# Patient Record
Sex: MALE | Race: BLACK OR AFRICAN AMERICAN | Employment: OTHER | URBAN - METROPOLITAN AREA
[De-identification: names, ages, dates, MRNs, and addresses within clinical notes are randomized per-mention and may not be internally consistent; named-entity substitution may affect disease eponyms.]

---

## 2018-02-15 ENCOUNTER — ED HISTORICAL/CONVERTED ENCOUNTER (OUTPATIENT)
Dept: OTHER | Age: 73
End: 2018-02-15

## 2019-03-04 ENCOUNTER — IP HISTORICAL/CONVERTED ENCOUNTER (OUTPATIENT)
Dept: OTHER | Age: 74
End: 2019-03-04

## 2019-04-04 ENCOUNTER — OP HISTORICAL/CONVERTED ENCOUNTER (OUTPATIENT)
Dept: OTHER | Age: 74
End: 2019-04-04

## 2019-04-18 ENCOUNTER — OP HISTORICAL/CONVERTED ENCOUNTER (OUTPATIENT)
Dept: OTHER | Age: 74
End: 2019-04-18

## 2019-05-02 ENCOUNTER — OP HISTORICAL/CONVERTED ENCOUNTER (OUTPATIENT)
Dept: OTHER | Age: 74
End: 2019-05-02

## 2019-05-14 ENCOUNTER — OP HISTORICAL/CONVERTED ENCOUNTER (OUTPATIENT)
Dept: OTHER | Age: 74
End: 2019-05-14

## 2019-05-16 ENCOUNTER — IP HISTORICAL/CONVERTED ENCOUNTER (OUTPATIENT)
Dept: OTHER | Age: 74
End: 2019-05-16

## 2019-05-30 ENCOUNTER — OP HISTORICAL/CONVERTED ENCOUNTER (OUTPATIENT)
Dept: OTHER | Age: 74
End: 2019-05-30

## 2019-06-27 ENCOUNTER — OP HISTORICAL/CONVERTED ENCOUNTER (OUTPATIENT)
Dept: OTHER | Age: 74
End: 2019-06-27

## 2019-07-11 ENCOUNTER — OP HISTORICAL/CONVERTED ENCOUNTER (OUTPATIENT)
Dept: OTHER | Age: 74
End: 2019-07-11

## 2019-08-01 ENCOUNTER — OP HISTORICAL/CONVERTED ENCOUNTER (OUTPATIENT)
Dept: OTHER | Age: 74
End: 2019-08-01

## 2019-08-22 ENCOUNTER — OP HISTORICAL/CONVERTED ENCOUNTER (OUTPATIENT)
Dept: OTHER | Age: 74
End: 2019-08-22

## 2019-09-05 ENCOUNTER — OP HISTORICAL/CONVERTED ENCOUNTER (OUTPATIENT)
Dept: OTHER | Age: 74
End: 2019-09-05

## 2019-10-03 ENCOUNTER — OP HISTORICAL/CONVERTED ENCOUNTER (OUTPATIENT)
Dept: OTHER | Age: 74
End: 2019-10-03

## 2019-11-22 ENCOUNTER — IP HISTORICAL/CONVERTED ENCOUNTER (OUTPATIENT)
Dept: OTHER | Age: 74
End: 2019-11-22

## 2020-07-10 ENCOUNTER — OP HISTORICAL/CONVERTED ENCOUNTER (OUTPATIENT)
Dept: OTHER | Age: 75
End: 2020-07-10

## 2020-07-19 ENCOUNTER — OP HISTORICAL/CONVERTED ENCOUNTER (OUTPATIENT)
Dept: OTHER | Age: 75
End: 2020-07-19

## 2021-01-01 ENCOUNTER — APPOINTMENT (OUTPATIENT)
Dept: CT IMAGING | Age: 76
DRG: 673 | End: 2021-01-01
Attending: FAMILY MEDICINE
Payer: MEDICARE

## 2021-01-01 ENCOUNTER — OFFICE VISIT (OUTPATIENT)
Dept: INFECTIOUS DISEASES | Age: 76
End: 2021-01-01
Payer: MEDICARE

## 2021-01-01 ENCOUNTER — APPOINTMENT (OUTPATIENT)
Dept: GENERAL RADIOLOGY | Age: 76
DRG: 673 | End: 2021-01-01
Attending: INTERNAL MEDICINE
Payer: MEDICARE

## 2021-01-01 ENCOUNTER — APPOINTMENT (OUTPATIENT)
Dept: ULTRASOUND IMAGING | Age: 76
DRG: 683 | End: 2021-01-01
Attending: INTERNAL MEDICINE
Payer: MEDICARE

## 2021-01-01 ENCOUNTER — HOSPITAL ENCOUNTER (OUTPATIENT)
Dept: INTERVENTIONAL RADIOLOGY/VASCULAR | Age: 76
Discharge: HOME | End: 2021-07-27
Attending: INTERNAL MEDICINE
Payer: MEDICARE

## 2021-01-01 ENCOUNTER — ANESTHESIA EVENT (OUTPATIENT)
Dept: SURGERY | Age: 76
DRG: 571 | End: 2021-01-01
Payer: MEDICARE

## 2021-01-01 ENCOUNTER — APPOINTMENT (OUTPATIENT)
Dept: GENERAL RADIOLOGY | Age: 76
DRG: 673 | End: 2021-01-01
Attending: NURSE PRACTITIONER
Payer: MEDICARE

## 2021-01-01 ENCOUNTER — APPOINTMENT (OUTPATIENT)
Dept: GENERAL RADIOLOGY | Age: 76
DRG: 571 | End: 2021-01-01
Attending: INTERNAL MEDICINE
Payer: MEDICARE

## 2021-01-01 ENCOUNTER — ANESTHESIA (OUTPATIENT)
Dept: SURGERY | Age: 76
DRG: 673 | End: 2021-01-01
Payer: MEDICARE

## 2021-01-01 ENCOUNTER — HOSPITAL ENCOUNTER (EMERGENCY)
Age: 76
Discharge: ANOTHER HOSPITAL | End: 2021-07-21
Attending: STUDENT IN AN ORGANIZED HEALTH CARE EDUCATION/TRAINING PROGRAM
Payer: MEDICARE

## 2021-01-01 ENCOUNTER — APPOINTMENT (OUTPATIENT)
Dept: GENERAL RADIOLOGY | Age: 76
DRG: 673 | End: 2021-01-01
Attending: SURGERY
Payer: MEDICARE

## 2021-01-01 ENCOUNTER — TELEPHONE (OUTPATIENT)
Dept: SURGERY | Age: 76
End: 2021-01-01

## 2021-01-01 ENCOUNTER — APPOINTMENT (OUTPATIENT)
Dept: INTERVENTIONAL RADIOLOGY/VASCULAR | Age: 76
DRG: 673 | End: 2021-01-01
Attending: EMERGENCY MEDICINE
Payer: MEDICARE

## 2021-01-01 ENCOUNTER — APPOINTMENT (OUTPATIENT)
Dept: NON INVASIVE DIAGNOSTICS | Age: 76
DRG: 571 | End: 2021-01-01
Attending: INTERNAL MEDICINE
Payer: MEDICARE

## 2021-01-01 ENCOUNTER — OFFICE VISIT (OUTPATIENT)
Dept: SURGERY | Age: 76
End: 2021-01-01
Payer: MEDICARE

## 2021-01-01 ENCOUNTER — APPOINTMENT (OUTPATIENT)
Dept: ULTRASOUND IMAGING | Age: 76
DRG: 571 | End: 2021-01-01
Attending: INTERNAL MEDICINE
Payer: MEDICARE

## 2021-01-01 ENCOUNTER — ANESTHESIA (OUTPATIENT)
Dept: SURGERY | Age: 76
DRG: 571 | End: 2021-01-01
Payer: MEDICARE

## 2021-01-01 ENCOUNTER — ANESTHESIA EVENT (OUTPATIENT)
Dept: SURGERY | Age: 76
DRG: 673 | End: 2021-01-01
Payer: MEDICARE

## 2021-01-01 ENCOUNTER — APPOINTMENT (OUTPATIENT)
Dept: CT IMAGING | Age: 76
DRG: 571 | End: 2021-01-01
Attending: PHYSICIAN ASSISTANT
Payer: MEDICARE

## 2021-01-01 ENCOUNTER — APPOINTMENT (OUTPATIENT)
Dept: GENERAL RADIOLOGY | Age: 76
DRG: 673 | End: 2021-01-01
Attending: STUDENT IN AN ORGANIZED HEALTH CARE EDUCATION/TRAINING PROGRAM
Payer: MEDICARE

## 2021-01-01 ENCOUNTER — APPOINTMENT (OUTPATIENT)
Dept: GENERAL RADIOLOGY | Age: 76
DRG: 673 | End: 2021-01-01
Attending: EMERGENCY MEDICINE
Payer: MEDICARE

## 2021-01-01 ENCOUNTER — APPOINTMENT (OUTPATIENT)
Dept: GENERAL RADIOLOGY | Age: 76
DRG: 683 | End: 2021-01-01
Attending: INTERNAL MEDICINE
Payer: MEDICARE

## 2021-01-01 ENCOUNTER — HOSPITAL ENCOUNTER (INPATIENT)
Age: 76
LOS: 7 days | Discharge: HOME HEALTH | DRG: 683 | End: 2021-03-11
Attending: EMERGENCY MEDICINE | Admitting: INTERNAL MEDICINE
Payer: MEDICARE

## 2021-01-01 ENCOUNTER — HOSPITAL ENCOUNTER (INPATIENT)
Age: 76
LOS: 15 days | DRG: 673 | End: 2021-08-05
Attending: EMERGENCY MEDICINE | Admitting: INTERNAL MEDICINE
Payer: MEDICARE

## 2021-01-01 ENCOUNTER — APPOINTMENT (OUTPATIENT)
Dept: CT IMAGING | Age: 76
DRG: 673 | End: 2021-01-01
Attending: NURSE PRACTITIONER
Payer: MEDICARE

## 2021-01-01 ENCOUNTER — APPOINTMENT (OUTPATIENT)
Dept: ULTRASOUND IMAGING | Age: 76
DRG: 673 | End: 2021-01-01
Attending: INTERNAL MEDICINE
Payer: MEDICARE

## 2021-01-01 ENCOUNTER — HOSPITAL ENCOUNTER (INPATIENT)
Age: 76
LOS: 14 days | Discharge: HOME HEALTH | DRG: 571 | End: 2021-02-10
Attending: INTERNAL MEDICINE | Admitting: INTERNAL MEDICINE
Payer: MEDICARE

## 2021-01-01 ENCOUNTER — APPOINTMENT (OUTPATIENT)
Dept: GENERAL RADIOLOGY | Age: 76
End: 2021-01-01
Attending: STUDENT IN AN ORGANIZED HEALTH CARE EDUCATION/TRAINING PROGRAM
Payer: MEDICARE

## 2021-01-01 ENCOUNTER — APPOINTMENT (OUTPATIENT)
Dept: CT IMAGING | Age: 76
End: 2021-01-01
Attending: STUDENT IN AN ORGANIZED HEALTH CARE EDUCATION/TRAINING PROGRAM
Payer: MEDICARE

## 2021-01-01 VITALS
HEART RATE: 51 BPM | HEIGHT: 72 IN | TEMPERATURE: 97.7 F | BODY MASS INDEX: 16.18 KG/M2 | RESPIRATION RATE: 4 BRPM | OXYGEN SATURATION: 100 % | DIASTOLIC BLOOD PRESSURE: 20 MMHG | WEIGHT: 119.49 LBS | SYSTOLIC BLOOD PRESSURE: 38 MMHG

## 2021-01-01 VITALS
SYSTOLIC BLOOD PRESSURE: 153 MMHG | WEIGHT: 151.9 LBS | TEMPERATURE: 97.8 F | HEIGHT: 74 IN | RESPIRATION RATE: 18 BRPM | HEART RATE: 60 BPM | DIASTOLIC BLOOD PRESSURE: 83 MMHG | BODY MASS INDEX: 19.49 KG/M2 | OXYGEN SATURATION: 100 %

## 2021-01-01 VITALS
HEART RATE: 67 BPM | DIASTOLIC BLOOD PRESSURE: 67 MMHG | SYSTOLIC BLOOD PRESSURE: 122 MMHG | OXYGEN SATURATION: 100 % | RESPIRATION RATE: 17 BRPM

## 2021-01-01 VITALS
RESPIRATION RATE: 16 BRPM | BODY MASS INDEX: 19.35 KG/M2 | TEMPERATURE: 98 F | OXYGEN SATURATION: 97 % | SYSTOLIC BLOOD PRESSURE: 149 MMHG | HEART RATE: 73 BPM | WEIGHT: 150.79 LBS | DIASTOLIC BLOOD PRESSURE: 80 MMHG | HEIGHT: 74 IN

## 2021-01-01 VITALS
HEIGHT: 74 IN | WEIGHT: 167 LBS | RESPIRATION RATE: 16 BRPM | SYSTOLIC BLOOD PRESSURE: 125 MMHG | BODY MASS INDEX: 21.43 KG/M2 | OXYGEN SATURATION: 95 % | HEART RATE: 58 BPM | DIASTOLIC BLOOD PRESSURE: 71 MMHG

## 2021-01-01 VITALS
DIASTOLIC BLOOD PRESSURE: 58 MMHG | SYSTOLIC BLOOD PRESSURE: 140 MMHG | HEART RATE: 67 BPM | HEIGHT: 74 IN | RESPIRATION RATE: 20 BRPM | OXYGEN SATURATION: 94 % | BODY MASS INDEX: 21.1 KG/M2 | WEIGHT: 164.4 LBS | TEMPERATURE: 98.9 F

## 2021-01-01 VITALS
BODY MASS INDEX: 20.32 KG/M2 | TEMPERATURE: 98.2 F | OXYGEN SATURATION: 96 % | RESPIRATION RATE: 25 BRPM | HEART RATE: 75 BPM | HEIGHT: 72 IN | DIASTOLIC BLOOD PRESSURE: 79 MMHG | SYSTOLIC BLOOD PRESSURE: 169 MMHG | WEIGHT: 150 LBS

## 2021-01-01 DIAGNOSIS — R63.30 FEEDING DIFFICULTIES: ICD-10-CM

## 2021-01-01 DIAGNOSIS — N17.9 AKI (ACUTE KIDNEY INJURY) (HCC): ICD-10-CM

## 2021-01-01 DIAGNOSIS — N17.9 AKI (ACUTE KIDNEY INJURY) (HCC): Primary | ICD-10-CM

## 2021-01-01 DIAGNOSIS — R79.89 ELEVATED SERUM CREATININE: ICD-10-CM

## 2021-01-01 DIAGNOSIS — R51.9 SCALP PAIN: ICD-10-CM

## 2021-01-01 DIAGNOSIS — N39.0 URINARY TRACT INFECTION WITHOUT HEMATURIA, SITE UNSPECIFIED: ICD-10-CM

## 2021-01-01 DIAGNOSIS — Z51.5 END OF LIFE CARE: ICD-10-CM

## 2021-01-01 DIAGNOSIS — L02.811 SCALP ABSCESS: Primary | ICD-10-CM

## 2021-01-01 DIAGNOSIS — N17.9 ACUTE RENAL FAILURE, UNSPECIFIED ACUTE RENAL FAILURE TYPE (HCC): Primary | ICD-10-CM

## 2021-01-01 DIAGNOSIS — A49.01 MSSA INFECTION, NON-INVASIVE: ICD-10-CM

## 2021-01-01 DIAGNOSIS — E87.6 HYPOKALEMIA: ICD-10-CM

## 2021-01-01 DIAGNOSIS — E83.51 HYPOCALCEMIA: ICD-10-CM

## 2021-01-01 DIAGNOSIS — R06.02 SHORTNESS OF BREATH: ICD-10-CM

## 2021-01-01 DIAGNOSIS — N18.4 CHRONIC RENAL FAILURE SYNDROME, STAGE 4 (SEVERE) (HCC): ICD-10-CM

## 2021-01-01 DIAGNOSIS — R09.02 HYPOXIA: ICD-10-CM

## 2021-01-01 DIAGNOSIS — R65.10 SIRS (SYSTEMIC INFLAMMATORY RESPONSE SYNDROME) (HCC): ICD-10-CM

## 2021-01-01 DIAGNOSIS — L02.811 SCALP ABSCESS: ICD-10-CM

## 2021-01-01 DIAGNOSIS — E88.09 HYPOALBUMINEMIA: ICD-10-CM

## 2021-01-01 DIAGNOSIS — J18.9 COMMUNITY ACQUIRED BILATERAL LOWER LOBE PNEUMONIA: ICD-10-CM

## 2021-01-01 DIAGNOSIS — R41.82 ALTERED MENTAL STATUS, UNSPECIFIED ALTERED MENTAL STATUS TYPE: Primary | ICD-10-CM

## 2021-01-01 DIAGNOSIS — K59.81 OGILVIE'S SYNDROME: Chronic | ICD-10-CM

## 2021-01-01 DIAGNOSIS — E87.20 METABOLIC ACIDOSIS: ICD-10-CM

## 2021-01-01 DIAGNOSIS — R41.0 DELIRIUM: ICD-10-CM

## 2021-01-01 DIAGNOSIS — R06.03 ACUTE RESPIRATORY DISTRESS: ICD-10-CM

## 2021-01-01 DIAGNOSIS — R41.0 ACUTE DELIRIUM: ICD-10-CM

## 2021-01-01 LAB
25(OH)D3 SERPL-MCNC: 13.2 NG/ML (ref 30–100)
25(OH)D3 SERPL-MCNC: <9 NG/ML (ref 30–100)
ABO + RH BLD: NORMAL
ALBUMIN SERPL ELPH-MCNC: 3.5 G/DL (ref 2.9–4.4)
ALBUMIN SERPL-MCNC: 2.2 G/DL (ref 3.5–5)
ALBUMIN SERPL-MCNC: 2.3 G/DL (ref 3.5–5)
ALBUMIN SERPL-MCNC: 2.4 G/DL (ref 3.5–5)
ALBUMIN SERPL-MCNC: 2.4 G/DL (ref 3.5–5)
ALBUMIN SERPL-MCNC: 2.5 G/DL (ref 3.5–5)
ALBUMIN SERPL-MCNC: 2.6 G/DL (ref 3.5–5)
ALBUMIN SERPL-MCNC: 2.7 G/DL (ref 3.5–5)
ALBUMIN SERPL-MCNC: 2.8 G/DL (ref 3.5–5)
ALBUMIN SERPL-MCNC: 2.8 G/DL (ref 3.5–5)
ALBUMIN SERPL-MCNC: 2.9 G/DL (ref 3.5–5)
ALBUMIN SERPL-MCNC: 3 G/DL (ref 3.5–5)
ALBUMIN SERPL-MCNC: 3.2 G/DL (ref 3.5–5)
ALBUMIN SERPL-MCNC: 3.3 G/DL (ref 3.5–5)
ALBUMIN SERPL-MCNC: 3.5 G/DL (ref 3.5–5)
ALBUMIN/GLOB SERPL: 0.6 {RATIO} (ref 1.1–2.2)
ALBUMIN/GLOB SERPL: 0.7 {RATIO} (ref 1.1–2.2)
ALBUMIN/GLOB SERPL: 0.8 {RATIO} (ref 1.1–2.2)
ALBUMIN/GLOB SERPL: 0.9 {RATIO} (ref 1.1–2.2)
ALBUMIN/GLOB SERPL: 1.1 {RATIO} (ref 0.7–1.7)
ALP SERPL-CCNC: 104 U/L (ref 45–117)
ALP SERPL-CCNC: 105 U/L (ref 45–117)
ALP SERPL-CCNC: 131 U/L (ref 45–117)
ALP SERPL-CCNC: 136 U/L (ref 45–117)
ALP SERPL-CCNC: 137 U/L (ref 45–117)
ALP SERPL-CCNC: 139 U/L (ref 45–117)
ALP SERPL-CCNC: 139 U/L (ref 45–117)
ALP SERPL-CCNC: 179 U/L (ref 45–117)
ALP SERPL-CCNC: 188 U/L (ref 45–117)
ALP SERPL-CCNC: 223 U/L (ref 45–117)
ALP SERPL-CCNC: 84 U/L (ref 45–117)
ALP SERPL-CCNC: 90 U/L (ref 45–117)
ALPHA1 GLOB SERPL ELPH-MCNC: 0.2 G/DL (ref 0–0.4)
ALPHA2 GLOB SERPL ELPH-MCNC: 0.6 G/DL (ref 0.4–1)
ALT SERPL-CCNC: 105 U/L (ref 12–78)
ALT SERPL-CCNC: 14 U/L (ref 12–78)
ALT SERPL-CCNC: 15 U/L (ref 12–78)
ALT SERPL-CCNC: 152 U/L (ref 12–78)
ALT SERPL-CCNC: 235 U/L (ref 12–78)
ALT SERPL-CCNC: 25 U/L (ref 12–78)
ALT SERPL-CCNC: 26 U/L (ref 12–78)
ALT SERPL-CCNC: 26 U/L (ref 12–78)
ALT SERPL-CCNC: 33 U/L (ref 12–78)
ALT SERPL-CCNC: 40 U/L (ref 12–78)
ALT SERPL-CCNC: 54 U/L (ref 12–78)
ALT SERPL-CCNC: 85 U/L (ref 12–78)
AMPHET UR QL SCN: NEGATIVE
ANION GAP SERPL CALC-SCNC: 10 MMOL/L (ref 5–15)
ANION GAP SERPL CALC-SCNC: 11 MMOL/L (ref 5–15)
ANION GAP SERPL CALC-SCNC: 12 MMOL/L (ref 5–15)
ANION GAP SERPL CALC-SCNC: 13 MMOL/L (ref 5–15)
ANION GAP SERPL CALC-SCNC: 15 MMOL/L (ref 5–15)
ANION GAP SERPL CALC-SCNC: 16 MMOL/L (ref 5–15)
ANION GAP SERPL CALC-SCNC: 16 MMOL/L (ref 5–15)
ANION GAP SERPL CALC-SCNC: 17 MMOL/L (ref 5–15)
ANION GAP SERPL CALC-SCNC: 17 MMOL/L (ref 5–15)
ANION GAP SERPL CALC-SCNC: 19 MMOL/L (ref 5–15)
ANION GAP SERPL CALC-SCNC: 19 MMOL/L (ref 5–15)
ANION GAP SERPL CALC-SCNC: 20 MMOL/L (ref 5–15)
ANION GAP SERPL CALC-SCNC: 21 MMOL/L (ref 5–15)
ANION GAP SERPL CALC-SCNC: 3 MMOL/L (ref 5–15)
ANION GAP SERPL CALC-SCNC: 3 MMOL/L (ref 5–15)
ANION GAP SERPL CALC-SCNC: 4 MMOL/L (ref 5–15)
ANION GAP SERPL CALC-SCNC: 5 MMOL/L (ref 5–15)
ANION GAP SERPL CALC-SCNC: 6 MMOL/L (ref 5–15)
ANION GAP SERPL CALC-SCNC: 7 MMOL/L (ref 5–15)
ANION GAP SERPL CALC-SCNC: 8 MMOL/L (ref 5–15)
ANION GAP SERPL CALC-SCNC: 9 MMOL/L (ref 5–15)
APPEARANCE UR: ABNORMAL
ARTERIAL PATENCY WRIST A: ABNORMAL
ARTERIAL PATENCY WRIST A: NORMAL
ARTERIAL PATENCY WRIST A: POSITIVE
ARTERIAL PATENCY WRIST A: YES
ARTERIAL PATENCY WRIST A: YES
AST SERPL W P-5'-P-CCNC: 12 U/L (ref 15–37)
AST SERPL W P-5'-P-CCNC: 14 U/L (ref 15–37)
AST SERPL W P-5'-P-CCNC: 24 U/L (ref 15–37)
AST SERPL W P-5'-P-CCNC: 28 U/L (ref 15–37)
AST SERPL-CCNC: 106 U/L (ref 15–37)
AST SERPL-CCNC: 18 U/L (ref 15–37)
AST SERPL-CCNC: 22 U/L (ref 15–37)
AST SERPL-CCNC: 23 U/L (ref 15–37)
AST SERPL-CCNC: 27 U/L (ref 15–37)
AST SERPL-CCNC: 40 U/L (ref 15–37)
AST SERPL-CCNC: 66 U/L (ref 15–37)
AST SERPL-CCNC: 728 U/L (ref 15–37)
ATRIAL RATE: 58 BPM
ATRIAL RATE: 75 BPM
ATRIAL RATE: 78 BPM
ATRIAL RATE: 84 BPM
B-GLOBULIN SERPL ELPH-MCNC: 0.8 G/DL (ref 0.7–1.3)
B-OH-BUTYR SERPL-SCNC: 0.04 MMOL/L
BACTERIA SPEC CULT: ABNORMAL
BACTERIA SPEC CULT: NORMAL
BACTERIA URNS QL MICRO: ABNORMAL /HPF
BACTERIA URNS QL MICRO: ABNORMAL /HPF
BACTERIA URNS QL MICRO: NEGATIVE /HPF
BARBITURATES UR QL SCN: NEGATIVE
BASE DEFICIT BLD-SCNC: 16.1 MMOL/L
BASE DEFICIT BLD-SCNC: 19.9 MMOL/L
BASE DEFICIT BLD-SCNC: 3.2 MMOL/L
BASE DEFICIT BLD-SCNC: 5.7 MMOL/L
BASE DEFICIT BLD-SCNC: 5.8 MMOL/L
BASE DEFICIT BLD-SCNC: 7.4 MMOL/L
BASE DEFICIT BLD-SCNC: 7.9 MMOL/L
BASE DEFICIT BLD-SCNC: 8.3 MMOL/L
BASE DEFICIT BLD-SCNC: 8.3 MMOL/L
BASE DEFICIT BLD-SCNC: 9.8 MMOL/L
BASE DEFICIT BLDA-SCNC: 0.7 MMOL/L
BASE DEFICIT BLDA-SCNC: 10.2 MMOL/L
BASE DEFICIT BLDA-SCNC: 18.5 MMOL/L
BASE DEFICIT BLDA-SCNC: 4.7 MMOL/L
BASE DEFICIT BLDA-SCNC: 7.7 MMOL/L
BASE DEFICIT BLDV-SCNC: 21.4 MMOL/L (ref 0–2)
BASE EXCESS BLD CALC-SCNC: 0.7 MMOL/L
BASOPHILS # BLD: 0 K/UL (ref 0–0.1)
BASOPHILS # BLD: 0.1 K/UL (ref 0–0.1)
BASOPHILS NFR BLD: 0 % (ref 0–1)
BASOPHILS NFR BLD: 1 % (ref 0–1)
BDY SITE: ABNORMAL
BDY SITE: NORMAL
BENZODIAZ UR QL: NEGATIVE
BILIRUB SERPL-MCNC: 0.2 MG/DL (ref 0.2–1)
BILIRUB SERPL-MCNC: 0.3 MG/DL (ref 0.2–1)
BILIRUB SERPL-MCNC: 0.3 MG/DL (ref 0.2–1)
BILIRUB SERPL-MCNC: 0.4 MG/DL (ref 0.2–1)
BILIRUB SERPL-MCNC: 0.5 MG/DL (ref 0.2–1)
BILIRUB SERPL-MCNC: 0.6 MG/DL (ref 0.2–1)
BILIRUB SERPL-MCNC: 0.7 MG/DL (ref 0.2–1)
BILIRUB UR QL: NEGATIVE
BLOOD BANK CMNT PATIENT-IMP: NORMAL
BLOOD GROUP ANTIBODIES SERPL: NEGATIVE
BLOOD GROUP ANTIBODIES SERPL: NEGATIVE
BLOOD GROUP ANTIBODIES SERPL: NORMAL
BNP SERPL-MCNC: 3508 PG/ML
BNP SERPL-MCNC: ABNORMAL PG/ML
BUN SERPL-MCNC: 103 MG/DL (ref 6–20)
BUN SERPL-MCNC: 23 MG/DL (ref 6–20)
BUN SERPL-MCNC: 23 MG/DL (ref 6–20)
BUN SERPL-MCNC: 24 MG/DL (ref 6–20)
BUN SERPL-MCNC: 24 MG/DL (ref 6–20)
BUN SERPL-MCNC: 25 MG/DL (ref 6–20)
BUN SERPL-MCNC: 26 MG/DL (ref 6–20)
BUN SERPL-MCNC: 26 MG/DL (ref 6–20)
BUN SERPL-MCNC: 28 MG/DL (ref 6–20)
BUN SERPL-MCNC: 28 MG/DL (ref 6–20)
BUN SERPL-MCNC: 29 MG/DL (ref 6–20)
BUN SERPL-MCNC: 31 MG/DL (ref 6–20)
BUN SERPL-MCNC: 32 MG/DL (ref 6–20)
BUN SERPL-MCNC: 32 MG/DL (ref 6–20)
BUN SERPL-MCNC: 34 MG/DL (ref 6–20)
BUN SERPL-MCNC: 35 MG/DL (ref 6–20)
BUN SERPL-MCNC: 36 MG/DL (ref 6–20)
BUN SERPL-MCNC: 37 MG/DL (ref 6–20)
BUN SERPL-MCNC: 38 MG/DL (ref 6–20)
BUN SERPL-MCNC: 39 MG/DL (ref 6–20)
BUN SERPL-MCNC: 39 MG/DL (ref 6–20)
BUN SERPL-MCNC: 41 MG/DL (ref 6–20)
BUN SERPL-MCNC: 41 MG/DL (ref 6–20)
BUN SERPL-MCNC: 42 MG/DL (ref 6–20)
BUN SERPL-MCNC: 43 MG/DL (ref 6–20)
BUN SERPL-MCNC: 45 MG/DL (ref 6–20)
BUN SERPL-MCNC: 46 MG/DL (ref 6–20)
BUN SERPL-MCNC: 46 MG/DL (ref 6–20)
BUN SERPL-MCNC: 47 MG/DL (ref 6–20)
BUN SERPL-MCNC: 47 MG/DL (ref 6–20)
BUN SERPL-MCNC: 49 MG/DL (ref 6–20)
BUN SERPL-MCNC: 52 MG/DL (ref 6–20)
BUN SERPL-MCNC: 55 MG/DL (ref 6–20)
BUN SERPL-MCNC: 57 MG/DL (ref 6–20)
BUN SERPL-MCNC: 58 MG/DL (ref 6–20)
BUN SERPL-MCNC: 60 MG/DL (ref 6–20)
BUN SERPL-MCNC: 66 MG/DL (ref 6–20)
BUN SERPL-MCNC: 97 MG/DL (ref 6–20)
BUN SERPL-MCNC: 98 MG/DL (ref 6–20)
BUN/CREAT SERPL: 10 (ref 12–20)
BUN/CREAT SERPL: 11 (ref 12–20)
BUN/CREAT SERPL: 12 (ref 12–20)
BUN/CREAT SERPL: 13 (ref 12–20)
BUN/CREAT SERPL: 14 (ref 12–20)
BUN/CREAT SERPL: 15 (ref 12–20)
BUN/CREAT SERPL: 16 (ref 12–20)
BUN/CREAT SERPL: 17 (ref 12–20)
BUN/CREAT SERPL: 18 (ref 12–20)
BUN/CREAT SERPL: 7 (ref 12–20)
BUN/CREAT SERPL: 7 (ref 12–20)
BUN/CREAT SERPL: 8 (ref 12–20)
BUN/CREAT SERPL: 9 (ref 12–20)
C DIFF GDH STL QL: NEGATIVE
C DIFF TOX A+B STL QL IA: NEGATIVE
CA-I BLD-MCNC: 0.88 MMOL/L (ref 1.12–1.32)
CA-I BLD-MCNC: 5.8 MG/DL (ref 8.5–10.1)
CA-I BLD-MCNC: 6.1 MG/DL (ref 8.5–10.1)
CA-I BLD-MCNC: 6.2 MG/DL (ref 8.5–10.1)
CA-I BLD-MCNC: 6.4 MG/DL (ref 8.5–10.1)
CA-I BLD-MCNC: 6.6 MG/DL (ref 8.5–10.1)
CA-I BLD-MCNC: 6.7 MG/DL (ref 8.5–10.1)
CA-I BLD-MCNC: 6.8 MG/DL (ref 8.5–10.1)
CA-I BLD-MCNC: 6.9 MG/DL (ref 8.5–10.1)
CA-I BLD-MCNC: 7 MG/DL (ref 8.5–10.1)
CA-I BLD-MCNC: 7.1 MG/DL (ref 8.5–10.1)
CA-I BLD-MCNC: 7.3 MG/DL (ref 8.5–10.1)
CA-I BLD-MCNC: 7.5 MG/DL (ref 8.5–10.1)
CA-I BLD-MCNC: 7.5 MG/DL (ref 8.5–10.1)
CA-I BLD-MCNC: 7.6 MG/DL (ref 8.5–10.1)
CA-I BLD-MCNC: 7.7 MG/DL (ref 8.5–10.1)
CA-I BLD-MCNC: 7.7 MG/DL (ref 8.5–10.1)
CA-I BLD-MCNC: 7.8 MG/DL (ref 8.5–10.1)
CA-I BLD-MCNC: 7.9 MG/DL (ref 8.5–10.1)
CA-I BLD-MCNC: 8 MG/DL (ref 8.5–10.1)
CA-I BLD-MCNC: 8.4 MG/DL (ref 8.5–10.1)
CA-I BLD-MCNC: 8.6 MG/DL (ref 8.5–10.1)
CA-I BLD-MCNC: 8.6 MG/DL (ref 8.5–10.1)
CA-I BLD-MCNC: 8.7 MG/DL (ref 8.5–10.1)
CA-I BLD-SCNC: 0.82 MMOL/L (ref 1.12–1.32)
CA-I BLD-SCNC: 1.11 MMOL/L (ref 1.12–1.32)
CALCIUM SERPL-MCNC: 6.6 MG/DL (ref 8.5–10.1)
CALCIUM SERPL-MCNC: 6.8 MG/DL (ref 8.5–10.1)
CALCIUM SERPL-MCNC: 7 MG/DL (ref 8.5–10.1)
CALCIUM SERPL-MCNC: 7 MG/DL (ref 8.5–10.1)
CALCIUM SERPL-MCNC: 7.1 MG/DL (ref 8.5–10.1)
CALCIUM SERPL-MCNC: 7.2 MG/DL (ref 8.5–10.1)
CALCIUM SERPL-MCNC: 7.5 MG/DL (ref 8.5–10.1)
CALCIUM SERPL-MCNC: 7.6 MG/DL (ref 8.5–10.1)
CALCIUM SERPL-MCNC: 7.8 MG/DL (ref 8.5–10.1)
CALCIUM SERPL-MCNC: 7.9 MG/DL (ref 8.5–10.1)
CALCIUM SERPL-MCNC: 7.9 MG/DL (ref 8.5–10.1)
CALCIUM SERPL-MCNC: 8 MG/DL (ref 8.5–10.1)
CALCIUM SERPL-MCNC: 8 MG/DL (ref 8.5–10.1)
CALCIUM SERPL-MCNC: 8.1 MG/DL (ref 8.5–10.1)
CALCIUM SERPL-MCNC: 8.2 MG/DL (ref 8.5–10.1)
CALCIUM SERPL-MCNC: 8.3 MG/DL (ref 8.5–10.1)
CALCIUM SERPL-MCNC: 8.4 MG/DL (ref 8.5–10.1)
CALCIUM SERPL-MCNC: 8.5 MG/DL (ref 8.5–10.1)
CALCIUM SERPL-MCNC: 8.6 MG/DL (ref 8.5–10.1)
CALCULATED P AXIS, ECG09: 103 DEGREES
CALCULATED P AXIS, ECG09: 51 DEGREES
CALCULATED R AXIS, ECG10: -86 DEGREES
CALCULATED R AXIS, ECG10: 20 DEGREES
CALCULATED R AXIS, ECG10: 25 DEGREES
CALCULATED R AXIS, ECG10: 36 DEGREES
CALCULATED T AXIS, ECG11: 104 DEGREES
CALCULATED T AXIS, ECG11: 141 DEGREES
CALCULATED T AXIS, ECG11: 27 DEGREES
CALCULATED T AXIS, ECG11: 84 DEGREES
CAMPYLOBACTER SPECIES, DNA: NEGATIVE
CANNABINOIDS UR QL SCN: NEGATIVE
CHLORIDE BLD-SCNC: 113 MMOL/L (ref 100–108)
CHLORIDE SERPL-SCNC: 102 MMOL/L (ref 97–108)
CHLORIDE SERPL-SCNC: 102 MMOL/L (ref 97–108)
CHLORIDE SERPL-SCNC: 103 MMOL/L (ref 97–108)
CHLORIDE SERPL-SCNC: 103 MMOL/L (ref 97–108)
CHLORIDE SERPL-SCNC: 104 MMOL/L (ref 97–108)
CHLORIDE SERPL-SCNC: 105 MMOL/L (ref 97–108)
CHLORIDE SERPL-SCNC: 106 MMOL/L (ref 97–108)
CHLORIDE SERPL-SCNC: 107 MMOL/L (ref 97–108)
CHLORIDE SERPL-SCNC: 108 MMOL/L (ref 97–108)
CHLORIDE SERPL-SCNC: 109 MMOL/L (ref 97–108)
CHLORIDE SERPL-SCNC: 110 MMOL/L (ref 97–108)
CHLORIDE SERPL-SCNC: 111 MMOL/L (ref 97–108)
CHLORIDE SERPL-SCNC: 112 MMOL/L (ref 97–108)
CHLORIDE SERPL-SCNC: 113 MMOL/L (ref 97–108)
CHLORIDE SERPL-SCNC: 114 MMOL/L (ref 97–108)
CHLORIDE SERPL-SCNC: 115 MMOL/L (ref 97–108)
CHLORIDE SERPL-SCNC: 117 MMOL/L (ref 97–108)
CHLORIDE SERPL-SCNC: 117 MMOL/L (ref 97–108)
CHLORIDE SERPL-SCNC: 119 MMOL/L (ref 97–108)
CHLORIDE SERPL-SCNC: 119 MMOL/L (ref 97–108)
CHLORIDE SERPL-SCNC: 120 MMOL/L (ref 97–108)
CHLORIDE SERPL-SCNC: 121 MMOL/L (ref 97–108)
CHLORIDE SERPL-SCNC: 124 MMOL/L (ref 97–108)
CHLORIDE UR-SCNC: 65 MMOL/L
CK SERPL-CCNC: 103 U/L (ref 39–308)
CK SERPL-CCNC: 276 U/L (ref 39–308)
CK SERPL-CCNC: 356 U/L (ref 39–308)
CO2 BLD-SCNC: 16 MMOL/L (ref 19–24)
CO2 SERPL-SCNC: 13 MMOL/L (ref 21–32)
CO2 SERPL-SCNC: 13 MMOL/L (ref 21–32)
CO2 SERPL-SCNC: 14 MMOL/L (ref 21–32)
CO2 SERPL-SCNC: 14 MMOL/L (ref 21–32)
CO2 SERPL-SCNC: 15 MMOL/L (ref 21–32)
CO2 SERPL-SCNC: 16 MMOL/L (ref 21–32)
CO2 SERPL-SCNC: 16 MMOL/L (ref 21–32)
CO2 SERPL-SCNC: 17 MMOL/L (ref 21–32)
CO2 SERPL-SCNC: 18 MMOL/L (ref 21–32)
CO2 SERPL-SCNC: 18 MMOL/L (ref 21–32)
CO2 SERPL-SCNC: 19 MMOL/L (ref 21–32)
CO2 SERPL-SCNC: 20 MMOL/L (ref 21–32)
CO2 SERPL-SCNC: 21 MMOL/L (ref 21–32)
CO2 SERPL-SCNC: 22 MMOL/L (ref 21–32)
CO2 SERPL-SCNC: 23 MMOL/L (ref 21–32)
CO2 SERPL-SCNC: 24 MMOL/L (ref 21–32)
CO2 SERPL-SCNC: 25 MMOL/L (ref 21–32)
CO2 SERPL-SCNC: 26 MMOL/L (ref 21–32)
CO2 SERPL-SCNC: 27 MMOL/L (ref 21–32)
CO2 SERPL-SCNC: 28 MMOL/L (ref 21–32)
CO2 SERPL-SCNC: 6 MMOL/L (ref 21–32)
CO2 SERPL-SCNC: 8 MMOL/L (ref 21–32)
CO2 SERPL-SCNC: 9 MMOL/L (ref 21–32)
COCAINE UR QL SCN: NEGATIVE
COLONY COUNT,CNT: ABNORMAL
COLOR UR: ABNORMAL
COLOR UR: YELLOW
COLOR UR: YELLOW
COMMENT, HOLDF: NORMAL
CREAT SERPL-MCNC: 1.98 MG/DL (ref 0.7–1.3)
CREAT SERPL-MCNC: 2.06 MG/DL (ref 0.7–1.3)
CREAT SERPL-MCNC: 2.13 MG/DL (ref 0.7–1.3)
CREAT SERPL-MCNC: 2.19 MG/DL (ref 0.7–1.3)
CREAT SERPL-MCNC: 2.19 MG/DL (ref 0.7–1.3)
CREAT SERPL-MCNC: 2.29 MG/DL (ref 0.7–1.3)
CREAT SERPL-MCNC: 2.4 MG/DL (ref 0.7–1.3)
CREAT SERPL-MCNC: 2.4 MG/DL (ref 0.7–1.3)
CREAT SERPL-MCNC: 2.42 MG/DL (ref 0.7–1.3)
CREAT SERPL-MCNC: 2.42 MG/DL (ref 0.7–1.3)
CREAT SERPL-MCNC: 2.47 MG/DL (ref 0.7–1.3)
CREAT SERPL-MCNC: 2.49 MG/DL (ref 0.7–1.3)
CREAT SERPL-MCNC: 2.55 MG/DL (ref 0.7–1.3)
CREAT SERPL-MCNC: 2.59 MG/DL (ref 0.7–1.3)
CREAT SERPL-MCNC: 2.6 MG/DL (ref 0.7–1.3)
CREAT SERPL-MCNC: 2.63 MG/DL (ref 0.7–1.3)
CREAT SERPL-MCNC: 2.66 MG/DL (ref 0.7–1.3)
CREAT SERPL-MCNC: 2.71 MG/DL (ref 0.7–1.3)
CREAT SERPL-MCNC: 2.73 MG/DL (ref 0.7–1.3)
CREAT SERPL-MCNC: 2.77 MG/DL (ref 0.7–1.3)
CREAT SERPL-MCNC: 2.81 MG/DL (ref 0.7–1.3)
CREAT SERPL-MCNC: 2.82 MG/DL (ref 0.7–1.3)
CREAT SERPL-MCNC: 2.82 MG/DL (ref 0.7–1.3)
CREAT SERPL-MCNC: 2.85 MG/DL (ref 0.7–1.3)
CREAT SERPL-MCNC: 2.85 MG/DL (ref 0.7–1.3)
CREAT SERPL-MCNC: 2.87 MG/DL (ref 0.7–1.3)
CREAT SERPL-MCNC: 2.9 MG/DL (ref 0.7–1.3)
CREAT SERPL-MCNC: 2.95 MG/DL (ref 0.7–1.3)
CREAT SERPL-MCNC: 2.98 MG/DL (ref 0.7–1.3)
CREAT SERPL-MCNC: 3.01 MG/DL (ref 0.7–1.3)
CREAT SERPL-MCNC: 3.14 MG/DL (ref 0.7–1.3)
CREAT SERPL-MCNC: 3.2 MG/DL (ref 0.7–1.3)
CREAT SERPL-MCNC: 3.21 MG/DL (ref 0.7–1.3)
CREAT SERPL-MCNC: 3.22 MG/DL (ref 0.7–1.3)
CREAT SERPL-MCNC: 3.23 MG/DL (ref 0.7–1.3)
CREAT SERPL-MCNC: 3.25 MG/DL (ref 0.7–1.3)
CREAT SERPL-MCNC: 3.29 MG/DL (ref 0.7–1.3)
CREAT SERPL-MCNC: 3.6 MG/DL (ref 0.7–1.3)
CREAT SERPL-MCNC: 3.61 MG/DL (ref 0.7–1.3)
CREAT SERPL-MCNC: 3.61 MG/DL (ref 0.7–1.3)
CREAT SERPL-MCNC: 3.62 MG/DL (ref 0.7–1.3)
CREAT SERPL-MCNC: 3.8 MG/DL (ref 0.7–1.3)
CREAT SERPL-MCNC: 3.93 MG/DL (ref 0.7–1.3)
CREAT SERPL-MCNC: 4.3 MG/DL (ref 0.7–1.3)
CREAT SERPL-MCNC: 4.39 MG/DL (ref 0.7–1.3)
CREAT SERPL-MCNC: 4.42 MG/DL (ref 0.7–1.3)
CREAT SERPL-MCNC: 4.7 MG/DL (ref 0.7–1.3)
CREAT SERPL-MCNC: 4.77 MG/DL (ref 0.7–1.3)
CREAT SERPL-MCNC: 4.83 MG/DL (ref 0.7–1.3)
CREAT SERPL-MCNC: 5.89 MG/DL (ref 0.7–1.3)
CREAT SERPL-MCNC: 6.24 MG/DL (ref 0.7–1.3)
CREAT SERPL-MCNC: 6.54 MG/DL (ref 0.7–1.3)
CREAT SERPL-MCNC: 6.79 MG/DL (ref 0.7–1.3)
CREAT SERPL-MCNC: 7.08 MG/DL (ref 0.7–1.3)
CREAT SERPL-MCNC: 7.35 MG/DL (ref 0.7–1.3)
CREAT SERPL-MCNC: 7.41 MG/DL (ref 0.7–1.3)
CREAT UR-MCNC: 101 MG/DL
CREAT UR-MCNC: 24.2 MG/DL
CREAT UR-MCNC: 46 MG/DL
CREAT UR-MCNC: 5.5 MG/DL (ref 0.6–1.3)
DIAGNOSIS, 93000: NORMAL
DIFFERENTIAL METHOD BLD: ABNORMAL
DRUG SCRN COMMENT,DRGCM: NORMAL
ECHO AO ROOT DIAM: 3.5 CM
ECHO AV PEAK GRADIENT: 7 MMHG
ECHO LA AREA 4C: 22.09 CM2
ECHO LA MAJOR AXIS: 4.9 CM
ECHO LA MINOR AXIS: 2.53 CM
ECHO LA TO AORTIC ROOT RATIO: 1.4
ECHO LV E' SEPTAL VELOCITY: 4.68 CM/S
ECHO LV EJECTION FRACTION BIPLANE: 71.9 % (ref 55–100)
ECHO LV ESV A2C: 24.6 CM3
ECHO LV INTERNAL DIMENSION DIASTOLIC MMODE: 4.4 CM
ECHO LV INTERNAL DIMENSION SYSTOLIC MMODE: 2.6 CM
ECHO LV IVSD MMODE: 1.61 CM
ECHO LV POSTERIOR WALL DIASTOLIC MMODE: 1.69 CM
ECHO LVOT PEAK GRADIENT: 4 MMHG
ECHO LVOT SV: 67.6 CM3
ECHO MV A VELOCITY: 90.1 CM/S
ECHO MV AREA PHT: 4.68 CM2
ECHO MV E DECELERATION TIME (DT): 204 MS
ECHO MV E VELOCITY: 131 CM/S
ECHO MV E/A RATIO: 1.45
ECHO MV E/E' SEPTAL: 27.99
ECHO MV PRESSURE HALF TIME (PHT): 47 MS
ECHO PV PEAK INSTANTANEOUS GRADIENT SYSTOLIC: 3 MMHG
ECHO PV REGURGITANT MAX VELOCITY: 102 CM/S
ECHO PV REGURGITANT MAX VELOCITY: 134 CM/S
ECHO PV REGURGITANT MAX VELOCITY: 89 CM/S
ECHO RA AREA 4C: 23.75 CM2
ECHO RV INTERNAL DIMENSION: 4.09 CM
ECHO TV MAX VELOCITY: 365 CM/S
ECHO TV REGURGITANT PEAK GRADIENT: 53 MMHG
ENTEROTOXIGEN E COLI, DNA: NEGATIVE
EOSINOPHIL # BLD: 0 K/UL (ref 0–0.4)
EOSINOPHIL # BLD: 0.1 K/UL (ref 0–0.4)
EOSINOPHIL # BLD: 0.2 K/UL (ref 0–0.4)
EOSINOPHIL # BLD: 0.3 K/UL (ref 0–0.4)
EOSINOPHIL #/AREA URNS HPF: NEGATIVE /[HPF]
EOSINOPHIL NFR BLD: 0 % (ref 0–7)
EOSINOPHIL NFR BLD: 1 % (ref 0–7)
EOSINOPHIL NFR BLD: 2 % (ref 0–7)
EOSINOPHIL NFR BLD: 2 % (ref 0–7)
EOSINOPHIL NFR BLD: 3 % (ref 0–7)
EOSINOPHIL NFR BLD: 3 % (ref 0–7)
EOSINOPHIL NFR BLD: 4 % (ref 0–7)
EOSINOPHIL NFR BLD: 6 % (ref 0–7)
EPITH CASTS URNS QL MICRO: ABNORMAL /LPF
ERYTHROCYTE [DISTWIDTH] IN BLOOD BY AUTOMATED COUNT: 15.1 % (ref 11.5–14.5)
ERYTHROCYTE [DISTWIDTH] IN BLOOD BY AUTOMATED COUNT: 15.2 % (ref 11.5–14.5)
ERYTHROCYTE [DISTWIDTH] IN BLOOD BY AUTOMATED COUNT: 15.3 % (ref 11.5–14.5)
ERYTHROCYTE [DISTWIDTH] IN BLOOD BY AUTOMATED COUNT: 15.4 % (ref 11.5–14.5)
ERYTHROCYTE [DISTWIDTH] IN BLOOD BY AUTOMATED COUNT: 15.4 % (ref 11.5–14.5)
ERYTHROCYTE [DISTWIDTH] IN BLOOD BY AUTOMATED COUNT: 15.6 % (ref 11.5–14.5)
ERYTHROCYTE [DISTWIDTH] IN BLOOD BY AUTOMATED COUNT: 15.6 % (ref 11.5–14.5)
ERYTHROCYTE [DISTWIDTH] IN BLOOD BY AUTOMATED COUNT: 16.7 % (ref 11.5–14.5)
ERYTHROCYTE [DISTWIDTH] IN BLOOD BY AUTOMATED COUNT: 16.8 % (ref 11.5–14.5)
ERYTHROCYTE [DISTWIDTH] IN BLOOD BY AUTOMATED COUNT: 17 % (ref 11.5–14.5)
ERYTHROCYTE [DISTWIDTH] IN BLOOD BY AUTOMATED COUNT: 17.1 % (ref 11.5–14.5)
ERYTHROCYTE [DISTWIDTH] IN BLOOD BY AUTOMATED COUNT: 17.2 % (ref 11.5–14.5)
ERYTHROCYTE [DISTWIDTH] IN BLOOD BY AUTOMATED COUNT: 17.3 % (ref 11.5–14.5)
ERYTHROCYTE [DISTWIDTH] IN BLOOD BY AUTOMATED COUNT: 17.8 % (ref 11.5–14.5)
ERYTHROCYTE [DISTWIDTH] IN BLOOD BY AUTOMATED COUNT: 17.9 % (ref 11.5–14.5)
ERYTHROCYTE [DISTWIDTH] IN BLOOD BY AUTOMATED COUNT: 18.1 % (ref 11.5–14.5)
ERYTHROCYTE [DISTWIDTH] IN BLOOD BY AUTOMATED COUNT: 18.2 % (ref 11.5–14.5)
ERYTHROCYTE [DISTWIDTH] IN BLOOD BY AUTOMATED COUNT: 18.5 % (ref 11.5–14.5)
ERYTHROCYTE [DISTWIDTH] IN BLOOD BY AUTOMATED COUNT: 18.6 % (ref 11.5–14.5)
ERYTHROCYTE [DISTWIDTH] IN BLOOD BY AUTOMATED COUNT: 18.7 % (ref 11.5–14.5)
ERYTHROCYTE [DISTWIDTH] IN BLOOD BY AUTOMATED COUNT: 18.8 % (ref 11.5–14.5)
ERYTHROCYTE [DISTWIDTH] IN BLOOD BY AUTOMATED COUNT: 19 % (ref 11.5–14.5)
ERYTHROCYTE [DISTWIDTH] IN BLOOD BY AUTOMATED COUNT: 19.1 % (ref 11.5–14.5)
ERYTHROCYTE [DISTWIDTH] IN BLOOD BY AUTOMATED COUNT: 20.1 % (ref 11.5–14.5)
ERYTHROCYTE [DISTWIDTH] IN BLOOD BY AUTOMATED COUNT: 20.1 % (ref 11.5–14.5)
ERYTHROCYTE [DISTWIDTH] IN BLOOD BY AUTOMATED COUNT: 20.7 % (ref 11.5–14.5)
ERYTHROCYTE [DISTWIDTH] IN BLOOD BY AUTOMATED COUNT: 21.2 % (ref 11.5–14.5)
EST. AVERAGE GLUCOSE BLD GHB EST-MCNC: 131 MG/DL
EST. AVERAGE GLUCOSE BLD GHB EST-MCNC: 131 MG/DL
FERRITIN SERPL-MCNC: 147 NG/ML (ref 26–388)
FERRITIN SERPL-MCNC: 287 NG/ML (ref 26–388)
FERRITIN SERPL-MCNC: 494 NG/ML (ref 26–388)
FIO2 ON VENT: 1 %
FIO2 ON VENT: 21 %
FOLATE SERPL-MCNC: 18.6 NG/ML (ref 5–21)
GAMMA GLOB SERPL ELPH-MCNC: 1.5 G/DL (ref 0.4–1.8)
GAS FLOW.O2 O2 DELIVERY SYS: ABNORMAL L/MIN
GAS FLOW.O2 SETTING OXYMISER: 18 BPM
GAS FLOW.O2 SETTING OXYMISER: 20 BPM
GAS FLOW.O2 SETTING OXYMISER: 22 BPM
GAS FLOW.O2 SETTING OXYMISER: 28 BPM
GAS FLOW.O2 SETTING OXYMISER: 4 BPM
GAS FLOW.O2 SETTING OXYMISER: 4 BPM
GLOBULIN SER CALC-MCNC: 3.2 G/DL (ref 2.2–3.9)
GLOBULIN SER CALC-MCNC: 3.6 G/DL (ref 2–4)
GLOBULIN SER CALC-MCNC: 3.9 G/DL (ref 2–4)
GLOBULIN SER CALC-MCNC: 4.1 G/DL (ref 2–4)
GLOBULIN SER CALC-MCNC: 4.1 G/DL (ref 2–4)
GLOBULIN SER CALC-MCNC: 4.2 G/DL (ref 2–4)
GLOBULIN SER CALC-MCNC: 4.4 G/DL (ref 2–4)
GLOBULIN SER CALC-MCNC: 4.4 G/DL (ref 2–4)
GLOBULIN SER CALC-MCNC: 4.7 G/DL (ref 2–4)
GLOBULIN SER CALC-MCNC: 4.8 G/DL (ref 2–4)
GLOBULIN SER CALC-MCNC: 4.9 G/DL (ref 2–4)
GLUCOSE BLD STRIP.AUTO-MCNC: 100 MG/DL (ref 65–117)
GLUCOSE BLD STRIP.AUTO-MCNC: 101 MG/DL (ref 65–100)
GLUCOSE BLD STRIP.AUTO-MCNC: 102 MG/DL (ref 65–117)
GLUCOSE BLD STRIP.AUTO-MCNC: 104 MG/DL (ref 65–117)
GLUCOSE BLD STRIP.AUTO-MCNC: 104 MG/DL (ref 65–117)
GLUCOSE BLD STRIP.AUTO-MCNC: 106 MG/DL (ref 65–117)
GLUCOSE BLD STRIP.AUTO-MCNC: 106 MG/DL (ref 74–106)
GLUCOSE BLD STRIP.AUTO-MCNC: 107 MG/DL (ref 65–100)
GLUCOSE BLD STRIP.AUTO-MCNC: 107 MG/DL (ref 65–100)
GLUCOSE BLD STRIP.AUTO-MCNC: 107 MG/DL (ref 65–117)
GLUCOSE BLD STRIP.AUTO-MCNC: 107 MG/DL (ref 65–117)
GLUCOSE BLD STRIP.AUTO-MCNC: 108 MG/DL (ref 65–100)
GLUCOSE BLD STRIP.AUTO-MCNC: 108 MG/DL (ref 65–117)
GLUCOSE BLD STRIP.AUTO-MCNC: 110 MG/DL (ref 65–117)
GLUCOSE BLD STRIP.AUTO-MCNC: 111 MG/DL (ref 65–100)
GLUCOSE BLD STRIP.AUTO-MCNC: 114 MG/DL (ref 65–117)
GLUCOSE BLD STRIP.AUTO-MCNC: 116 MG/DL (ref 65–100)
GLUCOSE BLD STRIP.AUTO-MCNC: 116 MG/DL (ref 65–117)
GLUCOSE BLD STRIP.AUTO-MCNC: 116 MG/DL (ref 65–117)
GLUCOSE BLD STRIP.AUTO-MCNC: 118 MG/DL (ref 65–100)
GLUCOSE BLD STRIP.AUTO-MCNC: 118 MG/DL (ref 65–117)
GLUCOSE BLD STRIP.AUTO-MCNC: 119 MG/DL (ref 65–117)
GLUCOSE BLD STRIP.AUTO-MCNC: 119 MG/DL (ref 65–117)
GLUCOSE BLD STRIP.AUTO-MCNC: 121 MG/DL (ref 65–100)
GLUCOSE BLD STRIP.AUTO-MCNC: 121 MG/DL (ref 65–100)
GLUCOSE BLD STRIP.AUTO-MCNC: 121 MG/DL (ref 65–117)
GLUCOSE BLD STRIP.AUTO-MCNC: 124 MG/DL (ref 65–100)
GLUCOSE BLD STRIP.AUTO-MCNC: 124 MG/DL (ref 65–117)
GLUCOSE BLD STRIP.AUTO-MCNC: 125 MG/DL (ref 65–100)
GLUCOSE BLD STRIP.AUTO-MCNC: 126 MG/DL (ref 65–100)
GLUCOSE BLD STRIP.AUTO-MCNC: 126 MG/DL (ref 65–100)
GLUCOSE BLD STRIP.AUTO-MCNC: 127 MG/DL (ref 65–117)
GLUCOSE BLD STRIP.AUTO-MCNC: 130 MG/DL (ref 65–117)
GLUCOSE BLD STRIP.AUTO-MCNC: 130 MG/DL (ref 65–117)
GLUCOSE BLD STRIP.AUTO-MCNC: 133 MG/DL (ref 65–100)
GLUCOSE BLD STRIP.AUTO-MCNC: 134 MG/DL (ref 65–100)
GLUCOSE BLD STRIP.AUTO-MCNC: 135 MG/DL (ref 65–100)
GLUCOSE BLD STRIP.AUTO-MCNC: 135 MG/DL (ref 65–100)
GLUCOSE BLD STRIP.AUTO-MCNC: 136 MG/DL (ref 65–117)
GLUCOSE BLD STRIP.AUTO-MCNC: 136 MG/DL (ref 65–117)
GLUCOSE BLD STRIP.AUTO-MCNC: 137 MG/DL (ref 65–100)
GLUCOSE BLD STRIP.AUTO-MCNC: 141 MG/DL (ref 65–100)
GLUCOSE BLD STRIP.AUTO-MCNC: 141 MG/DL (ref 65–117)
GLUCOSE BLD STRIP.AUTO-MCNC: 142 MG/DL (ref 65–117)
GLUCOSE BLD STRIP.AUTO-MCNC: 143 MG/DL (ref 65–100)
GLUCOSE BLD STRIP.AUTO-MCNC: 143 MG/DL (ref 65–117)
GLUCOSE BLD STRIP.AUTO-MCNC: 144 MG/DL (ref 65–100)
GLUCOSE BLD STRIP.AUTO-MCNC: 144 MG/DL (ref 65–117)
GLUCOSE BLD STRIP.AUTO-MCNC: 146 MG/DL (ref 65–100)
GLUCOSE BLD STRIP.AUTO-MCNC: 146 MG/DL (ref 65–100)
GLUCOSE BLD STRIP.AUTO-MCNC: 146 MG/DL (ref 65–117)
GLUCOSE BLD STRIP.AUTO-MCNC: 147 MG/DL (ref 65–117)
GLUCOSE BLD STRIP.AUTO-MCNC: 153 MG/DL (ref 65–100)
GLUCOSE BLD STRIP.AUTO-MCNC: 154 MG/DL (ref 65–117)
GLUCOSE BLD STRIP.AUTO-MCNC: 156 MG/DL (ref 65–100)
GLUCOSE BLD STRIP.AUTO-MCNC: 156 MG/DL (ref 65–100)
GLUCOSE BLD STRIP.AUTO-MCNC: 157 MG/DL (ref 65–100)
GLUCOSE BLD STRIP.AUTO-MCNC: 157 MG/DL (ref 65–100)
GLUCOSE BLD STRIP.AUTO-MCNC: 159 MG/DL (ref 65–100)
GLUCOSE BLD STRIP.AUTO-MCNC: 159 MG/DL (ref 65–117)
GLUCOSE BLD STRIP.AUTO-MCNC: 159 MG/DL (ref 65–117)
GLUCOSE BLD STRIP.AUTO-MCNC: 165 MG/DL (ref 65–100)
GLUCOSE BLD STRIP.AUTO-MCNC: 166 MG/DL (ref 65–117)
GLUCOSE BLD STRIP.AUTO-MCNC: 168 MG/DL (ref 65–100)
GLUCOSE BLD STRIP.AUTO-MCNC: 170 MG/DL (ref 65–100)
GLUCOSE BLD STRIP.AUTO-MCNC: 170 MG/DL (ref 65–100)
GLUCOSE BLD STRIP.AUTO-MCNC: 170 MG/DL (ref 65–117)
GLUCOSE BLD STRIP.AUTO-MCNC: 171 MG/DL (ref 65–100)
GLUCOSE BLD STRIP.AUTO-MCNC: 173 MG/DL (ref 65–100)
GLUCOSE BLD STRIP.AUTO-MCNC: 173 MG/DL (ref 65–117)
GLUCOSE BLD STRIP.AUTO-MCNC: 174 MG/DL (ref 65–117)
GLUCOSE BLD STRIP.AUTO-MCNC: 176 MG/DL (ref 65–100)
GLUCOSE BLD STRIP.AUTO-MCNC: 177 MG/DL (ref 65–100)
GLUCOSE BLD STRIP.AUTO-MCNC: 179 MG/DL (ref 65–117)
GLUCOSE BLD STRIP.AUTO-MCNC: 180 MG/DL (ref 65–100)
GLUCOSE BLD STRIP.AUTO-MCNC: 180 MG/DL (ref 65–100)
GLUCOSE BLD STRIP.AUTO-MCNC: 180 MG/DL (ref 65–117)
GLUCOSE BLD STRIP.AUTO-MCNC: 184 MG/DL (ref 65–100)
GLUCOSE BLD STRIP.AUTO-MCNC: 184 MG/DL (ref 65–117)
GLUCOSE BLD STRIP.AUTO-MCNC: 184 MG/DL (ref 65–117)
GLUCOSE BLD STRIP.AUTO-MCNC: 185 MG/DL (ref 65–117)
GLUCOSE BLD STRIP.AUTO-MCNC: 186 MG/DL (ref 65–100)
GLUCOSE BLD STRIP.AUTO-MCNC: 190 MG/DL (ref 65–100)
GLUCOSE BLD STRIP.AUTO-MCNC: 191 MG/DL (ref 65–100)
GLUCOSE BLD STRIP.AUTO-MCNC: 192 MG/DL (ref 65–117)
GLUCOSE BLD STRIP.AUTO-MCNC: 193 MG/DL (ref 65–100)
GLUCOSE BLD STRIP.AUTO-MCNC: 193 MG/DL (ref 65–117)
GLUCOSE BLD STRIP.AUTO-MCNC: 194 MG/DL (ref 65–100)
GLUCOSE BLD STRIP.AUTO-MCNC: 195 MG/DL (ref 65–100)
GLUCOSE BLD STRIP.AUTO-MCNC: 196 MG/DL (ref 65–100)
GLUCOSE BLD STRIP.AUTO-MCNC: 196 MG/DL (ref 65–117)
GLUCOSE BLD STRIP.AUTO-MCNC: 197 MG/DL (ref 65–100)
GLUCOSE BLD STRIP.AUTO-MCNC: 199 MG/DL (ref 65–100)
GLUCOSE BLD STRIP.AUTO-MCNC: 199 MG/DL (ref 65–100)
GLUCOSE BLD STRIP.AUTO-MCNC: 200 MG/DL (ref 65–100)
GLUCOSE BLD STRIP.AUTO-MCNC: 201 MG/DL (ref 65–117)
GLUCOSE BLD STRIP.AUTO-MCNC: 203 MG/DL (ref 65–117)
GLUCOSE BLD STRIP.AUTO-MCNC: 206 MG/DL (ref 65–100)
GLUCOSE BLD STRIP.AUTO-MCNC: 210 MG/DL (ref 65–117)
GLUCOSE BLD STRIP.AUTO-MCNC: 211 MG/DL (ref 65–100)
GLUCOSE BLD STRIP.AUTO-MCNC: 212 MG/DL (ref 65–100)
GLUCOSE BLD STRIP.AUTO-MCNC: 213 MG/DL (ref 65–117)
GLUCOSE BLD STRIP.AUTO-MCNC: 214 MG/DL (ref 65–117)
GLUCOSE BLD STRIP.AUTO-MCNC: 218 MG/DL (ref 65–117)
GLUCOSE BLD STRIP.AUTO-MCNC: 219 MG/DL (ref 65–100)
GLUCOSE BLD STRIP.AUTO-MCNC: 222 MG/DL (ref 65–100)
GLUCOSE BLD STRIP.AUTO-MCNC: 225 MG/DL (ref 65–117)
GLUCOSE BLD STRIP.AUTO-MCNC: 226 MG/DL (ref 65–100)
GLUCOSE BLD STRIP.AUTO-MCNC: 228 MG/DL (ref 65–100)
GLUCOSE BLD STRIP.AUTO-MCNC: 228 MG/DL (ref 65–100)
GLUCOSE BLD STRIP.AUTO-MCNC: 228 MG/DL (ref 65–117)
GLUCOSE BLD STRIP.AUTO-MCNC: 229 MG/DL (ref 65–100)
GLUCOSE BLD STRIP.AUTO-MCNC: 229 MG/DL (ref 65–117)
GLUCOSE BLD STRIP.AUTO-MCNC: 231 MG/DL (ref 65–100)
GLUCOSE BLD STRIP.AUTO-MCNC: 233 MG/DL (ref 65–100)
GLUCOSE BLD STRIP.AUTO-MCNC: 234 MG/DL (ref 65–117)
GLUCOSE BLD STRIP.AUTO-MCNC: 240 MG/DL (ref 65–100)
GLUCOSE BLD STRIP.AUTO-MCNC: 240 MG/DL (ref 65–117)
GLUCOSE BLD STRIP.AUTO-MCNC: 241 MG/DL (ref 65–117)
GLUCOSE BLD STRIP.AUTO-MCNC: 242 MG/DL (ref 65–100)
GLUCOSE BLD STRIP.AUTO-MCNC: 243 MG/DL (ref 65–100)
GLUCOSE BLD STRIP.AUTO-MCNC: 247 MG/DL (ref 65–100)
GLUCOSE BLD STRIP.AUTO-MCNC: 249 MG/DL (ref 65–100)
GLUCOSE BLD STRIP.AUTO-MCNC: 250 MG/DL (ref 65–100)
GLUCOSE BLD STRIP.AUTO-MCNC: 251 MG/DL (ref 65–117)
GLUCOSE BLD STRIP.AUTO-MCNC: 259 MG/DL (ref 65–117)
GLUCOSE BLD STRIP.AUTO-MCNC: 260 MG/DL (ref 65–117)
GLUCOSE BLD STRIP.AUTO-MCNC: 268 MG/DL (ref 65–100)
GLUCOSE BLD STRIP.AUTO-MCNC: 273 MG/DL (ref 65–100)
GLUCOSE BLD STRIP.AUTO-MCNC: 283 MG/DL (ref 65–100)
GLUCOSE BLD STRIP.AUTO-MCNC: 283 MG/DL (ref 65–117)
GLUCOSE BLD STRIP.AUTO-MCNC: 284 MG/DL (ref 65–117)
GLUCOSE BLD STRIP.AUTO-MCNC: 288 MG/DL (ref 65–117)
GLUCOSE BLD STRIP.AUTO-MCNC: 289 MG/DL (ref 65–100)
GLUCOSE BLD STRIP.AUTO-MCNC: 298 MG/DL (ref 65–117)
GLUCOSE BLD STRIP.AUTO-MCNC: 312 MG/DL (ref 65–100)
GLUCOSE BLD STRIP.AUTO-MCNC: 316 MG/DL (ref 65–117)
GLUCOSE BLD STRIP.AUTO-MCNC: 320 MG/DL (ref 65–100)
GLUCOSE BLD STRIP.AUTO-MCNC: 328 MG/DL (ref 65–117)
GLUCOSE BLD STRIP.AUTO-MCNC: 334 MG/DL (ref 65–100)
GLUCOSE BLD STRIP.AUTO-MCNC: 345 MG/DL (ref 65–100)
GLUCOSE BLD STRIP.AUTO-MCNC: 362 MG/DL (ref 65–117)
GLUCOSE BLD STRIP.AUTO-MCNC: 364 MG/DL (ref 65–100)
GLUCOSE BLD STRIP.AUTO-MCNC: 38 MG/DL (ref 65–100)
GLUCOSE BLD STRIP.AUTO-MCNC: 39 MG/DL (ref 65–100)
GLUCOSE BLD STRIP.AUTO-MCNC: 72 MG/DL (ref 65–117)
GLUCOSE BLD STRIP.AUTO-MCNC: 80 MG/DL (ref 65–117)
GLUCOSE BLD STRIP.AUTO-MCNC: 87 MG/DL (ref 65–117)
GLUCOSE BLD STRIP.AUTO-MCNC: 88 MG/DL (ref 65–117)
GLUCOSE BLD STRIP.AUTO-MCNC: 89 MG/DL (ref 65–117)
GLUCOSE BLD STRIP.AUTO-MCNC: 90 MG/DL (ref 65–100)
GLUCOSE BLD STRIP.AUTO-MCNC: 92 MG/DL (ref 65–100)
GLUCOSE BLD STRIP.AUTO-MCNC: 99 MG/DL (ref 65–117)
GLUCOSE SERPL-MCNC: 101 MG/DL (ref 65–100)
GLUCOSE SERPL-MCNC: 101 MG/DL (ref 65–100)
GLUCOSE SERPL-MCNC: 104 MG/DL (ref 65–100)
GLUCOSE SERPL-MCNC: 107 MG/DL (ref 65–100)
GLUCOSE SERPL-MCNC: 108 MG/DL (ref 65–100)
GLUCOSE SERPL-MCNC: 119 MG/DL (ref 65–100)
GLUCOSE SERPL-MCNC: 121 MG/DL (ref 65–100)
GLUCOSE SERPL-MCNC: 123 MG/DL (ref 65–100)
GLUCOSE SERPL-MCNC: 124 MG/DL (ref 65–100)
GLUCOSE SERPL-MCNC: 124 MG/DL (ref 65–100)
GLUCOSE SERPL-MCNC: 125 MG/DL (ref 65–100)
GLUCOSE SERPL-MCNC: 128 MG/DL (ref 65–100)
GLUCOSE SERPL-MCNC: 128 MG/DL (ref 65–100)
GLUCOSE SERPL-MCNC: 129 MG/DL (ref 65–100)
GLUCOSE SERPL-MCNC: 132 MG/DL (ref 65–100)
GLUCOSE SERPL-MCNC: 133 MG/DL (ref 65–100)
GLUCOSE SERPL-MCNC: 134 MG/DL (ref 65–100)
GLUCOSE SERPL-MCNC: 137 MG/DL (ref 65–100)
GLUCOSE SERPL-MCNC: 138 MG/DL (ref 65–100)
GLUCOSE SERPL-MCNC: 144 MG/DL (ref 65–100)
GLUCOSE SERPL-MCNC: 144 MG/DL (ref 65–100)
GLUCOSE SERPL-MCNC: 150 MG/DL (ref 65–100)
GLUCOSE SERPL-MCNC: 150 MG/DL (ref 65–100)
GLUCOSE SERPL-MCNC: 158 MG/DL (ref 65–100)
GLUCOSE SERPL-MCNC: 161 MG/DL (ref 65–100)
GLUCOSE SERPL-MCNC: 165 MG/DL (ref 65–100)
GLUCOSE SERPL-MCNC: 165 MG/DL (ref 65–100)
GLUCOSE SERPL-MCNC: 169 MG/DL (ref 65–100)
GLUCOSE SERPL-MCNC: 170 MG/DL (ref 65–100)
GLUCOSE SERPL-MCNC: 172 MG/DL (ref 65–100)
GLUCOSE SERPL-MCNC: 178 MG/DL (ref 65–100)
GLUCOSE SERPL-MCNC: 181 MG/DL (ref 65–100)
GLUCOSE SERPL-MCNC: 189 MG/DL (ref 65–100)
GLUCOSE SERPL-MCNC: 194 MG/DL (ref 65–100)
GLUCOSE SERPL-MCNC: 199 MG/DL (ref 65–100)
GLUCOSE SERPL-MCNC: 201 MG/DL (ref 65–100)
GLUCOSE SERPL-MCNC: 202 MG/DL (ref 65–100)
GLUCOSE SERPL-MCNC: 203 MG/DL (ref 65–100)
GLUCOSE SERPL-MCNC: 213 MG/DL (ref 65–100)
GLUCOSE SERPL-MCNC: 215 MG/DL (ref 65–100)
GLUCOSE SERPL-MCNC: 216 MG/DL (ref 65–100)
GLUCOSE SERPL-MCNC: 217 MG/DL (ref 65–100)
GLUCOSE SERPL-MCNC: 221 MG/DL (ref 65–100)
GLUCOSE SERPL-MCNC: 244 MG/DL (ref 65–100)
GLUCOSE SERPL-MCNC: 245 MG/DL (ref 65–100)
GLUCOSE SERPL-MCNC: 250 MG/DL (ref 65–100)
GLUCOSE SERPL-MCNC: 263 MG/DL (ref 65–100)
GLUCOSE SERPL-MCNC: 265 MG/DL (ref 65–100)
GLUCOSE SERPL-MCNC: 273 MG/DL (ref 65–100)
GLUCOSE SERPL-MCNC: 382 MG/DL (ref 65–100)
GLUCOSE SERPL-MCNC: 384 MG/DL (ref 65–100)
GLUCOSE SERPL-MCNC: 44 MG/DL (ref 65–100)
GLUCOSE SERPL-MCNC: 77 MG/DL (ref 65–100)
GLUCOSE SERPL-MCNC: 86 MG/DL (ref 65–100)
GLUCOSE SERPL-MCNC: 96 MG/DL (ref 65–100)
GLUCOSE SERPL-MCNC: 97 MG/DL (ref 65–100)
GLUCOSE UR STRIP.AUTO-MCNC: NEGATIVE MG/DL
GRAM STN SPEC: NORMAL
GRAM STN SPEC: NORMAL
HBA1C MFR BLD: 6.2 % (ref 4–5.6)
HBA1C MFR BLD: 6.2 % (ref 4–5.6)
HBV CORE AB SERPL QL IA: POSITIVE
HBV CORE AB SERPL QL IA: POSITIVE
HBV CORE IGM SER QL: NONREACTIVE
HBV CORE IGM SERPL QL IA: NEGATIVE
HBV SURFACE AB SER QL: REACTIVE
HBV SURFACE AB SER-ACNC: 13.29 MIU/ML
HBV SURFACE AG SER QL: <0.1 INDEX
HBV SURFACE AG SER QL: NEGATIVE
HCO3 BLD-SCNC: 18.1 MMOL/L (ref 22–26)
HCO3 BLD-SCNC: 18.2 MMOL/L (ref 22–26)
HCO3 BLD-SCNC: 19.9 MMOL/L (ref 22–26)
HCO3 BLD-SCNC: 20.3 MMOL/L (ref 22–26)
HCO3 BLD-SCNC: 22.1 MMOL/L (ref 22–26)
HCO3 BLD-SCNC: 23.2 MMOL/L (ref 22–26)
HCO3 BLD-SCNC: 23.4 MMOL/L (ref 22–26)
HCO3 BLD-SCNC: 25.3 MMOL/L (ref 22–26)
HCO3 BLD-SCNC: 26.5 MMOL/L (ref 22–26)
HCO3 BLD-SCNC: 7.6 MMOL/L (ref 22–26)
HCO3 BLDA-SCNC: 11 MMOL/L (ref 22–26)
HCO3 BLDA-SCNC: 14 MMOL/L
HCO3 BLDA-SCNC: 16 MMOL/L (ref 22–26)
HCO3 BLDA-SCNC: 20 MMOL/L (ref 22–26)
HCO3 BLDA-SCNC: 21 MMOL/L (ref 22–26)
HCO3 BLDA-SCNC: 27 MMOL/L (ref 22–26)
HCO3 BLDV-SCNC: 9 MMOL/L (ref 22–26)
HCT VFR BLD AUTO: 20.6 % (ref 36.6–50.3)
HCT VFR BLD AUTO: 21 % (ref 36.6–50.3)
HCT VFR BLD AUTO: 23 % (ref 36.6–50.3)
HCT VFR BLD AUTO: 23.4 % (ref 36.6–50.3)
HCT VFR BLD AUTO: 23.7 % (ref 36.6–50.3)
HCT VFR BLD AUTO: 23.7 % (ref 36.6–50.3)
HCT VFR BLD AUTO: 23.9 % (ref 36.6–50.3)
HCT VFR BLD AUTO: 24 % (ref 36.6–50.3)
HCT VFR BLD AUTO: 24.2 % (ref 36.6–50.3)
HCT VFR BLD AUTO: 24.3 % (ref 36.6–50.3)
HCT VFR BLD AUTO: 24.7 % (ref 36.6–50.3)
HCT VFR BLD AUTO: 25.2 % (ref 36.6–50.3)
HCT VFR BLD AUTO: 25.3 % (ref 36.6–50.3)
HCT VFR BLD AUTO: 25.9 % (ref 36.6–50.3)
HCT VFR BLD AUTO: 26.3 % (ref 36.6–50.3)
HCT VFR BLD AUTO: 26.6 % (ref 36.6–50.3)
HCT VFR BLD AUTO: 26.7 % (ref 36.6–50.3)
HCT VFR BLD AUTO: 26.8 % (ref 36.6–50.3)
HCT VFR BLD AUTO: 26.9 % (ref 36.6–50.3)
HCT VFR BLD AUTO: 27.4 % (ref 36.6–50.3)
HCT VFR BLD AUTO: 27.8 % (ref 36.6–50.3)
HCT VFR BLD AUTO: 27.9 % (ref 36.6–50.3)
HCT VFR BLD AUTO: 28.4 % (ref 36.6–50.3)
HCT VFR BLD AUTO: 28.5 % (ref 36.6–50.3)
HCT VFR BLD AUTO: 29.1 % (ref 36.6–50.3)
HCT VFR BLD AUTO: 29.2 % (ref 36.6–50.3)
HCT VFR BLD AUTO: 29.3 % (ref 36.6–50.3)
HCT VFR BLD AUTO: 29.9 % (ref 36.6–50.3)
HCT VFR BLD AUTO: 30.8 % (ref 36.6–50.3)
HCT VFR BLD AUTO: 31.5 % (ref 36.6–50.3)
HCT VFR BLD AUTO: 31.6 % (ref 36.6–50.3)
HCT VFR BLD AUTO: 32.1 % (ref 36.6–50.3)
HEMOCCULT STL QL: POSITIVE
HGB BLD-MCNC: 10.6 G/DL (ref 12.1–17)
HGB BLD-MCNC: 7 G/DL (ref 12.1–17)
HGB BLD-MCNC: 7.1 G/DL (ref 12.1–17)
HGB BLD-MCNC: 7.1 G/DL (ref 12.1–17)
HGB BLD-MCNC: 7.3 G/DL (ref 12.1–17)
HGB BLD-MCNC: 7.4 G/DL (ref 12.1–17)
HGB BLD-MCNC: 7.5 G/DL (ref 12.1–17)
HGB BLD-MCNC: 7.5 G/DL (ref 12.1–17)
HGB BLD-MCNC: 7.6 G/DL (ref 12.1–17)
HGB BLD-MCNC: 7.7 G/DL (ref 12.1–17)
HGB BLD-MCNC: 7.7 G/DL (ref 12.1–17)
HGB BLD-MCNC: 7.8 G/DL (ref 12.1–17)
HGB BLD-MCNC: 7.8 G/DL (ref 12.1–17)
HGB BLD-MCNC: 7.9 G/DL (ref 12.1–17)
HGB BLD-MCNC: 8.1 G/DL (ref 12.1–17)
HGB BLD-MCNC: 8.2 G/DL (ref 12.1–17)
HGB BLD-MCNC: 8.2 G/DL (ref 12.1–17)
HGB BLD-MCNC: 8.4 G/DL (ref 12.1–17)
HGB BLD-MCNC: 8.5 G/DL (ref 12.1–17)
HGB BLD-MCNC: 8.7 G/DL (ref 12.1–17)
HGB BLD-MCNC: 8.8 G/DL (ref 12.1–17)
HGB BLD-MCNC: 8.9 G/DL (ref 12.1–17)
HGB BLD-MCNC: 9 G/DL (ref 12.1–17)
HGB BLD-MCNC: 9.1 G/DL (ref 12.1–17)
HGB BLD-MCNC: 9.2 G/DL (ref 12.1–17)
HGB BLD-MCNC: 9.2 G/DL (ref 12.1–17)
HGB BLD-MCNC: 9.3 G/DL (ref 12.1–17)
HGB BLD-MCNC: 9.5 G/DL (ref 12.1–17)
HGB BLD-MCNC: 9.7 G/DL (ref 12.1–17)
HGB BLD-MCNC: 9.8 G/DL (ref 12.1–17)
HGB UR QL STRIP: ABNORMAL
HGB UR QL STRIP: ABNORMAL
HGB UR QL STRIP: NEGATIVE
HISTORY CHECKED?,CKHIST: NORMAL
HYALINE CASTS URNS QL MICRO: ABNORMAL /LPF (ref 0–5)
IMM GRANULOCYTES # BLD AUTO: 0 K/UL
IMM GRANULOCYTES # BLD AUTO: 0 K/UL (ref 0–0.04)
IMM GRANULOCYTES # BLD AUTO: 0.1 K/UL (ref 0–0.04)
IMM GRANULOCYTES # BLD AUTO: 0.2 K/UL (ref 0–0.04)
IMM GRANULOCYTES # BLD AUTO: 0.4 K/UL (ref 0–0.04)
IMM GRANULOCYTES NFR BLD AUTO: 0 %
IMM GRANULOCYTES NFR BLD AUTO: 0 % (ref 0–0.5)
IMM GRANULOCYTES NFR BLD AUTO: 1 % (ref 0–0.5)
IMM GRANULOCYTES NFR BLD AUTO: 2 % (ref 0–0.5)
INR PPP: 1.2 (ref 0.9–1.1)
INR PPP: 1.5 (ref 0.9–1.1)
INTERPRETATION: NORMAL
IRON SATN MFR SERPL: 10 % (ref 20–50)
IRON SATN MFR SERPL: 14 % (ref 20–50)
IRON SATN MFR SERPL: 9 % (ref 20–50)
IRON SERPL-MCNC: 17 UG/DL (ref 35–150)
IRON SERPL-MCNC: 20 UG/DL (ref 35–150)
IRON SERPL-MCNC: 21 UG/DL (ref 35–150)
IRON SERPL-MCNC: 23 UG/DL (ref 35–150)
KETONES UR QL STRIP.AUTO: NEGATIVE MG/DL
LACTATE BLD-SCNC: 6.6 MMOL/L (ref 0.4–2)
LACTATE SERPL-SCNC: 0.7 MMOL/L (ref 0.4–2)
LACTATE SERPL-SCNC: 0.7 MMOL/L (ref 0.4–2)
LACTATE SERPL-SCNC: 0.9 MMOL/L (ref 0.4–2)
LACTATE SERPL-SCNC: 1.2 MMOL/L (ref 0.4–2)
LACTATE SERPL-SCNC: 1.5 MMOL/L (ref 0.4–2)
LACTATE SERPL-SCNC: 1.5 MMOL/L (ref 0.4–2)
LACTATE SERPL-SCNC: 1.7 MMOL/L (ref 0.4–2)
LACTATE SERPL-SCNC: 10 MMOL/L (ref 0.4–2)
LACTATE SERPL-SCNC: 3 MMOL/L (ref 0.4–2)
LACTATE SERPL-SCNC: 8.2 MMOL/L (ref 0.4–2)
LEUKOCYTE ESTERASE UR QL STRIP.AUTO: ABNORMAL
LYMPHOCYTES # BLD: 0.4 K/UL (ref 0.8–3.5)
LYMPHOCYTES # BLD: 0.5 K/UL (ref 0.8–3.5)
LYMPHOCYTES # BLD: 0.7 K/UL (ref 0.8–3.5)
LYMPHOCYTES # BLD: 0.7 K/UL (ref 0.8–3.5)
LYMPHOCYTES # BLD: 0.8 K/UL (ref 0.8–3.5)
LYMPHOCYTES # BLD: 0.9 K/UL (ref 0.8–3.5)
LYMPHOCYTES # BLD: 1 K/UL (ref 0.8–3.5)
LYMPHOCYTES # BLD: 1 K/UL (ref 0.8–3.5)
LYMPHOCYTES # BLD: 1.1 K/UL (ref 0.8–3.5)
LYMPHOCYTES # BLD: 1.1 K/UL (ref 0.8–3.5)
LYMPHOCYTES # BLD: 1.2 K/UL (ref 0.8–3.5)
LYMPHOCYTES # BLD: 1.3 K/UL (ref 0.8–3.5)
LYMPHOCYTES # BLD: 1.4 K/UL (ref 0.8–3.5)
LYMPHOCYTES # BLD: 1.4 K/UL (ref 0.8–3.5)
LYMPHOCYTES # BLD: 1.5 K/UL (ref 0.8–3.5)
LYMPHOCYTES NFR BLD: 15 % (ref 12–49)
LYMPHOCYTES NFR BLD: 15 % (ref 12–49)
LYMPHOCYTES NFR BLD: 16 % (ref 12–49)
LYMPHOCYTES NFR BLD: 16 % (ref 12–49)
LYMPHOCYTES NFR BLD: 18 % (ref 12–49)
LYMPHOCYTES NFR BLD: 19 % (ref 12–49)
LYMPHOCYTES NFR BLD: 2 % (ref 12–49)
LYMPHOCYTES NFR BLD: 2 % (ref 12–49)
LYMPHOCYTES NFR BLD: 20 % (ref 12–49)
LYMPHOCYTES NFR BLD: 24 % (ref 12–49)
LYMPHOCYTES NFR BLD: 24 % (ref 12–49)
LYMPHOCYTES NFR BLD: 27 % (ref 12–49)
LYMPHOCYTES NFR BLD: 3 % (ref 12–49)
LYMPHOCYTES NFR BLD: 4 % (ref 12–49)
LYMPHOCYTES NFR BLD: 4 % (ref 12–49)
LYMPHOCYTES NFR BLD: 5 % (ref 12–49)
LYMPHOCYTES NFR BLD: 5 % (ref 12–49)
LYMPHOCYTES NFR BLD: 6 % (ref 12–49)
LYMPHOCYTES NFR BLD: 7 % (ref 12–49)
LYMPHOCYTES NFR BLD: 8 % (ref 12–49)
M PROTEIN SERPL ELPH-MCNC: 0.4 G/DL
MAGNESIUM SERPL-MCNC: 1.2 MG/DL (ref 1.6–2.4)
MAGNESIUM SERPL-MCNC: 1.4 MG/DL (ref 1.6–2.4)
MAGNESIUM SERPL-MCNC: 1.4 MG/DL (ref 1.6–2.4)
MAGNESIUM SERPL-MCNC: 1.6 MG/DL (ref 1.6–2.4)
MAGNESIUM SERPL-MCNC: 1.6 MG/DL (ref 1.6–2.4)
MAGNESIUM SERPL-MCNC: 1.8 MG/DL (ref 1.6–2.4)
MAGNESIUM SERPL-MCNC: 2.1 MG/DL (ref 1.6–2.4)
MAGNESIUM SERPL-MCNC: 2.2 MG/DL (ref 1.6–2.4)
MAGNESIUM SERPL-MCNC: 2.4 MG/DL (ref 1.6–2.4)
MAGNESIUM SERPL-MCNC: 2.4 MG/DL (ref 1.6–2.4)
MAGNESIUM SERPL-MCNC: 2.5 MG/DL (ref 1.6–2.4)
MAGNESIUM SERPL-MCNC: 2.6 MG/DL (ref 1.6–2.4)
MAGNESIUM SERPL-MCNC: 2.7 MG/DL (ref 1.6–2.4)
MCH RBC QN AUTO: 28.1 PG (ref 26–34)
MCH RBC QN AUTO: 28.2 PG (ref 26–34)
MCH RBC QN AUTO: 28.2 PG (ref 26–34)
MCH RBC QN AUTO: 28.3 PG (ref 26–34)
MCH RBC QN AUTO: 28.4 PG (ref 26–34)
MCH RBC QN AUTO: 28.5 PG (ref 26–34)
MCH RBC QN AUTO: 28.6 PG (ref 26–34)
MCH RBC QN AUTO: 28.7 PG (ref 26–34)
MCH RBC QN AUTO: 28.9 PG (ref 26–34)
MCH RBC QN AUTO: 28.9 PG (ref 26–34)
MCH RBC QN AUTO: 29.1 PG (ref 26–34)
MCH RBC QN AUTO: 29.2 PG (ref 26–34)
MCH RBC QN AUTO: 29.3 PG (ref 26–34)
MCH RBC QN AUTO: 29.3 PG (ref 26–34)
MCH RBC QN AUTO: 29.4 PG (ref 26–34)
MCH RBC QN AUTO: 29.5 PG (ref 26–34)
MCH RBC QN AUTO: 29.7 PG (ref 26–34)
MCH RBC QN AUTO: 30 PG (ref 26–34)
MCH RBC QN AUTO: 30.1 PG (ref 26–34)
MCHC RBC AUTO-ENTMCNC: 29.7 G/DL (ref 30–36.5)
MCHC RBC AUTO-ENTMCNC: 29.8 G/DL (ref 30–36.5)
MCHC RBC AUTO-ENTMCNC: 29.9 G/DL (ref 30–36.5)
MCHC RBC AUTO-ENTMCNC: 30.2 G/DL (ref 30–36.5)
MCHC RBC AUTO-ENTMCNC: 30.2 G/DL (ref 30–36.5)
MCHC RBC AUTO-ENTMCNC: 30.3 G/DL (ref 30–36.5)
MCHC RBC AUTO-ENTMCNC: 30.5 G/DL (ref 30–36.5)
MCHC RBC AUTO-ENTMCNC: 30.5 G/DL (ref 30–36.5)
MCHC RBC AUTO-ENTMCNC: 30.7 G/DL (ref 30–36.5)
MCHC RBC AUTO-ENTMCNC: 30.9 G/DL (ref 30–36.5)
MCHC RBC AUTO-ENTMCNC: 31.2 G/DL (ref 30–36.5)
MCHC RBC AUTO-ENTMCNC: 31.3 G/DL (ref 30–36.5)
MCHC RBC AUTO-ENTMCNC: 31.5 G/DL (ref 30–36.5)
MCHC RBC AUTO-ENTMCNC: 31.5 G/DL (ref 30–36.5)
MCHC RBC AUTO-ENTMCNC: 31.6 G/DL (ref 30–36.5)
MCHC RBC AUTO-ENTMCNC: 31.6 G/DL (ref 30–36.5)
MCHC RBC AUTO-ENTMCNC: 31.7 G/DL (ref 30–36.5)
MCHC RBC AUTO-ENTMCNC: 31.8 G/DL (ref 30–36.5)
MCHC RBC AUTO-ENTMCNC: 31.8 G/DL (ref 30–36.5)
MCHC RBC AUTO-ENTMCNC: 31.9 G/DL (ref 30–36.5)
MCHC RBC AUTO-ENTMCNC: 32.1 G/DL (ref 30–36.5)
MCHC RBC AUTO-ENTMCNC: 32.4 G/DL (ref 30–36.5)
MCHC RBC AUTO-ENTMCNC: 32.4 G/DL (ref 30–36.5)
MCHC RBC AUTO-ENTMCNC: 32.5 G/DL (ref 30–36.5)
MCHC RBC AUTO-ENTMCNC: 32.7 G/DL (ref 30–36.5)
MCHC RBC AUTO-ENTMCNC: 33.1 G/DL (ref 30–36.5)
MCHC RBC AUTO-ENTMCNC: 33.2 G/DL (ref 30–36.5)
MCHC RBC AUTO-ENTMCNC: 33.5 G/DL (ref 30–36.5)
MCHC RBC AUTO-ENTMCNC: 33.8 G/DL (ref 30–36.5)
MCHC RBC AUTO-ENTMCNC: 34 G/DL (ref 30–36.5)
MCV RBC AUTO: 83.3 FL (ref 80–99)
MCV RBC AUTO: 83.4 FL (ref 80–99)
MCV RBC AUTO: 86.5 FL (ref 80–99)
MCV RBC AUTO: 86.8 FL (ref 80–99)
MCV RBC AUTO: 86.9 FL (ref 80–99)
MCV RBC AUTO: 87.9 FL (ref 80–99)
MCV RBC AUTO: 88 FL (ref 80–99)
MCV RBC AUTO: 88.2 FL (ref 80–99)
MCV RBC AUTO: 88.5 FL (ref 80–99)
MCV RBC AUTO: 88.8 FL (ref 80–99)
MCV RBC AUTO: 89.1 FL (ref 80–99)
MCV RBC AUTO: 89.3 FL (ref 80–99)
MCV RBC AUTO: 89.4 FL (ref 80–99)
MCV RBC AUTO: 90.3 FL (ref 80–99)
MCV RBC AUTO: 90.3 FL (ref 80–99)
MCV RBC AUTO: 90.7 FL (ref 80–99)
MCV RBC AUTO: 91.2 FL (ref 80–99)
MCV RBC AUTO: 92 FL (ref 80–99)
MCV RBC AUTO: 92.4 FL (ref 80–99)
MCV RBC AUTO: 93.7 FL (ref 80–99)
MCV RBC AUTO: 93.9 FL (ref 80–99)
MCV RBC AUTO: 93.9 FL (ref 80–99)
MCV RBC AUTO: 94 FL (ref 80–99)
MCV RBC AUTO: 94.1 FL (ref 80–99)
MCV RBC AUTO: 94.1 FL (ref 80–99)
MCV RBC AUTO: 94.5 FL (ref 80–99)
MCV RBC AUTO: 95.1 FL (ref 80–99)
MCV RBC AUTO: 95.2 FL (ref 80–99)
MCV RBC AUTO: 95.7 FL (ref 80–99)
MCV RBC AUTO: 96.3 FL (ref 80–99)
MCV RBC AUTO: 96.9 FL (ref 80–99)
MCV RBC AUTO: 97.5 FL (ref 80–99)
METAMYELOCYTES NFR BLD MANUAL: 1 %
METAMYELOCYTES NFR BLD MANUAL: 3 %
METHADONE UR QL: NEGATIVE
MONOCYTES # BLD: 0.4 K/UL (ref 0–1)
MONOCYTES # BLD: 0.4 K/UL (ref 0–1)
MONOCYTES # BLD: 0.5 K/UL (ref 0–1)
MONOCYTES # BLD: 0.6 K/UL (ref 0–1)
MONOCYTES # BLD: 0.6 K/UL (ref 0–1)
MONOCYTES # BLD: 0.7 K/UL (ref 0–1)
MONOCYTES # BLD: 0.8 K/UL (ref 0–1)
MONOCYTES # BLD: 0.9 K/UL (ref 0–1)
MONOCYTES # BLD: 1 K/UL (ref 0–1)
MONOCYTES # BLD: 1.1 K/UL (ref 0–1)
MONOCYTES # BLD: 1.3 K/UL (ref 0–1)
MONOCYTES # BLD: 1.4 K/UL (ref 0–1)
MONOCYTES # BLD: 1.7 K/UL (ref 0–1)
MONOCYTES NFR BLD: 11 % (ref 5–13)
MONOCYTES NFR BLD: 11 % (ref 5–13)
MONOCYTES NFR BLD: 12 % (ref 5–13)
MONOCYTES NFR BLD: 12 % (ref 5–13)
MONOCYTES NFR BLD: 13 % (ref 5–13)
MONOCYTES NFR BLD: 13 % (ref 5–13)
MONOCYTES NFR BLD: 14 % (ref 5–13)
MONOCYTES NFR BLD: 3 % (ref 5–13)
MONOCYTES NFR BLD: 4 % (ref 5–13)
MONOCYTES NFR BLD: 4 % (ref 5–13)
MONOCYTES NFR BLD: 5 % (ref 5–13)
MONOCYTES NFR BLD: 5 % (ref 5–13)
MONOCYTES NFR BLD: 7 % (ref 5–13)
MONOCYTES NFR BLD: 7 % (ref 5–13)
MONOCYTES NFR BLD: 8 % (ref 5–13)
MONOCYTES NFR BLD: 9 % (ref 5–13)
MRSA DNA SPEC QL NAA+PROBE: NOT DETECTED
MUCOUS THREADS URNS QL MICRO: ABNORMAL /LPF
MV DEC SLOPE: 7600 MM/S2
MV DEC SLOPE: 7600 MM/S2
MYELOCYTES NFR BLD MANUAL: 3 %
NEUTS BAND NFR BLD MANUAL: 1 % (ref 0–6)
NEUTS BAND NFR BLD MANUAL: 2 % (ref 0–6)
NEUTS BAND NFR BLD MANUAL: 5 % (ref 0–6)
NEUTS SEG # BLD: 13.3 K/UL (ref 1.8–8)
NEUTS SEG # BLD: 14.8 K/UL (ref 1.8–8)
NEUTS SEG # BLD: 15.5 K/UL (ref 1.8–8)
NEUTS SEG # BLD: 16.7 K/UL (ref 1.8–8)
NEUTS SEG # BLD: 17.8 K/UL (ref 1.8–8)
NEUTS SEG # BLD: 18.5 K/UL (ref 1.8–8)
NEUTS SEG # BLD: 19 K/UL (ref 1.8–8)
NEUTS SEG # BLD: 2.3 K/UL (ref 1.8–8)
NEUTS SEG # BLD: 23.1 K/UL (ref 1.8–8)
NEUTS SEG # BLD: 3 K/UL (ref 1.8–8)
NEUTS SEG # BLD: 3 K/UL (ref 1.8–8)
NEUTS SEG # BLD: 3.4 K/UL (ref 1.8–8)
NEUTS SEG # BLD: 4.1 K/UL (ref 1.8–8)
NEUTS SEG # BLD: 4.1 K/UL (ref 1.8–8)
NEUTS SEG # BLD: 5.2 K/UL (ref 1.8–8)
NEUTS SEG # BLD: 5.2 K/UL (ref 1.8–8)
NEUTS SEG # BLD: 5.3 K/UL (ref 1.8–8)
NEUTS SEG # BLD: 6.7 K/UL (ref 1.8–8)
NEUTS SEG # BLD: 7.1 K/UL (ref 1.8–8)
NEUTS SEG # BLD: 8.2 K/UL (ref 1.8–8)
NEUTS SEG # BLD: 9.4 K/UL (ref 1.8–8)
NEUTS SEG # BLD: 9.8 K/UL (ref 1.8–8)
NEUTS SEG NFR BLD: 57 % (ref 32–75)
NEUTS SEG NFR BLD: 58 % (ref 32–75)
NEUTS SEG NFR BLD: 61 % (ref 32–75)
NEUTS SEG NFR BLD: 65 % (ref 32–75)
NEUTS SEG NFR BLD: 66 % (ref 32–75)
NEUTS SEG NFR BLD: 67 % (ref 32–75)
NEUTS SEG NFR BLD: 68 % (ref 32–75)
NEUTS SEG NFR BLD: 75 % (ref 32–75)
NEUTS SEG NFR BLD: 79 % (ref 32–75)
NEUTS SEG NFR BLD: 82 % (ref 32–75)
NEUTS SEG NFR BLD: 82 % (ref 32–75)
NEUTS SEG NFR BLD: 83 % (ref 32–75)
NEUTS SEG NFR BLD: 84 % (ref 32–75)
NEUTS SEG NFR BLD: 85 % (ref 32–75)
NEUTS SEG NFR BLD: 85 % (ref 32–75)
NEUTS SEG NFR BLD: 86 % (ref 32–75)
NEUTS SEG NFR BLD: 89 % (ref 32–75)
NEUTS SEG NFR BLD: 91 % (ref 32–75)
NEUTS SEG NFR BLD: 91 % (ref 32–75)
NEUTS SEG NFR BLD: 92 % (ref 32–75)
NITRITE UR QL STRIP.AUTO: NEGATIVE
NITRITE UR QL STRIP.AUTO: NEGATIVE
NITRITE UR QL STRIP.AUTO: POSITIVE
NRBC # BLD: 0 K/UL (ref 0–0.01)
NRBC # BLD: 0.08 K/UL (ref 0–0.01)
NRBC # BLD: 0.08 K/UL (ref 0–0.01)
NRBC # BLD: 0.16 K/UL (ref 0–0.01)
NRBC # BLD: 0.18 K/UL (ref 0–0.01)
NRBC # BLD: 0.28 K/UL (ref 0–0.01)
NRBC # BLD: 0.29 K/UL (ref 0–0.01)
NRBC # BLD: 0.3 K/UL (ref 0–0.01)
NRBC # BLD: 0.31 K/UL (ref 0–0.01)
NRBC # BLD: 0.31 K/UL (ref 0–0.01)
NRBC # BLD: 0.39 K/UL (ref 0–0.01)
NRBC # BLD: 0.39 K/UL (ref 0–0.01)
NRBC # BLD: 0.45 K/UL (ref 0–0.01)
NRBC # BLD: 0.75 K/UL (ref 0–0.01)
NRBC # BLD: 0.92 K/UL (ref 0–0.01)
NRBC # BLD: 0.97 K/UL (ref 0–0.01)
NRBC # BLD: 1.61 K/UL (ref 0–0.01)
NRBC BLD-RTO: 0 PER 100 WBC
NRBC BLD-RTO: 0.8 PER 100 WBC
NRBC BLD-RTO: 0.9 PER 100 WBC
NRBC BLD-RTO: 1 PER 100 WBC
NRBC BLD-RTO: 1.9 PER 100 WBC
NRBC BLD-RTO: 2.7 PER 100 WBC
NRBC BLD-RTO: 2.7 PER 100 WBC
NRBC BLD-RTO: 3.3 PER 100 WBC
NRBC BLD-RTO: 3.4 PER 100 WBC
NRBC BLD-RTO: 3.6 PER 100 WBC
NRBC BLD-RTO: 4.2 PER 100 WBC
NRBC BLD-RTO: 4.3 PER 100 WBC
NRBC BLD-RTO: 4.5 PER 100 WBC
NRBC BLD-RTO: 5.2 PER 100 WBC
NRBC BLD-RTO: 6 PER 100 WBC
NRBC BLD-RTO: 6.8 PER 100 WBC
O+P SPEC MICRO: NORMAL
O+P STL CONC: NORMAL
O2/TOTAL GAS SETTING VFR VENT: 2 %
O2/TOTAL GAS SETTING VFR VENT: 21 %
O2/TOTAL GAS SETTING VFR VENT: 28 %
O2/TOTAL GAS SETTING VFR VENT: 40 %
O2/TOTAL GAS SETTING VFR VENT: 40 %
O2/TOTAL GAS SETTING VFR VENT: 70 %
OPIATES UR QL: NEGATIVE
P SHIGELLOIDES DNA STL QL NAA+PROBE: NEGATIVE
P-R INTERVAL, ECG05: 208 MS
P-R INTERVAL, ECG05: 248 MS
PAW @ MEAN EXP FLOW ON VENT: 13 CMH2O
PCO2 BLD: 23.2 MMHG (ref 35–45)
PCO2 BLD: 38.3 MMHG (ref 35–45)
PCO2 BLD: 39.3 MMHG (ref 35–45)
PCO2 BLD: 41.1 MMHG (ref 35–45)
PCO2 BLD: 47.9 MMHG (ref 35–45)
PCO2 BLD: 51.1 MMHG (ref 35–45)
PCO2 BLD: 51.9 MMHG (ref 35–45)
PCO2 BLD: 61.6 MMHG (ref 35–45)
PCO2 BLD: 64 MMHG (ref 35–45)
PCO2 BLD: 71.5 MMHG (ref 35–45)
PCO2 BLD: 84 MMHG (ref 35–45)
PCO2 BLDA: 37 MMHG (ref 35–45)
PCO2 BLDA: 40 MMHG (ref 35–45)
PCO2 BLDA: 41 MMHG (ref 35–45)
PCO2 BLDA: 49 MMHG (ref 35–45)
PCO2 BLDA: 59 MMHG (ref 35–45)
PCO2 BLDV: 26 MMHG (ref 40–50)
PCP UR QL: NEGATIVE
PEEP RESPIRATORY: 5 CMH2O
PEEP RESPIRATORY: 5 CM[H2O]
PEEP RESPIRATORY: 6 CMH2O
PERFORMED BY, TECHID: ABNORMAL
PERFORMED BY, TECHID: NORMAL
PERFORMED BY, TECHID: NORMAL
PH BLD: 7.04 [PH] (ref 7.35–7.45)
PH BLD: 7.05 [PH] (ref 7.35–7.45)
PH BLD: 7.13 [PH] (ref 7.35–7.45)
PH BLD: 7.15 [PH] (ref 7.35–7.45)
PH BLD: 7.18 [PH] (ref 7.35–7.45)
PH BLD: 7.18 [PH] (ref 7.35–7.45)
PH BLD: 7.19 [PH] (ref 7.35–7.45)
PH BLD: 7.19 [PH] (ref 7.35–7.45)
PH BLD: 7.28 [PH] (ref 7.35–7.45)
PH BLD: 7.3 [PH] (ref 7.35–7.45)
PH BLD: 7.42 [PH] (ref 7.35–7.45)
PH BLDA: 7.06 [PH] (ref 7.35–7.45)
PH BLDA: 7.23 [PH] (ref 7.35–7.45)
PH BLDA: 7.26 [PH] (ref 7.35–7.45)
PH BLDA: 7.28 [PH] (ref 7.35–7.45)
PH BLDA: 7.33 [PH] (ref 7.35–7.45)
PH BLDV: 7.1 [PH] (ref 7.31–7.41)
PH UR STRIP: 5 [PH] (ref 5–8)
PHOSPHATE SERPL-MCNC: 2.1 MG/DL (ref 2.6–4.7)
PHOSPHATE SERPL-MCNC: 2.7 MG/DL (ref 2.6–4.7)
PHOSPHATE SERPL-MCNC: 2.8 MG/DL (ref 2.6–4.7)
PHOSPHATE SERPL-MCNC: 2.9 MG/DL (ref 2.6–4.7)
PHOSPHATE SERPL-MCNC: 3 MG/DL (ref 2.6–4.7)
PHOSPHATE SERPL-MCNC: 3.1 MG/DL (ref 2.6–4.7)
PHOSPHATE SERPL-MCNC: 3.2 MG/DL (ref 2.6–4.7)
PHOSPHATE SERPL-MCNC: 3.4 MG/DL (ref 2.6–4.7)
PHOSPHATE SERPL-MCNC: 3.5 MG/DL (ref 2.6–4.7)
PHOSPHATE SERPL-MCNC: 3.5 MG/DL (ref 2.6–4.7)
PHOSPHATE SERPL-MCNC: 3.6 MG/DL (ref 2.6–4.7)
PHOSPHATE SERPL-MCNC: 3.7 MG/DL (ref 2.6–4.7)
PHOSPHATE SERPL-MCNC: 3.8 MG/DL (ref 2.6–4.7)
PHOSPHATE SERPL-MCNC: 3.9 MG/DL (ref 2.6–4.7)
PHOSPHATE SERPL-MCNC: 4.1 MG/DL (ref 2.6–4.7)
PHOSPHATE SERPL-MCNC: 4.2 MG/DL (ref 2.6–4.7)
PHOSPHATE SERPL-MCNC: 4.9 MG/DL (ref 2.6–4.7)
PHOSPHATE SERPL-MCNC: 4.9 MG/DL (ref 2.6–4.7)
PHOSPHATE SERPL-MCNC: 7.1 MG/DL (ref 2.6–4.7)
PHOSPHATE SERPL-MCNC: 9.8 MG/DL (ref 2.6–4.7)
PIP ISTAT,IPIP: 14
PIP ISTAT,IPIP: 14
PIP ISTAT,IPIP: 15
PIP ISTAT,IPIP: 16
PIP ISTAT,IPIP: 20
PIP ISTAT,IPIP: 20
PLATELET # BLD AUTO: 109 K/UL (ref 150–400)
PLATELET # BLD AUTO: 113 K/UL (ref 150–400)
PLATELET # BLD AUTO: 114 K/UL (ref 150–400)
PLATELET # BLD AUTO: 131 K/UL (ref 150–400)
PLATELET # BLD AUTO: 132 K/UL (ref 150–400)
PLATELET # BLD AUTO: 137 K/UL (ref 150–400)
PLATELET # BLD AUTO: 143 K/UL (ref 150–400)
PLATELET # BLD AUTO: 144 K/UL (ref 150–400)
PLATELET # BLD AUTO: 145 K/UL (ref 150–400)
PLATELET # BLD AUTO: 147 K/UL (ref 150–400)
PLATELET # BLD AUTO: 149 K/UL (ref 150–400)
PLATELET # BLD AUTO: 155 K/UL (ref 150–400)
PLATELET # BLD AUTO: 160 K/UL (ref 150–400)
PLATELET # BLD AUTO: 170 K/UL (ref 150–400)
PLATELET # BLD AUTO: 172 K/UL (ref 150–400)
PLATELET # BLD AUTO: 173 K/UL (ref 150–400)
PLATELET # BLD AUTO: 174 K/UL (ref 150–400)
PLATELET # BLD AUTO: 174 K/UL (ref 150–400)
PLATELET # BLD AUTO: 176 K/UL (ref 150–400)
PLATELET # BLD AUTO: 177 K/UL (ref 150–400)
PLATELET # BLD AUTO: 177 K/UL (ref 150–400)
PLATELET # BLD AUTO: 182 K/UL (ref 150–400)
PLATELET # BLD AUTO: 183 K/UL (ref 150–400)
PLATELET # BLD AUTO: 191 K/UL (ref 150–400)
PLATELET # BLD AUTO: 201 K/UL (ref 150–400)
PLATELET # BLD AUTO: 273 K/UL (ref 150–400)
PLATELET # BLD AUTO: 308 K/UL (ref 150–400)
PLATELET # BLD AUTO: 329 K/UL (ref 150–400)
PLATELET # BLD AUTO: 333 K/UL (ref 150–400)
PLATELET # BLD AUTO: 333 K/UL (ref 150–400)
PLATELET # BLD AUTO: 339 K/UL (ref 150–400)
PLATELET # BLD AUTO: 97 K/UL (ref 150–400)
PLATELET COMMENTS,PCOM: ABNORMAL
PMV BLD AUTO: 10 FL (ref 8.9–12.9)
PMV BLD AUTO: 10.1 FL (ref 8.9–12.9)
PMV BLD AUTO: 10.1 FL (ref 8.9–12.9)
PMV BLD AUTO: 10.2 FL (ref 8.9–12.9)
PMV BLD AUTO: 10.3 FL (ref 8.9–12.9)
PMV BLD AUTO: 10.4 FL (ref 8.9–12.9)
PMV BLD AUTO: 10.4 FL (ref 8.9–12.9)
PMV BLD AUTO: 10.5 FL (ref 8.9–12.9)
PMV BLD AUTO: 10.6 FL (ref 8.9–12.9)
PMV BLD AUTO: 10.7 FL (ref 8.9–12.9)
PMV BLD AUTO: 10.7 FL (ref 8.9–12.9)
PMV BLD AUTO: 10.8 FL (ref 8.9–12.9)
PMV BLD AUTO: 10.9 FL (ref 8.9–12.9)
PMV BLD AUTO: 11 FL (ref 8.9–12.9)
PMV BLD AUTO: 11.2 FL (ref 8.9–12.9)
PMV BLD AUTO: 11.4 FL (ref 8.9–12.9)
PMV BLD AUTO: 11.4 FL (ref 8.9–12.9)
PMV BLD AUTO: 11.6 FL (ref 8.9–12.9)
PMV BLD AUTO: 12.1 FL (ref 8.9–12.9)
PMV BLD AUTO: 12.1 FL (ref 8.9–12.9)
PMV BLD AUTO: 12.7 FL (ref 8.9–12.9)
PMV BLD AUTO: 9.8 FL (ref 8.9–12.9)
PMV BLD AUTO: 9.9 FL (ref 8.9–12.9)
PO2 BLD: 109 MMHG (ref 80–100)
PO2 BLD: 132 MMHG (ref 80–100)
PO2 BLD: 172 MMHG (ref 80–100)
PO2 BLD: 200 MMHG (ref 80–100)
PO2 BLD: 202 MMHG (ref 80–100)
PO2 BLD: 283 MMHG (ref 80–100)
PO2 BLD: 426 MMHG (ref 80–100)
PO2 BLD: 57 MMHG (ref 80–100)
PO2 BLD: 71 MMHG (ref 80–100)
PO2 BLD: 77 MMHG (ref 80–100)
PO2 BLD: 80 MMHG (ref 80–100)
PO2 BLDA: 110 MMHG (ref 80–100)
PO2 BLDA: 127 MMHG (ref 80–100)
PO2 BLDA: 132 MMHG (ref 80–100)
PO2 BLDA: 157 MMHG (ref 80–100)
PO2 BLDA: 408 MMHG (ref 80–100)
PO2 BLDV: 95 MMHG (ref 36–42)
POTASSIUM BLD-SCNC: 3.2 MMOL/L (ref 3.5–5.5)
POTASSIUM SERPL-SCNC: 2.1 MMOL/L (ref 3.5–5.1)
POTASSIUM SERPL-SCNC: 2.3 MMOL/L (ref 3.5–5.1)
POTASSIUM SERPL-SCNC: 2.3 MMOL/L (ref 3.5–5.1)
POTASSIUM SERPL-SCNC: 2.4 MMOL/L (ref 3.5–5.1)
POTASSIUM SERPL-SCNC: 2.5 MMOL/L (ref 3.5–5.1)
POTASSIUM SERPL-SCNC: 2.6 MMOL/L (ref 3.5–5.1)
POTASSIUM SERPL-SCNC: 2.7 MMOL/L (ref 3.5–5.1)
POTASSIUM SERPL-SCNC: 2.8 MMOL/L (ref 3.5–5.1)
POTASSIUM SERPL-SCNC: 2.9 MMOL/L (ref 3.5–5.1)
POTASSIUM SERPL-SCNC: 3 MMOL/L (ref 3.5–5.1)
POTASSIUM SERPL-SCNC: 3.1 MMOL/L (ref 3.5–5.1)
POTASSIUM SERPL-SCNC: 3.1 MMOL/L (ref 3.5–5.1)
POTASSIUM SERPL-SCNC: 3.2 MMOL/L (ref 3.5–5.1)
POTASSIUM SERPL-SCNC: 3.2 MMOL/L (ref 3.5–5.1)
POTASSIUM SERPL-SCNC: 3.3 MMOL/L (ref 3.5–5.1)
POTASSIUM SERPL-SCNC: 3.4 MMOL/L (ref 3.5–5.1)
POTASSIUM SERPL-SCNC: 3.4 MMOL/L (ref 3.5–5.1)
POTASSIUM SERPL-SCNC: 3.5 MMOL/L (ref 3.5–5.1)
POTASSIUM SERPL-SCNC: 3.6 MMOL/L (ref 3.5–5.1)
POTASSIUM SERPL-SCNC: 3.7 MMOL/L (ref 3.5–5.1)
POTASSIUM SERPL-SCNC: 3.8 MMOL/L (ref 3.5–5.1)
POTASSIUM SERPL-SCNC: 3.9 MMOL/L (ref 3.5–5.1)
POTASSIUM SERPL-SCNC: 4 MMOL/L (ref 3.5–5.1)
POTASSIUM SERPL-SCNC: 4.1 MMOL/L (ref 3.5–5.1)
POTASSIUM SERPL-SCNC: 4.1 MMOL/L (ref 3.5–5.1)
POTASSIUM SERPL-SCNC: 4.2 MMOL/L (ref 3.5–5.1)
POTASSIUM SERPL-SCNC: 4.2 MMOL/L (ref 3.5–5.1)
POTASSIUM SERPL-SCNC: 4.4 MMOL/L (ref 3.5–5.1)
POTASSIUM SERPL-SCNC: 4.4 MMOL/L (ref 3.5–5.1)
POTASSIUM SERPL-SCNC: 4.5 MMOL/L (ref 3.5–5.1)
POTASSIUM SERPL-SCNC: 4.7 MMOL/L (ref 3.5–5.1)
POTASSIUM SERPL-SCNC: 4.8 MMOL/L (ref 3.5–5.1)
POTASSIUM SERPL-SCNC: 4.8 MMOL/L (ref 3.5–5.1)
POTASSIUM SERPL-SCNC: 4.9 MMOL/L (ref 3.5–5.1)
POTASSIUM SERPL-SCNC: 5 MMOL/L (ref 3.5–5.1)
POTASSIUM UR-SCNC: 7 MMOL/L
PRESSURE SUPPORT SETTING VENT: 8 CMH2O
PRESSURE SUPPORT SETTING VENT: 8 CMH2O
PROT SERPL-MCNC: 6.6 G/DL (ref 6.4–8.2)
PROT SERPL-MCNC: 6.7 G/DL (ref 6–8.5)
PROT SERPL-MCNC: 6.9 G/DL (ref 6.4–8.2)
PROT SERPL-MCNC: 7 G/DL (ref 6.4–8.2)
PROT SERPL-MCNC: 7 G/DL (ref 6.4–8.2)
PROT SERPL-MCNC: 7.1 G/DL (ref 6.4–8.2)
PROT SERPL-MCNC: 7.2 G/DL (ref 6.4–8.2)
PROT SERPL-MCNC: 7.6 G/DL (ref 6.4–8.2)
PROT SERPL-MCNC: 7.7 G/DL (ref 6.4–8.2)
PROT SERPL-MCNC: 7.7 G/DL (ref 6.4–8.2)
PROT SERPL-MCNC: 7.8 G/DL (ref 6.4–8.2)
PROT SERPL-MCNC: 7.9 G/DL (ref 6.4–8.2)
PROT SERPL-MCNC: 8.4 G/DL (ref 6.4–8.2)
PROT UR STRIP-MCNC: 100 MG/DL
PROT UR STRIP-MCNC: 100 MG/DL
PROT UR STRIP-MCNC: ABNORMAL MG/DL
PROT UR-MCNC: 85 MG/DL (ref 0–11.9)
PROTHROMBIN TIME: 12.1 SEC (ref 9–11.1)
PROTHROMBIN TIME: 15.2 SEC (ref 9–11.1)
PTH-INTACT SERPL-MCNC: 248.5 PG/ML (ref 18.4–88)
PTH-INTACT SERPL-MCNC: 352.6 PG/ML (ref 18.4–88)
Q-T INTERVAL, ECG07: 430 MS
Q-T INTERVAL, ECG07: 432 MS
Q-T INTERVAL, ECG07: 438 MS
Q-T INTERVAL, ECG07: 470 MS
QRS DURATION, ECG06: 108 MS
QRS DURATION, ECG06: 138 MS
QRS DURATION, ECG06: 92 MS
QRS DURATION, ECG06: 98 MS
QTC CALCULATION (BEZET), ECG08: 465 MS
QTC CALCULATION (BEZET), ECG08: 479 MS
QTC CALCULATION (BEZET), ECG08: 490 MS
QTC CALCULATION (BEZET), ECG08: 510 MS
RBC # BLD AUTO: 2.46 M/UL (ref 4.1–5.7)
RBC # BLD AUTO: 2.47 M/UL (ref 4.1–5.7)
RBC # BLD AUTO: 2.49 M/UL (ref 4.1–5.7)
RBC # BLD AUTO: 2.5 M/UL (ref 4.1–5.7)
RBC # BLD AUTO: 2.52 M/UL (ref 4.1–5.7)
RBC # BLD AUTO: 2.54 M/UL (ref 4.1–5.7)
RBC # BLD AUTO: 2.65 M/UL (ref 4.1–5.7)
RBC # BLD AUTO: 2.65 M/UL (ref 4.1–5.7)
RBC # BLD AUTO: 2.68 M/UL (ref 4.1–5.7)
RBC # BLD AUTO: 2.7 M/UL (ref 4.1–5.7)
RBC # BLD AUTO: 2.72 M/UL (ref 4.1–5.7)
RBC # BLD AUTO: 2.72 M/UL (ref 4.1–5.7)
RBC # BLD AUTO: 2.78 M/UL (ref 4.1–5.7)
RBC # BLD AUTO: 2.8 M/UL (ref 4.1–5.7)
RBC # BLD AUTO: 2.84 M/UL (ref 4.1–5.7)
RBC # BLD AUTO: 2.84 M/UL (ref 4.1–5.7)
RBC # BLD AUTO: 2.86 M/UL (ref 4.1–5.7)
RBC # BLD AUTO: 2.9 M/UL (ref 4.1–5.7)
RBC # BLD AUTO: 2.99 M/UL (ref 4.1–5.7)
RBC # BLD AUTO: 3.06 M/UL (ref 4.1–5.7)
RBC # BLD AUTO: 3.08 M/UL (ref 4.1–5.7)
RBC # BLD AUTO: 3.1 M/UL (ref 4.1–5.7)
RBC # BLD AUTO: 3.1 M/UL (ref 4.1–5.7)
RBC # BLD AUTO: 3.17 M/UL (ref 4.1–5.7)
RBC # BLD AUTO: 3.18 M/UL (ref 4.1–5.7)
RBC # BLD AUTO: 3.22 M/UL (ref 4.1–5.7)
RBC # BLD AUTO: 3.23 M/UL (ref 4.1–5.7)
RBC # BLD AUTO: 3.24 M/UL (ref 4.1–5.7)
RBC # BLD AUTO: 3.27 M/UL (ref 4.1–5.7)
RBC # BLD AUTO: 3.4 M/UL (ref 4.1–5.7)
RBC # BLD AUTO: 3.41 M/UL (ref 4.1–5.7)
RBC # BLD AUTO: 3.64 M/UL (ref 4.1–5.7)
RBC #/AREA URNS HPF: >100 /HPF (ref 0–5)
RBC #/AREA URNS HPF: ABNORMAL /HPF (ref 0–5)
RBC #/AREA URNS HPF: ABNORMAL /HPF (ref 0–5)
RBC MORPH BLD: ABNORMAL
SALMONELLA SPECIES, DNA: NEGATIVE
SAMPLES BEING HELD,HOLD: NORMAL
SAO2 % BLD: 100 % (ref 92–97)
SAO2 % BLD: 80.3 % (ref 92–97)
SAO2 % BLD: 89.5 % (ref 92–97)
SAO2 % BLD: 91.4 % (ref 92–97)
SAO2 % BLD: 96 % (ref 92–97)
SAO2 % BLD: 97 % (ref 92–97)
SAO2 % BLD: 97.7 % (ref 92–97)
SAO2 % BLD: 98 % (ref 92–97)
SAO2 % BLD: 99 % (ref 92–97)
SAO2 % BLD: 99.3 % (ref 92–97)
SAO2 % BLD: 99.4 % (ref 92–97)
SAO2 % BLD: 99.4 % (ref 92–97)
SAO2 % BLD: 99.7 % (ref 92–97)
SAO2 % BLDV: 97 % (ref 60–80)
SAO2% DEVICE SAO2% SENSOR NAME: ABNORMAL
SERVICE CMNT-IMP: ABNORMAL
SERVICE CMNT-IMP: NORMAL
SHIGA TOXIN PRODUCING, DNA: NEGATIVE
SHIGELLA SP+EIEC IPAH STL QL NAA+PROBE: NEGATIVE
SODIUM BLD-SCNC: 145 MMOL/L (ref 136–145)
SODIUM SERPL-SCNC: 136 MMOL/L (ref 136–145)
SODIUM SERPL-SCNC: 136 MMOL/L (ref 136–145)
SODIUM SERPL-SCNC: 137 MMOL/L (ref 136–145)
SODIUM SERPL-SCNC: 138 MMOL/L (ref 136–145)
SODIUM SERPL-SCNC: 139 MMOL/L (ref 136–145)
SODIUM SERPL-SCNC: 140 MMOL/L (ref 136–145)
SODIUM SERPL-SCNC: 141 MMOL/L (ref 136–145)
SODIUM SERPL-SCNC: 142 MMOL/L (ref 136–145)
SODIUM SERPL-SCNC: 143 MMOL/L (ref 136–145)
SODIUM SERPL-SCNC: 144 MMOL/L (ref 136–145)
SODIUM SERPL-SCNC: 146 MMOL/L (ref 136–145)
SODIUM SERPL-SCNC: 147 MMOL/L (ref 136–145)
SODIUM SERPL-SCNC: 147 MMOL/L (ref 136–145)
SODIUM UR-SCNC: 118 MMOL/L
SODIUM UR-SCNC: 36 MMOL/L
SODIUM UR-SCNC: 55 MMOL/L
SP GR UR REFRACTOMETRY: 1.01 (ref 1–1.03)
SPECIAL REQUESTS,SREQ: ABNORMAL
SPECIAL REQUESTS,SREQ: NORMAL
SPECIMEN EXP DATE BLD: NORMAL
SPECIMEN SITE: ABNORMAL
SPECIMEN SOURCE: NORMAL
SPECIMEN TYPE: ABNORMAL
SPECIMEN TYPE: NORMAL
TIBC SERPL-MCNC: 169 UG/DL (ref 250–450)
TIBC SERPL-MCNC: 209 UG/DL (ref 250–450)
TIBC SERPL-MCNC: 213 UG/DL (ref 250–450)
TROPONIN I SERPL-MCNC: 0.05 NG/ML
TROPONIN I SERPL-MCNC: 0.06 NG/ML
TROPONIN I SERPL-MCNC: 0.11 NG/ML
TSH SERPL DL<=0.05 MIU/L-ACNC: 2.17 UIU/ML (ref 0.36–3.74)
UA: UC IF INDICATED,UAUC: ABNORMAL
UROBILINOGEN UR QL STRIP.AUTO: 0.1 EU/DL (ref 0.1–1)
UROBILINOGEN UR QL STRIP.AUTO: 0.2 EU/DL (ref 0.2–1)
UROBILINOGEN UR QL STRIP.AUTO: NORMAL EU/DL (ref 0.1–1)
VENTILATION MODE VENT: ABNORMAL
VENTILATION MODE VENT: NORMAL
VENTRICULAR RATE, ECG03: 59 BPM
VENTRICULAR RATE, ECG03: 72 BPM
VENTRICULAR RATE, ECG03: 78 BPM
VENTRICULAR RATE, ECG03: 84 BPM
VIBRIO SPECIES, DNA: NEGATIVE
VIT B12 SERPL-MCNC: 598 PG/ML (ref 193–986)
VT SETTING VENT: 440 ML
VT SETTING VENT: 550 ML
WBC # BLD AUTO: 10.7 K/UL (ref 4.1–11.1)
WBC # BLD AUTO: 11.5 K/UL (ref 4.1–11.1)
WBC # BLD AUTO: 13.5 K/UL (ref 4.1–11.1)
WBC # BLD AUTO: 14.2 K/UL (ref 4.1–11.1)
WBC # BLD AUTO: 14.3 K/UL (ref 4.1–11.1)
WBC # BLD AUTO: 16.1 K/UL (ref 4.1–11.1)
WBC # BLD AUTO: 17.6 K/UL (ref 4.1–11.1)
WBC # BLD AUTO: 18.2 K/UL (ref 4.1–11.1)
WBC # BLD AUTO: 18.3 K/UL (ref 4.1–11.1)
WBC # BLD AUTO: 18.6 K/UL (ref 4.1–11.1)
WBC # BLD AUTO: 18.8 K/UL (ref 4.1–11.1)
WBC # BLD AUTO: 19.4 K/UL (ref 4.1–11.1)
WBC # BLD AUTO: 20.3 K/UL (ref 4.1–11.1)
WBC # BLD AUTO: 20.6 K/UL (ref 4.1–11.1)
WBC # BLD AUTO: 20.9 K/UL (ref 4.1–11.1)
WBC # BLD AUTO: 26.9 K/UL (ref 4.1–11.1)
WBC # BLD AUTO: 3.8 K/UL (ref 4.1–11.1)
WBC # BLD AUTO: 4.6 K/UL (ref 4.1–11.1)
WBC # BLD AUTO: 5.2 K/UL (ref 4.1–11.1)
WBC # BLD AUTO: 5.8 K/UL (ref 4.1–11.1)
WBC # BLD AUTO: 5.9 K/UL (ref 4.1–11.1)
WBC # BLD AUTO: 6.1 K/UL (ref 4.1–11.1)
WBC # BLD AUTO: 6.6 K/UL (ref 4.1–11.1)
WBC # BLD AUTO: 7.5 K/UL (ref 4.1–11.1)
WBC # BLD AUTO: 7.8 K/UL (ref 4.1–11.1)
WBC # BLD AUTO: 7.8 K/UL (ref 4.1–11.1)
WBC # BLD AUTO: 8.3 K/UL (ref 4.1–11.1)
WBC # BLD AUTO: 8.9 K/UL (ref 4.1–11.1)
WBC # BLD AUTO: 9 K/UL (ref 4.1–11.1)
WBC # BLD AUTO: 9.1 K/UL (ref 4.1–11.1)
WBC # BLD AUTO: 9.2 K/UL (ref 4.1–11.1)
WBC # BLD AUTO: 9.7 K/UL (ref 4.1–11.1)
WBC MORPH BLD: ABNORMAL
WBC URNS QL MICRO: >100 /HPF (ref 0–4)
Y. ENTEROCOLITICA, DNA: NEGATIVE

## 2021-01-01 PROCEDURE — 82962 GLUCOSE BLOOD TEST: CPT

## 2021-01-01 PROCEDURE — 36600 WITHDRAWAL OF ARTERIAL BLOOD: CPT

## 2021-01-01 PROCEDURE — 36415 COLL VENOUS BLD VENIPUNCTURE: CPT

## 2021-01-01 PROCEDURE — 99024 POSTOP FOLLOW-UP VISIT: CPT | Performed by: SURGERY

## 2021-01-01 PROCEDURE — 1111F DSCHRG MED/CURRENT MED MERGE: CPT | Performed by: INTERNAL MEDICINE

## 2021-01-01 PROCEDURE — 74011000258 HC RX REV CODE- 258: Performed by: INTERNAL MEDICINE

## 2021-01-01 PROCEDURE — 83735 ASSAY OF MAGNESIUM: CPT

## 2021-01-01 PROCEDURE — 74011250636 HC RX REV CODE- 250/636: Performed by: NURSE PRACTITIONER

## 2021-01-01 PROCEDURE — 82803 BLOOD GASES ANY COMBINATION: CPT

## 2021-01-01 PROCEDURE — 96374 THER/PROPH/DIAG INJ IV PUSH: CPT

## 2021-01-01 PROCEDURE — 74011000250 HC RX REV CODE- 250

## 2021-01-01 PROCEDURE — 86900 BLOOD TYPING SEROLOGIC ABO: CPT

## 2021-01-01 PROCEDURE — 90935 HEMODIALYSIS ONE EVALUATION: CPT

## 2021-01-01 PROCEDURE — 85025 COMPLETE CBC W/AUTO DIFF WBC: CPT

## 2021-01-01 PROCEDURE — 82550 ASSAY OF CK (CPK): CPT

## 2021-01-01 PROCEDURE — 87205 SMEAR GRAM STAIN: CPT

## 2021-01-01 PROCEDURE — 02PAX3Z REMOVAL OF INFUSION DEVICE FROM HEART, EXTERNAL APPROACH: ICD-10-PCS | Performed by: STUDENT IN AN ORGANIZED HEALTH CARE EDUCATION/TRAINING PROGRAM

## 2021-01-01 PROCEDURE — 83605 ASSAY OF LACTIC ACID: CPT

## 2021-01-01 PROCEDURE — 87324 CLOSTRIDIUM AG IA: CPT

## 2021-01-01 PROCEDURE — 74011250637 HC RX REV CODE- 250/637: Performed by: INTERNAL MEDICINE

## 2021-01-01 PROCEDURE — 83880 ASSAY OF NATRIURETIC PEPTIDE: CPT

## 2021-01-01 PROCEDURE — G8432 DEP SCR NOT DOC, RNG: HCPCS | Performed by: INTERNAL MEDICINE

## 2021-01-01 PROCEDURE — 97161 PT EVAL LOW COMPLEX 20 MIN: CPT

## 2021-01-01 PROCEDURE — 96375 TX/PRO/DX INJ NEW DRUG ADDON: CPT

## 2021-01-01 PROCEDURE — 51702 INSERT TEMP BLADDER CATH: CPT

## 2021-01-01 PROCEDURE — 77010033678 HC OXYGEN DAILY

## 2021-01-01 PROCEDURE — 0D9N00Z DRAINAGE OF SIGMOID COLON WITH DRAINAGE DEVICE, OPEN APPROACH: ICD-10-PCS | Performed by: SURGERY

## 2021-01-01 PROCEDURE — 74011636637 HC RX REV CODE- 636/637: Performed by: NURSE PRACTITIONER

## 2021-01-01 PROCEDURE — 76770 US EXAM ABDO BACK WALL COMP: CPT

## 2021-01-01 PROCEDURE — 2709999900 HC NON-CHARGEABLE SUPPLY: Performed by: SURGERY

## 2021-01-01 PROCEDURE — 65270000029 HC RM PRIVATE

## 2021-01-01 PROCEDURE — 74011000250 HC RX REV CODE- 250: Performed by: INTERNAL MEDICINE

## 2021-01-01 PROCEDURE — 99231 SBSQ HOSP IP/OBS SF/LOW 25: CPT | Performed by: INTERNAL MEDICINE

## 2021-01-01 PROCEDURE — 65660000000 HC RM CCU STEPDOWN

## 2021-01-01 PROCEDURE — 74011250636 HC RX REV CODE- 250/636: Performed by: INTERNAL MEDICINE

## 2021-01-01 PROCEDURE — G8754 DIAS BP LESS 90: HCPCS | Performed by: INTERNAL MEDICINE

## 2021-01-01 PROCEDURE — 99232 SBSQ HOSP IP/OBS MODERATE 35: CPT | Performed by: INTERNAL MEDICINE

## 2021-01-01 PROCEDURE — C1769 GUIDE WIRE: HCPCS

## 2021-01-01 PROCEDURE — 77030002996 HC SUT SLK J&J -A

## 2021-01-01 PROCEDURE — 83540 ASSAY OF IRON: CPT

## 2021-01-01 PROCEDURE — 80053 COMPREHEN METABOLIC PANEL: CPT

## 2021-01-01 PROCEDURE — 5A12012 PERFORMANCE OF CARDIAC OUTPUT, SINGLE, MANUAL: ICD-10-PCS | Performed by: INTERNAL MEDICINE

## 2021-01-01 PROCEDURE — 82570 ASSAY OF URINE CREATININE: CPT

## 2021-01-01 PROCEDURE — 94660 CPAP INITIATION&MGMT: CPT

## 2021-01-01 PROCEDURE — 74011636637 HC RX REV CODE- 636/637: Performed by: SURGERY

## 2021-01-01 PROCEDURE — 93005 ELECTROCARDIOGRAM TRACING: CPT

## 2021-01-01 PROCEDURE — 92526 ORAL FUNCTION THERAPY: CPT

## 2021-01-01 PROCEDURE — 99284 EMERGENCY DEPT VISIT MOD MDM: CPT

## 2021-01-01 PROCEDURE — 74011250637 HC RX REV CODE- 250/637: Performed by: SURGERY

## 2021-01-01 PROCEDURE — 94762 N-INVAS EAR/PLS OXIMTRY CONT: CPT

## 2021-01-01 PROCEDURE — 99222 1ST HOSP IP/OBS MODERATE 55: CPT | Performed by: COLON & RECTAL SURGERY

## 2021-01-01 PROCEDURE — 94003 VENT MGMT INPAT SUBQ DAY: CPT

## 2021-01-01 PROCEDURE — 74011000250 HC RX REV CODE- 250: Performed by: PHYSICIAN ASSISTANT

## 2021-01-01 PROCEDURE — 99356 PR PROLONGED SVC I/P OR OBS SETTING 1ST HOUR: CPT | Performed by: NURSE PRACTITIONER

## 2021-01-01 PROCEDURE — 0JB00ZZ EXCISION OF SCALP SUBCUTANEOUS TISSUE AND FASCIA, OPEN APPROACH: ICD-10-PCS | Performed by: COLON & RECTAL SURGERY

## 2021-01-01 PROCEDURE — 74011250636 HC RX REV CODE- 250/636: Performed by: EMERGENCY MEDICINE

## 2021-01-01 PROCEDURE — 80048 BASIC METABOLIC PNL TOTAL CA: CPT

## 2021-01-01 PROCEDURE — 74011250636 HC RX REV CODE- 250/636: Performed by: STUDENT IN AN ORGANIZED HEALTH CARE EDUCATION/TRAINING PROGRAM

## 2021-01-01 PROCEDURE — 5A1945Z RESPIRATORY VENTILATION, 24-96 CONSECUTIVE HOURS: ICD-10-PCS | Performed by: INTERNAL MEDICINE

## 2021-01-01 PROCEDURE — 77030042556 HC PNCL CAUT -B: Performed by: SURGERY

## 2021-01-01 PROCEDURE — 83036 HEMOGLOBIN GLYCOSYLATED A1C: CPT

## 2021-01-01 PROCEDURE — 70450 CT HEAD/BRAIN W/O DYE: CPT

## 2021-01-01 PROCEDURE — 87086 URINE CULTURE/COLONY COUNT: CPT

## 2021-01-01 PROCEDURE — 71045 X-RAY EXAM CHEST 1 VIEW: CPT

## 2021-01-01 PROCEDURE — 65620000000 HC RM CCU GENERAL

## 2021-01-01 PROCEDURE — 74011636637 HC RX REV CODE- 636/637: Performed by: INTERNAL MEDICINE

## 2021-01-01 PROCEDURE — 77030011278 HC ELECTRD LIG IMPT COVD -F: Performed by: SURGERY

## 2021-01-01 PROCEDURE — 31500 INSERT EMERGENCY AIRWAY: CPT

## 2021-01-01 PROCEDURE — 80069 RENAL FUNCTION PANEL: CPT

## 2021-01-01 PROCEDURE — 84484 ASSAY OF TROPONIN QUANT: CPT

## 2021-01-01 PROCEDURE — 65610000006 HC RM INTENSIVE CARE

## 2021-01-01 PROCEDURE — 74011000258 HC RX REV CODE- 258: Performed by: NURSE PRACTITIONER

## 2021-01-01 PROCEDURE — 5A1945Z RESPIRATORY VENTILATION, 24-96 CONSECUTIVE HOURS: ICD-10-PCS | Performed by: STUDENT IN AN ORGANIZED HEALTH CARE EDUCATION/TRAINING PROGRAM

## 2021-01-01 PROCEDURE — 74011000250 HC RX REV CODE- 250: Performed by: SURGERY

## 2021-01-01 PROCEDURE — 81003 URINALYSIS AUTO W/O SCOPE: CPT

## 2021-01-01 PROCEDURE — 90945 DIALYSIS ONE EVALUATION: CPT

## 2021-01-01 PROCEDURE — G8420 CALC BMI NORM PARAMETERS: HCPCS | Performed by: INTERNAL MEDICINE

## 2021-01-01 PROCEDURE — 99157 MOD SED OTHER PHYS/QHP EA: CPT

## 2021-01-01 PROCEDURE — 87186 SC STD MICRODIL/AGAR DIL: CPT

## 2021-01-01 PROCEDURE — 74011000258 HC RX REV CODE- 258: Performed by: PHYSICIAN ASSISTANT

## 2021-01-01 PROCEDURE — 77030018798 HC PMP KT ENTRL FED COVD -A

## 2021-01-01 PROCEDURE — 77030040361 HC SLV COMPR DVT MDII -B: Performed by: SURGERY

## 2021-01-01 PROCEDURE — 84100 ASSAY OF PHOSPHORUS: CPT

## 2021-01-01 PROCEDURE — 82306 VITAMIN D 25 HYDROXY: CPT

## 2021-01-01 PROCEDURE — G8420 CALC BMI NORM PARAMETERS: HCPCS | Performed by: COLON & RECTAL SURGERY

## 2021-01-01 PROCEDURE — 74011250637 HC RX REV CODE- 250/637: Performed by: PHYSICIAN ASSISTANT

## 2021-01-01 PROCEDURE — 99024 POSTOP FOLLOW-UP VISIT: CPT | Performed by: COLON & RECTAL SURGERY

## 2021-01-01 PROCEDURE — 1101F PT FALLS ASSESS-DOCD LE1/YR: CPT | Performed by: INTERNAL MEDICINE

## 2021-01-01 PROCEDURE — G8428 CUR MEDS NOT DOCUMENT: HCPCS | Performed by: INTERNAL MEDICINE

## 2021-01-01 PROCEDURE — 74011000250 HC RX REV CODE- 250: Performed by: NURSE PRACTITIONER

## 2021-01-01 PROCEDURE — 74011250636 HC RX REV CODE- 250/636: Performed by: SURGERY

## 2021-01-01 PROCEDURE — 99232 SBSQ HOSP IP/OBS MODERATE 35: CPT | Performed by: COLON & RECTAL SURGERY

## 2021-01-01 PROCEDURE — 74011000258 HC RX REV CODE- 258: Performed by: SURGERY

## 2021-01-01 PROCEDURE — 92610 EVALUATE SWALLOWING FUNCTION: CPT

## 2021-01-01 PROCEDURE — 3E0336Z INTRODUCTION OF NUTRITIONAL SUBSTANCE INTO PERIPHERAL VEIN, PERCUTANEOUS APPROACH: ICD-10-PCS | Performed by: NURSE PRACTITIONER

## 2021-01-01 PROCEDURE — 02H633Z INSERTION OF INFUSION DEVICE INTO RIGHT ATRIUM, PERCUTANEOUS APPROACH: ICD-10-PCS | Performed by: STUDENT IN AN ORGANIZED HEALTH CARE EDUCATION/TRAINING PROGRAM

## 2021-01-01 PROCEDURE — 86706 HEP B SURFACE ANTIBODY: CPT

## 2021-01-01 PROCEDURE — 97165 OT EVAL LOW COMPLEX 30 MIN: CPT

## 2021-01-01 PROCEDURE — 74176 CT ABD & PELVIS W/O CONTRAST: CPT

## 2021-01-01 PROCEDURE — 3017F COLORECTAL CA SCREEN DOC REV: CPT | Performed by: COLON & RECTAL SURGERY

## 2021-01-01 PROCEDURE — 74011250636 HC RX REV CODE- 250/636: Performed by: PHYSICIAN ASSISTANT

## 2021-01-01 PROCEDURE — 74011000258 HC RX REV CODE- 258: Performed by: HEALTH CARE PROVIDER

## 2021-01-01 PROCEDURE — 87506 IADNA-DNA/RNA PROBE TQ 6-11: CPT

## 2021-01-01 PROCEDURE — 87040 BLOOD CULTURE FOR BACTERIA: CPT

## 2021-01-01 PROCEDURE — 94002 VENT MGMT INPAT INIT DAY: CPT

## 2021-01-01 PROCEDURE — 86704 HEP B CORE ANTIBODY TOTAL: CPT

## 2021-01-01 PROCEDURE — 74011250636 HC RX REV CODE- 250/636: Performed by: HOSPITALIST

## 2021-01-01 PROCEDURE — 0D9B00Z DRAINAGE OF ILEUM WITH DRAINAGE DEVICE, OPEN APPROACH: ICD-10-PCS | Performed by: SURGERY

## 2021-01-01 PROCEDURE — 83970 ASSAY OF PARATHORMONE: CPT

## 2021-01-01 PROCEDURE — 85610 PROTHROMBIN TIME: CPT

## 2021-01-01 PROCEDURE — 2709999900 HC NON-CHARGEABLE SUPPLY

## 2021-01-01 PROCEDURE — 74011250637 HC RX REV CODE- 250/637: Performed by: NURSE PRACTITIONER

## 2021-01-01 PROCEDURE — G8752 SYS BP LESS 140: HCPCS | Performed by: INTERNAL MEDICINE

## 2021-01-01 PROCEDURE — C9113 INJ PANTOPRAZOLE SODIUM, VIA: HCPCS | Performed by: SURGERY

## 2021-01-01 PROCEDURE — 51798 US URINE CAPACITY MEASURE: CPT

## 2021-01-01 PROCEDURE — 0BH17EZ INSERTION OF ENDOTRACHEAL AIRWAY INTO TRACHEA, VIA NATURAL OR ARTIFICIAL OPENING: ICD-10-PCS | Performed by: INTERNAL MEDICINE

## 2021-01-01 PROCEDURE — 87340 HEPATITIS B SURFACE AG IA: CPT

## 2021-01-01 PROCEDURE — 77030002966 HC SUT PDS J&J -A: Performed by: SURGERY

## 2021-01-01 PROCEDURE — 97116 GAIT TRAINING THERAPY: CPT

## 2021-01-01 PROCEDURE — 86901 BLOOD TYPING SEROLOGIC RH(D): CPT

## 2021-01-01 PROCEDURE — 84300 ASSAY OF URINE SODIUM: CPT

## 2021-01-01 PROCEDURE — 82436 ASSAY OF URINE CHLORIDE: CPT

## 2021-01-01 PROCEDURE — 85027 COMPLETE CBC AUTOMATED: CPT

## 2021-01-01 PROCEDURE — 76210000006 HC OR PH I REC 0.5 TO 1 HR: Performed by: COLON & RECTAL SURGERY

## 2021-01-01 PROCEDURE — 76010000138 HC OR TIME 0.5 TO 1 HR: Performed by: COLON & RECTAL SURGERY

## 2021-01-01 PROCEDURE — 99233 SBSQ HOSP IP/OBS HIGH 50: CPT | Performed by: NURSE PRACTITIONER

## 2021-01-01 PROCEDURE — 97535 SELF CARE MNGMENT TRAINING: CPT

## 2021-01-01 PROCEDURE — 94760 N-INVAS EAR/PLS OXIMETRY 1: CPT

## 2021-01-01 PROCEDURE — 81001 URINALYSIS AUTO W/SCOPE: CPT

## 2021-01-01 PROCEDURE — 3017F COLORECTAL CA SCREEN DOC REV: CPT | Performed by: INTERNAL MEDICINE

## 2021-01-01 PROCEDURE — 77030019895 HC PCKNG STRP IODO -A: Performed by: COLON & RECTAL SURGERY

## 2021-01-01 PROCEDURE — 82728 ASSAY OF FERRITIN: CPT

## 2021-01-01 PROCEDURE — 77030002996 HC SUT SLK J&J -A: Performed by: SURGERY

## 2021-01-01 PROCEDURE — 99221 1ST HOSP IP/OBS SF/LOW 40: CPT | Performed by: INTERNAL MEDICINE

## 2021-01-01 PROCEDURE — 99213 OFFICE O/P EST LOW 20 MIN: CPT | Performed by: INTERNAL MEDICINE

## 2021-01-01 PROCEDURE — G8427 DOCREV CUR MEDS BY ELIG CLIN: HCPCS | Performed by: COLON & RECTAL SURGERY

## 2021-01-01 PROCEDURE — 99222 1ST HOSP IP/OBS MODERATE 55: CPT | Performed by: NURSE PRACTITIONER

## 2021-01-01 PROCEDURE — 84156 ASSAY OF PROTEIN URINE: CPT

## 2021-01-01 PROCEDURE — 84443 ASSAY THYROID STIM HORMONE: CPT

## 2021-01-01 PROCEDURE — 77030010507 HC ADH SKN DERMBND J&J -B: Performed by: SURGERY

## 2021-01-01 PROCEDURE — 74011250636 HC RX REV CODE- 250/636: Performed by: HEALTH CARE PROVIDER

## 2021-01-01 PROCEDURE — 99285 EMERGENCY DEPT VISIT HI MDM: CPT

## 2021-01-01 PROCEDURE — 02HV33Z INSERTION OF INFUSION DEVICE INTO SUPERIOR VENA CAVA, PERCUTANEOUS APPROACH: ICD-10-PCS | Performed by: INTERNAL MEDICINE

## 2021-01-01 PROCEDURE — 74011000250 HC RX REV CODE- 250: Performed by: HEALTH CARE PROVIDER

## 2021-01-01 PROCEDURE — 99213 OFFICE O/P EST LOW 20 MIN: CPT | Performed by: COLON & RECTAL SURGERY

## 2021-01-01 PROCEDURE — 92611 MOTION FLUOROSCOPY/SWALLOW: CPT

## 2021-01-01 PROCEDURE — G8753 SYS BP > OR = 140: HCPCS | Performed by: COLON & RECTAL SURGERY

## 2021-01-01 PROCEDURE — 74011250636 HC RX REV CODE- 250/636

## 2021-01-01 PROCEDURE — 75810000289 HC I&D ABSCESS SIMP/COMP/MULT

## 2021-01-01 PROCEDURE — 86705 HEP B CORE ANTIBODY IGM: CPT

## 2021-01-01 PROCEDURE — 97530 THERAPEUTIC ACTIVITIES: CPT

## 2021-01-01 PROCEDURE — 0H90XZZ DRAINAGE OF SCALP SKIN, EXTERNAL APPROACH: ICD-10-PCS | Performed by: PHYSICIAN ASSISTANT

## 2021-01-01 PROCEDURE — 77030019702 HC WRP THER MENM -C: Performed by: SURGERY

## 2021-01-01 PROCEDURE — 82746 ASSAY OF FOLIC ACID SERUM: CPT

## 2021-01-01 PROCEDURE — 96376 TX/PRO/DX INJ SAME DRUG ADON: CPT

## 2021-01-01 PROCEDURE — 74011000250 HC RX REV CODE- 250: Performed by: STUDENT IN AN ORGANIZED HEALTH CARE EDUCATION/TRAINING PROGRAM

## 2021-01-01 PROCEDURE — 77030040922 HC BLNKT HYPOTHRM STRY -A

## 2021-01-01 PROCEDURE — 74011000250 HC RX REV CODE- 250: Performed by: NURSE ANESTHETIST, CERTIFIED REGISTERED

## 2021-01-01 PROCEDURE — 93306 TTE W/DOPPLER COMPLETE: CPT

## 2021-01-01 PROCEDURE — 49320 DIAG LAPARO SEPARATE PROC: CPT | Performed by: SURGERY

## 2021-01-01 PROCEDURE — 0JH63XZ INSERTION OF TUNNELED VASCULAR ACCESS DEVICE INTO CHEST SUBCUTANEOUS TISSUE AND FASCIA, PERCUTANEOUS APPROACH: ICD-10-PCS | Performed by: STUDENT IN AN ORGANIZED HEALTH CARE EDUCATION/TRAINING PROGRAM

## 2021-01-01 PROCEDURE — P9045 ALBUMIN (HUMAN), 5%, 250 ML: HCPCS | Performed by: ANESTHESIOLOGY

## 2021-01-01 PROCEDURE — 74018 RADEX ABDOMEN 1 VIEW: CPT

## 2021-01-01 PROCEDURE — 74011000250 HC RX REV CODE- 250: Performed by: EMERGENCY MEDICINE

## 2021-01-01 PROCEDURE — 76060000036 HC ANESTHESIA 2.5 TO 3 HR: Performed by: SURGERY

## 2021-01-01 PROCEDURE — 92610 EVALUATE SWALLOWING FUNCTION: CPT | Performed by: SPEECH-LANGUAGE PATHOLOGIST

## 2021-01-01 PROCEDURE — 74011250637 HC RX REV CODE- 250/637: Performed by: FAMILY MEDICINE

## 2021-01-01 PROCEDURE — P9045 ALBUMIN (HUMAN), 5%, 250 ML: HCPCS | Performed by: INTERNAL MEDICINE

## 2021-01-01 PROCEDURE — 77030005513 HC CATH URETH FOL11 MDII -B: Performed by: SURGERY

## 2021-01-01 PROCEDURE — 77030011264 HC ELECTRD BLD EXT COVD -A: Performed by: SURGERY

## 2021-01-01 PROCEDURE — 74011250636 HC RX REV CODE- 250/636: Performed by: NURSE ANESTHETIST, CERTIFIED REGISTERED

## 2021-01-01 PROCEDURE — P9045 ALBUMIN (HUMAN), 5%, 250 ML: HCPCS | Performed by: NURSE PRACTITIONER

## 2021-01-01 PROCEDURE — 5A1D70Z PERFORMANCE OF URINARY FILTRATION, INTERMITTENT, LESS THAN 6 HOURS PER DAY: ICD-10-PCS | Performed by: INTERNAL MEDICINE

## 2021-01-01 PROCEDURE — 80307 DRUG TEST PRSMV CHEM ANLYZR: CPT

## 2021-01-01 PROCEDURE — 84133 ASSAY OF URINE POTASSIUM: CPT

## 2021-01-01 PROCEDURE — 0BH17EZ INSERTION OF ENDOTRACHEAL AIRWAY INTO TRACHEA, VIA NATURAL OR ARTIFICIAL OPENING: ICD-10-PCS | Performed by: STUDENT IN AN ORGANIZED HEALTH CARE EDUCATION/TRAINING PROGRAM

## 2021-01-01 PROCEDURE — 77030010507 HC ADH SKN DERMBND J&J -B

## 2021-01-01 PROCEDURE — 87209 SMEAR COMPLEX STAIN: CPT

## 2021-01-01 PROCEDURE — 10060 I&D ABSCESS SIMPLE/SINGLE: CPT | Performed by: COLON & RECTAL SURGERY

## 2021-01-01 PROCEDURE — 77030013038 HC MSK CPAP FISP -A

## 2021-01-01 PROCEDURE — 87077 CULTURE AEROBIC IDENTIFY: CPT

## 2021-01-01 PROCEDURE — 82010 KETONE BODYS QUAN: CPT

## 2021-01-01 PROCEDURE — 77030002916 HC SUT ETHLN J&J -A: Performed by: SURGERY

## 2021-01-01 PROCEDURE — G8536 NO DOC ELDER MAL SCRN: HCPCS | Performed by: INTERNAL MEDICINE

## 2021-01-01 PROCEDURE — 76010000131 HC OR TIME 2 TO 2.5 HR: Performed by: SURGERY

## 2021-01-01 PROCEDURE — 82607 VITAMIN B-12: CPT

## 2021-01-01 PROCEDURE — 74011000250 HC RX REV CODE- 250: Performed by: HOSPITALIST

## 2021-01-01 PROCEDURE — G8510 SCR DEP NEG, NO PLAN REQD: HCPCS | Performed by: COLON & RECTAL SURGERY

## 2021-01-01 PROCEDURE — 99221 1ST HOSP IP/OBS SF/LOW 40: CPT | Performed by: NURSE PRACTITIONER

## 2021-01-01 PROCEDURE — C1750 CATH, HEMODIALYSIS,LONG-TERM: HCPCS

## 2021-01-01 PROCEDURE — G8754 DIAS BP LESS 90: HCPCS | Performed by: COLON & RECTAL SURGERY

## 2021-01-01 PROCEDURE — 87641 MR-STAPH DNA AMP PROBE: CPT

## 2021-01-01 PROCEDURE — 74230 X-RAY XM SWLNG FUNCJ C+: CPT

## 2021-01-01 PROCEDURE — 74011250636 HC RX REV CODE- 250/636: Performed by: FAMILY MEDICINE

## 2021-01-01 PROCEDURE — 76060000032 HC ANESTHESIA 0.5 TO 1 HR: Performed by: COLON & RECTAL SURGERY

## 2021-01-01 PROCEDURE — 74011250637 HC RX REV CODE- 250/637: Performed by: HOSPITALIST

## 2021-01-01 PROCEDURE — 82330 ASSAY OF CALCIUM: CPT

## 2021-01-01 PROCEDURE — G8536 NO DOC ELDER MAL SCRN: HCPCS | Performed by: COLON & RECTAL SURGERY

## 2021-01-01 PROCEDURE — 74011000258 HC RX REV CODE- 258: Performed by: ANESTHESIOLOGY

## 2021-01-01 PROCEDURE — 84155 ASSAY OF PROTEIN SERUM: CPT

## 2021-01-01 PROCEDURE — 2709999900 HC NON-CHARGEABLE SUPPLY: Performed by: COLON & RECTAL SURGERY

## 2021-01-01 PROCEDURE — 74011000258 HC RX REV CODE- 258: Performed by: STUDENT IN AN ORGANIZED HEALTH CARE EDUCATION/TRAINING PROGRAM

## 2021-01-01 PROCEDURE — 74011000250 HC RX REV CODE- 250: Performed by: COLON & RECTAL SURGERY

## 2021-01-01 PROCEDURE — 76942 ECHO GUIDE FOR BIOPSY: CPT

## 2021-01-01 PROCEDURE — 77030040506 HC DRN WND MDII -A: Performed by: SURGERY

## 2021-01-01 PROCEDURE — 82272 OCCULT BLD FECES 1-3 TESTS: CPT

## 2021-01-01 PROCEDURE — 5A1D90Z PERFORMANCE OF URINARY FILTRATION, CONTINUOUS, GREATER THAN 18 HOURS PER DAY: ICD-10-PCS | Performed by: INTERNAL MEDICINE

## 2021-01-01 PROCEDURE — 74011250636 HC RX REV CODE- 250/636: Performed by: ANESTHESIOLOGY

## 2021-01-01 PROCEDURE — 1111F DSCHRG MED/CURRENT MED MERGE: CPT | Performed by: COLON & RECTAL SURGERY

## 2021-01-01 PROCEDURE — 74011000250 HC RX REV CODE- 250: Performed by: ANESTHESIOLOGY

## 2021-01-01 PROCEDURE — 1101F PT FALLS ASSESS-DOCD LE1/YR: CPT | Performed by: COLON & RECTAL SURGERY

## 2021-01-01 RX ORDER — SODIUM CHLORIDE 0.9 % (FLUSH) 0.9 %
5-40 SYRINGE (ML) INJECTION EVERY 8 HOURS
Status: DISCONTINUED | OUTPATIENT
Start: 2021-01-01 | End: 2021-01-01 | Stop reason: HOSPADM

## 2021-01-01 RX ORDER — MIDAZOLAM HYDROCHLORIDE 1 MG/ML
5 INJECTION, SOLUTION INTRAMUSCULAR; INTRAVENOUS
Status: DISCONTINUED | OUTPATIENT
Start: 2021-01-01 | End: 2021-01-01

## 2021-01-01 RX ORDER — FENTANYL CITRATE 50 UG/ML
INJECTION, SOLUTION INTRAMUSCULAR; INTRAVENOUS AS NEEDED
Status: DISCONTINUED | OUTPATIENT
Start: 2021-01-01 | End: 2021-01-01 | Stop reason: HOSPADM

## 2021-01-01 RX ORDER — OXYCODONE AND ACETAMINOPHEN 5; 325 MG/1; MG/1
1 TABLET ORAL
Status: DISCONTINUED | OUTPATIENT
Start: 2021-01-01 | End: 2021-01-01 | Stop reason: HOSPADM

## 2021-01-01 RX ORDER — SODIUM CHLORIDE 0.9 % (FLUSH) 0.9 %
5-40 SYRINGE (ML) INJECTION AS NEEDED
Status: CANCELLED | OUTPATIENT
Start: 2021-01-01

## 2021-01-01 RX ORDER — POTASSIUM CHLORIDE 7.45 MG/ML
10 INJECTION INTRAVENOUS
Status: ACTIVE | OUTPATIENT
Start: 2021-01-01 | End: 2021-01-01

## 2021-01-01 RX ORDER — LEVOFLOXACIN 500 MG/1
500 TABLET, FILM COATED ORAL EVERY 24 HOURS
Status: DISCONTINUED | OUTPATIENT
Start: 2021-01-01 | End: 2021-01-01 | Stop reason: HOSPADM

## 2021-01-01 RX ORDER — ACETAMINOPHEN 325 MG/1
650 TABLET ORAL
Status: DISCONTINUED | OUTPATIENT
Start: 2021-01-01 | End: 2021-08-06 | Stop reason: HOSPADM

## 2021-01-01 RX ORDER — CALCIUM GLUCONATE 20 MG/ML
2 INJECTION, SOLUTION INTRAVENOUS ONCE
Status: COMPLETED | OUTPATIENT
Start: 2021-01-01 | End: 2021-01-01

## 2021-01-01 RX ORDER — INSULIN GLARGINE 100 [IU]/ML
12 INJECTION, SOLUTION SUBCUTANEOUS DAILY
Status: DISCONTINUED | OUTPATIENT
Start: 2021-01-01 | End: 2021-01-01

## 2021-01-01 RX ORDER — BUPIVACAINE HYDROCHLORIDE 5 MG/ML
INJECTION, SOLUTION EPIDURAL; INTRACAUDAL AS NEEDED
Status: DISCONTINUED | OUTPATIENT
Start: 2021-01-01 | End: 2021-01-01 | Stop reason: HOSPADM

## 2021-01-01 RX ORDER — FACIAL-BODY WIPES
10 EACH TOPICAL DAILY
Status: DISCONTINUED | OUTPATIENT
Start: 2021-01-01 | End: 2021-01-01

## 2021-01-01 RX ORDER — ACETAMINOPHEN 650 MG/1
650 SUPPOSITORY RECTAL
Status: DISCONTINUED | OUTPATIENT
Start: 2021-01-01 | End: 2021-08-06 | Stop reason: HOSPADM

## 2021-01-01 RX ORDER — LANOLIN ALCOHOL/MO/W.PET/CERES
325 CREAM (GRAM) TOPICAL
Status: DISCONTINUED | OUTPATIENT
Start: 2021-01-01 | End: 2021-01-01 | Stop reason: HOSPADM

## 2021-01-01 RX ORDER — DOCUSATE SODIUM 100 MG/1
100 CAPSULE, LIQUID FILLED ORAL 2 TIMES DAILY
Status: DISCONTINUED | OUTPATIENT
Start: 2021-01-01 | End: 2021-01-01 | Stop reason: HOSPADM

## 2021-01-01 RX ORDER — INSULIN GLARGINE 100 [IU]/ML
10 INJECTION, SOLUTION SUBCUTANEOUS ONCE
Status: COMPLETED | OUTPATIENT
Start: 2021-01-01 | End: 2021-01-01

## 2021-01-01 RX ORDER — AMLODIPINE BESYLATE 5 MG/1
5 TABLET ORAL DAILY
Status: DISCONTINUED | OUTPATIENT
Start: 2021-01-01 | End: 2021-01-01

## 2021-01-01 RX ORDER — INSULIN GLARGINE 100 [IU]/ML
8 INJECTION, SOLUTION SUBCUTANEOUS DAILY
Status: DISCONTINUED | OUTPATIENT
Start: 2021-01-01 | End: 2021-01-01

## 2021-01-01 RX ORDER — DOXYCYCLINE 100 MG/1
500 CAPSULE ORAL 4 TIMES DAILY
Qty: 280 CAP | Refills: 0 | Status: SHIPPED | OUTPATIENT
Start: 2021-01-01 | End: 2021-01-01

## 2021-01-01 RX ORDER — METOCLOPRAMIDE HYDROCHLORIDE 5 MG/ML
10 INJECTION INTRAMUSCULAR; INTRAVENOUS EVERY 6 HOURS
Status: DISCONTINUED | OUTPATIENT
Start: 2021-01-01 | End: 2021-01-01

## 2021-01-01 RX ORDER — VANCOMYCIN HYDROCHLORIDE 250 MG/5ML
500 POWDER, FOR SOLUTION ORAL EVERY 6 HOURS
Status: DISCONTINUED | OUTPATIENT
Start: 2021-01-01 | End: 2021-01-01

## 2021-01-01 RX ORDER — HYDRALAZINE HYDROCHLORIDE 20 MG/ML
10 INJECTION INTRAMUSCULAR; INTRAVENOUS
Status: DISCONTINUED | OUTPATIENT
Start: 2021-01-01 | End: 2021-01-01 | Stop reason: HOSPADM

## 2021-01-01 RX ORDER — MAGNESIUM SULFATE HEPTAHYDRATE 40 MG/ML
2 INJECTION, SOLUTION INTRAVENOUS
Status: COMPLETED | OUTPATIENT
Start: 2021-01-01 | End: 2021-01-01

## 2021-01-01 RX ORDER — SODIUM CHLORIDE 9 MG/ML
INJECTION, SOLUTION INTRAVENOUS
Status: DISCONTINUED | OUTPATIENT
Start: 2021-01-01 | End: 2021-01-01 | Stop reason: HOSPADM

## 2021-01-01 RX ORDER — POTASSIUM CHLORIDE 29.8 MG/ML
20 INJECTION INTRAVENOUS
Status: COMPLETED | OUTPATIENT
Start: 2021-01-01 | End: 2021-01-01

## 2021-01-01 RX ORDER — METRONIDAZOLE 500 MG/100ML
500 INJECTION, SOLUTION INTRAVENOUS EVERY 8 HOURS
Status: DISCONTINUED | OUTPATIENT
Start: 2021-01-01 | End: 2021-01-01

## 2021-01-01 RX ORDER — PROPOFOL 10 MG/ML
0-50 VIAL (ML) INTRAVENOUS
Status: DISCONTINUED | OUTPATIENT
Start: 2021-01-01 | End: 2021-01-01

## 2021-01-01 RX ORDER — FUROSEMIDE 20 MG/1
20 TABLET ORAL DAILY
Qty: 30 TAB | Refills: 0 | Status: SHIPPED | OUTPATIENT
Start: 2021-01-01 | End: 2021-01-01

## 2021-01-01 RX ORDER — POTASSIUM CHLORIDE 29.8 MG/ML
20 INJECTION INTRAVENOUS ONCE
Status: COMPLETED | OUTPATIENT
Start: 2021-01-01 | End: 2021-01-01

## 2021-01-01 RX ORDER — LANOLIN ALCOHOL/MO/W.PET/CERES
325 CREAM (GRAM) TOPICAL
COMMUNITY

## 2021-01-01 RX ORDER — POTASSIUM CHLORIDE 750 MG/1
40 TABLET, FILM COATED, EXTENDED RELEASE ORAL
Status: COMPLETED | OUTPATIENT
Start: 2021-01-01 | End: 2021-01-01

## 2021-01-01 RX ORDER — SODIUM BICARBONATE 650 MG/1
650 TABLET ORAL 2 TIMES DAILY
Status: DISCONTINUED | OUTPATIENT
Start: 2021-01-01 | End: 2021-01-01

## 2021-01-01 RX ORDER — MAGNESIUM SULFATE 1 G/100ML
1 INJECTION INTRAVENOUS ONCE
Status: COMPLETED | OUTPATIENT
Start: 2021-01-01 | End: 2021-01-01

## 2021-01-01 RX ORDER — SODIUM CHLORIDE 9 MG/ML
50 INJECTION, SOLUTION INTRAVENOUS CONTINUOUS
Status: DISCONTINUED | OUTPATIENT
Start: 2021-01-01 | End: 2021-01-01

## 2021-01-01 RX ORDER — SAME BUTANEDISULFONATE/BETAINE 400-600 MG
250 POWDER IN PACKET (EA) ORAL 2 TIMES DAILY
Status: DISCONTINUED | OUTPATIENT
Start: 2021-01-01 | End: 2021-01-01 | Stop reason: HOSPADM

## 2021-01-01 RX ORDER — SODIUM CHLORIDE 0.9 % (FLUSH) 0.9 %
5-40 SYRINGE (ML) INJECTION EVERY 8 HOURS
Status: DISCONTINUED | OUTPATIENT
Start: 2021-01-01 | End: 2021-01-01

## 2021-01-01 RX ORDER — POLYETHYLENE GLYCOL 3350 17 G/17G
17 POWDER, FOR SOLUTION ORAL DAILY PRN
Status: DISCONTINUED | OUTPATIENT
Start: 2021-01-01 | End: 2021-01-01 | Stop reason: HOSPADM

## 2021-01-01 RX ORDER — ACETAMINOPHEN 325 MG/1
650 TABLET ORAL
Status: DISCONTINUED | OUTPATIENT
Start: 2021-01-01 | End: 2021-01-01 | Stop reason: HOSPADM

## 2021-01-01 RX ORDER — PHENYLEPHRINE HCL IN 0.9% NACL 0.4MG/10ML
100-400 SYRINGE (ML) INTRAVENOUS ONCE
Status: COMPLETED | OUTPATIENT
Start: 2021-01-01 | End: 2021-01-01

## 2021-01-01 RX ORDER — FENTANYL CITRATE 50 UG/ML
25-100 INJECTION, SOLUTION INTRAMUSCULAR; INTRAVENOUS
Status: DISCONTINUED | OUTPATIENT
Start: 2021-01-01 | End: 2021-01-01 | Stop reason: HOSPADM

## 2021-01-01 RX ORDER — CALCIUM GLUCONATE 20 MG/ML
1 INJECTION, SOLUTION INTRAVENOUS ONCE
Status: COMPLETED | OUTPATIENT
Start: 2021-01-01 | End: 2021-01-01

## 2021-01-01 RX ORDER — SODIUM CHLORIDE 9 MG/ML
250 INJECTION, SOLUTION INTRAVENOUS AS NEEDED
Status: DISCONTINUED | OUTPATIENT
Start: 2021-01-01 | End: 2021-01-01

## 2021-01-01 RX ORDER — PRAVASTATIN SODIUM 20 MG/1
20 TABLET ORAL DAILY
COMMUNITY

## 2021-01-01 RX ORDER — HYDROCODONE BITARTRATE AND ACETAMINOPHEN 5; 325 MG/1; MG/1
2 TABLET ORAL
COMMUNITY
End: 2021-01-01

## 2021-01-01 RX ORDER — HYDRALAZINE HYDROCHLORIDE 50 MG/1
50 TABLET, FILM COATED ORAL 3 TIMES DAILY
Status: DISCONTINUED | OUTPATIENT
Start: 2021-01-01 | End: 2021-01-01

## 2021-01-01 RX ORDER — SODIUM BICARBONATE 84 MG/ML
50 INJECTION, SOLUTION INTRAVENOUS
Status: COMPLETED | OUTPATIENT
Start: 2021-01-01 | End: 2021-01-01

## 2021-01-01 RX ORDER — DOCUSATE SODIUM 100 MG/1
100 CAPSULE, LIQUID FILLED ORAL 2 TIMES DAILY
Qty: 60 CAP | Refills: 2 | Status: SHIPPED | OUTPATIENT
Start: 2021-01-01 | End: 2021-01-01

## 2021-01-01 RX ORDER — INSULIN GLARGINE 100 [IU]/ML
INJECTION, SOLUTION SUBCUTANEOUS
Qty: 1 VIAL | Refills: 0 | Status: SHIPPED | OUTPATIENT
Start: 2021-01-01

## 2021-01-01 RX ORDER — LANOLIN ALCOHOL/MO/W.PET/CERES
100 CREAM (GRAM) TOPICAL DAILY
Status: DISCONTINUED | OUTPATIENT
Start: 2021-01-01 | End: 2021-08-06 | Stop reason: HOSPADM

## 2021-01-01 RX ORDER — PROPOFOL 10 MG/ML
INJECTION, EMULSION INTRAVENOUS
Status: DISCONTINUED | OUTPATIENT
Start: 2021-01-01 | End: 2021-01-01 | Stop reason: HOSPADM

## 2021-01-01 RX ORDER — HEPARIN SODIUM 5000 [USP'U]/ML
5000 INJECTION, SOLUTION INTRAVENOUS; SUBCUTANEOUS EVERY 8 HOURS
Status: DISCONTINUED | OUTPATIENT
Start: 2021-01-01 | End: 2021-01-01

## 2021-01-01 RX ORDER — OXYCODONE AND ACETAMINOPHEN 5; 325 MG/1; MG/1
1 TABLET ORAL
Qty: 20 TAB | Refills: 0 | Status: SHIPPED | OUTPATIENT
Start: 2021-01-01 | End: 2021-01-01

## 2021-01-01 RX ORDER — FENTANYL CITRATE 50 UG/ML
50 INJECTION, SOLUTION INTRAMUSCULAR; INTRAVENOUS
Status: DISCONTINUED | OUTPATIENT
Start: 2021-01-01 | End: 2021-01-01

## 2021-01-01 RX ORDER — DULOXETIN HYDROCHLORIDE 60 MG/1
60 CAPSULE, DELAYED RELEASE ORAL DAILY
Status: DISCONTINUED | OUTPATIENT
Start: 2021-01-01 | End: 2021-01-01

## 2021-01-01 RX ORDER — ROCURONIUM BROMIDE 10 MG/ML
INJECTION, SOLUTION INTRAVENOUS
Status: COMPLETED
Start: 2021-01-01 | End: 2021-01-01

## 2021-01-01 RX ORDER — CARVEDILOL 12.5 MG/1
12.5 TABLET ORAL 2 TIMES DAILY
Qty: 60 TAB | Refills: 0 | Status: SHIPPED | OUTPATIENT
Start: 2021-01-01 | End: 2021-01-01

## 2021-01-01 RX ORDER — HEPARIN SODIUM 1000 [USP'U]/ML
10000 INJECTION, SOLUTION INTRAVENOUS; SUBCUTANEOUS
Status: ACTIVE | OUTPATIENT
Start: 2021-01-01 | End: 2021-01-01

## 2021-01-01 RX ORDER — NIFEDIPINE 60 MG/1
60 TABLET, EXTENDED RELEASE ORAL 2 TIMES DAILY
Status: DISCONTINUED | OUTPATIENT
Start: 2021-01-01 | End: 2021-01-01 | Stop reason: HOSPADM

## 2021-01-01 RX ORDER — ERGOCALCIFEROL 1.25 MG/1
50000 CAPSULE ORAL
Status: CANCELLED | OUTPATIENT
Start: 2021-01-01

## 2021-01-01 RX ORDER — ONDANSETRON 2 MG/ML
INJECTION INTRAMUSCULAR; INTRAVENOUS AS NEEDED
Status: DISCONTINUED | OUTPATIENT
Start: 2021-01-01 | End: 2021-01-01 | Stop reason: HOSPADM

## 2021-01-01 RX ORDER — FUROSEMIDE 10 MG/ML
40 INJECTION INTRAMUSCULAR; INTRAVENOUS 2 TIMES DAILY
Status: DISCONTINUED | OUTPATIENT
Start: 2021-01-01 | End: 2021-01-01

## 2021-01-01 RX ORDER — DEXTROSE 50 % IN WATER (D50W) INTRAVENOUS SYRINGE
12.5-25 AS NEEDED
Status: DISCONTINUED | OUTPATIENT
Start: 2021-01-01 | End: 2021-08-06 | Stop reason: HOSPADM

## 2021-01-01 RX ORDER — ROCURONIUM BROMIDE 10 MG/ML
80 INJECTION, SOLUTION INTRAVENOUS
Status: COMPLETED | OUTPATIENT
Start: 2021-01-01 | End: 2021-01-01

## 2021-01-01 RX ORDER — HYDRALAZINE HYDROCHLORIDE 100 MG/1
100 TABLET, FILM COATED ORAL 3 TIMES DAILY
Qty: 90 TAB | Refills: 0 | Status: SHIPPED | OUTPATIENT
Start: 2021-01-01 | End: 2021-01-01

## 2021-01-01 RX ORDER — HYDROCODONE BITARTRATE AND ACETAMINOPHEN 5; 325 MG/1; MG/1
1 TABLET ORAL
Status: COMPLETED | OUTPATIENT
Start: 2021-01-01 | End: 2021-01-01

## 2021-01-01 RX ORDER — CALCITRIOL 0.25 UG/1
0.5 CAPSULE ORAL DAILY
Status: DISCONTINUED | OUTPATIENT
Start: 2021-01-01 | End: 2021-01-01

## 2021-01-01 RX ORDER — POTASSIUM CHLORIDE 20 MEQ/1
80 TABLET, EXTENDED RELEASE ORAL
Status: COMPLETED | OUTPATIENT
Start: 2021-01-01 | End: 2021-01-01

## 2021-01-01 RX ORDER — ALBUMIN HUMAN 50 G/1000ML
25 SOLUTION INTRAVENOUS ONCE
Status: COMPLETED | OUTPATIENT
Start: 2021-01-01 | End: 2021-01-01

## 2021-01-01 RX ORDER — POTASSIUM CHLORIDE 14.9 MG/ML
10 INJECTION INTRAVENOUS
Status: COMPLETED | OUTPATIENT
Start: 2021-01-01 | End: 2021-01-01

## 2021-01-01 RX ORDER — DEXTROSE, SODIUM CHLORIDE, SODIUM LACTATE, POTASSIUM CHLORIDE, AND CALCIUM CHLORIDE 5; .6; .31; .03; .02 G/100ML; G/100ML; G/100ML; G/100ML; G/100ML
125 INJECTION, SOLUTION INTRAVENOUS CONTINUOUS
Status: CANCELLED | OUTPATIENT
Start: 2021-01-01 | End: 2021-01-01

## 2021-01-01 RX ORDER — SODIUM CHLORIDE 0.9 % (FLUSH) 0.9 %
5-40 SYRINGE (ML) INJECTION EVERY 8 HOURS
Status: DISCONTINUED | OUTPATIENT
Start: 2021-01-01 | End: 2021-08-06 | Stop reason: HOSPADM

## 2021-01-01 RX ORDER — POTASSIUM CHLORIDE 20 MEQ/1
20 TABLET, EXTENDED RELEASE ORAL 2 TIMES DAILY
Status: ON HOLD | COMMUNITY
End: 2021-01-01 | Stop reason: SDUPTHER

## 2021-01-01 RX ORDER — SODIUM CHLORIDE 0.9 % (FLUSH) 0.9 %
5-40 SYRINGE (ML) INJECTION EVERY 8 HOURS
Status: CANCELLED | OUTPATIENT
Start: 2021-01-01

## 2021-01-01 RX ORDER — SODIUM BICARBONATE 1 MEQ/ML
200 SYRINGE (ML) INTRAVENOUS ONCE
Status: COMPLETED | OUTPATIENT
Start: 2021-01-01 | End: 2021-01-01

## 2021-01-01 RX ORDER — LANOLIN ALCOHOL/MO/W.PET/CERES
325 CREAM (GRAM) TOPICAL
Status: DISCONTINUED | OUTPATIENT
Start: 2021-01-01 | End: 2021-01-01

## 2021-01-01 RX ORDER — MIDAZOLAM HYDROCHLORIDE 1 MG/ML
2 INJECTION, SOLUTION INTRAMUSCULAR; INTRAVENOUS
Status: DISCONTINUED | OUTPATIENT
Start: 2021-01-01 | End: 2021-08-06 | Stop reason: HOSPADM

## 2021-01-01 RX ORDER — INSULIN GLARGINE 100 [IU]/ML
10 INJECTION, SOLUTION SUBCUTANEOUS DAILY
Status: DISCONTINUED | OUTPATIENT
Start: 2021-01-01 | End: 2021-01-01

## 2021-01-01 RX ORDER — HYDROCORTISONE SODIUM SUCCINATE 100 MG/2ML
50 INJECTION, POWDER, FOR SOLUTION INTRAMUSCULAR; INTRAVENOUS EVERY 12 HOURS
Status: DISCONTINUED | OUTPATIENT
Start: 2021-01-01 | End: 2021-01-01

## 2021-01-01 RX ORDER — DICLOXACILLIN SODIUM 250 MG/1
500 CAPSULE ORAL EVERY 6 HOURS
Status: DISCONTINUED | OUTPATIENT
Start: 2021-01-01 | End: 2021-01-01 | Stop reason: HOSPADM

## 2021-01-01 RX ORDER — NIFEDIPINE 30 MG/1
60 TABLET, EXTENDED RELEASE ORAL 2 TIMES DAILY
Status: DISCONTINUED | OUTPATIENT
Start: 2021-01-01 | End: 2021-01-01 | Stop reason: HOSPADM

## 2021-01-01 RX ORDER — FENTANYL CITRATE 50 UG/ML
25 INJECTION, SOLUTION INTRAMUSCULAR; INTRAVENOUS
Status: DISCONTINUED | OUTPATIENT
Start: 2021-01-01 | End: 2021-01-01 | Stop reason: SDUPTHER

## 2021-01-01 RX ORDER — FUROSEMIDE 10 MG/ML
60 INJECTION INTRAMUSCULAR; INTRAVENOUS ONCE
Status: COMPLETED | OUTPATIENT
Start: 2021-01-01 | End: 2021-01-01

## 2021-01-01 RX ORDER — DEXAMETHASONE SODIUM PHOSPHATE 4 MG/ML
INJECTION, SOLUTION INTRA-ARTICULAR; INTRALESIONAL; INTRAMUSCULAR; INTRAVENOUS; SOFT TISSUE AS NEEDED
Status: DISCONTINUED | OUTPATIENT
Start: 2021-01-01 | End: 2021-01-01 | Stop reason: HOSPADM

## 2021-01-01 RX ORDER — SODIUM CHLORIDE 0.9 % (FLUSH) 0.9 %
5-40 SYRINGE (ML) INJECTION AS NEEDED
Status: DISCONTINUED | OUTPATIENT
Start: 2021-01-01 | End: 2021-01-01 | Stop reason: HOSPADM

## 2021-01-01 RX ORDER — ERGOCALCIFEROL 1.25 MG/1
50000 CAPSULE ORAL
Qty: 4 CAP | Refills: 0 | Status: SHIPPED | OUTPATIENT
Start: 2021-01-01 | End: 2021-01-01

## 2021-01-01 RX ORDER — INSULIN LISPRO 100 [IU]/ML
INJECTION, SOLUTION INTRAVENOUS; SUBCUTANEOUS EVERY 4 HOURS
Status: DISCONTINUED | OUTPATIENT
Start: 2021-01-01 | End: 2021-01-01

## 2021-01-01 RX ORDER — HYDRALAZINE HYDROCHLORIDE 50 MG/1
50 TABLET, FILM COATED ORAL 3 TIMES DAILY
Qty: 90 TAB | Refills: 0 | Status: SHIPPED | OUTPATIENT
Start: 2021-01-01

## 2021-01-01 RX ORDER — LIDOCAINE HYDROCHLORIDE 20 MG/ML
20 INJECTION, SOLUTION INFILTRATION; PERINEURAL
Status: ACTIVE | OUTPATIENT
Start: 2021-01-01 | End: 2021-01-01

## 2021-01-01 RX ORDER — SODIUM BICARBONATE 1 MEQ/ML
SYRINGE (ML) INTRAVENOUS
Status: COMPLETED
Start: 2021-01-01 | End: 2021-01-01

## 2021-01-01 RX ORDER — INSULIN LISPRO 100 [IU]/ML
INJECTION, SOLUTION INTRAVENOUS; SUBCUTANEOUS
Status: DISCONTINUED | OUTPATIENT
Start: 2021-01-01 | End: 2021-01-01 | Stop reason: HOSPADM

## 2021-01-01 RX ORDER — MAGNESIUM SULFATE 100 %
4 CRYSTALS MISCELLANEOUS AS NEEDED
Status: DISCONTINUED | OUTPATIENT
Start: 2021-01-01 | End: 2021-08-06 | Stop reason: HOSPADM

## 2021-01-01 RX ORDER — AMLODIPINE BESYLATE 5 MG/1
10 TABLET ORAL DAILY
Status: DISCONTINUED | OUTPATIENT
Start: 2021-01-01 | End: 2021-01-01

## 2021-01-01 RX ORDER — ONDANSETRON 2 MG/ML
4 INJECTION INTRAMUSCULAR; INTRAVENOUS
Status: DISCONTINUED | OUTPATIENT
Start: 2021-01-01 | End: 2021-01-01 | Stop reason: HOSPADM

## 2021-01-01 RX ORDER — NIFEDIPINE 30 MG/1
60 TABLET, EXTENDED RELEASE ORAL DAILY
Status: DISCONTINUED | OUTPATIENT
Start: 2021-01-01 | End: 2021-01-01

## 2021-01-01 RX ORDER — SODIUM CHLORIDE, SODIUM LACTATE, POTASSIUM CHLORIDE, CALCIUM CHLORIDE 600; 310; 30; 20 MG/100ML; MG/100ML; MG/100ML; MG/100ML
125 INJECTION, SOLUTION INTRAVENOUS CONTINUOUS
Status: CANCELLED | OUTPATIENT
Start: 2021-01-01

## 2021-01-01 RX ORDER — CARVEDILOL 3.12 MG/1
6.25 TABLET ORAL 2 TIMES DAILY WITH MEALS
Status: DISCONTINUED | OUTPATIENT
Start: 2021-01-01 | End: 2021-01-01

## 2021-01-01 RX ORDER — ONDANSETRON 2 MG/ML
4 INJECTION INTRAMUSCULAR; INTRAVENOUS
Status: DISCONTINUED | OUTPATIENT
Start: 2021-01-01 | End: 2021-08-06 | Stop reason: HOSPADM

## 2021-01-01 RX ORDER — LINEZOLID 2 MG/ML
600 INJECTION, SOLUTION INTRAVENOUS EVERY 12 HOURS
Status: DISCONTINUED | OUTPATIENT
Start: 2021-01-01 | End: 2021-01-01

## 2021-01-01 RX ORDER — POTASSIUM CHLORIDE 20 MEQ/1
40 TABLET, EXTENDED RELEASE ORAL
Status: COMPLETED | OUTPATIENT
Start: 2021-01-01 | End: 2021-01-01

## 2021-01-01 RX ORDER — PROPOFOL 10 MG/ML
INJECTION, EMULSION INTRAVENOUS AS NEEDED
Status: DISCONTINUED | OUTPATIENT
Start: 2021-01-01 | End: 2021-01-01 | Stop reason: HOSPADM

## 2021-01-01 RX ORDER — FUROSEMIDE 10 MG/ML
40 INJECTION INTRAMUSCULAR; INTRAVENOUS ONCE
Status: COMPLETED | OUTPATIENT
Start: 2021-01-01 | End: 2021-01-01

## 2021-01-01 RX ORDER — ONDANSETRON 2 MG/ML
4 INJECTION INTRAMUSCULAR; INTRAVENOUS AS NEEDED
Status: CANCELLED | OUTPATIENT
Start: 2021-01-01

## 2021-01-01 RX ORDER — SODIUM CHLORIDE 0.9 % (FLUSH) 0.9 %
5-40 SYRINGE (ML) INJECTION AS NEEDED
Status: DISCONTINUED | OUTPATIENT
Start: 2021-01-01 | End: 2021-08-06 | Stop reason: HOSPADM

## 2021-01-01 RX ORDER — DEXTROSE MONOHYDRATE AND SODIUM CHLORIDE 5; .45 G/100ML; G/100ML
50 INJECTION, SOLUTION INTRAVENOUS CONTINUOUS
Status: DISCONTINUED | OUTPATIENT
Start: 2021-01-01 | End: 2021-01-01

## 2021-01-01 RX ORDER — DEXMEDETOMIDINE HYDROCHLORIDE 4 UG/ML
.1-1.5 INJECTION, SOLUTION INTRAVENOUS
Status: DISCONTINUED | OUTPATIENT
Start: 2021-01-01 | End: 2021-08-06 | Stop reason: HOSPADM

## 2021-01-01 RX ORDER — NOREPINEPHRINE BITARTRATE/D5W 8 MG/250ML
.5-16 PLASTIC BAG, INJECTION (ML) INTRAVENOUS
Status: DISCONTINUED | OUTPATIENT
Start: 2021-01-01 | End: 2021-01-01

## 2021-01-01 RX ORDER — DEXMEDETOMIDINE HYDROCHLORIDE 100 UG/ML
INJECTION, SOLUTION INTRAVENOUS AS NEEDED
Status: DISCONTINUED | OUTPATIENT
Start: 2021-01-01 | End: 2021-01-01 | Stop reason: HOSPADM

## 2021-01-01 RX ORDER — LIDOCAINE HYDROCHLORIDE 10 MG/ML
0.5 INJECTION, SOLUTION EPIDURAL; INFILTRATION; INTRACAUDAL; PERINEURAL AS NEEDED
Status: CANCELLED | OUTPATIENT
Start: 2021-01-01

## 2021-01-01 RX ORDER — CALCITRIOL 0.5 UG/1
0.5 CAPSULE ORAL DAILY
Qty: 30 CAP | Refills: 0 | Status: SHIPPED | OUTPATIENT
Start: 2021-01-01 | End: 2021-01-01

## 2021-01-01 RX ORDER — HEPARIN SODIUM 5000 [USP'U]/ML
5000 INJECTION, SOLUTION INTRAVENOUS; SUBCUTANEOUS EVERY 8 HOURS
Status: DISCONTINUED | OUTPATIENT
Start: 2021-01-01 | End: 2021-01-01 | Stop reason: HOSPADM

## 2021-01-01 RX ORDER — MORPHINE SULFATE 2 MG/ML
2 INJECTION, SOLUTION INTRAMUSCULAR; INTRAVENOUS
Status: CANCELLED | OUTPATIENT
Start: 2021-01-01

## 2021-01-01 RX ORDER — MIDAZOLAM HYDROCHLORIDE 1 MG/ML
1 INJECTION, SOLUTION INTRAMUSCULAR; INTRAVENOUS AS NEEDED
Status: CANCELLED | OUTPATIENT
Start: 2021-01-01

## 2021-01-01 RX ORDER — CALCIUM CARBONATE 200(500)MG
400 TABLET,CHEWABLE ORAL 2 TIMES DAILY
Qty: 120 TAB | Refills: 0 | Status: SHIPPED | OUTPATIENT
Start: 2021-01-01 | End: 2021-01-01

## 2021-01-01 RX ORDER — OXYCODONE AND ACETAMINOPHEN 10; 325 MG/1; MG/1
1 TABLET ORAL
Status: DISCONTINUED | OUTPATIENT
Start: 2021-01-01 | End: 2021-01-01 | Stop reason: HOSPADM

## 2021-01-01 RX ORDER — PRAVASTATIN SODIUM 10 MG/1
20 TABLET ORAL
Status: DISCONTINUED | OUTPATIENT
Start: 2021-01-01 | End: 2021-01-01

## 2021-01-01 RX ORDER — CALCIUM CARBONATE 200(500)MG
400 TABLET,CHEWABLE ORAL 2 TIMES DAILY
Status: DISCONTINUED | OUTPATIENT
Start: 2021-01-01 | End: 2021-01-01 | Stop reason: HOSPADM

## 2021-01-01 RX ORDER — LORAZEPAM 2 MG/ML
2 INJECTION INTRAMUSCULAR
Status: DISCONTINUED | OUTPATIENT
Start: 2021-01-01 | End: 2021-01-01

## 2021-01-01 RX ORDER — SILVER SULFADIAZINE 10 G/1000G
CREAM TOPICAL DAILY
Qty: 50 G | Refills: 1 | Status: SHIPPED | OUTPATIENT
Start: 2021-01-01

## 2021-01-01 RX ORDER — MAGNESIUM SULFATE HEPTAHYDRATE 40 MG/ML
2 INJECTION, SOLUTION INTRAVENOUS ONCE
Status: COMPLETED | OUTPATIENT
Start: 2021-01-01 | End: 2021-01-01

## 2021-01-01 RX ORDER — INSULIN LISPRO 100 [IU]/ML
INJECTION, SOLUTION INTRAVENOUS; SUBCUTANEOUS EVERY 6 HOURS
Status: DISCONTINUED | OUTPATIENT
Start: 2021-01-01 | End: 2021-01-01

## 2021-01-01 RX ORDER — POTASSIUM CHLORIDE 750 MG/1
40 TABLET, FILM COATED, EXTENDED RELEASE ORAL EVERY 4 HOURS
Status: COMPLETED | OUTPATIENT
Start: 2021-01-01 | End: 2021-01-01

## 2021-01-01 RX ORDER — HYDROMORPHONE HYDROCHLORIDE 2 MG/ML
2 INJECTION, SOLUTION INTRAMUSCULAR; INTRAVENOUS; SUBCUTANEOUS
Status: DISCONTINUED | OUTPATIENT
Start: 2021-01-01 | End: 2021-08-06 | Stop reason: HOSPADM

## 2021-01-01 RX ORDER — HYDRALAZINE HYDROCHLORIDE 50 MG/1
100 TABLET, FILM COATED ORAL 3 TIMES DAILY
Status: DISCONTINUED | OUTPATIENT
Start: 2021-01-01 | End: 2021-01-01 | Stop reason: HOSPADM

## 2021-01-01 RX ORDER — MIDAZOLAM HYDROCHLORIDE 1 MG/ML
1 INJECTION, SOLUTION INTRAMUSCULAR; INTRAVENOUS
Status: CANCELLED | OUTPATIENT
Start: 2021-01-01

## 2021-01-01 RX ORDER — EPINEPHRINE 0.1 MG/ML
INJECTION INTRACARDIAC; INTRAVENOUS
Status: COMPLETED | OUTPATIENT
Start: 2021-01-01 | End: 2021-01-01

## 2021-01-01 RX ORDER — FENTANYL CITRATE 50 UG/ML
25 INJECTION, SOLUTION INTRAMUSCULAR; INTRAVENOUS
Status: CANCELLED | OUTPATIENT
Start: 2021-01-01

## 2021-01-01 RX ORDER — ACETAMINOPHEN 650 MG/1
650 SUPPOSITORY RECTAL
Status: DISCONTINUED | OUTPATIENT
Start: 2021-01-01 | End: 2021-01-01 | Stop reason: HOSPADM

## 2021-01-01 RX ORDER — FUROSEMIDE 10 MG/ML
100 INJECTION INTRAMUSCULAR; INTRAVENOUS
Status: DISCONTINUED | OUTPATIENT
Start: 2021-01-01 | End: 2021-01-01

## 2021-01-01 RX ORDER — CALCIUM ACETATE 667 MG/1
1 TABLET ORAL
Status: DISCONTINUED | OUTPATIENT
Start: 2021-01-01 | End: 2021-01-01

## 2021-01-01 RX ORDER — CALCITRIOL 0.25 UG/1
0.25 CAPSULE ORAL DAILY
Status: DISCONTINUED | OUTPATIENT
Start: 2021-01-01 | End: 2021-01-01 | Stop reason: HOSPADM

## 2021-01-01 RX ORDER — INSULIN GLARGINE 100 [IU]/ML
14 INJECTION, SOLUTION SUBCUTANEOUS DAILY
Status: DISCONTINUED | OUTPATIENT
Start: 2021-01-01 | End: 2021-01-01

## 2021-01-01 RX ORDER — CHLORHEXIDINE GLUCONATE 1.2 MG/ML
15 RINSE ORAL EVERY 12 HOURS
Status: DISCONTINUED | OUTPATIENT
Start: 2021-01-01 | End: 2021-08-06 | Stop reason: HOSPADM

## 2021-01-01 RX ORDER — SODIUM CHLORIDE 9 MG/ML
250 INJECTION, SOLUTION INTRAVENOUS AS NEEDED
Status: DISCONTINUED | OUTPATIENT
Start: 2021-01-01 | End: 2021-01-01 | Stop reason: HOSPADM

## 2021-01-01 RX ORDER — DIPHENHYDRAMINE HYDROCHLORIDE 50 MG/ML
12.5 INJECTION, SOLUTION INTRAMUSCULAR; INTRAVENOUS AS NEEDED
Status: CANCELLED | OUTPATIENT
Start: 2021-01-01 | End: 2021-01-01

## 2021-01-01 RX ORDER — DULOXETIN HYDROCHLORIDE 60 MG/1
60 CAPSULE, DELAYED RELEASE ORAL DAILY
COMMUNITY
End: 2021-01-01

## 2021-01-01 RX ORDER — ENOXAPARIN SODIUM 100 MG/ML
30 INJECTION SUBCUTANEOUS DAILY
Status: DISCONTINUED | OUTPATIENT
Start: 2021-01-01 | End: 2021-01-01

## 2021-01-01 RX ORDER — CARVEDILOL 12.5 MG/1
12.5 TABLET ORAL 2 TIMES DAILY
Status: DISCONTINUED | OUTPATIENT
Start: 2021-01-01 | End: 2021-01-01

## 2021-01-01 RX ORDER — SODIUM BICARBONATE 1 MEQ/ML
100 SYRINGE (ML) INTRAVENOUS
Status: COMPLETED | OUTPATIENT
Start: 2021-01-01 | End: 2021-01-01

## 2021-01-01 RX ORDER — POLYETHYLENE GLYCOL 3350 17 G/17G
17 POWDER, FOR SOLUTION ORAL DAILY PRN
Status: DISCONTINUED | OUTPATIENT
Start: 2021-01-01 | End: 2021-08-06 | Stop reason: HOSPADM

## 2021-01-01 RX ORDER — INSULIN GLARGINE 100 [IU]/ML
8 INJECTION, SOLUTION SUBCUTANEOUS
Status: DISCONTINUED | OUTPATIENT
Start: 2021-01-01 | End: 2021-01-01

## 2021-01-01 RX ORDER — SODIUM CHLORIDE, SODIUM LACTATE, POTASSIUM CHLORIDE, CALCIUM CHLORIDE 600; 310; 30; 20 MG/100ML; MG/100ML; MG/100ML; MG/100ML
125 INJECTION, SOLUTION INTRAVENOUS CONTINUOUS
Status: CANCELLED | OUTPATIENT
Start: 2021-01-01 | End: 2021-01-01

## 2021-01-01 RX ORDER — DULOXETIN HYDROCHLORIDE 60 MG/1
60 CAPSULE, DELAYED RELEASE ORAL DAILY
Qty: 30 CAP | Refills: 0 | Status: SHIPPED | OUTPATIENT
Start: 2021-01-01

## 2021-01-01 RX ORDER — CALCITRIOL 0.25 UG/1
0.5 CAPSULE ORAL DAILY
Status: DISCONTINUED | OUTPATIENT
Start: 2021-01-01 | End: 2021-01-01 | Stop reason: HOSPADM

## 2021-01-01 RX ORDER — PRAVASTATIN SODIUM 20 MG/1
20 TABLET ORAL
Status: DISCONTINUED | OUTPATIENT
Start: 2021-01-01 | End: 2021-01-01 | Stop reason: HOSPADM

## 2021-01-01 RX ORDER — INSULIN GLARGINE 100 [IU]/ML
10 INJECTION, SOLUTION SUBCUTANEOUS 2 TIMES DAILY
Status: DISCONTINUED | OUTPATIENT
Start: 2021-01-01 | End: 2021-01-01

## 2021-01-01 RX ORDER — POTASSIUM CHLORIDE 20 MEQ/1
20 TABLET, EXTENDED RELEASE ORAL DAILY
Qty: 30 TAB | Refills: 0 | Status: SHIPPED | OUTPATIENT
Start: 2021-01-01 | End: 2021-01-01

## 2021-01-01 RX ORDER — HYDROCODONE BITARTRATE AND ACETAMINOPHEN 10; 325 MG/1; MG/1
1 TABLET ORAL
Qty: 12 TAB | Refills: 0 | Status: SHIPPED | OUTPATIENT
Start: 2021-01-01 | End: 2021-01-01

## 2021-01-01 RX ORDER — FENTANYL CITRATE 50 UG/ML
25-100 INJECTION, SOLUTION INTRAMUSCULAR; INTRAVENOUS
Status: DISCONTINUED | OUTPATIENT
Start: 2021-01-01 | End: 2021-01-01

## 2021-01-01 RX ORDER — ROCURONIUM BROMIDE 10 MG/ML
INJECTION, SOLUTION INTRAVENOUS AS NEEDED
Status: DISCONTINUED | OUTPATIENT
Start: 2021-01-01 | End: 2021-01-01 | Stop reason: HOSPADM

## 2021-01-01 RX ORDER — PHENYLEPHRINE HCL IN 0.9% NACL 0.4MG/10ML
SYRINGE (ML) INTRAVENOUS
Status: COMPLETED
Start: 2021-01-01 | End: 2021-01-01

## 2021-01-01 RX ORDER — SODIUM BICARBONATE 1 MEQ/ML
SYRINGE (ML) INTRAVENOUS AS NEEDED
Status: DISCONTINUED | OUTPATIENT
Start: 2021-01-01 | End: 2021-01-01 | Stop reason: HOSPADM

## 2021-01-01 RX ORDER — GLYCOPYRROLATE 0.2 MG/ML
0.2 INJECTION INTRAMUSCULAR; INTRAVENOUS
Status: DISCONTINUED | OUTPATIENT
Start: 2021-01-01 | End: 2021-08-06 | Stop reason: HOSPADM

## 2021-01-01 RX ORDER — CARVEDILOL 12.5 MG/1
12.5 TABLET ORAL 2 TIMES DAILY
Status: DISCONTINUED | OUTPATIENT
Start: 2021-01-01 | End: 2021-01-01 | Stop reason: HOSPADM

## 2021-01-01 RX ORDER — HYDROCORTISONE SODIUM SUCCINATE 100 MG/2ML
50 INJECTION, POWDER, FOR SOLUTION INTRAMUSCULAR; INTRAVENOUS EVERY 6 HOURS
Status: DISCONTINUED | OUTPATIENT
Start: 2021-01-01 | End: 2021-01-01

## 2021-01-01 RX ORDER — NIFEDIPINE 30 MG/1
30 TABLET, EXTENDED RELEASE ORAL DAILY
COMMUNITY
End: 2021-01-01

## 2021-01-01 RX ORDER — HYDRALAZINE HYDROCHLORIDE 50 MG/1
50 TABLET, FILM COATED ORAL 3 TIMES DAILY
Status: DISCONTINUED | OUTPATIENT
Start: 2021-01-01 | End: 2021-01-01 | Stop reason: HOSPADM

## 2021-01-01 RX ORDER — KETAMINE HYDROCHLORIDE 10 MG/ML
100 INJECTION, SOLUTION INTRAMUSCULAR; INTRAVENOUS ONCE
Status: COMPLETED | OUTPATIENT
Start: 2021-01-01 | End: 2021-01-01

## 2021-01-01 RX ORDER — CARVEDILOL 3.12 MG/1
6.25 TABLET ORAL 2 TIMES DAILY
Status: DISCONTINUED | OUTPATIENT
Start: 2021-01-01 | End: 2021-01-01

## 2021-01-01 RX ORDER — POTASSIUM CHLORIDE 750 MG/1
40 TABLET, FILM COATED, EXTENDED RELEASE ORAL ONCE
Status: COMPLETED | OUTPATIENT
Start: 2021-01-01 | End: 2021-01-01

## 2021-01-01 RX ORDER — NIFEDIPINE 60 MG/1
60 TABLET, EXTENDED RELEASE ORAL 2 TIMES DAILY
Status: DISCONTINUED | OUTPATIENT
Start: 2021-01-01 | End: 2021-08-06 | Stop reason: HOSPADM

## 2021-01-01 RX ORDER — ALBUMIN HUMAN 50 G/1000ML
SOLUTION INTRAVENOUS AS NEEDED
Status: DISCONTINUED | OUTPATIENT
Start: 2021-01-01 | End: 2021-01-01 | Stop reason: HOSPADM

## 2021-01-01 RX ORDER — PROMETHAZINE HYDROCHLORIDE 25 MG/1
12.5 TABLET ORAL
Status: DISCONTINUED | OUTPATIENT
Start: 2021-01-01 | End: 2021-01-01 | Stop reason: HOSPADM

## 2021-01-01 RX ORDER — NIFEDIPINE 60 MG/1
60 TABLET, EXTENDED RELEASE ORAL 2 TIMES DAILY
Qty: 60 TAB | Refills: 0 | Status: SHIPPED | OUTPATIENT
Start: 2021-01-01

## 2021-01-01 RX ORDER — DEXTROSE 50 % IN WATER (D50W) INTRAVENOUS SYRINGE
25-50 AS NEEDED
Status: DISCONTINUED | OUTPATIENT
Start: 2021-01-01 | End: 2021-01-01 | Stop reason: HOSPADM

## 2021-01-01 RX ORDER — GLIPIZIDE 5 MG/1
5 TABLET ORAL 2 TIMES DAILY
COMMUNITY

## 2021-01-01 RX ORDER — INSULIN GLARGINE 100 [IU]/ML
INJECTION, SOLUTION SUBCUTANEOUS
COMMUNITY
End: 2021-01-01

## 2021-01-01 RX ORDER — OXYCODONE HYDROCHLORIDE 5 MG/1
5 TABLET ORAL AS NEEDED
Status: CANCELLED | OUTPATIENT
Start: 2021-01-01

## 2021-01-01 RX ORDER — MAGNESIUM SULFATE 100 %
4 CRYSTALS MISCELLANEOUS AS NEEDED
Status: DISCONTINUED | OUTPATIENT
Start: 2021-01-01 | End: 2021-01-01 | Stop reason: HOSPADM

## 2021-01-01 RX ORDER — HEPARIN SODIUM 5000 [USP'U]/ML
5000 INJECTION, SOLUTION INTRAVENOUS; SUBCUTANEOUS EVERY 12 HOURS
Status: DISCONTINUED | OUTPATIENT
Start: 2021-01-01 | End: 2021-01-01

## 2021-01-01 RX ORDER — LIDOCAINE HYDROCHLORIDE 20 MG/ML
INJECTION, SOLUTION EPIDURAL; INFILTRATION; INTRACAUDAL; PERINEURAL AS NEEDED
Status: DISCONTINUED | OUTPATIENT
Start: 2021-01-01 | End: 2021-01-01 | Stop reason: HOSPADM

## 2021-01-01 RX ORDER — INSULIN LISPRO 100 [IU]/ML
10 INJECTION, SOLUTION INTRAVENOUS; SUBCUTANEOUS ONCE
Status: COMPLETED | OUTPATIENT
Start: 2021-01-01 | End: 2021-01-01

## 2021-01-01 RX ORDER — INSULIN GLARGINE 100 [IU]/ML
8 INJECTION, SOLUTION SUBCUTANEOUS
Status: DISCONTINUED | OUTPATIENT
Start: 2021-01-01 | End: 2021-01-01 | Stop reason: HOSPADM

## 2021-01-01 RX ORDER — HYDROCODONE BITARTRATE AND ACETAMINOPHEN 10; 325 MG/1; MG/1
1 TABLET ORAL
Qty: 20 TAB | Refills: 0 | Status: SHIPPED | OUTPATIENT
Start: 2021-01-01 | End: 2021-01-01

## 2021-01-01 RX ORDER — POTASSIUM CHLORIDE AND SODIUM CHLORIDE 450; 150 MG/100ML; MG/100ML
INJECTION, SOLUTION INTRAVENOUS CONTINUOUS
Status: DISCONTINUED | OUTPATIENT
Start: 2021-01-01 | End: 2021-01-01 | Stop reason: HOSPADM

## 2021-01-01 RX ORDER — OXYCODONE AND ACETAMINOPHEN 5; 325 MG/1; MG/1
1 TABLET ORAL
Status: DISCONTINUED | OUTPATIENT
Start: 2021-01-01 | End: 2021-01-01

## 2021-01-01 RX ORDER — SODIUM BICARBONATE 1 MEQ/ML
50 SYRINGE (ML) INTRAVENOUS ONCE
Status: COMPLETED | OUTPATIENT
Start: 2021-01-01 | End: 2021-01-01

## 2021-01-01 RX ORDER — SODIUM BICARBONATE 650 MG/1
650 TABLET ORAL 2 TIMES DAILY
COMMUNITY

## 2021-01-01 RX ORDER — ERGOCALCIFEROL 1.25 MG/1
50000 CAPSULE ORAL
Status: DISCONTINUED | OUTPATIENT
Start: 2021-01-01 | End: 2021-01-01 | Stop reason: HOSPADM

## 2021-01-01 RX ORDER — KETAMINE HYDROCHLORIDE 10 MG/ML
INJECTION, SOLUTION INTRAMUSCULAR; INTRAVENOUS
Status: COMPLETED
Start: 2021-01-01 | End: 2021-01-01

## 2021-01-01 RX ORDER — FUROSEMIDE 10 MG/ML
40 INJECTION INTRAMUSCULAR; INTRAVENOUS 2 TIMES DAILY
Status: COMPLETED | OUTPATIENT
Start: 2021-01-01 | End: 2021-01-01

## 2021-01-01 RX ORDER — HYDROCODONE BITARTRATE AND ACETAMINOPHEN 10; 325 MG/1; MG/1
1 TABLET ORAL
Status: DISCONTINUED | OUTPATIENT
Start: 2021-01-01 | End: 2021-01-01 | Stop reason: HOSPADM

## 2021-01-01 RX ORDER — DULOXETIN HYDROCHLORIDE 30 MG/1
60 CAPSULE, DELAYED RELEASE ORAL DAILY
Status: DISCONTINUED | OUTPATIENT
Start: 2021-01-01 | End: 2021-01-01 | Stop reason: HOSPADM

## 2021-01-01 RX ORDER — MIDAZOLAM HYDROCHLORIDE 1 MG/ML
5 INJECTION, SOLUTION INTRAMUSCULAR; INTRAVENOUS
Status: DISCONTINUED | OUTPATIENT
Start: 2021-01-01 | End: 2021-01-01 | Stop reason: HOSPADM

## 2021-01-01 RX ORDER — INSULIN GLARGINE 100 [IU]/ML
10 INJECTION, SOLUTION SUBCUTANEOUS DAILY
Status: DISCONTINUED | OUTPATIENT
Start: 2021-01-01 | End: 2021-01-01 | Stop reason: HOSPADM

## 2021-01-01 RX ORDER — LIDOCAINE HYDROCHLORIDE 10 MG/ML
10 INJECTION INFILTRATION; PERINEURAL ONCE
Status: COMPLETED | OUTPATIENT
Start: 2021-01-01 | End: 2021-01-01

## 2021-01-01 RX ORDER — ONDANSETRON 4 MG/1
4 TABLET, ORALLY DISINTEGRATING ORAL
Status: DISCONTINUED | OUTPATIENT
Start: 2021-01-01 | End: 2021-01-01

## 2021-01-01 RX ORDER — LIDOCAINE HYDROCHLORIDE 20 MG/ML
20 INJECTION, SOLUTION INFILTRATION; PERINEURAL
Status: COMPLETED | OUTPATIENT
Start: 2021-01-01 | End: 2021-01-01

## 2021-01-01 RX ORDER — SODIUM BICARBONATE 1 MEQ/ML
SYRINGE (ML) INTRAVENOUS
Status: COMPLETED | OUTPATIENT
Start: 2021-01-01 | End: 2021-01-01

## 2021-01-01 RX ORDER — INSULIN LISPRO 100 [IU]/ML
INJECTION, SOLUTION INTRAVENOUS; SUBCUTANEOUS
Status: DISCONTINUED | OUTPATIENT
Start: 2021-01-01 | End: 2021-01-01

## 2021-01-01 RX ORDER — HEPARIN SODIUM 1000 [USP'U]/ML
10000 INJECTION, SOLUTION INTRAVENOUS; SUBCUTANEOUS
Status: COMPLETED | OUTPATIENT
Start: 2021-01-01 | End: 2021-01-01

## 2021-01-01 RX ORDER — SODIUM BICARBONATE IN D5W 150/1000ML
PLASTIC BAG, INJECTION (ML) INTRAVENOUS CONTINUOUS
Status: DISCONTINUED | OUTPATIENT
Start: 2021-01-01 | End: 2021-01-01

## 2021-01-01 RX ORDER — NIFEDIPINE 30 MG/1
30 TABLET, EXTENDED RELEASE ORAL 2 TIMES DAILY
Status: DISCONTINUED | OUTPATIENT
Start: 2021-01-01 | End: 2021-01-01

## 2021-01-01 RX ORDER — ACETAMINOPHEN 325 MG/1
650 TABLET ORAL ONCE
Status: CANCELLED | OUTPATIENT
Start: 2021-01-01 | End: 2021-01-01

## 2021-01-01 RX ORDER — SODIUM CHLORIDE 9 MG/ML
500 INJECTION, SOLUTION INTRAVENOUS CONTINUOUS
Status: DISCONTINUED | OUTPATIENT
Start: 2021-01-01 | End: 2021-01-01 | Stop reason: HOSPADM

## 2021-01-01 RX ORDER — FENTANYL CITRATE 50 UG/ML
50 INJECTION, SOLUTION INTRAMUSCULAR; INTRAVENOUS AS NEEDED
Status: CANCELLED | OUTPATIENT
Start: 2021-01-01

## 2021-01-01 RX ORDER — AMOXICILLIN AND CLAVULANATE POTASSIUM 875; 125 MG/1; MG/1
1 TABLET, FILM COATED ORAL EVERY 12 HOURS
Qty: 14 TAB | Refills: 0 | Status: SHIPPED | OUTPATIENT
Start: 2021-01-01

## 2021-01-01 RX ORDER — CALCIUM CARBONATE 200(500)MG
400 TABLET,CHEWABLE ORAL 2 TIMES DAILY
Qty: 120 TAB | Refills: 0 | Status: ON HOLD | OUTPATIENT
Start: 2021-01-01 | End: 2021-01-01

## 2021-01-01 RX ORDER — NIFEDIPINE 60 MG/1
60 TABLET, EXTENDED RELEASE ORAL 2 TIMES DAILY
Qty: 60 TAB | Refills: 0 | Status: SHIPPED | OUTPATIENT
Start: 2021-01-01 | End: 2021-01-01

## 2021-01-01 RX ORDER — SAME BUTANEDISULFONATE/BETAINE 400-600 MG
250 POWDER IN PACKET (EA) ORAL 2 TIMES DAILY
Qty: 28 CAP | Refills: 0 | Status: SHIPPED | OUTPATIENT
Start: 2021-01-01 | End: 2021-01-01

## 2021-01-01 RX ORDER — HEPARIN SODIUM 1000 [USP'U]/ML
INJECTION, SOLUTION INTRAVENOUS; SUBCUTANEOUS
Status: COMPLETED
Start: 2021-01-01 | End: 2021-01-01

## 2021-01-01 RX ADMIN — POTASSIUM CHLORIDE 20 MEQ: 29.8 INJECTION, SOLUTION INTRAVENOUS at 00:26

## 2021-01-01 RX ADMIN — HEPARIN SODIUM 5000 UNITS: 5000 INJECTION INTRAVENOUS; SUBCUTANEOUS at 01:52

## 2021-01-01 RX ADMIN — HEPARIN SODIUM 5000 UNITS: 5000 INJECTION INTRAVENOUS; SUBCUTANEOUS at 09:24

## 2021-01-01 RX ADMIN — METOCLOPRAMIDE 10 MG: 5 INJECTION, SOLUTION INTRAMUSCULAR; INTRAVENOUS at 00:18

## 2021-01-01 RX ADMIN — POTASSIUM CHLORIDE AND SODIUM CHLORIDE: 450; 150 INJECTION, SOLUTION INTRAVENOUS at 17:37

## 2021-01-01 RX ADMIN — NAFCILLIN SODIUM 2 G: 2 INJECTION, POWDER, LYOPHILIZED, FOR SOLUTION INTRAMUSCULAR; INTRAVENOUS at 17:07

## 2021-01-01 RX ADMIN — PIPERACILLIN AND TAZOBACTAM 2.25 G: 2; .25 INJECTION, POWDER, LYOPHILIZED, FOR SOLUTION INTRAVENOUS at 11:44

## 2021-01-01 RX ADMIN — Medication 10 ML: at 14:00

## 2021-01-01 RX ADMIN — SODIUM BICARBONATE: 84 INJECTION, SOLUTION INTRAVENOUS at 01:43

## 2021-01-01 RX ADMIN — Medication 667 MG: at 11:41

## 2021-01-01 RX ADMIN — POTASSIUM CHLORIDE 20 MEQ: 29.8 INJECTION, SOLUTION INTRAVENOUS at 06:55

## 2021-01-01 RX ADMIN — NAFCILLIN SODIUM 2 G: 2 INJECTION, POWDER, LYOPHILIZED, FOR SOLUTION INTRAMUSCULAR; INTRAVENOUS at 11:34

## 2021-01-01 RX ADMIN — Medication 667 MG: at 16:11

## 2021-01-01 RX ADMIN — PRAVASTATIN SODIUM 20 MG: 20 TABLET ORAL at 23:53

## 2021-01-01 RX ADMIN — INSULIN LISPRO 5 UNITS: 100 INJECTION, SOLUTION INTRAVENOUS; SUBCUTANEOUS at 12:47

## 2021-01-01 RX ADMIN — HEPARIN SODIUM 5000 UNITS: 5000 INJECTION INTRAVENOUS; SUBCUTANEOUS at 16:11

## 2021-01-01 RX ADMIN — NAFCILLIN SODIUM 2 G: 2 INJECTION, POWDER, LYOPHILIZED, FOR SOLUTION INTRAMUSCULAR; INTRAVENOUS at 02:24

## 2021-01-01 RX ADMIN — VANCOMYCIN HYDROCHLORIDE 500 MG: KIT at 06:57

## 2021-01-01 RX ADMIN — SODIUM CHLORIDE 100 MG: 9 INJECTION, SOLUTION INTRAVENOUS at 13:58

## 2021-01-01 RX ADMIN — INSULIN GLARGINE 10 UNITS: 100 INJECTION, SOLUTION SUBCUTANEOUS at 08:13

## 2021-01-01 RX ADMIN — HEPARIN SODIUM 5000 UNITS: 5000 INJECTION INTRAVENOUS; SUBCUTANEOUS at 22:50

## 2021-01-01 RX ADMIN — CHLORHEXIDINE GLUCONATE 15 ML: 1.2 RINSE ORAL at 21:29

## 2021-01-01 RX ADMIN — PIPERACILLIN AND TAZOBACTAM 2.25 G: 2; .25 INJECTION, POWDER, LYOPHILIZED, FOR SOLUTION INTRAVENOUS at 04:55

## 2021-01-01 RX ADMIN — NAFCILLIN SODIUM 2 G: 2 INJECTION, POWDER, LYOPHILIZED, FOR SOLUTION INTRAMUSCULAR; INTRAVENOUS at 05:17

## 2021-01-01 RX ADMIN — HYDRALAZINE HYDROCHLORIDE 100 MG: 50 TABLET, FILM COATED ORAL at 09:53

## 2021-01-01 RX ADMIN — Medication 10 ML: at 13:58

## 2021-01-01 RX ADMIN — OXYCODONE HYDROCHLORIDE AND ACETAMINOPHEN 1 TABLET: 5; 325 TABLET ORAL at 17:19

## 2021-01-01 RX ADMIN — PIPERACILLIN AND TAZOBACTAM 3.38 G: 3; .375 INJECTION, POWDER, LYOPHILIZED, FOR SOLUTION INTRAVENOUS at 08:32

## 2021-01-01 RX ADMIN — CARVEDILOL 6.25 MG: 3.12 TABLET, FILM COATED ORAL at 20:17

## 2021-01-01 RX ADMIN — ACETAMINOPHEN 650 MG: 325 TABLET, FILM COATED ORAL at 06:00

## 2021-01-01 RX ADMIN — HEPARIN SODIUM 5000 UNITS: 5000 INJECTION INTRAVENOUS; SUBCUTANEOUS at 02:35

## 2021-01-01 RX ADMIN — Medication 10 ML: at 05:16

## 2021-01-01 RX ADMIN — ACETAMINOPHEN 650 MG: 325 TABLET ORAL at 06:34

## 2021-01-01 RX ADMIN — NAFCILLIN SODIUM 2 G: 2 INJECTION, POWDER, LYOPHILIZED, FOR SOLUTION INTRAMUSCULAR; INTRAVENOUS at 19:45

## 2021-01-01 RX ADMIN — DEXTROSE AND SODIUM CHLORIDE 50 ML/HR: 5; 450 INJECTION, SOLUTION INTRAVENOUS at 10:15

## 2021-01-01 RX ADMIN — HEPARIN SODIUM 5000 UNITS: 5000 INJECTION INTRAVENOUS; SUBCUTANEOUS at 14:00

## 2021-01-01 RX ADMIN — HYDRALAZINE HYDROCHLORIDE 50 MG: 50 TABLET, FILM COATED ORAL at 16:02

## 2021-01-01 RX ADMIN — POTASSIUM CHLORIDE 20 MEQ: 29.8 INJECTION, SOLUTION INTRAVENOUS at 15:39

## 2021-01-01 RX ADMIN — PRAVASTATIN SODIUM 20 MG: 20 TABLET ORAL at 21:01

## 2021-01-01 RX ADMIN — PRAVASTATIN SODIUM 20 MG: 20 TABLET ORAL at 22:35

## 2021-01-01 RX ADMIN — NIFEDIPINE 60 MG: 60 TABLET, EXTENDED RELEASE ORAL at 09:27

## 2021-01-01 RX ADMIN — HYDRALAZINE HYDROCHLORIDE 100 MG: 50 TABLET, FILM COATED ORAL at 17:07

## 2021-01-01 RX ADMIN — INSULIN LISPRO 2 UNITS: 100 INJECTION, SOLUTION INTRAVENOUS; SUBCUTANEOUS at 08:37

## 2021-01-01 RX ADMIN — FUROSEMIDE 40 MG: 10 INJECTION, SOLUTION INTRAMUSCULAR; INTRAVENOUS at 05:44

## 2021-01-01 RX ADMIN — OXYCODONE HYDROCHLORIDE AND ACETAMINOPHEN 1 TABLET: 5; 325 TABLET ORAL at 05:46

## 2021-01-01 RX ADMIN — CALCIUM GLUCONATE 2 G: 20 INJECTION, SOLUTION INTRAVENOUS at 13:10

## 2021-01-01 RX ADMIN — NOREPINEPHRINE BITARTRATE 7 MCG/MIN: 1 INJECTION, SOLUTION, CONCENTRATE INTRAVENOUS at 05:58

## 2021-01-01 RX ADMIN — CARVEDILOL 6.25 MG: 3.12 TABLET, FILM COATED ORAL at 21:04

## 2021-01-01 RX ADMIN — CHLORHEXIDINE GLUCONATE 15 ML: 1.2 RINSE ORAL at 08:14

## 2021-01-01 RX ADMIN — HYDRALAZINE HYDROCHLORIDE 50 MG: 50 TABLET, FILM COATED ORAL at 15:51

## 2021-01-01 RX ADMIN — HYDRALAZINE HYDROCHLORIDE 100 MG: 50 TABLET, FILM COATED ORAL at 21:51

## 2021-01-01 RX ADMIN — POTASSIUM CHLORIDE 20 MEQ: 29.8 INJECTION, SOLUTION INTRAVENOUS at 16:32

## 2021-01-01 RX ADMIN — METRONIDAZOLE 500 MG: 500 INJECTION, SOLUTION INTRAVENOUS at 00:06

## 2021-01-01 RX ADMIN — POTASSIUM CHLORIDE: 2 INJECTION, SOLUTION, CONCENTRATE INTRAVENOUS at 14:20

## 2021-01-01 RX ADMIN — CARVEDILOL 6.25 MG: 3.12 TABLET, FILM COATED ORAL at 08:19

## 2021-01-01 RX ADMIN — CHLORHEXIDINE GLUCONATE 15 ML: 1.2 RINSE ORAL at 09:00

## 2021-01-01 RX ADMIN — AMLODIPINE BESYLATE 10 MG: 5 TABLET ORAL at 09:54

## 2021-01-01 RX ADMIN — CALCIUM CARBONATE 400 MG: 500 TABLET, CHEWABLE ORAL at 21:51

## 2021-01-01 RX ADMIN — Medication 10 ML: at 21:00

## 2021-01-01 RX ADMIN — CEFTRIAXONE SODIUM 1 G: 1 INJECTION, POWDER, FOR SOLUTION INTRAMUSCULAR; INTRAVENOUS at 09:43

## 2021-01-01 RX ADMIN — Medication 10 ML: at 21:03

## 2021-01-01 RX ADMIN — Medication 10 ML: at 13:39

## 2021-01-01 RX ADMIN — CALCIUM GLUCONATE 1000 MG: 20 INJECTION, SOLUTION INTRAVENOUS at 22:30

## 2021-01-01 RX ADMIN — POTASSIUM CHLORIDE 20 MEQ: 29.8 INJECTION, SOLUTION INTRAVENOUS at 05:01

## 2021-01-01 RX ADMIN — CALCIUM CARBONATE 400 MG: 500 TABLET, CHEWABLE ORAL at 08:57

## 2021-01-01 RX ADMIN — NOREPINEPHRINE BITARTRATE 6 MCG/MIN: 1 INJECTION, SOLUTION, CONCENTRATE INTRAVENOUS at 02:44

## 2021-01-01 RX ADMIN — HEPARIN SODIUM 5000 UNITS: 5000 INJECTION INTRAVENOUS; SUBCUTANEOUS at 06:42

## 2021-01-01 RX ADMIN — OXYCODONE HYDROCHLORIDE AND ACETAMINOPHEN 1 TABLET: 5; 325 TABLET ORAL at 23:04

## 2021-01-01 RX ADMIN — HYDRALAZINE HYDROCHLORIDE 100 MG: 50 TABLET, FILM COATED ORAL at 20:43

## 2021-01-01 RX ADMIN — CARVEDILOL 6.25 MG: 3.12 TABLET, FILM COATED ORAL at 08:57

## 2021-01-01 RX ADMIN — Medication 100 MG: at 08:37

## 2021-01-01 RX ADMIN — DULOXETINE HYDROCHLORIDE 60 MG: 60 CAPSULE, DELAYED RELEASE ORAL at 08:08

## 2021-01-01 RX ADMIN — OXYCODONE HYDROCHLORIDE AND ACETAMINOPHEN 1 TABLET: 5; 325 TABLET ORAL at 20:42

## 2021-01-01 RX ADMIN — FERROUS SULFATE TAB 325 MG (65 MG ELEMENTAL FE) 325 MG: 325 (65 FE) TAB at 08:18

## 2021-01-01 RX ADMIN — PIPERACILLIN AND TAZOBACTAM 3.38 G: 3; .375 INJECTION, POWDER, LYOPHILIZED, FOR SOLUTION INTRAVENOUS at 05:30

## 2021-01-01 RX ADMIN — INSULIN LISPRO 3 UNITS: 100 INJECTION, SOLUTION INTRAVENOUS; SUBCUTANEOUS at 18:04

## 2021-01-01 RX ADMIN — OXYCODONE HYDROCHLORIDE AND ACETAMINOPHEN 1 TABLET: 5; 325 TABLET ORAL at 01:08

## 2021-01-01 RX ADMIN — Medication 10 ML: at 16:03

## 2021-01-01 RX ADMIN — Medication 10 ML: at 17:08

## 2021-01-01 RX ADMIN — PIPERACILLIN AND TAZOBACTAM 2.25 G: 2; .25 INJECTION, POWDER, LYOPHILIZED, FOR SOLUTION INTRAVENOUS at 20:39

## 2021-01-01 RX ADMIN — HYDRALAZINE HYDROCHLORIDE 50 MG: 50 TABLET, FILM COATED ORAL at 09:43

## 2021-01-01 RX ADMIN — HYDRALAZINE HYDROCHLORIDE 50 MG: 50 TABLET, FILM COATED ORAL at 21:03

## 2021-01-01 RX ADMIN — PRAVASTATIN SODIUM 20 MG: 20 TABLET ORAL at 21:03

## 2021-01-01 RX ADMIN — HYDROCODONE BITARTRATE AND ACETAMINOPHEN 1 TABLET: 10; 325 TABLET ORAL at 06:31

## 2021-01-01 RX ADMIN — HYDRALAZINE HYDROCHLORIDE 10 MG: 20 INJECTION INTRAMUSCULAR; INTRAVENOUS at 08:57

## 2021-01-01 RX ADMIN — HEPARIN SODIUM 5000 UNITS: 5000 INJECTION INTRAVENOUS; SUBCUTANEOUS at 13:30

## 2021-01-01 RX ADMIN — VASOPRESSIN 0.04 UNITS/MIN: 20 INJECTION INTRAVENOUS at 20:53

## 2021-01-01 RX ADMIN — PIPERACILLIN AND TAZOBACTAM 2.25 G: 2; .25 INJECTION, POWDER, LYOPHILIZED, FOR SOLUTION INTRAVENOUS at 04:24

## 2021-01-01 RX ADMIN — CALCITRIOL CAPSULES 0.25 MCG 0.5 MCG: 0.25 CAPSULE ORAL at 09:53

## 2021-01-01 RX ADMIN — INSULIN LISPRO 5 UNITS: 100 INJECTION, SOLUTION INTRAVENOUS; SUBCUTANEOUS at 13:58

## 2021-01-01 RX ADMIN — VASOPRESSIN 0.03 UNITS/MIN: 20 INJECTION INTRAVENOUS at 23:01

## 2021-01-01 RX ADMIN — HEPARIN SODIUM 1100 UNITS: 1000 INJECTION INTRAVENOUS; SUBCUTANEOUS at 00:03

## 2021-01-01 RX ADMIN — FAMOTIDINE: 10 INJECTION, SOLUTION INTRAVENOUS at 18:45

## 2021-01-01 RX ADMIN — INSULIN GLARGINE 8 UNITS: 100 INJECTION, SOLUTION SUBCUTANEOUS at 21:58

## 2021-01-01 RX ADMIN — PIPERACILLIN AND TAZOBACTAM 3.38 G: 3; .375 INJECTION, POWDER, LYOPHILIZED, FOR SOLUTION INTRAVENOUS at 09:23

## 2021-01-01 RX ADMIN — NIFEDIPINE 60 MG: 30 TABLET, FILM COATED, EXTENDED RELEASE ORAL at 21:51

## 2021-01-01 RX ADMIN — FENTANYL CITRATE 50 MCG: 50 INJECTION, SOLUTION INTRAMUSCULAR; INTRAVENOUS at 08:51

## 2021-01-01 RX ADMIN — CALCIUM CHLORIDE, MAGNESIUM CHLORIDE, DEXTROSE MONOHYDRATE, LACTIC ACID, SODIUM CHLORIDE, SODIUM BICARBONATE AND POTASSIUM CHLORIDE: 3.68; 3.05; 22; 5.4; 6.46; 3.09; .314 INJECTION INTRAVENOUS at 06:10

## 2021-01-01 RX ADMIN — INSULIN LISPRO 2 UNITS: 100 INJECTION, SOLUTION INTRAVENOUS; SUBCUTANEOUS at 00:05

## 2021-01-01 RX ADMIN — SODIUM BICARBONATE 50 MEQ: 84 INJECTION, SOLUTION INTRAVENOUS at 16:59

## 2021-01-01 RX ADMIN — OXYCODONE HYDROCHLORIDE AND ACETAMINOPHEN 1 TABLET: 5; 325 TABLET ORAL at 11:26

## 2021-01-01 RX ADMIN — Medication 10 ML: at 00:45

## 2021-01-01 RX ADMIN — HEPARIN SODIUM 5000 UNITS: 5000 INJECTION INTRAVENOUS; SUBCUTANEOUS at 06:33

## 2021-01-01 RX ADMIN — Medication 10 ML: at 14:27

## 2021-01-01 RX ADMIN — PIPERACILLIN SODIUM AND TAZOBACTAM SODIUM 4.5 G: 4; .5 INJECTION, POWDER, LYOPHILIZED, FOR SOLUTION INTRAVENOUS at 23:39

## 2021-01-01 RX ADMIN — OXYCODONE HYDROCHLORIDE AND ACETAMINOPHEN 1 TABLET: 5; 325 TABLET ORAL at 07:46

## 2021-01-01 RX ADMIN — NIFEDIPINE 60 MG: 60 TABLET, FILM COATED, EXTENDED RELEASE ORAL at 20:34

## 2021-01-01 RX ADMIN — CARVEDILOL 12.5 MG: 12.5 TABLET, FILM COATED ORAL at 22:16

## 2021-01-01 RX ADMIN — PIPERACILLIN AND TAZOBACTAM 3.38 G: 3; .375 INJECTION, POWDER, LYOPHILIZED, FOR SOLUTION INTRAVENOUS at 21:52

## 2021-01-01 RX ADMIN — CALCIUM CARBONATE (ANTACID) CHEW TAB 500 MG 400 MG: 500 CHEW TAB at 21:03

## 2021-01-01 RX ADMIN — ONDANSETRON 4 MG: 2 INJECTION INTRAMUSCULAR; INTRAVENOUS at 09:10

## 2021-01-01 RX ADMIN — HEPARIN SODIUM 5000 UNITS: 5000 INJECTION INTRAVENOUS; SUBCUTANEOUS at 22:07

## 2021-01-01 RX ADMIN — INSULIN GLARGINE 10 UNITS: 100 INJECTION, SOLUTION SUBCUTANEOUS at 12:56

## 2021-01-01 RX ADMIN — FAMOTIDINE 20 MG: 10 INJECTION, SOLUTION INTRAVENOUS at 11:01

## 2021-01-01 RX ADMIN — METOCLOPRAMIDE 10 MG: 5 INJECTION, SOLUTION INTRAMUSCULAR; INTRAVENOUS at 00:02

## 2021-01-01 RX ADMIN — VASOPRESSIN 0.04 UNITS/MIN: 20 INJECTION INTRAVENOUS at 16:44

## 2021-01-01 RX ADMIN — HYDRALAZINE HYDROCHLORIDE 100 MG: 50 TABLET, FILM COATED ORAL at 08:57

## 2021-01-01 RX ADMIN — Medication 10 ML: at 05:09

## 2021-01-01 RX ADMIN — INSULIN GLARGINE 12 UNITS: 100 INJECTION, SOLUTION SUBCUTANEOUS at 08:57

## 2021-01-01 RX ADMIN — PROPOFOL 10 MCG/KG/MIN: 10 INJECTION, EMULSION INTRAVENOUS at 19:01

## 2021-01-01 RX ADMIN — CALCIUM CHLORIDE, MAGNESIUM CHLORIDE, DEXTROSE MONOHYDRATE, LACTIC ACID, SODIUM CHLORIDE, SODIUM BICARBONATE AND POTASSIUM CHLORIDE: 3.68; 3.05; 22; 5.4; 6.46; 3.09; .314 INJECTION INTRAVENOUS at 09:02

## 2021-01-01 RX ADMIN — Medication 10 ML: at 23:45

## 2021-01-01 RX ADMIN — LINEZOLID 600 MG: 600 INJECTION, SOLUTION INTRAVENOUS at 22:15

## 2021-01-01 RX ADMIN — Medication 40 ML: at 05:47

## 2021-01-01 RX ADMIN — HYDROMORPHONE HYDROCHLORIDE 2 MG: 2 INJECTION INTRAMUSCULAR; INTRAVENOUS; SUBCUTANEOUS at 14:43

## 2021-01-01 RX ADMIN — DOCUSATE SODIUM 100 MG: 100 CAPSULE, LIQUID FILLED ORAL at 20:20

## 2021-01-01 RX ADMIN — POTASSIUM CHLORIDE 10 MEQ: 14.9 INJECTION, SOLUTION INTRAVENOUS at 23:46

## 2021-01-01 RX ADMIN — PIPERACILLIN AND TAZOBACTAM 2.25 G: 2; .25 INJECTION, POWDER, LYOPHILIZED, FOR SOLUTION INTRAVENOUS at 04:54

## 2021-01-01 RX ADMIN — ACETAMINOPHEN 650 MG: 325 TABLET, FILM COATED ORAL at 05:47

## 2021-01-01 RX ADMIN — EPOETIN ALFA-EPBX 20000 UNITS: 20000 INJECTION, SOLUTION INTRAVENOUS; SUBCUTANEOUS at 11:58

## 2021-01-01 RX ADMIN — LINEZOLID 600 MG: 600 INJECTION, SOLUTION INTRAVENOUS at 21:30

## 2021-01-01 RX ADMIN — HYDRALAZINE HYDROCHLORIDE 50 MG: 50 TABLET, FILM COATED ORAL at 08:50

## 2021-01-01 RX ADMIN — HYDROCORTISONE SODIUM SUCCINATE 50 MG: 100 INJECTION, POWDER, FOR SOLUTION INTRAMUSCULAR; INTRAVENOUS at 00:09

## 2021-01-01 RX ADMIN — HYDRALAZINE HYDROCHLORIDE 50 MG: 50 TABLET, FILM COATED ORAL at 22:51

## 2021-01-01 RX ADMIN — HEPARIN SODIUM 5000 UNITS: 5000 INJECTION INTRAVENOUS; SUBCUTANEOUS at 01:55

## 2021-01-01 RX ADMIN — HYDRALAZINE HYDROCHLORIDE 50 MG: 50 TABLET, FILM COATED ORAL at 08:17

## 2021-01-01 RX ADMIN — POTASSIUM CHLORIDE 10 MEQ: 14.9 INJECTION, SOLUTION INTRAVENOUS at 22:00

## 2021-01-01 RX ADMIN — Medication 10 ML: at 15:51

## 2021-01-01 RX ADMIN — HYDRALAZINE HYDROCHLORIDE 10 MG: 20 INJECTION INTRAMUSCULAR; INTRAVENOUS at 02:35

## 2021-01-01 RX ADMIN — PIPERACILLIN AND TAZOBACTAM 3.38 G: 3; .375 INJECTION, POWDER, LYOPHILIZED, FOR SOLUTION INTRAVENOUS at 21:09

## 2021-01-01 RX ADMIN — HYDROCODONE BITARTRATE AND ACETAMINOPHEN 1 TABLET: 10; 325 TABLET ORAL at 21:52

## 2021-01-01 RX ADMIN — OXYCODONE HYDROCHLORIDE AND ACETAMINOPHEN 1 TABLET: 10; 325 TABLET ORAL at 18:05

## 2021-01-01 RX ADMIN — CHLORHEXIDINE GLUCONATE 15 ML: 1.2 RINSE ORAL at 08:32

## 2021-01-01 RX ADMIN — ENOXAPARIN SODIUM 30 MG: 30 INJECTION SUBCUTANEOUS at 12:30

## 2021-01-01 RX ADMIN — NAFCILLIN SODIUM 2 G: 2 INJECTION, POWDER, LYOPHILIZED, FOR SOLUTION INTRAMUSCULAR; INTRAVENOUS at 14:36

## 2021-01-01 RX ADMIN — INSULIN LISPRO 2 UNITS: 100 INJECTION, SOLUTION INTRAVENOUS; SUBCUTANEOUS at 21:29

## 2021-01-01 RX ADMIN — Medication 667 MG: at 16:34

## 2021-01-01 RX ADMIN — CALCIUM CHLORIDE, MAGNESIUM CHLORIDE, DEXTROSE MONOHYDRATE, LACTIC ACID, SODIUM CHLORIDE, SODIUM BICARBONATE AND POTASSIUM CHLORIDE: 3.68; 3.05; 22; 5.4; 6.46; 3.09; .314 INJECTION INTRAVENOUS at 16:42

## 2021-01-01 RX ADMIN — AMLODIPINE BESYLATE 5 MG: 5 TABLET ORAL at 08:51

## 2021-01-01 RX ADMIN — CARVEDILOL 12.5 MG: 12.5 TABLET, FILM COATED ORAL at 20:38

## 2021-01-01 RX ADMIN — HYDRALAZINE HYDROCHLORIDE 100 MG: 50 TABLET, FILM COATED ORAL at 15:34

## 2021-01-01 RX ADMIN — CEFTRIAXONE SODIUM 1 G: 1 INJECTION, POWDER, FOR SOLUTION INTRAMUSCULAR; INTRAVENOUS at 18:40

## 2021-01-01 RX ADMIN — Medication 10 ML: at 21:44

## 2021-01-01 RX ADMIN — INSULIN LISPRO 5 UNITS: 100 INJECTION, SOLUTION INTRAVENOUS; SUBCUTANEOUS at 23:38

## 2021-01-01 RX ADMIN — POTASSIUM CHLORIDE 10 MEQ: 14.9 INJECTION, SOLUTION INTRAVENOUS at 14:58

## 2021-01-01 RX ADMIN — HYDRALAZINE HYDROCHLORIDE 100 MG: 50 TABLET, FILM COATED ORAL at 18:06

## 2021-01-01 RX ADMIN — HYDRALAZINE HYDROCHLORIDE 50 MG: 50 TABLET, FILM COATED ORAL at 16:22

## 2021-01-01 RX ADMIN — ENOXAPARIN SODIUM 30 MG: 30 INJECTION SUBCUTANEOUS at 10:12

## 2021-01-01 RX ADMIN — CEFTRIAXONE SODIUM 1 G: 1 INJECTION, POWDER, FOR SOLUTION INTRAMUSCULAR; INTRAVENOUS at 06:42

## 2021-01-01 RX ADMIN — NAFCILLIN SODIUM 2 G: 2 INJECTION, POWDER, LYOPHILIZED, FOR SOLUTION INTRAMUSCULAR; INTRAVENOUS at 22:17

## 2021-01-01 RX ADMIN — Medication 10 ML: at 21:10

## 2021-01-01 RX ADMIN — Medication 1 CAPSULE: at 08:28

## 2021-01-01 RX ADMIN — POTASSIUM CHLORIDE 20 MEQ: 29.8 INJECTION, SOLUTION INTRAVENOUS at 07:02

## 2021-01-01 RX ADMIN — HYDROCODONE BITARTRATE AND ACETAMINOPHEN 1 TABLET: 10; 325 TABLET ORAL at 14:41

## 2021-01-01 RX ADMIN — ENOXAPARIN SODIUM 30 MG: 30 INJECTION SUBCUTANEOUS at 08:35

## 2021-01-01 RX ADMIN — CALCIUM CHLORIDE, MAGNESIUM CHLORIDE, DEXTROSE MONOHYDRATE, LACTIC ACID, SODIUM CHLORIDE, SODIUM BICARBONATE AND POTASSIUM CHLORIDE: 3.68; 3.05; 22; 5.4; 6.46; 3.09; .314 INJECTION INTRAVENOUS at 05:33

## 2021-01-01 RX ADMIN — PIPERACILLIN AND TAZOBACTAM 3.38 G: 3; .375 INJECTION, POWDER, LYOPHILIZED, FOR SOLUTION INTRAVENOUS at 16:02

## 2021-01-01 RX ADMIN — CALCIUM CHLORIDE, MAGNESIUM CHLORIDE, DEXTROSE MONOHYDRATE, LACTIC ACID, SODIUM CHLORIDE, SODIUM BICARBONATE AND POTASSIUM CHLORIDE: 3.68; 3.05; 22; 5.4; 6.46; 3.09; .314 INJECTION INTRAVENOUS at 14:20

## 2021-01-01 RX ADMIN — POTASSIUM CHLORIDE 20 MEQ: 400 INJECTION, SOLUTION INTRAVENOUS at 13:30

## 2021-01-01 RX ADMIN — SODIUM CHLORIDE: 234 INJECTION, SOLUTION INTRAVENOUS at 07:03

## 2021-01-01 RX ADMIN — IRON SUCROSE 200 MG: 20 INJECTION, SOLUTION INTRAVENOUS at 11:31

## 2021-01-01 RX ADMIN — SODIUM BICARBONATE: 84 INJECTION, SOLUTION INTRAVENOUS at 11:33

## 2021-01-01 RX ADMIN — Medication 10 ML: at 06:04

## 2021-01-01 RX ADMIN — CALCIUM CARBONATE 400 MG: 500 TABLET, CHEWABLE ORAL at 08:51

## 2021-01-01 RX ADMIN — HEPARIN SODIUM 1400 UNITS: 1000 INJECTION INTRAVENOUS; SUBCUTANEOUS at 00:06

## 2021-01-01 RX ADMIN — Medication 667 MG: at 18:17

## 2021-01-01 RX ADMIN — CHLORHEXIDINE GLUCONATE 15 ML: 1.2 RINSE ORAL at 08:36

## 2021-01-01 RX ADMIN — VANCOMYCIN HYDROCHLORIDE 500 MG: KIT at 16:48

## 2021-01-01 RX ADMIN — SODIUM CHLORIDE 500 ML: 9 INJECTION, SOLUTION INTRAVENOUS at 18:15

## 2021-01-01 RX ADMIN — OXYCODONE HYDROCHLORIDE AND ACETAMINOPHEN 1 TABLET: 10; 325 TABLET ORAL at 00:12

## 2021-01-01 RX ADMIN — POTASSIUM CHLORIDE 40 MEQ: 750 TABLET, FILM COATED, EXTENDED RELEASE ORAL at 10:22

## 2021-01-01 RX ADMIN — ACETAMINOPHEN 650 MG: 325 TABLET, FILM COATED ORAL at 11:44

## 2021-01-01 RX ADMIN — NAFCILLIN SODIUM 2 G: 2 INJECTION, POWDER, LYOPHILIZED, FOR SOLUTION INTRAMUSCULAR; INTRAVENOUS at 04:40

## 2021-01-01 RX ADMIN — CARVEDILOL 12.5 MG: 12.5 TABLET, FILM COATED ORAL at 09:07

## 2021-01-01 RX ADMIN — CALCIUM CHLORIDE, MAGNESIUM CHLORIDE, DEXTROSE MONOHYDRATE, LACTIC ACID, SODIUM CHLORIDE, SODIUM BICARBONATE AND POTASSIUM CHLORIDE: 3.68; 3.05; 22; 5.4; 6.46; 3.09; .314 INJECTION INTRAVENOUS at 18:45

## 2021-01-01 RX ADMIN — ENOXAPARIN SODIUM 30 MG: 30 INJECTION SUBCUTANEOUS at 11:22

## 2021-01-01 RX ADMIN — NIFEDIPINE 60 MG: 30 TABLET, FILM COATED, EXTENDED RELEASE ORAL at 20:37

## 2021-01-01 RX ADMIN — CARVEDILOL 6.25 MG: 3.12 TABLET, FILM COATED ORAL at 22:14

## 2021-01-01 RX ADMIN — NAFCILLIN SODIUM 2 G: 2 INJECTION, POWDER, LYOPHILIZED, FOR SOLUTION INTRAMUSCULAR; INTRAVENOUS at 04:32

## 2021-01-01 RX ADMIN — NAFCILLIN SODIUM 2 G: 2 INJECTION, POWDER, LYOPHILIZED, FOR SOLUTION INTRAMUSCULAR; INTRAVENOUS at 11:37

## 2021-01-01 RX ADMIN — POTASSIUM CHLORIDE 20 MEQ: 29.8 INJECTION, SOLUTION INTRAVENOUS at 18:39

## 2021-01-01 RX ADMIN — OXYCODONE HYDROCHLORIDE AND ACETAMINOPHEN 1 TABLET: 5; 325 TABLET ORAL at 13:35

## 2021-01-01 RX ADMIN — NAFCILLIN SODIUM 2 G: 2 INJECTION, POWDER, LYOPHILIZED, FOR SOLUTION INTRAMUSCULAR; INTRAVENOUS at 05:44

## 2021-01-01 RX ADMIN — NIFEDIPINE 60 MG: 60 TABLET, FILM COATED, EXTENDED RELEASE ORAL at 20:54

## 2021-01-01 RX ADMIN — NIFEDIPINE 60 MG: 30 TABLET, FILM COATED, EXTENDED RELEASE ORAL at 09:06

## 2021-01-01 RX ADMIN — POTASSIUM CHLORIDE AND SODIUM CHLORIDE: 450; 150 INJECTION, SOLUTION INTRAVENOUS at 23:37

## 2021-01-01 RX ADMIN — LIDOCAINE HYDROCHLORIDE 10 ML: 10 INJECTION, SOLUTION INFILTRATION; PERINEURAL at 16:01

## 2021-01-01 RX ADMIN — INSULIN LISPRO 5 UNITS: 100 INJECTION, SOLUTION INTRAVENOUS; SUBCUTANEOUS at 09:54

## 2021-01-01 RX ADMIN — NAFCILLIN SODIUM 2 G: 2 INJECTION, POWDER, LYOPHILIZED, FOR SOLUTION INTRAMUSCULAR; INTRAVENOUS at 17:13

## 2021-01-01 RX ADMIN — PIPERACILLIN AND TAZOBACTAM 3.38 G: 3; .375 INJECTION, POWDER, LYOPHILIZED, FOR SOLUTION INTRAVENOUS at 17:05

## 2021-01-01 RX ADMIN — POTASSIUM CHLORIDE 20 MEQ: 29.8 INJECTION, SOLUTION INTRAVENOUS at 06:37

## 2021-01-01 RX ADMIN — HYDRALAZINE HYDROCHLORIDE 10 MG: 20 INJECTION INTRAMUSCULAR; INTRAVENOUS at 03:44

## 2021-01-01 RX ADMIN — Medication 10 ML: at 06:56

## 2021-01-01 RX ADMIN — CALCIUM CARBONATE 400 MG: 500 TABLET, CHEWABLE ORAL at 20:39

## 2021-01-01 RX ADMIN — DULOXETINE HYDROCHLORIDE 60 MG: 30 CAPSULE, DELAYED RELEASE ORAL at 09:24

## 2021-01-01 RX ADMIN — PIPERACILLIN AND TAZOBACTAM 3.38 G: 3; .375 INJECTION, POWDER, LYOPHILIZED, FOR SOLUTION INTRAVENOUS at 00:18

## 2021-01-01 RX ADMIN — PROPOFOL 10 MCG/KG/MIN: 10 INJECTION, EMULSION INTRAVENOUS at 06:56

## 2021-01-01 RX ADMIN — OXYCODONE HYDROCHLORIDE AND ACETAMINOPHEN 1 TABLET: 10; 325 TABLET ORAL at 12:30

## 2021-01-01 RX ADMIN — CALCITRIOL CAPSULES 0.25 MCG 0.5 MCG: 0.25 CAPSULE ORAL at 09:07

## 2021-01-01 RX ADMIN — HEPARIN SODIUM 5000 UNITS: 5000 INJECTION INTRAVENOUS; SUBCUTANEOUS at 02:12

## 2021-01-01 RX ADMIN — CALCIUM CHLORIDE, MAGNESIUM CHLORIDE, DEXTROSE MONOHYDRATE, LACTIC ACID, SODIUM CHLORIDE, SODIUM BICARBONATE AND POTASSIUM CHLORIDE: 3.68; 3.05; 22; 5.4; 6.46; 3.09; .314 INJECTION INTRAVENOUS at 00:09

## 2021-01-01 RX ADMIN — HYDRALAZINE HYDROCHLORIDE 50 MG: 50 TABLET, FILM COATED ORAL at 16:09

## 2021-01-01 RX ADMIN — EPOETIN ALFA-EPBX 10000 UNITS: 10000 INJECTION, SOLUTION INTRAVENOUS; SUBCUTANEOUS at 20:40

## 2021-01-01 RX ADMIN — HYDRALAZINE HYDROCHLORIDE 50 MG: 50 TABLET, FILM COATED ORAL at 18:24

## 2021-01-01 RX ADMIN — FERROUS SULFATE TAB 325 MG (65 MG ELEMENTAL FE) 325 MG: 325 (65 FE) TAB at 05:48

## 2021-01-01 RX ADMIN — Medication 10 ML: at 21:22

## 2021-01-01 RX ADMIN — AMLODIPINE BESYLATE 5 MG: 5 TABLET ORAL at 08:19

## 2021-01-01 RX ADMIN — CALCIUM CHLORIDE, MAGNESIUM CHLORIDE, DEXTROSE MONOHYDRATE, LACTIC ACID, SODIUM CHLORIDE, SODIUM BICARBONATE AND POTASSIUM CHLORIDE: 3.68; 3.05; 22; 5.4; 6.46; 3.09; .314 INJECTION INTRAVENOUS at 07:02

## 2021-01-01 RX ADMIN — PIPERACILLIN AND TAZOBACTAM 3.38 G: 3; .375 INJECTION, POWDER, LYOPHILIZED, FOR SOLUTION INTRAVENOUS at 14:27

## 2021-01-01 RX ADMIN — ACETAMINOPHEN 650 MG: 325 TABLET ORAL at 15:50

## 2021-01-01 RX ADMIN — MAGNESIUM SULFATE HEPTAHYDRATE 1 G: 1 INJECTION, SOLUTION INTRAVENOUS at 09:43

## 2021-01-01 RX ADMIN — HYDROCODONE BITARTRATE AND ACETAMINOPHEN 1 TABLET: 10; 325 TABLET ORAL at 18:03

## 2021-01-01 RX ADMIN — OXYCODONE HYDROCHLORIDE AND ACETAMINOPHEN 1 TABLET: 5; 325 TABLET ORAL at 10:14

## 2021-01-01 RX ADMIN — Medication 10 ML: at 22:43

## 2021-01-01 RX ADMIN — OXYCODONE HYDROCHLORIDE AND ACETAMINOPHEN 1 TABLET: 5; 325 TABLET ORAL at 14:03

## 2021-01-01 RX ADMIN — HYDRALAZINE HYDROCHLORIDE 50 MG: 50 TABLET, FILM COATED ORAL at 22:34

## 2021-01-01 RX ADMIN — HEPARIN SODIUM 5000 UNITS: 5000 INJECTION INTRAVENOUS; SUBCUTANEOUS at 23:05

## 2021-01-01 RX ADMIN — NIFEDIPINE 60 MG: 60 TABLET, EXTENDED RELEASE ORAL at 09:43

## 2021-01-01 RX ADMIN — Medication 667 MG: at 16:30

## 2021-01-01 RX ADMIN — CALCITRIOL CAPSULES 0.25 MCG 0.5 MCG: 0.25 CAPSULE ORAL at 08:57

## 2021-01-01 RX ADMIN — CALCITRIOL CAPSULES 0.25 MCG 0.5 MCG: 0.25 CAPSULE ORAL at 08:17

## 2021-01-01 RX ADMIN — FERROUS SULFATE TAB 325 MG (65 MG ELEMENTAL FE) 325 MG: 325 (65 FE) TAB at 09:25

## 2021-01-01 RX ADMIN — NIFEDIPINE 60 MG: 60 TABLET, EXTENDED RELEASE ORAL at 08:07

## 2021-01-01 RX ADMIN — CALCIUM CARBONATE (ANTACID) CHEW TAB 500 MG 400 MG: 500 CHEW TAB at 08:18

## 2021-01-01 RX ADMIN — NIFEDIPINE 60 MG: 60 TABLET, FILM COATED, EXTENDED RELEASE ORAL at 23:02

## 2021-01-01 RX ADMIN — HEPARIN SODIUM 5000 UNITS: 5000 INJECTION INTRAVENOUS; SUBCUTANEOUS at 16:02

## 2021-01-01 RX ADMIN — CALCIUM CHLORIDE, MAGNESIUM CHLORIDE, DEXTROSE MONOHYDRATE, LACTIC ACID, SODIUM CHLORIDE, SODIUM BICARBONATE AND POTASSIUM CHLORIDE: 3.68; 3.05; 22; 5.4; 6.46; 3.09; .314 INJECTION INTRAVENOUS at 15:10

## 2021-01-01 RX ADMIN — POTASSIUM CHLORIDE 20 MEQ: 29.8 INJECTION, SOLUTION INTRAVENOUS at 08:45

## 2021-01-01 RX ADMIN — NAFCILLIN SODIUM 2 G: 2 INJECTION, POWDER, LYOPHILIZED, FOR SOLUTION INTRAMUSCULAR; INTRAVENOUS at 23:18

## 2021-01-01 RX ADMIN — HEPARIN SODIUM 5000 UNITS: 5000 INJECTION INTRAVENOUS; SUBCUTANEOUS at 23:53

## 2021-01-01 RX ADMIN — Medication 10 ML: at 05:56

## 2021-01-01 RX ADMIN — Medication 10 ML: at 05:11

## 2021-01-01 RX ADMIN — HYDRALAZINE HYDROCHLORIDE 50 MG: 50 TABLET, FILM COATED ORAL at 09:54

## 2021-01-01 RX ADMIN — PHENYLEPHRINE HYDROCHLORIDE 35 MCG/MIN: 10 INJECTION INTRAVENOUS at 11:08

## 2021-01-01 RX ADMIN — DEXTROSE AND SODIUM CHLORIDE 50 ML/HR: 5; 450 INJECTION, SOLUTION INTRAVENOUS at 08:56

## 2021-01-01 RX ADMIN — CALCIUM CARBONATE 400 MG: 500 TABLET, CHEWABLE ORAL at 10:14

## 2021-01-01 RX ADMIN — CALCIUM CARBONATE (ANTACID) CHEW TAB 500 MG 400 MG: 500 CHEW TAB at 08:33

## 2021-01-01 RX ADMIN — Medication 10 ML: at 05:18

## 2021-01-01 RX ADMIN — HYDROCODONE BITARTRATE AND ACETAMINOPHEN 1 TABLET: 10; 325 TABLET ORAL at 20:34

## 2021-01-01 RX ADMIN — ALBUMIN (HUMAN) 250 ML: 12.5 INJECTION, SOLUTION INTRAVENOUS at 16:34

## 2021-01-01 RX ADMIN — Medication 10 ML: at 22:10

## 2021-01-01 RX ADMIN — POTASSIUM CHLORIDE 20 MEQ: 29.8 INJECTION, SOLUTION INTRAVENOUS at 05:57

## 2021-01-01 RX ADMIN — MAGNESIUM SULFATE HEPTAHYDRATE 2 G: 40 INJECTION, SOLUTION INTRAVENOUS at 15:52

## 2021-01-01 RX ADMIN — Medication 667 MG: at 08:07

## 2021-01-01 RX ADMIN — HYDRALAZINE HYDROCHLORIDE 50 MG: 50 TABLET, FILM COATED ORAL at 21:01

## 2021-01-01 RX ADMIN — CALCIUM CARBONATE (ANTACID) CHEW TAB 500 MG 400 MG: 500 CHEW TAB at 20:20

## 2021-01-01 RX ADMIN — HEPARIN SODIUM 5000 UNITS: 5000 INJECTION INTRAVENOUS; SUBCUTANEOUS at 15:44

## 2021-01-01 RX ADMIN — POTASSIUM CHLORIDE 40 MEQ: 750 TABLET, FILM COATED, EXTENDED RELEASE ORAL at 21:03

## 2021-01-01 RX ADMIN — NIFEDIPINE 60 MG: 60 TABLET, FILM COATED, EXTENDED RELEASE ORAL at 08:33

## 2021-01-01 RX ADMIN — SODIUM CHLORIDE 100 MG: 9 INJECTION, SOLUTION INTRAVENOUS at 13:45

## 2021-01-01 RX ADMIN — POTASSIUM CHLORIDE: 2 INJECTION, SOLUTION, CONCENTRATE INTRAVENOUS at 18:16

## 2021-01-01 RX ADMIN — OXYCODONE HYDROCHLORIDE AND ACETAMINOPHEN 1 TABLET: 5; 325 TABLET ORAL at 13:21

## 2021-01-01 RX ADMIN — CALCITRIOL CAPSULES 0.25 MCG 0.5 MCG: 0.25 CAPSULE ORAL at 10:14

## 2021-01-01 RX ADMIN — SODIUM BICARBONATE: 84 INJECTION, SOLUTION INTRAVENOUS at 01:42

## 2021-01-01 RX ADMIN — OXYCODONE HYDROCHLORIDE AND ACETAMINOPHEN 1 TABLET: 10; 325 TABLET ORAL at 23:04

## 2021-01-01 RX ADMIN — HEPARIN SODIUM 5000 UNITS: 5000 INJECTION INTRAVENOUS; SUBCUTANEOUS at 02:30

## 2021-01-01 RX ADMIN — SODIUM BICARBONATE 50 MEQ: 84 INJECTION, SOLUTION INTRAVENOUS at 10:20

## 2021-01-01 RX ADMIN — INSULIN LISPRO 3 UNITS: 100 INJECTION, SOLUTION INTRAVENOUS; SUBCUTANEOUS at 20:48

## 2021-01-01 RX ADMIN — HEPARIN SODIUM 2500 UNITS: 1000 INJECTION, SOLUTION INTRAVENOUS; SUBCUTANEOUS at 05:46

## 2021-01-01 RX ADMIN — NIFEDIPINE 60 MG: 30 TABLET, FILM COATED, EXTENDED RELEASE ORAL at 20:41

## 2021-01-01 RX ADMIN — CALCIUM CHLORIDE, MAGNESIUM CHLORIDE, DEXTROSE MONOHYDRATE, LACTIC ACID, SODIUM CHLORIDE, SODIUM BICARBONATE AND POTASSIUM CHLORIDE: 3.68; 3.05; 22; 5.4; 6.46; 3.09; .314 INJECTION INTRAVENOUS at 20:32

## 2021-01-01 RX ADMIN — OXYCODONE HYDROCHLORIDE AND ACETAMINOPHEN 1 TABLET: 10; 325 TABLET ORAL at 17:38

## 2021-01-01 RX ADMIN — Medication 667 MG: at 09:43

## 2021-01-01 RX ADMIN — DULOXETINE HYDROCHLORIDE 60 MG: 60 CAPSULE, DELAYED RELEASE ORAL at 09:43

## 2021-01-01 RX ADMIN — LIDOCAINE HYDROCHLORIDE 200 MG: 20 INJECTION, SOLUTION INFILTRATION; PERINEURAL at 05:43

## 2021-01-01 RX ADMIN — Medication 10 ML: at 06:05

## 2021-01-01 RX ADMIN — CALCIUM CARBONATE (ANTACID) CHEW TAB 500 MG 400 MG: 500 CHEW TAB at 09:24

## 2021-01-01 RX ADMIN — NAFCILLIN SODIUM 2 G: 2 INJECTION, POWDER, LYOPHILIZED, FOR SOLUTION INTRAMUSCULAR; INTRAVENOUS at 17:38

## 2021-01-01 RX ADMIN — PIPERACILLIN AND TAZOBACTAM 3.38 G: 3; .375 INJECTION, POWDER, LYOPHILIZED, FOR SOLUTION INTRAVENOUS at 08:55

## 2021-01-01 RX ADMIN — HEPARIN SODIUM 5000 UNITS: 5000 INJECTION INTRAVENOUS; SUBCUTANEOUS at 06:29

## 2021-01-01 RX ADMIN — METRONIDAZOLE 500 MG: 500 INJECTION, SOLUTION INTRAVENOUS at 00:21

## 2021-01-01 RX ADMIN — INSULIN LISPRO 3 UNITS: 100 INJECTION, SOLUTION INTRAVENOUS; SUBCUTANEOUS at 17:08

## 2021-01-01 RX ADMIN — NIFEDIPINE 60 MG: 30 TABLET, FILM COATED, EXTENDED RELEASE ORAL at 21:17

## 2021-01-01 RX ADMIN — SODIUM CHLORIDE 500 ML: 9 INJECTION, SOLUTION INTRAVENOUS at 21:38

## 2021-01-01 RX ADMIN — POTASSIUM CHLORIDE 20 MEQ: 400 INJECTION, SOLUTION INTRAVENOUS at 12:56

## 2021-01-01 RX ADMIN — Medication 10 ML: at 06:00

## 2021-01-01 RX ADMIN — PIPERACILLIN AND TAZOBACTAM 3.38 G: 3; .375 INJECTION, POWDER, LYOPHILIZED, FOR SOLUTION INTRAVENOUS at 16:59

## 2021-01-01 RX ADMIN — PRAVASTATIN SODIUM 20 MG: 20 TABLET ORAL at 20:54

## 2021-01-01 RX ADMIN — CARVEDILOL 6.25 MG: 3.12 TABLET, FILM COATED ORAL at 20:41

## 2021-01-01 RX ADMIN — NIFEDIPINE 60 MG: 30 TABLET, FILM COATED, EXTENDED RELEASE ORAL at 22:16

## 2021-01-01 RX ADMIN — Medication 1 CAPSULE: at 08:14

## 2021-01-01 RX ADMIN — PIPERACILLIN AND TAZOBACTAM 2.25 G: 2; .25 INJECTION, POWDER, LYOPHILIZED, FOR SOLUTION INTRAVENOUS at 17:29

## 2021-01-01 RX ADMIN — NIFEDIPINE 60 MG: 60 TABLET, FILM COATED, EXTENDED RELEASE ORAL at 08:50

## 2021-01-01 RX ADMIN — Medication 10 ML: at 20:56

## 2021-01-01 RX ADMIN — CEFTRIAXONE 1 G: 1 INJECTION, POWDER, FOR SOLUTION INTRAMUSCULAR; INTRAVENOUS at 15:00

## 2021-01-01 RX ADMIN — NAFCILLIN SODIUM 2 G: 2 INJECTION, POWDER, LYOPHILIZED, FOR SOLUTION INTRAMUSCULAR; INTRAVENOUS at 05:32

## 2021-01-01 RX ADMIN — VASOPRESSIN 0.03 UNITS/MIN: 20 INJECTION INTRAVENOUS at 14:09

## 2021-01-01 RX ADMIN — ONDANSETRON HYDROCHLORIDE 4 MG: 2 INJECTION, SOLUTION INTRAMUSCULAR; INTRAVENOUS at 15:44

## 2021-01-01 RX ADMIN — CHLORHEXIDINE GLUCONATE 15 ML: 1.2 RINSE ORAL at 08:29

## 2021-01-01 RX ADMIN — OXYCODONE HYDROCHLORIDE AND ACETAMINOPHEN 1 TABLET: 5; 325 TABLET ORAL at 23:42

## 2021-01-01 RX ADMIN — NAFCILLIN SODIUM 2 G: 2 INJECTION, POWDER, LYOPHILIZED, FOR SOLUTION INTRAMUSCULAR; INTRAVENOUS at 05:09

## 2021-01-01 RX ADMIN — FENTANYL CITRATE 25 MCG: 50 INJECTION, SOLUTION INTRAMUSCULAR; INTRAVENOUS at 09:37

## 2021-01-01 RX ADMIN — Medication 10 ML: at 15:53

## 2021-01-01 RX ADMIN — HEPARIN SODIUM 5000 UNITS: 5000 INJECTION INTRAVENOUS; SUBCUTANEOUS at 22:23

## 2021-01-01 RX ADMIN — Medication 10 ML: at 21:05

## 2021-01-01 RX ADMIN — NAFCILLIN SODIUM 2 G: 2 INJECTION, POWDER, LYOPHILIZED, FOR SOLUTION INTRAMUSCULAR; INTRAVENOUS at 04:12

## 2021-01-01 RX ADMIN — POTASSIUM CHLORIDE 10 MEQ: 14.9 INJECTION, SOLUTION INTRAVENOUS at 16:35

## 2021-01-01 RX ADMIN — SODIUM BICARBONATE: 84 INJECTION, SOLUTION INTRAVENOUS at 22:38

## 2021-01-01 RX ADMIN — DEXMEDETOMIDINE HYDROCHLORIDE 0.4 MCG/KG/HR: 400 INJECTION INTRAVENOUS at 10:56

## 2021-01-01 RX ADMIN — VANCOMYCIN HYDROCHLORIDE 1250 MG: 1.25 INJECTION, POWDER, LYOPHILIZED, FOR SOLUTION INTRAVENOUS at 01:56

## 2021-01-01 RX ADMIN — NIFEDIPINE 60 MG: 60 TABLET, EXTENDED RELEASE ORAL at 17:48

## 2021-01-01 RX ADMIN — HYDRALAZINE HYDROCHLORIDE 50 MG: 50 TABLET, FILM COATED ORAL at 23:53

## 2021-01-01 RX ADMIN — Medication 10 ML: at 14:02

## 2021-01-01 RX ADMIN — CALCIUM CHLORIDE, MAGNESIUM CHLORIDE, DEXTROSE MONOHYDRATE, LACTIC ACID, SODIUM CHLORIDE, SODIUM BICARBONATE AND POTASSIUM CHLORIDE: 3.68; 3.05; 22; 5.4; 6.46; 3.09; .314 INJECTION INTRAVENOUS at 13:19

## 2021-01-01 RX ADMIN — POTASSIUM CHLORIDE: 2 INJECTION, SOLUTION, CONCENTRATE INTRAVENOUS at 12:45

## 2021-01-01 RX ADMIN — POTASSIUM CHLORIDE 10 MEQ: 14.9 INJECTION, SOLUTION INTRAVENOUS at 13:36

## 2021-01-01 RX ADMIN — DOXYCYCLINE 100 MG: 100 INJECTION, POWDER, LYOPHILIZED, FOR SOLUTION INTRAVENOUS at 08:08

## 2021-01-01 RX ADMIN — CALCIUM CARBONATE 400 MG: 500 TABLET, CHEWABLE ORAL at 08:19

## 2021-01-01 RX ADMIN — HYDRALAZINE HYDROCHLORIDE 100 MG: 50 TABLET, FILM COATED ORAL at 22:16

## 2021-01-01 RX ADMIN — HYDRALAZINE HYDROCHLORIDE 10 MG: 20 INJECTION INTRAMUSCULAR; INTRAVENOUS at 23:28

## 2021-01-01 RX ADMIN — HYDRALAZINE HYDROCHLORIDE 10 MG: 20 INJECTION INTRAMUSCULAR; INTRAVENOUS at 08:35

## 2021-01-01 RX ADMIN — ACETAMINOPHEN 650 MG: 325 TABLET ORAL at 03:48

## 2021-01-01 RX ADMIN — BARIUM SULFATE 40 ML: 0.81 POWDER, FOR SUSPENSION ORAL at 10:00

## 2021-01-01 RX ADMIN — POTASSIUM CHLORIDE 20 MEQ: 400 INJECTION, SOLUTION INTRAVENOUS at 14:58

## 2021-01-01 RX ADMIN — INSULIN LISPRO 3 UNITS: 100 INJECTION, SOLUTION INTRAVENOUS; SUBCUTANEOUS at 11:46

## 2021-01-01 RX ADMIN — HEPARIN SODIUM 5000 UNITS: 5000 INJECTION INTRAVENOUS; SUBCUTANEOUS at 13:54

## 2021-01-01 RX ADMIN — CALCIUM CARBONATE 400 MG: 500 TABLET, CHEWABLE ORAL at 09:30

## 2021-01-01 RX ADMIN — INSULIN LISPRO 3 UNITS: 100 INJECTION, SOLUTION INTRAVENOUS; SUBCUTANEOUS at 12:00

## 2021-01-01 RX ADMIN — OXYCODONE HYDROCHLORIDE AND ACETAMINOPHEN 1 TABLET: 10; 325 TABLET ORAL at 18:06

## 2021-01-01 RX ADMIN — FERROUS SULFATE TAB 325 MG (65 MG ELEMENTAL FE) 325 MG: 325 (65 FE) TAB at 08:53

## 2021-01-01 RX ADMIN — ENOXAPARIN SODIUM 30 MG: 30 INJECTION SUBCUTANEOUS at 07:38

## 2021-01-01 RX ADMIN — CALCIUM GLUCONATE 2 G: 20 INJECTION, SOLUTION INTRAVENOUS at 02:08

## 2021-01-01 RX ADMIN — CALCIUM CARBONATE (ANTACID) CHEW TAB 500 MG 400 MG: 500 CHEW TAB at 23:02

## 2021-01-01 RX ADMIN — POTASSIUM CHLORIDE 20 MEQ: 29.8 INJECTION, SOLUTION INTRAVENOUS at 05:59

## 2021-01-01 RX ADMIN — HYDRALAZINE HYDROCHLORIDE 50 MG: 50 TABLET, FILM COATED ORAL at 20:34

## 2021-01-01 RX ADMIN — NIFEDIPINE 60 MG: 60 TABLET, FILM COATED, EXTENDED RELEASE ORAL at 09:24

## 2021-01-01 RX ADMIN — Medication 10 ML: at 23:03

## 2021-01-01 RX ADMIN — HYDROCORTISONE SODIUM SUCCINATE 50 MG: 100 INJECTION, POWDER, FOR SOLUTION INTRAMUSCULAR; INTRAVENOUS at 06:02

## 2021-01-01 RX ADMIN — HYDRALAZINE HYDROCHLORIDE 10 MG: 20 INJECTION INTRAMUSCULAR; INTRAVENOUS at 17:20

## 2021-01-01 RX ADMIN — POTASSIUM CHLORIDE 20 MEQ: 29.8 INJECTION, SOLUTION INTRAVENOUS at 18:25

## 2021-01-01 RX ADMIN — FAMOTIDINE: 10 INJECTION, SOLUTION INTRAVENOUS at 18:17

## 2021-01-01 RX ADMIN — CALCITRIOL CAPSULES 0.25 MCG 0.5 MCG: 0.25 CAPSULE ORAL at 09:24

## 2021-01-01 RX ADMIN — Medication 10 ML: at 05:47

## 2021-01-01 RX ADMIN — Medication 10 ML: at 05:30

## 2021-01-01 RX ADMIN — PIPERACILLIN AND TAZOBACTAM 3.38 G: 3; .375 INJECTION, POWDER, LYOPHILIZED, FOR SOLUTION INTRAVENOUS at 23:03

## 2021-01-01 RX ADMIN — PIPERACILLIN AND TAZOBACTAM 2.25 G: 2; .25 INJECTION, POWDER, LYOPHILIZED, FOR SOLUTION INTRAVENOUS at 22:15

## 2021-01-01 RX ADMIN — NAFCILLIN SODIUM 2 G: 2 INJECTION, POWDER, LYOPHILIZED, FOR SOLUTION INTRAMUSCULAR; INTRAVENOUS at 15:31

## 2021-01-01 RX ADMIN — INSULIN LISPRO 2 UNITS: 100 INJECTION, SOLUTION INTRAVENOUS; SUBCUTANEOUS at 08:45

## 2021-01-01 RX ADMIN — POTASSIUM CHLORIDE 20 MEQ: 29.8 INJECTION, SOLUTION INTRAVENOUS at 20:44

## 2021-01-01 RX ADMIN — OXYCODONE HYDROCHLORIDE AND ACETAMINOPHEN 1 TABLET: 5; 325 TABLET ORAL at 18:07

## 2021-01-01 RX ADMIN — INSULIN LISPRO 5 UNITS: 100 INJECTION, SOLUTION INTRAVENOUS; SUBCUTANEOUS at 17:00

## 2021-01-01 RX ADMIN — CALCITRIOL CAPSULES 0.25 MCG 0.5 MCG: 0.25 CAPSULE ORAL at 08:51

## 2021-01-01 RX ADMIN — HYDRALAZINE HYDROCHLORIDE 10 MG: 20 INJECTION INTRAMUSCULAR; INTRAVENOUS at 01:50

## 2021-01-01 RX ADMIN — INSULIN LISPRO 2 UNITS: 100 INJECTION, SOLUTION INTRAVENOUS; SUBCUTANEOUS at 15:58

## 2021-01-01 RX ADMIN — KETAMINE HYDROCHLORIDE 80 MG: 10 INJECTION, SOLUTION INTRAMUSCULAR; INTRAVENOUS at 06:12

## 2021-01-01 RX ADMIN — POTASSIUM CHLORIDE 20 MEQ: 400 INJECTION, SOLUTION INTRAVENOUS at 14:14

## 2021-01-01 RX ADMIN — CALCITRIOL CAPSULES 0.25 MCG 0.5 MCG: 0.25 CAPSULE ORAL at 12:37

## 2021-01-01 RX ADMIN — HEPARIN SODIUM 5000 UNITS: 5000 INJECTION INTRAVENOUS; SUBCUTANEOUS at 08:34

## 2021-01-01 RX ADMIN — ROCURONIUM BROMIDE 100 MG: 10 INJECTION, SOLUTION INTRAVENOUS at 06:14

## 2021-01-01 RX ADMIN — CHLORHEXIDINE GLUCONATE 15 ML: 1.2 RINSE ORAL at 08:33

## 2021-01-01 RX ADMIN — CHLORHEXIDINE GLUCONATE 15 ML: 1.2 RINSE ORAL at 21:00

## 2021-01-01 RX ADMIN — BARIUM SULFATE 5 ML: 400 SUSPENSION ORAL at 10:00

## 2021-01-01 RX ADMIN — VASOPRESSIN 0.04 UNITS/MIN: 20 INJECTION INTRAVENOUS at 12:00

## 2021-01-01 RX ADMIN — CARVEDILOL 6.25 MG: 3.12 TABLET, FILM COATED ORAL at 10:12

## 2021-01-01 RX ADMIN — CALCITRIOL CAPSULES 0.25 MCG 0.5 MCG: 0.25 CAPSULE ORAL at 08:19

## 2021-01-01 RX ADMIN — HYDROCORTISONE SODIUM SUCCINATE 50 MG: 100 INJECTION, POWDER, FOR SOLUTION INTRAMUSCULAR; INTRAVENOUS at 20:17

## 2021-01-01 RX ADMIN — Medication 250 MG: at 09:30

## 2021-01-01 RX ADMIN — CALCIUM CHLORIDE, MAGNESIUM CHLORIDE, DEXTROSE MONOHYDRATE, LACTIC ACID, SODIUM CHLORIDE, SODIUM BICARBONATE AND POTASSIUM CHLORIDE: 3.68; 3.05; 22; 5.4; 6.46; 3.09; .314 INJECTION INTRAVENOUS at 11:43

## 2021-01-01 RX ADMIN — HYDROCODONE BITARTRATE AND ACETAMINOPHEN 1 TABLET: 5; 325 TABLET ORAL at 16:00

## 2021-01-01 RX ADMIN — Medication 667 MG: at 17:47

## 2021-01-01 RX ADMIN — PHENYLEPHRINE HYDROCHLORIDE 50 MCG/MIN: 10 INJECTION INTRAVENOUS at 21:26

## 2021-01-01 RX ADMIN — INSULIN LISPRO 3 UNITS: 100 INJECTION, SOLUTION INTRAVENOUS; SUBCUTANEOUS at 17:20

## 2021-01-01 RX ADMIN — CARVEDILOL 6.25 MG: 3.12 TABLET, FILM COATED ORAL at 20:38

## 2021-01-01 RX ADMIN — DOXYCYCLINE 100 MG: 100 INJECTION, POWDER, LYOPHILIZED, FOR SOLUTION INTRAVENOUS at 21:00

## 2021-01-01 RX ADMIN — POTASSIUM CHLORIDE 20 MEQ: 400 INJECTION, SOLUTION INTRAVENOUS at 10:51

## 2021-01-01 RX ADMIN — CALCIUM CARBONATE (ANTACID) CHEW TAB 500 MG 400 MG: 500 CHEW TAB at 10:00

## 2021-01-01 RX ADMIN — LINEZOLID 600 MG: 600 INJECTION, SOLUTION INTRAVENOUS at 08:51

## 2021-01-01 RX ADMIN — OXYCODONE HYDROCHLORIDE AND ACETAMINOPHEN 1 TABLET: 5; 325 TABLET ORAL at 08:35

## 2021-01-01 RX ADMIN — Medication 10 ML: at 21:39

## 2021-01-01 RX ADMIN — NAFCILLIN SODIUM 2 G: 2 INJECTION, POWDER, LYOPHILIZED, FOR SOLUTION INTRAMUSCULAR; INTRAVENOUS at 10:10

## 2021-01-01 RX ADMIN — DEXAMETHASONE SODIUM PHOSPHATE 4 MG: 4 INJECTION, SOLUTION INTRAMUSCULAR; INTRAVENOUS at 15:25

## 2021-01-01 RX ADMIN — Medication 10 ML: at 22:00

## 2021-01-01 RX ADMIN — Medication 10 ML: at 06:15

## 2021-01-01 RX ADMIN — INSULIN GLARGINE 8 UNITS: 100 INJECTION, SOLUTION SUBCUTANEOUS at 09:54

## 2021-01-01 RX ADMIN — OXYCODONE HYDROCHLORIDE AND ACETAMINOPHEN 1 TABLET: 5; 325 TABLET ORAL at 16:38

## 2021-01-01 RX ADMIN — CALCIUM CARBONATE 400 MG: 500 TABLET, CHEWABLE ORAL at 20:36

## 2021-01-01 RX ADMIN — Medication 1 CAPSULE: at 08:37

## 2021-01-01 RX ADMIN — SODIUM BICARBONATE 100 MEQ: 84 INJECTION INTRAVENOUS at 12:41

## 2021-01-01 RX ADMIN — Medication 10 ML: at 21:18

## 2021-01-01 RX ADMIN — Medication 10 ML: at 06:02

## 2021-01-01 RX ADMIN — AMLODIPINE BESYLATE 5 MG: 5 TABLET ORAL at 17:29

## 2021-01-01 RX ADMIN — Medication 10 ML: at 15:31

## 2021-01-01 RX ADMIN — SODIUM BICARBONATE 50 MEQ: 84 INJECTION, SOLUTION INTRAVENOUS at 12:00

## 2021-01-01 RX ADMIN — OXYCODONE HYDROCHLORIDE AND ACETAMINOPHEN 1 TABLET: 5; 325 TABLET ORAL at 06:46

## 2021-01-01 RX ADMIN — NIFEDIPINE 60 MG: 60 TABLET, EXTENDED RELEASE ORAL at 08:05

## 2021-01-01 RX ADMIN — Medication 250 MG: at 11:46

## 2021-01-01 RX ADMIN — INSULIN LISPRO 2 UNITS: 100 INJECTION, SOLUTION INTRAVENOUS; SUBCUTANEOUS at 20:00

## 2021-01-01 RX ADMIN — SODIUM CHLORIDE 100 MG: 9 INJECTION, SOLUTION INTRAVENOUS at 13:39

## 2021-01-01 RX ADMIN — DULOXETINE HYDROCHLORIDE 60 MG: 30 CAPSULE, DELAYED RELEASE ORAL at 08:17

## 2021-01-01 RX ADMIN — OXYCODONE HYDROCHLORIDE AND ACETAMINOPHEN 1 TABLET: 10; 325 TABLET ORAL at 09:41

## 2021-01-01 RX ADMIN — MAGNESIUM SULFATE HEPTAHYDRATE 1 G: 1 INJECTION, SOLUTION INTRAVENOUS at 15:51

## 2021-01-01 RX ADMIN — INSULIN GLARGINE 8 UNITS: 100 INJECTION, SOLUTION SUBCUTANEOUS at 12:22

## 2021-01-01 RX ADMIN — INSULIN LISPRO 3 UNITS: 100 INJECTION, SOLUTION INTRAVENOUS; SUBCUTANEOUS at 17:00

## 2021-01-01 RX ADMIN — HYDRALAZINE HYDROCHLORIDE 100 MG: 50 TABLET, FILM COATED ORAL at 09:07

## 2021-01-01 RX ADMIN — INSULIN LISPRO 3 UNITS: 100 INJECTION, SOLUTION INTRAVENOUS; SUBCUTANEOUS at 20:23

## 2021-01-01 RX ADMIN — LINEZOLID 600 MG: 600 INJECTION, SOLUTION INTRAVENOUS at 00:09

## 2021-01-01 RX ADMIN — PIPERACILLIN AND TAZOBACTAM 2.25 G: 2; .25 INJECTION, POWDER, LYOPHILIZED, FOR SOLUTION INTRAVENOUS at 23:13

## 2021-01-01 RX ADMIN — PIPERACILLIN AND TAZOBACTAM 3.38 G: 3; .375 INJECTION, POWDER, LYOPHILIZED, FOR SOLUTION INTRAVENOUS at 16:31

## 2021-01-01 RX ADMIN — DULOXETINE HYDROCHLORIDE 60 MG: 60 CAPSULE, DELAYED RELEASE ORAL at 08:28

## 2021-01-01 RX ADMIN — HYDROCORTISONE SODIUM SUCCINATE 50 MG: 100 INJECTION, POWDER, FOR SOLUTION INTRAMUSCULAR; INTRAVENOUS at 12:32

## 2021-01-01 RX ADMIN — OXYCODONE HYDROCHLORIDE AND ACETAMINOPHEN 1 TABLET: 10; 325 TABLET ORAL at 16:19

## 2021-01-01 RX ADMIN — METOCLOPRAMIDE 10 MG: 5 INJECTION, SOLUTION INTRAMUSCULAR; INTRAVENOUS at 00:01

## 2021-01-01 RX ADMIN — INSULIN LISPRO 3 UNITS: 100 INJECTION, SOLUTION INTRAVENOUS; SUBCUTANEOUS at 11:44

## 2021-01-01 RX ADMIN — INSULIN LISPRO 3 UNITS: 100 INJECTION, SOLUTION INTRAVENOUS; SUBCUTANEOUS at 00:18

## 2021-01-01 RX ADMIN — ENOXAPARIN SODIUM 30 MG: 30 INJECTION SUBCUTANEOUS at 08:57

## 2021-01-01 RX ADMIN — OXYCODONE HYDROCHLORIDE AND ACETAMINOPHEN 1 TABLET: 10; 325 TABLET ORAL at 23:22

## 2021-01-01 RX ADMIN — CARVEDILOL 6.25 MG: 3.12 TABLET, FILM COATED ORAL at 21:17

## 2021-01-01 RX ADMIN — POTASSIUM CHLORIDE 20 MEQ: 400 INJECTION, SOLUTION INTRAVENOUS at 17:20

## 2021-01-01 RX ADMIN — CEFTRIAXONE SODIUM 1 G: 1 INJECTION, POWDER, FOR SOLUTION INTRAMUSCULAR; INTRAVENOUS at 17:48

## 2021-01-01 RX ADMIN — Medication 667 MG: at 11:31

## 2021-01-01 RX ADMIN — DOXYCYCLINE 100 MG: 100 INJECTION, POWDER, LYOPHILIZED, FOR SOLUTION INTRAVENOUS at 21:02

## 2021-01-01 RX ADMIN — PHYTONADIONE 10 MG: 10 INJECTION, EMULSION INTRAMUSCULAR; INTRAVENOUS; SUBCUTANEOUS at 13:59

## 2021-01-01 RX ADMIN — DOXYCYCLINE 100 MG: 100 INJECTION, POWDER, LYOPHILIZED, FOR SOLUTION INTRAVENOUS at 08:06

## 2021-01-01 RX ADMIN — Medication 10 ML: at 23:28

## 2021-01-01 RX ADMIN — CALCITRIOL CAPSULES 0.25 MCG 0.5 MCG: 0.25 CAPSULE ORAL at 10:00

## 2021-01-01 RX ADMIN — Medication 10 ML: at 21:52

## 2021-01-01 RX ADMIN — CHLORHEXIDINE GLUCONATE 15 ML: 1.2 RINSE ORAL at 08:46

## 2021-01-01 RX ADMIN — CALCIUM CARBONATE 400 MG: 500 TABLET, CHEWABLE ORAL at 21:04

## 2021-01-01 RX ADMIN — OXYCODONE HYDROCHLORIDE AND ACETAMINOPHEN 1 TABLET: 10; 325 TABLET ORAL at 09:07

## 2021-01-01 RX ADMIN — ACETAMINOPHEN 650 MG: 325 TABLET ORAL at 23:53

## 2021-01-01 RX ADMIN — HEPARIN SODIUM 5000 UNITS: 5000 INJECTION INTRAVENOUS; SUBCUTANEOUS at 17:59

## 2021-01-01 RX ADMIN — PIPERACILLIN AND TAZOBACTAM 3.38 G: 3; .375 INJECTION, POWDER, LYOPHILIZED, FOR SOLUTION INTRAVENOUS at 16:42

## 2021-01-01 RX ADMIN — POTASSIUM CHLORIDE 20 MEQ: 29.8 INJECTION, SOLUTION INTRAVENOUS at 10:50

## 2021-01-01 RX ADMIN — ENOXAPARIN SODIUM 30 MG: 30 INJECTION SUBCUTANEOUS at 09:54

## 2021-01-01 RX ADMIN — CALCIUM CARBONATE 400 MG: 500 TABLET, CHEWABLE ORAL at 20:38

## 2021-01-01 RX ADMIN — Medication 1 CAPSULE: at 08:31

## 2021-01-01 RX ADMIN — IRON SUCROSE 100 MG: 20 INJECTION, SOLUTION INTRAVENOUS at 02:07

## 2021-01-01 RX ADMIN — LIDOCAINE HYDROCHLORIDE 60 MG: 20 INJECTION, SOLUTION EPIDURAL; INFILTRATION; INTRACAUDAL; PERINEURAL at 08:51

## 2021-01-01 RX ADMIN — POTASSIUM CHLORIDE 20 MEQ: 29.8 INJECTION, SOLUTION INTRAVENOUS at 23:10

## 2021-01-01 RX ADMIN — POTASSIUM CHLORIDE 40 MEQ: 750 TABLET, FILM COATED, EXTENDED RELEASE ORAL at 23:02

## 2021-01-01 RX ADMIN — NIFEDIPINE 60 MG: 60 TABLET, FILM COATED, EXTENDED RELEASE ORAL at 21:03

## 2021-01-01 RX ADMIN — HYDRALAZINE HYDROCHLORIDE 100 MG: 50 TABLET, FILM COATED ORAL at 16:13

## 2021-01-01 RX ADMIN — DULOXETINE HYDROCHLORIDE 60 MG: 30 CAPSULE, DELAYED RELEASE ORAL at 08:35

## 2021-01-01 RX ADMIN — CALCIUM CARBONATE (ANTACID) CHEW TAB 500 MG 400 MG: 500 CHEW TAB at 08:50

## 2021-01-01 RX ADMIN — PHENYLEPHRINE HYDROCHLORIDE 30 MCG/MIN: 10 INJECTION INTRAVENOUS at 11:00

## 2021-01-01 RX ADMIN — METRONIDAZOLE 500 MG: 500 INJECTION, SOLUTION INTRAVENOUS at 16:34

## 2021-01-01 RX ADMIN — INSULIN LISPRO 3 UNITS: 100 INJECTION, SOLUTION INTRAVENOUS; SUBCUTANEOUS at 00:45

## 2021-01-01 RX ADMIN — NAFCILLIN SODIUM 2 G: 2 INJECTION, POWDER, LYOPHILIZED, FOR SOLUTION INTRAMUSCULAR; INTRAVENOUS at 23:55

## 2021-01-01 RX ADMIN — OXYCODONE HYDROCHLORIDE AND ACETAMINOPHEN 1 TABLET: 10; 325 TABLET ORAL at 17:07

## 2021-01-01 RX ADMIN — Medication 667 MG: at 08:28

## 2021-01-01 RX ADMIN — SODIUM BICARBONATE 50 MEQ: 84 INJECTION INTRAVENOUS at 11:22

## 2021-01-01 RX ADMIN — Medication 1 CAPSULE: at 08:09

## 2021-01-01 RX ADMIN — MIDAZOLAM HYDROCHLORIDE 1 MG: 1 INJECTION, SOLUTION INTRAMUSCULAR; INTRAVENOUS at 08:19

## 2021-01-01 RX ADMIN — CALCIUM CARBONATE 400 MG: 500 TABLET, CHEWABLE ORAL at 09:53

## 2021-01-01 RX ADMIN — CARVEDILOL 12.5 MG: 12.5 TABLET, FILM COATED ORAL at 08:28

## 2021-01-01 RX ADMIN — CALCIUM CHLORIDE, MAGNESIUM CHLORIDE, DEXTROSE MONOHYDRATE, LACTIC ACID, SODIUM CHLORIDE, SODIUM BICARBONATE AND POTASSIUM CHLORIDE: 3.68; 3.05; 22; 5.4; 6.46; 3.09; .314 INJECTION INTRAVENOUS at 19:44

## 2021-01-01 RX ADMIN — HYDRALAZINE HYDROCHLORIDE 100 MG: 50 TABLET, FILM COATED ORAL at 21:04

## 2021-01-01 RX ADMIN — HEPARIN SODIUM 5000 UNITS: 5000 INJECTION INTRAVENOUS; SUBCUTANEOUS at 13:57

## 2021-01-01 RX ADMIN — CALCIUM CARBONATE 400 MG: 500 TABLET, CHEWABLE ORAL at 22:14

## 2021-01-01 RX ADMIN — OXYCODONE HYDROCHLORIDE AND ACETAMINOPHEN 1 TABLET: 10; 325 TABLET ORAL at 08:28

## 2021-01-01 RX ADMIN — CARVEDILOL 6.25 MG: 3.12 TABLET, FILM COATED ORAL at 20:39

## 2021-01-01 RX ADMIN — SODIUM CHLORIDE 50 ML/HR: 9 INJECTION, SOLUTION INTRAVENOUS at 17:56

## 2021-01-01 RX ADMIN — POTASSIUM CHLORIDE 40 MEQ: 750 TABLET, FILM COATED, EXTENDED RELEASE ORAL at 12:33

## 2021-01-01 RX ADMIN — HYDROCODONE BITARTRATE AND ACETAMINOPHEN 1 TABLET: 10; 325 TABLET ORAL at 23:03

## 2021-01-01 RX ADMIN — DEXMEDETOMIDINE HYDROCHLORIDE 1 MCG/KG/HR: 400 INJECTION INTRAVENOUS at 12:10

## 2021-01-01 RX ADMIN — HYDRALAZINE HYDROCHLORIDE 10 MG: 20 INJECTION INTRAMUSCULAR; INTRAVENOUS at 08:19

## 2021-01-01 RX ADMIN — CALCITRIOL CAPSULES 0.25 MCG 0.5 MCG: 0.25 CAPSULE ORAL at 08:35

## 2021-01-01 RX ADMIN — OXYCODONE HYDROCHLORIDE AND ACETAMINOPHEN 1 TABLET: 5; 325 TABLET ORAL at 10:12

## 2021-01-01 RX ADMIN — Medication 667 MG: at 13:12

## 2021-01-01 RX ADMIN — OXYCODONE HYDROCHLORIDE AND ACETAMINOPHEN 1 TABLET: 5; 325 TABLET ORAL at 05:32

## 2021-01-01 RX ADMIN — LEVOFLOXACIN 500 MG: 500 TABLET, FILM COATED ORAL at 16:13

## 2021-01-01 RX ADMIN — HYDROCORTISONE SODIUM SUCCINATE 50 MG: 100 INJECTION, POWDER, FOR SOLUTION INTRAMUSCULAR; INTRAVENOUS at 08:14

## 2021-01-01 RX ADMIN — CEFTRIAXONE SODIUM 1 G: 1 INJECTION, POWDER, FOR SOLUTION INTRAMUSCULAR; INTRAVENOUS at 09:36

## 2021-01-01 RX ADMIN — CARVEDILOL 6.25 MG: 3.12 TABLET, FILM COATED ORAL at 08:17

## 2021-01-01 RX ADMIN — Medication 10 ML: at 22:04

## 2021-01-01 RX ADMIN — CARVEDILOL 12.5 MG: 12.5 TABLET, FILM COATED ORAL at 11:23

## 2021-01-01 RX ADMIN — INSULIN LISPRO 5 UNITS: 100 INJECTION, SOLUTION INTRAVENOUS; SUBCUTANEOUS at 11:30

## 2021-01-01 RX ADMIN — HYDROCORTISONE SODIUM SUCCINATE 50 MG: 100 INJECTION, POWDER, FOR SOLUTION INTRAMUSCULAR; INTRAVENOUS at 17:21

## 2021-01-01 RX ADMIN — METRONIDAZOLE 500 MG: 500 INJECTION, SOLUTION INTRAVENOUS at 09:27

## 2021-01-01 RX ADMIN — POTASSIUM CHLORIDE 20 MEQ: 29.8 INJECTION, SOLUTION INTRAVENOUS at 06:04

## 2021-01-01 RX ADMIN — LORAZEPAM 2 MG: 2 INJECTION INTRAMUSCULAR; INTRAVENOUS at 11:32

## 2021-01-01 RX ADMIN — NIFEDIPINE 60 MG: 60 TABLET, EXTENDED RELEASE ORAL at 18:17

## 2021-01-01 RX ADMIN — BARIUM SULFATE 10 ML: 400 PASTE ORAL at 10:00

## 2021-01-01 RX ADMIN — HEPARIN SODIUM 5000 UNITS: 5000 INJECTION INTRAVENOUS; SUBCUTANEOUS at 00:29

## 2021-01-01 RX ADMIN — CARVEDILOL 6.25 MG: 3.12 TABLET, FILM COATED ORAL at 21:51

## 2021-01-01 RX ADMIN — MAGNESIUM SULFATE HEPTAHYDRATE 1 G: 1 INJECTION, SOLUTION INTRAVENOUS at 20:11

## 2021-01-01 RX ADMIN — CHLORHEXIDINE GLUCONATE 15 ML: 1.2 RINSE ORAL at 20:14

## 2021-01-01 RX ADMIN — SODIUM BICARBONATE: 84 INJECTION, SOLUTION INTRAVENOUS at 09:25

## 2021-01-01 RX ADMIN — CHLORHEXIDINE GLUCONATE 15 ML: 1.2 RINSE ORAL at 21:26

## 2021-01-01 RX ADMIN — SODIUM BICARBONATE 50 MEQ: 84 INJECTION, SOLUTION INTRAVENOUS at 17:10

## 2021-01-01 RX ADMIN — IRON SUCROSE 200 MG: 20 INJECTION, SOLUTION INTRAVENOUS at 11:41

## 2021-01-01 RX ADMIN — Medication 10 ML: at 14:48

## 2021-01-01 RX ADMIN — Medication 10 ML: at 05:19

## 2021-01-01 RX ADMIN — ENOXAPARIN SODIUM 30 MG: 30 INJECTION SUBCUTANEOUS at 10:18

## 2021-01-01 RX ADMIN — HYDRALAZINE HYDROCHLORIDE 100 MG: 50 TABLET, FILM COATED ORAL at 17:38

## 2021-01-01 RX ADMIN — ENOXAPARIN SODIUM 30 MG: 30 INJECTION SUBCUTANEOUS at 08:28

## 2021-01-01 RX ADMIN — CARVEDILOL 6.25 MG: 3.12 TABLET, FILM COATED ORAL at 20:53

## 2021-01-01 RX ADMIN — INSULIN GLARGINE 10 UNITS: 100 INJECTION, SOLUTION SUBCUTANEOUS at 11:00

## 2021-01-01 RX ADMIN — SODIUM CHLORIDE 50 ML/HR: 9 INJECTION, SOLUTION INTRAVENOUS at 20:18

## 2021-01-01 RX ADMIN — Medication 10 ML: at 22:32

## 2021-01-01 RX ADMIN — NAFCILLIN SODIUM 2 G: 2 INJECTION, POWDER, LYOPHILIZED, FOR SOLUTION INTRAMUSCULAR; INTRAVENOUS at 12:58

## 2021-01-01 RX ADMIN — METOCLOPRAMIDE 10 MG: 5 INJECTION, SOLUTION INTRAMUSCULAR; INTRAVENOUS at 13:05

## 2021-01-01 RX ADMIN — ERGOCALCIFEROL 50000 UNITS: 1.25 CAPSULE ORAL at 15:06

## 2021-01-01 RX ADMIN — HEPARIN SODIUM 5000 UNITS: 5000 INJECTION INTRAVENOUS; SUBCUTANEOUS at 13:39

## 2021-01-01 RX ADMIN — SODIUM CHLORIDE: 9 INJECTION, SOLUTION INTRAVENOUS at 08:29

## 2021-01-01 RX ADMIN — ACETAMINOPHEN 650 MG: 325 TABLET, FILM COATED ORAL at 05:16

## 2021-01-01 RX ADMIN — CALCIUM CHLORIDE, MAGNESIUM CHLORIDE, DEXTROSE MONOHYDRATE, LACTIC ACID, SODIUM CHLORIDE, SODIUM BICARBONATE AND POTASSIUM CHLORIDE: 3.68; 3.05; 22; 5.4; 6.46; 3.09; .314 INJECTION INTRAVENOUS at 20:58

## 2021-01-01 RX ADMIN — HEPARIN SODIUM 2500 UNITS: 1000 INJECTION INTRAVENOUS; SUBCUTANEOUS at 05:46

## 2021-01-01 RX ADMIN — CALCIUM CARBONATE 400 MG: 500 TABLET, CHEWABLE ORAL at 20:41

## 2021-01-01 RX ADMIN — PIPERACILLIN AND TAZOBACTAM 3.38 G: 3; .375 INJECTION, POWDER, LYOPHILIZED, FOR SOLUTION INTRAVENOUS at 16:34

## 2021-01-01 RX ADMIN — NAFCILLIN SODIUM 2 G: 2 INJECTION, POWDER, LYOPHILIZED, FOR SOLUTION INTRAMUSCULAR; INTRAVENOUS at 08:31

## 2021-01-01 RX ADMIN — HEPARIN SODIUM 5000 UNITS: 5000 INJECTION INTRAVENOUS; SUBCUTANEOUS at 16:03

## 2021-01-01 RX ADMIN — PIPERACILLIN AND TAZOBACTAM 3.38 G: 3; .375 INJECTION, POWDER, LYOPHILIZED, FOR SOLUTION INTRAVENOUS at 00:55

## 2021-01-01 RX ADMIN — DOXYCYCLINE 100 MG: 100 INJECTION, POWDER, LYOPHILIZED, FOR SOLUTION INTRAVENOUS at 22:22

## 2021-01-01 RX ADMIN — CALCIUM CARBONATE 400 MG: 500 TABLET, CHEWABLE ORAL at 21:18

## 2021-01-01 RX ADMIN — SODIUM BICARBONATE: 84 INJECTION, SOLUTION INTRAVENOUS at 15:06

## 2021-01-01 RX ADMIN — Medication 10 ML: at 22:15

## 2021-01-01 RX ADMIN — Medication 667 MG: at 08:12

## 2021-01-01 RX ADMIN — SODIUM CHLORIDE 50 ML/HR: 9 INJECTION, SOLUTION INTRAVENOUS at 17:26

## 2021-01-01 RX ADMIN — FUROSEMIDE 40 MG: 10 INJECTION, SOLUTION INTRAMUSCULAR; INTRAVENOUS at 14:35

## 2021-01-01 RX ADMIN — HEPARIN SODIUM 5000 UNITS: 5000 INJECTION INTRAVENOUS; SUBCUTANEOUS at 03:02

## 2021-01-01 RX ADMIN — Medication 10 ML: at 13:33

## 2021-01-01 RX ADMIN — DICLOXACILLIN SODIUM 500 MG: 250 CAPSULE ORAL at 06:00

## 2021-01-01 RX ADMIN — Medication 10 ML: at 14:01

## 2021-01-01 RX ADMIN — OXYCODONE HYDROCHLORIDE AND ACETAMINOPHEN 1 TABLET: 5; 325 TABLET ORAL at 20:17

## 2021-01-01 RX ADMIN — CALCIUM GLUCONATE 1000 MG: 20 INJECTION, SOLUTION INTRAVENOUS at 11:56

## 2021-01-01 RX ADMIN — NIFEDIPINE 60 MG: 30 TABLET, FILM COATED, EXTENDED RELEASE ORAL at 20:53

## 2021-01-01 RX ADMIN — POTASSIUM CHLORIDE 10 MEQ: 14.9 INJECTION, SOLUTION INTRAVENOUS at 21:38

## 2021-01-01 RX ADMIN — Medication: at 01:45

## 2021-01-01 RX ADMIN — ALBUMIN (HUMAN) 25 G: 12.5 INJECTION, SOLUTION INTRAVENOUS at 13:41

## 2021-01-01 RX ADMIN — HEPARIN SODIUM 5000 UNITS: 5000 INJECTION INTRAVENOUS; SUBCUTANEOUS at 18:42

## 2021-01-01 RX ADMIN — OXYCODONE HYDROCHLORIDE AND ACETAMINOPHEN 1 TABLET: 5; 325 TABLET ORAL at 03:44

## 2021-01-01 RX ADMIN — INSULIN LISPRO 2 UNITS: 100 INJECTION, SOLUTION INTRAVENOUS; SUBCUTANEOUS at 10:12

## 2021-01-01 RX ADMIN — ACETAMINOPHEN 650 MG: 325 TABLET ORAL at 09:37

## 2021-01-01 RX ADMIN — CALCIUM CHLORIDE, MAGNESIUM CHLORIDE, DEXTROSE MONOHYDRATE, LACTIC ACID, SODIUM CHLORIDE, SODIUM BICARBONATE AND POTASSIUM CHLORIDE: 3.68; 3.05; 22; 5.4; 6.46; 3.09; .314 INJECTION INTRAVENOUS at 10:40

## 2021-01-01 RX ADMIN — CALCITRIOL CAPSULES 0.25 MCG 0.5 MCG: 0.25 CAPSULE ORAL at 17:29

## 2021-01-01 RX ADMIN — HYDRALAZINE HYDROCHLORIDE 50 MG: 50 TABLET, FILM COATED ORAL at 21:38

## 2021-01-01 RX ADMIN — INSULIN LISPRO 3 UNITS: 100 INJECTION, SOLUTION INTRAVENOUS; SUBCUTANEOUS at 04:57

## 2021-01-01 RX ADMIN — INSULIN LISPRO 3 UNITS: 100 INJECTION, SOLUTION INTRAVENOUS; SUBCUTANEOUS at 08:00

## 2021-01-01 RX ADMIN — NIFEDIPINE 60 MG: 60 TABLET, FILM COATED, EXTENDED RELEASE ORAL at 10:00

## 2021-01-01 RX ADMIN — DULOXETINE HYDROCHLORIDE 60 MG: 30 CAPSULE, DELAYED RELEASE ORAL at 10:00

## 2021-01-01 RX ADMIN — POTASSIUM CHLORIDE 20 MEQ: 29.8 INJECTION, SOLUTION INTRAVENOUS at 10:11

## 2021-01-01 RX ADMIN — HYDRALAZINE HYDROCHLORIDE 50 MG: 50 TABLET, FILM COATED ORAL at 08:33

## 2021-01-01 RX ADMIN — PHENYLEPHRINE HYDROCHLORIDE 125 MCG/MIN: 10 INJECTION INTRAVENOUS at 05:23

## 2021-01-01 RX ADMIN — DEXMEDETOMIDINE HYDROCHLORIDE 2 MCG: 100 INJECTION, SOLUTION, CONCENTRATE INTRAVENOUS at 15:07

## 2021-01-01 RX ADMIN — Medication 10 ML: at 21:54

## 2021-01-01 RX ADMIN — METOCLOPRAMIDE 10 MG: 5 INJECTION, SOLUTION INTRAMUSCULAR; INTRAVENOUS at 18:50

## 2021-01-01 RX ADMIN — NAFCILLIN SODIUM 2 G: 2 INJECTION, POWDER, LYOPHILIZED, FOR SOLUTION INTRAMUSCULAR; INTRAVENOUS at 23:29

## 2021-01-01 RX ADMIN — POTASSIUM CHLORIDE 20 MEQ: 29.8 INJECTION, SOLUTION INTRAVENOUS at 01:50

## 2021-01-01 RX ADMIN — Medication 1 CAPSULE: at 09:38

## 2021-01-01 RX ADMIN — HYDRALAZINE HYDROCHLORIDE 10 MG: 20 INJECTION INTRAMUSCULAR; INTRAVENOUS at 07:39

## 2021-01-01 RX ADMIN — PIPERACILLIN AND TAZOBACTAM 2.25 G: 2; .25 INJECTION, POWDER, LYOPHILIZED, FOR SOLUTION INTRAVENOUS at 11:49

## 2021-01-01 RX ADMIN — DEXMEDETOMIDINE HYDROCHLORIDE 0.8 MCG/KG/HR: 400 INJECTION INTRAVENOUS at 02:45

## 2021-01-01 RX ADMIN — ENOXAPARIN SODIUM 30 MG: 30 INJECTION SUBCUTANEOUS at 08:51

## 2021-01-01 RX ADMIN — PHENYLEPHRINE HYDROCHLORIDE 55 MCG/MIN: 10 INJECTION INTRAVENOUS at 11:25

## 2021-01-01 RX ADMIN — Medication 400 MCG: at 06:30

## 2021-01-01 RX ADMIN — INSULIN LISPRO 2 UNITS: 100 INJECTION, SOLUTION INTRAVENOUS; SUBCUTANEOUS at 00:09

## 2021-01-01 RX ADMIN — INSULIN LISPRO 10 UNITS: 100 INJECTION, SOLUTION INTRAVENOUS; SUBCUTANEOUS at 06:03

## 2021-01-01 RX ADMIN — CHLORHEXIDINE GLUCONATE 15 ML: 1.2 RINSE ORAL at 20:01

## 2021-01-01 RX ADMIN — NIFEDIPINE 60 MG: 60 TABLET, FILM COATED, EXTENDED RELEASE ORAL at 21:00

## 2021-01-01 RX ADMIN — PIPERACILLIN AND TAZOBACTAM 2.25 G: 2; .25 INJECTION, POWDER, LYOPHILIZED, FOR SOLUTION INTRAVENOUS at 10:11

## 2021-01-01 RX ADMIN — ERGOCALCIFEROL 50000 UNITS: 1.25 CAPSULE ORAL at 11:34

## 2021-01-01 RX ADMIN — FENTANYL CITRATE 50 MCG/HR: 50 INJECTION, SOLUTION INTRAMUSCULAR; INTRAVENOUS at 07:11

## 2021-01-01 RX ADMIN — PROPOFOL 100 MG: 10 INJECTION, EMULSION INTRAVENOUS at 08:51

## 2021-01-01 RX ADMIN — POTASSIUM CHLORIDE 20 MEQ: 29.8 INJECTION, SOLUTION INTRAVENOUS at 04:05

## 2021-01-01 RX ADMIN — CALCIUM CHLORIDE, MAGNESIUM CHLORIDE, DEXTROSE MONOHYDRATE, LACTIC ACID, SODIUM CHLORIDE, SODIUM BICARBONATE AND POTASSIUM CHLORIDE: 3.68; 3.05; 22; 5.4; 6.46; 3.09; .314 INJECTION INTRAVENOUS at 17:43

## 2021-01-01 RX ADMIN — NAFCILLIN SODIUM 2 G: 2 INJECTION, POWDER, LYOPHILIZED, FOR SOLUTION INTRAMUSCULAR; INTRAVENOUS at 23:28

## 2021-01-01 RX ADMIN — PIPERACILLIN AND TAZOBACTAM 3.38 G: 3; .375 INJECTION, POWDER, LYOPHILIZED, FOR SOLUTION INTRAVENOUS at 08:28

## 2021-01-01 RX ADMIN — HEPARIN SODIUM 5000 UNITS: 5000 INJECTION INTRAVENOUS; SUBCUTANEOUS at 21:49

## 2021-01-01 RX ADMIN — HYDRALAZINE HYDROCHLORIDE 10 MG: 20 INJECTION INTRAMUSCULAR; INTRAVENOUS at 02:09

## 2021-01-01 RX ADMIN — HYDRALAZINE HYDROCHLORIDE 50 MG: 50 TABLET, FILM COATED ORAL at 16:43

## 2021-01-01 RX ADMIN — NIFEDIPINE 60 MG: 60 TABLET, EXTENDED RELEASE ORAL at 17:11

## 2021-01-01 RX ADMIN — IRON SUCROSE 100 MG: 20 INJECTION, SOLUTION INTRAVENOUS at 23:39

## 2021-01-01 RX ADMIN — INSULIN LISPRO 2 UNITS: 100 INJECTION, SOLUTION INTRAVENOUS; SUBCUTANEOUS at 04:16

## 2021-01-01 RX ADMIN — INSULIN LISPRO 2 UNITS: 100 INJECTION, SOLUTION INTRAVENOUS; SUBCUTANEOUS at 12:28

## 2021-01-01 RX ADMIN — ENOXAPARIN SODIUM 30 MG: 30 INJECTION SUBCUTANEOUS at 09:31

## 2021-01-01 RX ADMIN — NIFEDIPINE 60 MG: 60 TABLET, EXTENDED RELEASE ORAL at 10:58

## 2021-01-01 RX ADMIN — FUROSEMIDE 40 MG: 10 INJECTION, SOLUTION INTRAMUSCULAR; INTRAVENOUS at 18:03

## 2021-01-01 RX ADMIN — FENTANYL CITRATE 10 MCG: 50 INJECTION, SOLUTION INTRAMUSCULAR; INTRAVENOUS at 14:52

## 2021-01-01 RX ADMIN — INSULIN GLARGINE 14 UNITS: 100 INJECTION, SOLUTION SUBCUTANEOUS at 12:32

## 2021-01-01 RX ADMIN — OXYCODONE HYDROCHLORIDE AND ACETAMINOPHEN 1 TABLET: 5; 325 TABLET ORAL at 18:00

## 2021-01-01 RX ADMIN — HEPARIN SODIUM 5000 UNITS: 5000 INJECTION INTRAVENOUS; SUBCUTANEOUS at 10:00

## 2021-01-01 RX ADMIN — INSULIN GLARGINE 10 UNITS: 100 INJECTION, SOLUTION SUBCUTANEOUS at 03:31

## 2021-01-01 RX ADMIN — INSULIN LISPRO 5 UNITS: 100 INJECTION, SOLUTION INTRAVENOUS; SUBCUTANEOUS at 08:13

## 2021-01-01 RX ADMIN — Medication 1 CAPSULE: at 10:58

## 2021-01-01 RX ADMIN — METOCLOPRAMIDE 10 MG: 5 INJECTION, SOLUTION INTRAMUSCULAR; INTRAVENOUS at 06:02

## 2021-01-01 RX ADMIN — LINEZOLID 600 MG: 600 INJECTION, SOLUTION INTRAVENOUS at 10:11

## 2021-01-01 RX ADMIN — FERROUS SULFATE TAB 325 MG (65 MG ELEMENTAL FE) 325 MG: 325 (65 FE) TAB at 05:16

## 2021-01-01 RX ADMIN — INSULIN LISPRO 2 UNITS: 100 INJECTION, SOLUTION INTRAVENOUS; SUBCUTANEOUS at 20:14

## 2021-01-01 RX ADMIN — POTASSIUM CHLORIDE 10 MEQ: 14.9 INJECTION, SOLUTION INTRAVENOUS at 15:47

## 2021-01-01 RX ADMIN — HYDRALAZINE HYDROCHLORIDE 50 MG: 50 TABLET, FILM COATED ORAL at 20:20

## 2021-01-01 RX ADMIN — DOCUSATE SODIUM 100 MG: 100 CAPSULE, LIQUID FILLED ORAL at 10:01

## 2021-01-01 RX ADMIN — Medication 10 ML: at 00:52

## 2021-01-01 RX ADMIN — LINEZOLID 600 MG: 600 INJECTION, SOLUTION INTRAVENOUS at 08:19

## 2021-01-01 RX ADMIN — POTASSIUM CHLORIDE 20 MEQ: 29.8 INJECTION, SOLUTION INTRAVENOUS at 08:28

## 2021-01-01 RX ADMIN — POTASSIUM CHLORIDE 10 MEQ: 14.9 INJECTION, SOLUTION INTRAVENOUS at 19:22

## 2021-01-01 RX ADMIN — IRON SUCROSE 200 MG: 20 INJECTION, SOLUTION INTRAVENOUS at 12:27

## 2021-01-01 RX ADMIN — CARVEDILOL 12.5 MG: 12.5 TABLET, FILM COATED ORAL at 21:51

## 2021-01-01 RX ADMIN — OXYCODONE HYDROCHLORIDE AND ACETAMINOPHEN 1 TABLET: 5; 325 TABLET ORAL at 05:17

## 2021-01-01 RX ADMIN — NIFEDIPINE 30 MG: 30 TABLET, FILM COATED, EXTENDED RELEASE ORAL at 09:24

## 2021-01-01 RX ADMIN — INSULIN LISPRO 3 UNITS: 100 INJECTION, SOLUTION INTRAVENOUS; SUBCUTANEOUS at 16:30

## 2021-01-01 RX ADMIN — IRON SUCROSE 200 MG: 20 INJECTION, SOLUTION INTRAVENOUS at 12:11

## 2021-01-01 RX ADMIN — Medication 10 ML: at 18:06

## 2021-01-01 RX ADMIN — SODIUM CHLORIDE 100 MG: 9 INJECTION, SOLUTION INTRAVENOUS at 13:54

## 2021-01-01 RX ADMIN — Medication 10 ML: at 21:35

## 2021-01-01 RX ADMIN — CALCIUM CHLORIDE, MAGNESIUM CHLORIDE, DEXTROSE MONOHYDRATE, LACTIC ACID, SODIUM CHLORIDE, SODIUM BICARBONATE AND POTASSIUM CHLORIDE: 3.68; 3.05; 22; 5.4; 6.46; 3.09; .314 INJECTION INTRAVENOUS at 04:12

## 2021-01-01 RX ADMIN — HEPARIN SODIUM 5000 UNITS: 5000 INJECTION INTRAVENOUS; SUBCUTANEOUS at 09:25

## 2021-01-01 RX ADMIN — Medication 10 ML: at 05:24

## 2021-01-01 RX ADMIN — EPOETIN ALFA-EPBX 10000 UNITS: 10000 INJECTION, SOLUTION INTRAVENOUS; SUBCUTANEOUS at 22:50

## 2021-01-01 RX ADMIN — CALCIUM CHLORIDE, MAGNESIUM CHLORIDE, DEXTROSE MONOHYDRATE, LACTIC ACID, SODIUM CHLORIDE, SODIUM BICARBONATE AND POTASSIUM CHLORIDE: 3.68; 3.05; 22; 5.4; 6.46; 3.09; .314 INJECTION INTRAVENOUS at 01:55

## 2021-01-01 RX ADMIN — Medication 10 ML: at 12:23

## 2021-01-01 RX ADMIN — PIPERACILLIN AND TAZOBACTAM 3.38 G: 3; .375 INJECTION, POWDER, LYOPHILIZED, FOR SOLUTION INTRAVENOUS at 00:47

## 2021-01-01 RX ADMIN — POTASSIUM CHLORIDE 20 MEQ: 29.8 INJECTION, SOLUTION INTRAVENOUS at 06:57

## 2021-01-01 RX ADMIN — NIFEDIPINE 60 MG: 30 TABLET, FILM COATED, EXTENDED RELEASE ORAL at 09:53

## 2021-01-01 RX ADMIN — PIPERACILLIN AND TAZOBACTAM 2.25 G: 2; .25 INJECTION, POWDER, LYOPHILIZED, FOR SOLUTION INTRAVENOUS at 17:28

## 2021-01-01 RX ADMIN — CARVEDILOL 6.25 MG: 3.12 TABLET, FILM COATED ORAL at 08:33

## 2021-01-01 RX ADMIN — CALCITRIOL CAPSULES 0.25 MCG 0.5 MCG: 0.25 CAPSULE ORAL at 09:30

## 2021-01-01 RX ADMIN — HYDRALAZINE HYDROCHLORIDE 100 MG: 50 TABLET, FILM COATED ORAL at 18:12

## 2021-01-01 RX ADMIN — FENTANYL CITRATE 50 MCG/HR: 50 INJECTION, SOLUTION INTRAMUSCULAR; INTRAVENOUS at 10:45

## 2021-01-01 RX ADMIN — NAFCILLIN SODIUM 2 G: 2 INJECTION, POWDER, LYOPHILIZED, FOR SOLUTION INTRAMUSCULAR; INTRAVENOUS at 00:12

## 2021-01-01 RX ADMIN — HYDROCODONE BITARTRATE AND ACETAMINOPHEN 1 TABLET: 10; 325 TABLET ORAL at 10:22

## 2021-01-01 RX ADMIN — Medication 200 MEQ: at 22:26

## 2021-01-01 RX ADMIN — POTASSIUM CHLORIDE 40 MEQ: 750 TABLET, FILM COATED, EXTENDED RELEASE ORAL at 04:14

## 2021-01-01 RX ADMIN — Medication 10 ML: at 15:06

## 2021-01-01 RX ADMIN — Medication 10 ML: at 21:27

## 2021-01-01 RX ADMIN — INSULIN LISPRO 7 UNITS: 100 INJECTION, SOLUTION INTRAVENOUS; SUBCUTANEOUS at 17:56

## 2021-01-01 RX ADMIN — NOREPINEPHRINE BITARTRATE 4 MCG/MIN: 1 INJECTION, SOLUTION, CONCENTRATE INTRAVENOUS at 14:09

## 2021-01-01 RX ADMIN — CALCIUM GLUCONATE 2 G: 20 INJECTION, SOLUTION INTRAVENOUS at 21:28

## 2021-01-01 RX ADMIN — HYDRALAZINE HYDROCHLORIDE 50 MG: 50 TABLET, FILM COATED ORAL at 22:32

## 2021-01-01 RX ADMIN — Medication 250 MG: at 09:06

## 2021-01-01 RX ADMIN — DEXMEDETOMIDINE HYDROCHLORIDE 4 MCG: 100 INJECTION, SOLUTION, CONCENTRATE INTRAVENOUS at 15:06

## 2021-01-01 RX ADMIN — HEPARIN SODIUM 1400 UNITS: 1000 INJECTION INTRAVENOUS; SUBCUTANEOUS at 11:04

## 2021-01-01 RX ADMIN — HEPARIN SODIUM 5000 UNITS: 5000 INJECTION INTRAVENOUS; SUBCUTANEOUS at 13:44

## 2021-01-01 RX ADMIN — INSULIN LISPRO 3 UNITS: 100 INJECTION, SOLUTION INTRAVENOUS; SUBCUTANEOUS at 07:37

## 2021-01-01 RX ADMIN — HYDRALAZINE HYDROCHLORIDE 100 MG: 50 TABLET, FILM COATED ORAL at 08:28

## 2021-01-01 RX ADMIN — CALCIUM CHLORIDE, MAGNESIUM CHLORIDE, DEXTROSE MONOHYDRATE, LACTIC ACID, SODIUM CHLORIDE, SODIUM BICARBONATE AND POTASSIUM CHLORIDE: 3.68; 3.05; 22; 5.4; 6.46; 3.09; .314 INJECTION INTRAVENOUS at 04:41

## 2021-01-01 RX ADMIN — DULOXETINE HYDROCHLORIDE 60 MG: 60 CAPSULE, DELAYED RELEASE ORAL at 09:29

## 2021-01-01 RX ADMIN — CALCIUM GLUCONATE: 94 INJECTION, SOLUTION INTRAVENOUS at 18:28

## 2021-01-01 RX ADMIN — PHENYLEPHRINE HYDROCHLORIDE 60 MCG/MIN: 10 INJECTION INTRAVENOUS at 11:43

## 2021-01-01 RX ADMIN — POTASSIUM CHLORIDE 20 MEQ: 29.8 INJECTION, SOLUTION INTRAVENOUS at 08:34

## 2021-01-01 RX ADMIN — AMLODIPINE BESYLATE 10 MG: 5 TABLET ORAL at 10:14

## 2021-01-01 RX ADMIN — PROPOFOL 10 MCG/KG/MIN: 10 INJECTION, EMULSION INTRAVENOUS at 20:40

## 2021-01-01 RX ADMIN — HEPARIN SODIUM 5000 UNITS: 5000 INJECTION INTRAVENOUS; SUBCUTANEOUS at 15:50

## 2021-01-01 RX ADMIN — BISACODYL 10 MG: 10 SUPPOSITORY RECTAL at 14:00

## 2021-01-01 RX ADMIN — NAFCILLIN SODIUM 2 G: 2 INJECTION, POWDER, LYOPHILIZED, FOR SOLUTION INTRAMUSCULAR; INTRAVENOUS at 18:32

## 2021-01-01 RX ADMIN — OXYCODONE HYDROCHLORIDE AND ACETAMINOPHEN 1 TABLET: 5; 325 TABLET ORAL at 18:28

## 2021-01-01 RX ADMIN — ENOXAPARIN SODIUM 30 MG: 30 INJECTION SUBCUTANEOUS at 08:19

## 2021-01-01 RX ADMIN — PIPERACILLIN AND TAZOBACTAM 3.38 G: 3; .375 INJECTION, POWDER, LYOPHILIZED, FOR SOLUTION INTRAVENOUS at 01:22

## 2021-01-01 RX ADMIN — CALCIUM CARBONATE (ANTACID) CHEW TAB 500 MG 400 MG: 500 CHEW TAB at 22:35

## 2021-01-01 RX ADMIN — HYDRALAZINE HYDROCHLORIDE 50 MG: 50 TABLET, FILM COATED ORAL at 20:54

## 2021-01-01 RX ADMIN — HYDRALAZINE HYDROCHLORIDE 100 MG: 50 TABLET, FILM COATED ORAL at 17:20

## 2021-01-01 RX ADMIN — CHLORHEXIDINE GLUCONATE 15 ML: 1.2 RINSE ORAL at 20:34

## 2021-01-01 RX ADMIN — CALCIUM CHLORIDE, MAGNESIUM CHLORIDE, DEXTROSE MONOHYDRATE, LACTIC ACID, SODIUM CHLORIDE, SODIUM BICARBONATE AND POTASSIUM CHLORIDE: 3.68; 3.05; 22; 5.4; 6.46; 3.09; .314 INJECTION INTRAVENOUS at 10:47

## 2021-01-01 RX ADMIN — HYDRALAZINE HYDROCHLORIDE 50 MG: 50 TABLET, FILM COATED ORAL at 15:50

## 2021-01-01 RX ADMIN — OXYCODONE HYDROCHLORIDE AND ACETAMINOPHEN 1 TABLET: 5; 325 TABLET ORAL at 15:34

## 2021-01-01 RX ADMIN — HYDRALAZINE HYDROCHLORIDE 100 MG: 50 TABLET, FILM COATED ORAL at 15:31

## 2021-01-01 RX ADMIN — HYDROCODONE BITARTRATE AND ACETAMINOPHEN 1 TABLET: 10; 325 TABLET ORAL at 08:23

## 2021-01-01 RX ADMIN — POTASSIUM CHLORIDE: 2 INJECTION, SOLUTION, CONCENTRATE INTRAVENOUS at 06:54

## 2021-01-01 RX ADMIN — CALCIUM CHLORIDE, MAGNESIUM CHLORIDE, DEXTROSE MONOHYDRATE, LACTIC ACID, SODIUM CHLORIDE, SODIUM BICARBONATE AND POTASSIUM CHLORIDE: 3.68; 3.05; 22; 5.4; 6.46; 3.09; .314 INJECTION INTRAVENOUS at 23:03

## 2021-01-01 RX ADMIN — INSULIN LISPRO 3 UNITS: 100 INJECTION, SOLUTION INTRAVENOUS; SUBCUTANEOUS at 04:00

## 2021-01-01 RX ADMIN — HYDRALAZINE HYDROCHLORIDE 50 MG: 50 TABLET, FILM COATED ORAL at 17:47

## 2021-01-01 RX ADMIN — NIFEDIPINE 60 MG: 60 TABLET, FILM COATED, EXTENDED RELEASE ORAL at 08:17

## 2021-01-01 RX ADMIN — OXYCODONE HYDROCHLORIDE AND ACETAMINOPHEN 1 TABLET: 5; 325 TABLET ORAL at 20:39

## 2021-01-01 RX ADMIN — CALCIUM CARBONATE 400 MG: 500 TABLET, CHEWABLE ORAL at 20:53

## 2021-01-01 RX ADMIN — NIFEDIPINE 60 MG: 60 TABLET, EXTENDED RELEASE ORAL at 17:58

## 2021-01-01 RX ADMIN — HYDRALAZINE HYDROCHLORIDE 10 MG: 20 INJECTION INTRAMUSCULAR; INTRAVENOUS at 14:14

## 2021-01-01 RX ADMIN — METRONIDAZOLE 500 MG: 500 INJECTION, SOLUTION INTRAVENOUS at 08:45

## 2021-01-01 RX ADMIN — POTASSIUM CHLORIDE 20 MEQ: 29.8 INJECTION, SOLUTION INTRAVENOUS at 08:14

## 2021-01-01 RX ADMIN — ALBUMIN (HUMAN) 250 ML: 12.5 INJECTION, SOLUTION INTRAVENOUS at 15:55

## 2021-01-01 RX ADMIN — NIFEDIPINE 60 MG: 30 TABLET, FILM COATED, EXTENDED RELEASE ORAL at 08:57

## 2021-01-01 RX ADMIN — PIPERACILLIN AND TAZOBACTAM 2.25 G: 2; .25 INJECTION, POWDER, LYOPHILIZED, FOR SOLUTION INTRAVENOUS at 21:43

## 2021-01-01 RX ADMIN — DOXYCYCLINE 100 MG: 100 INJECTION, POWDER, LYOPHILIZED, FOR SOLUTION INTRAVENOUS at 22:57

## 2021-01-01 RX ADMIN — HEPARIN SODIUM 5000 UNITS: 5000 INJECTION INTRAVENOUS; SUBCUTANEOUS at 17:13

## 2021-01-01 RX ADMIN — INSULIN LISPRO 10 UNITS: 100 INJECTION, SOLUTION INTRAVENOUS; SUBCUTANEOUS at 03:32

## 2021-01-01 RX ADMIN — POTASSIUM CHLORIDE 20 MEQ: 400 INJECTION, SOLUTION INTRAVENOUS at 08:32

## 2021-01-01 RX ADMIN — LEVOFLOXACIN 500 MG: 500 TABLET, FILM COATED ORAL at 18:12

## 2021-01-01 RX ADMIN — PRAVASTATIN SODIUM 20 MG: 20 TABLET ORAL at 23:03

## 2021-01-01 RX ADMIN — PIPERACILLIN AND TAZOBACTAM 3.38 G: 3; .375 INJECTION, POWDER, LYOPHILIZED, FOR SOLUTION INTRAVENOUS at 08:29

## 2021-01-01 RX ADMIN — CALCIUM CHLORIDE, MAGNESIUM CHLORIDE, DEXTROSE MONOHYDRATE, LACTIC ACID, SODIUM CHLORIDE, SODIUM BICARBONATE AND POTASSIUM CHLORIDE: 3.68; 3.05; 22; 5.4; 6.46; 3.09; .314 INJECTION INTRAVENOUS at 07:03

## 2021-01-01 RX ADMIN — ROCURONIUM BROMIDE 25 MG: 10 INJECTION, SOLUTION INTRAVENOUS at 08:52

## 2021-01-01 RX ADMIN — FERROUS SULFATE TAB 325 MG (65 MG ELEMENTAL FE) 325 MG: 325 (65 FE) TAB at 06:15

## 2021-01-01 RX ADMIN — METOCLOPRAMIDE 10 MG: 5 INJECTION, SOLUTION INTRAMUSCULAR; INTRAVENOUS at 06:55

## 2021-01-01 RX ADMIN — HYDRALAZINE HYDROCHLORIDE 100 MG: 50 TABLET, FILM COATED ORAL at 12:37

## 2021-01-01 RX ADMIN — Medication 10 ML: at 22:33

## 2021-01-01 RX ADMIN — INSULIN GLARGINE 10 UNITS: 100 INJECTION, SOLUTION SUBCUTANEOUS at 18:28

## 2021-01-01 RX ADMIN — Medication 10 ML: at 05:28

## 2021-01-01 RX ADMIN — CALCITRIOL CAPSULES 0.25 MCG 0.5 MCG: 0.25 CAPSULE ORAL at 08:50

## 2021-01-01 RX ADMIN — PHENYLEPHRINE HYDROCHLORIDE 100 MCG/MIN: 10 INJECTION INTRAVENOUS at 09:01

## 2021-01-01 RX ADMIN — Medication 10 ML: at 06:34

## 2021-01-01 RX ADMIN — FUROSEMIDE 60 MG: 10 INJECTION INTRAMUSCULAR; INTRAVENOUS at 22:27

## 2021-01-01 RX ADMIN — INSULIN LISPRO 5 UNITS: 100 INJECTION, SOLUTION INTRAVENOUS; SUBCUTANEOUS at 12:30

## 2021-01-01 RX ADMIN — PIPERACILLIN AND TAZOBACTAM 3.38 G: 3; .375 INJECTION, POWDER, LYOPHILIZED, FOR SOLUTION INTRAVENOUS at 08:35

## 2021-01-01 RX ADMIN — CALCIUM CARBONATE 400 MG: 500 TABLET, CHEWABLE ORAL at 08:28

## 2021-01-01 RX ADMIN — Medication 400 MCG: at 06:22

## 2021-01-01 RX ADMIN — PRAVASTATIN SODIUM 20 MG: 20 TABLET ORAL at 21:38

## 2021-01-01 RX ADMIN — IRON SUCROSE 100 MG: 20 INJECTION, SOLUTION INTRAVENOUS at 21:53

## 2021-01-01 RX ADMIN — CALCIUM CARBONATE 400 MG: 500 TABLET, CHEWABLE ORAL at 20:37

## 2021-01-01 RX ADMIN — NAFCILLIN SODIUM 2 G: 2 INJECTION, POWDER, LYOPHILIZED, FOR SOLUTION INTRAMUSCULAR; INTRAVENOUS at 05:33

## 2021-01-01 RX ADMIN — VANCOMYCIN HYDROCHLORIDE 1000 MG: 1 INJECTION, POWDER, LYOPHILIZED, FOR SOLUTION INTRAVENOUS at 22:48

## 2021-01-01 RX ADMIN — HEPARIN SODIUM 5000 UNITS: 5000 INJECTION INTRAVENOUS; SUBCUTANEOUS at 00:43

## 2021-01-01 RX ADMIN — DOCUSATE SODIUM 100 MG: 100 CAPSULE, LIQUID FILLED ORAL at 08:17

## 2021-01-01 RX ADMIN — DOXYCYCLINE 100 MG: 100 INJECTION, POWDER, LYOPHILIZED, FOR SOLUTION INTRAVENOUS at 21:30

## 2021-01-01 RX ADMIN — CALCITRIOL CAPSULES 0.25 MCG 0.5 MCG: 0.25 CAPSULE ORAL at 08:28

## 2021-01-01 RX ADMIN — INSULIN LISPRO 2 UNITS: 100 INJECTION, SOLUTION INTRAVENOUS; SUBCUTANEOUS at 16:30

## 2021-01-01 RX ADMIN — ALBUMIN (HUMAN) 250 ML: 12.5 INJECTION, SOLUTION INTRAVENOUS at 15:03

## 2021-01-01 RX ADMIN — AMLODIPINE BESYLATE 10 MG: 5 TABLET ORAL at 08:35

## 2021-01-01 RX ADMIN — INSULIN LISPRO 5 UNITS: 100 INJECTION, SOLUTION INTRAVENOUS; SUBCUTANEOUS at 11:53

## 2021-01-01 RX ADMIN — INSULIN LISPRO 2 UNITS: 100 INJECTION, SOLUTION INTRAVENOUS; SUBCUTANEOUS at 12:56

## 2021-01-01 RX ADMIN — CALCIUM CARBONATE 400 MG: 500 TABLET, CHEWABLE ORAL at 12:29

## 2021-01-01 RX ADMIN — PRAVASTATIN SODIUM 20 MG: 20 TABLET ORAL at 20:19

## 2021-01-01 RX ADMIN — PIPERACILLIN AND TAZOBACTAM 3.38 G: 3; .375 INJECTION, POWDER, LYOPHILIZED, FOR SOLUTION INTRAVENOUS at 23:37

## 2021-01-01 RX ADMIN — OXYCODONE HYDROCHLORIDE AND ACETAMINOPHEN 1 TABLET: 5; 325 TABLET ORAL at 17:20

## 2021-01-01 RX ADMIN — HYDRALAZINE HYDROCHLORIDE 10 MG: 20 INJECTION INTRAMUSCULAR; INTRAVENOUS at 16:10

## 2021-01-01 RX ADMIN — NIFEDIPINE 30 MG: 30 TABLET, FILM COATED, EXTENDED RELEASE ORAL at 22:34

## 2021-01-01 RX ADMIN — SODIUM CHLORIDE 200 MG: 9 INJECTION, SOLUTION INTRAVENOUS at 17:25

## 2021-01-01 RX ADMIN — DULOXETINE HYDROCHLORIDE 60 MG: 30 CAPSULE, DELAYED RELEASE ORAL at 08:50

## 2021-01-01 RX ADMIN — MAGNESIUM SULFATE HEPTAHYDRATE 2 G: 40 INJECTION, SOLUTION INTRAVENOUS at 15:11

## 2021-01-01 RX ADMIN — CALCIUM CHLORIDE, MAGNESIUM CHLORIDE, DEXTROSE MONOHYDRATE, LACTIC ACID, SODIUM CHLORIDE, SODIUM BICARBONATE AND POTASSIUM CHLORIDE: 3.68; 3.05; 22; 5.4; 6.46; 3.09; .314 INJECTION INTRAVENOUS at 09:35

## 2021-01-01 RX ADMIN — POTASSIUM CHLORIDE 20 MEQ: 29.8 INJECTION, SOLUTION INTRAVENOUS at 05:08

## 2021-01-01 RX ADMIN — PHENYLEPHRINE HYDROCHLORIDE 30 MCG/MIN: 10 INJECTION INTRAVENOUS at 07:37

## 2021-01-01 RX ADMIN — INSULIN LISPRO 3 UNITS: 100 INJECTION, SOLUTION INTRAVENOUS; SUBCUTANEOUS at 20:54

## 2021-01-01 RX ADMIN — EPINEPHRINE 1 MG: 0.1 INJECTION INTRACARDIAC; INTRAVENOUS at 10:19

## 2021-01-01 RX ADMIN — EPINEPHRINE 1 MG: 0.1 INJECTION INTRACARDIAC; INTRAVENOUS at 10:15

## 2021-01-01 RX ADMIN — FENTANYL CITRATE 25 MCG: 50 INJECTION, SOLUTION INTRAMUSCULAR; INTRAVENOUS at 06:02

## 2021-01-01 RX ADMIN — PRAVASTATIN SODIUM 20 MG: 20 TABLET ORAL at 22:50

## 2021-01-01 RX ADMIN — CEFTRIAXONE SODIUM 1 G: 1 INJECTION, POWDER, FOR SOLUTION INTRAMUSCULAR; INTRAVENOUS at 08:05

## 2021-01-01 RX ADMIN — CHLORHEXIDINE GLUCONATE 15 ML: 1.2 RINSE ORAL at 08:28

## 2021-01-01 RX ADMIN — CARVEDILOL 12.5 MG: 12.5 TABLET, FILM COATED ORAL at 09:53

## 2021-01-01 RX ADMIN — POTASSIUM CHLORIDE 80 MEQ: 1500 TABLET, EXTENDED RELEASE ORAL at 20:18

## 2021-01-01 RX ADMIN — NAFCILLIN SODIUM 2 G: 2 INJECTION, POWDER, LYOPHILIZED, FOR SOLUTION INTRAMUSCULAR; INTRAVENOUS at 11:43

## 2021-01-01 RX ADMIN — ALBUMIN (HUMAN) 250 ML: 12.5 INJECTION, SOLUTION INTRAVENOUS at 15:22

## 2021-01-01 RX ADMIN — PIPERACILLIN AND TAZOBACTAM 3.38 G: 3; .375 INJECTION, POWDER, LYOPHILIZED, FOR SOLUTION INTRAVENOUS at 17:27

## 2021-01-01 RX ADMIN — Medication: at 14:13

## 2021-01-01 RX ADMIN — METOCLOPRAMIDE 10 MG: 5 INJECTION, SOLUTION INTRAMUSCULAR; INTRAVENOUS at 18:12

## 2021-01-01 RX ADMIN — HYDRALAZINE HYDROCHLORIDE 100 MG: 50 TABLET, FILM COATED ORAL at 18:31

## 2021-01-01 RX ADMIN — Medication 10 ML: at 05:20

## 2021-01-01 RX ADMIN — CALCIUM CHLORIDE, MAGNESIUM CHLORIDE, DEXTROSE MONOHYDRATE, LACTIC ACID, SODIUM CHLORIDE, SODIUM BICARBONATE AND POTASSIUM CHLORIDE: 3.68; 3.05; 22; 5.4; 6.46; 3.09; .314 INJECTION INTRAVENOUS at 14:31

## 2021-01-01 RX ADMIN — POTASSIUM CHLORIDE: 2 INJECTION, SOLUTION, CONCENTRATE INTRAVENOUS at 03:19

## 2021-01-01 RX ADMIN — NAFCILLIN SODIUM 2 G: 2 INJECTION, POWDER, LYOPHILIZED, FOR SOLUTION INTRAMUSCULAR; INTRAVENOUS at 18:00

## 2021-01-01 RX ADMIN — CALCIUM CHLORIDE, MAGNESIUM CHLORIDE, DEXTROSE MONOHYDRATE, LACTIC ACID, SODIUM CHLORIDE, SODIUM BICARBONATE AND POTASSIUM CHLORIDE: 3.68; 3.05; 22; 5.4; 6.46; 3.09; .314 INJECTION INTRAVENOUS at 02:44

## 2021-01-01 RX ADMIN — ROCURONIUM BROMIDE 5 MG: 10 INJECTION, SOLUTION INTRAVENOUS at 08:51

## 2021-01-01 RX ADMIN — HYDRALAZINE HYDROCHLORIDE 50 MG: 50 TABLET, FILM COATED ORAL at 09:24

## 2021-01-01 RX ADMIN — DOXYCYCLINE 100 MG: 100 INJECTION, POWDER, LYOPHILIZED, FOR SOLUTION INTRAVENOUS at 09:39

## 2021-01-01 RX ADMIN — NIFEDIPINE 60 MG: 30 TABLET, FILM COATED, EXTENDED RELEASE ORAL at 08:28

## 2021-01-01 RX ADMIN — HEPARIN SODIUM 5000 UNITS: 5000 INJECTION INTRAVENOUS; SUBCUTANEOUS at 09:20

## 2021-01-01 RX ADMIN — Medication 10 ML: at 20:39

## 2021-01-01 RX ADMIN — Medication 10 ML: at 13:15

## 2021-01-01 RX ADMIN — Medication 10 ML: at 18:24

## 2021-01-01 RX ADMIN — HEPARIN SODIUM 5000 UNITS: 5000 INJECTION INTRAVENOUS; SUBCUTANEOUS at 17:48

## 2021-01-01 RX ADMIN — HYDRALAZINE HYDROCHLORIDE 50 MG: 50 TABLET, FILM COATED ORAL at 16:11

## 2021-01-01 RX ADMIN — DEXMEDETOMIDINE HYDROCHLORIDE 4 MCG: 100 INJECTION, SOLUTION INTRAVENOUS at 08:51

## 2021-01-01 RX ADMIN — DULOXETINE HYDROCHLORIDE 60 MG: 60 CAPSULE, DELAYED RELEASE ORAL at 08:06

## 2021-01-01 RX ADMIN — MAGNESIUM SULFATE HEPTAHYDRATE 2 G: 40 INJECTION, SOLUTION INTRAVENOUS at 19:35

## 2021-01-01 RX ADMIN — HYDROCODONE BITARTRATE AND ACETAMINOPHEN 1 TABLET: 10; 325 TABLET ORAL at 00:29

## 2021-01-01 RX ADMIN — CARVEDILOL 6.25 MG: 3.12 TABLET, FILM COATED ORAL at 08:35

## 2021-01-01 RX ADMIN — INSULIN LISPRO 2 UNITS: 100 INJECTION, SOLUTION INTRAVENOUS; SUBCUTANEOUS at 17:07

## 2021-01-01 RX ADMIN — CALCIUM CARBONATE 400 MG: 500 TABLET, CHEWABLE ORAL at 10:12

## 2021-01-01 RX ADMIN — PRAVASTATIN SODIUM 20 MG: 20 TABLET ORAL at 20:33

## 2021-01-01 RX ADMIN — Medication 10 ML: at 06:13

## 2021-01-01 RX ADMIN — HYDRALAZINE HYDROCHLORIDE 10 MG: 20 INJECTION INTRAMUSCULAR; INTRAVENOUS at 01:40

## 2021-01-01 RX ADMIN — HEPARIN SODIUM 1400 UNITS: 1000 INJECTION INTRAVENOUS; SUBCUTANEOUS at 11:31

## 2021-01-01 RX ADMIN — POTASSIUM CHLORIDE 20 MEQ: 29.8 INJECTION, SOLUTION INTRAVENOUS at 07:03

## 2021-01-01 RX ADMIN — SODIUM CHLORIDE 100 MG: 9 INJECTION, SOLUTION INTRAVENOUS at 15:51

## 2021-01-01 RX ADMIN — NAFCILLIN SODIUM 2 G: 2 INJECTION, POWDER, LYOPHILIZED, FOR SOLUTION INTRAMUSCULAR; INTRAVENOUS at 12:32

## 2021-01-01 RX ADMIN — CALCIUM CARBONATE 400 MG: 500 TABLET, CHEWABLE ORAL at 09:54

## 2021-01-01 RX ADMIN — Medication 250 MG: at 22:16

## 2021-01-01 RX ADMIN — NAFCILLIN SODIUM 2 G: 2 INJECTION, POWDER, LYOPHILIZED, FOR SOLUTION INTRAMUSCULAR; INTRAVENOUS at 17:20

## 2021-01-01 RX ADMIN — PIPERACILLIN AND TAZOBACTAM 3.38 G: 3; .375 INJECTION, POWDER, LYOPHILIZED, FOR SOLUTION INTRAVENOUS at 20:41

## 2021-01-01 RX ADMIN — METOCLOPRAMIDE 10 MG: 5 INJECTION, SOLUTION INTRAMUSCULAR; INTRAVENOUS at 12:36

## 2021-01-01 RX ADMIN — CALCIUM CHLORIDE, MAGNESIUM CHLORIDE, DEXTROSE MONOHYDRATE, LACTIC ACID, SODIUM CHLORIDE, SODIUM BICARBONATE AND POTASSIUM CHLORIDE: 3.68; 3.05; 22; 5.4; 6.46; 3.09; .314 INJECTION INTRAVENOUS at 22:17

## 2021-01-01 RX ADMIN — ENOXAPARIN SODIUM 30 MG: 30 INJECTION SUBCUTANEOUS at 08:58

## 2021-01-01 RX ADMIN — POTASSIUM CHLORIDE 40 MEQ: 750 TABLET, FILM COATED, EXTENDED RELEASE ORAL at 00:29

## 2021-01-01 RX ADMIN — CARVEDILOL 6.25 MG: 3.12 TABLET, FILM COATED ORAL at 12:29

## 2021-01-01 RX ADMIN — MIDAZOLAM HYDROCHLORIDE 2 MG: 1 INJECTION, SOLUTION INTRAMUSCULAR; INTRAVENOUS at 13:46

## 2021-01-01 RX ADMIN — FENTANYL CITRATE 50 MCG: 50 INJECTION, SOLUTION INTRAMUSCULAR; INTRAVENOUS at 08:18

## 2021-01-01 RX ADMIN — CALCIUM CARBONATE (ANTACID) CHEW TAB 500 MG 400 MG: 500 CHEW TAB at 20:54

## 2021-01-01 RX ADMIN — CHLORHEXIDINE GLUCONATE 15 ML: 1.2 RINSE ORAL at 21:21

## 2021-01-01 RX ADMIN — Medication 10 ML: at 12:24

## 2021-01-01 RX ADMIN — POTASSIUM CHLORIDE 40 MEQ: 750 TABLET, FILM COATED, EXTENDED RELEASE ORAL at 20:54

## 2021-01-01 RX ADMIN — Medication 667 MG: at 08:05

## 2021-01-01 RX ADMIN — INSULIN GLARGINE 12 UNITS: 100 INJECTION, SOLUTION SUBCUTANEOUS at 09:00

## 2021-01-01 RX ADMIN — MIDAZOLAM HYDROCHLORIDE 2 MG: 1 INJECTION, SOLUTION INTRAMUSCULAR; INTRAVENOUS at 12:21

## 2021-01-01 RX ADMIN — INSULIN LISPRO 7 UNITS: 100 INJECTION, SOLUTION INTRAVENOUS; SUBCUTANEOUS at 12:22

## 2021-01-01 RX ADMIN — CARVEDILOL 12.5 MG: 12.5 TABLET, FILM COATED ORAL at 02:07

## 2021-01-01 RX ADMIN — DICLOXACILLIN SODIUM 500 MG: 250 CAPSULE ORAL at 14:00

## 2021-01-01 RX ADMIN — OXYCODONE HYDROCHLORIDE AND ACETAMINOPHEN 1 TABLET: 5; 325 TABLET ORAL at 16:29

## 2021-01-01 RX ADMIN — INSULIN GLARGINE 10 UNITS: 100 INJECTION, SOLUTION SUBCUTANEOUS at 08:37

## 2021-01-01 RX ADMIN — CARVEDILOL 6.25 MG: 3.12 TABLET, FILM COATED ORAL at 09:54

## 2021-01-01 RX ADMIN — PIPERACILLIN AND TAZOBACTAM 2.25 G: 2; .25 INJECTION, POWDER, LYOPHILIZED, FOR SOLUTION INTRAVENOUS at 11:22

## 2021-01-01 RX ADMIN — POTASSIUM CHLORIDE: 2 INJECTION, SOLUTION, CONCENTRATE INTRAVENOUS at 16:55

## 2021-01-01 RX ADMIN — POTASSIUM CHLORIDE 10 MEQ: 14.9 INJECTION, SOLUTION INTRAVENOUS at 18:20

## 2021-01-01 RX ADMIN — Medication 10 ML: at 14:23

## 2021-01-01 RX ADMIN — METRONIDAZOLE 500 MG: 500 INJECTION, SOLUTION INTRAVENOUS at 17:28

## 2021-01-01 RX ADMIN — HEPARIN SODIUM 1100 UNITS: 1000 INJECTION INTRAVENOUS; SUBCUTANEOUS at 11:30

## 2021-01-01 RX ADMIN — HYDRALAZINE HYDROCHLORIDE 100 MG: 50 TABLET, FILM COATED ORAL at 20:38

## 2021-01-01 RX ADMIN — HYDRALAZINE HYDROCHLORIDE 10 MG: 20 INJECTION INTRAMUSCULAR; INTRAVENOUS at 03:26

## 2021-01-01 RX ADMIN — Medication 667 MG: at 12:32

## 2021-01-01 RX ADMIN — POTASSIUM CHLORIDE AND SODIUM CHLORIDE: 450; 150 INJECTION, SOLUTION INTRAVENOUS at 14:27

## 2021-01-01 RX ADMIN — OXYCODONE HYDROCHLORIDE AND ACETAMINOPHEN 1 TABLET: 10; 325 TABLET ORAL at 09:53

## 2021-01-01 RX ADMIN — CALCIUM GLUCONATE: 94 INJECTION, SOLUTION INTRAVENOUS at 19:15

## 2021-01-01 RX ADMIN — Medication 100 MG: at 08:31

## 2021-01-01 RX ADMIN — HYDROCORTISONE SODIUM SUCCINATE 50 MG: 100 INJECTION, POWDER, FOR SOLUTION INTRAMUSCULAR; INTRAVENOUS at 12:45

## 2021-01-01 RX ADMIN — INSULIN LISPRO 5 UNITS: 100 INJECTION, SOLUTION INTRAVENOUS; SUBCUTANEOUS at 13:22

## 2021-01-01 RX ADMIN — DICLOXACILLIN SODIUM 500 MG: 250 CAPSULE ORAL at 00:00

## 2021-01-01 RX ADMIN — SODIUM BICARBONATE 200 MEQ: 84 INJECTION, SOLUTION INTRAVENOUS at 22:26

## 2021-01-01 RX ADMIN — PRAVASTATIN SODIUM 20 MG: 20 TABLET ORAL at 22:33

## 2021-01-01 RX ADMIN — DICLOXACILLIN SODIUM 500 MG: 250 CAPSULE ORAL at 18:00

## 2021-01-01 RX ADMIN — HYDRALAZINE HYDROCHLORIDE 10 MG: 20 INJECTION INTRAMUSCULAR; INTRAVENOUS at 22:16

## 2021-01-01 RX ADMIN — HEPARIN SODIUM 5000 UNITS: 5000 INJECTION INTRAVENOUS; SUBCUTANEOUS at 08:18

## 2021-01-01 RX ADMIN — Medication 667 MG: at 09:27

## 2021-01-01 RX ADMIN — Medication 1 CAPSULE: at 09:36

## 2021-01-01 RX ADMIN — Medication 10 ML: at 01:19

## 2021-01-01 RX ADMIN — I.V. FAT EMULSION 500 ML: 20 EMULSION INTRAVENOUS at 18:17

## 2021-01-01 RX ADMIN — FAMOTIDINE: 10 INJECTION, SOLUTION INTRAVENOUS at 18:50

## 2021-01-01 RX ADMIN — POTASSIUM CHLORIDE 20 MEQ: 29.8 INJECTION, SOLUTION INTRAVENOUS at 09:38

## 2021-01-01 RX ADMIN — CEFTRIAXONE SODIUM 1 G: 1 INJECTION, POWDER, FOR SOLUTION INTRAMUSCULAR; INTRAVENOUS at 17:13

## 2021-01-01 RX ADMIN — Medication 667 MG: at 12:25

## 2021-01-01 RX ADMIN — Medication 10 ML: at 05:50

## 2021-01-01 RX ADMIN — HEPARIN SODIUM 5000 UNITS: 5000 INJECTION INTRAVENOUS; SUBCUTANEOUS at 01:44

## 2021-01-01 RX ADMIN — HYDRALAZINE HYDROCHLORIDE 50 MG: 50 TABLET, FILM COATED ORAL at 08:08

## 2021-01-01 RX ADMIN — METOCLOPRAMIDE 10 MG: 5 INJECTION, SOLUTION INTRAMUSCULAR; INTRAVENOUS at 19:20

## 2021-01-01 RX ADMIN — HYDRALAZINE HYDROCHLORIDE 50 MG: 50 TABLET, FILM COATED ORAL at 22:14

## 2021-01-01 RX ADMIN — HEPARIN SODIUM 5000 UNITS: 5000 INJECTION INTRAVENOUS; SUBCUTANEOUS at 02:55

## 2021-01-01 RX ADMIN — POTASSIUM CHLORIDE: 2 INJECTION, SOLUTION, CONCENTRATE INTRAVENOUS at 17:40

## 2021-01-01 RX ADMIN — HYDRALAZINE HYDROCHLORIDE 10 MG: 20 INJECTION INTRAMUSCULAR; INTRAVENOUS at 23:03

## 2021-01-01 RX ADMIN — INSULIN GLARGINE 10 UNITS: 100 INJECTION, SOLUTION SUBCUTANEOUS at 08:34

## 2021-01-01 RX ADMIN — NAFCILLIN SODIUM 2 G: 2 INJECTION, POWDER, LYOPHILIZED, FOR SOLUTION INTRAMUSCULAR; INTRAVENOUS at 23:00

## 2021-01-01 RX ADMIN — INSULIN LISPRO 3 UNITS: 100 INJECTION, SOLUTION INTRAVENOUS; SUBCUTANEOUS at 14:35

## 2021-01-01 RX ADMIN — HEPARIN SODIUM 5000 UNITS: 5000 INJECTION INTRAVENOUS; SUBCUTANEOUS at 16:59

## 2021-01-01 RX ADMIN — Medication 10 ML: at 05:52

## 2021-01-01 RX ADMIN — CALCIUM CHLORIDE, MAGNESIUM CHLORIDE, DEXTROSE MONOHYDRATE, LACTIC ACID, SODIUM CHLORIDE, SODIUM BICARBONATE AND POTASSIUM CHLORIDE: 3.68; 3.05; 22; 5.4; 6.46; 3.09; .314 INJECTION INTRAVENOUS at 20:01

## 2021-01-01 RX ADMIN — NIFEDIPINE 60 MG: 30 TABLET, FILM COATED, EXTENDED RELEASE ORAL at 12:30

## 2021-01-01 RX ADMIN — CEFTRIAXONE SODIUM 1 G: 1 INJECTION, POWDER, FOR SOLUTION INTRAMUSCULAR; INTRAVENOUS at 16:03

## 2021-01-01 RX ADMIN — OXYCODONE HYDROCHLORIDE AND ACETAMINOPHEN 1 TABLET: 5; 325 TABLET ORAL at 10:10

## 2021-01-01 RX ADMIN — CARVEDILOL 6.25 MG: 3.12 TABLET, FILM COATED ORAL at 18:39

## 2021-01-01 RX ADMIN — Medication 1 CAPSULE: at 08:35

## 2021-01-01 RX ADMIN — POTASSIUM CHLORIDE 40 MEQ: 1500 TABLET, EXTENDED RELEASE ORAL at 14:41

## 2021-01-01 RX ADMIN — INSULIN LISPRO 2 UNITS: 100 INJECTION, SOLUTION INTRAVENOUS; SUBCUTANEOUS at 12:00

## 2021-01-01 RX ADMIN — HYDRALAZINE HYDROCHLORIDE 100 MG: 50 TABLET, FILM COATED ORAL at 21:17

## 2021-01-01 RX ADMIN — ROCURONIUM BROMIDE 50 MG: 10 SOLUTION INTRAVENOUS at 14:54

## 2021-01-01 RX ADMIN — DOCUSATE SODIUM 100 MG: 100 CAPSULE, LIQUID FILLED ORAL at 20:34

## 2021-01-01 RX ADMIN — HEPARIN SODIUM 5000 UNITS: 5000 INJECTION INTRAVENOUS; SUBCUTANEOUS at 06:15

## 2021-01-01 RX ADMIN — ENOXAPARIN SODIUM 30 MG: 30 INJECTION SUBCUTANEOUS at 09:07

## 2021-01-01 RX ADMIN — SODIUM CHLORIDE 40 MG: 9 INJECTION, SOLUTION INTRAMUSCULAR; INTRAVENOUS; SUBCUTANEOUS at 08:45

## 2021-01-01 RX ADMIN — HEPARIN SODIUM 5000 UNITS: 5000 INJECTION INTRAVENOUS; SUBCUTANEOUS at 22:33

## 2021-01-01 RX ADMIN — ALBUMIN (HUMAN) 25 G: 12.5 INJECTION, SOLUTION INTRAVENOUS at 14:57

## 2021-01-01 RX ADMIN — Medication 10 ML: at 20:38

## 2021-01-01 RX ADMIN — DICLOXACILLIN SODIUM 500 MG: 250 CAPSULE ORAL at 17:00

## 2021-01-01 RX ADMIN — HYDRALAZINE HYDROCHLORIDE 10 MG: 20 INJECTION INTRAMUSCULAR; INTRAVENOUS at 15:35

## 2021-01-01 RX ADMIN — POTASSIUM CHLORIDE: 2 INJECTION, SOLUTION, CONCENTRATE INTRAVENOUS at 18:00

## 2021-01-01 RX ADMIN — CALCIUM CHLORIDE, MAGNESIUM CHLORIDE, DEXTROSE MONOHYDRATE, LACTIC ACID, SODIUM CHLORIDE, SODIUM BICARBONATE AND POTASSIUM CHLORIDE: 3.68; 3.05; 22; 5.4; 6.46; 3.09; .314 INJECTION INTRAVENOUS at 00:48

## 2021-01-01 RX ADMIN — CALCITRIOL CAPSULES 0.25 MCG 0.5 MCG: 0.25 CAPSULE ORAL at 09:54

## 2021-01-01 RX ADMIN — INSULIN GLARGINE 10 UNITS: 100 INJECTION, SOLUTION SUBCUTANEOUS at 08:31

## 2021-01-01 RX ADMIN — OXYCODONE HYDROCHLORIDE AND ACETAMINOPHEN 1 TABLET: 5; 325 TABLET ORAL at 22:14

## 2021-01-01 RX ADMIN — NAFCILLIN SODIUM 2 G: 2 INJECTION, POWDER, LYOPHILIZED, FOR SOLUTION INTRAMUSCULAR; INTRAVENOUS at 11:46

## 2021-01-01 RX ADMIN — HYDROCORTISONE SODIUM SUCCINATE 50 MG: 100 INJECTION, POWDER, FOR SOLUTION INTRAMUSCULAR; INTRAVENOUS at 17:05

## 2021-01-01 RX ADMIN — Medication 10 ML: at 16:11

## 2021-01-01 RX ADMIN — Medication 40 ML: at 22:00

## 2021-01-01 RX ADMIN — FENTANYL CITRATE 50 MCG: 50 INJECTION, SOLUTION INTRAMUSCULAR; INTRAVENOUS at 10:58

## 2021-01-01 RX ADMIN — HYDRALAZINE HYDROCHLORIDE 50 MG: 50 TABLET, FILM COATED ORAL at 10:00

## 2021-01-01 RX ADMIN — CALCIUM CHLORIDE, MAGNESIUM CHLORIDE, DEXTROSE MONOHYDRATE, LACTIC ACID, SODIUM CHLORIDE, SODIUM BICARBONATE AND POTASSIUM CHLORIDE: 3.68; 3.05; 22; 5.4; 6.46; 3.09; .314 INJECTION INTRAVENOUS at 08:10

## 2021-01-01 RX ADMIN — INSULIN LISPRO 5 UNITS: 100 INJECTION, SOLUTION INTRAVENOUS; SUBCUTANEOUS at 08:28

## 2021-01-01 RX ADMIN — Medication 10 ML: at 13:52

## 2021-01-01 RX ADMIN — Medication 667 MG: at 12:01

## 2021-01-01 RX ADMIN — HEPARIN SODIUM 5000 UNITS: 5000 INJECTION INTRAVENOUS; SUBCUTANEOUS at 16:23

## 2021-01-01 RX ADMIN — OXYCODONE HYDROCHLORIDE AND ACETAMINOPHEN 1 TABLET: 5; 325 TABLET ORAL at 02:08

## 2021-01-01 RX ADMIN — Medication 1 CAPSULE: at 09:43

## 2021-01-01 RX ADMIN — CALCITRIOL CAPSULES 0.25 MCG 0.5 MCG: 0.25 CAPSULE ORAL at 08:34

## 2021-01-01 RX ADMIN — CALCIUM CARBONATE (ANTACID) CHEW TAB 500 MG 400 MG: 500 CHEW TAB at 20:34

## 2021-01-01 RX ADMIN — OXYCODONE HYDROCHLORIDE AND ACETAMINOPHEN 1 TABLET: 10; 325 TABLET ORAL at 07:38

## 2021-01-01 RX ADMIN — INSULIN LISPRO 2 UNITS: 100 INJECTION, SOLUTION INTRAVENOUS; SUBCUTANEOUS at 06:15

## 2021-01-01 RX ADMIN — NAFCILLIN SODIUM 2 G: 2 INJECTION, POWDER, LYOPHILIZED, FOR SOLUTION INTRAMUSCULAR; INTRAVENOUS at 17:19

## 2021-01-01 RX ADMIN — PROPOFOL 20 MCG/KG/MIN: 10 INJECTION, EMULSION INTRAVENOUS at 16:33

## 2021-01-01 RX ADMIN — Medication 10 ML: at 06:47

## 2021-01-01 RX ADMIN — NIFEDIPINE 60 MG: 30 TABLET, FILM COATED, EXTENDED RELEASE ORAL at 09:30

## 2021-01-01 RX ADMIN — OXYCODONE HYDROCHLORIDE AND ACETAMINOPHEN 1 TABLET: 10; 325 TABLET ORAL at 08:57

## 2021-01-01 RX ADMIN — PIPERACILLIN AND TAZOBACTAM 3.38 G: 3; .375 INJECTION, POWDER, LYOPHILIZED, FOR SOLUTION INTRAVENOUS at 06:15

## 2021-01-01 RX ADMIN — EPOETIN ALFA-EPBX 10000 UNITS: 10000 INJECTION, SOLUTION INTRAVENOUS; SUBCUTANEOUS at 20:01

## 2021-01-01 RX ADMIN — HEPARIN SODIUM 1100 UNITS: 1000 INJECTION INTRAVENOUS; SUBCUTANEOUS at 11:05

## 2021-01-01 RX ADMIN — DEXMEDETOMIDINE HYDROCHLORIDE 6 MCG: 100 INJECTION, SOLUTION INTRAVENOUS at 09:05

## 2021-01-01 RX ADMIN — HEPARIN SODIUM 5000 UNITS: 5000 INJECTION INTRAVENOUS; SUBCUTANEOUS at 13:10

## 2021-01-01 RX ADMIN — CARVEDILOL 12.5 MG: 12.5 TABLET, FILM COATED ORAL at 09:30

## 2021-01-01 RX ADMIN — Medication 10 ML: at 00:33

## 2021-01-01 RX ADMIN — CALCIUM CARBONATE 400 MG: 500 TABLET, CHEWABLE ORAL at 08:35

## 2021-01-01 RX ADMIN — HEPARIN SODIUM 5000 UNITS: 5000 INJECTION INTRAVENOUS; SUBCUTANEOUS at 08:50

## 2021-01-01 RX ADMIN — CALCIUM CHLORIDE, MAGNESIUM CHLORIDE, DEXTROSE MONOHYDRATE, LACTIC ACID, SODIUM CHLORIDE, SODIUM BICARBONATE AND POTASSIUM CHLORIDE: 3.68; 3.05; 22; 5.4; 6.46; 3.09; .314 INJECTION INTRAVENOUS at 04:00

## 2021-01-01 RX ADMIN — IRON SUCROSE 200 MG: 20 INJECTION, SOLUTION INTRAVENOUS at 13:09

## 2021-01-01 RX ADMIN — HYDRALAZINE HYDROCHLORIDE 100 MG: 50 TABLET, FILM COATED ORAL at 10:14

## 2021-01-01 RX ADMIN — CARVEDILOL 6.25 MG: 3.12 TABLET, FILM COATED ORAL at 10:14

## 2021-01-01 RX ADMIN — HEPARIN SODIUM 5000 UNITS: 5000 INJECTION INTRAVENOUS; SUBCUTANEOUS at 23:38

## 2021-01-01 RX ADMIN — SODIUM CHLORIDE: 234 INJECTION, SOLUTION INTRAVENOUS at 15:43

## 2021-01-01 RX ADMIN — ACETAMINOPHEN 650 MG: 325 TABLET, FILM COATED ORAL at 04:27

## 2021-01-01 RX ADMIN — PIPERACILLIN AND TAZOBACTAM 3.38 G: 3; .375 INJECTION, POWDER, LYOPHILIZED, FOR SOLUTION INTRAVENOUS at 08:44

## 2021-01-01 RX ADMIN — HYDRALAZINE HYDROCHLORIDE 50 MG: 50 TABLET, FILM COATED ORAL at 08:06

## 2021-01-01 RX ADMIN — Medication 10 ML: at 13:44

## 2021-01-01 RX ADMIN — PIPERACILLIN AND TAZOBACTAM 2.25 G: 2; .25 INJECTION, POWDER, LYOPHILIZED, FOR SOLUTION INTRAVENOUS at 05:46

## 2021-01-01 RX ADMIN — Medication 10 ML: at 16:13

## 2021-01-01 RX ADMIN — PIPERACILLIN AND TAZOBACTAM 2.25 G: 2; .25 INJECTION, POWDER, LYOPHILIZED, FOR SOLUTION INTRAVENOUS at 04:17

## 2021-01-01 RX ADMIN — Medication 10 ML: at 05:01

## 2021-01-01 RX ADMIN — POTASSIUM CHLORIDE 80 MEQ: 1500 TABLET, EXTENDED RELEASE ORAL at 12:41

## 2021-01-01 RX ADMIN — VASOPRESSIN 0.04 UNITS/MIN: 20 INJECTION INTRAVENOUS at 02:05

## 2021-01-01 RX ADMIN — CARVEDILOL 6.25 MG: 3.12 TABLET, FILM COATED ORAL at 08:51

## 2021-01-01 RX ADMIN — HEPARIN SODIUM 5000 UNITS: 5000 INJECTION INTRAVENOUS; SUBCUTANEOUS at 05:56

## 2021-01-01 RX ADMIN — VASOPRESSIN 0.04 UNITS/MIN: 20 INJECTION INTRAVENOUS at 06:09

## 2021-01-01 RX ADMIN — CALCIUM CARBONATE 400 MG: 500 TABLET, CHEWABLE ORAL at 20:17

## 2021-01-01 RX ADMIN — INSULIN LISPRO 3 UNITS: 100 INJECTION, SOLUTION INTRAVENOUS; SUBCUTANEOUS at 13:19

## 2021-01-01 RX ADMIN — CALCIUM CARBONATE 400 MG: 500 TABLET, CHEWABLE ORAL at 22:16

## 2021-01-01 RX ADMIN — HYDRALAZINE HYDROCHLORIDE 50 MG: 50 TABLET, FILM COATED ORAL at 23:02

## 2021-01-01 RX ADMIN — NAFCILLIN SODIUM 2 G: 2 INJECTION, POWDER, LYOPHILIZED, FOR SOLUTION INTRAMUSCULAR; INTRAVENOUS at 10:16

## 2021-01-01 RX ADMIN — CEFTRIAXONE SODIUM 1 G: 1 INJECTION, POWDER, FOR SOLUTION INTRAMUSCULAR; INTRAVENOUS at 07:05

## 2021-01-01 RX ADMIN — OXYCODONE HYDROCHLORIDE AND ACETAMINOPHEN 1 TABLET: 5; 325 TABLET ORAL at 17:29

## 2021-01-01 RX ADMIN — OXYCODONE HYDROCHLORIDE AND ACETAMINOPHEN 1 TABLET: 10; 325 TABLET ORAL at 14:56

## 2021-01-01 RX ADMIN — ACETAMINOPHEN 650 MG: 325 TABLET ORAL at 14:28

## 2021-01-01 RX ADMIN — CALCIUM CARBONATE 400 MG: 500 TABLET, CHEWABLE ORAL at 09:06

## 2021-01-01 RX ADMIN — ROCURONIUM BROMIDE 100 MG: 10 INJECTION INTRAVENOUS at 06:14

## 2021-01-01 RX ADMIN — Medication 10 ML: at 06:01

## 2021-01-01 RX ADMIN — Medication 10 ML: at 12:39

## 2021-01-01 RX ADMIN — HYDROMORPHONE HYDROCHLORIDE 2 MG: 2 INJECTION INTRAMUSCULAR; INTRAVENOUS; SUBCUTANEOUS at 13:46

## 2021-01-01 RX ADMIN — HEPARIN SODIUM 5000 UNITS: 5000 INJECTION INTRAVENOUS; SUBCUTANEOUS at 14:29

## 2021-01-01 RX ADMIN — MAGNESIUM SULFATE HEPTAHYDRATE 1 G: 1 INJECTION, SOLUTION INTRAVENOUS at 20:54

## 2021-01-01 RX ADMIN — HYDRALAZINE HYDROCHLORIDE 10 MG: 20 INJECTION INTRAMUSCULAR; INTRAVENOUS at 22:15

## 2021-01-01 RX ADMIN — CEFTRIAXONE SODIUM 1 G: 1 INJECTION, POWDER, FOR SOLUTION INTRAMUSCULAR; INTRAVENOUS at 17:11

## 2021-01-01 RX ADMIN — HYDROCORTISONE SODIUM SUCCINATE 50 MG: 100 INJECTION, POWDER, FOR SOLUTION INTRAMUSCULAR; INTRAVENOUS at 08:28

## 2021-01-01 RX ADMIN — PRAVASTATIN SODIUM 20 MG: 20 TABLET ORAL at 21:30

## 2021-01-01 RX ADMIN — Medication 10 ML: at 23:04

## 2021-01-01 RX ADMIN — POTASSIUM CHLORIDE 20 MEQ: 29.8 INJECTION, SOLUTION INTRAVENOUS at 02:52

## 2021-01-01 RX ADMIN — NAFCILLIN SODIUM 2 G: 2 INJECTION, POWDER, LYOPHILIZED, FOR SOLUTION INTRAMUSCULAR; INTRAVENOUS at 22:16

## 2021-01-01 RX ADMIN — Medication 250 MG: at 20:37

## 2021-01-01 RX ADMIN — BISACODYL 10 MG: 10 SUPPOSITORY RECTAL at 08:32

## 2021-01-01 RX ADMIN — INSULIN GLARGINE 10 UNITS: 100 INJECTION, SOLUTION SUBCUTANEOUS at 10:13

## 2021-01-01 RX ADMIN — INSULIN LISPRO 2 UNITS: 100 INJECTION, SOLUTION INTRAVENOUS; SUBCUTANEOUS at 16:27

## 2021-01-01 RX ADMIN — DOXYCYCLINE 100 MG: 100 INJECTION, POWDER, LYOPHILIZED, FOR SOLUTION INTRAVENOUS at 12:39

## 2021-01-01 RX ADMIN — DULOXETINE HYDROCHLORIDE 60 MG: 60 CAPSULE, DELAYED RELEASE ORAL at 09:36

## 2021-01-01 RX ADMIN — CEFTRIAXONE SODIUM 1 G: 1 INJECTION, POWDER, FOR SOLUTION INTRAMUSCULAR; INTRAVENOUS at 17:59

## 2021-01-01 RX ADMIN — CARVEDILOL 6.25 MG: 3.12 TABLET, FILM COATED ORAL at 20:36

## 2021-01-01 RX ADMIN — HYDRALAZINE HYDROCHLORIDE 50 MG: 50 TABLET, FILM COATED ORAL at 17:13

## 2021-01-01 RX ADMIN — Medication 10 ML: at 05:10

## 2021-01-01 RX ADMIN — INSULIN LISPRO 7 UNITS: 100 INJECTION, SOLUTION INTRAVENOUS; SUBCUTANEOUS at 08:57

## 2021-01-01 RX ADMIN — INSULIN LISPRO 3 UNITS: 100 INJECTION, SOLUTION INTRAVENOUS; SUBCUTANEOUS at 04:07

## 2021-01-01 RX ADMIN — ACETAMINOPHEN 650 MG: 325 TABLET, FILM COATED ORAL at 22:34

## 2021-01-01 RX ADMIN — INSULIN LISPRO 3 UNITS: 100 INJECTION, SOLUTION INTRAVENOUS; SUBCUTANEOUS at 12:36

## 2021-01-26 NOTE — H&P
GENERAL GENERIC H&P/CONSULT  Presenting complaint: Scalp swelling    Subjective: 78-year-old male with past medical history of diabetes, hypertension, poor historian presents to [facility] with complaints of scalp swelling. Patient states he was in his usual state of health until few days back when he start expensing gradual onset persistent progressive swelling of his scalp, patient states that there was a bump over his posterior scalp that he scratched subsequent when she started experiencing scalp swelling, as the symptoms continue to progress patient presented to the ED. Patient denies any fevers chills nausea vomiting lightheadedness dizziness dyspnea orthopnea paroxysmal nocturnal dyspnea chest pain palpitations headache focal weakness loss of sensation auditory or visual symptoms abdominal stool or urinary complaints or any other associated symptoms. Patient endorses no recent sick contacts or travel activity    Past Medical History:   Diagnosis Date    Diabetes ([address]     Hypertension       History reviewed. No pertinent surgical history. Prior to Admission medications    Not on File   Pt does not remember his home medications, will provide copy in AM  No Known Allergies   Social History     Tobacco Use    Smoking status: Never Smoker    Smokeless tobacco: Never Used   Substance Use Topics    Alcohol use: Yes     Comment: rarely      History reviewed. No pertinent family history. Review of Systems   Constitutional: Positive for activity change, appetite change and fatigue. Negative for chills, diaphoresis, fever and unexpected weight change. HENT: Negative for congestion, dental problem, drooling, ear discharge, ear pain, facial swelling, hearing loss, mouth sores, nosebleeds, postnasal drip, rhinorrhea, sinus pressure, sinus pain, sneezing, sore throat, tinnitus, trouble swallowing and voice change.     Eyes: Negative for photophobia, pain, discharge, redness, itching and visual disturbance. Respiratory: Negative for apnea, cough, choking, chest tightness, shortness of breath, wheezing and stridor. Cardiovascular: Negative for chest pain, palpitations and leg swelling. Gastrointestinal: Negative for abdominal distention, abdominal pain, anal bleeding, blood in stool, constipation, diarrhea, nausea, rectal pain and vomiting. Endocrine: Negative for cold intolerance, heat intolerance, polydipsia, polyphagia and polyuria. Genitourinary: Negative for decreased urine volume, difficulty urinating, discharge, dysuria, enuresis, flank pain, frequency, genital sores, hematuria, penile pain, penile swelling, scrotal swelling, testicular pain and urgency. Musculoskeletal: Negative for arthralgias, back pain, gait problem, joint swelling, myalgias, neck pain and neck stiffness. Skin: Positive for rash and wound. Negative for color change and pallor. Allergic/Immunologic: Negative for environmental allergies, food allergies and immunocompromised state. Neurological: Positive for weakness. Negative for dizziness, tremors, seizures, syncope, facial asymmetry, speech difficulty, light-headedness, numbness and headaches. Hematological: Negative for adenopathy. Does not bruise/bleed easily. Psychiatric/Behavioral: Negative for agitation, behavioral problems, confusion, decreased concentration, dysphoric mood, hallucinations, self-injury, sleep disturbance and suicidal ideas. The patient is not nervous/anxious and is not hyperactive. Objective:    No intake/output data recorded. No intake/output data recorded. Patient Vitals for the past 8 hrs:   BP Temp Pulse Resp SpO2 Height Weight   01/26/21 1442 120/76 97.6 °F (36.4 °C) 70 18 100 % 6' 2\" (1.88 m) 77.1 kg (170 lb)     Physical Exam  Vitals signs reviewed. Constitutional:       General: He is not in acute distress. Appearance: Normal appearance. He is normal weight.  He is not ill-appearing, toxic-appearing or diaphoretic. HENT:      Head: Normocephalic and atraumatic. Nose: Nose normal. No congestion or rhinorrhea. Mouth/Throat:      Mouth: Mucous membranes are moist.      Pharynx: Oropharynx is clear. Eyes:      General: No scleral icterus. Right eye: No discharge. Left eye: No discharge. Extraocular Movements: Extraocular movements intact. Conjunctiva/sclera: Conjunctivae normal.      Pupils: Pupils are equal, round, and reactive to light. Neck:      Musculoskeletal: Normal range of motion and neck supple. No neck rigidity or muscular tenderness. Vascular: No carotid bruit. Cardiovascular:      Rate and Rhythm: Normal rate and regular rhythm. Pulses: Normal pulses. Heart sounds: Normal heart sounds. No murmur. No friction rub. No gallop. Pulmonary:      Effort: Pulmonary effort is normal. No respiratory distress. Breath sounds: Normal breath sounds. No stridor. No wheezing, rhonchi or rales. Chest:      Chest wall: No tenderness. Abdominal:      General: Abdomen is flat. Bowel sounds are normal. There is no distension. Palpations: Abdomen is soft. There is no mass. Tenderness: There is no abdominal tenderness. There is no right CVA tenderness, left CVA tenderness, guarding or rebound. Hernia: No hernia is present. Musculoskeletal: Normal range of motion. General: No swelling, tenderness, deformity or signs of injury. Right lower leg: No edema. Left lower leg: No edema. Lymphadenopathy:      Cervical: No cervical adenopathy. Skin:     Capillary Refill: Capillary refill takes less than 2 seconds. Coloration: Skin is not jaundiced or pale. Findings: Erythema, lesion (over scalp) and rash present. No bruising. Neurological:      General: No focal deficit present. Mental Status: He is alert and oriented to person, place, and time. Mental status is at baseline.       Cranial Nerves: No cranial nerve deficit. Sensory: No sensory deficit. Motor: No weakness. Coordination: Coordination normal.      Gait: Gait normal.      Deep Tendon Reflexes: Reflexes normal.   Psychiatric:         Mood and Affect: Mood normal.         Behavior: Behavior normal.         Thought Content: Thought content normal.         Judgment: Judgment normal.          Labs:    Recent Results (from the past 24 hour(s))   GLUCOSE, POC    Collection Time: 01/26/21  2:42 PM   Result Value Ref Range    Glucose (POC) 107 (H) 65 - 100 mg/dL    Performed by Clari Orta    CBC WITH AUTOMATED DIFF    Collection Time: 01/26/21  4:21 PM   Result Value Ref Range    WBC 18.6 (H) 4.1 - 11.1 K/uL    RBC 3.64 (L) 4.10 - 5.70 M/uL    HGB 10.6 (L) 12.1 - 17.0 g/dL    HCT 31.6 (L) 36.6 - 50.3 %    MCV 86.8 80.0 - 99.0 FL    MCH 29.1 26.0 - 34.0 PG    MCHC 33.5 30.0 - 36.5 g/dL    RDW 15.2 (H) 11.5 - 14.5 %    PLATELET 272 380 - 959 K/uL    MPV 9.9 8.9 - 12.9 FL    NEUTROPHILS 82 (H) 32 - 75 %    LYMPHOCYTES 7 (L) 12 - 49 %    MONOCYTES 9 5 - 13 %    EOSINOPHILS 1 0 - 7 %    BASOPHILS 0 0 - 1 %    IMMATURE GRANULOCYTES 1 (H) 0.0 - 0.5 %    ABS. NEUTROPHILS 15.5 (H) 1.8 - 8.0 K/UL    ABS. LYMPHOCYTES 1.3 0.8 - 3.5 K/UL    ABS. MONOCYTES 1.7 (H) 0.0 - 1.0 K/UL    ABS. EOSINOPHILS 0.1 0.0 - 0.4 K/UL    ABS. BASOPHILS 0.0 0.0 - 0.1 K/UL    ABS. IMM.  GRANS. 0.2 (H) 0.00 - 0.04 K/UL    DF AUTOMATED     METABOLIC PANEL, BASIC    Collection Time: 01/26/21  4:21 PM   Result Value Ref Range    Sodium 140 136 - 145 mmol/L    Potassium 2.7 (LL) 3.5 - 5.1 mmol/L    Chloride 113 (H) 97 - 108 mmol/L    CO2 15 (LL) 21 - 32 mmol/L    Anion gap 12 5 - 15 mmol/L    Glucose 138 (H) 65 - 100 mg/dL    BUN 46 (H) 6 - 20 mg/dL    Creatinine 3.25 (H) 0.70 - 1.30 mg/dL    BUN/Creatinine ratio 14 12 - 20      GFR est AA 23 (L) >60 ml/min/1.73m2    GFR est non-AA 19 (L) >60 ml/min/1.73m2    Calcium 5.8 (LL) 8.5 - 10.1 mg/dL       ECG: unchanged from previous tracings   CT head:IMPRESSION  Impression: Age-appropriate atrophy. No acute findings.        Patient has a soft tissue infection over the right occipital scalp. This is  shown to be fairly pronounced localized skin thickening, subcutaneous fat edema,  and some thickening of the deep soft tissues. There is no gas formation, bone  destruction, periosteal reaction or abscess    Assessment:  Active Problems:    * No active hospital problems. *      Plan:    Scalp abscesspatient presents with above-mentioned symptomatology based on patient's clinical presentation patient was found of a scalp abscess, does not meet sepsis criteria at this time and remains hemodynamically stable  Follow-up blood cultures  Follow-up wound cultures  Vancomycin and Zosyn for broad-spectrum antibiotic coverage  Infectious disease consult  Wound care consult    Hypokalemiareplete potassium and recheck potassium  Check magnesium level    Hypocalcemialikely multifactorial  Calcium gluconate 2 g x 1  Nephrology consult for evaluation    Acute kidney injurypatient was found to have an elevated serum creatinine with unclear baseline, could be multifactorial  Maintenance IV fluids  Obtain urinary electrolytes calculate fractional excretion of sodium  Nephrology consult    Hypertensionwe will consider reinitiation of hypertensive medications once patient has a list of his home medications    Diabetescurrently has a normal blood glucose level, initiate insulin sliding scale    ProphylaxisLovenox  FENcardiac diet, maintenance IV fluids, replete potassium and magnesium  Full code, will clarify about surrogate decision-maker, admitted for further management    Signed:   Albina Gaytan MD 1/26/2021

## 2021-01-26 NOTE — ED PROVIDER NOTES
EMERGENCY DEPARTMENT HISTORY AND PHYSICAL EXAM      Date: 1/26/2021  Patient Name: Mer Ware    History of Presenting Illness     Chief Complaint   Patient presents with    Abscess       History Provided By: Patient    HPI: Mer Ware, 77 y.o. male with a past medical history significant for hypertension, CKD, diabetes who presents to the ED with cc of gradually worsening abscess to right posterior scalp x4 to 5 days. Associated symptoms include pain over this area and drainage coming from this area over the weekend. Symptoms exacerbated to palpation and when patient tries to lay down to sleep. Patient reports he has had a \"bump\" in the right posterior scalp area ongoing for several years that has been unchanged until recently. No recent antibiotics. Patient denies fever, chills, chest pain, shortness of breath, nausea, vomiting, diarrhea    There are no other complaints, changes, or physical findings at this time. PCP: Ralph Baxter MD    No current facility-administered medications on file prior to encounter. No current outpatient medications on file prior to encounter. Past History     Past Medical History:  Past Medical History:   Diagnosis Date    Diabetes ([facility] [address]     Hypertension        Past Surgical History:  History reviewed. No pertinent surgical history. Family History:  History reviewed. No pertinent family history. Social History:  Social History     Tobacco Use    Smoking status: Never Smoker    Smokeless tobacco: Never Used   Substance Use Topics    Alcohol use: Yes     Comment: rarely    Drug use: Never       Allergies:  No Known Allergies      Review of Systems     Review of Systems   Constitutional: Negative for chills, fatigue and fever. HENT: Negative. Respiratory: Negative for cough, chest tightness, shortness of breath and wheezing. Cardiovascular: Negative for chest pain and palpitations.    Gastrointestinal: Negative for abdominal pain, diarrhea, nausea and vomiting. Genitourinary: Negative for frequency and urgency. Musculoskeletal: Negative for back pain, neck pain and neck stiffness. Skin: Negative for rash.        +abscess (posterior scalp)   Neurological: Negative for dizziness, weakness, light-headedness and headaches. Psychiatric/Behavioral: Negative. All other systems reviewed and are negative. Physical Exam     Physical Exam  Vitals signs and nursing note reviewed. Constitutional:       General: He is not in acute distress. Appearance: Normal appearance. He is well-developed. He is not ill-appearing, toxic-appearing or diaphoretic. Comments: Thin AA male   HENT:      Head: Normocephalic and atraumatic. Comments: R posterior scalp with large area of erythema, induration, fluctuance, and overlying TTP. Central area with purulent drainage. Diffusely excoriated, erythematous, indurated areas surrounding abscess     Nose: Nose normal. No congestion or rhinorrhea. Mouth/Throat:      Mouth: Mucous membranes are moist.      Pharynx: Oropharynx is clear. No oropharyngeal exudate or posterior oropharyngeal erythema. Eyes:      General: No scleral icterus. Conjunctiva/sclera: Conjunctivae normal.      Pupils: Pupils are equal, round, and reactive to light. Neck:      Musculoskeletal: Normal range of motion and neck supple. No neck rigidity or muscular tenderness. Cardiovascular:      Rate and Rhythm: Normal rate and regular rhythm. Pulses:           Radial pulses are 2+ on the right side and 2+ on the left side. Dorsalis pedis pulses are 2+ on the right side and 2+ on the left side. Heart sounds: No murmur. No friction rub. No gallop. Pulmonary:      Effort: Pulmonary effort is normal. No tachypnea, accessory muscle usage, respiratory distress or retractions. Breath sounds: Normal breath sounds. No stridor. No decreased breath sounds, wheezing, rhonchi or rales.    Chest:      Chest wall: No tenderness. Abdominal:      General: Bowel sounds are normal. There is no distension. Palpations: Abdomen is soft. There is no mass. Tenderness: There is no abdominal tenderness. There is no right CVA tenderness, left CVA tenderness, guarding or rebound. Musculoskeletal: Normal range of motion. General: No deformity. Right lower leg: No edema. Left lower leg: No edema. Skin:     General: Skin is warm and dry. Capillary Refill: Capillary refill takes less than 2 seconds. Coloration: Skin is not jaundiced or pale. Findings: No bruising, erythema or rash. Neurological:      General: No focal deficit present. Mental Status: He is alert and oriented to person, place, and time. Mental status is at baseline. Sensory: Sensation is intact. Motor: Motor function is intact. Psychiatric:         Mood and Affect: Mood normal.         Behavior: Behavior normal. Behavior is cooperative. Thought Content: Thought content normal.         Judgment: Judgment normal.         Lab and Diagnostic Study Results     Labs -     Recent Results (from the past 12 hour(s))   GLUCOSE, POC    Collection Time: 01/26/21  2:42 PM   Result Value Ref Range    Glucose (POC) 107 (H) 65 - 100 mg/dL    Performed by Louis Landaverde    CBC WITH AUTOMATED DIFF    Collection Time: 01/26/21  4:21 PM   Result Value Ref Range    WBC 18.6 (H) 4.1 - 11.1 K/uL    RBC 3.64 (L) 4.10 - 5.70 M/uL    HGB 10.6 (L) 12.1 - 17.0 g/dL    HCT 31.6 (L) 36.6 - 50.3 %    MCV 86.8 80.0 - 99.0 FL    MCH 29.1 26.0 - 34.0 PG    MCHC 33.5 30.0 - 36.5 g/dL    RDW 15.2 (H) 11.5 - 14.5 %    PLATELET 297 906 - 264 K/uL    MPV 9.9 8.9 - 12.9 FL    NEUTROPHILS 82 (H) 32 - 75 %    LYMPHOCYTES 7 (L) 12 - 49 %    MONOCYTES 9 5 - 13 %    EOSINOPHILS 1 0 - 7 %    BASOPHILS 0 0 - 1 %    IMMATURE GRANULOCYTES 1 (H) 0.0 - 0.5 %    ABS. NEUTROPHILS 15.5 (H) 1.8 - 8.0 K/UL    ABS. LYMPHOCYTES 1.3 0.8 - 3.5 K/UL    ABS.  MONOCYTES 1.7 (H) 0.0 - 1.0 K/UL    ABS. EOSINOPHILS 0.1 0.0 - 0.4 K/UL    ABS. BASOPHILS 0.0 0.0 - 0.1 K/UL    ABS. IMM. GRANS. 0.2 (H) 0.00 - 0.04 K/UL    DF AUTOMATED     METABOLIC PANEL, BASIC    Collection Time: 01/26/21  4:21 PM   Result Value Ref Range    Sodium 140 136 - 145 mmol/L    Potassium 2.7 (LL) 3.5 - 5.1 mmol/L    Chloride 113 (H) 97 - 108 mmol/L    CO2 15 (LL) 21 - 32 mmol/L    Anion gap 12 5 - 15 mmol/L    Glucose 138 (H) 65 - 100 mg/dL    BUN 46 (H) 6 - 20 mg/dL    Creatinine 3.25 (H) 0.70 - 1.30 mg/dL    BUN/Creatinine ratio 14 12 - 20      GFR est AA 23 (L) >60 ml/min/1.73m2    GFR est non-AA 19 (L) >60 ml/min/1.73m2    Calcium 5.8 (LL) 8.5 - 10.1 mg/dL       Radiologic Studies -   CT Results  (Last 48 hours)               01/26/21 1723  CT HEAD WO CONT Final result    Impression:  Impression: Age-appropriate atrophy. No acute findings. Patient has a soft tissue infection over the right occipital scalp. This is   shown to be fairly pronounced localized skin thickening, subcutaneous fat edema,   and some thickening of the deep soft tissues. There is no gas formation, bone   destruction, periosteal reaction or abscess       Narrative:  CT dose reduction was achieved through use of a standardized protocol tailored   for this examination and automatic exposure control for dose modulation. CT Head       History:        The sulci are prominent but symmetric. Periventricular and deep white matter   hypodensity is not unexpected for age. No acute abnormality seen gray or white   matter. Ventricles are symmetric and appropriate for atrophy. There is no   midline shift or mass effect, or evidence of hemorrhage. Bone windows show no   fracture. Medical Decision Making   - I am the first provider for this patient. - I reviewed the vital signs, available nursing notes, past medical history, past surgical history, family history and social history. - Initial assessment performed.  The patients presenting problems have been discussed, and they are in agreement with the care plan formulated and outlined with them. I have encouraged them to ask questions as they arise throughout their visit. Vital Signs-Reviewed the patient's vital signs. Patient Vitals for the past 12 hrs:   Temp Pulse Resp BP SpO2   01/26/21 1442 97.6 °F (36.4 °C) 70 18 120/76 100 %       Records Reviewed: Nursing Notes and Old Medical Records    The patient presents with scalp abscess with a differential diagnosis of abscess, cellulitis, folliculitis      ED Course:     ED Course as of Jan 26 1804   Tue Jan 26, 2021   1800 CONSULT NOTE:  Consultant: Dr. Denny Ramirez  Specialty: Hospitalist  Discussed pt's history, disposition, and available diagnostic and imaging results. Reviewed care plans. Patient will be admitted to the hospital for further management. ANTOINETTE Salmon    [NO]      ED Course User Index  [NO] ANTOINETTE Catherine       Provider Notes (Medical Decision Making):     MDM  Number of Diagnoses or Management Options  Elevated serum creatinine  Hypocalcemia  Hypokalemia  Scalp abscess  Diagnosis management comments:     56-year-old male with abscess to posterior scalp. Very poor historian. Appears very large. Discussed with ED attending who recommended CT scan and lab work. Lab work revealing leukocytosis with left shift, hypokalemia, hypocalcemia, elevated creatinine with no previous for comparison. Given how large the abscess is with leukocytosis, will admit to the hospital for IV antibiotics and further management. I&D as noted below. Wound culture pending. Blood cultures pending.   Patient agreeable with plan of care       Amount and/or Complexity of Data Reviewed  Clinical lab tests: ordered and reviewed  Tests in the radiology section of CPT®: ordered and reviewed  Discussion of test results with the performing providers: yes  Review and summarize past medical records: yes  Discuss the patient with other providers: yes  Independent visualization of images, tracings, or specimens: yes    Patient Progress  Patient progress: stable         Procedures   Medical Decision Makingedical Decision Making  Performed by: ANTOINETTE Hawley  PROCEDURES:  I&D Abcess Simple    Date/Time: 1/26/2021 5:50 PM  Performed by: ANTOINETTE Baca  Authorized by: ANTOINETTE Baca     Consent:     Consent obtained:  Verbal    Consent given by:  Patient    Risks discussed:  Bleeding, incomplete drainage, infection and pain    Alternatives discussed:  No treatment  Location:     Type:  Abscess    Location:  Head    Head location:  Scalp  Pre-procedure details:     Skin preparation:  Betadine  Anesthesia (see MAR for exact dosages): Anesthesia method:  Local infiltration    Local anesthetic:  Lidocaine 1% w/o epi  Procedure type:     Complexity:  Simple  Procedure details:     Needle aspiration: no      Incision types:  Stab incision    Incision depth:  Dermal    Scalpel blade:  11    Wound management:  Probed and deloculated    Drainage:  Purulent    Drainage amount: Moderate    Wound treatment:  Wound left open    Packing materials:  None  Post-procedure details:     Patient tolerance of procedure: Tolerated well, no immediate complications           Disposition   Disposition: Admitted to hospital      Diagnosis     Clinical Impression:   1. Scalp abscess    2. Hypokalemia    3. Hypocalcemia    4. Elevated serum creatinine        Attestations:    ANTOINETTE Hawley    Please note that this dictation was completed with [employer], the computer voice recognition software. Quite often unanticipated grammatical, syntax, homophones, and other interpretive errors are inadvertently transcribed by the computer software. Please disregard these errors. Please excuse any errors that have escaped final proofreading. Thank you.

## 2021-01-27 PROBLEM — L02.811 SCALP ABSCESS: Status: ACTIVE | Noted: 2021-01-01

## 2021-01-27 NOTE — PROGRESS NOTES
Infectious Disease Progress Note               Subjective:   Pt seen and examined at bedside. Remains in the ED awaiting transfer to the floors. Denies new complaints   Objective:   Physical Exam:     Visit Vitals  BP (!) 182/71   Pulse (!) 56   Temp 98.4 °F (36.9 °C)   Resp 18   Ht 6' 2\" (1.88 m)   Wt 170 lb (77.1 kg)   SpO2 100%   BMI 21.83 kg/m²      O2 Device: Room air    Temp (24hrs), Av.6 °F (37 °C), Min:98.4 °F (36.9 °C), Max:98.8 °F (37.1 °C)     07 -  1900  In: 100 [I.V.:100]  Out: -    No intake/output data recorded. General: NAD, AAO x 4  HEENT: NATHALIE, Moist mucosa   Lungs: CTA b/l, no wheeze/rhonchi   Heart: S1S2+, RRR, no murmur  Abdo: Soft, NT, ND, +BS   Exts: No edema, + pulses b/l   Skin: Occipital scalp lesion, purulent drainage     Data Review:       Recent Days:  Recent Labs     21  0430 21  1621   WBC 17.6* 18.6*   HGB 9.7* 10.6*   HCT 29.9* 31.6*    329     Recent Labs     21  1315 21  0430 21  1621   BUN 43* 45* 46*   CREA 2.95* 3.22* 3.25*     Microbiology   - MRSA screen is neg   - Wound Cx : Pending   - Blood Cx : Pending     Diagnostics   CXR Results  (Last 48 hours)    None         Assessment/Plan     1. Occipital scalp abscess, multiseptated lesion on scalp with purulent drainage      MRSA screen is neg, superficial wound and blood Cxs are pending       Afebrile, mild decrease in WBC on todays labs      Continue on renally dosed Vanc and Zosyn for now       Routine labs in the morning       2. Acute renal failure: Cr trending down on todays labs     3. H/o DM: A1C is pending, BS control to help w wound healing     4.  Anemia of chronic disease: Stable Hgb      Randall Traylor MD    2021

## 2021-01-27 NOTE — PROGRESS NOTES
Hospitalist Progress Note    NAME: Mer Ware   :  1945   MRN:  [id number]       Subjective:     Chief Complaint / Reason for Physician Visit  Patient seen and evaluated at bedside, currently complaining of localized pain at scalp site, overnight events reviewed. Discussed with RN events overnight. Review of Systems:  Symptom Y/N Comments  Symptom Y/N Comments   Fever/Chills N   Chest Pain N    Poor Appetite N   Edema N    Cough N   Abdominal Pain N    Sputum N   Joint Pain N    SOB/ROMEO N   Pruritis/Rash N    Nausea/vomit N   Tolerating PT/OT NA    Diarrhea N   Tolerating Diet Y    Constipation N   Other       Could NOT obtain due to:    Patient denies any fevers chills nausea vomiting lightheadedness dizziness dyspnea orthopnea paroxysmal nocturnal dyspnea chest pain palpitations headache focal weakness loss of sensation auditory or visual symptoms abdominal stool or urinary complaints or any other associated symptoms  Objective:     VITALS:   Last 24hrs VS reviewed since prior progress note. Most recent are:  Patient Vitals for the past 24 hrs:   Temp Pulse Resp BP SpO2   21 1106  60 18 (!) 178/86 99 %   21 0540 98.4 °F (36.9 °C) 93  (!) 151/72 100 %   21 0302  61  (!) 144/71 99 %   21 2240 98.8 °F (37.1 °C) (!) 57 18 (!) 140/73 97 %   21 1442 97.6 °F (36.4 °C) 70 18 120/76 100 %     No intake or output data in the 24 hours ending 21 1137     PHYSICAL EXAM:  General: WD, WN. Alert, cooperative, no acute distress    EENT:  EOMI. Anicteric sclerae. MMM  Resp:  CTA bilaterally, no wheezing or rales. No accessory muscle use  CV:  Regular  rhythm,  No edema  GI:  Soft, Non distended, Non tender. +Bowel sounds  Neurologic:  Alert and oriented X 3, normal speech,   Psych:   Good insight.  Not anxious nor agitated  Skin:  Patient has appreciable swelling with ulceration with mucopurulent discharge over posterior scalp, slight improvement from yesterday    Procedures: see electronic medical records for all procedures/Xrays and details which were not copied into this note but were reviewed prior to creation of Plan. LABS:  I reviewed today's most current labs and imaging studies. Pertinent labs include:  Recent Labs     01/27/21  0430 01/26/21  1621   WBC 17.6* 18.6*   HGB 9.7* 10.6*   HCT 29.9* 31.6*    329     Recent Labs     01/27/21  0430 01/26/21  1621 01/26/21  0830    140  --    K 3.2* 2.7*  --    * 113*  --    CO2 14* 15*  --    * 138*  --    BUN 45* 46*  --    CREA 3.22* 3.25*  --    CA 6.1* 5.8*  --    MG  --   --  1.2*   ALB 3.2*  --   --    TBILI 0.2  --   --    ALT 40  --   --        Signed: Stella Quezada MD  CT head:Patient has a soft tissue infection over the right occipital scalp. This is  shown to be fairly pronounced localized skin thickening, subcutaneous fat edema,  and some thickening of the deep soft tissues.  There is no gas formation, bone  destruction, periosteal reaction or abscess      Reviewed most current lab test results and cultures  YES  Reviewed most current radiology test results   YES  Review and summation of old records today    NO  Reviewed patient's current orders and MAR    YES  PMH/ reviewed - no change compared to H&P      Assessment / Plan:  Scalp abscesspatient presents with above-mentioned symptomatology based on patient's clinical presentation patient was found of a scalp abscess, does not meet sepsis criteria at this time and remains hemodynamically stable  Follow-up blood cultures  Follow-up wound cultures  Continue Zosyn for broad-spectrum antibiotic coverage  Infectious disease consult appreciated, will continue to follow  Wound care consult     Hypokalemiareplete potassium and recheck potassium  Check magnesium level     Hypocalcemialikely multifactorial  Calcium gluconate 2 g x 1  Nephrology consult for evaluation     Acute kidney injurypatient was found to have an elevated serum creatinine with unclear baseline, could be multifactorial, currently improving  Maintenance IV fluids  Continue to  Trend serum creatinine  Nephrology consult appreciated, will continue to follow    Hypomagnesemia - Replete Magnesium and recheck magnesium     Hypertensionwe will consider reinitiation of hypertensive medications once patient has a list of his home medications     Diabetescurrently has a normal blood glucose level, initiate insulin sliding scale     ProphylaxisLovenox  FENcardiac diet, maintenance IV fluids, replete potassium and magnesium  Full code,     Disposition -pending clinical improvement, ID recommendations    18.5 - 24.9 Normal weight / Body mass index is 21.83 kg/m². Code status: Full  Prophylaxis: Lovenox  Recommended Disposition: Home w/Family     ________________________________________________________________________  Care Plan discussed with:    Comments   Patient X    Family  X    RN X    Care Manager X    Consultant  X                     X Multidiciplinary team rounds were held today with , nursing, pharmacist and clinical coordinator. Patient's plan of care was discussed; medications were reviewed and discharge planning was addressed.      ________________________________________________________________________  Total NON critical care TIME:  35   Minutes        Comments   >50% of visit spent in counseling and coordination of care X    ________________________________________________________________________  Chuck Tate MD

## 2021-01-27 NOTE — CONSULTS
Infectious Disease Consult Note    Reason for Consult:  Scalp abscess   Date of Consultation: January 26, 2021  Date of Admission: 1/26/2021  Referring Physician: Hospitalist       HPI: 70-y.o BM presenting w occipital scalp abscess for which ID has been consulted. His medical history is significant for HTN, DM, CKD and hyperlipidemia. He reports noticing a \"bump\" on his right posterior scalp about 4 days ago which progressively worsened after irritation from scratching w associated  Pain/discomfort and drainage. He is afebrile with significant leukocytosis of 18.6 on routine labs. Cr is elevated at 3.25, and lactic acid 0.7. CT head done earlier today revealed soft tissue MR with skin thickening w/o soft tissue gas or bone reaction. Denies a history of MRSA colonization/infection. He has been started on Vanco and Zosyn. Blood and superficial wound Cxs are pending. Review of Systems:     Gen: Negative for chills, fevers, weight loss, weight gain   HEENT: Negative for headache, vision changes, ear ache or discharge   CV:  Negative for chest pain, dyspnea on exertion, leg edema   Lungs: Negative for shortness of breath, cough, wheezing   Abdomen: Negative for abdominal pain, nausea, vomiting, diarrhea, constipation   Genitourinary: Negative for genital pain or genital discharge     Neuro: Negative for headache, numbness, tingling, extremity weakness   Skin: Right posterior scalp swelling and tenderness    Musculoskeletal: Negative for joint pain, joint swelling, joint erythema    Psych: Negative for manic behavior       Past Medical History:  Past Medical History:   Diagnosis Date    Diabetes ([address]     Hypertension      Past surgical history   History reviewed. No pertinent surgical history.      Social History   Social History     Tobacco Use    Smoking status: Never Smoker    Smokeless tobacco: Never Used   Substance Use Topics    Alcohol use: Yes     Comment: rarely    Drug use: Never        Family history   History reviewed. No pertinent family history. Allergies:  No Known Allergies      Medications:  No current facility-administered medications on file prior to encounter. No current outpatient medications on file prior to encounter. Physical Exam:    Vitals:   Patient Vitals for the past 24 hrs:   Temp Pulse Resp BP SpO2   01/26/21 1442 97.6 °F (36.4 °C) 70 18 120/76 100 %   ·   · GEN: NAD, AAO x 4  · HEENT: NCAT, PERRLA  · HEART: S1, S2+, RRR, No murmur   · Lungs: CTA B/l, decreased at the bases, no wheeze/rhonchi   · Abdomen: soft, ND, NT, +BS   · Genitourinary: no genital discharge, no alonso  · Extremities: no edema  · Skin: Right posterior scalp cystic lesion w purulent drainage, tenderness, swelling     Labs:   Recent Labs     01/26/21  1621   WBC 18.6*   HGB 10.6*   HCT 31.6*        Recent Labs     01/26/21  1621   BUN 46*   CREA 3.25*       No results found for: CRPQN, CRP, CRPM   No results found for: SR       Microbiology Data:  - Blood Cx 01/26: Pending     Imaging:   CT head 01/26: Patient has a soft tissue infection over the right occipital scalp. This is  shown to be fairly pronounced localized skin thickening, subcutaneous fat edema,  and some thickening of the deep soft tissues. There is no gas formation, bone  destruction, periosteal reaction or abscess    Assessment / Plan:     1. SIRS due to Occipital scalp abscess, cystic lesion w purulent drainage       Underlying DM, unclear if controlled       Afebrile, sig leukocytosis, hemodynamically stable       Will check drainage Cx, MRSA screen       Agree w Vanc and Zosyn, may need I&D if no improvement       Routine lab in the morning     2. Acute renal failure: Baseline renal function unknown         3. H/o DM: Await A1C, BS control to help w wound healing     4.  Anemia of chronic disease: Stable Hgb     Abby Anne MD     1/26/2021

## 2021-01-27 NOTE — PROGRESS NOTES
Spiritual Care Assessment/Progress Note  [facility]      NAME: Bryant Naik      MRN: [id number]  AGE: 77 y.o.  SEX: male  Alevism Affiliation: No preference   Language: English     1/27/2021     Total Time (in minutes): 11     Spiritual Assessment begun in [facility] EMERGENCY DEPT through conversation with:         [x]Patient        [] Family    [] Friend(s)        Reason for Consult: Emergency Department visit     Spiritual beliefs: (Please include comment if needed)     [x] Identifies with a aleksey tradition:         [] Supported by a aleksey community:            [] Claims no spiritual orientation:           [] Seeking spiritual identity:                [] Adheres to an individual form of spirituality:           [] Not able to assess:                           Identified resources for coping:      [x] Prayer                               [] Music                  [] Guided Imagery     [] Family/friends                 [] Pet visits     [] Devotional reading                         [] Unknown     [] Other:                                             Interventions offered during this visit: (See comments for more details)    Patient Interventions: Affirmation of emotions/emotional suffering, Affirmation of aleksey, Catharsis/review of pertinent events in supportive environment, Coping skills reviewed/reinforced, Initial/Spiritual assessment, patient floor, Iconic (affirming the presence of God/Higher Power), Normalization of emotional/spiritual concerns, Prayer (actual), Prayer (assurance of), Alevism beliefs/image of God discussed           Plan of Care:     [] Support spiritual and/or cultural needs    [] Support AMD and/or advance care planning process      [] Support grieving process   [] Coordinate Rites and/or Rituals    [] Coordination with community clergy   [] No spiritual needs identified at this time   [] Detailed Plan of Care below (See Comments)  [] Make referral to Music Therapy  [] Make referral to Pet Therapy     [] Make referral to Addiction services  [] Make referral to [facility]  [] Make referral to Spiritual Care Partner  [] No future visits requested        [x] Follow up upon further referrals     Comments:  Visited the patient in the ED during rounds. Patient was alone during visit. Patient requested his bed to be repositioned. Per policy, I requested a medical staff to fulfill his request.  Mr. Chely Trinh shared his medical history and his overnight stay in the ED. He expressed his aleksey in God which seemed to be his source of strength and meaning. I facilitated storytelling and explored his needs and resources. I listened to Mr. Chely Trinh with empathy and reflection, affirming his aleksey in God and hope in getting better. I provided prayer per his request and advised of  availability should he sense further care. Chaplains will follow up if further referrals are requested. SANTOS [email or web address].    can be reached by calling the  at [facility]  [phone]

## 2021-01-27 NOTE — ED NOTES
Pt had multiple consecutive BP readings with SBP in the 240s. This RN notified Dr. Raf Morales. Verbal orders received via [employer].

## 2021-01-28 PROBLEM — N18.4 CHRONIC RENAL FAILURE SYNDROME, STAGE 4 (SEVERE) (HCC): Status: ACTIVE | Noted: 2021-01-01

## 2021-01-28 NOTE — CONSULTS
NAME:  Kendal Gerard   :   1945   MRN:   [id number]     ATTENDING: Lenin Harris MD  PCP:  Alfredo Orellana MD    Date/Time:  2021 9:59 PM      Subjective:   REQUESTING PHYSICIAN: dr. Barajas Nip:   Acute kidney injury, electrolyte disturbance    Mr. Farida Means is a 74-year old male with past medical history of hypertension, type 2 diabetes presented to the emergency room with complaints scalp swelling and pain. Patient was diagnosed with a abscess in his scalp, started on IV antibiotics. Initial labs showed a low acid of 2.7, calcium of 5 8, CO2 level of 14, high creatinine of 3.25, nephrology consultation is requested for further evaluation and management acute kidney injury and electrolyte disturbances. Patient was started on IV fluids, received potassium chloride 80 mEq. Patient seen in the emergency room, he is alert awake and well oriented. Unaware of any prior kidney disease. No complaint any voiding difficulties, no complaints of any swelling in his lower extremities, no history of any renal calculi, no history of NSAID use. He was not any ACE inhibitor or angiotensin receptor blocker. Patient had poor p.o. intake over last 3 to 4 days, no complaints of diarrhea or vomitings   Past Medical History:   Diagnosis Date    Diabetes ([address]     Hypertension       History reviewed. No pertinent surgical history. Social History     Tobacco Use    Smoking status: Never Smoker    Smokeless tobacco: Never Used   Substance Use Topics    Alcohol use: Yes     Comment: rarely      History reviewed. No pertinent family history. No Known Allergies   Prior to Admission medications    Medication Sig Start Date End Date Taking? Authorizing Provider   insulin glargine (Lantus Solostar U-100 Insulin) 100 unit/mL (3 mL) inpn by SubCUTAneous route nightly. Yes Provider, Historical   ferrous sulfate 325 mg (65 mg iron) tablet Take 325 mg by mouth Daily (before breakfast).    Yes Provider, Historical   DULoxetine (CYMBALTA) 60 mg capsule Take 60 mg by mouth daily. Yes Provider, Historical   NIFEdipine ER (PROCARDIA XL) 30 mg ER tablet Take 30 mg by mouth daily. Yes Provider, Historical   sodium bicarbonate 650 mg tablet Take 650 mg by mouth two (2) times a day. Yes Provider, Historical   pravastatin (PRAVACHOL) 20 mg tablet Take 20 mg by mouth daily. Yes Provider, Historical   potassium chloride (K-DUR, KLOR-CON) 20 mEq tablet Take 20 mEq by mouth two (2) times a day. Yes Provider, Historical   HYDROcodone-acetaminophen (Norco) 5-325 mg per tablet Take 2 Tabs by mouth nightly.  Indications: pain    Provider, Historical     Current Facility-Administered Medications   Medication Dose Route Frequency    0.45% sodium chloride 1,000 mL with potassium chloride 20 mEq, sodium bicarbonate (8.4%) 50 mEq infusion   IntraVENous CONTINUOUS    oxyCODONE-acetaminophen (PERCOCET) 5-325 mg per tablet 1 Tab  1 Tab Oral Q4H PRN    hydrALAZINE (APRESOLINE) 20 mg/mL injection 10 mg  10 mg IntraVENous Q4H PRN    piperacillin-tazobactam (ZOSYN) 2.25 g in 0.9% sodium chloride (MBP/ADV) 50 mL MBP  2.25 g IntraVENous Q8H    sodium chloride (NS) flush 5-40 mL  5-40 mL IntraVENous Q8H    sodium chloride (NS) flush 5-40 mL  5-40 mL IntraVENous PRN    acetaminophen (TYLENOL) tablet 650 mg  650 mg Oral Q6H PRN    Or    acetaminophen (TYLENOL) suppository 650 mg  650 mg Rectal Q6H PRN    polyethylene glycol (MIRALAX) packet 17 g  17 g Oral DAILY PRN    promethazine (PHENERGAN) tablet 12.5 mg  12.5 mg Oral Q6H PRN    Or    ondansetron (ZOFRAN) injection 4 mg  4 mg IntraVENous Q6H PRN    enoxaparin (LOVENOX) injection 30 mg  30 mg SubCUTAneous DAILY    glucose chewable tablet 16 g  4 Tab Oral PRN    dextrose (D50W) injection syrg 12.5-25 g  25-50 mL IntraVENous PRN    glucagon (GLUCAGEN) injection 1 mg  1 mg IntraMUSCular PRN    insulin lispro (HUMALOG) injection   SubCUTAneous AC&HS       REVIEW OF SYSTEMS:     Complaints as mentioned in the history of presenting illness. Patient claims generalized weakness,, poor appetite, on and off joint pains. No other significant pain on complete review of systems    Objective:   VITALS:    Visit Vitals  BP (!) 182/71   Pulse (!) 56   Temp 98.4 °F (36.9 °C)   Resp 18   Ht 6' 2\" (1.88 m)   Wt 77.1 kg (170 lb)   SpO2 100%   BMI 21.83 kg/m²     Temp (24hrs), Av.6 °F (37 °C), Min:98.4 °F (36.9 °C), Max:98.8 °F (37.1 °C)      PHYSICAL EXAM:   General: alert awake well-oriented, no acute distress. HEENT: EOMI, no Icterus, no Pallor, pupils reactive, mucosa moist, normal inspection of ears and nose, scalp swelling+  Neck: Neck is supple, No JVD, no thyromegaly,   Lungs: breathsounds normal, no respiratory distress on inspection, no rhonchi, no rales,  CVS: heart sounds normal,  no murmurs, no rubs. GI: soft, nontender, normal BS, no palpable organomegaly, no renal angle tenderness. Extremeties: no clubbing, no cyanosis, no edema,  Neuro: Alert, awake, oriented x3, speech is normal, moving all extremeties well.   Skin: normal skin turgor, scalp area swelling /pain+  Musculoskeletal: no acute joint swellings    LAB DATA REVIEWED:    Recent Results (from the past 24 hour(s))   GLUCOSE, POC    Collection Time: 21 10:08 PM   Result Value Ref Range    Glucose (POC) 135 (H) 65 - 100 mg/dL    Performed by Carola Boyce [facility]    Collection Time: 21  4:30 AM   Result Value Ref Range    Sodium 144 136 - 145 mmol/L    Potassium 3.2 (L) 3.5 - 5.1 mmol/L    Chloride 117 (H) 97 - 108 mmol/L    CO2 14 (LL) 21 - 32 mmol/L    Anion gap 13 5 - 15 mmol/L    Glucose 194 (H) 65 - 100 mg/dL    BUN 45 (H) 6 - 20 mg/dL    Creatinine 3.22 (H) 0.70 - 1.30 mg/dL    BUN/Creatinine ratio 14 12 - 20      GFR est AA 23 (L) >60 ml/min/1.73m2    GFR est non-AA 19 (L) >60 ml/min/1.73m2    Calcium 6.1 (LL) 8.5 - 10.1 mg/dL    Bilirubin, total 0.2 0.2 - 1.0 mg/dL AST (SGOT) 28 15 - 37 U/L    ALT (SGPT) 40 12 - 78 U/L    Alk. phosphatase 223 (H) 45 - 117 U/L    Protein, total 7.6 6.4 - 8.2 g/dL    Albumin 3.2 (L) 3.5 - 5.0 g/dL    Globulin 4.4 (H) 2.0 - 4.0 g/dL    A-G Ratio 0.7 (L) 1.1 - 2.2     CBC WITH AUTOMATED DIFF    Collection Time: 01/27/21  4:30 AM   Result Value Ref Range    WBC 17.6 (H) 4.1 - 11.1 K/uL    RBC 3.40 (L) 4.10 - 5.70 M/uL    HGB 9.7 (L) 12.1 - 17.0 g/dL    HCT 29.9 (L) 36.6 - 50.3 %    MCV 87.9 80.0 - 99.0 FL    MCH 28.5 26.0 - 34.0 PG    MCHC 32.4 30.0 - 36.5 g/dL    RDW 15.6 (H) 11.5 - 14.5 %    PLATELET 624 321 - 090 K/uL    MPV 10.4 8.9 - 12.9 FL    NEUTROPHILS 83 (H) 32 - 75 %    LYMPHOCYTES 7 (L) 12 - 49 %    MONOCYTES 8 5 - 13 %    EOSINOPHILS 1 0 - 7 %    BASOPHILS 0 0 - 1 %    IMMATURE GRANULOCYTES 1 (H) 0.0 - 0.5 %    ABS. NEUTROPHILS 14.8 (H) 1.8 - 8.0 K/UL    ABS. LYMPHOCYTES 1.2 0.8 - 3.5 K/UL    ABS. MONOCYTES 1.4 (H) 0.0 - 1.0 K/UL    ABS. EOSINOPHILS 0.1 0.0 - 0.4 K/UL    ABS. BASOPHILS 0.0 0.0 - 0.1 K/UL    ABS. IMM.  GRANS. 0.2 (H) 0.00 - 0.04 K/UL    DF AUTOMATED     GLUCOSE, POC    Collection Time: 01/27/21  7:04 AM   Result Value Ref Range    Glucose (POC) 242 (H) 65 - 100 mg/dL    Performed by [address] [place] [address] Dr, UR, RANDOM    Collection Time: 01/27/21  8:15 AM   Result Value Ref Range    Sodium,urine random 55 mmol/L   CREATININE, UR, RANDOM    Collection Time: 01/27/21  8:15 AM   Result Value Ref Range    Creatinine, urine 46.00 mg/dL   CHLORIDE, URINE RANDOM    Collection Time: 01/27/21  8:15 AM   Result Value Ref Range    Chloride,urine random 65 mmol/L   GLUCOSE, POC    Collection Time: 01/27/21 10:58 AM   Result Value Ref Range    Glucose (POC) 206 (H) 65 - 100 mg/dL    Performed by Marian Peterson, BASIC    Collection Time: 01/27/21  1:15 PM   Result Value Ref Range    Sodium 146 (H) 136 - 145 mmol/L    Potassium 3.4 (L) 3.5 - 5.1 mmol/L    Chloride 119 (H) 97 - 108 mmol/L    CO2 16 (L) 21 - 32 mmol/L Anion gap 11 5 - 15 mmol/L    Glucose 144 (H) 65 - 100 mg/dL    BUN 43 (H) 6 - 20 mg/dL    Creatinine 2.95 (H) 0.70 - 1.30 mg/dL    BUN/Creatinine ratio 15 12 - 20      GFR est AA 25 (L) >60 ml/min/1.73m2    GFR est non-AA 21 (L) >60 ml/min/1.73m2    Calcium 6.2 (LL) 8.5 - 10.1 mg/dL   GLUCOSE, POC    Collection Time: 01/27/21  5:29 PM   Result Value Ref Range    Glucose (POC) 222 (H) 65 - 100 mg/dL    Performed by Abida Mendoza    GLUCOSE, POC    Collection Time: 01/27/21  9:42 PM   Result Value Ref Range    Glucose (POC) 146 (H) 65 - 100 mg/dL    Performed by Malina Hauser        Assessment and plan   1. Acute Kidney Injury: probably prerenal azotemia secondary to dehydratrion/infection. Possible ATN  Non oliguric  Recommend to give IVF  Recommend to check urinalysis,urine random electrolytes, creatinine and protein. Check CPK levels to rule out rhabdomyolysis or myopathy  Will get an ultrasound of kidneys and bladder to rule out obstruction if no imporvement with IVF  Advised increased p.o. fluid intake. will continue to monitor renal functions and adjust management as needed    2. Chronic kidney disease: probably has stage 3 chronic kidney disease related to DM/HTN nephrosclerosis. Recommend to check urinalysis, urine random protein creatinine ratio to assess proteinuria. Will get phosphorus level, intact PTH level, vitamin D levels to assess the metabolic bone disease related to chronic kidney disease. Will continue to monitor renal functions to assess the baseline    3. Hypertension: Systolic hypertension , high systolic blood pressures  Recommend to add carvedilol 12.5 mg p.o. twice daily,   Will consider adding amlodipine tomorrow if no improvement in blood pressure  Will continue to monitor and blood pressures and adjust antihypertensive regimen as needed.     4. Hypokalemia: probably related to poor p.o. intake   Recommend to add potassium chloride to IV fluids  Supplement potassium and magnesium  Continue to monitor K and Mg    5. Metabolic acidosis:Secondary to acute kidney injury  nongap acidosis  Patient was given to have sodium bicarb IV now, started on IV sodium bicarb drip  Will monitor CO2 levels    6. Hypocalcemia: Probably with poor intake. Patient appears malnourished with significant electrolyte disturbances  ? ? Alcoholism  Will supplement IV calcium and monitor calcium levels    7. Scalp abscess: Continue IV antibiotics  ID following the patient  Cultures are pending, hemodynamically    8. Diabetes mellitus: stable.    check urine random protein creatinine ratio to assess for proteinuria with diabetic nephropathy  Accu-Cheks/SSI coverage      Signed: Virginia Beaulieu MD

## 2021-01-28 NOTE — PROGRESS NOTES
2 nurse skin assessment completed with [address] [place] [address]. Back of head (Scalp) has large abscess with drainage. No other open areas of skin issues noted.

## 2021-01-28 NOTE — PROGRESS NOTES
Infectious Disease Progress Note           Subjective:   Assessed pt at bedside. Reports feeling much better. Denies new complaints. Still w serousanguineous drainage from scalp wound. No antibiotic associated diarrhea   Objective:   Physical Exam:     Visit Vitals  /71 (BP 1 Location: Right upper arm, BP Patient Position: Sitting)   Pulse (!) 55   Temp 97.8 °F (36.6 °C)   Resp 18   Ht 6' 2\" (1.88 m)   Wt 170 lb (77.1 kg)   SpO2 99%   BMI 21.83 kg/m²      O2 Device: Room air    Temp (24hrs), Av.1 °F (36.7 °C), Min:97.8 °F (36.6 °C), Max:98.4 °F (36.9 °C)    No intake/output data recorded.  1901 -  0700  In: 100 [I.V.:100]  Out: -     General: NAD, AAO x 4  HEENT: NATHALIE, Moist mucosa   Lungs: CTA b/l, no wheeze/rhonchi   Heart: S1S2+, RRR, no murmur  Abdo: Soft, NT, ND, +BS   Exts: No edema, + pulses b/l   Skin: Occipital scalp lesion, purulent drainage     Data Review:       Recent Days:  Recent Labs     21  0605 21  0430 21  1621   WBC 18.8* 17.6* 18.6*   HGB 9.3* 9.7* 10.6*   HCT 28.4* 29.9* 31.6*    339 329     Recent Labs     21  0605 21  1315 21  0430   BUN 38* 43* 45*   CREA 2.82* 2.95* 3.22*     Microbiology   - MRSA screen is neg   - Wound Cx : Staph aureus   - Blood Cx : Pending     Diagnostics   CXR Results  (Last 48 hours)    None         Assessment/Plan     1. Occipital scalp abscess, multiseptated lesion on scalp with purulent drainage      MRSA screen is neg, Staph aureus pending susceptibility isolated from wound Cx       Remains afebrile, routine labs not done today       Pt not on Vanc, will start on Vanc pending susceptibility of staph aureus, continue on Zosyn for now       Greatly appreciate input from [facility], may need I&D       Routine labs in the morning       2. Acute renal failure: Cr continues to trend down on serial labs     3. H/o DM: A1C of 6.2, BS control to help w wound healing     4.  Anemia of chronic disease: Stable Hgb      Paul Ash MD    1/28/2021

## 2021-01-28 NOTE — PROGRESS NOTES
Renal Progress Note    Patient: Jake Reyna MRN: [id number]  SSN: [id number]    YOB: 1945  Age: 77 y.o. Sex: male      Admit Date: 1/26/2021    LOS: 1 day     Subjective:   Patient seen at bedside. Alert and awake, no acute distress. Patient is not giving any new complaints today. Labs showed c02 at 14 today  No complaints of any swelling in lower extremities. No complaints of shortness of breath.  No c/o doarrhea        Current Facility-Administered Medications   Medication Dose Route Frequency    carvediloL (COREG) tablet 12.5 mg  12.5 mg Oral BID    sterile water (preservative free) 1,000 mL with potassium chloride 20 mEq, sodium bicarbonate (8.4%) 100 mEq infusion   IntraVENous CONTINUOUS    linezolid in dextrose 5% (ZYVOX) IVPB premix in D5W 600 mg  600 mg IntraVENous Q12H    oxyCODONE-acetaminophen (PERCOCET) 5-325 mg per tablet 1 Tab  1 Tab Oral Q4H PRN    hydrALAZINE (APRESOLINE) 20 mg/mL injection 10 mg  10 mg IntraVENous Q4H PRN    piperacillin-tazobactam (ZOSYN) 2.25 g in 0.9% sodium chloride (MBP/ADV) 50 mL MBP  2.25 g IntraVENous Q8H    sodium chloride (NS) flush 5-40 mL  5-40 mL IntraVENous Q8H    sodium chloride (NS) flush 5-40 mL  5-40 mL IntraVENous PRN    acetaminophen (TYLENOL) tablet 650 mg  650 mg Oral Q6H PRN    Or    acetaminophen (TYLENOL) suppository 650 mg  650 mg Rectal Q6H PRN    polyethylene glycol (MIRALAX) packet 17 g  17 g Oral DAILY PRN    promethazine (PHENERGAN) tablet 12.5 mg  12.5 mg Oral Q6H PRN    Or    ondansetron (ZOFRAN) injection 4 mg  4 mg IntraVENous Q6H PRN    enoxaparin (LOVENOX) injection 30 mg  30 mg SubCUTAneous DAILY    glucose chewable tablet 16 g  4 Tab Oral PRN    dextrose (D50W) injection syrg 12.5-25 g  25-50 mL IntraVENous PRN    glucagon (GLUCAGEN) injection 1 mg  1 mg IntraMUSCular PRN    insulin lispro (HUMALOG) injection   SubCUTAneous AC&HS        Vitals:    01/28/21 0204 01/28/21 0800 01/28/21 1309 01/28/21 1626 BP: (!) 189/83 (!) 163/76 129/71 (!) 149/78   Pulse: 69 62 (!) 55 (!) 54   Resp: 18 18 18 18   Temp: 98.3 °F (36.8 °C) 98.4 °F (36.9 °C) 97.8 °F (36.6 °C) 97.9 °F (36.6 °C)   SpO2: 100% 99% 99% 100%   Weight:       Height:         Objective:   General: alert awake well-oriented, no acute distress. HEENT: EOMI, no Icterus, no Pallor, pupils reactive, mucosa moist,   Neck: Neck is supple, No JVD,   Lungs: breathsounds normal, no rhonchi, no rales,  CVS: heart sounds normal,  no murmurs, no rubs. GI: soft, nontender, normal BS,   Extremeties: no clubbing, no cyanosis, no edema,  Neuro: Alert, awake, oriented x3, speech is normal, moving all extremeties well. Skin: normal skin turgor, scalp area swelling /pain+  Musculoskeletal: no acute joint swellings       Intake and Output:  Current Shift: No intake/output data recorded. Last three shifts: 01/26 1901 - 01/28 0700  In: 100 [I.V.:100]  Out: -       Lab/Data Review:  Recent Labs     01/28/21  0605 01/27/21  0430 01/26/21  1621   WBC 18.8* 17.6* 18.6*   HGB 9.3* 9.7* 10.6*   HCT 28.4* 29.9* 31.6*    339 329     Recent Labs     01/28/21  0605 01/27/21  1315 01/27/21  0430 01/26/21  0830 01/26/21  0830   * 146* 144   < >  --    K 3.8 3.4* 3.2*   < >  --    * 119* 117*   < >  --    CO2 14* 16* 14*   < >  --    * 144* 194*   < >  --    BUN 38* 43* 45*   < >  --    CREA 2.82* 2.95* 3.22*   < >  --    CA 6.7* 6.2* 6.1*   < >  --    MG 1.6  --   --   --  1.2*   PHOS 3.0  --   --   --   --    ALB 2.7*  --  3.2*  --   --    TBILI  --   --  0.2  --   --    ALT  --   --  40  --   --     < > = values in this interval not displayed. No results for input(s): PH, PCO2, PO2, HCO3, FIO2 in the last 72 hours.   Recent Results (from the past 24 hour(s))   GLUCOSE, POC    Collection Time: 01/27/21  9:42 PM   Result Value Ref Range    Glucose (POC) 146 (H) 65 - 100 mg/dL    Performed by Kirit LATIF W/O DIFF    Collection Time: 01/28/21  6:05 AM Result Value Ref Range    WBC 18.8 (H) 4.1 - 11.1 K/uL    RBC 3.27 (L) 4.10 - 5.70 M/uL    HGB 9.3 (L) 12.1 - 17.0 g/dL    HCT 28.4 (L) 36.6 - 50.3 %    MCV 86.9 80.0 - 99.0 FL    MCH 28.4 26.0 - 34.0 PG    MCHC 32.7 30.0 - 36.5 g/dL    RDW 15.4 (H) 11.5 - 14.5 %    PLATELET 838 681 - 449 K/uL    MPV 10.0 8.9 - 12.9 FL    NRBC 0.0 0  WBC    ABSOLUTE NRBC 0.00 0.00 - 0.01 K/uL   MAGNESIUM    Collection Time: 01/28/21  6:05 AM   Result Value Ref Range    Magnesium 1.6 1.6 - 2.4 mg/dL   RENAL FUNCTION PANEL    Collection Time: 01/28/21  6:05 AM   Result Value Ref Range    Sodium 146 (H) 136 - 145 mmol/L    Potassium 3.8 3.5 - 5.1 mmol/L    Chloride 120 (H) 97 - 108 mmol/L    CO2 14 (LL) 21 - 32 mmol/L    Anion gap 12 5 - 15 mmol/L    Glucose 107 (H) 65 - 100 mg/dL    BUN 38 (H) 6 - 20 mg/dL    Creatinine 2.82 (H) 0.70 - 1.30 mg/dL    BUN/Creatinine ratio 13 12 - 20      GFR est AA 27 (L) >60 ml/min/1.73m2    GFR est non-AA 22 (L) >60 ml/min/1.73m2    Calcium 6.7 (L) 8.5 - 10.1 mg/dL    Phosphorus 3.0 2.6 - 4.7 mg/dL    Albumin 2.7 (L) 3.5 - 5.0 g/dL   CK    Collection Time: 01/28/21  6:05 AM   Result Value Ref Range     39 - 308 U/L   GLUCOSE, POC    Collection Time: 01/28/21  1:14 PM   Result Value Ref Range    Glucose (POC) 268 (H) 65 - 100 mg/dL    Performed by Belem Navarrete    LACTIC ACID    Collection Time: 01/28/21  1:20 PM   Result Value Ref Range    Lactic acid 1.2 0.4 - 2.0 mmol/L   GLUCOSE, POC    Collection Time: 01/28/21  3:18 PM   Result Value Ref Range    Glucose (POC) 121 (H) 65 - 100 mg/dL    Performed by Calli Curran and Plan:     1. Acute Kidney Injury on ? CKD: probably prerenal azotemia secondary to dehydratrion/infection. Improving creatinine 3.25 to 2.8 today with IVF  Recommend to continue IVF  No rhabdomyolysis   Will get an ultrasound of kidneys and bladder to rule out obstruction Advised increased p.o. fluid intake.   will continue to monitor renal functions and adjust management as needed    2. Chronic kidney disease: probably has stage 3 chronic kidney disease related to DM/HTN nephrosclerosis. Recommend to check urine random protein creatinine ratio to assess proteinuria. Will get phosphorus level, intact PTH level, vitamin D levels to assess the metabolic bone disease related to chronic kidney disease. Will continue to monitor renal functions to assess the baseline    3. Hypertension: Systolic hypertension , high systolic blood pressures   added carvedilol 12.5 mg p.o. twice daily, BP improved but bradycardia note with Hrs in 50s  Will decrease carvedilol to 6.25 bid, recommend to add amlodipine for high Bps if needed  Will continue to monitor and blood pressures and adjust antihypertensive regimen as needed. 4. Hypokalemia: probably related to poor p.o. intake   imporved with Supplementation potassium and magnesium  Continue to monitor K and Mg    5. Metabolic acidosis:Secondary to acute kidney injury  nongap acidosis. co2 down to 14  Given 1 amp of [place]3 today and increased bicarb in IVF  Check lactate ans ketone levels  Advised nursing staff to ensure continuous IV hydration  Will monitor CO2 levels     6. Hypocalcemia: Probably with poor intake. Patient appears malnourished with significant electrolyte disturbances  Denied any Alcoholism  Will supplement IV calcium and monitor calcium levels     7.  Scalp abscess: Continue IV antibiotics  ID following the patient  Cultures are pending, hemodynamically    8. Diabetes mellitus: stable.    check urine random protein creatinine ratio to assess for proteinuria with diabetic nephropathy  Accu-Cheks/SSI coverage        Signed By: Pedro Brandon MD     January 28, 2021

## 2021-01-28 NOTE — PROGRESS NOTES
Problem: Falls - Risk of  Goal: *Absence of Falls  Description: Document Alexey Pimentel Fall Risk and appropriate interventions in the flowsheet.   Outcome: Progressing Towards Goal  Note: Fall Risk Interventions:            Medication Interventions: Patient to call before getting OOB

## 2021-01-28 NOTE — WOUND CARE
Evaluated patient for wound care of abcess on back of scalp. Multiple openings draining serosanguinous drainage and yellow purulent. Irringate with NS. Dressing not place at this time due to pain. Order placed to irrigate with NS to remove dried drainage and replace chux under head when soiled. Will place on follow up to monitor change in treatment if needed.

## 2021-01-28 NOTE — PROGRESS NOTES
Hospitalist Progress Note               Daily Progress Note: 1/28/2021      Subjective: The patient is seen for follow  up.   78-year-old male with a history of diabetes mellitus and hypertension and chronic kidney disease came to the hospital with a complaint of having a bump on the occipital area, this is draining purulent discharge. Medications reviewed  Current Facility-Administered Medications   Medication Dose Route Frequency    carvediloL (COREG) tablet 12.5 mg  12.5 mg Oral BID    sodium bicarbonate 8.4 % (1 mEq/mL) injection 50 mEq  50 mEq IntraVENous ONCE    0.45% sodium chloride 1,000 mL with potassium chloride 20 mEq, sodium bicarbonate (8.4%) 50 mEq infusion   IntraVENous CONTINUOUS    oxyCODONE-acetaminophen (PERCOCET) 5-325 mg per tablet 1 Tab  1 Tab Oral Q4H PRN    hydrALAZINE (APRESOLINE) 20 mg/mL injection 10 mg  10 mg IntraVENous Q4H PRN    piperacillin-tazobactam (ZOSYN) 2.25 g in 0.9% sodium chloride (MBP/ADV) 50 mL MBP  2.25 g IntraVENous Q8H    sodium chloride (NS) flush 5-40 mL  5-40 mL IntraVENous Q8H    sodium chloride (NS) flush 5-40 mL  5-40 mL IntraVENous PRN    acetaminophen (TYLENOL) tablet 650 mg  650 mg Oral Q6H PRN    Or    acetaminophen (TYLENOL) suppository 650 mg  650 mg Rectal Q6H PRN    polyethylene glycol (MIRALAX) packet 17 g  17 g Oral DAILY PRN    promethazine (PHENERGAN) tablet 12.5 mg  12.5 mg Oral Q6H PRN    Or    ondansetron (ZOFRAN) injection 4 mg  4 mg IntraVENous Q6H PRN    enoxaparin (LOVENOX) injection 30 mg  30 mg SubCUTAneous DAILY    glucose chewable tablet 16 g  4 Tab Oral PRN    dextrose (D50W) injection syrg 12.5-25 g  25-50 mL IntraVENous PRN    glucagon (GLUCAGEN) injection 1 mg  1 mg IntraMUSCular PRN    insulin lispro (HUMALOG) injection   SubCUTAneous AC&HS       Review of Systems:   A comprehensive review of systems was negative except for that written in the HPI.     Objective:   Physical Exam:     Visit Vitals  BP (!) 163/76 (BP 1 Location: Right arm, BP Patient Position: At rest)   Pulse 62   Temp 98.4 °F (36.9 °C)   Resp 18   Ht 6' 2\" (1.88 m)   Wt 77.1 kg (170 lb)   SpO2 99%   BMI 21.83 kg/m²      O2 Device: Room air    Temp (24hrs), Av.2 °F (36.8 °C), Min:98 °F (36.7 °C), Max:98.4 °F (36.9 °C)    No intake/output data recorded.  1901 -  0700  In: 100 [I.V.:100]  Out: -     PHYSICAL EXAM:  General: Alert and awake  Skin: Extremities and face reveal no rashes. HEENT: Abscess on Rt occipital area with purulent discharge noticed, no ENT discharge, extraocular movements are intact  Cardiovascular: Regular rate and rhythm. No murmurs, gallops, or rubs. PMI nondisplaced. Carotids without bruits. Respiratory: Comfortable breathing with no accessory muscle use. Clear breath sounds with no wheezes, rales, or rhonchi. GI: Abdomen nondistended, soft, and nontender. Normal active bowel sounds. No enlargement of the liver or spleen. No masses palpable. Rectal: Deferred   Musculoskeletal: No pitting edema of the lower legs. Extremities have good range of motion. No costovertebral tenderness. Neurological: Gross memory appears intact. Patient is alert and oriented. Power 5.5, Cranial nerves II-XII intact  Psychiatric: Mood appears appropriate with judgement intact. Lymphatic: No cervical or supraclavicular adenopathy.     Data Review:       Recent Days:  Recent Labs     21  0605 21  0430 21  1621   WBC 18.8* 17.6* 18.6*   HGB 9.3* 9.7* 10.6*   HCT 28.4* 29.9* 31.6*    339 329     Recent Labs     21  0605 21  1315 21  0430 21  0830 21  0830   * 146* 144   < >  --    K 3.8 3.4* 3.2*   < >  --    * 119* 117*   < >  --    CO2 14* 16* 14*   < >  --    * 144* 194*   < >  --    BUN 38* 43* 45*   < >  --    CREA 2.82* 2.95* 3.22*   < >  --    CA 6.7* 6.2* 6.1*   < >  --    MG 1.6  --   --   --  1.2*   PHOS 3.0  --   --   --   --    ALB 2.7*  --  3.2*  -- --    TBILI  --   --  0.2  --   --    ALT  --   --  40  --   --     < > = values in this interval not displayed. No results for input(s): PH, PCO2, PO2, HCO3, FIO2 in the last 72 hours. Results     Procedure Component Value Units Date/Time    MRSA SCREEN - PCR (NASAL) [[id number]] Collected: 01/26/21 2000    Order Status: Completed Specimen: Swab Updated: 01/27/21 1415     MRSA by PCR, Nasal Not Detected       CULTURE, BLOOD, PAIRED [[id number]] Collected: 01/26/21 1820    Order Status: Completed Specimen: Blood Updated: 01/26/21 1835    CULTURE, Arzella Carrel STAIN [[id number]] Collected: 01/26/21 1743    Order Status: Completed Specimen: Wound Drainage Updated: 01/28/21 0655     Special Requests: No Special Requests        GRAM STAIN Few WBCs seen               2+ Gram Positive Cocci in clusters           Culture result: PENDING    CULTURE, BLOOD [[id number]]     Order Status: Canceled Specimen: Blood             CT HEAD WO CONT   Final Result   Impression: Age-appropriate atrophy. No acute findings. Patient has a soft tissue infection over the right occipital scalp. This is   shown to be fairly pronounced localized skin thickening, subcutaneous fat edema,   and some thickening of the deep soft tissues.  There is no gas formation, bone   destruction, periosteal reaction or abscess             Assessment/     Problem List:  Hospital Problems  Never Reviewed          Codes Class Noted POA    Scalp abscess ICD-10-CM: L02.811  ICD-9-CM: 682.8  1/27/2021 Unknown                     Plan:  Scalp abscesspatient presents with above-mentioned symptomatology based on patient's clinical presentation patient was found of a scalp abscess, does not meet sepsis criteria at this time and remains hemodynamically stable  Follow-up blood cultures  Follow-up wound cultures  Continue Zosyn for broad-spectrum antibiotic coverage  Infectious disease consult appreciated, will continue to follow  Wound care consult     Hypokalemiareplete potassium and recheck potassium  Check magnesium level     Hypocalcemialikely multifactorial, corrected Ca is 7.7  Calcium gluconate IV will be given  Nephrology consult appreciated     Acute kidney injurypatient was found to have an elevated serum creatinine with unclear baseline, could be multifactorial, currently improving  Maintenance IV fluids  Continue to  Trend serum creatinine  Nephrology consult appreciated, will continue to follow    Metabolic acidosis: patient is on Bicarb drip, HCO3 still low. D/w nephrology, he will adjust patients fluids, will give one dose of IV Bicarb now.      Hypomagnesemia - Resolved     Hypertension Patient is on coreg 12.5mg BID, will add amlodipine      Diabetescurrently has a normal blood glucose level, initiate insulin sliding scale     ProphylaxisLovenox  FENcardiac diet, maintenance IV fluids, replete potassium and magnesium  Full code,      Disposition -pending clinical improvement, ID recommendations     Code status: Full  Prophylaxis: Lovenox  Recommended Disposition: Home w/Family    Care Plan discussed with: Patient/Family, Nurse,  and Consultant Nephrology    Total time spent with patient: 45 minutes.     Deondre Franco MD

## 2021-01-29 NOTE — PROGRESS NOTES
Infectious Disease Progress Note           Subjective:   Remains stable, subjectively better, denies new complaints. Decreased drainage from scab ulcer  Objective:   Physical Exam:     Visit Vitals  BP (!) 156/77 (BP 1 Location: Left upper arm)   Pulse (!) 56   Temp 97.6 °F (36.4 °C)   Resp 18   Ht 6' 2\" (1.88 m)   Wt 170 lb (77.1 kg)   SpO2 99%   BMI 21.83 kg/m²      O2 Device: Room air    Temp (24hrs), Av.9 °F (36.6 °C), Min:97.6 °F (36.4 °C), Max:98.1 °F (36.7 °C)     07 -  1900  In: 470 [P.O.:470]  Out: -    1901 -  0700  In: 670 [P.O.:670]  Out: 1300 [Urine:1300]    General: NAD, AAO x 4  HEENT: NATHALIE, Moist mucosa   Lungs: CTA b/l, no wheeze/rhonchi   Heart: S1S2+, RRR, no murmur  Abdo: Soft, NT, ND, +BS   Exts: No edema, + pulses b/l   Skin: Occipital scalp lesion, purulent drainage     Data Review:       Recent Days:  Recent Labs     21  0603 21  0605 21  0430   WBC 16.1* 18.8* 17.6*   HGB 8.9* 9.3* 9.7*   HCT 26.8* 28.4* 29.9*    333 339     Recent Labs     21  0603 21  0605 21  1315   BUN 36* 38* 43*   CREA 2.90* 2.82* 2.95*     Microbiology   - MRSA screen is neg   - Wound Cx : Staph aureus   - Blood Cx : Negative    Diagnostics   CXR Results  (Last 48 hours)    None         Assessment/Plan     1. Occipital scalp abscess, multiseptated lesion on scalp with purulent drainage      Staph aureus pending susceptibility isolated from drainage Cx      Continue on Zosyn and linezolid pending final wound Cxs       Febrile, mild decrease in WBC on today's labs      Avoiding Vanc due to underlying renal failure      Routine labs in the morning. Continue routine wound care per [facility] (Appreciate recs)       I do not anticipate pt needing IV antibiotics upon d/c      2. Acute renal failure: Cr likely at baseline, seen by nephrology     3. H/o DM: A1C of 6.2, BS control to help w wound healing     4.  Anemia of chronic disease: Stable Hgb      Susan Estrella MD    1/29/2021

## 2021-01-29 NOTE — PROGRESS NOTES
Physician Progress Note      July De Los Santos  CSN #:                  [id number]  :                       1945  ADMIT DATE:       2021 3:16 PM  [address] [place] DATE:  RESPONDING  PROVIDER #:        Christy Morales MD          QUERY TEXT:    Patient admitted with SCALP ABSCESS. If possible, please document in progress notes and discharge summary if you are evaluating and /or treating any of the following: The medical record reflects the following:  Risk Factors: ACUTE RENAL FAILURE, ABSCESS, DM, CKD 3, HTN, METABOLIC ACIDOSIS  Clinical Indicators: ALB 2.7,  NEPH PN: Hypocalcemia: Probably with poor intake. Patient appears malnourished with significant electrolyte disturbances, ?? Alcoholism. CALCIUM 6.1, H&P: Constitutional: Positive for activity change, appetite change and fatigue. BMI: 21.83  Treatment: ID CONSULT, NEPH CONSULT, CARDIAC DIET, Supplement IV calcium, Advised increased p.o. fluid intake. Supplement potassium and magnesium, Patient was given to have sodium bicarb IV, IV ANTIBIOTICS, LAB MONITORING    Thank you,  ALAN VegaN, RN, CDI Specialist  [phone] or [email or web address]  Options provided:  -- Protein calorie malnutrition moderate  -- Protein calorie malnutrition severe  -- Protein calorie malnutrition mild  -- Other - I will add my own diagnosis  -- Disagree - Not applicable / Not valid  -- Disagree - Clinically unable to determine / Unknown  -- Refer to Clinical Documentation Reviewer    PROVIDER RESPONSE TEXT:    This patient has moderate protein calorie malnutrition.     Query created by: Jarod Guillen on 2021 4:21 PM      Electronically signed by:  Christy Morales MD 2021 9:41 AM

## 2021-01-29 NOTE — PROGRESS NOTES
Problem: Falls - Risk of  Goal: *Absence of Falls  Description: Document Abigail Reynolds Fall Risk and appropriate interventions in the flowsheet.   Outcome: Progressing Towards Goal  Note: Fall Risk Interventions:            Medication Interventions: Teach patient to arise slowly, Patient to call before getting OOB

## 2021-01-29 NOTE — PROGRESS NOTES
Patient was admitted to the hospital for complaints of scalp swelling. RUR score is 19%. Patient resides with his daughter Randy Hankins [phone]) in a one story house. Patient uses no DME, does not drive, and no falling. PCP is Dr. Addi Stone and had last appointment in 12/2020. Patient uses [address] Patient has no POA and has an AMD.    Decision maker is daughter Randy Hankins [phone]). Patient received HH in the past and no SNF. Patient declined [facility] and stated \"I don't need it\".

## 2021-01-29 NOTE — ACP (ADVANCE CARE PLANNING)
Advance Care Planning   Healthcare Decision Maker:   Quyen Morgan (daughter) [phone]      Click here to complete [address] including selection of the Healthcare Decision Maker Relationship (ie \"Primary\")

## 2021-01-29 NOTE — PROGRESS NOTES
Hospitalist Progress Note               Daily Progress Note: 1/29/2021      Subjective: The patient is seen for follow  up.   81-year-old male with a history of diabetes mellitus and hypertension and chronic kidney disease came to the hospital with a complaint of having a bump on the occipital area, this is draining purulent discharge.     Medications reviewed  Current Facility-Administered Medications   Medication Dose Route Frequency    sterile water (preservative free) 1,000 mL with potassium chloride 20 mEq, sodium bicarbonate (8.4%) 100 mEq infusion   IntraVENous CONTINUOUS    linezolid in dextrose 5% (ZYVOX) IVPB premix in D5W 600 mg  600 mg IntraVENous Q12H    carvediloL (COREG) tablet 6.25 mg  6.25 mg Oral BID    calcium carbonate (TUMS) chewable tablet 400 mg [elemental]  400 mg Oral BID    oxyCODONE-acetaminophen (PERCOCET) 5-325 mg per tablet 1 Tab  1 Tab Oral Q4H PRN    hydrALAZINE (APRESOLINE) 20 mg/mL injection 10 mg  10 mg IntraVENous Q4H PRN    piperacillin-tazobactam (ZOSYN) 2.25 g in 0.9% sodium chloride (MBP/ADV) 50 mL MBP  2.25 g IntraVENous Q8H    sodium chloride (NS) flush 5-40 mL  5-40 mL IntraVENous Q8H    sodium chloride (NS) flush 5-40 mL  5-40 mL IntraVENous PRN    acetaminophen (TYLENOL) tablet 650 mg  650 mg Oral Q6H PRN    Or    acetaminophen (TYLENOL) suppository 650 mg  650 mg Rectal Q6H PRN    polyethylene glycol (MIRALAX) packet 17 g  17 g Oral DAILY PRN    promethazine (PHENERGAN) tablet 12.5 mg  12.5 mg Oral Q6H PRN    Or    ondansetron (ZOFRAN) injection 4 mg  4 mg IntraVENous Q6H PRN    enoxaparin (LOVENOX) injection 30 mg  30 mg SubCUTAneous DAILY    glucose chewable tablet 16 g  4 Tab Oral PRN    dextrose (D50W) injection syrg 12.5-25 g  25-50 mL IntraVENous PRN    glucagon (GLUCAGEN) injection 1 mg  1 mg IntraMUSCular PRN    insulin lispro (HUMALOG) injection   SubCUTAneous AC&HS       Review of Systems:   A comprehensive review of systems was negative except for that written in the HPI. Objective:   Physical Exam:     Visit Vitals  BP (!) 170/82 (BP 1 Location: Left upper arm, BP Patient Position: Sitting)   Pulse (!) 56   Temp 98.1 °F (36.7 °C)   Resp 18   Ht 6' 2\" (1.88 m)   Wt 77.1 kg (170 lb)   SpO2 98%   BMI 21.83 kg/m²      O2 Device: Room air    Temp (24hrs), Av °F (36.7 °C), Min:97.8 °F (36.6 °C), Max:98.1 °F (36.7 °C)    No intake/output data recorded.  1901 -  0700  In: 670 [P.O.:670]  Out: 1300 [Urine:1300]    PHYSICAL EXAM:  General: Alert and awake  Skin: Extremities and face reveal no rashes. HEENT: Abscess on Rt occipital area with purulent discharge noticed, no ENT discharge, extraocular movements are intact  Cardiovascular: Regular rate and rhythm. No murmurs, gallops, or rubs. PMI nondisplaced. Carotids without bruits. Respiratory: Comfortable breathing with no accessory muscle use. Clear breath sounds with no wheezes, rales, or rhonchi. GI: Abdomen nondistended, soft, and nontender. Normal active bowel sounds. No enlargement of the liver or spleen. No masses palpable. Rectal: Deferred   Musculoskeletal: No pitting edema of the lower legs. Extremities have good range of motion. No costovertebral tenderness. Neurological: Gross memory appears intact. Patient is alert and oriented. Power 5.5, Cranial nerves II-XII intact  Psychiatric: Mood appears appropriate with judgement intact. Lymphatic: No cervical or supraclavicular adenopathy.     Data Review:       Recent Days:  Recent Labs     21  0603 21  0605 21  0430   WBC 16.1* 18.8* 17.6*   HGB 8.9* 9.3* 9.7*   HCT 26.8* 28.4* 29.9*    333 339     Recent Labs     21  0603 21  0605 21  1315 21  0430    146* 146* 144   K 3.7 3.8 3.4* 3.2*   * 120* 119* 117*   CO2 19* 14* 16* 14*   * 107* 144* 194*   BUN 36* 38* 43* 45*   CREA 2.90* 2.82* 2.95* 3.22*   CA 6.9* 6.7* 6.2* 6.1*   MG  --  1.6  --   --    PHOS 2.8 3.0  --   --    ALB 2.5* 2.7*  --  3.2*   TBILI  --   --   --  0.2   ALT  --   --   --  40     No results for input(s): PH, PCO2, PO2, HCO3, FIO2 in the last 72 hours. Results     Procedure Component Value Units Date/Time    MRSA SCREEN - PCR (NASAL) [[id number]] Collected: 01/26/21 2000    Order Status: Completed Specimen: Swab Updated: 01/27/21 1415     MRSA by PCR, Nasal Not Detected       CULTURE, BLOOD, PAIRED [[id number]] Collected: 01/26/21 1820    Order Status: Completed Specimen: Blood Updated: 01/26/21 1835    CULTURE, Adeola Blue STAIN [[id number]] Collected: 01/26/21 1743    Order Status: Completed Specimen: Wound Drainage Updated: 01/28/21 1341     Special Requests: No Special Requests        GRAM STAIN Few WBCs seen               2+ Gram Positive Cocci in clusters           Culture result:       Heavy Probable Staphylococcus aureus          CULTURE, BLOOD [[id number]]     Order Status: Canceled Specimen: Blood             CT HEAD WO CONT   Final Result   Impression: Age-appropriate atrophy. No acute findings. Patient has a soft tissue infection over the right occipital scalp. This is   shown to be fairly pronounced localized skin thickening, subcutaneous fat edema,   and some thickening of the deep soft tissues.  There is no gas formation, bone   destruction, periosteal reaction or abscess      US RETROPERITONEUM COMP    (Results Pending)          Assessment/     Problem List:  Hospital Problems  Never Reviewed          Codes Class Noted POA    Chronic renal failure syndrome, stage 4 (severe) ([facility]) ICD-10-CM: N18.4  ICD-9-CM: 585.4  1/28/2021 Unknown        Scalp abscess ICD-10-CM: L02.811  ICD-9-CM: 682.8  1/27/2021 Unknown                     Plan:  Scalp abscesspatient presents with above-mentioned symptomatology based on patient's clinical presentation patient was found of a scalp abscess, does not meet sepsis criteria at this time and remains hemodynamically stable  Follow-up blood cultures  Follow-up wound cultures  Continue Zosyn for broad-spectrum antibiotic coverage  Infectious disease consult appreciated, will continue to follow  Wound care consult     Hypokalemiareplete potassium and recheck potassium  Check magnesium level     Hypocalcemialikely multifactorial  Nephrology consult appreciated     Acute kidney injurypatient was found to have an elevated serum creatinine with unclear baseline, could be multifactorial, currently improving  Maintenance IV fluids  Continue to  Trend serum creatinine  Nephrology consult appreciated, will continue to follow    Metabolic acidosis: patient is on Bicarb drip, HCO3 still low. D/w nephrology, he will adjust patients fluids, will give one dose of IV Bicarb now.      Hypomagnesemia - Resolved     Hypertension Patient is on coreg 12.5mg BID, will add amlodipine      Diabetescurrently has a normal blood glucose level, initiate insulin sliding scale     ProphylaxisLovenox  FENcardiac diet, maintenance IV fluids, replete potassium and magnesium  Full code,      Disposition -pending clinical improvement, ID recommendations     Code status: Full  Prophylaxis: Lovenox  Recommended Disposition: Home w/Family    Care Plan discussed with: Patient/Family, Nurse,  and Consultant Nephrology    Total time spent with patient: 45 minutes.     Sincere Ace MD

## 2021-01-29 NOTE — PROGRESS NOTES
Renal Progress Note    Patient: Caitlin Rodriguez MRN: [id number]  SSN: [id number]    YOB: 1945  Age: 77 y.o. Sex: male      Admit Date: 1/26/2021    LOS: 2 days     Subjective:   Patient seen at bedside. Alert and awake, no acute distress. Patient is having some pain and discharge from scal abscess site  improfved CO2 to 19 today   No complaints of any swelling in lower extremities. No complaints of shortness of breath.  No c/o doarrhea        Current Facility-Administered Medications   Medication Dose Route Frequency    sterile water (preservative free) 1,000 mL with potassium chloride 20 mEq, sodium bicarbonate (8.4%) 100 mEq infusion   IntraVENous CONTINUOUS    linezolid in dextrose 5% (ZYVOX) IVPB premix in D5W 600 mg  600 mg IntraVENous Q12H    carvediloL (COREG) tablet 6.25 mg  6.25 mg Oral BID    calcium carbonate (TUMS) chewable tablet 400 mg [elemental]  400 mg Oral BID    oxyCODONE-acetaminophen (PERCOCET) 5-325 mg per tablet 1 Tab  1 Tab Oral Q4H PRN    hydrALAZINE (APRESOLINE) 20 mg/mL injection 10 mg  10 mg IntraVENous Q4H PRN    piperacillin-tazobactam (ZOSYN) 2.25 g in 0.9% sodium chloride (MBP/ADV) 50 mL MBP  2.25 g IntraVENous Q8H    sodium chloride (NS) flush 5-40 mL  5-40 mL IntraVENous Q8H    sodium chloride (NS) flush 5-40 mL  5-40 mL IntraVENous PRN    acetaminophen (TYLENOL) tablet 650 mg  650 mg Oral Q6H PRN    Or    acetaminophen (TYLENOL) suppository 650 mg  650 mg Rectal Q6H PRN    polyethylene glycol (MIRALAX) packet 17 g  17 g Oral DAILY PRN    promethazine (PHENERGAN) tablet 12.5 mg  12.5 mg Oral Q6H PRN    Or    ondansetron (ZOFRAN) injection 4 mg  4 mg IntraVENous Q6H PRN    enoxaparin (LOVENOX) injection 30 mg  30 mg SubCUTAneous DAILY    glucose chewable tablet 16 g  4 Tab Oral PRN    dextrose (D50W) injection syrg 12.5-25 g  25-50 mL IntraVENous PRN    glucagon (GLUCAGEN) injection 1 mg  1 mg IntraMUSCular PRN    insulin lispro (HUMALOG) injection SubCUTAneous AC&HS        Vitals:    01/28/21 2011 01/29/21 0319 01/29/21 0700 01/29/21 1302   BP: (!) 155/80 (!) 168/81 (!) 170/82 (!) 156/77   Pulse: 60 (!) 56 (!) 56 (!) 56   Resp: 18 18 18 18   Temp: 98 °F (36.7 °C) 98 °F (36.7 °C) 98.1 °F (36.7 °C) 97.6 °F (36.4 °C)   SpO2: 100% 100% 98% 99%   Weight:       Height:         Objective:   General: alert awake well-oriented, no acute distress. HEENT: EOMI, no Icterus, no Pallor, pupils reactive, mucosa moist,   Neck: Neck is supple, No JVD,   Lungs: breathsounds normal, no rhonchi, no rales,  CVS: heart sounds normal,  no murmurs, no rubs. GI: soft, nontender, normal BS,   Extremeties: no clubbing, no cyanosis, no edema,  Neuro: Alert, awake, oriented x3, speech is normal, moving all extremeties well. Skin: normal skin turgor, scalp area swelling /pain+  Musculoskeletal: no acute joint swellings       Intake and Output:  Current Shift: 01/29 0701 - 01/29 1900  In: 470 [P.O.:470]  Out: -   Last three shifts: 01/27 1901 - 01/29 0700  In: 670 [P.O.:670]  Out: 1300 [Urine:1300]      Lab/Data Review:  Recent Labs     01/29/21  0603 01/28/21  0605 01/27/21  0430   WBC 16.1* 18.8* 17.6*   HGB 8.9* 9.3* 9.7*   HCT 26.8* 28.4* 29.9*    333 339     Recent Labs     01/29/21  0603 01/28/21  1956 01/28/21  0605 01/27/21  1315 01/27/21  0430     --  146* 146* 144   K 3.7  --  3.8 3.4* 3.2*   *  --  120* 119* 117*   CO2 19*  --  14* 16* 14*   *  --  107* 144* 194*   BUN 36*  --  38* 43* 45*   CREA 2.90*  --  2.82* 2.95* 3.22*   CA 6.9* 6.4* 6.7* 6.2* 6.1*   MG  --   --  1.6  --   --    PHOS 2.8  --  3.0  --   --    ALB 2.5*  --  2.7*  --  3.2*   TBILI  --   --   --   --  0.2   ALT  --   --   --   --  40     No results for input(s): PH, PCO2, PO2, HCO3, FIO2 in the last 72 hours.   Recent Results (from the past 24 hour(s))   PTH INTACT    Collection Time: 01/28/21  7:56 PM   Result Value Ref Range    Calcium 6.4 (L) 8.5 - 10.1 mg/dL    PTH, Intact 352.6 (H) 18.4 - 88.0 pg/mL   GLUCOSE, POC    Collection Time: 01/28/21  7:58 PM   Result Value Ref Range    Glucose (POC) 165 (H) 65 - 100 mg/dL    Performed by ALY CORCORAN    CBC WITH AUTOMATED DIFF    Collection Time: 01/29/21  6:03 AM   Result Value Ref Range    WBC 16.1 (H) 4.1 - 11.1 K/uL    RBC 3.10 (L) 4.10 - 5.70 M/uL    HGB 8.9 (L) 12.1 - 17.0 g/dL    HCT 26.8 (L) 36.6 - 50.3 %    MCV 86.5 80.0 - 99.0 FL    MCH 28.7 26.0 - 34.0 PG    MCHC 33.2 30.0 - 36.5 g/dL    RDW 15.4 (H) 11.5 - 14.5 %    PLATELET 894 480 - 953 K/uL    MPV 10.1 8.9 - 12.9 FL    NEUTROPHILS 82 (H) 32 - 75 %    LYMPHOCYTES 7 (L) 12 - 49 %    MONOCYTES 8 5 - 13 %    EOSINOPHILS 2 0 - 7 %    BASOPHILS 0 0 - 1 %    IMMATURE GRANULOCYTES 1 (H) 0.0 - 0.5 %    ABS. NEUTROPHILS 13.3 (H) 1.8 - 8.0 K/UL    ABS. LYMPHOCYTES 1.1 0.8 - 3.5 K/UL    ABS. MONOCYTES 1.3 (H) 0.0 - 1.0 K/UL    ABS. EOSINOPHILS 0.3 0.0 - 0.4 K/UL    ABS. BASOPHILS 0.1 0.0 - 0.1 K/UL    ABS. IMM.  GRANS. 0.2 (H) 0.00 - 0.04 K/UL    DF AUTOMATED     RENAL FUNCTION PANEL    Collection Time: 01/29/21  6:03 AM   Result Value Ref Range    Sodium 140 136 - 145 mmol/L    Potassium 3.7 3.5 - 5.1 mmol/L    Chloride 112 (H) 97 - 108 mmol/L    CO2 19 (L) 21 - 32 mmol/L    Anion gap 9 5 - 15 mmol/L    Glucose 172 (H) 65 - 100 mg/dL    BUN 36 (H) 6 - 20 mg/dL    Creatinine 2.90 (H) 0.70 - 1.30 mg/dL    BUN/Creatinine ratio 12 12 - 20      GFR est AA 26 (L) >60 ml/min/1.73m2    GFR est non-AA 21 (L) >60 ml/min/1.73m2    Calcium 6.9 (L) 8.5 - 10.1 mg/dL    Phosphorus 2.8 2.6 - 4.7 mg/dL    Albumin 2.5 (L) 3.5 - 5.0 g/dL   GLUCOSE, POC    Collection Time: 01/29/21  8:02 AM   Result Value Ref Range    Glucose (POC) 171 (H) 65 - 100 mg/dL    Performed by Milagros Osullivan    GLUCOSE, POC    Collection Time: 01/29/21  1:06 PM   Result Value Ref Range    Glucose (POC) 289 (H) 65 - 100 mg/dL    Performed by [address], POC    Collection Time: 01/29/21  2:54 PM   Result Value Ref Range    Glucose (POC) 229 (H) 65 - 100 mg/dL    Performed by VIANCA ZIEGLER         Assessment and Plan:     1. Acute Kidney Injury on ? CKD: probably prerenal azotemia secondary to dehydratrion/infection. Improving creatinine 3.25 to 2.8--2.9 today with IVF  Recommend to continue IVF  No rhabdomyolysis   ultrasound of kidneys and bladder is negative for obstruction Advised increased p.o. fluid intake. will continue to monitor renal functions and adjust management as needed    2. Chronic kidney disease: probably has stage 3/4 chronic kidney disease related to DM/HTN nephrosclerosis. Renal functions probably at baseline  Will continue to monitor renal functions to assess the baseline    Recommend to check urine random protein creatinine ratio to assess proteinuria. Renal osteodystrophy: Hypocalcemia present, improving with supplementation  Phosphorus level is normal  Severe secondary hyperparathyroidism noted, recommend to add calcitriol 0.5 mcg once daily  Vitamin D level is pending      3. Hypertension: Systolic hypertension , high systolic blood pressures  decreased carvedilol to 6.25 bid, recommend to add amlodipine 5mg po daily  Will continue to monitor and blood pressures and adjust antihypertensive regimen as needed. 4. Hypokalemia: probably related to poor p.o. intake   imporved with Supplementation potassium and magnesium  Continue to monitor K and Mg    5. Metabolic acidosis:Secondary to acute kidney injury  nongap acidosis. co2 improved from 14-19 with sodium bicarbonate drip  Recommend to continue sodium bicarb drip for 1 more day  Will monitor CO2 levels     6. Scalp abscess: Continue IV antibiotics  ID following the patient  Cultures are pending, hemodynamically    8. Diabetes mellitus: stable.    check urine random protein creatinine ratio to assess for proteinuria with diabetic nephropathy  Accu-Cheks/SSI coverage        Signed By: Laci Heredia MD     January 29, 2021

## 2021-01-30 NOTE — PROGRESS NOTES
Progress Note    Patient: Susan Lino MRN: [id number]  SSN: [id number]    YOB: 1945  Age: 77 y.o. Sex: male      Admit Date: 1/26/2021    LOS: 3 days     Subjective:   Patient seen in follow-up for posterior scalp carbuncle  Patient seen in bed  Complains of some pain at the site  Reports he is having ongoing drainage    Objective:     Vitals:    01/29/21 1302 01/29/21 2004 01/30/21 0231 01/30/21 0830   BP: (!) 156/77 (!) 160/84 (!) 185/95 (!) 175/83   Pulse: (!) 56 74 70 60   Resp: 18 17 16 16   Temp: 97.6 °F (36.4 °C) 97.9 °F (36.6 °C) 98.3 °F (36.8 °C) 97.7 °F (36.5 °C)   SpO2: 99% 99% 98% 98%   Weight:       Height:            Intake and Output:  Current Shift: No intake/output data recorded. Last three shifts: 01/28 1901 - 01/30 0700  In: 1920 [P.O.:1220;  I.V.:700]  Out: 700 [Urine:700]    Review of Systems:  ROS     Physical Exam:   Posterior scalp area is draining spontaneously, area washed and dressing applied with Medihoney and Exufiber Ag    Lab/Data Review:  Recent Results (from the past 24 hour(s))   GLUCOSE, POC    Collection Time: 01/29/21  1:06 PM   Result Value Ref Range    Glucose (POC) 289 (H) 65 - 100 mg/dL    Performed by Holden Kilpatrick, POC    Collection Time: 01/29/21  2:54 PM   Result Value Ref Range    Glucose (POC) 229 (H) 65 - 100 mg/dL    Performed by VIANCA ZIEGLER    GLUCOSE, POC    Collection Time: 01/29/21  9:29 PM   Result Value Ref Range    Glucose (POC) 193 (H) 65 - 100 mg/dL    Performed by Moriah Higgins    GLUCOSE, POC    Collection Time: 01/30/21  8:25 AM   Result Value Ref Range    Glucose (POC) 156 (H) 65 - 100 mg/dL    Performed by Vonda Ngo           Assessment:     Active Problems:    Scalp abscess (1/27/2021)      Chronic renal failure syndrome, stage 4 (severe) ([facility] (1/28/2021)        Plan:    continue current IV antimicrobials pending speciation of staph, my suspicion is MRSA  Medihoney dressing with Exufiber Ag dressing change twice daily, area to be wash twice daily between dressing changes  No need for urgent surgical intervention at this time as area spontaneously draining    Signed By: Hermelinda Galvez MD     January 30, 2021

## 2021-01-30 NOTE — CONSULTS
Consult Date: 1/29/2021    Consults    Subjective   The patient is a 15-year-old gentleman with a past medical history of diabetes, hypertension, CKD who was admitted to the hospital 3 days ago after presents emergency department with complaints of posterior scalp swelling. At the time of admission he gave a history of there being a small bump which he scratched at and subsequently it became larger and started swelling. Since admission he has been started on Zosyn and linezolid. Surgery is now consulted for possible incision and drainage. Today  the patient states that the site has actually decreased in size. White blood cell count has decreased slightly from 18,600-16,000. Past Medical History:   Diagnosis Date    Diabetes ([facility] [address]     Hypertension       History reviewed. No pertinent surgical history. History reviewed. No pertinent family history.    Social History     Tobacco Use    Smoking status: Never Smoker    Smokeless tobacco: Never Used   Substance Use Topics    Alcohol use: Yes     Comment: rarely       Current Facility-Administered Medications   Medication Dose Route Frequency Provider Last Rate Last Admin    calcitRIOL (ROCALTROL) capsule 0.5 mcg  0.5 mcg Oral DAILY Jose Agudelo MD   0.5 mcg at 01/29/21 1729    amLODIPine (NORVASC) tablet 5 mg  5 mg Oral DAILY Jose Agudelo MD   5 mg at 01/29/21 1729    sterile water (preservative free) 1,000 mL with potassium chloride 20 mEq, sodium bicarbonate (8.4%) 100 mEq infusion   IntraVENous CONTINUOUS Jose Agudelo  mL/hr at 01/29/21 1740 New Bag at 01/29/21 1740    linezolid in dextrose 5% (ZYVOX) IVPB premix in D5W 600 mg  600 mg IntraVENous Q12H Sabine Perez MD   600 mg at 01/29/21 1011    carvediloL (COREG) tablet 6.25 mg  6.25 mg Oral BID Jose Agudelo MD   6.25 mg at 01/29/21 1012    calcium carbonate (TUMS) chewable tablet 400 mg [elemental]  400 mg Oral BID Jose Agudelo MD   400 mg at 01/29/21 1012    oxyCODONE-acetaminophen (PERCOCET) 5-325 mg per tablet 1 Tab  1 Tab Oral Q4H PRN Jose Luis Perez MD   1 Tab at 01/29/21 1729    hydrALAZINE (APRESOLINE) 20 mg/mL injection 10 mg  10 mg IntraVENous Q4H PRN Jose Luis Perez MD   10 mg at 01/29/21 0326    piperacillin-tazobactam (ZOSYN) 2.25 g in 0.9% sodium chloride (MBP/ADV) 50 mL MBP  2.25 g IntraVENous Susan Carmichael  mL/hr at 01/29/21 1729 2.25 g at 01/29/21 1729    sodium chloride (NS) flush 5-40 mL  5-40 mL IntraVENous Susan Carmichael MD   10 mL at 01/29/21 1400    sodium chloride (NS) flush 5-40 mL  5-40 mL IntraVENous PRN Jose Luis Perez MD        acetaminophen (TYLENOL) tablet 650 mg  650 mg Oral Q6H PRN Jose Luis Perez MD   650 mg at 01/27/21 1144    Or    acetaminophen (TYLENOL) suppository 650 mg  650 mg Rectal Q6H PRN Jose Luis Perez MD        polyethylene glycol [facility]) packet 17 g  17 g Oral DAILY PRN Jose Luis Perez MD        promethazine (PHENERGAN) tablet 12.5 mg  12.5 mg Oral Q6H PRN Jose Luis Perez MD        Or    ondansetron [facility]) injection 4 mg  4 mg IntraVENous Q6H PRN Jose Luis Perez MD        enoxaparin (LOVENOX) injection 30 mg  30 mg SubCUTAneous DAILY Kardar, Leopold Levee, MD   30 mg at 01/29/21 1012    glucose chewable tablet 16 g  4 Tab Oral PRN Jose Luis Perez MD        dextrose (D50W) injection syrg 12.5-25 g  25-50 mL IntraVENous PRN Jose Luis Perez MD        glucagon [facility]) injection 1 mg  1 mg IntraMUSCular PRN Jose Luis Perez MD        insulin lispro (HUMALOG) injection   SubCUTAneous AC&HS Jose Luis Perez MD   3 Units at 01/29/21 1630        Review of Systems   All other systems reviewed and are negative.       Objective     Vital signs for last 24 hours:  Visit Vitals  BP (!) 160/84   Pulse 74   Temp 97.9 °F (36.6 °C)   Resp 17   Ht 6' 2\" (1.88 m)   Wt 170 lb (77.1 kg)   SpO2 99%   BMI 21.83 kg/m²       Intake/Output this shift:  Current Shift: No intake/output data recorded. Last 3 Shifts: 01/28 0701 - 01/29 1900  In: 1390 [P.O.:1390]  Out: 1300 [Urine:1300]    Data Review:   Recent Results (from the past 24 hour(s))   CBC WITH AUTOMATED DIFF    Collection Time: 01/29/21  6:03 AM   Result Value Ref Range    WBC 16.1 (H) 4.1 - 11.1 K/uL    RBC 3.10 (L) 4.10 - 5.70 M/uL    HGB 8.9 (L) 12.1 - 17.0 g/dL    HCT 26.8 (L) 36.6 - 50.3 %    MCV 86.5 80.0 - 99.0 FL    MCH 28.7 26.0 - 34.0 PG    MCHC 33.2 30.0 - 36.5 g/dL    RDW 15.4 (H) 11.5 - 14.5 %    PLATELET 477 867 - 722 K/uL    MPV 10.1 8.9 - 12.9 FL    NEUTROPHILS 82 (H) 32 - 75 %    LYMPHOCYTES 7 (L) 12 - 49 %    MONOCYTES 8 5 - 13 %    EOSINOPHILS 2 0 - 7 %    BASOPHILS 0 0 - 1 %    IMMATURE GRANULOCYTES 1 (H) 0.0 - 0.5 %    ABS. NEUTROPHILS 13.3 (H) 1.8 - 8.0 K/UL    ABS. LYMPHOCYTES 1.1 0.8 - 3.5 K/UL    ABS. MONOCYTES 1.3 (H) 0.0 - 1.0 K/UL    ABS. EOSINOPHILS 0.3 0.0 - 0.4 K/UL    ABS. BASOPHILS 0.1 0.0 - 0.1 K/UL    ABS. IMM.  GRANS. 0.2 (H) 0.00 - 0.04 K/UL    DF AUTOMATED     RENAL FUNCTION PANEL    Collection Time: 01/29/21  6:03 AM   Result Value Ref Range    Sodium 140 136 - 145 mmol/L    Potassium 3.7 3.5 - 5.1 mmol/L    Chloride 112 (H) 97 - 108 mmol/L    CO2 19 (L) 21 - 32 mmol/L    Anion gap 9 5 - 15 mmol/L    Glucose 172 (H) 65 - 100 mg/dL    BUN 36 (H) 6 - 20 mg/dL    Creatinine 2.90 (H) 0.70 - 1.30 mg/dL    BUN/Creatinine ratio 12 12 - 20      GFR est AA 26 (L) >60 ml/min/1.73m2    GFR est non-AA 21 (L) >60 ml/min/1.73m2    Calcium 6.9 (L) 8.5 - 10.1 mg/dL    Phosphorus 2.8 2.6 - 4.7 mg/dL    Albumin 2.5 (L) 3.5 - 5.0 g/dL   VITAMIN D, 25 HYDROXY    Collection Time: 01/29/21  6:03 AM   Result Value Ref Range    Vitamin D 25-Hydroxy <9.0 (L) 30 - 100 ng/mL   GLUCOSE, POC    Collection Time: 01/29/21  8:02 AM   Result Value Ref Range    Glucose (POC) 171 (H) 65 - 100 mg/dL    Performed by Carol Adams, POC    Collection Time: 01/29/21  1:06 PM   Result Value Ref Range    Glucose (POC) 289 (H) 65 - 100 mg/dL    Performed by Carol Adams, POC    Collection Time: 01/29/21  2:54 PM   Result Value Ref Range    Glucose (POC) 229 (H) 65 - 100 mg/dL    Performed by VIANCA ZIEGLER        Physical Exam  Constitutional:       General: He is not in acute distress. Appearance: Normal appearance. He is not ill-appearing. HENT:      Head: Normocephalic. Neck:      Musculoskeletal: Normal range of motion and neck supple. Cardiovascular:      Rate and Rhythm: Normal rate and regular rhythm. Pulmonary:      Breath sounds: Normal breath sounds. Abdominal:      General: There is no distension. Palpations: Abdomen is soft. Tenderness: There is no abdominal tenderness. Musculoskeletal: Normal range of motion. Skin:     General: Skin is warm. Neurological:      Mental Status: He is alert. Mental status is at baseline. Psychiatric:         Mood and Affect: Mood normal.         Thought Content: Thought content normal.         Judgment: Judgment normal.     Assessment and plan:  67-year-old male with a form colitis of the posterior scalp which is evolved into a carbuncle. There are several draining sinuses. It has decreased in size according to the patient. I recommend continued antibiotics. From my assessment and also most likely this is MRSA. Will reevaluate in a.m. to see if the patient needs any surgical drainage.

## 2021-01-30 NOTE — PROGRESS NOTES
Renal Progress Note    Patient: Jose Mckeon MRN: [id number]  SSN: [id number]    YOB: 1945  Age: 77 y.o. Sex: male      Admit Date: 1/26/2021    LOS: 3 days     Subjective:   Patient seen at bedside. Alert and awake, no acute distress. improved CO2 to 24 today   No complaints of any swelling in lower extremities. No complaints of shortness of breath.  No c/o doarrhea        Current Facility-Administered Medications   Medication Dose Route Frequency    calcitRIOL (ROCALTROL) capsule 0.5 mcg  0.5 mcg Oral DAILY    amLODIPine (NORVASC) tablet 5 mg  5 mg Oral DAILY    sterile water (preservative free) 1,000 mL with potassium chloride 20 mEq, sodium bicarbonate (8.4%) 100 mEq infusion   IntraVENous CONTINUOUS    linezolid in dextrose 5% (ZYVOX) IVPB premix in D5W 600 mg  600 mg IntraVENous Q12H    carvediloL (COREG) tablet 6.25 mg  6.25 mg Oral BID    calcium carbonate (TUMS) chewable tablet 400 mg [elemental]  400 mg Oral BID    oxyCODONE-acetaminophen (PERCOCET) 5-325 mg per tablet 1 Tab  1 Tab Oral Q4H PRN    hydrALAZINE (APRESOLINE) 20 mg/mL injection 10 mg  10 mg IntraVENous Q4H PRN    piperacillin-tazobactam (ZOSYN) 2.25 g in 0.9% sodium chloride (MBP/ADV) 50 mL MBP  2.25 g IntraVENous Q8H    sodium chloride (NS) flush 5-40 mL  5-40 mL IntraVENous Q8H    sodium chloride (NS) flush 5-40 mL  5-40 mL IntraVENous PRN    acetaminophen (TYLENOL) tablet 650 mg  650 mg Oral Q6H PRN    Or    acetaminophen (TYLENOL) suppository 650 mg  650 mg Rectal Q6H PRN    polyethylene glycol (MIRALAX) packet 17 g  17 g Oral DAILY PRN    promethazine (PHENERGAN) tablet 12.5 mg  12.5 mg Oral Q6H PRN    Or    ondansetron (ZOFRAN) injection 4 mg  4 mg IntraVENous Q6H PRN    enoxaparin (LOVENOX) injection 30 mg  30 mg SubCUTAneous DAILY    glucose chewable tablet 16 g  4 Tab Oral PRN    dextrose (D50W) injection syrg 12.5-25 g  25-50 mL IntraVENous PRN    glucagon (GLUCAGEN) injection 1 mg  1 mg IntraMUSCular PRN    insulin lispro (HUMALOG) injection   SubCUTAneous AC&HS        Vitals:    01/30/21 0231 01/30/21 0830 01/30/21 1530 01/30/21 1627   BP: (!) 185/95 (!) 175/83 (!) 172/80 (!) 150/78   Pulse: 70 60 63    Resp: 16 16 16    Temp: 98.3 °F (36.8 °C) 97.7 °F (36.5 °C) 97.9 °F (36.6 °C)    SpO2: 98% 98% 99%    Weight:       Height:         Objective:   General: alert awake well-oriented, no acute distress. HEENT: EOMI, no Icterus, no Pallor, pupils reactive, mucosa moist,   Neck: Neck is supple, No JVD,   Lungs: breathsounds normal, no rhonchi, no rales,  CVS: heart sounds normal,  no murmurs, no rubs. GI: soft, nontender, normal BS,   Extremeties: no clubbing, no cyanosis, no edema,  Neuro: Alert, awake, oriented x3, speech is normal, moving all extremeties well. Skin: normal skin turgor, scalp area swelling /pain+  Musculoskeletal: no acute joint swellings       Intake and Output:  Current Shift: No intake/output data recorded. Last three shifts: 01/28 1901 - 01/30 0700  In: 1920 [P.O.:1220; I.V.:700]  Out: 700 [Urine:700]      Lab/Data Review:  Recent Labs     01/29/21  0603 01/28/21  0605   WBC 16.1* 18.8*   HGB 8.9* 9.3*   HCT 26.8* 28.4*    333     Recent Labs     01/30/21  1015 01/29/21  0603 01/28/21  1956 01/28/21  0605    140  --  146*   K 4.0 3.7  --  3.8    112*  --  120*   CO2 24 19*  --  14*   * 172*  --  107*   BUN 32* 36*  --  38*   CREA 2.55* 2.90*  --  2.82*   CA 7.1* 6.9* 6.4* 6.7*   MG  --   --   --  1.6   PHOS 2.7 2.8  --  3.0   ALB 2.5* 2.5*  --  2.7*     No results for input(s): PH, PCO2, PO2, HCO3, FIO2 in the last 72 hours.   Recent Results (from the past 24 hour(s))   GLUCOSE, POC    Collection Time: 01/29/21  9:29 PM   Result Value Ref Range    Glucose (POC) 193 (H) 65 - 100 mg/dL    Performed by Juan Alberto Enriquez    GLUCOSE, POC    Collection Time: 01/30/21  8:25 AM   Result Value Ref Range    Glucose (POC) 156 (H) 65 - 100 mg/dL    Performed by [facility] Jeni    RENAL FUNCTION PANEL    Collection Time: 01/30/21 10:15 AM   Result Value Ref Range    Sodium 139 136 - 145 mmol/L    Potassium 4.0 3.5 - 5.1 mmol/L    Chloride 107 97 - 108 mmol/L    CO2 24 21 - 32 mmol/L    Anion gap 8 5 - 15 mmol/L    Glucose 273 (H) 65 - 100 mg/dL    BUN 32 (H) 6 - 20 mg/dL    Creatinine 2.55 (H) 0.70 - 1.30 mg/dL    BUN/Creatinine ratio 13 12 - 20      GFR est AA 30 (L) >60 ml/min/1.73m2    GFR est non-AA 25 (L) >60 ml/min/1.73m2    Calcium 7.1 (L) 8.5 - 10.1 mg/dL    Phosphorus 2.7 2.6 - 4.7 mg/dL    Albumin 2.5 (L) 3.5 - 5.0 g/dL   GLUCOSE, POC    Collection Time: 01/30/21 11:34 AM   Result Value Ref Range    Glucose (POC) 345 (H) 65 - 100 mg/dL    Performed by [facility] Jeni    GLUCOSE, POC    Collection Time: 01/30/21  3:27 PM   Result Value Ref Range    Glucose (POC) 364 (H) 65 - 100 mg/dL    Performed by [facility] Jeni    GLUCOSE, POC    Collection Time: 01/30/21  4:40 PM   Result Value Ref Range    Glucose (POC) 312 (H) 65 - 100 mg/dL    Performed by [facility] Jeni         Assessment and Plan:     1. Acute Kidney Injury on ? CKD: probably prerenal azotemia secondary to dehydratrion/infection. Improving creatinine 3.25 to 2.8--2.9--2.55 today with IVF  Recommend to continue IVF  No rhabdomyolysis   ultrasound of kidneys and bladder is negative for obstruction Advised increased p.o. fluid intake. will continue to monitor renal functions and adjust management as needed    2. Chronic kidney disease: probably has stage 3/4 chronic kidney disease related to DM/HTN nephrosclerosis. Will continue to monitor renal functions to assess the baseline    3. Renal osteodystrophy: Hypocalcemia present, improving with supplementation  Phosphorus level is normal  Severe secondary hyperparathyroidism noted, added calcitriol 0.5 mcg once daily  Vitamin D level is pending    3.  Hypertension: Systolic hypertension ,   Better controlled, Hrs in 60s with decrease in carvedilol  continue amlodipine 5mg po daily and low dose carvedilol  Will continue to monitor and blood pressures and adjust antihypertensive regimen as needed. 4. Hypokalemia: probably related to poor p.o. intake   improved with Supplementation potassium and magnesium  Continue to monitor K and Mg    5. Metabolic acidosis:Secondary to acute kidney injury  nongap acidosis. co2 improved from 14-19--24 with sodium bicarbonate drip  willdc bicarb drip  Will monitor CO2 levels     6. Scalp abscess: Continue IV antibiotics  ID following the patient  Cultures are pending, hemodynamically stable    8.  Diabetes mellitus: hyperglycemia+  check urine random protein creatinine ratio to assess for proteinuria with diabetic nephropathy  Accu-Cheks/SSI coverage        Signed By: Laci Heredia MD     January 30, 2021

## 2021-01-30 NOTE — PROGRESS NOTES
Hospitalist Progress Note               Daily Progress Note: 1/30/2021      Subjective: The patient is seen for follow  up.   26-year-old male with a history of diabetes mellitus and hypertension and chronic kidney disease came to the hospital with a complaint of having a bump on the occipital area, this is draining purulent discharge.     Medications reviewed  Current Facility-Administered Medications   Medication Dose Route Frequency    calcitRIOL (ROCALTROL) capsule 0.5 mcg  0.5 mcg Oral DAILY    amLODIPine (NORVASC) tablet 5 mg  5 mg Oral DAILY    sterile water (preservative free) 1,000 mL with potassium chloride 20 mEq, sodium bicarbonate (8.4%) 100 mEq infusion   IntraVENous CONTINUOUS    linezolid in dextrose 5% (ZYVOX) IVPB premix in D5W 600 mg  600 mg IntraVENous Q12H    carvediloL (COREG) tablet 6.25 mg  6.25 mg Oral BID    calcium carbonate (TUMS) chewable tablet 400 mg [elemental]  400 mg Oral BID    oxyCODONE-acetaminophen (PERCOCET) 5-325 mg per tablet 1 Tab  1 Tab Oral Q4H PRN    hydrALAZINE (APRESOLINE) 20 mg/mL injection 10 mg  10 mg IntraVENous Q4H PRN    piperacillin-tazobactam (ZOSYN) 2.25 g in 0.9% sodium chloride (MBP/ADV) 50 mL MBP  2.25 g IntraVENous Q8H    sodium chloride (NS) flush 5-40 mL  5-40 mL IntraVENous Q8H    sodium chloride (NS) flush 5-40 mL  5-40 mL IntraVENous PRN    acetaminophen (TYLENOL) tablet 650 mg  650 mg Oral Q6H PRN    Or    acetaminophen (TYLENOL) suppository 650 mg  650 mg Rectal Q6H PRN    polyethylene glycol (MIRALAX) packet 17 g  17 g Oral DAILY PRN    promethazine (PHENERGAN) tablet 12.5 mg  12.5 mg Oral Q6H PRN    Or    ondansetron (ZOFRAN) injection 4 mg  4 mg IntraVENous Q6H PRN    enoxaparin (LOVENOX) injection 30 mg  30 mg SubCUTAneous DAILY    glucose chewable tablet 16 g  4 Tab Oral PRN    dextrose (D50W) injection syrg 12.5-25 g  25-50 mL IntraVENous PRN    glucagon (GLUCAGEN) injection 1 mg  1 mg IntraMUSCular PRN    insulin lispro (HUMALOG) injection   SubCUTAneous AC&HS       Review of Systems:   A comprehensive review of systems was negative except for that written in the HPI. Objective:   Physical Exam:     Visit Vitals  BP (!) 185/95   Pulse 70   Temp 98.3 °F (36.8 °C)   Resp 16   Ht 6' 2\" (1.88 m)   Wt 77.1 kg (170 lb)   SpO2 98%   BMI 21.83 kg/m²      O2 Device: Room air    Temp (24hrs), Av.9 °F (36.6 °C), Min:97.6 °F (36.4 °C), Max:98.3 °F (36.8 °C)    No intake/output data recorded.  1901 -  0700  In: 1920 [P.O.:1220; I.V.:700]  Out: 700 [Urine:700]    PHYSICAL EXAM:  General: Alert and awake  Skin: Extremities and face reveal no rashes. HEENT: Abscess on Rt occipital area with purulent discharge noticed, no ENT discharge, extraocular movements are intact  Cardiovascular: Regular rate and rhythm. No murmurs, gallops, or rubs. PMI nondisplaced. Carotids without bruits. Respiratory: Comfortable breathing with no accessory muscle use. Clear breath sounds with no wheezes, rales, or rhonchi. GI: Abdomen nondistended, soft, and nontender. Normal active bowel sounds. No enlargement of the liver or spleen. No masses palpable. Rectal: Deferred   Musculoskeletal: No pitting edema of the lower legs. Extremities have good range of motion. No costovertebral tenderness. Neurological: Gross memory appears intact. Patient is alert and oriented. Power 5.5, Cranial nerves II-XII intact  Psychiatric: Mood appears appropriate with judgement intact. Lymphatic: No cervical or supraclavicular adenopathy.     Data Review:       Recent Days:  Recent Labs     21  0603 21  0605   WBC 16.1* 18.8*   HGB 8.9* 9.3*   HCT 26.8* 28.4*    333     Recent Labs     21  0603 21  1956 21  0605 21  1315     --  146* 146*   K 3.7  --  3.8 3.4*   *  --  120* 119*   CO2 19*  --  14* 16*   *  --  107* 144*   BUN 36*  --  38* 43*   CREA 2.90*  --  2.82* 2.95*   CA 6.9* 6.4* 6.7* 6.2*   MG --   --  1.6  --    PHOS 2.8  --  3.0  --    ALB 2.5*  --  2.7*  --      No results for input(s): PH, PCO2, PO2, HCO3, FIO2 in the last 72 hours. Results     Procedure Component Value Units Date/Time    MRSA SCREEN - PCR (NASAL) [[id number]] Collected: 01/26/21 2000    Order Status: Completed Specimen: Swab Updated: 01/27/21 1415     MRSA by PCR, Nasal Not Detected       CULTURE, BLOOD, PAIRED [[id number]] Collected: 01/26/21 1820    Order Status: Completed Specimen: Blood Updated: 01/30/21 0639     Special Requests: No Special Requests        Culture result: No growth 4 days       CULTURE, Hulon Tori STAIN [[id number]] Collected: 01/26/21 1743    Order Status: Completed Specimen: Wound Drainage Updated: 01/28/21 1341     Special Requests: No Special Requests        GRAM STAIN Few WBCs seen               2+ Gram Positive Cocci in clusters           Culture result:       Heavy Probable Staphylococcus aureus          CULTURE, BLOOD [[id number]]     Order Status: Canceled Specimen: Blood             US RETROPERITONEUM COMP   Final Result   Early medical disease of right kidney. Left kidney not seen. Thick-walled bladder      CT HEAD WO CONT   Final Result   Impression: Age-appropriate atrophy. No acute findings. Patient has a soft tissue infection over the right occipital scalp. This is   shown to be fairly pronounced localized skin thickening, subcutaneous fat edema,   and some thickening of the deep soft tissues.  There is no gas formation, bone   destruction, periosteal reaction or abscess             Assessment/     Problem List:  Hospital Problems  Never Reviewed          Codes Class Noted POA    Chronic renal failure syndrome, stage 4 (severe) ([facility]) ICD-10-CM: N18.4  ICD-9-CM: 585.4  1/28/2021 Unknown        Scalp abscess ICD-10-CM: L02.811  ICD-9-CM: 682.8  1/27/2021 Unknown                     Plan:  Scalp abscesspatient presents with above-mentioned symptomatology based on patient's clinical presentation patient was found of a scalp abscess, does not meet sepsis criteria at this time and remains hemodynamically stable  No growth on blood cultures  Staph aureus on wound cultures  Continue Zosyn  And zyvox for  antibiotic coverage  Infectious disease and surgery consult appreciated, will continue to follow  Wound care consult     Hypokalemiareplete potassium and recheck potassium  Check magnesium level     Hypocalcemialikely multifactorial  Nephrology consult appreciated     Acute kidney injurypatient was found to have an elevated serum creatinine with unclear baseline, could be multifactorial, currently improving  Maintenance IV fluids  Continue to  Trend serum creatinine  Nephrology consult appreciated, will continue to follow    Metabolic acidosis: Patient is on Bicarb drip, HCO3 still low. D/w nephrology, he will adjust patients fluids, will give one dose of IV Bicarb now.      Hypomagnesemia - Resolved     Hypertension Patient is on coreg 12.5 mg BID, will add amlodipine      Diabetescurrently has a normal blood glucose level, initiate insulin sliding scale     ProphylaxisLovenox  FENcardiac diet, maintenance IV fluids, replete potassium and magnesium  Full code,      Disposition -pending clinical improvement, ID recommendations     Code status: Full  Prophylaxis: Lovenox  Recommended Disposition: Home w/Family    Care Plan discussed with: Patient/Family, Nurse,  and Consultant Nephrology    Total time spent with patient: 45 minutes.     Venu Fatima MD

## 2021-01-31 NOTE — PROGRESS NOTES
Hospitalist Progress Note               Daily Progress Note: 1/31/2021      Subjective: The patient is seen for follow  up.   56-year-old male with a history of diabetes mellitus and hypertension and chronic kidney disease came to the hospital with a complaint of having a bump on the occipital area, this is draining purulent discharge. Patient has another bump on Rt cheek area. Purulent drain +ve.      Medications reviewed  Current Facility-Administered Medications   Medication Dose Route Frequency    0.9% sodium chloride infusion  50 mL/hr IntraVENous CONTINUOUS    calcitRIOL (ROCALTROL) capsule 0.5 mcg  0.5 mcg Oral DAILY    amLODIPine (NORVASC) tablet 5 mg  5 mg Oral DAILY    linezolid in dextrose 5% (ZYVOX) IVPB premix in D5W 600 mg  600 mg IntraVENous Q12H    carvediloL (COREG) tablet 6.25 mg  6.25 mg Oral BID    calcium carbonate (TUMS) chewable tablet 400 mg [elemental]  400 mg Oral BID    oxyCODONE-acetaminophen (PERCOCET) 5-325 mg per tablet 1 Tab  1 Tab Oral Q4H PRN    hydrALAZINE (APRESOLINE) 20 mg/mL injection 10 mg  10 mg IntraVENous Q4H PRN    piperacillin-tazobactam (ZOSYN) 2.25 g in 0.9% sodium chloride (MBP/ADV) 50 mL MBP  2.25 g IntraVENous Q8H    sodium chloride (NS) flush 5-40 mL  5-40 mL IntraVENous Q8H    sodium chloride (NS) flush 5-40 mL  5-40 mL IntraVENous PRN    acetaminophen (TYLENOL) tablet 650 mg  650 mg Oral Q6H PRN    Or    acetaminophen (TYLENOL) suppository 650 mg  650 mg Rectal Q6H PRN    polyethylene glycol (MIRALAX) packet 17 g  17 g Oral DAILY PRN    promethazine (PHENERGAN) tablet 12.5 mg  12.5 mg Oral Q6H PRN    Or    ondansetron (ZOFRAN) injection 4 mg  4 mg IntraVENous Q6H PRN    enoxaparin (LOVENOX) injection 30 mg  30 mg SubCUTAneous DAILY    glucose chewable tablet 16 g  4 Tab Oral PRN    dextrose (D50W) injection syrg 12.5-25 g  25-50 mL IntraVENous PRN    glucagon (GLUCAGEN) injection 1 mg  1 mg IntraMUSCular PRN    insulin lispro (HUMALOG) injection   SubCUTAneous AC&HS       Review of Systems:   A comprehensive review of systems was negative except for that written in the HPI. Objective:   Physical Exam:     Visit Vitals  BP (!) 174/75 (BP 1 Location: Left upper arm)   Pulse 60   Temp 97.8 °F (36.6 °C)   Resp 18   Ht 6' 2\" (1.88 m)   Wt 68.9 kg (151 lb 14.4 oz)   SpO2 98%   BMI 19.50 kg/m²      O2 Device: Room air    Temp (24hrs), Av.9 °F (36.6 °C), Min:97.2 °F (36.2 °C), Max:98.5 °F (36.9 °C)    No intake/output data recorded.  1901 -  0700  In: 1200 [P.O.:500; I.V.:700]  Out: -     PHYSICAL EXAM:  General: Alert and awake  Skin: Extremities and face reveal no rashes. HEENT: Abscess on Rt occipital area with purulent discharge noticed, no ENT discharge, extraocular movements are intact  Cardiovascular: Regular rate and rhythm. No murmurs, gallops, or rubs. PMI nondisplaced. Carotids without bruits. Respiratory: Comfortable breathing with no accessory muscle use. Clear breath sounds with no wheezes, rales, or rhonchi. GI: Abdomen nondistended, soft, and nontender. Normal active bowel sounds. No enlargement of the liver or spleen. No masses palpable. Rectal: Deferred   Musculoskeletal: No pitting edema of the lower legs. Extremities have good range of motion. No costovertebral tenderness. Neurological: Gross memory appears intact. Patient is alert and oriented. Power 5/5, Cranial nerves II-XII intact  Psychiatric: Mood appears appropriate with judgement intact. Lymphatic: No cervical or supraclavicular adenopathy.     Data Review:       Recent Days:  Recent Labs     21  0603   WBC 16.1*   HGB 8.9*   HCT 26.8*        Recent Labs     21  0716 21  1015 21  0603    139 140   K 4.0 4.0 3.7   * 107 112*   CO2 25 24 19*   * 273* 172*   BUN 29* 32* 36*   CREA 2.77* 2.55* 2.90*   CA 7.7* 7.1* 6.9*   PHOS 3.1 2.7 2.8   ALB 2.4* 2.5* 2.5*     No results for input(s): PH, PCO2, PO2, HCO3, FIO2 in the last 72 hours. Results     Procedure Component Value Units Date/Time    MRSA SCREEN - PCR (NASAL) [[id number]] Collected: 01/26/21 2000    Order Status: Completed Specimen: Swab Updated: 01/27/21 1415     MRSA by PCR, Nasal Not Detected       CULTURE, BLOOD, PAIRED [[id number]] Collected: 01/26/21 1820    Order Status: Completed Specimen: Blood Updated: 01/31/21 0732     Special Requests: No Special Requests        Culture result: No growth 5 days       CULTURE, Mac Corpus STAIN [[id number]]  (Susceptibility) Collected: 01/26/21 1743    Order Status: Completed Specimen: Wound Drainage Updated: 01/30/21 1058     Special Requests: No Special Requests        GRAM STAIN Few WBCs seen               2+ Gram Positive Cocci in clusters           Culture result:       Heavy Staphylococcus aureus          Susceptibility      Staphylococcus aureus     DRAIN     Ciprofloxacin ($) Susceptible     Clindamycin ($) Susceptible     Daptomycin ($$$$$) Susceptible     Doxycycline ($$) Susceptible     Erythromycin ($$$$) Susceptible     Gentamicin ($) Susceptible     Levofloxacin ($) Susceptible     Linezolid ($$$$$) Susceptible     Moxifloxacin ($$$$) Susceptible     Oxacillin Susceptible     Rifampin ($$$$) Susceptible [1]      Tetracycline Susceptible     Trimeth/Sulfa Susceptible     Vancomycin ($) Susceptible            [1]   Rifampin is not to be used for mono-therapy. CULTURE, BLOOD [[id number]]     Order Status: Canceled Specimen: Blood             US RETROPERITONEUM COMP   Final Result   Early medical disease of right kidney. Left kidney not seen. Thick-walled bladder      CT HEAD WO CONT   Final Result   Impression: Age-appropriate atrophy. No acute findings. Patient has a soft tissue infection over the right occipital scalp. This is   shown to be fairly pronounced localized skin thickening, subcutaneous fat edema,   and some thickening of the deep soft tissues.  There is no gas formation, bone   destruction, periosteal reaction or abscess             Assessment/     Problem List:  Hospital Problems  Never Reviewed          Codes Class Noted POA    Chronic renal failure syndrome, stage 4 (severe) (HCC) ICD-10-CM: N18.4  ICD-9-CM: 585.4  1/28/2021 Unknown        Scalp abscess ICD-10-CM: L02.811  ICD-9-CM: 682.8  1/27/2021 Unknown                     Plan:  Scalp abscesspatient presents with above-mentioned symptomatology based on patient's clinical presentation patient was found of a scalp abscess, does not meet sepsis criteria at this time and remains hemodynamically stable  No growth on blood cultures  MSSA on wound cultures  Continue Zosyn  And zyvox for  antibiotic coverage, will likely switch to PO abx  Infectious disease and surgery consult appreciated, will continue to follow  Wound care consult     Hypokalemiareplete potassium and recheck potassium  Check magnesium level     Hypocalcemialikely multifactorial  Nephrology consult appreciated     Acute kidney injurypatient was found to have an elevated serum creatinine with unclear baseline, could be multifactorial, currently improving  Maintenance IV fluids  Continue to  Trend serum creatinine  Nephrology consult appreciated, will continue to follow    Metabolic acidosis: HCO3 is improved, D/c HCO3 drip     Hypomagnesemia - Resolved     Hypertension Patient is on coreg 12.5 mg BID, will add amlodipine      Diabetescurrently has a normal blood glucose level, initiate insulin sliding scale     ProphylaxisLovenox  FENcardiac diet, maintenance IV fluids, replete potassium and magnesium  Full code,      Disposition - ID recommendations     Code status: Full  Prophylaxis: Lovenox  Recommended Disposition: Home w/Family    Care Plan discussed with: Patient/Family, Nurse,  and Consultant Nephrology      Wm Sewell MD

## 2021-01-31 NOTE — PROGRESS NOTES
Renal Progress Note    Patient: Jake Reyna MRN: [id number]  SSN: [id number]    YOB: 1945  Age: 77 y.o. Sex: male      Admit Date: 1/26/2021    LOS: 4 days     Subjective:   Patient seen at bedside. Alert and awake, no acute distress. No new c/o today  No complaints of any swelling in lower extremities. No complaints of shortness of breath.  No c/o doarrhea        Current Facility-Administered Medications   Medication Dose Route Frequency    nafcillin (NALLPEN) 2 g in 0.9% sodium chloride (MBP/ADV) 100 mL MBP  2 g IntraVENous Q6H    0.9% sodium chloride infusion  50 mL/hr IntraVENous CONTINUOUS    calcitRIOL (ROCALTROL) capsule 0.5 mcg  0.5 mcg Oral DAILY    amLODIPine (NORVASC) tablet 5 mg  5 mg Oral DAILY    carvediloL (COREG) tablet 6.25 mg  6.25 mg Oral BID    calcium carbonate (TUMS) chewable tablet 400 mg [elemental]  400 mg Oral BID    oxyCODONE-acetaminophen (PERCOCET) 5-325 mg per tablet 1 Tab  1 Tab Oral Q4H PRN    hydrALAZINE (APRESOLINE) 20 mg/mL injection 10 mg  10 mg IntraVENous Q4H PRN    sodium chloride (NS) flush 5-40 mL  5-40 mL IntraVENous Q8H    sodium chloride (NS) flush 5-40 mL  5-40 mL IntraVENous PRN    acetaminophen (TYLENOL) tablet 650 mg  650 mg Oral Q6H PRN    Or    acetaminophen (TYLENOL) suppository 650 mg  650 mg Rectal Q6H PRN    polyethylene glycol (MIRALAX) packet 17 g  17 g Oral DAILY PRN    promethazine (PHENERGAN) tablet 12.5 mg  12.5 mg Oral Q6H PRN    Or    ondansetron (ZOFRAN) injection 4 mg  4 mg IntraVENous Q6H PRN    enoxaparin (LOVENOX) injection 30 mg  30 mg SubCUTAneous DAILY    glucose chewable tablet 16 g  4 Tab Oral PRN    dextrose (D50W) injection syrg 12.5-25 g  25-50 mL IntraVENous PRN    glucagon (GLUCAGEN) injection 1 mg  1 mg IntraMUSCular PRN    insulin lispro (HUMALOG) injection   SubCUTAneous AC&HS        Vitals:    01/31/21 0455 01/31/21 0733 01/31/21 1135 01/31/21 1533   BP:  (!) 174/75 (!) 150/80 (!) 160/75   Pulse: 60 (!) 54 (!) 55   Resp:  18 18 18   Temp:  97.8 °F (36.6 °C) 97.8 °F (36.6 °C) 97.8 °F (36.6 °C)   SpO2:  98% 100% 100%   Weight: 68.9 kg (151 lb 14.4 oz)      Height:         Objective:   General: alert awake well-oriented, no acute distress. HEENT: EOMI, no Icterus, no Pallor, pupils reactive, mucosa moist,   Neck: Neck is supple, No JVD,   Lungs: breathsounds normal, no rhonchi, no rales,  CVS: heart sounds normal,  no murmurs, no rubs. GI: soft, nontender, normal BS,   Extremeties: no clubbing, no cyanosis, no edema,  Neuro: Alert, awake, oriented x3, speech is normal, moving all extremeties well. Skin: normal skin turgor, scalp area swelling /pain+  Musculoskeletal: no acute joint swellings       Intake and Output:  Current Shift: No intake/output data recorded. Last three shifts: 01/29 1901 - 01/31 0700  In: 1200 [P.O.:500; I.V.:700]  Out: -       Lab/Data Review:  Recent Labs     01/29/21  0603   WBC 16.1*   HGB 8.9*   HCT 26.8*        Recent Labs     01/31/21  0716 01/30/21  1015 01/29/21  0603    139 140   K 4.0 4.0 3.7   * 107 112*   CO2 25 24 19*   * 273* 172*   BUN 29* 32* 36*   CREA 2.77* 2.55* 2.90*   CA 7.7* 7.1* 6.9*   PHOS 3.1 2.7 2.8   ALB 2.4* 2.5* 2.5*     No results for input(s): PH, PCO2, PO2, HCO3, FIO2 in the last 72 hours.   Recent Results (from the past 24 hour(s))   GLUCOSE, POC    Collection Time: 01/30/21  9:58 PM   Result Value Ref Range    Glucose (POC) 156 (H) 65 - 100 mg/dL    Performed by [facility]    RENAL FUNCTION PANEL    Collection Time: 01/31/21  7:16 AM   Result Value Ref Range    Sodium 143 136 - 145 mmol/L    Potassium 4.0 3.5 - 5.1 mmol/L    Chloride 111 (H) 97 - 108 mmol/L    CO2 25 21 - 32 mmol/L    Anion gap 7 5 - 15 mmol/L    Glucose 132 (H) 65 - 100 mg/dL    BUN 29 (H) 6 - 20 mg/dL    Creatinine 2.77 (H) 0.70 - 1.30 mg/dL    BUN/Creatinine ratio 10 (L) 12 - 20      GFR est AA 27 (L) >60 ml/min/1.73m2    GFR est non-AA 22 (L) >60 ml/min/1.73m2    Calcium 7.7 (L) 8.5 - 10.1 mg/dL    Phosphorus 3.1 2.6 - 4.7 mg/dL    Albumin 2.4 (L) 3.5 - 5.0 g/dL   GLUCOSE, POC    Collection Time: 01/31/21  7:42 AM   Result Value Ref Range    Glucose (POC) 157 (H) 65 - 100 mg/dL    Performed by [address], POC    Collection Time: 01/31/21 11:04 AM   Result Value Ref Range    Glucose (POC) 334 (H) 65 - 100 mg/dL    Performed by [address], POC    Collection Time: 01/31/21  3:36 PM   Result Value Ref Range    Glucose (POC) 249 (H) 65 - 100 mg/dL    Performed by [facility]N [facility]ORDEN         Assessment and Plan:     1. Acute Kidney Injury on ? CKD: probably prerenal azotemia secondary to dehydratrion/infection. Improving creatinine 3.25 to 2.8--2.9--2.55--2.77 today with IVF  Renal functions probably at baseline  Will dc IVF and monitor  No rhabdomyolysis   ultrasound of kidneys and bladder is negative for obstruction Advised increased p.o. fluid intake. will continue to monitor renal functions and adjust management as needed    2. Chronic kidney disease: probably has stage 3/4 chronic kidney disease related to DM/HTN nephrosclerosis. Will continue to monitor renal functions to assess the baseline    3. Renal osteodystrophy: Hypocalcemia present, improving with supplementation  Phosphorus level is normal  Severe secondary hyperparathyroidism noted, added calcitriol 0.5 mcg once daily  Vitamin D level is pending    3. Hypertension: Systolic hypertension ,   Better controlled,  decreased carvedilol for low HRs  increase amlodipine 10 mg po daily   Will continue to monitor and blood pressures and adjust antihypertensive regimen as needed. 4. Hypokalemia: probably related to poor p.o. intake   improved with Supplementation potassium and magnesium  Continue to monitor K and Mg    5. Metabolic acidosis:Secondary to acute kidney injury  nongap acidosis.  co2 improved from 14-19--24-25   off bicarb drip now  Will monitor CO2 levels     6. Scalp abscess: Continue IV antibiotics  ID following the patient  Cultures are pending, hemodynamically stable    8.  Diabetes mellitus: hyperglycemia+  check urine random protein creatinine ratio to assess for proteinuria with diabetic nephropathy  Accu-Cheks/SSI coverage        Signed By: Rhys Garcia MD     January 31, 2021

## 2021-01-31 NOTE — PROGRESS NOTES
Infectious Disease Progress Note           Subjective:   Patient followed by Dr. Thom Naqvi for occipital scalp abscess has grown MSSA. Blood cultures are negative so far. Remains afebrile but WBC remained elevated 2 days ago. Seen by General Surgery with no plans for surgical intervention. Asked to see patient regarding appropriateness for discharge at this time. Objective:   Physical Exam:     Visit Vitals  BP (!) 174/75 (BP 1 Location: Left upper arm)   Pulse 60   Temp 97.8 °F (36.6 °C)   Resp 18   Ht 6' 2\" (1.88 m)   Wt 151 lb 14.4 oz (68.9 kg)   SpO2 98%   BMI 19.50 kg/m²      O2 Device: Room air    Temp (24hrs), Av.9 °F (36.6 °C), Min:97.2 °F (36.2 °C), Max:98.5 °F (36.9 °C)    No intake/output data recorded.  1901 -  0700  In: 1200 [P.O.:500; I.V.:700]  Out: -     General: NAD, AAO x 4  HEENT: NATHALIE, Moist mucosa   Lungs: CTA b/l, no wheeze/rhonchi   Heart: S1S2+, RRR, no murmur  Abdo: Soft, NT, ND, +BS   Exts: No edema, + pulses b/l   Skin: Occipital scalp wound that is open but not draining and no surrounding fluctuance, but tender; No drainage from right cheek     Data Review:       Recent Days:  Recent Labs     21  0603   WBC 16.1*   HGB 8.9*   HCT 26.8*        Recent Labs     21  0716 21  1015 21  0603   BUN 29* 32* 36*   CREA 2.77* 2.55* 2.90*     Microbiology   - MRSA screen is neg   - Wound Cx : Staph aureus (MSSA)  - Blood Cx : Negative at 5 days    Diagnostics   CXR Results  (Last 48 hours)    None         Assessment/Plan     1. Abscess and infected wound, occipital area, secondary to MSSA, Day #6 IV Zosyn, Day #4 Linezolid, resolving. 2. Leukocytosis, secondary to #1  3. Diabetes mellitus  4. Chronic renal failure     Comment:  No fluctuance identified, patient is afebrile and blood cultures likely to be negative.   WBC still quite elevated, but, otherwise, patient could probably be transitioned to oral antibiotic such as Dicloxacillin. There are apparently still some concerns about his renal failure and where his baseline is. Also patient will need instructions on how to care his open wound. 1. Discontinue Zosyn and Linezolid  2. Start IV Nafcillin with transition to oral Dicloxacillin at discharge  3. Follow-up blood cultures  4.  In am, repeat CBC, DAVID Teixeira MD    1/31/2021

## 2021-02-01 NOTE — PROGRESS NOTES
Infectious Disease Progress Note           Subjective:   Doing well, no acute events since last seen. Denies  Scalp pain, fever/chills. Remains stable   Objective:   Physical Exam:     Visit Vitals  BP (!) 163/77 (BP 1 Location: Left upper arm, BP Patient Position: Supine)   Pulse (!) 54   Temp 97.6 °F (36.4 °C)   Resp 16   Ht 6' 2\" (1.88 m)   Wt 151 lb 14.4 oz (68.9 kg)   SpO2 100%   BMI 19.50 kg/m²      O2 Device: Room air    Temp (24hrs), Av.9 °F (36.6 °C), Min:97.6 °F (36.4 °C), Max:98 °F (36.7 °C)    No intake/output data recorded. No intake/output data recorded. General: NAD, AAO x 4  HEENT: NATHALIE, Moist mucosa   Lungs: CTA b/l, no wheeze/rhonchi   Heart: S1S2+, RRR, no murmur  Abdo: Soft, NT, ND, +BS   Exts: No edema, + pulses b/l   Skin: Occipital scalp lesion, purulent drainage     Data Review:       Recent Days:  Recent Labs     21  0645   WBC 7.8   HGB 8.4*   HCT 26.7*        Recent Labs     21  0645 21  0716 21  1015   BUN 25* 29* 32*   CREA 2.66* 2.77* 2.55*     Microbiology   - MRSA screen is neg   - Wound Cx : Staph aureus   - Blood Cx : Negative    Diagnostics   CXR Results  (Last 48 hours)    None         Assessment/Plan     1. Occipital scalp abscess, multiseptated lesion on scalp with purulent drainage      MSSA isolated from wound Cx, decreased drainage       Will leave on Nafcillin while hospitalized, transition to dicloxacillin upon d/c500 mg q 6 hours x 2 wks       Continue routine wound care per recs by [facility]      2. Acute renal failure: Cr is staying stable      3. H/o DM: A1C of 6.2, BS control to help w wound healing     4.  Anemia of chronic disease: Stable Hgb      Darin Green MD    2021

## 2021-02-01 NOTE — PROGRESS NOTES
Chart reviewed. Patient declined HH. CM will continue to follow patient for discharge planning needs.

## 2021-02-01 NOTE — PROGRESS NOTES
Progress Note    Patient: Mer Ware MRN: [id number]  SSN: [id number]    YOB: 1945  Age: 77 y.o. Sex: male      Admit Date: 1/26/2021    LOS: 5 days     Subjective:   Patient seen in bed  Reports he continues to drain from his posterior scalp abscess  Has been afebrile    Objective:     Vitals:    01/31/21 1533 01/31/21 1941 01/31/21 2324 02/01/21 0801   BP: (!) 160/75 (!) 159/79 (!) 172/82 (!) 181/82   Pulse: (!) 55 64 (!) 57 (!) 57   Resp: 18 16 16 16   Temp: 97.8 °F (36.6 °C) 98 °F (36.7 °C) 98 °F (36.7 °C) 98 °F (36.7 °C)   SpO2: 100% 99% 100% 98%   Weight:       Height:            Intake and Output:  Current Shift: No intake/output data recorded. Last three shifts: No intake/output data recorded.     Review of Systems:  ROS     Physical Exam:   Posterior scalp abscess with multiple draining sinuses, tender to palpation, wounds clean    Lab/Data Review:  Recent Results (from the past 24 hour(s))   GLUCOSE, POC    Collection Time: 01/31/21 11:04 AM   Result Value Ref Range    Glucose (POC) 334 (H) 65 - 100 mg/dL    Performed by [address], POC    Collection Time: 01/31/21  3:36 PM   Result Value Ref Range    Glucose (POC) 249 (H) 65 - 100 mg/dL    Performed by [facility] P[facility]    Collection Time: 01/31/21  7:34 PM   Result Value Ref Range    Glucose (POC) 191 (H) 65 - 100 mg/dL    Performed by [facility]    RENAL FUNCTION PANEL    Collection Time: 02/01/21  6:45 AM   Result Value Ref Range    Sodium 143 136 - 145 mmol/L    Potassium 4.1 3.5 - 5.1 mmol/L    Chloride 112 (H) 97 - 108 mmol/L    CO2 25 21 - 32 mmol/L    Anion gap 6 5 - 15 mmol/L    Glucose 133 (H) 65 - 100 mg/dL    BUN 25 (H) 6 - 20 mg/dL    Creatinine 2.66 (H) 0.70 - 1.30 mg/dL    BUN/Creatinine ratio 9 (L) 12 - 20      GFR est AA 29 (L) >60 ml/min/1.73m2    GFR est non-AA 24 (L) >60 ml/min/1.73m2    Calcium 7.9 (L) 8.5 - 10.1 mg/dL    Phosphorus 3.5 2.6 - 4.7 mg/dL    Albumin 2.3 (L) 3.5 - 5.0 g/dL   GLUCOSE, POC    Collection Time: 02/01/21  7:39 AM   Result Value Ref Range    Glucose (POC) 141 (H) 65 - 100 mg/dL    Performed by Tam Gale           Assessment:     Active Problems:    Scalp abscess (1/27/2021)      Chronic renal failure syndrome, stage 4 (severe) ([facility] [address] (1/28/2021)        Plan:   Continue local wound care with [facility] at [facility] and Rosa [facility]ap  Recommend continue IV antibiotics at this time  We will order a CBC since his last CBC was 3 days ago with a white blood cell count of 16,000    Signed By: Timothy New MD     February 1, 2021

## 2021-02-01 NOTE — PROGRESS NOTES
Hospitalist Progress Note               Daily Progress Note: 2/1/2021      Subjective: The patient is seen for follow  up.   43-year-old male with a history of diabetes mellitus and hypertension and chronic kidney disease came to the hospital with a complaint of having a bump on the occipital area, this is draining purulent discharge. Seen by surgery today, continues drainage from posterior scalp abscess. Remains afebrile. BG above goal as is BP.           Medications reviewed  Current Facility-Administered Medications   Medication Dose Route Frequency    hydrALAZINE (APRESOLINE) tablet 50 mg  50 mg Oral TID    nafcillin (NALLPEN) 2 g in 0.9% sodium chloride (MBP/ADV) 100 mL MBP  2 g IntraVENous Q6H    amLODIPine (NORVASC) tablet 10 mg  10 mg Oral DAILY    influenza vaccine 2020-21 (4 yrs+)(PF) (FLUCELVAX QUAD) injection 0.5 mL  0.5 mL IntraMUSCular PRIOR TO DISCHARGE    calcitRIOL (ROCALTROL) capsule 0.5 mcg  0.5 mcg Oral DAILY    carvediloL (COREG) tablet 6.25 mg  6.25 mg Oral BID    calcium carbonate (TUMS) chewable tablet 400 mg [elemental]  400 mg Oral BID    oxyCODONE-acetaminophen (PERCOCET) 5-325 mg per tablet 1 Tab  1 Tab Oral Q4H PRN    hydrALAZINE (APRESOLINE) 20 mg/mL injection 10 mg  10 mg IntraVENous Q4H PRN    sodium chloride (NS) flush 5-40 mL  5-40 mL IntraVENous Q8H    sodium chloride (NS) flush 5-40 mL  5-40 mL IntraVENous PRN    acetaminophen (TYLENOL) tablet 650 mg  650 mg Oral Q6H PRN    Or    acetaminophen (TYLENOL) suppository 650 mg  650 mg Rectal Q6H PRN    polyethylene glycol (MIRALAX) packet 17 g  17 g Oral DAILY PRN    promethazine (PHENERGAN) tablet 12.5 mg  12.5 mg Oral Q6H PRN    Or    ondansetron (ZOFRAN) injection 4 mg  4 mg IntraVENous Q6H PRN    enoxaparin (LOVENOX) injection 30 mg  30 mg SubCUTAneous DAILY    glucose chewable tablet 16 g  4 Tab Oral PRN    dextrose (D50W) injection syrg 12.5-25 g  25-50 mL IntraVENous PRN    glucagon (GLUCAGEN) injection 1 mg  1 mg IntraMUSCular PRN    insulin lispro (HUMALOG) injection   SubCUTAneous AC&HS       Review of Systems:   A comprehensive review of systems was negative except for that written in the HPI. Objective:   Physical Exam:     Visit Vitals  BP (!) 163/77 (BP 1 Location: Left upper arm, BP Patient Position: Supine)   Pulse (!) 54   Temp 97.6 °F (36.4 °C)   Resp 16   Ht 6' 2\" (1.88 m)   Wt 68.9 kg (151 lb 14.4 oz)   SpO2 100%   BMI 19.50 kg/m²      O2 Device: Room air    Temp (24hrs), Av.9 °F (36.6 °C), Min:97.6 °F (36.4 °C), Max:98 °F (36.7 °C)    No intake/output data recorded. No intake/output data recorded. PHYSICAL EXAM:  General: Alert and awake  Skin: Extremities and face reveal no rashes. HEENT: Abscess on Rt occipital area with purulent discharge-not visualized as scalp dressing clean and dry. No ENT discharge, extraocular movements are intact  Cardiovascular: Regular rate and rhythm. No murmurs, gallops, or rubs. PMI nondisplaced. Carotids without bruits. Respiratory: Comfortable breathing with no accessory muscle use. Clear breath sounds with no wheezes, rales, or rhonchi. GI: Abdomen nondistended, soft, and nontender. Normal active bowel sounds. No enlargement of the liver or spleen. No masses palpable. Rectal: Deferred   Musculoskeletal: No pitting edema of the lower legs. Extremities have good range of motion. No costovertebral tenderness. Neurological: Gross memory appears intact. Patient is alert and oriented. Power 5/5, Cranial nerves II-XII intact  Psychiatric: Mood appears appropriate with judgement intact. Lymphatic: No cervical or supraclavicular adenopathy.     Data Review:       Recent Days:  Recent Labs     21  0645   WBC 7.8   HGB 8.4*   HCT 26.7*        Recent Labs     21  0645 21  0716 21  1015    143 139   K 4.1 4.0 4.0   * 111* 107   CO2 25 25 24   * 132* 273*   BUN 25* 29* 32*   CREA 2.66* 2.77* 2.55* CA 7.9* 7.7* 7.1*   PHOS 3.5 3.1 2.7   ALB 2.3* 2.4* 2.5*     No results for input(s): PH, PCO2, PO2, HCO3, FIO2 in the last 72 hours. Results     Procedure Component Value Units Date/Time    MRSA SCREEN - PCR (NASAL) [[id number]] Collected: 01/26/21 2000    Order Status: Completed Specimen: Swab Updated: 01/27/21 1415     MRSA by PCR, Nasal Not Detected       CULTURE, BLOOD, PAIRED [[id number]] Collected: 01/26/21 1820    Order Status: Completed Specimen: Blood Updated: 02/01/21 0942     Special Requests: No Special Requests        Culture result: No growth 6 days       CULTURE, [facility] STAIN [[id number]]  (Susceptibility) Collected: 01/26/21 1743    Order Status: Completed Specimen: Wound Drainage Updated: 01/30/21 1058     Special Requests: No Special Requests        GRAM STAIN Few WBCs seen               2+ Gram Positive Cocci in clusters           Culture result:       Heavy Staphylococcus aureus          Susceptibility      Staphylococcus aureus     DARIN     Ciprofloxacin ($) Susceptible     Clindamycin ($) Susceptible     Daptomycin ($$$$$) Susceptible     Doxycycline ($$) Susceptible     Erythromycin ($$$$) Susceptible     Gentamicin ($) Susceptible     Levofloxacin ($) Susceptible     Linezolid ($$$$$) Susceptible     Moxifloxacin ($$$$) Susceptible     Oxacillin Susceptible     Rifampin ($$$$) Susceptible [1]      Tetracycline Susceptible     Trimeth/Sulfa Susceptible     Vancomycin ($) Susceptible            [1]   Rifampin is not to be used for mono-therapy. CULTURE, BLOOD [[id number]]     Order Status: Canceled Specimen: Blood             US RETROPERITONEUM COMP   Final Result   Early medical disease of right kidney. Left kidney not seen. Thick-walled bladder      CT HEAD WO CONT   Final Result   Impression: Age-appropriate atrophy. No acute findings. Patient has a soft tissue infection over the right occipital scalp.  This is   shown to be fairly pronounced localized skin thickening, subcutaneous fat edema,   and some thickening of the deep soft tissues. There is no gas formation, bone   destruction, periosteal reaction or abscess             Assessment/     Problem List:  Hospital Problems  Never Reviewed          Codes Class Noted POA    Chronic renal failure syndrome, stage 4 (severe) ([facility]) ICD-10-CM: N18.4  ICD-9-CM: 585.4  1/28/2021 Unknown        Scalp abscess ICD-10-CM: L02.811  ICD-9-CM: 682.8  1/27/2021 Unknown                     Plan:  Scalp abscesspatient presents with above-mentioned symptomatology based on patient's clinical presentation patient was found of a scalp abscess, does not meet sepsis criteria at this time and remains hemodynamically stable  No growth on blood cultures  MSSA on wound cultures  ID following, switched to iv Nafcillin with transition to po dicloxacillin at discharge. Infectious disease and surgery consult appreciated, will continue to follow  Wound care following     Hypokalemiareplete potassium follow    Hypomagnesemia - Resolved     Hypocalcemialikely multifactorial  Nephrology consult appreciated     Acute kidney injuryCKD 3-4:   patient was found to have an elevated serum creatinine with unclear baseline, could be multifactorial, currently improving  Maintenance IV fluids held 2/1 2/2 bp  Encourage po intake  Us kidneys did not show obstruction  Nephrology following- probably at baseline creatinine      Metabolic acidosis: HCO3 is improved, off bicarb       Hypertension Patient is on coreg 6.25 mg BID, Amlodipine 10 mg. Hydralazine 50 mg tid added. Titrate     Diabetesabove goal, pta lantus to resume. Continue ssi/hypoglycemia protocol.   He reports 7-15 units daily depending on bg level.     ProphylaxisLovenox  FENcardiac diet, maintenance IV fluids, replete potassium and magnesium  Full code,      Disposition - ID recommendations     Code status: Full  Prophylaxis: Lovenox  Recommended Disposition: Home w/Family    Care Plan discussed with: Patient/Family, Nurse,  and Consultant Nephrology      Gemma Zapien MD

## 2021-02-01 NOTE — PROGRESS NOTES
Renal Progress Note    Patient: Uriel Dodd MRN: [id number]  SSN: [id number]    YOB: 1945  Age: 77 y.o. Sex: male      Admit Date: 1/26/2021    LOS: 5 days     Subjective:   Patient seen at bedside. Alert and awake, no acute distress. No new c/o today  Tells me he will be discharged home in a.m. No complaints of any swelling in lower extremities. No complaints of shortness of breath.  No c/o doarrhea        Current Facility-Administered Medications   Medication Dose Route Frequency    nafcillin (NALLPEN) 2 g in 0.9% sodium chloride (MBP/ADV) 100 mL MBP  2 g IntraVENous Q6H    amLODIPine (NORVASC) tablet 10 mg  10 mg Oral DAILY    influenza vaccine 2020-21 (4 yrs+)(PF) (FLUCELVAX QUAD) injection 0.5 mL  0.5 mL IntraMUSCular PRIOR TO DISCHARGE    calcitRIOL (ROCALTROL) capsule 0.5 mcg  0.5 mcg Oral DAILY    carvediloL (COREG) tablet 6.25 mg  6.25 mg Oral BID    calcium carbonate (TUMS) chewable tablet 400 mg [elemental]  400 mg Oral BID    oxyCODONE-acetaminophen (PERCOCET) 5-325 mg per tablet 1 Tab  1 Tab Oral Q4H PRN    hydrALAZINE (APRESOLINE) 20 mg/mL injection 10 mg  10 mg IntraVENous Q4H PRN    sodium chloride (NS) flush 5-40 mL  5-40 mL IntraVENous Q8H    sodium chloride (NS) flush 5-40 mL  5-40 mL IntraVENous PRN    acetaminophen (TYLENOL) tablet 650 mg  650 mg Oral Q6H PRN    Or    acetaminophen (TYLENOL) suppository 650 mg  650 mg Rectal Q6H PRN    polyethylene glycol (MIRALAX) packet 17 g  17 g Oral DAILY PRN    promethazine (PHENERGAN) tablet 12.5 mg  12.5 mg Oral Q6H PRN    Or    ondansetron (ZOFRAN) injection 4 mg  4 mg IntraVENous Q6H PRN    enoxaparin (LOVENOX) injection 30 mg  30 mg SubCUTAneous DAILY    glucose chewable tablet 16 g  4 Tab Oral PRN    dextrose (D50W) injection syrg 12.5-25 g  25-50 mL IntraVENous PRN    glucagon (GLUCAGEN) injection 1 mg  1 mg IntraMUSCular PRN    insulin lispro (HUMALOG) injection   SubCUTAneous AC&HS        Vitals: 01/31/21 1941 01/31/21 2324 02/01/21 0801 02/01/21 1132   BP: (!) 159/79 (!) 172/82 (!) 181/82 (!) 163/77   Pulse: 64 (!) 57 (!) 57 (!) 54   Resp: 16 16 16 16   Temp: 98 °F (36.7 °C) 98 °F (36.7 °C) 98 °F (36.7 °C) 97.6 °F (36.4 °C)   SpO2: 99% 100% 98% 100%   Weight:       Height:         Objective:   General: alert awake well-oriented, no acute distress. HEENT: EOMI, no Icterus, no Pallor, pupils reactive, mucosa moist,   Neck: Neck is supple, No JVD,   Lungs: breathsounds normal, no rhonchi, no rales,  CVS: heart sounds normal,  no murmurs, no rubs. GI: soft, nontender, normal BS,   Extremeties: no clubbing, no cyanosis, no edema,  Neuro: Alert, awake, oriented x3, speech is normal, moving all extremeties well. Skin: normal skin turgor, scalp area swelling /pain+  Musculoskeletal: no acute joint swellings       Intake and Output:  Current Shift: No intake/output data recorded. Last three shifts: No intake/output data recorded. Lab/Data Review:  Recent Labs     02/01/21  0645   WBC 7.8   HGB 8.4*   HCT 26.7*        Recent Labs     02/01/21  0645 01/31/21  0716 01/30/21  1015    143 139   K 4.1 4.0 4.0   * 111* 107   CO2 25 25 24   * 132* 273*   BUN 25* 29* 32*   CREA 2.66* 2.77* 2.55*   CA 7.9* 7.7* 7.1*   PHOS 3.5 3.1 2.7   ALB 2.3* 2.4* 2.5*     No results for input(s): PH, PCO2, PO2, HCO3, FIO2 in the last 72 hours.   Recent Results (from the past 24 hour(s))   GLUCOSE, POC    Collection Time: 01/31/21  3:36 PM   Result Value Ref Range    Glucose (POC) 249 (H) 65 - 100 mg/dL    Performed by Yves Peralta, POC    Collection Time: 01/31/21  7:34 PM   Result Value Ref Range    Glucose (POC) 191 (H) 65 - 100 mg/dL    Performed by [facility] ERIN    CBC WITH AUTOMATED DIFF    Collection Time: 02/01/21  6:45 AM   Result Value Ref Range    WBC 7.8 4.1 - 11.1 K/uL    RBC 2.99 (L) 4.10 - 5.70 M/uL    HGB 8.4 (L) 12.1 - 17.0 g/dL    HCT 26.7 (L) 36.6 - 50.3 %    MCV 89.3 80.0 - 99.0 FL    MCH 28.1 26.0 - 34.0 PG    MCHC 31.5 30.0 - 36.5 g/dL    RDW 15.1 (H) 11.5 - 14.5 %    PLATELET 587 863 - 023 K/uL    MPV 9.8 8.9 - 12.9 FL    NEUTROPHILS 66 32 - 75 %    LYMPHOCYTES 16 12 - 49 %    MONOCYTES 13 5 - 13 %    EOSINOPHILS 4 0 - 7 %    BASOPHILS 0 0 - 1 %    IMMATURE GRANULOCYTES 1 (H) 0.0 - 0.5 %    ABS. NEUTROPHILS 5.3 1.8 - 8.0 K/UL    ABS. LYMPHOCYTES 1.3 0.8 - 3.5 K/UL    ABS. MONOCYTES 1.0 0.0 - 1.0 K/UL    ABS. EOSINOPHILS 0.3 0.0 - 0.4 K/UL    ABS. BASOPHILS 0.0 0.0 - 0.1 K/UL    ABS. IMM. GRANS. 0.0 0.00 - 0.04 K/UL    DF AUTOMATED     RENAL FUNCTION PANEL    Collection Time: 02/01/21  6:45 AM   Result Value Ref Range    Sodium 143 136 - 145 mmol/L    Potassium 4.1 3.5 - 5.1 mmol/L    Chloride 112 (H) 97 - 108 mmol/L    CO2 25 21 - 32 mmol/L    Anion gap 6 5 - 15 mmol/L    Glucose 133 (H) 65 - 100 mg/dL    BUN 25 (H) 6 - 20 mg/dL    Creatinine 2.66 (H) 0.70 - 1.30 mg/dL    BUN/Creatinine ratio 9 (L) 12 - 20      GFR est AA 29 (L) >60 ml/min/1.73m2    GFR est non-AA 24 (L) >60 ml/min/1.73m2    Calcium 7.9 (L) 8.5 - 10.1 mg/dL    Phosphorus 3.5 2.6 - 4.7 mg/dL    Albumin 2.3 (L) 3.5 - 5.0 g/dL   GLUCOSE, POC    Collection Time: 02/01/21  7:39 AM   Result Value Ref Range    Glucose (POC) 141 (H) 65 - 100 mg/dL    Performed by Jed Pena    GLUCOSE, POC    Collection Time: 02/01/21 11:35 AM   Result Value Ref Range    Glucose (POC) 320 (H) 65 - 100 mg/dL    Performed by Graciela CHILDRESS         Assessment and Plan:     1. Acute Kidney Injury on ? CKD: probably prerenal azotemia secondary to dehydratrion/infection. Improving creatinine 3.25 to 2.8--2.9--2.55--2.6 today with IVF  IV fluids have been discontinued. I will continue to hold because of elevated blood pressure  Renal functions probably at baseline  No rhabdomyolysis   ultrasound of kidneys and bladder is negative for obstruction Advised increased p.o. fluid intake.   will continue to monitor renal functions and adjust management as needed    2. Chronic kidney disease: probably has stage 3/4 chronic kidney disease related to DM/HTN nephrosclerosis. Will continue to monitor renal functions to assess the baseline    3. Renal osteodystrophy:   Hypocalcemia present, improving with supplementation  Phosphorus level is normal  Severe secondary hyperparathyroidism noted, added calcitriol 0.5 mcg once daily  Vitamin D level is pending    3. Hypertension: Systolic hypertension ,   Poorly controlled despite increasing amlodipine to 10 td  I will add hydralazine 50 TIDWill continue to monitor and blood pressures and adjust antihypertensive regimen as needed. 4. Hypokalemia: probably related to poor p.o. intake   improved with Supplementation potassium and magnesium  Continue to monitor K and Mg    5. Metabolic acidosis:Secondary to acute kidney injury  nongap acidosis. co2 improved from 14-19--24-25   off bicarb drip now  Will monitor CO2 levels     6. Scalp abscess: Continue IV antibiotics  ID following the patient  Cultures are pending, hemodynamically stable    8.  Diabetes mellitus: hyperglycemia+  check urine random protein creatinine ratio to assess for proteinuria with diabetic nephropathy  Accu-Cheks/SSI coverage        Signed By: Carina Rabago MD     February 1, 2021

## 2021-02-02 NOTE — PROGRESS NOTES
Progress Note    Patient: Soniya Merritt MRN: [id number]  SSN: [id number]    YOB: 1945  Age: 77 y.o. Sex: male      Admit Date: 1/26/2021    LOS: 6 days     Subjective:   Patient seen in bed  Reports posterior scalp feels better  Continues to drain    Objective:     Vitals:    02/01/21 2319 02/02/21 0140 02/02/21 0311 02/02/21 0516   BP: (!) 166/79 (!) 179/89 (!) 153/69 (!) 157/77   Pulse: (!) 57 63 64 60   Resp: 18 16 16 16   Temp: 97.9 °F (36.6 °C) 97.6 °F (36.4 °C) 98 °F (36.7 °C)    SpO2: 98% 97% 98%    Weight:       Height:            Intake and Output:  Current Shift: No intake/output data recorded. Last three shifts: No intake/output data recorded.     Review of Systems:  ROS     Physical Exam:   Physical Exam  HENT:      Head:            Lab/Data Review:  Recent Results (from the past 24 hour(s))   GLUCOSE, POC    Collection Time: 02/01/21 11:35 AM   Result Value Ref Range    Glucose (POC) 320 (H) 65 - 100 mg/dL    Performed by [address] [place] [address], POC    Collection Time: 02/01/21  3:46 PM   Result Value Ref Range    Glucose (POC) 228 (H) 65 - 100 mg/dL    Performed by [address] [place] [address], POC    Collection Time: 02/01/21  8:02 PM   Result Value Ref Range    Glucose (POC) 196 (H) 65 - 100 mg/dL    Performed by [facility] ERIN    RENAL FUNCTION PANEL    Collection Time: 02/02/21  4:55 AM   Result Value Ref Range    Sodium 146 (H) 136 - 145 mmol/L    Potassium 4.2 3.5 - 5.1 mmol/L    Chloride 115 (H) 97 - 108 mmol/L    CO2 26 21 - 32 mmol/L    Anion gap 5 5 - 15 mmol/L    Glucose 250 (H) 65 - 100 mg/dL    BUN 24 (H) 6 - 20 mg/dL    Creatinine 2.81 (H) 0.70 - 1.30 mg/dL    BUN/Creatinine ratio 9 (L) 12 - 20      GFR est AA 27 (L) >60 ml/min/1.73m2    GFR est non-AA 22 (L) >60 ml/min/1.73m2    Calcium 7.6 (L) 8.5 - 10.1 mg/dL    Phosphorus 3.8 2.6 - 4.7 mg/dL    Albumin 2.2 (L) 3.5 - 5.0 g/dL            Assessment:     Active Problems:    Scalp abscess (1/27/2021)      Chronic renal failure syndrome, stage 4 (severe) ([facility]) (1/28/2021)        Plan:   Continue twice daily dressing changes as ordered  Leukocytosis resolved  Hemoglobin at admission was 10.6 currently 8.4 anemia likely secondary to chronic disease however recommend doing a Hemoccult stool will go ahead and order    Signed By: Rudolph Towsnend MD     February 2, 2021

## 2021-02-02 NOTE — PROGRESS NOTES
Renal Progress Note    Patient: Johnny Tenorio MRN: [id number]  SSN: [id number]    YOB: 1945  Age: 77 y.o. Sex: male      Admit Date: 1/26/2021    LOS: 6 days     Subjective:   Patient seen at bedside. Alert and awake, no acute distress. No new c/o today  Patient is still elevated  Tells me he will be discharged home in a.m. No complaints of any swelling in lower extremities. No complaints of shortness of breath.  No c/o doarrhea        Current Facility-Administered Medications   Medication Dose Route Frequency    [START ON 2/3/2021] insulin glargine (LANTUS) injection 12 Units  12 Units SubCUTAneous DAILY    hydrALAZINE (APRESOLINE) tablet 100 mg  100 mg Oral TID    nafcillin (NALLPEN) 2 g in 0.9% sodium chloride (MBP/ADV) 100 mL MBP  2 g IntraVENous Q6H    amLODIPine (NORVASC) tablet 10 mg  10 mg Oral DAILY    influenza vaccine 2020-21 (4 yrs+)(PF) (FLUCELVAX QUAD) injection 0.5 mL  0.5 mL IntraMUSCular PRIOR TO DISCHARGE    calcitRIOL (ROCALTROL) capsule 0.5 mcg  0.5 mcg Oral DAILY    carvediloL (COREG) tablet 6.25 mg  6.25 mg Oral BID    calcium carbonate (TUMS) chewable tablet 400 mg [elemental]  400 mg Oral BID    oxyCODONE-acetaminophen (PERCOCET) 5-325 mg per tablet 1 Tab  1 Tab Oral Q4H PRN    hydrALAZINE (APRESOLINE) 20 mg/mL injection 10 mg  10 mg IntraVENous Q4H PRN    sodium chloride (NS) flush 5-40 mL  5-40 mL IntraVENous Q8H    sodium chloride (NS) flush 5-40 mL  5-40 mL IntraVENous PRN    acetaminophen (TYLENOL) tablet 650 mg  650 mg Oral Q6H PRN    Or    acetaminophen (TYLENOL) suppository 650 mg  650 mg Rectal Q6H PRN    polyethylene glycol (MIRALAX) packet 17 g  17 g Oral DAILY PRN    promethazine (PHENERGAN) tablet 12.5 mg  12.5 mg Oral Q6H PRN    Or    ondansetron (ZOFRAN) injection 4 mg  4 mg IntraVENous Q6H PRN    enoxaparin (LOVENOX) injection 30 mg  30 mg SubCUTAneous DAILY    glucose chewable tablet 16 g  4 Tab Oral PRN    dextrose (D50W) injection syrg 12.5-25 g  25-50 mL IntraVENous PRN    glucagon (GLUCAGEN) injection 1 mg  1 mg IntraMUSCular PRN    insulin lispro (HUMALOG) injection   SubCUTAneous AC&HS        Vitals:    02/02/21 0311 02/02/21 0516 02/02/21 0908 02/02/21 1117   BP: (!) 153/69 (!) 157/77 (!) 158/75 (!) 189/82   Pulse: 64 60 63 (!) 57   Resp: 16 16 16 16   Temp: 98 °F (36.7 °C)  98.3 °F (36.8 °C) 98 °F (36.7 °C)   SpO2: 98%  99% 99%   Weight:       Height:         Objective:   General: alert awake well-oriented, no acute distress. HEENT: EOMI, no Icterus, no Pallor, pupils reactive, mucosa moist,   Neck: Neck is supple, No JVD,   Lungs: breathsounds normal, no rhonchi, no rales,  CVS: heart sounds normal,  no murmurs, no rubs. GI: soft, nontender, normal BS,   Extremeties: no clubbing, no cyanosis, no edema,  Neuro: Alert, awake, oriented x3, speech is normal, moving all extremeties well. Skin: normal skin turgor, scalp area swelling /pain+  Musculoskeletal: no acute joint swellings       Intake and Output:  Current Shift: No intake/output data recorded. Last three shifts: No intake/output data recorded. Lab/Data Review:  Recent Labs     02/01/21  0645   WBC 7.8   HGB 8.4*   HCT 26.7*        Recent Labs     02/02/21  0455 02/01/21  0645 01/31/21  0716   * 143 143   K 4.2 4.1 4.0   * 112* 111*   CO2 26 25 25   * 133* 132*   BUN 24* 25* 29*   CREA 2.81* 2.66* 2.77*   CA 7.6* 7.9* 7.7*   PHOS 3.8 3.5 3.1   ALB 2.2* 2.3* 2.4*     No results for input(s): PH, PCO2, PO2, HCO3, FIO2 in the last 72 hours.   Recent Results (from the past 24 hour(s))   GLUCOSE, POC    Collection Time: 02/01/21  3:46 PM   Result Value Ref Range    Glucose (POC) 228 (H) 65 - 100 mg/dL    Performed by Eve Palacios POC    Collection Time: 02/01/21  8:02 PM   Result Value Ref Range    Glucose (POC) 196 (H) 65 - 100 mg/dL    Performed by [facility] ERIN    RENAL FUNCTION PANEL    Collection Time: 02/02/21  4:55 AM   Result Value Ref Range    Sodium 146 (H) 136 - 145 mmol/L    Potassium 4.2 3.5 - 5.1 mmol/L    Chloride 115 (H) 97 - 108 mmol/L    CO2 26 21 - 32 mmol/L    Anion gap 5 5 - 15 mmol/L    Glucose 250 (H) 65 - 100 mg/dL    BUN 24 (H) 6 - 20 mg/dL    Creatinine 2.81 (H) 0.70 - 1.30 mg/dL    BUN/Creatinine ratio 9 (L) 12 - 20      GFR est AA 27 (L) >60 ml/min/1.73m2    GFR est non-AA 22 (L) >60 ml/min/1.73m2    Calcium 7.6 (L) 8.5 - 10.1 mg/dL    Phosphorus 3.8 2.6 - 4.7 mg/dL    Albumin 2.2 (L) 3.5 - 5.0 g/dL   GLUCOSE, POC    Collection Time: 02/02/21  9:10 AM   Result Value Ref Range    Glucose (POC) 247 (H) 65 - 100 mg/dL    Performed by [address] [place] [address], POC    Collection Time: 02/02/21 11:20 AM   Result Value Ref Range    Glucose (POC) 250 (H) 65 - 100 mg/dL    Performed by [facility] Jeni         Assessment and Plan:     1. Acute Kidney Injury on ? CKD: probably prerenal azotemia secondary to dehydratrion/infection. Improving creatinine 3.25 --> 2.6-2.8 range  Stable at 2.8 today. This is his baseline  Fluids have been discontinued which I will continue to hold because of elevated blood pressure  No rhabdomyolysis   ultrasound of kidneys and bladder is negative for obstruction   He is okay to be discharged home from renal standpoint. We will see him in the office in 2 weeks with preclinic labs  will continue to monitor renal functions and adjust management as needed    2. Chronic kidney disease: probably has stage 3/4 chronic kidney disease related to DM/HTN nephrosclerosis. Will continue to monitor renal functions to assess the baseline    3. Renal osteodystrophy:   Hypocalcemia present, improving with supplementation  Phosphorus level is normal  Severe secondary hyperparathyroidism noted, added calcitriol 0.5 mcg once daily  Vitamin D level is pending    3. Hypertension: Systolic hypertension ,   Poorly controlled despite adding hydralazine 50 TID.   Will increase it to 100 mg tid  Will continue Coreg at current dose heart rate is in the 50s. If blood pressure still stays elevated, I will change amlodipine to nifedipine 30 twice daily and titrate up as needed   Will continue to monitor and blood pressures and adjust antihypertensive regimen as needed. 4. Hypokalemia: probably related to poor p.o. intake   improved with Supplementation potassium and magnesium  Continue to monitor K and Mg    5. Metabolic acidosis:Secondary to acute kidney injury  nongap acidosis. co2 improved from 14-19--24-25   off bicarb drip now  Will monitor CO2 levels     6. Scalp abscess: Continue IV antibiotics  ID following the patient  Cultures are pending, hemodynamically stable    8.  Diabetes mellitus: hyperglycemia+  check urine random protein creatinine ratio to assess for proteinuria with diabetic nephropathy  Accu-Cheks/SSI coverage        Signed By: Whitley Livingston MD     February 2, 2021

## 2021-02-02 NOTE — PROGRESS NOTES
CM spoke to patient at bedside. Patient signed a choice for [facility]. CM sent multiple referrals on via. CM will continue to follow patient for discharge planning needs.

## 2021-02-02 NOTE — PROGRESS NOTES
Infectious Disease Progress Note           Subjective:   Doing well, no acute events since last seen. Denies  Scalp pain, fever/chills. Remains stable   Objective:   Physical Exam:     Visit Vitals  BP (!) 189/82 (BP 1 Location: Left lower arm, BP Patient Position: At rest;Supine)   Pulse (!) 57   Temp 98 °F (36.7 °C)   Resp 16   Ht 6' 2\" (1.88 m)   Wt 151 lb 14.4 oz (68.9 kg)   SpO2 99%   BMI 19.50 kg/m²      O2 Device: Room air    Temp (24hrs), Av °F (36.7 °C), Min:97.6 °F (36.4 °C), Max:98.3 °F (36.8 °C)    No intake/output data recorded. No intake/output data recorded. General: NAD, AAO x 4  HEENT: NATHALIE, Moist mucosa   Lungs: CTA b/l, no wheeze/rhonchi   Heart: S1S2+, RRR, no murmur  Abdo: Soft, NT, ND, +BS   Exts: No edema, + pulses b/l   Skin: Occipital scalp lesion, purulent drainage     Data Review:       Recent Days:  Recent Labs     21  0645   WBC 7.8   HGB 8.4*   HCT 26.7*        Recent Labs     21  0455 21  0645 21  0716   BUN 24* 25* 29*   CREA 2.81* 2.66* 2.77*     Microbiology   - MRSA screen is neg   - Wound Cx : Staph aureus   - Blood Cx : Negative    Diagnostics   CXR Results  (Last 48 hours)    None         Assessment/Plan     1. Occipital scalp abscess, multiseptated lesion on scalp with purulent drainage      MSSA isolated from wound Cx, decreased drainage       Remains afebrile w a normal WBC on most recent labs       On Nafcillin, to be discharged on dicloxacillin       Will follow up in 2 wks post d/c. Routine labs in the morning     2. Acute renal failure: Cr is staying stable      3. H/o DM: A1C of 6.2, BS control per primary     4.  Anemia of chronic disease: Stable Hgb      Lisa Cox MD    2021

## 2021-02-02 NOTE — ROUTINE PROCESS
{[facility] BEDSIDE_VERBALshift change report given to Alecia Orozco RN (oncoming nurse) by Marie Adams RN (offgoing nurse).  Report included the following information from the SBAR and night assessment

## 2021-02-02 NOTE — PROGRESS NOTES
Hospitalist Progress Note               Daily Progress Note: 2/2/2021      Subjective: The patient is seen for follow  up.   69-year-old male with a history of diabetes mellitus and hypertension and chronic kidney disease came to the hospital with a complaint of having a bump on the occipital area, this is draining purulent discharge. Seen by surgery today, continues drainage from posterior scalp abscess. Remains afebrile. BG remains above goal as is BP. Patient now is agreeable for home health and wound care- he previously had declined c.     Medications reviewed  Current Facility-Administered Medications   Medication Dose Route Frequency    hydrALAZINE (APRESOLINE) tablet 50 mg  50 mg Oral TID    insulin glargine (LANTUS) injection 8 Units  8 Units SubCUTAneous DAILY    nafcillin (NALLPEN) 2 g in 0.9% sodium chloride (MBP/ADV) 100 mL MBP  2 g IntraVENous Q6H    amLODIPine (NORVASC) tablet 10 mg  10 mg Oral DAILY    influenza vaccine 2020-21 (4 yrs+)(PF) (FLUCELVAX QUAD) injection 0.5 mL  0.5 mL IntraMUSCular PRIOR TO DISCHARGE    calcitRIOL (ROCALTROL) capsule 0.5 mcg  0.5 mcg Oral DAILY    carvediloL (COREG) tablet 6.25 mg  6.25 mg Oral BID    calcium carbonate (TUMS) chewable tablet 400 mg [elemental]  400 mg Oral BID    oxyCODONE-acetaminophen (PERCOCET) 5-325 mg per tablet 1 Tab  1 Tab Oral Q4H PRN    hydrALAZINE (APRESOLINE) 20 mg/mL injection 10 mg  10 mg IntraVENous Q4H PRN    sodium chloride (NS) flush 5-40 mL  5-40 mL IntraVENous Q8H    sodium chloride (NS) flush 5-40 mL  5-40 mL IntraVENous PRN    acetaminophen (TYLENOL) tablet 650 mg  650 mg Oral Q6H PRN    Or    acetaminophen (TYLENOL) suppository 650 mg  650 mg Rectal Q6H PRN    polyethylene glycol (MIRALAX) packet 17 g  17 g Oral DAILY PRN    promethazine (PHENERGAN) tablet 12.5 mg  12.5 mg Oral Q6H PRN    Or    ondansetron (ZOFRAN) injection 4 mg  4 mg IntraVENous Q6H PRN    enoxaparin (LOVENOX) injection 30 mg  30 mg SubCUTAneous DAILY    glucose chewable tablet 16 g  4 Tab Oral PRN    dextrose (D50W) injection syrg 12.5-25 g  25-50 mL IntraVENous PRN    glucagon (GLUCAGEN) injection 1 mg  1 mg IntraMUSCular PRN    insulin lispro (HUMALOG) injection   SubCUTAneous AC&HS       Review of Systems:   A comprehensive review of systems was negative except for that written in the HPI. Objective:   Physical Exam:     Visit Vitals  BP (!) 189/82 (BP 1 Location: Left lower arm, BP Patient Position: At rest;Supine)   Pulse (!) 57   Temp 98 °F (36.7 °C)   Resp 16   Ht 6' 2\" (1.88 m)   Wt 68.9 kg (151 lb 14.4 oz)   SpO2 99%   BMI 19.50 kg/m²      O2 Device: Room air    Temp (24hrs), Av °F (36.7 °C), Min:97.6 °F (36.4 °C), Max:98.3 °F (36.8 °C)    No intake/output data recorded. No intake/output data recorded. PHYSICAL EXAM:  General: Alert and awake  HEENT: Abscess on Rt occipital area with purulent discharge-not visualized as scalp dressing clean and dry. No ENT discharge, extraocular movements are intact  Cardiovascular: Regular rate and rhythm. No murmurs, gallops, or rubs. PMI nondisplaced. Carotids without bruits. Respiratory: Comfortable breathing with no accessory muscle use. Clear breath sounds with no wheezes, rales, or rhonchi. GI: Abdomen nondistended, soft, and nontender. Normal active bowel sounds. No enlargement of the liver or spleen. No masses palpable. Rectal: Deferred   Musculoskeletal: No pitting edema of the lower legs. Extremities have good range of motion. No costovertebral tenderness. Neurological: Gross memory appears intact. Patient is alert and oriented. Power 5/5, Cranial nerves II-XII intact  Psychiatric: Mood appears appropriate with judgement intact. Lymphatic: No cervical or supraclavicular adenopathy.     Data Review:       Recent Days:  Recent Labs     21  0645   WBC 7.8   HGB 8.4*   HCT 26.7*        Recent Labs     21  0455 21  0645 21  0716   * 143 143   K 4.2 4.1 4.0   * 112* 111*   CO2 26 25 25   * 133* 132*   BUN 24* 25* 29*   CREA 2.81* 2.66* 2.77*   CA 7.6* 7.9* 7.7*   PHOS 3.8 3.5 3.1   ALB 2.2* 2.3* 2.4*     No results for input(s): PH, PCO2, PO2, HCO3, FIO2 in the last 72 hours. Results     Procedure Component Value Units Date/Time    MRSA SCREEN - PCR (NASAL) [[id number]] Collected: 01/26/21 2000    Order Status: Completed Specimen: Swab Updated: 01/27/21 1415     MRSA by PCR, Nasal Not Detected       CULTURE, BLOOD, PAIRED [[id number]] Collected: 01/26/21 1820    Order Status: Completed Specimen: Blood Updated: 02/01/21 0942     Special Requests: No Special Requests        Culture result: No growth 6 days       CULTURE, Mac Corpus STAIN [[id number]]  (Susceptibility) Collected: 01/26/21 1743    Order Status: Completed Specimen: Wound Drainage Updated: 01/30/21 1058     Special Requests: No Special Requests        GRAM STAIN Few WBCs seen               2+ Gram Positive Cocci in clusters           Culture result:       Heavy Staphylococcus aureus          Susceptibility      Staphylococcus aureus     DARIN     Ciprofloxacin ($) Susceptible     Clindamycin ($) Susceptible     Daptomycin ($$$$$) Susceptible     Doxycycline ($$) Susceptible     Erythromycin ($$$$) Susceptible     Gentamicin ($) Susceptible     Levofloxacin ($) Susceptible     Linezolid ($$$$$) Susceptible     Moxifloxacin ($$$$) Susceptible     Oxacillin Susceptible     Rifampin ($$$$) Susceptible [1]      Tetracycline Susceptible     Trimeth/Sulfa Susceptible     Vancomycin ($) Susceptible            [1]   Rifampin is not to be used for mono-therapy. CULTURE, BLOOD [[id number]]     Order Status: Canceled Specimen: Blood             US RETROPERITONEUM COMP   Final Result   Early medical disease of right kidney. Left kidney not seen. Thick-walled bladder      CT HEAD WO CONT   Final Result   Impression: Age-appropriate atrophy. No acute findings. Patient has a soft tissue infection over the right occipital scalp. This is   shown to be fairly pronounced localized skin thickening, subcutaneous fat edema,   and some thickening of the deep soft tissues. There is no gas formation, bone   destruction, periosteal reaction or abscess             Assessment/     Problem List:  Hospital Problems  Never Reviewed          Codes Class Noted POA    Chronic renal failure syndrome, stage 4 (severe) ([facility]) ICD-10-CM: N18.4  ICD-9-CM: 585.4  1/28/2021 Unknown        Scalp abscess ICD-10-CM: L02.811  ICD-9-CM: 682.8  1/27/2021 Unknown                     Plan:  Scalp/Occipital abscesspatient presents with above-mentioned symptomatology based on patient's clinical presentation patient was found of a scalp abscess, does not meet sepsis criteria at this time and remains hemodynamically stable  No growth on blood cultures  MSSA on wound cultures  ID following, switched to iv Nafcillin with transition to po dicloxacillin at discharge. Infectious disease and surgery consult appreciated, will continue to follow  Wound care following  Pt is now agreeable for hhc/wound care.     Hypokalemiareplete potassium follow    Hypomagnesemia - Resolved     Hypocalcemialikely multifactorial  Nephrology consult appreciated     Acute kidney injuryCKD 3-4:   patient was found to have an elevated serum creatinine with unclear baseline, could be multifactorial, currently improving  Maintenance IV fluids held 2/1 2/2 bp  Encourage po intake  Us kidneys did not show obstruction  Nephrology following- probably at baseline creatinine    Vit D deficiency: Vit D 25 hydroxy=<9. Cont calcitriol    Metabolic acidosis: resolved, off bicarb now     Hypertension Patient is on coreg 6.25 mg BID, Amlodipine 10 mg. Hydralazine 50 mg tid added. Titrate     Diabetesremains above goal, pta lantus to resume & titrating to 12 units(2/2). Continue ssi/hypoglycemia protocol.   He reports 7-15 units daily depending on bg level.     ProphylaxisLovenox  FENcardiac diet, maintenance IV fluids, replete potassium and magnesium  Full code,      Disposition - previously declined hhc is not agreeable for hhc/wound care.  PT/Ot ordered for dc recs.     Code status: Full  Prophylaxis: Lovenox  Recommended Disposition: Home w/Family    Care Plan discussed with: Patient/Family, Nurse,  and Consultant Nephrology      Lizet Miramontes MD

## 2021-02-03 NOTE — PROGRESS NOTES
OCCUPATIONAL THERAPY EVALUATION  Patient: Jr Marshall (89 y.o. male)  Date: 2/3/2021  Primary Diagnosis: Scalp abscess [L02.811]        Precautions:      ASSESSMENT  Pt is a 76 y/o M with PMH of DM, HTN, CKD, presenting to [facility] with c/o scalp swelling. Per notes, pt states he was in his usual state of health until a few days back when he started experiencing gradual onset persistent progressive swelling of his scalp draining purulent discharge. Pt seen by surgery, per notes \"no need for urgent surgical intervention at this time as area spontaneously draining. \" Pt admitted 1/26/21 and being followed/treated for scalp abscess. Pt received semi-supine in bed upon arrival, AXO x4, and agreeable to OT evaluation at this time. Per pt, he lives with his daughter and two grandchildren in a one-story home with 10 DARRIUS and B HR, was IND with ADLs and ambulatory without any AD at [facility]. Based on current observations, pt presents with deficits in generalized strength/AROM, dynamic standing balance, functional activity tolerance, increased headache pain impacting overall performance of ADLs and functional transfers/mobility. Pt currently IND with bed mobility/supine>sit, dons socks EOB Mod I by bringing feet up onto bedside. Pt sit><stand to/from EOB IND, ambulates mod I while pushing IV pole into bathroom with safe descent onto toilet seat Mod I using grab bars for additional support. Pt completes hand washing task Mod I standing and the sink and then returns to bed and to supine IND at end of session. Pt reports mild dizziness with initial change as he has been in bed for a while, however does report ambulating to/from bed and bathroom without difficulty. Overall, pt tolerates session well today.  Pt would benefit from continued skilled OT services to address current impairments and improve overall IND and safety with self cares and functional mobility upon d/c. OT currently recommending HHOT vs home with family at d/c once medically appropriate. Other factors to consider for discharge: Good family support, time since onset, IND at baseline      Patient will benefit from skilled therapy intervention to address the above noted impairments. PLAN :  Recommendations and Planned Interventions: self care training, functional mobility training, therapeutic exercise, balance training, therapeutic activities, endurance activities, patient education, and home safety training    Frequency/Duration: Patient will be followed by occupational therapy 1 time a week to address goals. Recommendation for discharge: (in order for the patient to meet his/her long term goals)  HHOT vs Home with family     This discharge recommendation:  Has been made in collaboration with the attending provider and/or case management    IF patient discharges home will need the following DME: Pt may benefit from shower chair        SUBJECTIVE:   Patient stated i'm doing OK, I just have a headache    OBJECTIVE DATA SUMMARY:   HISTORY:   Past Medical History:   Diagnosis Date    Diabetes ([facility] [address]     Hypertension    History reviewed. No pertinent surgical history. Expanded or extensive additional review of patient history:     Home Situation  Home Environment: Private residence  # Steps to Enter: 10  Rails to Enter: Yes  Hand Rails : Bilateral  One/Two Story Residence: One story  Living Alone: No(Daughter and grandchildren)  Support Systems: Family member(s)  Patient Expects to be Discharged to[de-identified] Private residence  Current DME Used/Available at Home: None  Tub or Shower Type: Tub/Shower combination    Hand dominance: Right    EXAMINATION OF PERFORMANCE DEFICITS:  Cognitive/Behavioral Status:  Neurologic State: Alert  Orientation Level: Oriented X4  Cognition: Follows commands; Appropriate safety awareness    Skin: Bandaging noted around scalp, otherwise intact where visible    Hearing:   Auditory  Auditory Impairment: None    Vision/Perceptual:     [facility] Corrective Lenses: Glasses    Range of Motion:  AROM: Generally decreased, functional     Strength:  Strength: Within functional limits     Coordination:  Coordination: Within functional limits  Fine Motor Skills-Upper: Left Intact; Right Intact    Gross Motor Skills-Upper: Left Intact; Right Intact    Tone & Sensation:  Sensation: Intact    Balance:  Sitting: Intact  Standing: Intact    Functional Mobility and Transfers for ADLs:  Bed Mobility:  Rolling: Independent  Supine to Sit: Independent  Sit to Supine: Independent  Scooting: Independent    Transfers:  Sit to Stand: Independent  Stand to Sit: Independent  Bathroom Mobility: Modified independent(Pushing IV pole)  Toilet Transfer : Modified independent(Use of grab bars)    ADL Intervention and task modifications:  Grooming  Grooming Assistance: Modified independent  Position Performed: Standing  Washing Hands: Modified independent    Lower Body Dressing Assistance  Socks: Modified independent  Position Performed: Seated edge of bed    Therapeutic Exercise:  Pt would benefit from UE HEP to improve overall UE AROM/strength and can be further educated in next treatment session. Functional Measure:    [address] [place] [id number] AM-PACTM \"6 Clicks\"                                                       Daily Activity Inpatient Short Form  How much help from another person does the patient currently need. .. Total; A Lot A Little None   1. Putting on and taking off regular lower body clothing? []  1 []  2 []  3 [x]  4   2. Bathing (including washing, rinsing, drying)? []  1 []  2 [x]  3 []  4   3. Toileting, which includes using toilet, bedpan or urinal? [] 1 []  2 []  3 [x]  4   4. Putting on and taking off regular upper body clothing? []  1 []  2 []  3 [x]  4   5. Taking care of personal grooming such as brushing teeth? []  1 []  2 []  3 [x]  4   6. Eating meals? []  1 []  2 []  3 [x]  4   © 2007, Trustees of [address] [place] [address] [address] [address], under license to [employer].  All rights reserved     Score:      Interpretation of Tool:  Represents clinically-significant functional categories (i.e. Activities of daily living). Percentage of Impairment CH    0%   CI    1-19% CJ    20-39% CK    40-59% CL    60-79% CM    80-99% CN     100%   [facility]  Score 6-24 24 23 20-22 15-19 10-14 7-9 6       Occupational Therapy Evaluation Charge Determination   History Examination Decision-Making   LOW Complexity : Brief history review  LOW Complexity : 1-3 performance deficits relating to physical, cognitive , or psychosocial skils that result in activity limitations and / or participation restrictions  LOW Complexity : No comorbidities that affect functional and no verbal or physical assistance needed to complete eval tasks       Based on the above components, the patient evaluation is determined to be of the following complexity level: LOW   Pain Ratin/10 reported headache pain    Activity Tolerance:   Good  Please refer to the flowsheet for vital signs taken during this treatment. After treatment patient left in no apparent distress:    Supine in bed and Call bell within reach    COMMUNICATION/EDUCATION:   The patients plan of care was discussed with: Registered nurse. Patient/family have participated as able in goal setting and plan of care. and Patient/family agree to work toward stated goals and plan of care. This patients plan of care is appropriate for delegation to [facility].     Thank you for this referral.  Malick   Time Calculation: 21 mins   Problem: Self Care Deficits Care Plan (Adult)  Goal: *Acute Goals and Plan of Care (Insert Text)  Description: Pt will be IND grooming standing sinkside 10 minutes or greater  Pt will be IND LE dressing sitting EOB/long sit  Pt will be IND toileting/toilet transfer/cloth mgmt LRAD  Pt will be IND following UE HEP in prep for self care tasks     Outcome: Not Met

## 2021-02-03 NOTE — PROGRESS NOTES
PHYSICAL THERAPY EVALUATION  Patient: Mychal Nelson (34 y.o. male)  Date: 2/3/2021  Primary Diagnosis: Scalp abscess [L02.811]        Precautions: head abcess. ASSESSMENT  Pt is a 76 y/o M with PMH of DM, HTN, CKD, presenting to [facility] with c/o scalp swelling. Per notes, pt states he was in his usual state of health until a few days back when he started experiencing gradual onset persistent progressive swelling of his scalp draining purulent discharge. Pt seen by surgery, per notes \"no need for urgent surgical intervention at this time as area spontaneously draining. \" Pt admitted 1/26/21 and being followed/treated for scalp abscess. Pt received semi-supine in bed upon arrival, AXO x4, and agreeable to OT evaluation at this time. Per pt, he lives with his daughter and two grandchildren in a one-story home with 10 DARRIUS and B HR, was IND with ADLs and ambulatory without any AD at [facility]. pt presents with deficits in generalized strength/AROM, dynamic standing balance, functional activity tolerance, increased headache pain impacting overall performance of ADLs and functional transfers/mobility. Pt currently IND with bed mobility/supine>sit, . Pt sit><stand to/from EOB IND, ambulates mod I while pushing IV pole into hallways with safe turning to return. Pt reports mild dizziness with initial change as he has been in bed for a while, however does report ambulating to/from bed and bathroom without difficulty. Overall, pt tolerates session well today. Patient is at baseline. DC PT after eval.should be ablel to return home with daughter    Current Level of Function Impacting Discharge (mobility/balance): gen weakness  Other factors to consider for discharge: supervision initially. Patient will benefit from skilled therapy intervention to address the above noted impairments.         PLAN :  Recommendations and Planned Interventions: DC after eval  Frequency/Duration: Patient will be followed by physical therapy:  eval only  Recommendation for discharge: (in order for the patient to meet his/her long term goals)  No skilled physical therapy/ follow up rehabilitation needs identified at this time. This discharge recommendation:  Has been made in collaboration with the attending provider and/or case management    IF patient discharges home will need the following DME: none         SUBJECTIVE:   Patient stated I walked with the therapist earlier.     OBJECTIVE DATA SUMMARY:   HISTORY:    Past Medical History:   Diagnosis Date    Diabetes ([facility] [address]     Hypertension    History reviewed. No pertinent surgical history. Personal factors and/or comorbidities impacting plan of care:   Home Situation  Home Environment: Private residence  # Steps to Enter: 10  Rails to Enter: Yes  Hand Rails : Bilateral  One/Two Story Residence: One story  Living Alone: No(Daughter and grandchildren)  Support Systems: Family member(s)  Patient Expects to be Discharged to[de-identified] Private residence  Current DME Used/Available at Home: None  Tub or Shower Type: Tub/Shower combination    EXAMINATION/PRESENTATION/DECISION MAKING:   Critical Behavior:  Neurologic State: Alert  Orientation Level: Oriented X4  Cognition: Follows commands, Appropriate safety awareness     Hearing:   Auditory  Auditory Impairment: None  Range Of Motion:  AROM: Generally decreased, functional                       Strength:    Strength: Generally decreased, functional                    Tone & Sensation:                  Sensation: Intact               Coordination:  Coordination: Within functional limits  Vision:   Corrective Lenses: Glasses  Functional Mobility:  Bed Mobility:  Rolling: Independent  Supine to Sit: Independent  Sit to Supine: Independent  Scooting: Independent  Transfers:  Sit to Stand: Independent  Stand to Sit: Independent                       Balance:   Sitting: Intact  Standing: Intact  Ambulation/Gait Training:  Distance (ft): 220 Feet (ft)  Assistive Device: Gait belt(hha)  Ambulation - Level of Assistance: Modified independent     Gait Description (WDL): Exceptions to [facility]           Base of Support: Narrowed     Speed/Sherine: Slow                         Functional Measure:  [facility] AM-PAC 6 Clicks         Basic Mobility Inpatient Short Form  How much difficulty does the patient currently have. .. Unable A Lot A Little None   1. Turning over in bed (including adjusting bedclothes, sheets and blankets)? [] 1   [] 2   [] 3   [x] 4   2. Sitting down on and standing up from a chair with arms ( e.g., wheelchair, bedside commode, etc.)   [] 1   [] 2   [] 3   [x] 4   3. Moving from lying on back to sitting on the side of the bed? [] 1   [] 2   [] 3   [x] 4          How much help from another person does the patient currently need. .. Total A Lot A Little None   4. Moving to and from a bed to a chair (including a wheelchair)? [] 1   [] 2   [] 3   [x] 4   5. Need to walk in hospital room? [] 1   [] 2   [] 3   [x] 4   6. Climbing 3-5 steps with a railing? [] 1   [] 2   [] 3   [x] 4   © 2007, Trustees of [facility], under license to [employer]. All rights reserved     Score:  Initial: 24/24 Most Recent: X (Date: 02/03/2021 )   Interpretation of Tool:  Represents activities that are increasingly more difficult (i.e. Bed mobility, Transfers, Gait).   Score 24 23 22-20 19-15 14-10 9-7 6   Modifier CH CI CJ CK CL CM CN           Physical Therapy Evaluation Charge Determination   History Examination Presentation Decision-Making   LOW Complexity : Zero comorbidities / personal factors that will impact the outcome / POC LOW Complexity : 1-2 Standardized tests and measures addressing body structure, function, activity limitation and / or participation in recreation  LOW Complexity : Stable, uncomplicated  LOW Complexity : FOTO score of       Based on the above components, the patient evaluation is determined to be of the following complexity level: LOW Pain Ratin/10    Activity Tolerance: WNL  Please refer to the flowsheet for vital signs taken during this treatment. After treatment patient left in no apparent distress:   Supine in bed and Call bell within reach    COMMUNICATION/EDUCATION:   The patients plan of care was discussed with: Registered nurse. Patient/family have participated as able in goal setting and plan of care.     Thank you for this referral.  [place]bania Osuna PT   Time Calculation: 21 mins

## 2021-02-03 NOTE — PROGRESS NOTES
Dual nurse skin assessment. Assessment performed with Charisma Vizcarra RN. Patient has scalp abscess present on the posterior head. Wound is wrapped with medi honey, exufiber and kerlex around his head. No other skin issues noted.

## 2021-02-03 NOTE — PROGRESS NOTES
Hospitalist Progress Note               Daily Progress Note: 2/3/2021      Subjective: The patient is seen for follow  up.   67-year-old male with a history of diabetes mellitus and hypertension and chronic kidney disease came to the hospital with a complaint of having a bump on the occipital area, this is draining purulent discharge. Seen by surgery today, continues drainage from posterior scalp abscess. Remains afebrile. BG remains above goal as is BP. C/o severe pain from scalp lesion today, improved with increased percocet dosage. Denies other complaints. Remains agreeable for [facility].  ID appreciated.     Medications reviewed  Current Facility-Administered Medications   Medication Dose Route Frequency    insulin glargine (LANTUS) injection 12 Units  12 Units SubCUTAneous DAILY    hydrALAZINE (APRESOLINE) tablet 100 mg  100 mg Oral TID    nafcillin (NALLPEN) 2 g in 0.9% sodium chloride (MBP/ADV) 100 mL MBP  2 g IntraVENous Q6H    amLODIPine (NORVASC) tablet 10 mg  10 mg Oral DAILY    influenza vaccine 2020-21 (4 yrs+)(PF) (FLUCELVAX QUAD) injection 0.5 mL  0.5 mL IntraMUSCular PRIOR TO DISCHARGE    calcitRIOL (ROCALTROL) capsule 0.5 mcg  0.5 mcg Oral DAILY    carvediloL (COREG) tablet 6.25 mg  6.25 mg Oral BID    calcium carbonate (TUMS) chewable tablet 400 mg [elemental]  400 mg Oral BID    oxyCODONE-acetaminophen (PERCOCET) 5-325 mg per tablet 1 Tab  1 Tab Oral Q4H PRN    hydrALAZINE (APRESOLINE) 20 mg/mL injection 10 mg  10 mg IntraVENous Q4H PRN    sodium chloride (NS) flush 5-40 mL  5-40 mL IntraVENous Q8H    sodium chloride (NS) flush 5-40 mL  5-40 mL IntraVENous PRN    acetaminophen (TYLENOL) tablet 650 mg  650 mg Oral Q6H PRN    Or    acetaminophen (TYLENOL) suppository 650 mg  650 mg Rectal Q6H PRN    polyethylene glycol (MIRALAX) packet 17 g  17 g Oral DAILY PRN    promethazine (PHENERGAN) tablet 12.5 mg  12.5 mg Oral Q6H PRN    Or    ondansetron (ZOFRAN) injection 4 mg  4 mg IntraVENous Q6H PRN    enoxaparin (LOVENOX) injection 30 mg  30 mg SubCUTAneous DAILY    glucose chewable tablet 16 g  4 Tab Oral PRN    dextrose (D50W) injection syrg 12.5-25 g  25-50 mL IntraVENous PRN    glucagon (GLUCAGEN) injection 1 mg  1 mg IntraMUSCular PRN    insulin lispro (HUMALOG) injection   SubCUTAneous AC&HS       Review of Systems:   A comprehensive review of systems was negative except for that written in the HPI. Objective:   Physical Exam:     Visit Vitals  BP (!) 172/85 (BP 1 Location: Left upper arm, BP Patient Position: At rest)   Pulse 61   Temp 98 °F (36.7 °C)   Resp 18   Ht 6' 2\" (1.88 m)   Wt 68.9 kg (151 lb 14.4 oz)   SpO2 97%   BMI 19.50 kg/m²      O2 Device: Room air    Temp (24hrs), Av.9 °F (36.6 °C), Min:97.6 °F (36.4 °C), Max:98.2 °F (36.8 °C)    No intake/output data recorded.  1901 -  0700  In: 1050 [P.O.:1050]  Out: -     PHYSICAL EXAM:  General: Alert and awake  HEENT: Abscess on Rt occipital area with purulent discharge-not visualized as scalp dressing clean and dry. No ENT discharge, extraocular movements are intact  Cardiovascular: Regular rate and rhythm. No murmurs, gallops, or rubs. PMI nondisplaced. Carotids without bruits. Respiratory: Sits up in bed easily for exam. Comfortable breathing with no accessory muscle use. Clear breath sounds with no wheezes, rales, or rhonchi. GI: Abdomen nondistended, soft, and nontender. Normal active bowel sounds. No enlargement of the liver or spleen. No masses palpable. Rectal: Deferred   Musculoskeletal: No pitting edema of the lower legs. Extremities have good range of motion. No costovertebral tenderness. Neurological: Gross memory appears intact. Patient is alert and oriented. Power 5/5, Cranial nerves II-XII intact  Psychiatric: Mood appears appropriate with judgement intact. Lymphatic: No cervical or supraclavicular adenopathy.     Data Review:       Recent Days:  Recent Labs     21  1206 02/01/21  0645   WBC 7.5 7.8   HGB 9.0* 8.4*   HCT 29.3* 26.7*    308     Recent Labs     02/03/21  0858 02/02/21  0455 02/01/21  0645    146* 143   K 4.4 4.2 4.1   * 115* 112*   CO2 27 26 25   * 250* 133*   BUN 23* 24* 25*   CREA 2.40* 2.81* 2.66*   CA 8.0* 7.6* 7.9*   PHOS  --  3.8 3.5   ALB  --  2.2* 2.3*     No results for input(s): PH, PCO2, PO2, HCO3, FIO2 in the last 72 hours. Results     Procedure Component Value Units Date/Time    MRSA SCREEN - PCR (NASAL) [[id number]] Collected: 01/26/21 2000    Order Status: Completed Specimen: Swab Updated: 01/27/21 1415     MRSA by PCR, Nasal Not Detected       CULTURE, BLOOD, PAIRED [[id number]] Collected: 01/26/21 1820    Order Status: Completed Specimen: Blood Updated: 02/01/21 0942     Special Requests: No Special Requests        Culture result: No growth 6 days       CULTURE, [place] STAIN [[id number]]  (Susceptibility) Collected: 01/26/21 1743    Order Status: Completed Specimen: Wound Drainage Updated: 01/30/21 1058     Special Requests: No Special Requests        GRAM STAIN Few WBCs seen               2+ Gram Positive Cocci in clusters           Culture result:       Heavy Staphylococcus aureus          Susceptibility      Staphylococcus aureus     DARIN     Ciprofloxacin ($) Susceptible     Clindamycin ($) Susceptible     Daptomycin ($$$$$) Susceptible     Doxycycline ($$) Susceptible     Erythromycin ($$$$) Susceptible     Gentamicin ($) Susceptible     Levofloxacin ($) Susceptible     Linezolid ($$$$$) Susceptible     Moxifloxacin ($$$$) Susceptible     Oxacillin Susceptible     Rifampin ($$$$) Susceptible [1]      Tetracycline Susceptible     Trimeth/Sulfa Susceptible     Vancomycin ($) Susceptible            [1]   Rifampin is not to be used for mono-therapy.                  CULTURE, BLOOD [[id number]]     Order Status: Canceled Specimen: Blood             US RETROPERITONEUM COMP   Final Result   Early medical disease of right kidney. Left kidney not seen. Thick-walled bladder      CT HEAD WO CONT   Final Result   Impression: Age-appropriate atrophy. No acute findings. Patient has a soft tissue infection over the right occipital scalp. This is   shown to be fairly pronounced localized skin thickening, subcutaneous fat edema,   and some thickening of the deep soft tissues. There is no gas formation, bone   destruction, periosteal reaction or abscess             Assessment/     Problem List:  Hospital Problems  Never Reviewed          Codes Class Noted POA    Chronic renal failure syndrome, stage 4 (severe) ([facility]) ICD-10-CM: N18.4  ICD-9-CM: 585.4  1/28/2021 Unknown        Scalp abscess ICD-10-CM: L02.811  ICD-9-CM: 682.8  1/27/2021 Unknown                     Plan:  Scalp/Occipital abscesspatient presents with above-mentioned symptomatology based on patient's clinical presentation patient was found of a scalp abscess, does not meet sepsis criteria at this time and remains hemodynamically stable  No growth on blood cultures  MSSA on wound cultures  ID following, switched to iv Nafcillin with transition to po dicloxacillin at discharge. Infectious disease and surgery consult appreciated, will continue to follow  Wound care following  Pt remains agreeable for hhc/wound care.     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  Cont calcitriol    Metabolic acidosis: resolved, off bicarb now     HypertensionRemains above goal despite Patient is on coreg 6.25 mg BID(not increasing 2/2 borderline bradycardia)  Amlodipine 10 mg. Hydralazine 100 mg tid. Will switch back to his pta Nifedipine xr 60 md daily and stop amlodipine.      Diabetesremains above goal, pta lantus to resume & titrated to 12 units(2/2). Continue ssi/hypoglycemia protocol. He reports 7-15 units daily depending on bg level pta.     ProphylaxisLovenox  FENcardiac diet, maintenance IV fluids, replete potassium and magnesium  Full code,      Disposition - previously declined hhc is not agreeable for hhc/wound care. PT/Ot ordered for dc recs.  Anticipate home 2/4.     Code status: Full  Prophylaxis: Lovenox  Recommended Disposition: Home w/Family    Care Plan discussed with: Patient/Family, Nurse,  and Consultant Nephrology      Iris Ruiz MD

## 2021-02-03 NOTE — PROGRESS NOTES
Renal Progress Note    Patient: Anshul Dotson MRN: [id number]  SSN: [id number]    YOB: 1945  Age: 77 y.o. Sex: male      Admit Date: 1/26/2021    LOS: 7 days     Subjective:   Patient seen at bedside. Alert and awake, no acute distress. No new c/o today  BP is still elevated  No complaints of any swelling in lower extremities. No complaints of shortness of breath.  No c/o doarrhea        Current Facility-Administered Medications   Medication Dose Route Frequency    [START ON 2/4/2021] NIFEdipine ER (PROCARDIA XL) tablet 60 mg  60 mg Oral DAILY    insulin glargine (LANTUS) injection 12 Units  12 Units SubCUTAneous DAILY    hydrALAZINE (APRESOLINE) tablet 100 mg  100 mg Oral TID    nafcillin (NALLPEN) 2 g in 0.9% sodium chloride (MBP/ADV) 100 mL MBP  2 g IntraVENous Q6H    influenza vaccine 2020-21 (4 yrs+)(PF) (FLUCELVAX QUAD) injection 0.5 mL  0.5 mL IntraMUSCular PRIOR TO DISCHARGE    calcitRIOL (ROCALTROL) capsule 0.5 mcg  0.5 mcg Oral DAILY    carvediloL (COREG) tablet 6.25 mg  6.25 mg Oral BID    calcium carbonate (TUMS) chewable tablet 400 mg [elemental]  400 mg Oral BID    oxyCODONE-acetaminophen (PERCOCET) 5-325 mg per tablet 1 Tab  1 Tab Oral Q4H PRN    hydrALAZINE (APRESOLINE) 20 mg/mL injection 10 mg  10 mg IntraVENous Q4H PRN    sodium chloride (NS) flush 5-40 mL  5-40 mL IntraVENous Q8H    sodium chloride (NS) flush 5-40 mL  5-40 mL IntraVENous PRN    acetaminophen (TYLENOL) tablet 650 mg  650 mg Oral Q6H PRN    Or    acetaminophen (TYLENOL) suppository 650 mg  650 mg Rectal Q6H PRN    polyethylene glycol (MIRALAX) packet 17 g  17 g Oral DAILY PRN    promethazine (PHENERGAN) tablet 12.5 mg  12.5 mg Oral Q6H PRN    Or    ondansetron (ZOFRAN) injection 4 mg  4 mg IntraVENous Q6H PRN    enoxaparin (LOVENOX) injection 30 mg  30 mg SubCUTAneous DAILY    glucose chewable tablet 16 g  4 Tab Oral PRN    dextrose (D50W) injection syrg 12.5-25 g  25-50 mL IntraVENous PRN    glucagon (GLUCAGEN) injection 1 mg  1 mg IntraMUSCular PRN    insulin lispro (HUMALOG) injection   SubCUTAneous AC&HS        Vitals:    02/03/21 0533 02/03/21 0732 02/03/21 1155 02/03/21 1529   BP: (!) 152/80 (!) 197/95 (!) 172/85 (!) 155/77   Pulse: 70 60 61 61   Resp: 16 20 18 18   Temp: 98.2 °F (36.8 °C) 97.6 °F (36.4 °C) 98 °F (36.7 °C) 98.1 °F (36.7 °C)   SpO2: 98% 97% 97% 100%   Weight:       Height:         Objective:   General: alert awake well-oriented, no acute distress. HEENT: EOMI, no Icterus, no Pallor, pupils reactive, mucosa moist,   Neck: Neck is supple, No JVD,   Lungs: breathsounds normal, no rhonchi, no rales,  CVS: heart sounds normal,  no murmurs, no rubs. GI: soft, nontender, normal BS,   Extremeties: no clubbing, no cyanosis, no edema,  Neuro: Alert, awake, oriented x3, speech is normal, moving all extremeties well. Skin: normal skin turgor, scalp area swelling /pain+  Musculoskeletal: no acute joint swellings       Intake and Output:  Current Shift: No intake/output data recorded. Last three shifts: 02/01 1901 - 02/03 0700  In: 1050 [P.O.:1050]  Out: -       Lab/Data Review:  Recent Labs     02/03/21  0858 02/01/21  0645   WBC 7.5 7.8   HGB 9.0* 8.4*   HCT 29.3* 26.7*    308     Recent Labs     02/03/21  0858 02/02/21  0455 02/01/21  0645    146* 143   K 4.4 4.2 4.1   * 115* 112*   CO2 27 26 25   * 250* 133*   BUN 23* 24* 25*   CREA 2.40* 2.81* 2.66*   CA 8.0* 7.6* 7.9*   PHOS  --  3.8 3.5   ALB  --  2.2* 2.3*     No results for input(s): PH, PCO2, PO2, HCO3, FIO2 in the last 72 hours.   Recent Results (from the past 24 hour(s))   GLUCOSE, POC    Collection Time: 02/02/21  4:49 PM   Result Value Ref Range    Glucose (POC) 134 (H) 65 - 100 mg/dL    Performed by [address] [place] [address], POC    Collection Time: 02/02/21  9:08 PM   Result Value Ref Range    Glucose (POC) 184 (H) 65 - 100 mg/dL    Performed by [facility], POC    Collection Time: 02/03/21  8:04 AM   Result Value Ref Range    Glucose (POC) 111 (H) 65 - 100 mg/dL    Performed by [facility]    METABOLIC PANEL, BASIC    Collection Time: 02/03/21  8:58 AM   Result Value Ref Range    Sodium 142 136 - 145 mmol/L    Potassium 4.4 3.5 - 5.1 mmol/L    Chloride 111 (H) 97 - 108 mmol/L    CO2 27 21 - 32 mmol/L    Anion gap 4 (L) 5 - 15 mmol/L    Glucose 189 (H) 65 - 100 mg/dL    BUN 23 (H) 6 - 20 mg/dL    Creatinine 2.40 (H) 0.70 - 1.30 mg/dL    BUN/Creatinine ratio 10 (L) 12 - 20      GFR est AA 32 (L) >60 ml/min/1.73m2    GFR est non-AA 27 (L) >60 ml/min/1.73m2    Calcium 8.0 (L) 8.5 - 10.1 mg/dL   CBC WITH AUTOMATED DIFF    Collection Time: 02/03/21  8:58 AM   Result Value Ref Range    WBC 7.5 4.1 - 11.1 K/uL    RBC 3.17 (L) 4.10 - 5.70 M/uL    HGB 9.0 (L) 12.1 - 17.0 g/dL    HCT 29.3 (L) 36.6 - 50.3 %    MCV 92.4 80.0 - 99.0 FL    MCH 28.4 26.0 - 34.0 PG    MCHC 30.7 30.0 - 36.5 g/dL    RDW 15.1 (H) 11.5 - 14.5 %    PLATELET 316 772 - 316 K/uL    MPV 10.2 8.9 - 12.9 FL    NEUTROPHILS 68 32 - 75 %    LYMPHOCYTES 20 12 - 49 %    MONOCYTES 7 5 - 13 %    EOSINOPHILS 4 0 - 7 %    BASOPHILS 1 0 - 1 %    IMMATURE GRANULOCYTES 0 0.0 - 0.5 %    ABS. NEUTROPHILS 5.2 1.8 - 8.0 K/UL    ABS. LYMPHOCYTES 1.5 0.8 - 3.5 K/UL    ABS. MONOCYTES 0.5 0.0 - 1.0 K/UL    ABS. EOSINOPHILS 0.3 0.0 - 0.4 K/UL    ABS. BASOPHILS 0.0 0.0 - 0.1 K/UL    ABS. IMM. GRANS. 0.0 0.00 - 0.04 K/UL    DF AUTOMATED     GLUCOSE, POC    Collection Time: 02/03/21 11:57 AM   Result Value Ref Range    Glucose (POC) 226 (H) 65 - 100 mg/dL    Performed by Isidra Puente and Plan:     1. Acute Kidney Injury on ? CKD: probably prerenal azotemia secondary to dehydratrion/infection.    Improving creatinine 3.25 --> 2.6-->2.4 range  This is his baseline  Fluids have been discontinued which I will continue to hold because of elevated blood pressure  No rhabdomyolysis   ultrasound of kidneys and bladder is negative for obstruction He is okay to be discharged home from renal standpoint. We will see him in the office in 2 weeks with preclinic labs  will continue to monitor renal functions and adjust management as needed    2. Chronic kidney disease: probably has stage 3/4 chronic kidney disease related to DM/HTN nephrosclerosis. Will continue to monitor renal functions to assess the baseline    3. Renal osteodystrophy:   Hypocalcemia present, improving with supplementation  Phosphorus level is normal  Severe secondary hyperparathyroidism noted, added calcitriol 0.5 mcg once daily  Vitamin D level is pending    3. Hypertension: Systolic hypertension ,   Poorly controlled despite adding hydralazine 100 TID. Will continue Coreg at current dose heart rate is in the 50s. I will change amlodipine to nifedipine 30 twice daily and titrate up as needed   Will continue to monitor and blood pressures and adjust antihypertensive regimen as needed. 4. Hypokalemia: probably related to poor p.o. intake   improved with Supplementation potassium and magnesium  Continue to monitor K and Mg    5. Metabolic acidosis:Secondary to acute kidney injury  nongap acidosis. co2 improved from 14-19--24-27   off bicarb drip now  Will monitor CO2 levels     6. Scalp abscess: Continue IV antibiotics  ID following the patient  Cultures are pending, hemodynamically stable    8.  Diabetes mellitus: hyperglycemia+  check urine random protein creatinine ratio to assess for proteinuria with diabetic nephropathy  Accu-Cheks/SSI coverage        Signed By: Jorge Bryant MD     February 3, 2021

## 2021-02-04 NOTE — PROGRESS NOTES
Progress Note    Patient: Joel Cook MRN: [id number]  SSN: [id number]    YOB: 1945  Age: 77 y.o. Sex: male      Admit Date: 1/26/2021    LOS: 8 days     Subjective:   Patient seen in bed  No complaints    Objective:     Vitals:    02/03/21 2314 02/04/21 0104 02/04/21 0339 02/04/21 0513   BP: (!) 161/80 (!) 162/80 (!) 169/77 (!) 159/80   Pulse: 67  66    Resp: 18  18    Temp: 98 °F (36.7 °C)  98.2 °F (36.8 °C)    SpO2: 96%  98%    Weight:       Height:            Intake and Output:  Current Shift: No intake/output data recorded.   Last three shifts: 02/02 1901 - 02/04 0700  In: 500 [P.O.:500]  Out: 400 [Urine:400]    Review of Systems:  ROS     Physical Exam:   Physical Exam  HENT:      Head:            Lab/Data Review:  Recent Results (from the past 24 hour(s))   GLUCOSE, POC    Collection Time: 02/03/21  8:04 AM   Result Value Ref Range    Glucose (POC) 111 (H) 65 - 100 mg/dL    Performed by Alexandra Armando    METABOLIC PANEL, BASIC    Collection Time: 02/03/21  8:58 AM   Result Value Ref Range    Sodium 142 136 - 145 mmol/L    Potassium 4.4 3.5 - 5.1 mmol/L    Chloride 111 (H) 97 - 108 mmol/L    CO2 27 21 - 32 mmol/L    Anion gap 4 (L) 5 - 15 mmol/L    Glucose 189 (H) 65 - 100 mg/dL    BUN 23 (H) 6 - 20 mg/dL    Creatinine 2.40 (H) 0.70 - 1.30 mg/dL    BUN/Creatinine ratio 10 (L) 12 - 20      GFR est AA 32 (L) >60 ml/min/1.73m2    GFR est non-AA 27 (L) >60 ml/min/1.73m2    Calcium 8.0 (L) 8.5 - 10.1 mg/dL   CBC WITH AUTOMATED DIFF    Collection Time: 02/03/21  8:58 AM   Result Value Ref Range    WBC 7.5 4.1 - 11.1 K/uL    RBC 3.17 (L) 4.10 - 5.70 M/uL    HGB 9.0 (L) 12.1 - 17.0 g/dL    HCT 29.3 (L) 36.6 - 50.3 %    MCV 92.4 80.0 - 99.0 FL    MCH 28.4 26.0 - 34.0 PG    MCHC 30.7 30.0 - 36.5 g/dL    RDW 15.1 (H) 11.5 - 14.5 %    PLATELET 684 448 - 479 K/uL    MPV 10.2 8.9 - 12.9 FL    NEUTROPHILS 68 32 - 75 %    LYMPHOCYTES 20 12 - 49 %    MONOCYTES 7 5 - 13 %    EOSINOPHILS 4 0 - 7 % BASOPHILS 1 0 - 1 %    IMMATURE GRANULOCYTES 0 0.0 - 0.5 %    ABS. NEUTROPHILS 5.2 1.8 - 8.0 K/UL    ABS. LYMPHOCYTES 1.5 0.8 - 3.5 K/UL    ABS. MONOCYTES 0.5 0.0 - 1.0 K/UL    ABS. EOSINOPHILS 0.3 0.0 - 0.4 K/UL    ABS. BASOPHILS 0.0 0.0 - 0.1 K/UL    ABS. IMM. GRANS. 0.0 0.00 - 0.04 K/UL    DF AUTOMATED     GLUCOSE, POC    Collection Time: 02/03/21 11:57 AM   Result Value Ref Range    Glucose (POC) 226 (H) 65 - 100 mg/dL    Performed by Karla Arrieta    GLUCOSE, POC    Collection Time: 02/03/21  4:31 PM   Result Value Ref Range    Glucose (POC) 173 (H) 65 - 100 mg/dL    Performed by [address] POC    Collection Time: 02/03/21  8:15 PM   Result Value Ref Range    Glucose (POC) 176 (H) 65 - 100 mg/dL    Performed by Ron Morel    GLUCOSE, POC    Collection Time: 02/04/21  5:59 AM   Result Value Ref Range    Glucose (POC) 126 (H) 65 - 100 mg/dL    Performed by Ron Morel           Assessment:     Active Problems:    Scalp abscess (1/27/2021)      Chronic renal failure syndrome, stage 4 (severe) ([facility] [address] (1/28/2021)        Plan:     Leukocytosis resolved however there is still fluctuance which likely is inadequately draining spontaneously at this point. Continue current dressings.   Plan for the OR tomorrow for incision and drainage and debridement of scalp wound    Signed By: Yulia Cotton MD     February 4, 2021

## 2021-02-04 NOTE — PROGRESS NOTES
Renal Progress Note    Patient: Abdi Mills MRN: [id number]  SSN: [id number]    YOB: 1945  Age: 77 y.o. Sex: male      Admit Date: 1/26/2021    LOS: 8 days     Subjective:   Patient seen at bedside. Alert and awake, no acute distress. No new c/o today  BP is much better today  No complaints of any swelling in lower extremities. No complaints of shortness of breath.  No c/o doarrhea        Current Facility-Administered Medications   Medication Dose Route Frequency    NIFEdipine ER (PROCARDIA XL) tablet 60 mg  60 mg Oral BID    insulin glargine (LANTUS) injection 12 Units  12 Units SubCUTAneous DAILY    hydrALAZINE (APRESOLINE) tablet 100 mg  100 mg Oral TID    nafcillin (NALLPEN) 2 g in 0.9% sodium chloride (MBP/ADV) 100 mL MBP  2 g IntraVENous Q6H    influenza vaccine 2020-21 (4 yrs+)(PF) (FLUCELVAX QUAD) injection 0.5 mL  0.5 mL IntraMUSCular PRIOR TO DISCHARGE    calcitRIOL (ROCALTROL) capsule 0.5 mcg  0.5 mcg Oral DAILY    carvediloL (COREG) tablet 6.25 mg  6.25 mg Oral BID    calcium carbonate (TUMS) chewable tablet 400 mg [elemental]  400 mg Oral BID    oxyCODONE-acetaminophen (PERCOCET) 5-325 mg per tablet 1 Tab  1 Tab Oral Q4H PRN    hydrALAZINE (APRESOLINE) 20 mg/mL injection 10 mg  10 mg IntraVENous Q4H PRN    sodium chloride (NS) flush 5-40 mL  5-40 mL IntraVENous Q8H    sodium chloride (NS) flush 5-40 mL  5-40 mL IntraVENous PRN    acetaminophen (TYLENOL) tablet 650 mg  650 mg Oral Q6H PRN    Or    acetaminophen (TYLENOL) suppository 650 mg  650 mg Rectal Q6H PRN    polyethylene glycol (MIRALAX) packet 17 g  17 g Oral DAILY PRN    promethazine (PHENERGAN) tablet 12.5 mg  12.5 mg Oral Q6H PRN    Or    ondansetron (ZOFRAN) injection 4 mg  4 mg IntraVENous Q6H PRN    enoxaparin (LOVENOX) injection 30 mg  30 mg SubCUTAneous DAILY    glucose chewable tablet 16 g  4 Tab Oral PRN    dextrose (D50W) injection syrg 12.5-25 g  25-50 mL IntraVENous PRN    glucagon (GLUCAGEN) injection 1 mg  1 mg IntraMUSCular PRN    insulin lispro (HUMALOG) injection   SubCUTAneous AC&HS        Vitals:    02/04/21 0339 02/04/21 0513 02/04/21 0700 02/04/21 1248   BP: (!) 169/77 (!) 159/80 (!) 175/84 (!) 141/73   Pulse: 66  62 65   Resp: 18  18 18   Temp: 98.2 °F (36.8 °C)  98 °F (36.7 °C) 97.2 °F (36.2 °C)   SpO2: 98%  100% 100%   Weight:       Height:    6' 2\" (1.88 m)     Objective:   General: alert awake well-oriented, no acute distress. HEENT: EOMI, no Icterus, no Pallor, pupils reactive, mucosa moist,   Neck: Neck is supple, No JVD,   Lungs: breathsounds normal, no rhonchi, no rales,  CVS: heart sounds normal,  no murmurs, no rubs. GI: soft, nontender, normal BS,   Extremeties: no clubbing, no cyanosis, no edema,  Neuro: Alert, awake, oriented x3, speech is normal, moving all extremeties well. Skin: normal skin turgor, scalp area swelling /pain+  Musculoskeletal: no acute joint swellings       Intake and Output:  Current Shift: No intake/output data recorded. Last three shifts: 02/02 1901 - 02/04 0700  In: 500 [P.O.:500]  Out: 400 [Urine:400]      Lab/Data Review:  Recent Labs     02/03/21  0858   WBC 7.5   HGB 9.0*   HCT 29.3*        Recent Labs     02/03/21  0858 02/02/21  0455    146*   K 4.4 4.2   * 115*   CO2 27 26   * 250*   BUN 23* 24*   CREA 2.40* 2.81*   CA 8.0* 7.6*   PHOS  --  3.8   ALB  --  2.2*     No results for input(s): PH, PCO2, PO2, HCO3, FIO2 in the last 72 hours.   Recent Results (from the past 24 hour(s))   GLUCOSE, POC    Collection Time: 02/03/21  4:31 PM   Result Value Ref Range    Glucose (POC) 173 (H) 65 - 100 mg/dL    Performed by Suraj Ramos alfredo, POC    Collection Time: 02/03/21  8:15 PM   Result Value Ref Range    Glucose (POC) 176 (H) 65 - 100 mg/dL    Performed by Shawanda [facility], POC    Collection Time: 02/04/21  5:59 AM   Result Value Ref Range    Glucose (POC) 126 (H) 65 - 100 mg/dL    Performed by Yodit Bacon GLUCOSE, POC    Collection Time: 02/04/21  7:33 AM   Result Value Ref Range    Glucose (POC) 125 (H) 65 - 100 mg/dL    Performed by [address] [place] [address] Road, POC    Collection Time: 02/04/21 12:44 PM   Result Value Ref Range    Glucose (POC) 283 (H) 65 - 100 mg/dL    Performed by RAMÓN BORDEN         Assessment and Plan:     1. Acute Kidney Injury on ? CKD: probably prerenal azotemia secondary to dehydratrion/infection. Improving creatinine 3.25 --> 2.6-->2.4. Today's labs are pending  This is his baseline  Continue to hold IV fluids because of elevated blood pressure no rhabdomyolysis   ultrasound of kidneys and bladder is negative for obstruction   He is okay to be discharged home from renal standpoint. We will see him in the office in 2 weeks with preclinic labs  will continue to monitor renal functions and adjust management as needed    2. Chronic kidney disease: probably has stage 3/4 chronic kidney disease related to DM/HTN nephrosclerosis. Will continue to monitor renal functions to assess the baseline    3. Renal osteodystrophy:   Hypocalcemia present, improving with supplementation  Phosphorus level is normal  Severe secondary hyperparathyroidism noted, added calcitriol 0.5 mcg once daily  Vitamin D level is pending    3. Hypertension: Systolic hypertension ,   Poorly controlled despite adding hydralazine 100 TID. Will continue Coreg at current dose heart rate is in the 50s. I will change amlodipine to nifedipine 30 twice daily and titrate up as needed   Will continue to monitor and blood pressures and adjust antihypertensive regimen as needed. 4. Hypokalemia: probably related to poor p.o. intake   improved with Supplementation potassium and magnesium  Continue to monitor K and Mg    5. Metabolic acidosis:Secondary to acute kidney injury  nongap acidosis. co2 improved from 14-19--24-27   off bicarb drip now  Will monitor CO2 levels     6.   Scalp abscess: Continue IV antibiotics  ID following the patient  Cultures are pending, hemodynamically stable    7. Scalp wound  surgery on the case.   Plan for debridement in a.m.    8. Diabetes mellitus: hyperglycemia+  check urine random protein creatinine ratio to assess for proteinuria with diabetic nephropathy  Accu-Cheks/SSI coverage        Signed By: Madeline Early MD     February 4, 2021

## 2021-02-04 NOTE — PROGRESS NOTES
Infectious Disease Progress Note           Subjective:   Stable, surgical debridement scheduled for . Denies new complaints. No fever/chills   Objective:   Physical Exam:     Visit Vitals  BP (!) 141/71   Pulse 63   Temp 98.5 °F (36.9 °C)   Resp 18   Ht 6' 2\" (1.88 m)   Wt 151 lb 14.4 oz (68.9 kg)   SpO2 96%   BMI 19.50 kg/m²      O2 Device: Room air    Temp (24hrs), Av.9 °F (36.6 °C), Min:97.2 °F (36.2 °C), Max:98.5 °F (36.9 °C)    No intake/output data recorded.  1901 -  0700  In: 500 [P.O.:500]  Out: 400 [Urine:400]    General: NAD, AAO x 4  HEENT: NATHALIE, Moist mucosa   Lungs: CTA b/l, no wheeze/rhonchi   Heart: S1S2+, RRR, no murmur  Abdo: Soft, NT, ND, +BS   Exts: No edema, + pulses b/l   Skin: Occipital scalp lesion, purulent drainage, some fluctuance     Data Review:       Recent Days:  Recent Labs     21  0858   WBC 7.5   HGB 9.0*   HCT 29.3*        Recent Labs     21  0858 21  0455   BUN 23* 24*   CREA 2.40* 2.81*     Microbiology   - MRSA screen is neg   - Wound Cx : Staph aureus   - Blood Cx : Negative    Diagnostics   CXR Results  (Last 48 hours)    None         Assessment/Plan     1. Occipital scalp abscess, multiseptated lesion on scalp with purulent drainage      MSSA isolated from wound Cx. Ongoing purulent drainage w some fluctuance       Scheduled for debridement on       Continue on Nafcillin while hospitalized     2. Acute renal failure: Cr trending down on serial labs     3. H/o DM: A1C of 6.2, BS control per primary     4.  Anemia of chronic disease: Stable Hgb      Kamran Diego MD    2021

## 2021-02-04 NOTE — PROGRESS NOTES
Hospitalist Progress Note               Daily Progress Note: 2/4/2021      Subjective: The patient is seen for follow  up.   56-year-old male with a history of diabetes mellitus and hypertension and chronic kidney disease came to the hospital with a complaint of having a bump on the occipital area, this is draining purulent discharge. Seen by surgery today, continues drainage from posterior scalp abscess. Plans for  Or debridement tomorrow. Pain scalp/occipital persists  Remains afebrile. BG remains above goal as is BP. Denies other complaints. Remains agreeable for [facility].  ID/ gen surgery appreciated.     Medications reviewed  Current Facility-Administered Medications   Medication Dose Route Frequency    NIFEdipine ER (PROCARDIA XL) tablet 60 mg  60 mg Oral BID    insulin glargine (LANTUS) injection 12 Units  12 Units SubCUTAneous DAILY    hydrALAZINE (APRESOLINE) tablet 100 mg  100 mg Oral TID    nafcillin (NALLPEN) 2 g in 0.9% sodium chloride (MBP/ADV) 100 mL MBP  2 g IntraVENous Q6H    influenza vaccine 2020-21 (4 yrs+)(PF) (FLUCELVAX QUAD) injection 0.5 mL  0.5 mL IntraMUSCular PRIOR TO DISCHARGE    calcitRIOL (ROCALTROL) capsule 0.5 mcg  0.5 mcg Oral DAILY    carvediloL (COREG) tablet 6.25 mg  6.25 mg Oral BID    calcium carbonate (TUMS) chewable tablet 400 mg [elemental]  400 mg Oral BID    oxyCODONE-acetaminophen (PERCOCET) 5-325 mg per tablet 1 Tab  1 Tab Oral Q4H PRN    hydrALAZINE (APRESOLINE) 20 mg/mL injection 10 mg  10 mg IntraVENous Q4H PRN    sodium chloride (NS) flush 5-40 mL  5-40 mL IntraVENous Q8H    sodium chloride (NS) flush 5-40 mL  5-40 mL IntraVENous PRN    acetaminophen (TYLENOL) tablet 650 mg  650 mg Oral Q6H PRN    Or    acetaminophen (TYLENOL) suppository 650 mg  650 mg Rectal Q6H PRN    polyethylene glycol (MIRALAX) packet 17 g  17 g Oral DAILY PRN    promethazine (PHENERGAN) tablet 12.5 mg  12.5 mg Oral Q6H PRN    Or    ondansetron (ZOFRAN) injection 4 mg  4 mg IntraVENous Q6H PRN    enoxaparin (LOVENOX) injection 30 mg  30 mg SubCUTAneous DAILY    glucose chewable tablet 16 g  4 Tab Oral PRN    dextrose (D50W) injection syrg 12.5-25 g  25-50 mL IntraVENous PRN    glucagon (GLUCAGEN) injection 1 mg  1 mg IntraMUSCular PRN    insulin lispro (HUMALOG) injection   SubCUTAneous AC&HS       Review of Systems:   A comprehensive review of systems was negative except for that written in the HPI. Objective:   Physical Exam:     Visit Vitals  BP (!) 175/84 (BP 1 Location: Left upper arm, BP Patient Position: At rest)   Pulse 62   Temp 98 °F (36.7 °C)   Resp 18   Ht 6' 2\" (1.88 m)   Wt 68.9 kg (151 lb 14.4 oz)   SpO2 100%   BMI 19.50 kg/m²      O2 Device: Room air    Temp (24hrs), Av °F (36.7 °C), Min:97.7 °F (36.5 °C), Max:98.2 °F (36.8 °C)    No intake/output data recorded.  1901 -  0700  In: 500 [P.O.:500]  Out: 400 [Urine:400]    PHYSICAL EXAM:  General: Alert and awake  HEENT: Abscess on Rt occipital area with purulent discharge-not visualized as scalp dressing clean and dry. No ENT discharge, extraocular movements are intact  Cardiovascular: Regular rate and rhythm. No murmurs, gallops, or rubs. PMI nondisplaced. Carotids without bruits. Respiratory: Sits up in bed easily for exam. Comfortable breathing with no accessory muscle use. Clear breath sounds with no wheezes, rales, or rhonchi. GI: Abdomen nondistended, soft, and nontender. Normal active bowel sounds. No enlargement of the liver or spleen. No masses palpable. Rectal: Deferred   Musculoskeletal: No pitting edema of the lower legs. Extremities have good range of motion. No costovertebral tenderness. Neurological: Gross memory appears intact. Patient is alert and oriented. Power 5/5, Cranial nerves II-XII intact  Psychiatric: Mood appears appropriate with judgement intact. Lymphatic: No cervical or supraclavicular adenopathy.     Data Review:       Recent Days:  Recent Labs 02/03/21  0858   WBC 7.5   HGB 9.0*   HCT 29.3*        Recent Labs     02/03/21  0858 02/02/21  0455    146*   K 4.4 4.2   * 115*   CO2 27 26   * 250*   BUN 23* 24*   CREA 2.40* 2.81*   CA 8.0* 7.6*   PHOS  --  3.8   ALB  --  2.2*     No results for input(s): PH, PCO2, PO2, HCO3, FIO2 in the last 72 hours. Results     Procedure Component Value Units Date/Time    MRSA SCREEN - PCR (NASAL) [[id number]] Collected: 01/26/21 2000    Order Status: Completed Specimen: Swab Updated: 01/27/21 1415     MRSA by PCR, Nasal Not Detected       CULTURE, BLOOD, PAIRED [[id number]] Collected: 01/26/21 1820    Order Status: Completed Specimen: Blood Updated: 02/01/21 0942     Special Requests: No Special Requests        Culture result: No growth 6 days       CULTURE, Mac Corpus STAIN [[id number]]  (Susceptibility) Collected: 01/26/21 1743    Order Status: Completed Specimen: Wound Drainage Updated: 01/30/21 1058     Special Requests: No Special Requests        GRAM STAIN Few WBCs seen               2+ Gram Positive Cocci in clusters           Culture result:       Heavy Staphylococcus aureus          Susceptibility      Staphylococcus aureus     DARIN     Ciprofloxacin ($) Susceptible     Clindamycin ($) Susceptible     Daptomycin ($$$$$) Susceptible     Doxycycline ($$) Susceptible     Erythromycin ($$$$) Susceptible     Gentamicin ($) Susceptible     Levofloxacin ($) Susceptible     Linezolid ($$$$$) Susceptible     Moxifloxacin ($$$$) Susceptible     Oxacillin Susceptible     Rifampin ($$$$) Susceptible [1]      Tetracycline Susceptible     Trimeth/Sulfa Susceptible     Vancomycin ($) Susceptible            [1]   Rifampin is not to be used for mono-therapy. CULTURE, BLOOD [[id number]]     Order Status: Canceled Specimen: Blood             US RETROPERITONEUM COMP   Final Result   Early medical disease of right kidney. Left kidney not seen.    Thick-walled bladder      CT HEAD WO CONT   Final Result   Impression: Age-appropriate atrophy. No acute findings. Patient has a soft tissue infection over the right occipital scalp. This is   shown to be fairly pronounced localized skin thickening, subcutaneous fat edema,   and some thickening of the deep soft tissues. There is no gas formation, bone   destruction, periosteal reaction or abscess             Assessment/     Problem List:  Hospital Problems  Never Reviewed          Codes Class Noted POA    Chronic renal failure syndrome, stage 4 (severe) ([facility]) ICD-10-CM: N18.4  ICD-9-CM: 585.4  1/28/2021 Unknown        Scalp abscess ICD-10-CM: L02.811  ICD-9-CM: 682.8  1/27/2021 Unknown                     Plan:  Scalp/Occipital abscesspatient presents with above-mentioned symptomatology based on patient's clinical presentation patient was found of a scalp abscess, does not meet sepsis criteria at this time and remains hemodynamically stable  No growth on blood cultures  MSSA on wound cultures  ID following, switched to iv Nafcillin with transition to po dicloxacillin at discharge. Infectious disease and surgery consult appreciated, will continue to follow  Wound care following  Pt remains agreeable for hhc/wound care. To OR for debridement 2/5     Hypokalemia & Hypomagnesemiarepleted       Hypocalcemialikely multifactorial  Nephrology consult appreciated     Acute kidney injuryCKD 3-4:   patient was found to have an elevated serum creatinine with unclear baseline, could be multifactorial, currently improving  Maintenance IV fluids held 2/1 2/2 bp  Encourage po intake  Us kidneys did not show obstruction  Nephrology following- probably at baseline creatinine    Vit D deficiency: Vit D 25 hydroxy=<9. Cont calcitriol    Metabolic acidosis: resolved, off bicarb now     HypertensionRemains above goal despite Patient is on coreg 6.25 mg BID(not increasing 2/2 borderline bradycardia)  Amlodipine 10 mg.  Hydralazine 100 mg tid. switched back to his pta Nifedipine xr 60 md daily and stop amlodipine. Follow and titrate     Diabetesremains above goal, pta lantus to resume & titrated to 14 units(2/4). Continue ssi/hypoglycemia protocol. He reports 7-15 units daily depending on bg level pta.     ProphylaxisLovenox  FENcardiac diet, maintenance IV fluids, replete potassium and magnesium  Full code,      Disposition - previously declined hhc is not agreeable for hhc/wound care. PT/Ot ordered for dc recs.  OR for debridement 2/5     Code status: Full  Prophylaxis: Lovenox  Recommended Disposition: Home w/Family    Care Plan discussed with: Patient/Family, Nurse,  and Consultant Nephrology      Prerna Vivar MD

## 2021-02-04 NOTE — PROGRESS NOTES
Comprehensive Nutrition Assessment    Type and Reason for Visit: RD nutrition re-screen/LOS    Nutrition Recommendations/Plan:   Modify diet to include Cardiac, Consistent Carb 2000 kcal, Regular  Honor pt preferences within diet restriction  Adjust insulin regimen for goal of euglycemia  Nursing to document % p.o. meal and snack intake, BMs in I/Os    Nutrition Assessment:  Admitted for scalp abscess; per EMR plan for the OR tomorrow for incision and drainage and debridement of scalp wound. Pt reports eating 100% of trays. Diet order: Cardiac, regular, will modify to include Consistent Carb 2/2 h/o DM and BS above goal. Labs and meds reviewed. Malnutrition Assessment:  Malnutrition Status:  No malnutrition      Nutrition Related Findings:  NFPE not performed 2/2 pt resting. Pt denied chewing or swallowing difficulties; no n/v/c/d; last BM 2/4. No edema recorded.       Wounds:    Open wounds((R) LE)       Current Nutrition Therapies:  DIET CARDIAC Regular  DIET NPO    Anthropometric Measures:  · Height:  6' 2\" (188 cm)  · Current Body Wt:  68.9 kg (151 lb 14.4 oz)   · Usual Body Wt:  68 kg (150 lb) pt stated   · Ideal Body Wt:  190 lbs:  79.9 %   · BMI Category:  Underweight (BMI less than 22) age over 72       Nutrition Diagnosis:   No nutrition diagnosis at this time     Nutrition Interventions:   Food and/or Nutrient Delivery: Modify current diet  Nutrition Education and Counseling: No recommendations at this time  Coordination of Nutrition Care: Continue to monitor while inpatient    Nutrition Monitoring and Evaluation:   Behavioral-Environmental Outcomes: None identified  Food/Nutrient Intake Outcomes: Food and nutrient intake  Physical Signs/Symptoms Outcomes: Weight, Biochemical data    Discharge Planning:    No discharge needs at this time     Electronically signed by Roxy Sanon RD on 2/4/2021 at 1:06 PM    Contact:

## 2021-02-05 NOTE — PROGRESS NOTES
Renal Progress Note    Patient: Fernando Tracey MRN: [id number]  SSN: [id number]    YOB: 1945  Age: 77 y.o. Sex: male      Admit Date: 1/26/2021    LOS: 9 days     Subjective:   Patient seen at bedside. Alert and awake, no acute distress. No new c/o today  BP is better this morning but he did not receive his morning BP meds. N.p.o. for surgery today  No complaints of any swelling in lower extremities. No complaints of shortness of breath.  No c/o doarrhea        Current Facility-Administered Medications   Medication Dose Route Frequency    dextrose 5 % - 0.45% NaCl infusion  50 mL/hr IntraVENous CONTINUOUS    insulin glargine (LANTUS) injection 14 Units  14 Units SubCUTAneous DAILY    oxyCODONE-acetaminophen (PERCOCET) 5-325 mg per tablet 1 Tab  1 Tab Oral Q6H PRN    oxyCODONE-acetaminophen (PERCOCET 10)  mg per tablet 1 Tab  1 Tab Oral Q6H PRN    NIFEdipine ER (PROCARDIA XL) tablet 60 mg  60 mg Oral BID    hydrALAZINE (APRESOLINE) tablet 100 mg  100 mg Oral TID    nafcillin (NALLPEN) 2 g in 0.9% sodium chloride (MBP/ADV) 100 mL MBP  2 g IntraVENous Q6H    influenza vaccine 2020-21 (4 yrs+)(PF) (FLUCELVAX QUAD) injection 0.5 mL  0.5 mL IntraMUSCular PRIOR TO DISCHARGE    calcitRIOL (ROCALTROL) capsule 0.5 mcg  0.5 mcg Oral DAILY    carvediloL (COREG) tablet 6.25 mg  6.25 mg Oral BID    calcium carbonate (TUMS) chewable tablet 400 mg [elemental]  400 mg Oral BID    hydrALAZINE (APRESOLINE) 20 mg/mL injection 10 mg  10 mg IntraVENous Q4H PRN    sodium chloride (NS) flush 5-40 mL  5-40 mL IntraVENous Q8H    sodium chloride (NS) flush 5-40 mL  5-40 mL IntraVENous PRN    acetaminophen (TYLENOL) tablet 650 mg  650 mg Oral Q6H PRN    Or    acetaminophen (TYLENOL) suppository 650 mg  650 mg Rectal Q6H PRN    polyethylene glycol (MIRALAX) packet 17 g  17 g Oral DAILY PRN    promethazine (PHENERGAN) tablet 12.5 mg  12.5 mg Oral Q6H PRN    Or    ondansetron (ZOFRAN) injection 4 mg  4 mg IntraVENous Q6H PRN    enoxaparin (LOVENOX) injection 30 mg  30 mg SubCUTAneous DAILY    glucose chewable tablet 16 g  4 Tab Oral PRN    dextrose (D50W) injection syrg 12.5-25 g  25-50 mL IntraVENous PRN    glucagon (GLUCAGEN) injection 1 mg  1 mg IntraMUSCular PRN    insulin lispro (HUMALOG) injection   SubCUTAneous AC&HS        Vitals:    02/05/21 1021 02/05/21 1050 02/05/21 1120 02/05/21 1244   BP: (!) 148/76 (!) 146/58 (!) 163/79 (!) 153/81   Pulse: (!) 58 63 62 66   Resp: 20 20 20 20   Temp: 97.5 °F (36.4 °C) 97.6 °F (36.4 °C) 97.7 °F (36.5 °C) 97.5 °F (36.4 °C)   SpO2: 93% 91% 95% 95%   Weight:       Height:         Objective:   General: alert awake well-oriented, no acute distress. HEENT: EOMI, no Icterus, no Pallor, pupils reactive, mucosa moist,   Neck: Neck is supple, No JVD,   Lungs: breathsounds normal, no rhonchi, no rales,  CVS: heart sounds normal,  no murmurs, no rubs. GI: soft, nontender, normal BS,   Extremeties: no clubbing, no cyanosis, no edema,  Neuro: Alert, awake, oriented x3, speech is normal, moving all extremeties well. Skin: normal skin turgor, scalp area swelling /pain+  Musculoskeletal: no acute joint swellings       Intake and Output:  Current Shift: 02/05 0701 - 02/05 1900  In: 250 [I.V.:250]  Out: 220 [Urine:200]  Last three shifts: 02/03 1901 - 02/05 0700  In: 500 [P.O.:500]  Out: 400 [Urine:400]      Lab/Data Review:  Recent Labs     02/03/21  0858   WBC 7.5   HGB 9.0*   HCT 29.3*        Recent Labs     02/03/21  0858      K 4.4   *   CO2 27   *   BUN 23*   CREA 2.40*   CA 8.0*     No results for input(s): PH, PCO2, PO2, HCO3, FIO2 in the last 72 hours.   Recent Results (from the past 24 hour(s))   GLUCOSE, POC    Collection Time: 02/04/21  3:06 PM   Result Value Ref Range    Glucose (POC) 273 (H) 65 - 100 mg/dL    Performed by [address], POC    Collection Time: 02/05/21  7:38 AM   Result Value Ref Range    Glucose (POC) 90 65 - 100 mg/dL    Performed by Estefani Waters    GLUCOSE, POC    Collection Time: 02/05/21  8:07 AM   Result Value Ref Range    Glucose (POC) 108 (H) 65 - 100 mg/dL    Performed by Carmela Mays    GLUCOSE, POC    Collection Time: 02/05/21 11:44 AM   Result Value Ref Range    Glucose (POC) 116 (H) 65 - 100 mg/dL    Performed by Nimo Michelle and Plan:     1. Acute Kidney Injury on ? CKD: probably prerenal azotemia secondary to dehydratrion/infection. Improving creatinine 3.25 --> 2.6-->2.4. Today's labs are pending  This is his baseline  Continue to hold IV fluids because of elevated blood pressure no rhabdomyolysis   ultrasound of kidneys and bladder is negative for obstruction   He is okay to be discharged home from renal standpoint. We will see him in the office in 2 weeks with preclinic labs  will continue to monitor renal functions and adjust management as needed    2. Chronic kidney disease: probably has stage 3/4 chronic kidney disease related to DM/HTN nephrosclerosis. Will continue to monitor renal functions to assess the baseline    3. Renal osteodystrophy:   Hypocalcemia present, improving with supplementation  Phosphorus level is normal  Severe secondary hyperparathyroidism noted, added calcitriol 0.5 mcg once daily  Vitamin D level is pending    3. Hypertension: Systolic hypertension ,   Poorly controlled despite adding hydralazine 100 TID. Will continue Coreg at current dose heart rate is in the 50s. I will change amlodipine to nifedipine 30 twice daily and titrate up as needed   Will continue to monitor and blood pressures and adjust antihypertensive regimen as needed. 4. Hypokalemia: probably related to poor p.o. intake   improved with Supplementation potassium and magnesium  Continue to monitor K and Mg    5. Metabolic acidosis:Secondary to acute kidney injury  nongap acidosis. co2 improved from 14-19--24-27   off bicarb drip now  Will monitor CO2 levels     6.   Scalp abscess: Continue IV antibiotics  ID following the patient. For I&D today  Cultures are pending, hemodynamically stable    7. Scalp wound  surgery on the case. Plan for debridement today    8.  Diabetes mellitus: hyperglycemia+  check urine random protein creatinine ratio to assess for proteinuria with diabetic nephropathy  Accu-Cheks/SSI coverage        Signed By: Jorge Bryant MD     February 5, 2021

## 2021-02-05 NOTE — PROGRESS NOTES
Infectious Disease Progress Note           Subjective:   Continues to do well clinically, denies new complaints, no acute events since last seen. Underwent debridement of scalp abscess today   Objective:   Physical Exam:     Visit Vitals  BP (!) 148/77   Pulse 61   Temp 97.9 °F (36.6 °C)   Resp 20   Ht 6' 2\" (1.88 m)   Wt 151 lb 14.4 oz (68.9 kg)   SpO2 97%   BMI 19.50 kg/m²    O2 Flow Rate (L/min): 2 l/min O2 Device: Room air    Temp (24hrs), Av.6 °F (36.4 °C), Min:97.1 °F (36.2 °C), Max:98 °F (36.7 °C)    701 - 1900  In: 989.2 [P.O.:500; I.V.:489.2]  Out: 620 [Urine:600]   1901 -  07  In: 500 [P.O.:500]  Out: 400 [Urine:400]    General: NAD, AAO x 4  HEENT: NATHALIE, Moist mucosa   Lungs: CTA b/l, no wheeze/rhonchi   Heart: S1S2+, RRR, no murmur  Abdo: Soft, NT, ND, +BS   Exts: No edema, + pulses b/l   Skin: Head dressing in place, not examined   Data Review:       Recent Days:  Recent Labs     21  0858   WBC 7.5   HGB 9.0*   HCT 29.3*        Recent Labs     21  0858   BUN 23*   CREA 2.40*     Microbiology   - MRSA screen is neg   - Wound Cx : Staph aureus   - Blood Cx : Negative    Diagnostics   CXR Results  (Last 48 hours)    None         Assessment/Plan     1. Occipital scalp abscess, multiseptated lesion on scalp with purulent drainage      MSSA isolated from wound Cx. S/p debridement in the OR today       Continue on Nafcillin in house, d/c on Dicloxacillin 500 mg QID x 2 wks when ready       Routine labs in the morning     2. Acute renal failure: Cr trending down on serial labs     3. H/o DM: A1C of 6.2, BS control per primary     4.  Anemia of chronic disease: Stable Hgb     Will only follow intermittently since clinically stable from ID stand point      Susan Estrella MD    2021

## 2021-02-05 NOTE — PROGRESS NOTES
Chart reviewed. Patient has been accepted to [facility] when patient is ready for discharge. CM will continue to follow patient for discharge planning needs.

## 2021-02-05 NOTE — ROUTINE PROCESS
Bedside shift change report given to Felix Rodriguez RN by Marie Chavarria RN Report included SBAR information .

## 2021-02-05 NOTE — OP NOTES
Operative Note    Patient: Beth Stone  YOB: 1945  MRN: [id number]    Date of Procedure: 2/5/2021     Pre-Op Diagnosis: SCALP ABSCESS    Post-Op Diagnosis: Same as preoperative diagnosis. Procedure(s):  INCISION & DRAINAGE SCALP ABSCESS/DEBRIDEMENT OF ABSCESS    Surgeon(s):  Saroj Villalobos MD    Surgical Assistant: Registered Nurse First Assistant: Kassi Lucas RN  Surg Asst-1: Anaid Yuan    Anesthesia: General     Estimated Blood Loss (mL):  18LK    Complications: None    Specimens: * No specimens in log *     Implants: * No implants in log *    Drains: * No LDAs found *    Findings: approximately [id number] abscess cavity    Detailed Description of Procedure: The patient was brought to the operating room and, following the induction of general anesthesia was positioned in the left lateral decubitus position. The posterior scalp was then prepped and draped in standard sterile fashion. A surgical timeout was performed outside the patient's identity and surgical procedure were once again confirmed. There is noted the patient had a approximately 4 x 7 cm abscess. There was some epidermal lysis overlying this. There was a central area of opening which was enlarged to allow the wound to be completely drained and irrigated out. Necrotic tissue was sharply debrided. The area of debridement was roughly 1.5 cm². After ensuring meticulous hemostasis the abscess was packed with half-inch iodoform gauze. Marcaine was infiltrated in the operative field the procedure terminated. Dressings were applied and the patient returned to supine position, awakened, extubated, taken recovery in stable condition. All counts correct at the close of the case.     Electronically Signed by Buddy Pearson MD on 2/5/2021 at 9:25 AM

## 2021-02-05 NOTE — PROGRESS NOTES
Dr. Gracia Early, Anesthesia, notified of BP elevated this morning at 182/92; pt received PO Hydralazine and PO pain medication w/ sip of water; no new orders received

## 2021-02-05 NOTE — PROGRESS NOTES
Hospitalist Progress Note               Daily Progress Note: 2/5/2021      Subjective: The patient is seen for follow  up.   61-year-old male with a history of diabetes mellitus and hypertension and chronic kidney disease came to the hospital with a complaint of having a bump on the occipital area, this is draining purulent discharge. Seen in pre-op,  He is alert, nad,  States pain controlled   Anticipate debridement scalp lesion today. BG this am 90- d5 1/2 ns started. BP up, did not get usual am rx yet. Did receive hydralazine 10 mg iv @ 7am.    ID/ gen surgery appreciated.     Medications reviewed  Current Facility-Administered Medications   Medication Dose Route Frequency    dextrose 5 % - 0.45% NaCl infusion  50 mL/hr IntraVENous CONTINUOUS    insulin glargine (LANTUS) injection 14 Units  14 Units SubCUTAneous DAILY    oxyCODONE-acetaminophen (PERCOCET) 5-325 mg per tablet 1 Tab  1 Tab Oral Q6H PRN    oxyCODONE-acetaminophen (PERCOCET 10)  mg per tablet 1 Tab  1 Tab Oral Q6H PRN    NIFEdipine ER (PROCARDIA XL) tablet 60 mg  60 mg Oral BID    hydrALAZINE (APRESOLINE) tablet 100 mg  100 mg Oral TID    nafcillin (NALLPEN) 2 g in 0.9% sodium chloride (MBP/ADV) 100 mL MBP  2 g IntraVENous Q6H    influenza vaccine 2020-21 (4 yrs+)(PF) (FLUCELVAX QUAD) injection 0.5 mL  0.5 mL IntraMUSCular PRIOR TO DISCHARGE    calcitRIOL (ROCALTROL) capsule 0.5 mcg  0.5 mcg Oral DAILY    carvediloL (COREG) tablet 6.25 mg  6.25 mg Oral BID    calcium carbonate (TUMS) chewable tablet 400 mg [elemental]  400 mg Oral BID    hydrALAZINE (APRESOLINE) 20 mg/mL injection 10 mg  10 mg IntraVENous Q4H PRN    sodium chloride (NS) flush 5-40 mL  5-40 mL IntraVENous Q8H    sodium chloride (NS) flush 5-40 mL  5-40 mL IntraVENous PRN    acetaminophen (TYLENOL) tablet 650 mg  650 mg Oral Q6H PRN    Or    acetaminophen (TYLENOL) suppository 650 mg  650 mg Rectal Q6H PRN    polyethylene glycol (MIRALAX) packet 17 g  17 g Oral DAILY PRN    promethazine (PHENERGAN) tablet 12.5 mg  12.5 mg Oral Q6H PRN    Or    ondansetron (ZOFRAN) injection 4 mg  4 mg IntraVENous Q6H PRN    enoxaparin (LOVENOX) injection 30 mg  30 mg SubCUTAneous DAILY    glucose chewable tablet 16 g  4 Tab Oral PRN    dextrose (D50W) injection syrg 12.5-25 g  25-50 mL IntraVENous PRN    glucagon (GLUCAGEN) injection 1 mg  1 mg IntraMUSCular PRN    insulin lispro (HUMALOG) injection   SubCUTAneous AC&HS       Review of Systems:   A comprehensive review of systems was negative except for that written in the HPI. Objective:   Physical Exam:     Visit Vitals  BP (!) 180/82   Pulse 62   Temp 97.8 °F (36.6 °C)   Resp 20   Ht 6' 2\" (1.88 m)   Wt 68.9 kg (151 lb 14.4 oz)   SpO2 98%   BMI 19.50 kg/m²      O2 Device: Room air    Temp (24hrs), Av.8 °F (36.6 °C), Min:97.2 °F (36.2 °C), Max:98.5 °F (36.9 °C)    No intake/output data recorded.  1901 -  0700  In: 500 [P.O.:500]  Out: 400 [Urine:400]    PHYSICAL EXAM:  General: Alert and awake  HEENT:scalp dressing intact. Eomi. Cardiovascular: RRR. No M/R/G. Respiratory: cta, not labored  GI: soft, nd, nt, bs+. Rectal: Deferred   Musculoskeletal: Good rom, no edema  Neurological: Aao x 3, nfd.  Cranial nerves II-XII intact  Psychiatric: Mood appears appropriate with intact judgement  Lymphatic: No cervical or supraclavicular adenopathy. appreciated    Data Review:       Recent Days:  Recent Labs     21  0858   WBC 7.5   HGB 9.0*   HCT 29.3*        Recent Labs     21  0858      K 4.4   *   CO2 27   *   BUN 23*   CREA 2.40*   CA 8.0*     No results for input(s): PH, PCO2, PO2, HCO3, FIO2 in the last 72 hours.     Results     Procedure Component Value Units Date/Time    MRSA SCREEN - PCR (NASAL) [[id number]] Collected: 21    Order Status: Completed Specimen: Swab Updated: 21 1415     MRSA by PCR, Nasal Not Detected       CULTURE, BLOOD, PAIRED [[id number]] Collected: 01/26/21 1820    Order Status: Completed Specimen: Blood Updated: 02/01/21 0942     Special Requests: No Special Requests        Culture result: No growth 6 days       CULTURE, Ne Santoyo STAIN [[id number]]  (Susceptibility) Collected: 01/26/21 1743    Order Status: Completed Specimen: Wound Drainage Updated: 01/30/21 1058     Special Requests: No Special Requests        GRAM STAIN Few WBCs seen               2+ Gram Positive Cocci in clusters           Culture result:       Heavy Staphylococcus aureus          Susceptibility      Staphylococcus aureus     DARIN     Ciprofloxacin ($) Susceptible     Clindamycin ($) Susceptible     Daptomycin ($$$$$) Susceptible     Doxycycline ($$) Susceptible     Erythromycin ($$$$) Susceptible     Gentamicin ($) Susceptible     Levofloxacin ($) Susceptible     Linezolid ($$$$$) Susceptible     Moxifloxacin ($$$$) Susceptible     Oxacillin Susceptible     Rifampin ($$$$) Susceptible [1]      Tetracycline Susceptible     Trimeth/Sulfa Susceptible     Vancomycin ($) Susceptible            [1]   Rifampin is not to be used for mono-therapy. CULTURE, BLOOD [[id number]]     Order Status: Canceled Specimen: Blood             US RETROPERITONEUM COMP   Final Result   Early medical disease of right kidney. Left kidney not seen. Thick-walled bladder      CT HEAD WO CONT   Final Result   Impression: Age-appropriate atrophy. No acute findings. Patient has a soft tissue infection over the right occipital scalp. This is   shown to be fairly pronounced localized skin thickening, subcutaneous fat edema,   and some thickening of the deep soft tissues.  There is no gas formation, bone   destruction, periosteal reaction or abscess             Assessment/     Problem List:  Hospital Problems  Date Reviewed: 2/5/2021          Codes Class Noted POA    Chronic renal failure syndrome, stage 4 (severe) ([facility]) ICD-10-CM: N18.4  ICD-9-CM: 585.4  1/28/2021 Unknown Scalp abscess ICD-10-CM: L02.811  ICD-9-CM: 682.8  1/27/2021 Unknown                     Plan:  Scalp/Occipital abscessHe describes better pain ccontrrol with inc percocet. Debridement OR 2/5.  hemodynamically stable  No growth on blood cultures  MSSA on wound cultures  ID following, switched to iv Nafcillin with transition to po dicloxacillin at discharge. Wound care following  hhc/wound care on dc     Hypokalemia & Hypomagnesemiarepleted       Hypocalcemiastable  Vit d deficiency- vit d 25 hydroxy<9  Cont calcitriol  Nephrology following     Acute kidney injuryCKD 3-4:   patient was found to have an elevated serum creatinine with unclear baseline,  currently improving  d5 1/22(2/5) npo for debridement + bg 90. Us kidneys did not show obstruction  Nephrology following- probably at baseline creatinine    Metabolic acidosis- resolved     Hypertension  Has remainedd above goal intermittently, titrating rxx. Up this am 22/5, ddidd not receive usual po meds pree-op,  did receive hydralazine 10 mg iv @ 7am.   Patient is on coreg 6.25 mg BID(not increasing 2/2 borderline bradycardia)  Amlodipine 10 mg. Hydralazine 100 mg tid. switched back to his pta Nifedipine xr 60 md daily and stop amlodipine. Follow and titrate     Diabetes  bg 90 this am, received 12 units lantus last on 2/4 @ 8am.  Cont d51.22ns @ 50 while npo. Cont lantus/ssi.       ProphylaxisLovenox  FENcardiac diet, maintenance IV fluids, replete potassium and magnesium  Full code,      Disposition - previously declined [facility] is not agreeable for hhc/wound care. PT/Ot ordered for dc recs.  OR for debridement 2/5     Code status: Full  Prophylaxis: Lovenox  Recommended Disposition: Home w/Family    Care Plan discussed with: Patient/Family, Nurse,  and Consultant Nephrology      Etienne Sosa MD

## 2021-02-05 NOTE — ANESTHESIA POSTPROCEDURE EVALUATION
Procedure(s):  INCISION & DRAINAGE SCALP ABSCESS/DEBRIDEMENT OF ABSCESS.     general    Anesthesia Post Evaluation      Multimodal analgesia: multimodal analgesia not used between 6 hours prior to anesthesia start to PACU discharge  Patient location during evaluation: PACU  Patient participation: complete - patient participated  Level of consciousness: awake and alert  Pain score: 1  Pain management: adequate  Airway patency: patent  Anesthetic complications: no  Cardiovascular status: acceptable and stable  Respiratory status: acceptable, spontaneous ventilation and nonlabored ventilation  Hydration status: acceptable  Post anesthesia nausea and vomiting:  none  Final Post Anesthesia Temperature Assessment:  Normothermia (36.0-37.5 degrees C)      INITIAL Post-op Vital signs:   Vitals Value Taken Time   /82 02/05/21 0955   Temp 36.2 °C (97.1 °F) 02/05/21 0955   Pulse 63 02/05/21 0955   Resp 18 02/05/21 0955   SpO2 96 % 02/05/21 0955

## 2021-02-06 NOTE — PROGRESS NOTES
Hospitalist Progress Note               Daily Progress Note: 2/6/2021      Subjective: The patient is seen for follow  up.   71-year-old male with a history of diabetes mellitus and hypertension and chronic kidney disease came to the hospital with a complaint of having a bump on the occipital area, this is draining purulent discharge. Seen in pre-op,  He is alert, nad,  He states pain is controlled though still present and worse with wound care changes. Discussed with surgery, he will need twice daily dressing changes, he is okay to be discharged when we confirm this. [facility] has been confirmed. Patient states that he is not ready to go till Monday. ID/ gen surgery appreciated.     Medications reviewed  Current Facility-Administered Medications   Medication Dose Route Frequency    ergocalciferol capsule 50,000 Units  50,000 Units Oral Q7D    [START ON 2/7/2021] insulin glargine (LANTUS) injection 10 Units  10 Units SubCUTAneous DAILY    oxyCODONE-acetaminophen (PERCOCET) 5-325 mg per tablet 1 Tab  1 Tab Oral Q6H PRN    oxyCODONE-acetaminophen (PERCOCET 10)  mg per tablet 1 Tab  1 Tab Oral Q6H PRN    NIFEdipine ER (PROCARDIA XL) tablet 60 mg  60 mg Oral BID    hydrALAZINE (APRESOLINE) tablet 100 mg  100 mg Oral TID    nafcillin (NALLPEN) 2 g in 0.9% sodium chloride (MBP/ADV) 100 mL MBP  2 g IntraVENous Q6H    influenza vaccine 2020-21 (4 yrs+)(PF) (FLUCELVAX QUAD) injection 0.5 mL  0.5 mL IntraMUSCular PRIOR TO DISCHARGE    calcitRIOL (ROCALTROL) capsule 0.5 mcg  0.5 mcg Oral DAILY    carvediloL (COREG) tablet 6.25 mg  6.25 mg Oral BID    calcium carbonate (TUMS) chewable tablet 400 mg [elemental]  400 mg Oral BID    hydrALAZINE (APRESOLINE) 20 mg/mL injection 10 mg  10 mg IntraVENous Q4H PRN    sodium chloride (NS) flush 5-40 mL  5-40 mL IntraVENous Q8H    sodium chloride (NS) flush 5-40 mL  5-40 mL IntraVENous PRN    acetaminophen (TYLENOL) tablet 650 mg  650 mg Oral Q6H PRN    Or    acetaminophen (TYLENOL) suppository 650 mg  650 mg Rectal Q6H PRN    polyethylene glycol (MIRALAX) packet 17 g  17 g Oral DAILY PRN    promethazine (PHENERGAN) tablet 12.5 mg  12.5 mg Oral Q6H PRN    Or    ondansetron (ZOFRAN) injection 4 mg  4 mg IntraVENous Q6H PRN    enoxaparin (LOVENOX) injection 30 mg  30 mg SubCUTAneous DAILY    glucose chewable tablet 16 g  4 Tab Oral PRN    dextrose (D50W) injection syrg 12.5-25 g  25-50 mL IntraVENous PRN    glucagon (GLUCAGEN) injection 1 mg  1 mg IntraMUSCular PRN    insulin lispro (HUMALOG) injection   SubCUTAneous AC&HS       Review of Systems:   A comprehensive review of systems was negative except for that written in the HPI. Objective:   Physical Exam:     Visit Vitals  BP (!) 170/83   Pulse 61   Temp 97.7 °F (36.5 °C)   Resp 18   Ht 6' 2\" (1.88 m)   Wt 68.9 kg (151 lb 14.4 oz)   SpO2 98%   BMI 19.50 kg/m²    O2 Flow Rate (L/min): 2 l/min O2 Device: Room air    Temp (24hrs), Av.8 °F (36.6 °C), Min:97.6 °F (36.4 °C), Max:98.1 °F (36.7 °C)    No intake/output data recorded.  1901 -  0700  In: 989.2 [P.O.:500; I.V.:489.2]  Out: 620 [Urine:600]    PHYSICAL EXAM:  General: Alert and awake  HEENT:scalp dressing intact. Eomi. Cardiovascular: RRR. No M/R/G. Respiratory: cta, not labored  GI: soft, nd, nt, bs+. Rectal: Deferred   Musculoskeletal: Good rom, no edema  Neurological: Aao x 3, nfd.  Cranial nerves II-XII intact  Psychiatric: Mood appears appropriate with intact judgement  Lymphatic: No cervical or supraclavicular adenopathy. appreciated    Data Review:       Recent Days:  No results for input(s): WBC, HGB, HCT, PLT, HGBEXT, HCTEXT, PLTEXT, HGBEXT, HCTEXT, PLTEXT in the last 72 hours. Recent Labs     21  0735      K 4.0   *   CO2 27   GLU 44*   BUN 26*   CREA 2.19*   CA 7.1*   PHOS 3.4   ALB 2.5*     No results for input(s): PH, PCO2, PO2, HCO3, FIO2 in the last 72 hours.     Results     Procedure Component Value Units Date/Time    MRSA SCREEN - PCR (NASAL) [[id number]] Collected: 01/26/21 2000    Order Status: Completed Specimen: Swab Updated: 01/27/21 1415     MRSA by PCR, Nasal Not Detected       CULTURE, BLOOD, PAIRED [[id number]] Collected: 01/26/21 1820    Order Status: Completed Specimen: Blood Updated: 02/01/21 0942     Special Requests: No Special Requests        Culture result: No growth 6 days       CULTURE, Adeola Blue STAIN [[id number]]  (Susceptibility) Collected: 01/26/21 1743    Order Status: Completed Specimen: Wound Drainage Updated: 01/30/21 1058     Special Requests: No Special Requests        GRAM STAIN Few WBCs seen               2+ Gram Positive Cocci in clusters           Culture result:       Heavy Staphylococcus aureus          Susceptibility      Staphylococcus aureus     DARIN     Ciprofloxacin ($) Susceptible     Clindamycin ($) Susceptible     Daptomycin ($$$$$) Susceptible     Doxycycline ($$) Susceptible     Erythromycin ($$$$) Susceptible     Gentamicin ($) Susceptible     Levofloxacin ($) Susceptible     Linezolid ($$$$$) Susceptible     Moxifloxacin ($$$$) Susceptible     Oxacillin Susceptible     Rifampin ($$$$) Susceptible [1]      Tetracycline Susceptible     Trimeth/Sulfa Susceptible     Vancomycin ($) Susceptible            [1]   Rifampin is not to be used for mono-therapy. CULTURE, BLOOD [[id number]]     Order Status: Canceled Specimen: Blood             US RETROPERITONEUM COMP   Final Result   Early medical disease of right kidney. Left kidney not seen. Thick-walled bladder      CT HEAD WO CONT   Final Result   Impression: Age-appropriate atrophy. No acute findings. Patient has a soft tissue infection over the right occipital scalp. This is   shown to be fairly pronounced localized skin thickening, subcutaneous fat edema,   and some thickening of the deep soft tissues.  There is no gas formation, bone   destruction, periosteal reaction or abscess Assessment/     Problem List:  Hospital Problems  Date Reviewed: 2/5/2021          Codes Class Noted POA    Chronic renal failure syndrome, stage 4 (severe) ([facility]) ICD-10-CM: N18.4  ICD-9-CM: 585.4  1/28/2021 Unknown        Scalp abscess ICD-10-CM: L02.811  ICD-9-CM: 682.8  1/27/2021 Unknown                     Plan:  Scalp/Occipital abscessHe describes better pain ccontrrol with inc percocet. Debridement OR 2/5.  hemodynamically stable  No growth on blood cultures  MSSA on wound cultures  ID following, switched to iv Nafcillin with transition to po dicloxacillin at discharge. Wound care following  hhc/wound care on dc  Status post I&D 2/5/2021  Twice daily dressing changes  Anticipate discharge on Monday     Hypokalemia & Hypomagnesemiarepleted       Hypocalcemiastable  Vit d deficiency- vit d 25 hydroxy<9  Cont calcitriol  Nephrology following     Acute kidney injuryCKD 3-4:   patient was found to have an elevated serum creatinine with unclear baseline,  currently improving  d5 1/22(2/5) npo for debridement + bg 90. Us kidneys did not show obstruction  Nephrology following- probably at baseline creatinine    Metabolic acidosis- resolved     Hypertension  Has remainedd above goal intermittently, titrating rxx. Up this am 22/5, ddidd not receive usual po meds pree-op,  did receive hydralazine 10 mg iv @ 7am.   Patient is on coreg 6.25 mg BID(not increasing 2/2 borderline bradycardia)  Amlodipine 10 mg. Hydralazine 100 mg tid. switched back to his pta Nifedipine xr 60 md daily and stop amlodipine. Follow and titrate     Diabetes  bg 90 this am, received 12 units lantus last on 2/4 @ 8am.  Cont d51.22ns @ 50 while npo. Cont lantus/ssi. Had an episode of hypoglycemia, cutting back on Lantus to 10 units. Continue hypoglycemic protocol.     ProphylaxisLovenox  FENcardiac diet, maintenance IV fluids, replete potassium and magnesium  Full code,      Disposition -anticipate discharge Monday.   Patient declined discharge today saying he is not ready and does not have the help at home until then.   Code status: Full  Prophylaxis: Lovenox  Recommended Disposition: Home w/Family    Care Plan discussed with: Patient/Family, Nurse,  and Consultant Nephrology      Renee Alva MD

## 2021-02-06 NOTE — PROGRESS NOTES
Chart reviewed. Patient has been accepted by [facility] when patient is ready for discharge. CM will continue to follow patient for discharge planning needs.

## 2021-02-06 NOTE — PROGRESS NOTES
Renal Progress Note    Patient: Caitlin Rodriguez MRN: [id number]  SSN: [id number]    YOB: 1945  Age: 77 y.o. Sex: male      Admit Date: 1/26/2021    LOS: 10 days     Subjective:   Patient seen at bedside. Alert and awake, no acute distress. He is ambulating in the room  No new c/o today  BP is better this morning   He had debridement of his scalp wound yesterday   Renal function is improving   no complaints of any swelling in lower extremities. No complaints of shortness of breath.  No c/o doarrhea        Current Facility-Administered Medications   Medication Dose Route Frequency    dextrose 5 % - 0.45% NaCl infusion  50 mL/hr IntraVENous CONTINUOUS    insulin glargine (LANTUS) injection 14 Units  14 Units SubCUTAneous DAILY    oxyCODONE-acetaminophen (PERCOCET) 5-325 mg per tablet 1 Tab  1 Tab Oral Q6H PRN    oxyCODONE-acetaminophen (PERCOCET 10)  mg per tablet 1 Tab  1 Tab Oral Q6H PRN    NIFEdipine ER (PROCARDIA XL) tablet 60 mg  60 mg Oral BID    hydrALAZINE (APRESOLINE) tablet 100 mg  100 mg Oral TID    nafcillin (NALLPEN) 2 g in 0.9% sodium chloride (MBP/ADV) 100 mL MBP  2 g IntraVENous Q6H    influenza vaccine 2020-21 (4 yrs+)(PF) (FLUCELVAX QUAD) injection 0.5 mL  0.5 mL IntraMUSCular PRIOR TO DISCHARGE    calcitRIOL (ROCALTROL) capsule 0.5 mcg  0.5 mcg Oral DAILY    carvediloL (COREG) tablet 6.25 mg  6.25 mg Oral BID    calcium carbonate (TUMS) chewable tablet 400 mg [elemental]  400 mg Oral BID    hydrALAZINE (APRESOLINE) 20 mg/mL injection 10 mg  10 mg IntraVENous Q4H PRN    sodium chloride (NS) flush 5-40 mL  5-40 mL IntraVENous Q8H    sodium chloride (NS) flush 5-40 mL  5-40 mL IntraVENous PRN    acetaminophen (TYLENOL) tablet 650 mg  650 mg Oral Q6H PRN    Or    acetaminophen (TYLENOL) suppository 650 mg  650 mg Rectal Q6H PRN    polyethylene glycol (MIRALAX) packet 17 g  17 g Oral DAILY PRN    promethazine (PHENERGAN) tablet 12.5 mg  12.5 mg Oral Q6H PRN    Or  ondansetron (ZOFRAN) injection 4 mg  4 mg IntraVENous Q6H PRN    enoxaparin (LOVENOX) injection 30 mg  30 mg SubCUTAneous DAILY    glucose chewable tablet 16 g  4 Tab Oral PRN    dextrose (D50W) injection syrg 12.5-25 g  25-50 mL IntraVENous PRN    glucagon (GLUCAGEN) injection 1 mg  1 mg IntraMUSCular PRN    insulin lispro (HUMALOG) injection   SubCUTAneous AC&HS        Vitals:    02/05/21 1939 02/06/21 0016 02/06/21 0244 02/06/21 0832   BP: (!) 144/69 (!) 157/77 (!) 144/74 (!) 154/75   Pulse: 64 65 69 63   Resp: 19 19 19 18   Temp: 98 °F (36.7 °C) 97.7 °F (36.5 °C) 98.1 °F (36.7 °C) 97.8 °F (36.6 °C)   SpO2: 97% 97% 93% 98%   Weight:       Height:         Objective:   General: alert awake well-oriented, no acute distress. HEENT: EOMI, no Icterus, no Pallor, pupils reactive, mucosa moist,   Neck: Neck is supple, No JVD,   Lungs: breathsounds normal, no rhonchi, no rales,  CVS: heart sounds normal,  no murmurs, no rubs. GI: soft, nontender, normal BS,   Extremeties: no clubbing, no cyanosis, no edema,  Neuro: Alert, awake, oriented x3, speech is normal, moving all extremeties well. Skin: normal skin turgor, scalp area swelling /pain+  Musculoskeletal: no acute joint swellings       Intake and Output:  Current Shift: No intake/output data recorded. Last three shifts: 02/04 1901 - 02/06 0700  In: 989.2 [P.O.:500; I.V.:489.2]  Out: 620 [Urine:600]      Lab/Data Review:  No results for input(s): WBC, HGB, HCT, PLT, HGBEXT, HCTEXT, PLTEXT, HGBEXT, HCTEXT, PLTEXT in the last 72 hours. Recent Labs     02/06/21  0735      K 4.0   *   CO2 27   GLU 44*   BUN 26*   CREA 2.19*   CA 7.1*   PHOS 3.4   ALB 2.5*     No results for input(s): PH, PCO2, PO2, HCO3, FIO2 in the last 72 hours.   Recent Results (from the past 24 hour(s))   GLUCOSE, POC    Collection Time: 02/05/21 11:44 AM   Result Value Ref Range    Glucose (POC) 116 (H) 65 - 100 mg/dL    Performed by [address], POC    Collection Time: 02/05/21  2:25 PM   Result Value Ref Range    Glucose (POC) 159 (H) 65 - 100 mg/dL    Performed by Sophia Tate    RENAL FUNCTION PANEL    Collection Time: 02/06/21  7:35 AM   Result Value Ref Range    Sodium 144 136 - 145 mmol/L    Potassium 4.0 3.5 - 5.1 mmol/L    Chloride 114 (H) 97 - 108 mmol/L    CO2 27 21 - 32 mmol/L    Anion gap 3 (L) 5 - 15 mmol/L    Glucose 44 (LL) 65 - 100 mg/dL    BUN 26 (H) 6 - 20 mg/dL    Creatinine 2.19 (H) 0.70 - 1.30 mg/dL    BUN/Creatinine ratio 12 12 - 20      GFR est AA 36 (L) >60 ml/min/1.73m2    GFR est non-AA 29 (L) >60 ml/min/1.73m2    Calcium 7.1 (L) 8.5 - 10.1 mg/dL    Phosphorus 3.4 2.6 - 4.7 mg/dL    Albumin 2.5 (L) 3.5 - 5.0 g/dL   GLUCOSE, POC    Collection Time: 02/06/21  8:31 AM   Result Value Ref Range    Glucose (POC) 39 (LL) 65 - 100 mg/dL    Performed by Juan Carlos Maldonado         Assessment and Plan:     1. Acute Kidney Injury on ? CKD: probably prerenal azotemia secondary to dehydratrion/infection.   -Improving creatinine 3.25 --> 2.6-->2.4-->2.1 . This is his baseline  -Continue to hold IV fluids because of elevated blood pressure no rhabdomyolysis   -ultrasound of kidneys and bladder is negative for obstruction   -He is okay to be discharged home from renal standpoint. Will see him in the office in 2 weeks with preclinic labs  -He was started on IV fluids yesterday and it seems postop. I will discontinue  -will continue to monitor renal functions and adjust management as needed    2. Chronic kidney disease:   -probably has stage 3/4 chronic kidney disease related to DM/HTN nephrosclerosis. -Will continue to monitor renal functions to assess the baseline    3. Renal osteodystrophy:   -Hypocalcemia present, improving with supplementation. Corrected calcium today is 8.5  Phosphorus level is normal at 3.4  Severe secondary hyperparathyroidism noted, added calcitriol 0.5 mcg once daily  Vitamin D level is < 9.   I will start ergocalciferol 50,000 units weekly    3. Hypertension: Systolic hypertension ,   -Much better controlled with changes over the past few days  -On hydralazine 100 thrice daily, Coreg and nifedipine  -Will continue to monitor and blood pressures and adjust antihypertensive regimen as needed. 4. Hypokalemia: probably related to poor p.o. intake   improved with Supplementation potassium and magnesium  Continue to monitor K and Mg    5. Metabolic acidosis:Secondary to acute kidney injury  nongap acidosis. co2 improved from 14-19--24-27   off bicarb drip now. Not on oral bicarbonate  Will monitor CO2 levels     6. Scalp abscess: Continue IV antibiotics  ID following the patient. s/p I&D   Cultures are pending, hemodynamically stable    7. Scalp wound  surgery on the case. s/p debridement    8.  Diabetes mellitus: hyperglycemia+  check urine random protein creatinine ratio to assess for proteinuria with diabetic nephropathy  Accu-Cheks/SSI coverage        Signed By: Kaden Sofia MD     February 6, 2021

## 2021-02-06 NOTE — PROGRESS NOTES
Progress Note    Patient: Joel Cook MRN: [id number]  SSN: [id number]    YOB: 1945  Age: 77 y.o. Sex: male      Admit Date: 1/26/2021    LOS: 10 days     Subjective:   Postoperative day 1 status post incision and drainage of scalp abscess and debridement  Patient seen in bed  No complaints    Objective:     Vitals:    02/05/21 1939 02/06/21 0016 02/06/21 0244 02/06/21 0832   BP: (!) 144/69 (!) 157/77 (!) 144/74 (!) 154/75   Pulse: 64 65 69 63   Resp: 19 19 19 18   Temp: 98 °F (36.7 °C) 97.7 °F (36.5 °C) 98.1 °F (36.7 °C) 97.8 °F (36.6 °C)   SpO2: 97% 97% 93% 98%   Weight:       Height:            Intake and Output:  Current Shift: No intake/output data recorded. Last three shifts: 02/04 1901 - 02/06 0700  In: 989.2 [P.O.:500;  I.V.:489.2]  Out: 620 [Urine:600]    Review of Systems:  ROS     Physical Exam:   Physical Exam  HENT:      Head:            Lab/Data Review:  Recent Results (from the past 24 hour(s))   GLUCOSE, POC    Collection Time: 02/05/21 11:44 AM   Result Value Ref Range    Glucose (POC) 116 (H) 65 - 100 mg/dL    Performed by [facility]    GLUCOSE, POC    Collection Time: 02/05/21  2:25 PM   Result Value Ref Range    Glucose (POC) 159 (H) 65 - 100 mg/dL    Performed by "[employer]    RENAL FUNCTION PANEL    Collection Time: 02/06/21  7:35 AM   Result Value Ref Range    Sodium 144 136 - 145 mmol/L    Potassium 4.0 3.5 - 5.1 mmol/L    Chloride 114 (H) 97 - 108 mmol/L    CO2 27 21 - 32 mmol/L    Anion gap 3 (L) 5 - 15 mmol/L    Glucose 44 (LL) 65 - 100 mg/dL    BUN 26 (H) 6 - 20 mg/dL    Creatinine 2.19 (H) 0.70 - 1.30 mg/dL    BUN/Creatinine ratio 12 12 - 20      GFR est AA 36 (L) >60 ml/min/1.73m2    GFR est non-AA 29 (L) >60 ml/min/1.73m2    Calcium 7.1 (L) 8.5 - 10.1 mg/dL    Phosphorus 3.4 2.6 - 4.7 mg/dL    Albumin 2.5 (L) 3.5 - 5.0 g/dL   GLUCOSE, POC    Collection Time: 02/06/21  8:31 AM   Result Value Ref Range    Glucose (POC) 39 (LL) 65 - 100 mg/dL    Performed by INDELICATO UGO        Assessment:     Active Problems:    Scalp abscess (1/27/2021)      Chronic renal failure syndrome, stage 4 (severe) ([address] (1/28/2021)        Plan:   The patient will require twice daily dressing changes to the scalp abscess with Medihoney at the superior aspect. Does not need to have anything packed. Home health has been set up per case management however I am not sure of the frequency. The patient himself cannot do dressing changes because of the location. From a surgical perspective the patient can be discharged home on oral antibiotics with dressing changes however again I am not certain of the patient's social situation with he has any assistance at home to help with dressing changes in between nursing visits.     Signed By: Hermelinda Galvez MD     February 6, 2021

## 2021-02-06 NOTE — ROUTINE PROCESS
Bedside shift change report given to Chato Miller RN by Mecca Portillo RN. Report included SBAR information .

## 2021-02-06 NOTE — ROUTINE PROCESS
Performed a 2 person post op skin assessment with Temi Haddad RN. Patient's skin is dry, warm, and intact. With the exception of right posterior scalp abscess. Which is dressed with ioderform, 4x4's, and kerlex.

## 2021-02-06 NOTE — PROGRESS NOTES
Patients glucose was 72 at 2155. Pt ate some crackers and drank a soda.   Pt glucose was 170 at 0020

## 2021-02-07 NOTE — ROUTINE PROCESS
Bedside shift change report given to Lucas Palma RN by Sasha Marshall RN. Report included SBAR information .

## 2021-02-07 NOTE — PROGRESS NOTES
Renal Progress Note    Patient: Alba Rae MRN: [id number]  SSN: [id number]    YOB: 1945  Age: 77 y.o. Sex: male      Admit Date: 1/26/2021    LOS: 11 days     Subjective:   Patient seen at bedside. Alert and awake, no acute distress. He is laying in bed. Tells me he will be discharged home in a.m.   no new complaints today   BP is better this morning , though still slightly elevated  Denies any pain at the scalp wound today   renal function is improving. Today's labs are pending  no complaints of any swelling in lower extremities. No complaints of shortness of breath.  No c/o doarrhea        Current Facility-Administered Medications   Medication Dose Route Frequency    ergocalciferol capsule 50,000 Units  50,000 Units Oral Q7D    insulin glargine (LANTUS) injection 10 Units  10 Units SubCUTAneous DAILY    oxyCODONE-acetaminophen (PERCOCET) 5-325 mg per tablet 1 Tab  1 Tab Oral Q6H PRN    oxyCODONE-acetaminophen (PERCOCET 10)  mg per tablet 1 Tab  1 Tab Oral Q6H PRN    NIFEdipine ER (PROCARDIA XL) tablet 60 mg  60 mg Oral BID    hydrALAZINE (APRESOLINE) tablet 100 mg  100 mg Oral TID    nafcillin (NALLPEN) 2 g in 0.9% sodium chloride (MBP/ADV) 100 mL MBP  2 g IntraVENous Q6H    influenza vaccine 2020-21 (4 yrs+)(PF) (FLUCELVAX QUAD) injection 0.5 mL  0.5 mL IntraMUSCular PRIOR TO DISCHARGE    calcitRIOL (ROCALTROL) capsule 0.5 mcg  0.5 mcg Oral DAILY    carvediloL (COREG) tablet 6.25 mg  6.25 mg Oral BID    calcium carbonate (TUMS) chewable tablet 400 mg [elemental]  400 mg Oral BID    hydrALAZINE (APRESOLINE) 20 mg/mL injection 10 mg  10 mg IntraVENous Q4H PRN    sodium chloride (NS) flush 5-40 mL  5-40 mL IntraVENous Q8H    sodium chloride (NS) flush 5-40 mL  5-40 mL IntraVENous PRN    acetaminophen (TYLENOL) tablet 650 mg  650 mg Oral Q6H PRN    Or    acetaminophen (TYLENOL) suppository 650 mg  650 mg Rectal Q6H PRN    polyethylene glycol (MIRALAX) packet 17 g  17 g Oral DAILY PRN    promethazine (PHENERGAN) tablet 12.5 mg  12.5 mg Oral Q6H PRN    Or    ondansetron (ZOFRAN) injection 4 mg  4 mg IntraVENous Q6H PRN    enoxaparin (LOVENOX) injection 30 mg  30 mg SubCUTAneous DAILY    glucose chewable tablet 16 g  4 Tab Oral PRN    dextrose (D50W) injection syrg 12.5-25 g  25-50 mL IntraVENous PRN    glucagon (GLUCAGEN) injection 1 mg  1 mg IntraMUSCular PRN    insulin lispro (HUMALOG) injection   SubCUTAneous AC&HS        Vitals:    02/06/21 1706 02/06/21 1939 02/07/21 0131 02/07/21 0741   BP: (!) 170/83 (!) 160/91 (!) 145/68 (!) 162/77   Pulse: 61 62 82 74   Resp: 18 18 18 18   Temp: 97.7 °F (36.5 °C) 98.1 °F (36.7 °C) 98.8 °F (37.1 °C) 98.5 °F (36.9 °C)   SpO2: 98% 97% 92% 95%   Weight:       Height:         Objective:   General: alert awake well-oriented, no acute distress. HEENT: EOMI, no Icterus, no Pallor, pupils reactive, mucosa moist,   Neck: Neck is supple, No JVD,   Lungs: breathsounds normal, no rhonchi, no rales,  CVS: heart sounds normal,  no murmurs, no rubs. GI: soft, nontender, normal BS,   Extremeties: no clubbing, no cyanosis, no edema,  Neuro: Alert, awake, oriented x3, speech is normal, moving all extremeties well. Skin: normal skin turgor, scalp area swelling /pain+  Musculoskeletal: no acute joint swellings       Intake and Output:  Current Shift: No intake/output data recorded. Last three shifts: No intake/output data recorded. Lab/Data Review:  No results for input(s): WBC, HGB, HCT, PLT, HGBEXT, HCTEXT, PLTEXT, HGBEXT, HCTEXT, PLTEXT in the last 72 hours. Recent Labs     02/06/21  0735      K 4.0   *   CO2 27   GLU 44*   BUN 26*   CREA 2.19*   CA 7.1*   PHOS 3.4   ALB 2.5*     No results for input(s): PH, PCO2, PO2, HCO3, FIO2 in the last 72 hours.   Recent Results (from the past 24 hour(s))   GLUCOSE, POC    Collection Time: 02/06/21 12:19 PM   Result Value Ref Range    Glucose (POC) 228 (H) 65 - 100 mg/dL    Performed by KARLA KHAN    GLUCOSE, POC    Collection Time: 02/06/21  4:24 PM   Result Value Ref Range    Glucose (POC) 190 (H) 65 - 100 mg/dL    Performed by [employer], POC    Collection Time: 02/07/21  8:05 AM   Result Value Ref Range    Glucose (POC) 153 (H) 65 - 100 mg/dL    Performed by Peterson Barrios and Plan:     1. Acute Kidney Injury on ? CKD: probably prerenal azotemia secondary to dehydratrion/infection.   -Improving creatinine 3.25 --> 2.6-->2.4-->2.1 .today's labs are pending.    -This is his baseline   -no rhabdomyolysis   -ultrasound of kidneys and bladder is negative for obstruction   -He is okay to be discharged home from renal standpoint. Will see him in the office in 2 weeks with preclinic labs  -IV fluids discontinued yesterday. Continue to hold because of elevated blood pressure  -will continue to monitor renal functions and adjust management as needed    2. Chronic kidney disease:   -probably has stage 3/4 chronic kidney disease related to DM/HTN nephrosclerosis. -Will continue to monitor renal functions. He is likely at his baseline    3. Renal osteodystrophy:   -Hypocalcemia present, improving with supplementation. Corrected calcium is 8.5  -Phosphorus level is normal at 3.4. No need for binders  -Severe secondary hyperparathyroidism noted, added calcitriol 0.5 mcg once daily  -Vitamin D level is < 9. I will start ergocalciferol 50,000 units weekly    3. Hypertension: Systolic hypertension ,   -Much better controlled , though still suboptimal                                             -On hydralazine 100 thrice daily, Coreg and nifedipine  -I will increase Coreg to 12.5 twice daily  -Will continue to monitor and blood pressures and adjust antihypertensive regimen as needed. 4. Hypokalemia: probably related to poor p.o. intake   improved with Supplementation potassium and magnesium  Continue to monitor K and Mg    5.  Metabolic acidosis:Secondary to acute kidney injury  nongap acidosis. co2 improved from 14-19--24-27   off bicarb drip now. Not on oral bicarbonate  Will monitor CO2 levels     6. Scalp abscess: Continue IV antibiotics  ID following the patient. s/p I&D   Cultures are pending, hemodynamically stable    7. Scalp wound  -surgery on the case. s/p debridement  -ID following, switched to iv Nafcillin with transition to po dicloxacillin at discharge.     8. Diabetes mellitus: hyperglycemia+  check urine random protein creatinine ratio to assess for proteinuria with diabetic nephropathy  Accu-Cheks/SSI coverage        Signed By: Sebas Cuellar MD     February 7, 2021

## 2021-02-07 NOTE — PROGRESS NOTES
Hospitalist Progress Note               Daily Progress Note: 2/7/2021      Subjective: The patient is seen for follow  up.   67-year-old male with a history of diabetes mellitus and hypertension and chronic kidney disease came to the hospital with a complaint of having a bump on the occipital area, this is draining purulent discharge. Seen in pre-op,  He is alert, nad,  Pain better controlled today. Hypoglycemia yesterday, adjusted lantus, subsequently 152-228 bg. He states ready to go home in am as will have help with dressing changes bid with daughter. ID/ gen surgery appreciated.     Medications reviewed  Current Facility-Administered Medications   Medication Dose Route Frequency    carvediloL (COREG) tablet 12.5 mg  12.5 mg Oral BID    ergocalciferol capsule 50,000 Units  50,000 Units Oral Q7D    insulin glargine (LANTUS) injection 10 Units  10 Units SubCUTAneous DAILY    oxyCODONE-acetaminophen (PERCOCET) 5-325 mg per tablet 1 Tab  1 Tab Oral Q6H PRN    oxyCODONE-acetaminophen (PERCOCET 10)  mg per tablet 1 Tab  1 Tab Oral Q6H PRN    NIFEdipine ER (PROCARDIA XL) tablet 60 mg  60 mg Oral BID    hydrALAZINE (APRESOLINE) tablet 100 mg  100 mg Oral TID    nafcillin (NALLPEN) 2 g in 0.9% sodium chloride (MBP/ADV) 100 mL MBP  2 g IntraVENous Q6H    influenza vaccine 2020-21 (4 yrs+)(PF) (FLUCELVAX QUAD) injection 0.5 mL  0.5 mL IntraMUSCular PRIOR TO DISCHARGE    calcitRIOL (ROCALTROL) capsule 0.5 mcg  0.5 mcg Oral DAILY    calcium carbonate (TUMS) chewable tablet 400 mg [elemental]  400 mg Oral BID    hydrALAZINE (APRESOLINE) 20 mg/mL injection 10 mg  10 mg IntraVENous Q4H PRN    sodium chloride (NS) flush 5-40 mL  5-40 mL IntraVENous Q8H    sodium chloride (NS) flush 5-40 mL  5-40 mL IntraVENous PRN    acetaminophen (TYLENOL) tablet 650 mg  650 mg Oral Q6H PRN    Or    acetaminophen (TYLENOL) suppository 650 mg  650 mg Rectal Q6H PRN    polyethylene glycol (MIRALAX) packet 17 g  17 g Oral DAILY PRN    promethazine (PHENERGAN) tablet 12.5 mg  12.5 mg Oral Q6H PRN    Or    ondansetron (ZOFRAN) injection 4 mg  4 mg IntraVENous Q6H PRN    enoxaparin (LOVENOX) injection 30 mg  30 mg SubCUTAneous DAILY    glucose chewable tablet 16 g  4 Tab Oral PRN    dextrose (D50W) injection syrg 12.5-25 g  25-50 mL IntraVENous PRN    glucagon (GLUCAGEN) injection 1 mg  1 mg IntraMUSCular PRN    insulin lispro (HUMALOG) injection   SubCUTAneous AC&HS       Review of Systems:   A comprehensive review of systems was negative except for that written in the HPI. Objective:   Physical Exam:     Visit Vitals  BP (!) 164/82   Pulse 62   Temp 97.7 °F (36.5 °C)   Resp 18   Ht 6' 2\" (1.88 m)   Wt 68.9 kg (151 lb 14.4 oz)   SpO2 95%   BMI 19.50 kg/m²    O2 Flow Rate (L/min): 2 l/min O2 Device: Room air    Temp (24hrs), Av.2 °F (36.8 °C), Min:97.7 °F (36.5 °C), Max:98.8 °F (37.1 °C)    No intake/output data recorded. No intake/output data recorded. PHYSICAL EXAM:  General: Alert and awake  HEENT:scalp dressing intact. Eomi. Cardiovascular: RRR. No M/R/G. Respiratory: cta, not labored  GI: soft, nd, nt, bs+. Rectal: Deferred   Musculoskeletal: Good rom, no edema  Neurological: Aao x 3, nfd.  Cranial nerves II-XII intact  Psychiatric: Mood appears appropriate with intact judgement  Lymphatic: No cervical or supraclavicular adenopathy. appreciated    Data Review:       Recent Days:  Recent Labs     21  0939   WBC 8.9   HGB 7.7*   HCT 25.9*        Recent Labs     21  0939 21  0735    144   K 4.4 4.0   * 114*   CO2 26 27   * 44*   BUN 24* 26*   CREA 2.42* 2.19*   CA 7.1* 7.1*   PHOS 3.2 3.4   ALB 2.3* 2.5*     No results for input(s): PH, PCO2, PO2, HCO3, FIO2 in the last 72 hours.     Results     Procedure Component Value Units Date/Time    MRSA SCREEN - PCR (NASAL) [[id number]] Collected: 21    Order Status: Completed Specimen: Swab Updated: 01/27/21 1415     MRSA by PCR, Nasal Not Detected       CULTURE, BLOOD, PAIRED [[id number]] Collected: 01/26/21 1820    Order Status: Completed Specimen: Blood Updated: 02/01/21 0942     Special Requests: No Special Requests        Culture result: No growth 6 days       CULTURE, Dulcy Sandra STAIN [[id number]]  (Susceptibility) Collected: 01/26/21 1743    Order Status: Completed Specimen: Wound Drainage Updated: 01/30/21 1058     Special Requests: No Special Requests        GRAM STAIN Few WBCs seen               2+ Gram Positive Cocci in clusters           Culture result:       Heavy Staphylococcus aureus          Susceptibility      Staphylococcus aureus     DARIN     Ciprofloxacin ($) Susceptible     Clindamycin ($) Susceptible     Daptomycin ($$$$$) Susceptible     Doxycycline ($$) Susceptible     Erythromycin ($$$$) Susceptible     Gentamicin ($) Susceptible     Levofloxacin ($) Susceptible     Linezolid ($$$$$) Susceptible     Moxifloxacin ($$$$) Susceptible     Oxacillin Susceptible     Rifampin ($$$$) Susceptible [1]      Tetracycline Susceptible     Trimeth/Sulfa Susceptible     Vancomycin ($) Susceptible            [1]   Rifampin is not to be used for mono-therapy. CULTURE, BLOOD [[id number]]     Order Status: Canceled Specimen: Blood             US RETROPERITONEUM COMP   Final Result   Early medical disease of right kidney. Left kidney not seen. Thick-walled bladder      CT HEAD WO CONT   Final Result   Impression: Age-appropriate atrophy. No acute findings. Patient has a soft tissue infection over the right occipital scalp. This is   shown to be fairly pronounced localized skin thickening, subcutaneous fat edema,   and some thickening of the deep soft tissues.  There is no gas formation, bone   destruction, periosteal reaction or abscess             Assessment/     Problem List:  Hospital Problems  Date Reviewed: 2/5/2021          Codes Class Noted POA    Chronic renal failure syndrome, stage 4 (severe) (HCC) ICD-10-CM: N18.4  ICD-9-CM: 585.4  1/28/2021 Unknown        Scalp abscess ICD-10-CM: L02.811  ICD-9-CM: 682.8  1/27/2021 Unknown                     Plan:  Scalp/Occipital abscess  He describes better pain ccontrrol- cont percocet. S/p Debridement OR 2/5. No growth on blood cultures  MSSA on wound cultures  ID following, switched to iv Nafcillin with transition to po dicloxacillin at discharge. Wound care following  hhc/wound care on dc- will need bid dressing changes  Anticipate discharge on Monday     Hypokalemia & Hypomagnesemiarepleted       Hypocalcemiastable    Vit d deficiency- vit d 25 hydroxy<9  Cont calcitriol  Nephrology following     Acute kidney injuryCKD 3-4:   patient was found to have an elevated serum creatinine with unclear baseline  Us kidneys did not show obstruction  Nephrology following- probably at baseline creatinine, ok for dc per nephrology    Metabolic acidosis- resolved     Hypertension  Above goal at times  Cont coreg, hydralazine, nifedipine. Follow and titrate     Diabetes  Hypoglycemia yesterday, bg to 39,  Subsequently 150's-220's. Titrated lantus, continue ssi/hypoglycemia protocol.        ProphylaxisLovenox  FENcardiac diet, maintenance IV fluids, replete potassium and magnesium  Full code,      Disposition -anticipate discharge Monday. He states will have help from daughter come Monday. Code status: Full  Prophylaxis: Lovenox  Recommended Disposition: Home w/Family anticipate 2/8.     Care Plan discussed with: Patient/Family, Nurse,  and Consultant Nephrology      Nargis Smith MD

## 2021-02-08 PROBLEM — N17.9 AKI (ACUTE KIDNEY INJURY) (HCC): Status: ACTIVE | Noted: 2021-01-01

## 2021-02-08 PROBLEM — E83.42 HYPOMAGNESEMIA: Status: ACTIVE | Noted: 2021-01-01

## 2021-02-08 PROBLEM — E87.6 HYPOKALEMIA: Status: RESOLVED | Noted: 2021-01-01 | Resolved: 2021-01-01

## 2021-02-08 PROBLEM — E83.51 HYPOCALCEMIA: Status: ACTIVE | Noted: 2021-01-01

## 2021-02-08 PROBLEM — I10 ESSENTIAL HYPERTENSION: Status: ACTIVE | Noted: 2021-01-01

## 2021-02-08 PROBLEM — E11.9 INSULIN-TREATED TYPE 2 DIABETES MELLITUS (HCC): Status: ACTIVE | Noted: 2021-01-01

## 2021-02-08 PROBLEM — N25.81 SECONDARY HYPERPARATHYROIDISM (HCC): Status: ACTIVE | Noted: 2021-01-01

## 2021-02-08 PROBLEM — N18.30 CKD (CHRONIC KIDNEY DISEASE) STAGE 3, GFR 30-59 ML/MIN (HCC): Status: ACTIVE | Noted: 2021-01-01

## 2021-02-08 PROBLEM — Z79.4 INSULIN-TREATED TYPE 2 DIABETES MELLITUS (HCC): Status: ACTIVE | Noted: 2021-01-01

## 2021-02-08 PROBLEM — E83.51 HYPOCALCEMIA: Status: RESOLVED | Noted: 2021-01-01 | Resolved: 2021-01-01

## 2021-02-08 PROBLEM — N17.9 AKI (ACUTE KIDNEY INJURY) (HCC): Status: RESOLVED | Noted: 2021-01-01 | Resolved: 2021-01-01

## 2021-02-08 PROBLEM — E83.42 HYPOMAGNESEMIA: Status: RESOLVED | Noted: 2021-01-01 | Resolved: 2021-01-01

## 2021-02-08 PROBLEM — E87.6 HYPOKALEMIA: Status: ACTIVE | Noted: 2021-01-01

## 2021-02-08 PROBLEM — E55.9 VITAMIN D DEFICIENCY: Status: ACTIVE | Noted: 2021-01-01

## 2021-02-08 NOTE — DISCHARGE SUMMARY
Physician Discharge Summary     Patient ID:    Zack Burt  [id number]  77 y.o.  1945    Admit date: 1/26/2021    Discharge date : 2/8/2021    Chronic Diagnoses:    Problem List as of 2/8/2021 Date Reviewed: 2/5/2021          Codes Class Noted - Resolved    CKD (chronic kidney disease) stage 3, GFR 30-59 ml/min ICD-10-CM: N18.30  ICD-9-CM: 585.3  2/8/2021 - Present        Secondary hyperparathyroidism ([facility] ICD-10-CM: N25.81  ICD-9-CM: 588.81  2/8/2021 - Present        Vitamin D deficiency ICD-10-CM: E55.9  ICD-9-CM: 268.9  2/8/2021 - Present        Essential hypertension ICD-10-CM: I10  ICD-9-CM: 401.9  2/8/2021 - Present        Insulin-treated type 2 diabetes mellitus ([facility] ICD-10-CM: E11.9, Z79.4  ICD-9-CM: 250.00, V58.67  2/8/2021 - Present        Scalp abscess ICD-10-CM: L02.811  ICD-9-CM: 682.8  1/27/2021 - Present        RESOLVED: BRIAN (acute kidney injury) ([facility] ICD-10-CM: N17.9  ICD-9-CM: 584.9  2/8/2021 - 2/8/2021        RESOLVED: Hypokalemia ICD-10-CM: E87.6  ICD-9-CM: 276.8  2/8/2021 - 2/8/2021        RESOLVED: Hypomagnesemia ICD-10-CM: E83.42  ICD-9-CM: 275.2  2/8/2021 - 2/8/2021        RESOLVED: Hypocalcemia ICD-10-CM: E83.51  ICD-9-CM: 275.41  2/8/2021 - 2/8/2021          22    Final Diagnoses:   Scalp abscess [L02.811]   Brian resolved. CKD 3. Metabolic acidosis resolved  Hypokalemia resolved. Hypomagnesemia resolved. Vit d deficiency  Secondary hyperparathyroid. Hypocalcemia resolved. Insulin treated dm2.(a1c 6.2-1/26/21)  Essential hypertension      Reason for Hospitalization:  Purulent scalp abscess, brian/ckd,metabolic acidosis. Hospital Course:   15-year-old male with a history of diabetes mellitus and hypertension,HLD, and chronic kidney disease related to DM/HTN nephro sclerosis. Came to the hospital with a complaint of having a bump on the occipital area, this is draining purulent discharge. White count initially 18.6, did not meet sepsis criteria.   Also with JEREMIAS, hypokalemia, hypo-Wyatt C. Cindy, and metabolic acidosis. Patient followed by ID, general surgery, nephrology as well as hospitalist group. Bicarb drip initiated for metabolic acidosis and this was resolved ultimately. Jeremias surmised 2/2 prerenal azotemia from dehydration/infection. Cr improved 3.2-->      . Renal us neg for obstruction. Renal osteodystrophy, hypocalcemia present improved with supplementation. Severe secondary hyperparathyroidism, calcitriol added, 0.5 mcg daily. Vit d 25 hydroxy<9, Ergocalciferol 50K units weekly added. Nephrology followed and assisted with titration of bp meds. Followed by ID and general surgery 2/2 scalp abscess. Empirically started on Zosyn, vanco. Vanco avoided 2/2 jeremias and briefly on Linezolid. Wound cs ultimately grew MSSA and was started on IV Nafcillin which completed 9 days prior to dc with plan to complete course with po  Dicloxacillin 500 mg bid x 2 weeks. Gen surgery followed. Wound care locally with ag/medihoney. Purulence continued and was taken to OR for debridement on 2/5/21. Pain level improved subsequently- required excalation to percocet 10/325. Bid dressing changes recommended with ag/medihoney. Pt ultimately agreed for [facility] to continue wound care but did not have help to assist with bid dressing changes until 2/8 therefore dc 2/8/21.           Wound care:  Medihoney with extra fiber Ag and Rosa wrap scalp twice daily    Abx:  Complete 2 weeks on Dicloxacillin 500 mg bid. Probiotics    Follow up:  1- Gen ronel Candelario 1 week. Follow up scalp abscess. 2. Pcp 1-2 weeks, Dr Cherl Frankel, Prerna Martinez- Assess for tolerance/efficacy of bp and rx changes. 3. Dr Yu/Dr Endy Roche, Nephrology 2 weeks. Follow up electrolyte imbalance/CKD, bmp.     Discharge Medications:   Current Discharge Medication List      START taking these medications    Details   HYDROcodone-acetaminophen (Lorcet HD)  mg tablet Take 1 Tab by mouth every six (6) hours as needed for Pain for up to 5 days. Max Daily Amount: 4 Tabs. Qty: 20 Tab, Refills: 0    Associated Diagnoses: Scalp abscess      calcitRIOL (ROCALTROL) 0.5 mcg capsule Take 1 Cap by mouth daily for 30 days. Qty: 30 Cap, Refills: 0      calcium carbonate (TUMS) 200 mg calcium (500 mg) chew Take 2 Tabs by mouth two (2) times a day for 30 days. Qty: 120 Tab, Refills: 0      carvediloL (COREG) 12.5 mg tablet Take 1 Tab by mouth two (2) times a day for 30 days. Qty: 60 Tab, Refills: 0      ergocalciferol (ERGOCALCIFEROL) 1,250 mcg (50,000 unit) capsule Take 1 Cap by mouth every seven (7) days for 30 days. Qty: 4 Cap, Refills: 0      hydrALAZINE (APRESOLINE) 100 mg tablet Take 1 Tab by mouth three (3) times daily for 30 days. Qty: 90 Tab, Refills: 0      docusate sodium (Colace) 100 mg capsule Take 1 Cap by mouth two (2) times a day for 90 days. Qty: 60 Cap, Refills: 2      Saccharomyces boulardii (FLORASTOR) 250 mg capsule Take 1 Cap by mouth two (2) times a day for 14 days. Qty: 28 Cap, Refills: 0         CONTINUE these medications which have CHANGED    Details   NIFEdipine ER (PROCARDIA XL) 60 mg ER tablet Take 1 Tab by mouth two (2) times a day for 30 days. Qty: 60 Tab, Refills: 0      potassium chloride (K-DUR, KLOR-CON) 20 mEq tablet Take 1 Tab by mouth daily for 30 days. Qty: 30 Tab, Refills: 0         CONTINUE these medications which have NOT CHANGED    Details   insulin glargine (Lantus Solostar U-100 Insulin) 100 unit/mL (3 mL) inpn by SubCUTAneous route nightly. ferrous sulfate 325 mg (65 mg iron) tablet Take 325 mg by mouth Daily (before breakfast). DULoxetine (CYMBALTA) 60 mg capsule Take 60 mg by mouth daily. pravastatin (PRAVACHOL) 20 mg tablet Take 20 mg by mouth daily.          STOP taking these medications       HYDROcodone-acetaminophen (Norco) 5-325 mg per tablet Comments:   Reason for Stopping:                 Diet:  Diabetic Diet    Disposition:  Home and [facility] HH/wound care/SN. Advanced Directive:   FULL x   DNR      Discharge Exam:  Patient Vitals for the past 12 hrs:   Temp Pulse Resp BP SpO2   02/08/21 0227    136/66    02/08/21 0135 98 °F (36.7 °C) 68 18 (!) 169/83 98 %   General: Alert and awake  HEENT:scalp dressing intact. Eomi. Cardiovascular: RRR. No M/R/G. Respiratory: cta, not labored  GI: soft, nd, nt, bs+. Rectal: Deferred   Musculoskeletal: Good rom, no edema  Neurological: Aao x 3, nfd.  Cranial nerves II-XII intact  Psychiatric: Mood appears appropriate with intact judgement  Lymphatic: No cervical or supraclavicular adenopathy. appreciated    CONSULTATIONS: ID, Nephrology, General Surgery and Hospitalist    Significant Diagnostic Studies:   1/26/2021: BUN 46 mg/dL* (Ref range: 6 - 20 mg/dL); Calcium 5.8 mg/dL* (Ref range: 8.5 - 10.1 mg/dL); CO2 15 mmol/L* (Ref range: 21 - 32 mmol/L); Creatinine 3.25 mg/dL* (Ref range: 0.70 - 1.30 mg/dL); Glucose 138 mg/dL* (Ref range: 65 - 100 mg/dL); HCT 31.6 %* (Ref range: 36.6 - 50.3 %); HGB 10.6 g/dL* (Ref range: 12.1 - 17.0 g/dL); Potassium 2.7 mmol/L* (Ref range: 3.5 - 5.1 mmol/L); Sodium 140 mmol/L (Ref range: 136 - 145 mmol/L)  1/27/2021: BUN 45 mg/dL* (Ref range: 6 - 20 mg/dL); BUN 43 mg/dL* (Ref range: 6 - 20 mg/dL); Calcium 6.1 mg/dL* (Ref range: 8.5 - 10.1 mg/dL); Calcium 6.2 mg/dL* (Ref range: 8.5 - 10.1 mg/dL); CO2 14 mmol/L* (Ref range: 21 - 32 mmol/L); CO2 16 mmol/L* (Ref range: 21 - 32 mmol/L); Creatinine 3.22 mg/dL* (Ref range: 0.70 - 1.30 mg/dL); Creatinine 2.95 mg/dL* (Ref range: 0.70 - 1.30 mg/dL); Glucose 194 mg/dL* (Ref range: 65 - 100 mg/dL); Glucose 144 mg/dL* (Ref range: 65 - 100 mg/dL); HCT 29.9 %* (Ref range: 36.6 - 50.3 %); HGB 9.7 g/dL* (Ref range: 12.1 - 17.0 g/dL); Potassium 3.2 mmol/L* (Ref range: 3.5 - 5.1 mmol/L); Potassium 3.4 mmol/L* (Ref range: 3.5 - 5.1 mmol/L); Sodium 144 mmol/L (Ref range: 136 - 145 mmol/L);  Sodium 146 mmol/L* (Ref range: 136 - 145 mmol/L)  Recent Labs 02/07/21  0939   WBC 8.9   HGB 7.7*   HCT 25.9*        Recent Labs     02/07/21  0939 02/06/21  0735    144   K 4.4 4.0   * 114*   CO2 26 27   BUN 24* 26*   CREA 2.42* 2.19*   * 44*   CA 7.1* 7.1*   PHOS 3.2 3.4     Recent Labs     02/07/21  0939 02/06/21  0735   ALB 2.3* 2.5*     No results for input(s): INR, PTP, APTT, INREXT in the last 72 hours. No results for input(s): FE, TIBC, PSAT, FERR in the last 72 hours. No results for input(s): PH, PCO2, PO2 in the last 72 hours. No results for input(s): CPK, CKMB in the last 72 hours.     No lab exists for component: TROPONINI  Lab Results   Component Value Date/Time    Glucose (POC) 137 (H) 02/08/2021 08:34 AM    Glucose (POC) 126 (H) 02/07/2021 06:55 PM    Glucose (POC) 38 (LL) 02/07/2021 05:21 PM    Glucose (POC) 180 (H) 02/07/2021 12:11 PM    Glucose (POC) 153 (H) 02/07/2021 08:05 AM           Signed:  Marielle Mccabe MD  2/8/2021  9:14 AM    Time 40 minutes

## 2021-02-08 NOTE — PROGRESS NOTES
Patient was seen today at bedside, denies complaining of a little shortness of breath and I am awaiting a chest x-ray. Seen by surgery today and okay for discharge. Patient states though that tomorrow. Discharge summary is ready and I am trying to get a hold of family members. Will complete wound care this afternoon at approximately 4 PM then hopefully discharge him subsequently if chest x-ray does not hinder that. On exam his lungs are clear, respirations are nonlabored, he is saturating 100% on room air.

## 2021-02-08 NOTE — ROUTINE PROCESS
Bedside shift change report given to Hardeep Steinberg RN by Gina Almonte RN. Report included SBAR information .

## 2021-02-08 NOTE — PROGRESS NOTES
Renal Progress Note    Patient: Bryant Naik MRN: [id number]  SSN: [id number]    YOB: 1945  Age: 77 y.o. Sex: male      Admit Date: 1/26/2021    LOS: 12 days     Subjective:   Patient seen at bedside. Alert and awake, no acute distress. renal function is improving. Today's labs are pending  no complaints of any swelling in lower extremities. No complaints of shortness of breath.  No c/o doarrhea        Current Facility-Administered Medications   Medication Dose Route Frequency    carvediloL (COREG) tablet 12.5 mg  12.5 mg Oral BID    ergocalciferol capsule 50,000 Units  50,000 Units Oral Q7D    insulin glargine (LANTUS) injection 10 Units  10 Units SubCUTAneous DAILY    oxyCODONE-acetaminophen (PERCOCET) 5-325 mg per tablet 1 Tab  1 Tab Oral Q6H PRN    oxyCODONE-acetaminophen (PERCOCET 10)  mg per tablet 1 Tab  1 Tab Oral Q6H PRN    NIFEdipine ER (PROCARDIA XL) tablet 60 mg  60 mg Oral BID    hydrALAZINE (APRESOLINE) tablet 100 mg  100 mg Oral TID    nafcillin (NALLPEN) 2 g in 0.9% sodium chloride (MBP/ADV) 100 mL MBP  2 g IntraVENous Q6H    influenza vaccine 2020-21 (4 yrs+)(PF) (FLUCELVAX QUAD) injection 0.5 mL  0.5 mL IntraMUSCular PRIOR TO DISCHARGE    calcitRIOL (ROCALTROL) capsule 0.5 mcg  0.5 mcg Oral DAILY    calcium carbonate (TUMS) chewable tablet 400 mg [elemental]  400 mg Oral BID    hydrALAZINE (APRESOLINE) 20 mg/mL injection 10 mg  10 mg IntraVENous Q4H PRN    sodium chloride (NS) flush 5-40 mL  5-40 mL IntraVENous Q8H    sodium chloride (NS) flush 5-40 mL  5-40 mL IntraVENous PRN    acetaminophen (TYLENOL) tablet 650 mg  650 mg Oral Q6H PRN    Or    acetaminophen (TYLENOL) suppository 650 mg  650 mg Rectal Q6H PRN    polyethylene glycol (MIRALAX) packet 17 g  17 g Oral DAILY PRN    promethazine (PHENERGAN) tablet 12.5 mg  12.5 mg Oral Q6H PRN    Or    ondansetron (ZOFRAN) injection 4 mg  4 mg IntraVENous Q6H PRN    enoxaparin (LOVENOX) injection 30 mg  30 mg SubCUTAneous DAILY    glucose chewable tablet 16 g  4 Tab Oral PRN    dextrose (D50W) injection syrg 12.5-25 g  25-50 mL IntraVENous PRN    glucagon (GLUCAGEN) injection 1 mg  1 mg IntraMUSCular PRN    insulin lispro (HUMALOG) injection   SubCUTAneous AC&HS        Vitals:    02/07/21 1642 02/07/21 1952 02/08/21 0135 02/08/21 0227   BP: (!) 162/82 (!) 158/72 (!) 169/83 136/66   Pulse: 66 70 68    Resp: 18 18 18    Temp: 98.2 °F (36.8 °C) 98.1 °F (36.7 °C) 98 °F (36.7 °C)    SpO2: 96% 98% 98%    Weight:       Height:         Objective:   General: alert awake well-oriented, no acute distress. HEENT: EOMI, no Icterus, no Pallor, pupils reactive, mucosa moist,   Neck: Neck is supple, No JVD,   Lungs: breathsounds normal, no rhonchi, no rales,  CVS: heart sounds normal,  no murmurs, no rubs. GI: soft, nontender, normal BS,   Extremeties: no clubbing, no cyanosis, no edema,  Neuro: Alert, awake, oriented x3, speech is normal, moving all extremeties well. Skin: normal skin turgor, scalp area swelling /pain+  Musculoskeletal: no acute joint swellings       Intake and Output:  Current Shift: No intake/output data recorded. Last three shifts: No intake/output data recorded. Lab/Data Review:  Recent Labs     02/07/21  0939   WBC 8.9   HGB 7.7*   HCT 25.9*        Recent Labs     02/07/21  0939 02/06/21  0735    144   K 4.4 4.0   * 114*   CO2 26 27   * 44*   BUN 24* 26*   CREA 2.42* 2.19*   CA 7.1* 7.1*   PHOS 3.2 3.4   ALB 2.3* 2.5*     No results for input(s): PH, PCO2, PO2, HCO3, FIO2 in the last 72 hours.   Recent Results (from the past 24 hour(s))   RENAL FUNCTION PANEL    Collection Time: 02/07/21  9:39 AM   Result Value Ref Range    Sodium 143 136 - 145 mmol/L    Potassium 4.4 3.5 - 5.1 mmol/L    Chloride 114 (H) 97 - 108 mmol/L    CO2 26 21 - 32 mmol/L    Anion gap 3 (L) 5 - 15 mmol/L    Glucose 150 (H) 65 - 100 mg/dL    BUN 24 (H) 6 - 20 mg/dL    Creatinine 2.42 (H) 0.70 - 1.30 mg/dL    BUN/Creatinine ratio 10 (L) 12 - 20      GFR est AA 32 (L) >60 ml/min/1.73m2    GFR est non-AA 26 (L) >60 ml/min/1.73m2    Calcium 7.1 (L) 8.5 - 10.1 mg/dL    Phosphorus 3.2 2.6 - 4.7 mg/dL    Albumin 2.3 (L) 3.5 - 5.0 g/dL   CBC WITH AUTOMATED DIFF    Collection Time: 02/07/21  9:39 AM   Result Value Ref Range    WBC 8.9 4.1 - 11.1 K/uL    RBC 2.72 (L) 4.10 - 5.70 M/uL    HGB 7.7 (L) 12.1 - 17.0 g/dL    HCT 25.9 (L) 36.6 - 50.3 %    MCV 95.2 80.0 - 99.0 FL    MCH 28.3 26.0 - 34.0 PG    MCHC 29.7 (L) 30.0 - 36.5 g/dL    RDW 15.1 (H) 11.5 - 14.5 %    PLATELET 781 562 - 060 K/uL    MPV 10.4 8.9 - 12.9 FL    NEUTROPHILS 75 32 - 75 %    LYMPHOCYTES 15 12 - 49 %    MONOCYTES 8 5 - 13 %    EOSINOPHILS 2 0 - 7 %    BASOPHILS 0 0 - 1 %    IMMATURE GRANULOCYTES 0 0.0 - 0.5 %    ABS. NEUTROPHILS 6.7 1.8 - 8.0 K/UL    ABS. LYMPHOCYTES 1.3 0.8 - 3.5 K/UL    ABS. MONOCYTES 0.7 0.0 - 1.0 K/UL    ABS. EOSINOPHILS 0.2 0.0 - 0.4 K/UL    ABS. BASOPHILS 0.0 0.0 - 0.1 K/UL    ABS. IMM. GRANS. 0.0 0.00 - 0.04 K/UL    DF AUTOMATED     GLUCOSE, POC    Collection Time: 02/07/21 12:11 PM   Result Value Ref Range    Glucose (POC) 180 (H) 65 - 100 mg/dL    Performed by Edwin Chapin    GLUCOSE, POC    Collection Time: 02/07/21  5:21 PM   Result Value Ref Range    Glucose (POC) 38 (LL) 65 - 100 mg/dL    Performed by Edwin Chapin    GLUCOSE, POC    Collection Time: 02/07/21  6:55 PM   Result Value Ref Range    Glucose (POC) 126 (H) 65 - 100 mg/dL    Performed by Emmy Guerrero and Plan:     1. Acute Kidney Injury:  -2/2  prerenal azotemia from dehydratrion/infection.   -Improving creatinine 3.2 --> 2.6-->2.1/2.4-->  -today's labs are pending.    -No idea about BL Cr  -no rhabdomyolysis   -ultrasound of kidneys and bladder is negative for obstruction   -off IV fluids discontinued yesterday.   -He is okay to be discharged home from renal standpoint.    -Will see him in the office in 2 weeks with preclinic labs    2. Renal osteodystrophy:   -Hypocalcemia present, improving with supplementation.    -Corrected calcium is 8.5  -Phosphorus level is normal at 3.2. No need for binders  -Severe secondary hyperparathyroidism noted, added calcitriol 0.5 mcg once daily  -Vitamin D level is < 9. Started ergocalciferol 50,000 units weekly    3. Hypertension:   -Much better controlled                                             -On hydralazine 100 thrice daily, Coreg and nifedipine    4. Hypokalemia:   -probably related to poor p.o. intake   -improved with Supplementation potassium and magnesium      5. Metabolic acidosis:  -Secondary to acute kidney injury  -resolved  -off bicarb drip now. Not on oral bicarbonate     6. Scalp abscess:   -on IV antibiotics  -ID following the patient.   s/p I&D             Signed By: Miracle Vickers MD     February 8, 2021

## 2021-02-08 NOTE — PROGRESS NOTES
Progress Note    Patient: Kendal Gerard MRN: [id number]  SSN: [id number]    YOB: 1945  Age: 77 y.o. Sex: male      Admit Date: 1/26/2021    LOS: 12 days     Subjective:   Postop day 3/incision and drainage and debridement of scalp abscess  Patient seen in bed without complaints    Objective:     Vitals:    02/07/21 1642 02/07/21 1952 02/08/21 0135 02/08/21 0227   BP: (!) 162/82 (!) 158/72 (!) 169/83 136/66   Pulse: 66 70 68    Resp: 18 18 18    Temp: 98.2 °F (36.8 °C) 98.1 °F (36.7 °C) 98 °F (36.7 °C)    SpO2: 96% 98% 98%    Weight:       Height:            Intake and Output:  Current Shift: No intake/output data recorded. Last three shifts: No intake/output data recorded.     Review of Systems:  ROS     Physical Exam:   Physical Exam  HENT:      Head:            Lab/Data Review:  Recent Results (from the past 24 hour(s))   GLUCOSE, POC    Collection Time: 02/07/21  8:05 AM   Result Value Ref Range    Glucose (POC) 153 (H) 65 - 100 mg/dL    Performed by Oli Schneider    RENAL FUNCTION PANEL    Collection Time: 02/07/21  9:39 AM   Result Value Ref Range    Sodium 143 136 - 145 mmol/L    Potassium 4.4 3.5 - 5.1 mmol/L    Chloride 114 (H) 97 - 108 mmol/L    CO2 26 21 - 32 mmol/L    Anion gap 3 (L) 5 - 15 mmol/L    Glucose 150 (H) 65 - 100 mg/dL    BUN 24 (H) 6 - 20 mg/dL    Creatinine 2.42 (H) 0.70 - 1.30 mg/dL    BUN/Creatinine ratio 10 (L) 12 - 20      GFR est AA 32 (L) >60 ml/min/1.73m2    GFR est non-AA 26 (L) >60 ml/min/1.73m2    Calcium 7.1 (L) 8.5 - 10.1 mg/dL    Phosphorus 3.2 2.6 - 4.7 mg/dL    Albumin 2.3 (L) 3.5 - 5.0 g/dL   CBC WITH AUTOMATED DIFF    Collection Time: 02/07/21  9:39 AM   Result Value Ref Range    WBC 8.9 4.1 - 11.1 K/uL    RBC 2.72 (L) 4.10 - 5.70 M/uL    HGB 7.7 (L) 12.1 - 17.0 g/dL    HCT 25.9 (L) 36.6 - 50.3 %    MCV 95.2 80.0 - 99.0 FL    MCH 28.3 26.0 - 34.0 PG    MCHC 29.7 (L) 30.0 - 36.5 g/dL    RDW 15.1 (H) 11.5 - 14.5 %    PLATELET 805 495 - 037 K/uL    MPV 10.4 8.9 - 12.9 FL    NEUTROPHILS 75 32 - 75 %    LYMPHOCYTES 15 12 - 49 %    MONOCYTES 8 5 - 13 %    EOSINOPHILS 2 0 - 7 %    BASOPHILS 0 0 - 1 %    IMMATURE GRANULOCYTES 0 0.0 - 0.5 %    ABS. NEUTROPHILS 6.7 1.8 - 8.0 K/UL    ABS. LYMPHOCYTES 1.3 0.8 - 3.5 K/UL    ABS. MONOCYTES 0.7 0.0 - 1.0 K/UL    ABS. EOSINOPHILS 0.2 0.0 - 0.4 K/UL    ABS. BASOPHILS 0.0 0.0 - 0.1 K/UL    ABS. IMM. GRANS. 0.0 0.00 - 0.04 K/UL    DF AUTOMATED     GLUCOSE, POC    Collection Time: 02/07/21 12:11 PM   Result Value Ref Range    Glucose (POC) 180 (H) 65 - 100 mg/dL    Performed by "[employer]    GLUCOSE, POC    Collection Time: 02/07/21  5:21 PM   Result Value Ref Range    Glucose (POC) 38 (LL) 65 - 100 mg/dL    Performed by "[employer] [facility] [employer]    GLUCOSE, POC    Collection Time: 02/07/21  6:55 PM   Result Value Ref Range    Glucose (POC) 126 (H) 65 - 100 mg/dL    Performed by "[employer] [employer]           Assessment:     Active Problems:    Scalp abscess (1/27/2021)      Chronic renal failure syndrome, stage 4 (severe) ([facility] [address] (1/28/2021)        Plan:   Patient may be discharged home from surgical standpoint with twice daily Medihoney dressings to site. Cavity does not need to be packed. Patient is to follow-up in my office 1 week after discharge.     Signed By: Naty Saunders MD     February 8, 2021

## 2021-02-08 NOTE — PROGRESS NOTES
Chest x-ray showed some mild interstitial edema. Patient has no history of CHF. We will give him Lasix a dose tonight in the morning and follow-up and x-ray of the chest midmorning. He feels better, remains with good oxygen saturation he can probably go sometime tomorrow home with home health care services. Discharge summary is ready and medications have been reconciled.

## 2021-02-09 NOTE — ROUTINE PROCESS
Dressing change on abscess to patient's posterior head completed. Patient tolerated dressing change well.

## 2021-02-09 NOTE — CONSULTS
Consult    Patient: Uriel Dodd MRN: [id number]  SSN: [id number]    YOB: 1945  Age: 77 y.o. Sex: male      Subjective:      Uriel Dodd is a 77 y.o. male who is being seen for CHF. Iker Connors is a 44-year-old -American male with history of diabetes mellitus, hypertension, ex-smoker who quit smoking 30 years ago. Echo in January 2019 showed asymmetric septal hypertrophy, no regional wall motion normality, EF 60-65% with normal diastolic function. Mild aortic sclerosis without stenosis with trace MR and mild to moderate TR with RVSP of 60 mmHg. Stress test in February 2019 showed EF of 60-65% without signs of ischemia. He was admitted later to the hospital with altered mental status. His blood sugar was found to be 30. It appears that his chronic kidney disease. Patient has been admitted for a while now. He presented on 26 January with complaints of scalp swelling. There was going on for a while. There is was draining purulent discharge. His white count was elevated and he was found to be hypokalemic with acute kidney injury. He was treated with bicarb gtt. for metabolic acidosis and creatinine improved from 3.2 upon admission. Patient complains of dyspnea with exertion. Denied having any chest pain. Past Medical History:   Diagnosis Date    CKD (chronic kidney disease) stage 3, GFR 30-59 ml/min 2/8/2021    Diabetes ([facility]     Essential hypertension 2/8/2021    Hypertension     Insulin-treated type 2 diabetes mellitus ([facility] 2/8/2021    Secondary hyperparathyroidism ([facility] 2/8/2021    Vitamin D deficiency 2/8/2021     History reviewed. No pertinent surgical history. History reviewed. No pertinent family history.   Social History     Tobacco Use    Smoking status: Never Smoker    Smokeless tobacco: Never Used   Substance Use Topics    Alcohol use: Yes     Comment: rarely      Current Facility-Administered Medications   Medication Dose Route Frequency Provider Last Rate Last Admin    Saccharomyces boulardii (FLORASTOR) capsule 250 mg  250 mg Oral BID Kimberly LAIRD MD   250 mg at 02/09/21 0906    carvediloL (COREG) tablet 12.5 mg  12.5 mg Oral BID Lewanda Aschoff, MD   12.5 mg at 02/09/21 9149    ergocalciferol capsule 50,000 Units  50,000 Units Oral Saumya Ritchie MD   50,000 Units at 02/06/21 1134    insulin glargine (LANTUS) injection 10 Units  10 Units SubCUTAneous DAILY Paolo Becerra MD   10 Units at 02/07/21 1256    oxyCODONE-acetaminophen (PERCOCET) 5-325 mg per tablet 1 Tab  1 Tab Oral Q6H PRN Paolo Becerra MD        oxyCODONE-acetaminophen (PERCOCET 10)  mg per tablet 1 Tab  1 Tab Oral Q6H PRN Paolo Becerra MD   1 Tab at 02/09/21 1456    NIFEdipine ER (PROCARDIA XL) tablet 60 mg  60 mg Oral BID Lewanda Aschoff, MD   60 mg at 02/09/21 3617    hydrALAZINE (APRESOLINE) tablet 100 mg  100 mg Oral TID Lewanda Aschoff, MD   100 mg at 02/09/21 0907    nafcillin (NALLPEN) 2 g in 0.9% sodium chloride (MBP/ADV) 100 mL MBP  2 g IntraVENous Q6H Otis Tay  mL/hr at 02/09/21 1436 2 g at 02/09/21 1436    influenza vaccine 2020-21 (4 yrs+)(PF) (FLUCELVAX QUAD) injection 0.5 mL  0.5 mL IntraMUSCular PRIOR TO DISCHARGE Conner Huang MD        calcitRIOL (ROCALTROL) capsule 0.5 mcg  0.5 mcg Oral DAILY Jose Agudelo MD   0.5 mcg at 02/09/21 0907    calcium carbonate (TUMS) chewable tablet 400 mg [elemental]  400 mg Oral BID Jose Agudelo MD   400 mg at 02/09/21 0906    hydrALAZINE (APRESOLINE) 20 mg/mL injection 10 mg  10 mg IntraVENous Q4H PRN Jennifer Abarca MD   10 mg at 02/08/21 2216    sodium chloride (NS) flush 5-40 mL  5-40 mL IntraVENous Susan Conley MD   10 mL at 02/08/21 2200    sodium chloride (NS) flush 5-40 mL  5-40 mL IntraVENous PRN Jennifer Abarca MD        acetaminophen (TYLENOL) tablet 650 mg  650 mg Oral Q6H PRN Jennifer Abarca MD   650 mg at 02/04/21 0600    Or    acetaminophen (TYLENOL) suppository 650 mg  650 mg Rectal Q6H PRN Jennifer Abarca MD        polyethylene glycol [facility]) packet 17 g  17 g Oral DAILY PRN Susan Mak MD        promethazine (PHENERGAN) tablet 12.5 mg  12.5 mg Oral Q6H PRN Jennifer Abarca MD        Or    [facility] injection 4 mg  4 mg IntraVENous Q6H PRN Jennfier Abarca MD        enoxaparin (LOVENOX) injection 30 mg  30 mg SubCUTAneous DAILY Susan Patrick MD   30 mg at 02/09/21 9862    glucose chewable tablet 16 g  4 Tab Oral PRN Jennifer Abarca MD        dextrose (D50W) injection syrg 12.5-25 g  25-50 mL IntraVENous PRN Jennifer Abarca MD        glucagon [facility]) injection 1 mg  1 mg IntraMUSCular PRN Jennifer Abarca MD        insulin lispro (HUMALOG) injection   SubCUTAneous AC&HS Jennifer Abarca MD   3 Units at 02/09/21 1435        No Known Allergies    Review of Systems:  A comprehensive review of systems was negative except for that written in the History of Present Illness. Objective:     Vitals:    02/09/21 0100 02/09/21 0700 02/09/21 1346 02/09/21 1515   BP: (!) 157/80 (!) 156/81 135/71 (!) 157/73   Pulse: 70 64 61 (!) 59   Resp: 18 18 18 18   Temp: 98.6 °F (37 °C) 98 °F (36.7 °C)  98 °F (36.7 °C)   SpO2: 97% 98% 98% 99%   Weight:       Height:            Physical Exam:  General:  Alert, cooperative, no distress, appears stated age. Eyes:  Conjunctivae/corneas clear. PERRL, EOMs intact. Fundi benign   Ears:  Normal TMs and external ear canals both ears. Nose: Nares normal. Septum midline. Mucosa normal. No drainage or sinus tenderness. Mouth/Throat: Lips, mucosa, and tongue normal. Teeth and gums normal.   Neck: Supple, symmetrical, trachea midline, no adenopathy, thyroid: no enlargment/tenderness/nodules, no carotid bruit and no JVD. Back:   Symmetric, no curvature.  ROM normal. No CVA tenderness. Lungs:   Clear to auscultation bilaterally. Heart:  Regular rate and rhythm, S1, S2 normal, no murmur, click, rub or gallop. Abdomen:   Soft, non-tender. Bowel sounds normal. No masses,  No organomegaly. Extremities: Extremities normal, atraumatic, no cyanosis or edema. Pulses: 2+ and symmetric all extremities. Skin: Skin color, texture, turgor normal. No rashes or lesions   Lymph nodes: Cervical, supraclavicular, and axillary nodes normal.   Neurologic: CNII-XII intact. Normal strength, sensation and reflexes throughout. Assessment:     Hospital Problems  Date Reviewed: 2/8/2021          Codes Class Noted POA    CKD (chronic kidney disease) stage 3, GFR 30-59 ml/min ICD-10-CM: N18.30  ICD-9-CM: 585.3  2/8/2021 Unknown        Secondary hyperparathyroidism ([facility] ICD-10-CM: N25.81  ICD-9-CM: 588.81  2/8/2021 Unknown        Vitamin D deficiency ICD-10-CM: E55.9  ICD-9-CM: 268.9  2/8/2021 Unknown        Essential hypertension ICD-10-CM: I10  ICD-9-CM: 401.9  2/8/2021 Unknown        Insulin-treated type 2 diabetes mellitus ([facility] ICD-10-CM: E11.9, Z79.4  ICD-9-CM: 250.00, V58.67  2/8/2021 Unknown        Scalp abscess ICD-10-CM: L02.811  ICD-9-CM: 682.8  1/27/2021 Unknown          Cuong Garcia is a 67-year-old -American male with:  1. Heart failure with preserved EF. 2.  Hypertension with HHD. 3.  Diabetes mellitus. 4. Ex-smoker. 5.  Degenerative joint disease  6. CKD. 7.  Rule out pneumonia  8. Scalp abscess    Plan:   He recently underwent Cardiolite stress test in September 2020 in the office and that showed EF of 60 to 65%. He had fixed defect in the inferior wall without reversibility. His echo in August of last year showed moderate LVH with EF of 60 to 65% with mild MR, RVSP 66 mmHg. Inferior vena cava was dilated and was less than 50% collapse. His hemoglobin is 7.5. Platelet is 308. His creatinine was down to 2.1 and is up to 2.6. BUN is 26. Potassium 4.8 and sodium 143. Chest x-ray showed hazy opacity throughout the lung more dense suggestive of pneumonia. We will give 1 dose of IV Lasix. He is currently on carvedilol 12.5 mg twice a day. Will be careful due to previous history of bradycardia. Currently on nifedipine and hydralazine. Blood pressure is elevated. Antibiotic per primary team.  No further cardiac testing planned at this time. We will continue to follow and provide my recommendation depending on clinical progression. Thank you  For involving me in Mr. Nabor rhodes.         Signed By: Neal Flanagan MD     February 9, 2021

## 2021-02-09 NOTE — PROGRESS NOTES
Renal Progress Note    Patient: Caitlin Rodriguez MRN: [id number]  SSN: [id number]    YOB: 1945  Age: 77 y.o. Sex: male      Admit Date: 1/26/2021    LOS: 13 days     Subjective:   Patient seen at bedside. Cr 2.6  c/o sob on activity  no complaints of any swelling in lower extremities.            Current Facility-Administered Medications   Medication Dose Route Frequency    Saccharomyces boulardii (FLORASTOR) capsule 250 mg  250 mg Oral BID    carvediloL (COREG) tablet 12.5 mg  12.5 mg Oral BID    ergocalciferol capsule 50,000 Units  50,000 Units Oral Q7D    insulin glargine (LANTUS) injection 10 Units  10 Units SubCUTAneous DAILY    oxyCODONE-acetaminophen (PERCOCET) 5-325 mg per tablet 1 Tab  1 Tab Oral Q6H PRN    oxyCODONE-acetaminophen (PERCOCET 10)  mg per tablet 1 Tab  1 Tab Oral Q6H PRN    NIFEdipine ER (PROCARDIA XL) tablet 60 mg  60 mg Oral BID    hydrALAZINE (APRESOLINE) tablet 100 mg  100 mg Oral TID    nafcillin (NALLPEN) 2 g in 0.9% sodium chloride (MBP/ADV) 100 mL MBP  2 g IntraVENous Q6H    influenza vaccine 2020-21 (4 yrs+)(PF) (FLUCELVAX QUAD) injection 0.5 mL  0.5 mL IntraMUSCular PRIOR TO DISCHARGE    calcitRIOL (ROCALTROL) capsule 0.5 mcg  0.5 mcg Oral DAILY    calcium carbonate (TUMS) chewable tablet 400 mg [elemental]  400 mg Oral BID    hydrALAZINE (APRESOLINE) 20 mg/mL injection 10 mg  10 mg IntraVENous Q4H PRN    sodium chloride (NS) flush 5-40 mL  5-40 mL IntraVENous Q8H    sodium chloride (NS) flush 5-40 mL  5-40 mL IntraVENous PRN    acetaminophen (TYLENOL) tablet 650 mg  650 mg Oral Q6H PRN    Or    acetaminophen (TYLENOL) suppository 650 mg  650 mg Rectal Q6H PRN    polyethylene glycol (MIRALAX) packet 17 g  17 g Oral DAILY PRN    promethazine (PHENERGAN) tablet 12.5 mg  12.5 mg Oral Q6H PRN    Or    ondansetron (ZOFRAN) injection 4 mg  4 mg IntraVENous Q6H PRN    enoxaparin (LOVENOX) injection 30 mg  30 mg SubCUTAneous DAILY    glucose chewable tablet 16 g  4 Tab Oral PRN    dextrose (D50W) injection syrg 12.5-25 g  25-50 mL IntraVENous PRN    glucagon (GLUCAGEN) injection 1 mg  1 mg IntraMUSCular PRN    insulin lispro (HUMALOG) injection   SubCUTAneous AC&HS        Vitals:    02/08/21 1533 02/08/21 1952 02/09/21 0100 02/09/21 0700   BP: (!) 163/78 (!) 157/79 (!) 157/80 (!) 156/81   Pulse: 72 71 70 64   Resp: 19 18 18 18   Temp: 98.4 °F (36.9 °C) 98.3 °F (36.8 °C) 98.6 °F (37 °C) 98 °F (36.7 °C)   SpO2: 99% 98% 97% 98%   Weight:       Height:         Objective:   General: alert awake well-oriented, no acute distress. HEENT: EOMI, no Icterus, no Pallor, pupils reactive, mucosa moist,   Neck: Neck is supple, No JVD,   Lungs: breathsounds normal, no rhonchi, no rales,  CVS: heart sounds normal,  no murmurs, no rubs. GI: soft, nontender, normal BS,   Extremeties: no clubbing, no cyanosis, no edema,  Neuro: Alert, awake, oriented x3, speech is normal, moving all extremeties well. Skin: normal skin turgor, scalp area swelling /pain+  Musculoskeletal: no acute joint swellings       Intake and Output:  Current Shift: No intake/output data recorded. Last three shifts: 02/07 1901 - 02/09 0700  In: 850 [P.O.:650; I.V.:200]  Out: 600 [Urine:600]      Lab/Data Review:  Recent Labs     02/09/21  0715 02/07/21  0939   WBC 7.8 8.9   HGB 7.5* 7.7*   HCT 25.2* 25.9*    174     Recent Labs     02/09/21  0715 02/08/21  0835 02/07/21  0939    144 143   K 4.8 4.7 4.4   * 114* 114*   CO2 25 24 26   GLU 96 128* 150*   BUN 26* 23* 24*   CREA 2.63* 2.29* 2.42*   CA 7.6* 7.1* 7.1*   PHOS 3.8 3.6 3.2   ALB 2.5* 2.3* 2.3*     No results for input(s): PH, PCO2, PO2, HCO3, FIO2 in the last 72 hours.   Recent Results (from the past 24 hour(s))   GLUCOSE, POC    Collection Time: 02/08/21 11:06 AM   Result Value Ref Range    Glucose (POC) 240 (H) 65 - 100 mg/dL    Performed by 22 YVETTE Aj    Collection Time: 02/08/21  3:43 PM   Result Value Ref Range    Glucose (POC) 211 (H) 65 - 100 mg/dL    Performed by [address] POC    Collection Time: 02/08/21  7:42 PM   Result Value Ref Range    Glucose (POC) 92 65 - 100 mg/dL    Performed by David Walker    RENAL FUNCTION PANEL    Collection Time: 02/09/21  7:15 AM   Result Value Ref Range    Sodium 143 136 - 145 mmol/L    Potassium 4.8 3.5 - 5.1 mmol/L    Chloride 112 (H) 97 - 108 mmol/L    CO2 25 21 - 32 mmol/L    Anion gap 6 5 - 15 mmol/L    Glucose 96 65 - 100 mg/dL    BUN 26 (H) 6 - 20 mg/dL    Creatinine 2.63 (H) 0.70 - 1.30 mg/dL    BUN/Creatinine ratio 10 (L) 12 - 20      GFR est AA 29 (L) >60 ml/min/1.73m2    GFR est non-AA 24 (L) >60 ml/min/1.73m2    Calcium 7.6 (L) 8.5 - 10.1 mg/dL    Phosphorus 3.8 2.6 - 4.7 mg/dL    Albumin 2.5 (L) 3.5 - 5.0 g/dL   CBC WITH AUTOMATED DIFF    Collection Time: 02/09/21  7:15 AM   Result Value Ref Range    WBC 7.8 4.1 - 11.1 K/uL    RBC 2.65 (L) 4.10 - 5.70 M/uL    HGB 7.5 (L) 12.1 - 17.0 g/dL    HCT 25.2 (L) 36.6 - 50.3 %    MCV 95.1 80.0 - 99.0 FL    MCH 28.3 26.0 - 34.0 PG    MCHC 29.8 (L) 30.0 - 36.5 g/dL    RDW 15.3 (H) 11.5 - 14.5 %    PLATELET 311 666 - 671 K/uL    MPV 10.7 8.9 - 12.9 FL    NEUTROPHILS 65 32 - 75 %    LYMPHOCYTES 18 12 - 49 %    MONOCYTES 12 5 - 13 %    EOSINOPHILS 4 0 - 7 %    BASOPHILS 1 0 - 1 %    IMMATURE GRANULOCYTES 0 0.0 - 0.5 %    ABS. NEUTROPHILS 5.2 1.8 - 8.0 K/UL    ABS. LYMPHOCYTES 1.4 0.8 - 3.5 K/UL    ABS. MONOCYTES 0.9 0.0 - 1.0 K/UL    ABS. EOSINOPHILS 0.3 0.0 - 0.4 K/UL    ABS. BASOPHILS 0.0 0.0 - 0.1 K/UL    ABS. IMM. GRANS. 0.0 0.00 - 0.04 K/UL    DF AUTOMATED     GLUCOSE, POC    Collection Time: 02/09/21  8:17 AM   Result Value Ref Range    Glucose (POC) 101 (H) 65 - 100 mg/dL    Performed by Belem Navarrete         Assessment and Plan:     1.  Acute Kidney Injury:  -2/2  prerenal azotemia from dehydratrion/infection.   -Improved creatinine 3.2 --> 2.6-->2.1  -Cr has bumped again 2.1->2.4->2.6  -No idea about BL Cr  -no rhabdomyolysis   -ultrasound of kidneys and bladder is negative for obstruction   -off IV fluids   -Will see him in the office in 2 weeks with preclinic labs    2. Renal osteodystrophy:   -Hypocalcemia present, improving with supplementation.    -Corrected calcium is 8.5  -Phosphorus level is normal at 3.2. No need for binders  -Severe secondary hyperparathyroidism noted, added calcitriol 0.5 mcg once daily  -Vitamin D level is < 9. Started ergocalciferol 50,000 units weekly    3. Hypertension:   -Much better controlled                                             -On hydralazine 100 thrice daily, Coreg and nifedipine    4. Hypokalemia:   -probably related to poor p.o. intake   -improved with Supplementation potassium and magnesium    5. Metabolic acidosis:  -Secondary to acute kidney injury  -resolved  -off bicarb drip now. Not on oral bicarbonate     6. Scalp abscess:   -on IV antibiotics  -ID following the patient.   s/p I&D             Signed By: Ernie Mendoza MD     February 9, 2021

## 2021-02-09 NOTE — PROGRESS NOTES
Infectious Disease Progress Note           Subjective:   Stable, d/c postponed on  due to dyspnea, denies productive cough, afebrile   Objective:   Physical Exam:     Visit Vitals  BP (!) 157/73 (BP 1 Location: Left upper arm)   Pulse (!) 59   Temp 98 °F (36.7 °C)   Resp 18   Ht 6' 2\" (1.88 m)   Wt 151 lb 14.4 oz (68.9 kg)   SpO2 99%   BMI 19.50 kg/m²    O2 Flow Rate (L/min): 2 l/min O2 Device: Room air    Temp (24hrs), Av.2 °F (36.8 °C), Min:98 °F (36.7 °C), Max:98.6 °F (37 °C)    No intake/output data recorded.  1901 -  0700  In: 850 [P.O.:650; I.V.:200]  Out: 600 [Urine:600]    General: NAD, AAO x 4  HEENT: NATHALIE, Moist mucosa   Lungs: CTA b/l, no wheeze/rhonchi   Heart: S1S2+, RRR, no murmur  Abdo: Soft, NT, ND, +BS   Exts: No edema, + pulses b/l   Skin: Head dressing in place, not examined   Data Review:       Recent Days:  Recent Labs     21  0715 21  0939   WBC 7.8 8.9   HGB 7.5* 7.7*   HCT 25.2* 25.9*    174     Recent Labs     21  0715 21  0835 21  0939   BUN 26* 23* 24*   CREA 2.63* 2.29* 2.42*     Microbiology   - MRSA screen is neg   - Wound Cx : Staph aureus   - Blood Cx : Negative    Diagnostics   CXR Results  (Last 48 hours)               21 0933  XR CHEST PORT Final result    Narrative:  Chest single view. Comparison single view chest 2021       Hazy opacity through lungs now slightly more dense compared to prior imaging. Mid and lower lung predominance. Consider pneumonia. Cardiac and mediastinal structures unchanged. Elevation left hemidiaphragm. No   pneumothorax or sizable pleural effusion. 21 1520  XR CHEST PORT Final result    Narrative:  Chest single view. Comparison single chest 2020       Suspect interstitial edema now superimposed upon chronic change. Elevation left   hemidiaphragm. Cardiac and mediastinal structures unchanged.  No pneumothorax or sizable pleural effusion. Assessment/Plan     1. Occipital scalp abscess, s/p debridement on 02/05. MSSA isolated from wound Cx      Still w sig swelling and slough, minimal drainage       D/c Nafcillin, start on Dicloxacilin 500 mg QID x 2 wks       Needs intensive wound care upon d/c, highly rec F/u w the [facility] upon d/c     2. Suspected PNA per CXR. Questionable w no productive cough, fever or leukocytosis       Will empirically start on levaquin for GNR coverage for 7 days       3. Acute renal failure: Cr trending down on serial labs     4. H/o DM: A1C of 6.2, BS control per primary     5.  Anemia of chronic disease: Stable Hgb     Hank Guerra MD    2/9/2021

## 2021-02-09 NOTE — DISCHARGE SUMMARY
Physician Discharge Summary     Patient ID:    Gil Charles  [id number]  77 y.o.  1945    Admit date: 1/26/2021    Discharge date : 2/9/2021    Chronic Diagnoses:    Problem List as of 2/9/2021 Date Reviewed: 2/8/2021          Codes Class Noted - Resolved    CKD (chronic kidney disease) stage 3, GFR 30-59 ml/min ICD-10-CM: N18.30  ICD-9-CM: 585.3  2/8/2021 - Present        Secondary hyperparathyroidism ([facility] ICD-10-CM: N25.81  ICD-9-CM: 588.81  2/8/2021 - Present        Vitamin D deficiency ICD-10-CM: E55.9  ICD-9-CM: 268.9  2/8/2021 - Present        Essential hypertension ICD-10-CM: I10  ICD-9-CM: 401.9  2/8/2021 - Present        Insulin-treated type 2 diabetes mellitus ([facility] ICD-10-CM: E11.9, Z79.4  ICD-9-CM: 250.00, V58.67  2/8/2021 - Present        Scalp abscess ICD-10-CM: L02.811  ICD-9-CM: 682.8  1/27/2021 - Present        RESOLVED: BRIAN (acute kidney injury) ([facility].) ICD-10-CM: N17.9  ICD-9-CM: 584.9  2/8/2021 - 2/8/2021        RESOLVED: Hypokalemia ICD-10-CM: E87.6  ICD-9-CM: 276.8  2/8/2021 - 2/8/2021        RESOLVED: Hypomagnesemia ICD-10-CM: E83.42  ICD-9-CM: 275.2  2/8/2021 - 2/8/2021        RESOLVED: Hypocalcemia ICD-10-CM: E83.51  ICD-9-CM: 275.41  2/8/2021 - 2/8/2021          22    Final Diagnoses:   Scalp abscess [L02.811]   Brian resolved. CKD 3. Metabolic acidosis resolved  Hypokalemia resolved. Hypomagnesemia resolved. Vit d deficiency  Secondary hyperparathyroid. Hypocalcemia resolved. Insulin treated dm2.(a1c 6.2-1/26/21)  Essential hypertension      Reason for Hospitalization:  Purulent scalp abscess, brian/ckd,metabolic acidosis. Hospital Course:   71-year-old male with a history of diabetes mellitus and hypertension,HLD, and chronic kidney disease related to DM/HTN nephro sclerosis. Came to the hospital with a complaint of having a bump on the occipital area, this is draining purulent discharge. White count initially 18.6, did not meet sepsis criteria.   Also with JEREMIAS, hypokalemia, hypo-Wyatt C. Cindy, and metabolic acidosis. Patient followed by ID, general surgery, nephrology as well as hospitalist group. Bicarb drip initiated for metabolic acidosis and this was resolved ultimately. Jeremias surmised 2/2 prerenal azotemia from dehydration/infection. Cr improved 3.2-->      . Renal us neg for obstruction. Renal osteodystrophy, hypocalcemia present improved with supplementation. Severe secondary hyperparathyroidism, calcitriol added, 0.5 mcg daily. Vit d 25 hydroxy<9, Ergocalciferol 50K units weekly added. Nephrology followed and assisted with titration of bp meds. Followed by ID and general surgery 2/2 scalp abscess. Empirically started on Zosyn, vanco. Vanco avoided 2/2 jeremias and briefly on Linezolid. Wound cs ultimately grew MSSA and was started on IV Nafcillin which completed 9 days prior to dc with plan to complete course with po  Dicloxacillin 500 mg bid x 2 weeks. Gen surgery followed. Wound care locally with ag/medihoney. Purulence continued and was taken to OR for debridement on 2/5/21. Pain level improved subsequently- required excalation to percocet 10/325. Bid dressing changes recommended with ag/medihoney. Pt ultimately agreed for [facility] to continue wound care but did not have help to assist with bid dressing changes , pt was subsequently cleared for discharge with close oupatient follow up. Of note prior to discharge pt was found to have some shortness of breath with interstitial edema, pt received IV Lasix with improvement in symptoms. Case discussed with cardiology, pt to be discharged with subsequent close outpatient follow up with Cardiology for further evaluation. Plan was discussed with pt who is agreeable to current plan           Wound care:  Medihoney with extra fiber Ag and Rosa wrap scalp twice daily    Abx:  Complete 2 weeks on Dicloxacillin 500 mg bid. Probiotics    Follow up:  1- Gen ronel Montanez 1 week. Follow up scalp abscess.   2. Pcp 1-2 ernesto, [facility]- Assess for tolerance/efficacy of bp and rx changes. 3. Dr Yu/Dr Samreen Pedro, Nephrology 2 weeks. Follow up electrolyte imbalance/CKD, bmp. Discharge Medications:   Current Discharge Medication List      START taking these medications    Details   doxycycline (MONODOX) 100 mg capsule Take 5 Caps by mouth four (4) times daily for 14 days. Qty: 280 Cap, Refills: 0      HYDROcodone-acetaminophen (Lorcet HD)  mg tablet Take 1 Tab by mouth every six (6) hours as needed for Pain for up to 5 days. Max Daily Amount: 4 Tabs. Qty: 20 Tab, Refills: 0    Associated Diagnoses: Scalp abscess      calcitRIOL (ROCALTROL) 0.5 mcg capsule Take 1 Cap by mouth daily for 30 days. Qty: 30 Cap, Refills: 0      calcium carbonate (TUMS) 200 mg calcium (500 mg) chew Take 2 Tabs by mouth two (2) times a day for 30 days. Qty: 120 Tab, Refills: 0      carvediloL (COREG) 12.5 mg tablet Take 1 Tab by mouth two (2) times a day for 30 days. Qty: 60 Tab, Refills: 0      ergocalciferol (ERGOCALCIFEROL) 1,250 mcg (50,000 unit) capsule Take 1 Cap by mouth every seven (7) days for 30 days. Qty: 4 Cap, Refills: 0      hydrALAZINE (APRESOLINE) 100 mg tablet Take 1 Tab by mouth three (3) times daily for 30 days. Qty: 90 Tab, Refills: 0      docusate sodium (Colace) 100 mg capsule Take 1 Cap by mouth two (2) times a day for 90 days. Qty: 60 Cap, Refills: 2      Saccharomyces boulardii (FLORASTOR) 250 mg capsule Take 1 Cap by mouth two (2) times a day for 14 days. Qty: 28 Cap, Refills: 0         CONTINUE these medications which have CHANGED    Details   NIFEdipine ER (PROCARDIA XL) 60 mg ER tablet Take 1 Tab by mouth two (2) times a day for 30 days. Qty: 60 Tab, Refills: 0      potassium chloride (K-DUR, KLOR-CON) 20 mEq tablet Take 1 Tab by mouth daily for 30 days.   Qty: 30 Tab, Refills: 0         CONTINUE these medications which have NOT CHANGED    Details   insulin glargine (Lantus Solostar U-100 Insulin) 100 unit/mL (3 mL) inpn by SubCUTAneous route nightly. ferrous sulfate 325 mg (65 mg iron) tablet Take 325 mg by mouth Daily (before breakfast). DULoxetine (CYMBALTA) 60 mg capsule Take 60 mg by mouth daily. sodium bicarbonate 650 mg tablet Take 650 mg by mouth two (2) times a day. pravastatin (PRAVACHOL) 20 mg tablet Take 20 mg by mouth daily. STOP taking these medications       HYDROcodone-acetaminophen (Norco) 5-325 mg per tablet Comments:   Reason for Stopping:                 Diet:  Diabetic Diet    Disposition:  [facility]ound [facility]/SN. Advanced Directive:   FULL x   DNR      Discharge Exam:  Patient Vitals for the past 12 hrs:   Temp Pulse Resp BP SpO2   02/09/21 0700 98 °F (36.7 °C) 64 18 (!) 156/81 98 %   02/09/21 0100 98.6 °F (37 °C) 70 18 (!) 157/80 97 %   General: Alert and awake  HEENT:scalp dressing intact. Eomi. Cardiovascular: RRR. No M/R/G. Respiratory: cta, not labored  GI: soft, nd, nt, bs+. Rectal: Deferred   Musculoskeletal: Good rom, no edema  Neurological: Aao x 3, nfd.  Cranial nerves II-XII intact  Psychiatric: Mood appears appropriate with intact judgement  Lymphatic: No cervical or supraclavicular adenopathy. appreciated    CONSULTATIONS: ID, Nephrology, General Surgery and Hospitalist    Significant Diagnostic Studies:   1/26/2021: BUN 46 mg/dL* (Ref range: 6 - 20 mg/dL); Calcium 5.8 mg/dL* (Ref range: 8.5 - 10.1 mg/dL); CO2 15 mmol/L* (Ref range: 21 - 32 mmol/L); Creatinine 3.25 mg/dL* (Ref range: 0.70 - 1.30 mg/dL); Glucose 138 mg/dL* (Ref range: 65 - 100 mg/dL); HCT 31.6 %* (Ref range: 36.6 - 50.3 %); HGB 10.6 g/dL* (Ref range: 12.1 - 17.0 g/dL); Potassium 2.7 mmol/L* (Ref range: 3.5 - 5.1 mmol/L); Sodium 140 mmol/L (Ref range: 136 - 145 mmol/L)  1/27/2021: BUN 45 mg/dL* (Ref range: 6 - 20 mg/dL); BUN 43 mg/dL* (Ref range: 6 - 20 mg/dL); Calcium 6.1 mg/dL* (Ref range: 8.5 - 10.1 mg/dL);  Calcium 6.2 mg/dL* (Ref range: 8.5 - 10.1 mg/dL); CO2 14 mmol/L* (Ref range: 21 - 32 mmol/L); CO2 16 mmol/L* (Ref range: 21 - 32 mmol/L); Creatinine 3.22 mg/dL* (Ref range: 0.70 - 1.30 mg/dL); Creatinine 2.95 mg/dL* (Ref range: 0.70 - 1.30 mg/dL); Glucose 194 mg/dL* (Ref range: 65 - 100 mg/dL); Glucose 144 mg/dL* (Ref range: 65 - 100 mg/dL); HCT 29.9 %* (Ref range: 36.6 - 50.3 %); HGB 9.7 g/dL* (Ref range: 12.1 - 17.0 g/dL); Potassium 3.2 mmol/L* (Ref range: 3.5 - 5.1 mmol/L); Potassium 3.4 mmol/L* (Ref range: 3.5 - 5.1 mmol/L); Sodium 144 mmol/L (Ref range: 136 - 145 mmol/L); Sodium 146 mmol/L* (Ref range: 136 - 145 mmol/L)  Recent Labs     02/09/21  0715 02/07/21  0939   WBC 7.8 8.9   HGB 7.5* 7.7*   HCT 25.2* 25.9*    174     Recent Labs     02/09/21  0715 02/08/21  0835 02/07/21  0939    144 143   K 4.8 4.7 4.4   * 114* 114*   CO2 25 24 26   BUN 26* 23* 24*   CREA 2.63* 2.29* 2.42*   GLU 96 128* 150*   CA 7.6* 7.1* 7.1*   PHOS 3.8 3.6 3.2     Recent Labs     02/09/21  0715 02/08/21  0835 02/07/21  0939   ALB 2.5* 2.3* 2.3*     No results for input(s): INR, PTP, APTT, INREXT, INREXT in the last 72 hours. No results for input(s): FE, TIBC, PSAT, FERR in the last 72 hours. No results for input(s): PH, PCO2, PO2 in the last 72 hours. No results for input(s): CPK, CKMB in the last 72 hours. No lab exists for component: TROPONINI  Lab Results   Component Value Date/Time    Glucose (POC) 101 (H) 02/09/2021 08:17 AM    Glucose (POC) 92 02/08/2021 07:42 PM    Glucose (POC) 211 (H) 02/08/2021 03:43 PM    Glucose (POC) 240 (H) 02/08/2021 11:06 AM    Glucose (POC) 137 (H) 02/08/2021 08:34 AM           Signed:   Rohan Lopez MD  2/9/2021  9:14 AM    Time 40 minutes

## 2021-02-09 NOTE — ROUTINE PROCESS
This nurse reached out to Dr. Elijah Trujillo in regard to patient's discharge today. Relayed chest xray results to HCP and was directed to contact Dr. Kassie Chappell before proceeding with discharge. Dr. Kassie Chappell directed this nurse to administer a one time dose of 40mg Lasix IVP, repeat CBC and CMP in the AM 2/10/2021, and to hold patient overnight. This nurse communicated this with Dr. Elijah Trujillo.

## 2021-02-09 NOTE — PROGRESS NOTES
Pt will discharge home with [facility]. Cm met with pt at the bedside. CM informed him of acceptance with [facility]. CM presented and discussed IMM. IMM obtained. Medicare pt has received, reviewed, and signed 2nd IM letter informing them of their right to appeal the discharge. Signed copied has been placed on pt bedside chart. Discharge plan of care/case management plan validated with provider's discharge order.

## 2021-02-10 NOTE — PROGRESS NOTES
Skin assessment completed by this Rn and Ally Fabian Rn. During assessment noted abscess to posterior head with some drainage. Remainder of skin is dry and intact.

## 2021-02-10 NOTE — DISCHARGE SUMMARY
Physician Discharge Summary     Patient ID:    Katlyn Gann  [id number]  77 y.o.  1945    Admit date: 1/26/2021    Discharge date : 2/10/2021    Chronic Diagnoses:    Problem List as of 2/10/2021 Date Reviewed: 2/8/2021          Codes Class Noted - Resolved    CKD (chronic kidney disease) stage 3, GFR 30-59 ml/min ICD-10-CM: N18.30  ICD-9-CM: 585.3  2/8/2021 - Present        Secondary hyperparathyroidism ([facility] ICD-10-CM: N25.81  ICD-9-CM: 588.81  2/8/2021 - Present        Vitamin D deficiency ICD-10-CM: E55.9  ICD-9-CM: 268.9  2/8/2021 - Present        Essential hypertension ICD-10-CM: I10  ICD-9-CM: 401.9  2/8/2021 - Present        Insulin-treated type 2 diabetes mellitus ([facility] ICD-10-CM: E11.9, Z79.4  ICD-9-CM: 250.00, V58.67  2/8/2021 - Present        Scalp abscess ICD-10-CM: L02.811  ICD-9-CM: 682.8  1/27/2021 - Present        RESOLVED: BRIAN (acute kidney injury) ([facility] ICD-10-CM: N17.9  ICD-9-CM: 584.9  2/8/2021 - 2/8/2021        RESOLVED: Hypokalemia ICD-10-CM: E87.6  ICD-9-CM: 276.8  2/8/2021 - 2/8/2021        RESOLVED: Hypomagnesemia ICD-10-CM: E83.42  ICD-9-CM: 275.2  2/8/2021 - 2/8/2021        RESOLVED: Hypocalcemia ICD-10-CM: E83.51  ICD-9-CM: 275.41  2/8/2021 - 2/8/2021          22    Final Diagnoses:   Scalp abscess [L02.811]   Brian resolved. CKD 3. Metabolic acidosis resolved  Hypokalemia resolved. Hypomagnesemia resolved. Vit d deficiency  Secondary hyperparathyroid. Hypocalcemia resolved. Insulin treated dm2.(a1c 6.2-1/26/21)  Essential hypertension      Reason for Hospitalization:  Purulent scalp abscess, brian/ckd,metabolic acidosis. Hospital Course:   52-year-old male with a history of diabetes mellitus and hypertension,HLD, and chronic kidney disease related to DM/HTN nephro sclerosis. Came to the hospital with a complaint of having a bump on the occipital area, this is draining purulent discharge. White count initially 18.6, did not meet sepsis criteria. Also with JEREMIAS, hypokalemia, hypo-Wyatt C. Cindy, and metabolic acidosis. Patient followed by ID, general surgery, nephrology as well as hospitalist group. Bicarb drip initiated for metabolic acidosis and this was resolved ultimately. Jeremias surmised 2/2 prerenal azotemia from dehydration/infection. Cr improved 3.2-->      . Renal us neg for obstruction. Renal osteodystrophy, hypocalcemia present improved with supplementation. Severe secondary hyperparathyroidism, calcitriol added, 0.5 mcg daily. Vit d 25 hydroxy<9, Ergocalciferol 50K units weekly added. Nephrology followed and assisted with titration of bp meds. Followed by ID and general surgery 2/2 scalp abscess. Empirically started on Zosyn, vanco. Vanco avoided 2/2 jeremias and briefly on Linezolid. Wound cs ultimately grew MSSA and was started on IV Nafcillin which completed 9 days prior to dc with plan to complete course with po  Dicloxacillin 500 mg bid x 2 weeks. Gen surgery followed. Wound care locally with ag/medihoney. Purulence continued and was taken to OR for debridement on 2/5/21. Pain level improved subsequently- required excalation to percocet 10/325. Bid dressing changes recommended with ag/medihoney. Pt ultimately agreed for [facility] to continue wound care but did not have help to assist with bid dressing changes , pt was subsequently cleared for discharge with close oupatient follow up. Of note prior to discharge pt was found to have some shortness of breath with interstitial edema, pt received IV Lasix with improvement in symptoms. Case discussed with cardiology, pt to be discharged with subsequent close outpatient follow up with Cardiology for further evaluation. Plan was discussed with pt who is agreeable to current plan           Wound care:  Medihoney with extra fiber Ag and Rosa wrap scalp twice daily    Abx:  Complete 2 weeks on Dicloxacillin 500 mg bid. Probiotics    Follow up:  1- Gen srgery Dr Gloria Ends 1 week. Follow up scalp abscess.   2. Pcp 1-2 weeks, Dr Mary Black- Assess for tolerance/efficacy of bp and rx changes. 3. Dr Yu/Dr Dilip Bryant, Nephrology 2 weeks. Follow up electrolyte imbalance/CKD, bmp. Discharge Medications:   Current Discharge Medication List      START taking these medications    Details   doxycycline (MONODOX) 100 mg capsule Take 5 Caps by mouth four (4) times daily for 14 days. Qty: 280 Cap, Refills: 0      furosemide (Lasix) 20 mg tablet Take 1 Tab by mouth daily. Qty: 30 Tab, Refills: 0      HYDROcodone-acetaminophen (Lorcet HD)  mg tablet Take 1 Tab by mouth every six (6) hours as needed for Pain for up to 5 days. Max Daily Amount: 4 Tabs. Qty: 20 Tab, Refills: 0    Associated Diagnoses: Scalp abscess      calcitRIOL (ROCALTROL) 0.5 mcg capsule Take 1 Cap by mouth daily for 30 days. Qty: 30 Cap, Refills: 0      calcium carbonate (TUMS) 200 mg calcium (500 mg) chew Take 2 Tabs by mouth two (2) times a day for 30 days. Qty: 120 Tab, Refills: 0      carvediloL (COREG) 12.5 mg tablet Take 1 Tab by mouth two (2) times a day for 30 days. Qty: 60 Tab, Refills: 0      ergocalciferol (ERGOCALCIFEROL) 1,250 mcg (50,000 unit) capsule Take 1 Cap by mouth every seven (7) days for 30 days. Qty: 4 Cap, Refills: 0      hydrALAZINE (APRESOLINE) 100 mg tablet Take 1 Tab by mouth three (3) times daily for 30 days. Qty: 90 Tab, Refills: 0      docusate sodium (Colace) 100 mg capsule Take 1 Cap by mouth two (2) times a day for 90 days. Qty: 60 Cap, Refills: 2      Saccharomyces boulardii (FLORASTOR) 250 mg capsule Take 1 Cap by mouth two (2) times a day for 14 days. Qty: 28 Cap, Refills: 0         CONTINUE these medications which have CHANGED    Details   NIFEdipine ER (PROCARDIA XL) 60 mg ER tablet Take 1 Tab by mouth two (2) times a day for 30 days. Qty: 60 Tab, Refills: 0      potassium chloride (K-DUR, KLOR-CON) 20 mEq tablet Take 1 Tab by mouth daily for 30 days.   Qty: 30 Tab, Refills: 0         CONTINUE these medications which have NOT CHANGED    Details   insulin glargine (Lantus Solostar U-100 Insulin) 100 unit/mL (3 mL) inpn by SubCUTAneous route nightly. ferrous sulfate 325 mg (65 mg iron) tablet Take 325 mg by mouth Daily (before breakfast). DULoxetine (CYMBALTA) 60 mg capsule Take 60 mg by mouth daily. sodium bicarbonate 650 mg tablet Take 650 mg by mouth two (2) times a day. pravastatin (PRAVACHOL) 20 mg tablet Take 20 mg by mouth daily. STOP taking these medications       HYDROcodone-acetaminophen (Norco) 5-325 mg per tablet Comments:   Reason for Stopping:                 Diet:  Diabetic Diet    Disposition:  [facility]ound care/SN. Advanced Directive:   FULL x   DNR      Discharge Exam:  Patient Vitals for the past 12 hrs:   Temp Pulse Resp BP SpO2   02/10/21 0025 99 °F (37.2 °C) 63 18 (!) 142/73 98 %   02/09/21 2031 98.1 °F (36.7 °C) 60 18 (!) 150/72 96 %   General: Alert and awake  HEENT:scalp dressing intact. Eomi. Cardiovascular: RRR. No M/R/G. Respiratory: cta, not labored  GI: soft, nd, nt, bs+. Rectal: Deferred   Musculoskeletal: Good rom, no edema  Neurological: Aao x 3, nfd.  Cranial nerves II-XII intact  Psychiatric: Mood appears appropriate with intact judgement  Lymphatic: No cervical or supraclavicular adenopathy. appreciated    CONSULTATIONS: ID, Nephrology, General Surgery and Hospitalist    Significant Diagnostic Studies:   1/26/2021: BUN 46 mg/dL* (Ref range: 6 - 20 mg/dL); Calcium 5.8 mg/dL* (Ref range: 8.5 - 10.1 mg/dL); CO2 15 mmol/L* (Ref range: 21 - 32 mmol/L); Creatinine 3.25 mg/dL* (Ref range: 0.70 - 1.30 mg/dL); Glucose 138 mg/dL* (Ref range: 65 - 100 mg/dL); HCT 31.6 %* (Ref range: 36.6 - 50.3 %); HGB 10.6 g/dL* (Ref range: 12.1 - 17.0 g/dL); Potassium 2.7 mmol/L* (Ref range: 3.5 - 5.1 mmol/L); Sodium 140 mmol/L (Ref range: 136 - 145 mmol/L)  1/27/2021: BUN 45 mg/dL* (Ref range: 6 - 20 mg/dL); BUN 43 mg/dL* (Ref range: 6 - 20 mg/dL);  Calcium 6.1 mg/dL* (Ref range: 8.5 - 10.1 mg/dL); Calcium 6.2 mg/dL* (Ref range: 8.5 - 10.1 mg/dL); CO2 14 mmol/L* (Ref range: 21 - 32 mmol/L); CO2 16 mmol/L* (Ref range: 21 - 32 mmol/L); Creatinine 3.22 mg/dL* (Ref range: 0.70 - 1.30 mg/dL); Creatinine 2.95 mg/dL* (Ref range: 0.70 - 1.30 mg/dL); Glucose 194 mg/dL* (Ref range: 65 - 100 mg/dL); Glucose 144 mg/dL* (Ref range: 65 - 100 mg/dL); HCT 29.9 %* (Ref range: 36.6 - 50.3 %); HGB 9.7 g/dL* (Ref range: 12.1 - 17.0 g/dL); Potassium 3.2 mmol/L* (Ref range: 3.5 - 5.1 mmol/L); Potassium 3.4 mmol/L* (Ref range: 3.5 - 5.1 mmol/L); Sodium 144 mmol/L (Ref range: 136 - 145 mmol/L); Sodium 146 mmol/L* (Ref range: 136 - 145 mmol/L)  Recent Labs     02/10/21  0550 02/09/21  0715   WBC 5.9 7.8   HGB 7.1* 7.5*   HCT 23.4* 25.2*    183     Recent Labs     02/09/21  0715 02/08/21  0835 02/07/21  0939    144 143   K 4.8 4.7 4.4   * 114* 114*   CO2 25 24 26   BUN 26* 23* 24*   CREA 2.63* 2.29* 2.42*   GLU 96 128* 150*   CA 7.6* 7.1* 7.1*   PHOS 3.8 3.6 3.2     Recent Labs     02/09/21  0715 02/08/21  0835 02/07/21  0939   ALB 2.5* 2.3* 2.3*     No results for input(s): INR, PTP, APTT, INREXT, INREXT in the last 72 hours. Recent Labs     02/09/21  0715   TIBC 169*   PSAT 14*   FERR 287      No results for input(s): PH, PCO2, PO2 in the last 72 hours. No results for input(s): CPK, CKMB in the last 72 hours. No lab exists for component: TROPONINI  Lab Results   Component Value Date/Time    Glucose (POC) 186 (H) 02/09/2021 08:30 PM    Glucose (POC) 231 (H) 02/09/2021 03:19 PM    Glucose (POC) 243 (H) 02/09/2021 01:53 PM    Glucose (POC) 101 (H) 02/09/2021 08:17 AM    Glucose (POC) 92 02/08/2021 07:42 PM           Signed:   Patricia Lewis MD  2/10/2021  9:14 AM    Time 40 minutes

## 2021-02-10 NOTE — PROGRESS NOTES
Hospitalist Progress Note               Daily Progress Note: 2/10/2021      Subjective: The patient is seen for follow  up.   78-year-old male with a history of diabetes mellitus and hypertension and chronic kidney disease came to the hospital with a complaint of having a bump on the occipital area, this is draining purulent discharge. Patient seen and evaluated at bedside, of note patient feels improved, no acute events overnight      ID/ gen surgery appreciated.     Medications reviewed  Current Facility-Administered Medications   Medication Dose Route Frequency    dicloxacillin (DYNAPEN) capsule 500 mg  500 mg Oral Q6H    levoFLOXacin (LEVAQUIN) tablet 500 mg  500 mg Oral Q24H    Saccharomyces boulardii (FLORASTOR) capsule 250 mg  250 mg Oral BID    carvediloL (COREG) tablet 12.5 mg  12.5 mg Oral BID    ergocalciferol capsule 50,000 Units  50,000 Units Oral Q7D    insulin glargine (LANTUS) injection 10 Units  10 Units SubCUTAneous DAILY    oxyCODONE-acetaminophen (PERCOCET) 5-325 mg per tablet 1 Tab  1 Tab Oral Q6H PRN    oxyCODONE-acetaminophen (PERCOCET 10)  mg per tablet 1 Tab  1 Tab Oral Q6H PRN    NIFEdipine ER (PROCARDIA XL) tablet 60 mg  60 mg Oral BID    hydrALAZINE (APRESOLINE) tablet 100 mg  100 mg Oral TID    influenza vaccine 2020-21 (4 yrs+)(PF) (FLUCELVAX QUAD) injection 0.5 mL  0.5 mL IntraMUSCular PRIOR TO DISCHARGE    calcitRIOL (ROCALTROL) capsule 0.5 mcg  0.5 mcg Oral DAILY    calcium carbonate (TUMS) chewable tablet 400 mg [elemental]  400 mg Oral BID    hydrALAZINE (APRESOLINE) 20 mg/mL injection 10 mg  10 mg IntraVENous Q4H PRN    sodium chloride (NS) flush 5-40 mL  5-40 mL IntraVENous Q8H    sodium chloride (NS) flush 5-40 mL  5-40 mL IntraVENous PRN    acetaminophen (TYLENOL) tablet 650 mg  650 mg Oral Q6H PRN    Or    acetaminophen (TYLENOL) suppository 650 mg  650 mg Rectal Q6H PRN    polyethylene glycol (MIRALAX) packet 17 g  17 g Oral DAILY PRN    promethazine (PHENERGAN) tablet 12.5 mg  12.5 mg Oral Q6H PRN    Or    ondansetron (ZOFRAN) injection 4 mg  4 mg IntraVENous Q6H PRN    enoxaparin (LOVENOX) injection 30 mg  30 mg SubCUTAneous DAILY    glucose chewable tablet 16 g  4 Tab Oral PRN    dextrose (D50W) injection syrg 12.5-25 g  25-50 mL IntraVENous PRN    glucagon (GLUCAGEN) injection 1 mg  1 mg IntraMUSCular PRN    insulin lispro (HUMALOG) injection   SubCUTAneous AC&HS       Review of Systems:   A comprehensive review of systems was negative except for that written in the HPI. Objective:   Physical Exam:     Visit Vitals  BP (!) 142/73   Pulse 63   Temp 99 °F (37.2 °C)   Resp 18   Ht 6' 2\" (1.88 m)   Wt 68.9 kg (151 lb 14.4 oz)   SpO2 98%   BMI 19.50 kg/m²    O2 Flow Rate (L/min): 2 l/min O2 Device: Room air    Temp (24hrs), Av.4 °F (36.9 °C), Min:98 °F (36.7 °C), Max:99 °F (37.2 °C)    No intake/output data recorded. No intake/output data recorded. PHYSICAL EXAM:  General: Alert and awake  HEENT:scalp dressing intact. Eomi. Cardiovascular: RRR. No M/R/G. Respiratory: cta, not labored  GI: soft, nd, nt, bs+. Rectal: Deferred   Musculoskeletal: Good rom, no edema  Neurological: Aao x 3, nfd.  Cranial nerves II-XII intact  Psychiatric: Mood appears appropriate with intact judgement  Lymphatic: No cervical or supraclavicular adenopathy. appreciated    Data Review:       Recent Days:  Recent Labs     02/10/21  0550 21  0715 21  0939   WBC 5.9 7.8 8.9   HGB 7.1* 7.5* 7.7*   HCT 23.4* 25.2* 25.9*    183 174     Recent Labs     21  0715 21  0835 21  0939    144 143   K 4.8 4.7 4.4   * 114* 114*   CO2 25 24 26   GLU 96 128* 150*   BUN 26* 23* 24*   CREA 2.63* 2.29* 2.42*   CA 7.6* 7.1* 7.1*   PHOS 3.8 3.6 3.2   ALB 2.5* 2.3* 2.3*     No results for input(s): PH, PCO2, PO2, HCO3, FIO2 in the last 72 hours. Results     ** No results found for the last 336 hours.  **            XR CHEST PORT   Final Result      XR CHEST PORT   Final Result      US RETROPERITONEUM COMP   Final Result   Early medical disease of right kidney. Left kidney not seen. Thick-walled bladder      CT HEAD WO CONT   Final Result   Impression: Age-appropriate atrophy. No acute findings. Patient has a soft tissue infection over the right occipital scalp. This is   shown to be fairly pronounced localized skin thickening, subcutaneous fat edema,   and some thickening of the deep soft tissues. There is no gas formation, bone   destruction, periosteal reaction or abscess             Assessment/     Problem List:  Hospital Problems  Date Reviewed: 2/8/2021          Codes Class Noted POA    CKD (chronic kidney disease) stage 3, GFR 30-59 ml/min ICD-10-CM: N18.30  ICD-9-CM: 585.3  2/8/2021 Unknown        Secondary hyperparathyroidism ([facility] ICD-10-CM: N25.81  ICD-9-CM: 588.81  2/8/2021 Unknown        Vitamin D deficiency ICD-10-CM: E55.9  ICD-9-CM: 268.9  2/8/2021 Unknown        Essential hypertension ICD-10-CM: I10  ICD-9-CM: 401.9  2/8/2021 Unknown        Insulin-treated type 2 diabetes mellitus ([facility] ICD-10-CM: E11.9, Z79.4  ICD-9-CM: 250.00, V58.67  2/8/2021 Unknown        Scalp abscess ICD-10-CM: L02.811  ICD-9-CM: 682.8  1/27/2021 Unknown                     Plan:  Scalp/Occipital abscess  He describes better pain ccontrrol- cont percocet. S/p Debridement OR 2/5. No growth on blood cultures  MSSA on wound cultures  ID following, switched to iv Nafcillin with transition to po dicloxacillin at discharge.    Wound care following  hhc/wound care on dc- will need bid dressing changes  Anticipate discharge on Monday     Hypokalemia & Hypomagnesemiarepleted       Hypocalcemiastable    Vit d deficiency- vit d 25 hydroxy<9  Cont calcitriol  Nephrology following     Acute kidney injuryCKD 3-4:   patient was found to have an elevated serum creatinine with unclear baseline  Us kidneys did not show obstruction  Nephrology following- probably at baseline creatinine, ok for dc per nephrology    Metabolic acidosis- resolved     Hypertension  Above goal at times  Cont coreg, hydralazine, nifedipine. Follow and titrate     Diabetes  Hypoglycemia yesterday, bg to 39,  Subsequently 150's-220's. Titrated lantus, continue ssi/hypoglycemia protocol. Acute heart failure with unknown ejection fractionpatient was found to have pulmonary edema, status post Lasix 40 mg IV, currently remains on room air  Daily weights  Frequent intake and output monitoring  Lasix 40 mg once  TTE for further evaluation  Cardiology consult appreciated       ProphylaxisLovenox  FENcardiac diet, replete potassium and magnesium  Full code,      Disposition -pending clinical improvement  Code status: Full  Prophylaxis: Lovenox  Recommended Disposition: Home w/Family .     Care Plan discussed with: Patient/Family, Nurse,  and Consultant Nephrology      Joyce Allan MD

## 2021-02-10 NOTE — PROGRESS NOTES
Comprehensive Nutrition Assessment    Type and Reason for Visit: RD nutrition re-screen/LOS    Nutrition Recommendations/Plan:   Continue Diabetic Consistent Carb, regular diet  Honor pt preferences within diet restriction  Adjust insulin regimen for goal of euglycemia  Nursing to document % p.o. meal and snack intake    Nutrition Assessment:  Admitted for scalp abscess; s/p Debridement OR 2/5. Pt reports good appetite, eating ~100% of trays. Diet order - Diabetic Consistent Carb, Regular. Labs and meds reviewed. Pt slated for discharge today. Malnutrition Assessment:  Malnutrition Status:  No malnutrition      Nutrition Related Findings:  Pt appears nourished. No edema. Pt denied chewing or swallowing difficulties, no n/v/c/d; last BM 2/10.       Wounds:    Surgical incision       Current Nutrition Therapies:  DIET DIABETIC CONSISTENT CARB Regular    Anthropometric Measures:  · Height:  6' 2\" (188 cm)  · Current Body Wt:  68.9 kg (151 lb 14.4 oz)   · Usual Body Wt:  68 kg (150 lb)     · Ideal Body Wt:  190 lbs:  79.9 %   · BMI Category:  Underweight (BMI less than 22) age over 72       Nutrition Diagnosis:   No nutrition diagnosis at this time    Nutrition Interventions:   Food and/or Nutrient Delivery: Continue current diet  Nutrition Education and Counseling: No recommendations at this time  Coordination of Nutrition Care: Continue to monitor while inpatient    Nutrition Monitoring and Evaluation:   Behavioral-Environmental Outcomes: None identified  Food/Nutrient Intake Outcomes: Food and nutrient intake  Physical Signs/Symptoms Outcomes: Weight    Discharge Planning:    No discharge needs at this time     Electronically signed by Cody Hinkle RD on 2/10/2021 at 1:50 PM    Contact:

## 2021-02-10 NOTE — PROGRESS NOTES
Renal Progress Note    Patient: Mer Ware MRN: [id number]  SSN: [id number]    YOB: 1945  Age: 77 y.o. Sex: male      Admit Date: 1/26/2021    LOS: 14 days     Subjective:   Patient seen at bedside. Cr 2.8  c/o sob on activity  no complaints of any swelling in lower extremities.            Current Facility-Administered Medications   Medication Dose Route Frequency    dicloxacillin (DYNAPEN) capsule 500 mg  500 mg Oral Q6H    levoFLOXacin (LEVAQUIN) tablet 500 mg  500 mg Oral Q24H    Saccharomyces boulardii (FLORASTOR) capsule 250 mg  250 mg Oral BID    carvediloL (COREG) tablet 12.5 mg  12.5 mg Oral BID    ergocalciferol capsule 50,000 Units  50,000 Units Oral Q7D    insulin glargine (LANTUS) injection 10 Units  10 Units SubCUTAneous DAILY    oxyCODONE-acetaminophen (PERCOCET) 5-325 mg per tablet 1 Tab  1 Tab Oral Q6H PRN    oxyCODONE-acetaminophen (PERCOCET 10)  mg per tablet 1 Tab  1 Tab Oral Q6H PRN    NIFEdipine ER (PROCARDIA XL) tablet 60 mg  60 mg Oral BID    hydrALAZINE (APRESOLINE) tablet 100 mg  100 mg Oral TID    influenza vaccine 2020-21 (4 yrs+)(PF) (FLUCELVAX QUAD) injection 0.5 mL  0.5 mL IntraMUSCular PRIOR TO DISCHARGE    calcitRIOL (ROCALTROL) capsule 0.5 mcg  0.5 mcg Oral DAILY    calcium carbonate (TUMS) chewable tablet 400 mg [elemental]  400 mg Oral BID    hydrALAZINE (APRESOLINE) 20 mg/mL injection 10 mg  10 mg IntraVENous Q4H PRN    sodium chloride (NS) flush 5-40 mL  5-40 mL IntraVENous Q8H    sodium chloride (NS) flush 5-40 mL  5-40 mL IntraVENous PRN    acetaminophen (TYLENOL) tablet 650 mg  650 mg Oral Q6H PRN    Or    acetaminophen (TYLENOL) suppository 650 mg  650 mg Rectal Q6H PRN    polyethylene glycol (MIRALAX) packet 17 g  17 g Oral DAILY PRN    promethazine (PHENERGAN) tablet 12.5 mg  12.5 mg Oral Q6H PRN    Or    ondansetron (ZOFRAN) injection 4 mg  4 mg IntraVENous Q6H PRN    enoxaparin (LOVENOX) injection 30 mg  30 mg SubCUTAneous DAILY    glucose chewable tablet 16 g  4 Tab Oral PRN    dextrose (D50W) injection syrg 12.5-25 g  25-50 mL IntraVENous PRN    glucagon (GLUCAGEN) injection 1 mg  1 mg IntraMUSCular PRN    insulin lispro (HUMALOG) injection   SubCUTAneous AC&HS        Vitals:    02/10/21 0919 02/10/21 1100 02/10/21 1341 02/10/21 1547   BP: 111/61 (!) 150/78  (!) 153/83   Pulse: 92 60  60   Resp: 18 18  18   Temp: 98.5 °F (36.9 °C) 97.5 °F (36.4 °C)  97.8 °F (36.6 °C)   SpO2: 98% 100%  100%   Weight:       Height:   6' 2\" (1.88 m)      Objective:   General: alert awake well-oriented, no acute distress. HEENT: EOMI, no Icterus, no Pallor, pupils reactive, mucosa moist,   Neck: Neck is supple, No JVD,   Lungs: breathsounds normal, no rhonchi, no rales,  CVS: heart sounds normal,  no murmurs, no rubs. GI: soft, nontender, normal BS,   Extremeties: no clubbing, no cyanosis, no edema,  Neuro: Alert, awake, oriented x3, speech is normal, moving all extremeties well. Skin: normal skin turgor, scalp area swelling /pain+  Musculoskeletal: no acute joint swellings       Intake and Output:  Current Shift: No intake/output data recorded. Last three shifts: No intake/output data recorded. Lab/Data Review:  Recent Labs     02/10/21  0550 02/09/21  0715   WBC 5.9 7.8   HGB 7.1* 7.5*   HCT 23.4* 25.2*    183     Recent Labs     02/10/21  1208 02/10/21  0550 02/09/21  0715 02/08/21  0835    143 143 144   K 5.0 4.8 4.8 4.7   * 110* 112* 114*   CO2 27 26 25 24   * 119* 96 128*   BUN 31* 32* 26* 23*   CREA 2.82* 2.73* 2.63* 2.29*   CA 7.0* 7.3* 7.6* 7.1*   PHOS  --   --  3.8 3.6   ALB  --  2.4* 2.5* 2.3*   TBILI  --  0.5  --   --    ALT  --  14  --   --      No results for input(s): PH, PCO2, PO2, HCO3, FIO2 in the last 72 hours.   Recent Results (from the past 24 hour(s))   GLUCOSE, POC    Collection Time: 02/09/21  8:30 PM   Result Value Ref Range    Glucose (POC) 186 (H) 65 - 100 mg/dL    Performed by [employer] KARLA    METABOLIC PANEL, COMPREHENSIVE    Collection Time: 02/10/21  5:50 AM   Result Value Ref Range    Sodium 143 136 - 145 mmol/L    Potassium 4.8 3.5 - 5.1 mmol/L    Chloride 110 (H) 97 - 108 mmol/L    CO2 26 21 - 32 mmol/L    Anion gap 7 5 - 15 mmol/L    Glucose 119 (H) 65 - 100 mg/dL    BUN 32 (H) 6 - 20 mg/dL    Creatinine 2.73 (H) 0.70 - 1.30 mg/dL    BUN/Creatinine ratio 12 12 - 20      GFR est AA 28 (L) >60 ml/min/1.73m2    GFR est non-AA 23 (L) >60 ml/min/1.73m2    Calcium 7.3 (L) 8.5 - 10.1 mg/dL    Bilirubin, total 0.5 0.2 - 1.0 mg/dL    AST (SGOT) 14 (L) 15 - 37 U/L    ALT (SGPT) 14 12 - 78 U/L    Alk. phosphatase 136 (H) 45 - 117 U/L    Protein, total 6.6 6.4 - 8.2 g/dL    Albumin 2.4 (L) 3.5 - 5.0 g/dL    Globulin 4.2 (H) 2.0 - 4.0 g/dL    A-G Ratio 0.6 (L) 1.1 - 2.2     CBC WITH AUTOMATED DIFF    Collection Time: 02/10/21  5:50 AM   Result Value Ref Range    WBC 5.9 4.1 - 11.1 K/uL    RBC 2.49 (L) 4.10 - 5.70 M/uL    HGB 7.1 (L) 12.1 - 17.0 g/dL    HCT 23.4 (L) 36.6 - 50.3 %    MCV 94.0 80.0 - 99.0 FL    MCH 28.5 26.0 - 34.0 PG    MCHC 30.3 30.0 - 36.5 g/dL    RDW 15.1 (H) 11.5 - 14.5 %    PLATELET 763 262 - 706 K/uL    MPV 11.2 8.9 - 12.9 FL    NEUTROPHILS 57 32 - 75 %    LYMPHOCYTES 24 12 - 49 %    MONOCYTES 14 (H) 5 - 13 %    EOSINOPHILS 4 0 - 7 %    BASOPHILS 1 0 - 1 %    IMMATURE GRANULOCYTES 0 0.0 - 0.5 %    ABS. NEUTROPHILS 3.4 1.8 - 8.0 K/UL    ABS. LYMPHOCYTES 1.4 0.8 - 3.5 K/UL    ABS. MONOCYTES 0.9 0.0 - 1.0 K/UL    ABS. EOSINOPHILS 0.3 0.0 - 0.4 K/UL    ABS. BASOPHILS 0.0 0.0 - 0.1 K/UL    ABS. IMM.  GRANS. 0.0 0.00 - 0.04 K/UL    DF AUTOMATED     GLUCOSE, POC    Collection Time: 02/10/21  8:22 AM   Result Value Ref Range    Glucose (POC) 121 (H) 65 - 100 mg/dL    Performed by Jose Ospina    ECHO ADULT COMPLETE    Collection Time: 02/10/21 11:45 AM   Result Value Ref Range    Pulmonic Regurgitant End Max Velocity 134.00 cm/s    AoV PG 7.00 mmHg    Ao Root D 3.50 cm    IVSd (M-mode) 1.61 (A) cm    LVIDd (M-mode) 4.40 cm    LVIDs (M-mode) 2.60 cm    Pulmonic Regurgitant End Max Velocity 102.00 cm/s    LVOT Peak Gradient 4.00 mmHg    LVPWd (M-mode) 1.69 (A) cm    LV E' Septal Velocity 4.68 cm/s    BP EF 71.9 55 - 100 %    LV ES Vol A2C 24.60 cm3    E/E' septal 27.99     LVOT SV 67.6 cm3    Left Atrium Major Axis 4.90 cm    Left Atrium to Aortic Root Ratio 1.40     Mitral Valve Deceleration [place] 7,600.00 mm/s2    Mitral Valve Deceleration [place] 7,600.00 mm/s2    Mitral Valve E Wave Deceleration Time 204.00 ms    Mitral Valve Pressure Half-time 47.00 ms    MV A David 90.10 cm/s    MV E David 131.00 cm/s    MVA (PHT) 4.68 cm2    MV E/A 1.45     Pulmonic Regurgitant End Max Velocity 89.00 cm/s    Pulmonic Valve Systolic Peak Instantaneous Gradient 3.00 mmHg    RVIDd 4.09 cm    Tricuspid Valve Max Velocity 365.00 cm/s    Triscuspid Valve Regurgitation Peak Gradient 53.00 mmHg    Right Atrial Area 4C 23.75 cm2    LA Area 4C 22.09 cm2    Left Atrium Minor Axis 5.47 cm   METABOLIC PANEL, BASIC    Collection Time: 02/10/21 12:08 PM   Result Value Ref Range    Sodium 142 136 - 145 mmol/L    Potassium 5.0 3.5 - 5.1 mmol/L    Chloride 110 (H) 97 - 108 mmol/L    CO2 27 21 - 32 mmol/L    Anion gap 5 5 - 15 mmol/L    Glucose 216 (H) 65 - 100 mg/dL    BUN 31 (H) 6 - 20 mg/dL    Creatinine 2.82 (H) 0.70 - 1.30 mg/dL    BUN/Creatinine ratio 11 (L) 12 - 20      GFR est AA 27 (L) >60 ml/min/1.73m2    GFR est non-AA 22 (L) >60 ml/min/1.73m2    Calcium 7.0 (L) 8.5 - 10.1 mg/dL   GLUCOSE, POC    Collection Time: 02/10/21 12:58 PM   Result Value Ref Range    Glucose (POC) 219 (H) 65 - 100 mg/dL    Performed by Haley Bianchi    GLUCOSE, POC    Collection Time: 02/10/21  3:55 PM   Result Value Ref Range    Glucose (POC) 107 (H) 65 - 100 mg/dL    Performed by Jennifer Sneed and Plan:     1.  Acute Kidney Injury:  -2/2  prerenal azotemia from dehydratrion/infection.   -Improved creatinine 3.2 --> 2.6-->2.1  -Cr has bumped again 2.1->2.4->2.6->2.8  -No idea about BL Cr  -no rhabdomyolysis   -ultrasound of kidneys and bladder is negative for obstruction   -off IV fluids   -Will see him in the office in 2 weeks with preclinic labs    2. Renal osteodystrophy:   -Hypocalcemia present, improving with supplementation.    -Corrected calcium is 8.5  -Phosphorus level is normal at 3.2. No need for binders  -Severe secondary hyperparathyroidism noted, added calcitriol 0.5 mcg once daily  -Vitamin D level is < 9. Started ergocalciferol 50,000 units weekly    3. Hypertension:   -Much better controlled                                             -On hydralazine 100 thrice daily, Coreg and nifedipine    4. Hypokalemia:   -probably related to poor p.o. intake   -improved with Supplementation potassium and magnesium    5. Metabolic acidosis:  -Secondary to acute kidney injury  -resolved  -off bicarb drip now. Not on oral bicarbonate     6. Scalp abscess:   -on IV antibiotics  -ID following the patient.   s/p I&D             Signed By: Lewis Brewer MD     February 10, 2021

## 2021-02-11 NOTE — PROGRESS NOTES
Discharge plan of care/case management plan validated with provider discharge order. Patient no IV access, no telemetry, no alonso. Wheeled down to ground entrance safely, discharge to home  With [place] . No complaints.

## 2021-02-11 NOTE — PROGRESS NOTES
Progress Note    Patient: Karlo Coleman MRN: [id number]  SSN: [id number]    YOB: 1945  Age: 77 y.o. Sex: male      Admit Date: 1/26/2021    LOS: 14 days     Subjective:     No acute events overnight. He feels better. Objective:     Vitals:    02/10/21 0919 02/10/21 1100 02/10/21 1341 02/10/21 1547   BP: 111/61 (!) 150/78  (!) 153/83   Pulse: 92 60  60   Resp: 18 18  18   Temp: 98.5 °F (36.9 °C) 97.5 °F (36.4 °C)  97.8 °F (36.6 °C)   SpO2: 98% 100%  100%   Weight:       Height:   6' 2\" (1.88 m)         Intake and Output:  Current Shift: No intake/output data recorded. Last three shifts: No intake/output data recorded. Physical Exam:   General:  Alert, cooperative, no distress, appears stated age. Eyes:  Conjunctivae/corneas clear. PERRL, EOMs intact. Fundi benign   Ears:  Normal TMs and external ear canals both ears. Nose: Nares normal. Septum midline. Mucosa normal. No drainage or sinus tenderness. Mouth/Throat: Lips, mucosa, and tongue normal. Teeth and gums normal.   Neck: Supple, symmetrical, trachea midline, no adenopathy, thyroid: no enlargment/tenderness/nodules, no carotid bruit and no JVD. Back:   Symmetric, no curvature. ROM normal. No CVA tenderness. Lungs:   Clear to auscultation bilaterally. Heart:  Regular rate and rhythm, S1, S2 normal, no murmur, click, rub or gallop. Abdomen:   Soft, non-tender. Bowel sounds normal. No masses,  No organomegaly. Extremities: Extremities normal, atraumatic, no cyanosis or edema. Pulses: 2+ and symmetric all extremities. Skin: Skin color, texture, turgor normal. No rashes or lesions   Lymph nodes: Cervical, supraclavicular, and axillary nodes normal.   Neurologic: CNII-XII intact. Normal strength, sensation and reflexes throughout. Lab/Data Review: All lab results for the last 24 hours reviewed.          Assessment:     Active Problems:    Scalp abscess (1/27/2021)      CKD (chronic kidney disease) stage 3, GFR 30-59 ml/min (2/8/2021)      Secondary hyperparathyroidism ([facility] (2/8/2021)      Vitamin D deficiency (2/8/2021)      Essential hypertension (2/8/2021)      Insulin-treated type 2 diabetes mellitus ([facility] (2/8/2021)    Omar Mcwilliams is a 78-year-old -American male with:  1. Heart failure with preserved EF. 2.  Hypertension with HHD. 3.  Diabetes mellitus. 4. Ex-smoker. 5.  Degenerative joint disease  6. CKD. 7.  Rule out pneumonia  8. Scalp abscess    Plan:       Okay to discharge from cardiac standpoint. I will be happy to see in 2 to 4 weeks after discharge or sooner if needed. Resume Lasix and consider close follow-up on his BMP. Nephrology following patient.   Signed By: Hien Sandoval MD     February 10, 2021

## 2021-02-13 NOTE — PROGRESS NOTES
Physician Progress Note      [facility]  CSN #:                  [id number]  :                       1945  ADMIT DATE:       2021 3:16 PM  [address] [place] DATE:        2/10/2021 8:10 PM  RESPONDING  PROVIDER #:        Crystal Colin MD          QUERY TEXT:    Patient admitted with SCALP ABSCESS. Per Op note dated  documentation of debridement. To accurately reflect the procedure performed please document if debridement was excisional or nonexcisional and the deepest depth of tissue removed as down to and including:    Risk Factors: SCALP ABSCESS, AKF, BACTEREMIA, CKD 4, MALNUTRITION  Clinical Indicators: -OP PN : Necrotic tissue was sharply debrided. The area of debridement was roughly 1.5 cm? . ( DEEPEST POINT OF DRIDEMENT NOT CHARTED). Treatment: - I&D OF SCALP ABSCESS AND DEBRIDEMENT. SURGERY CONSULT, WOUND CARE CONSULT, LAB MONITORING. Thank you,  ZAK Paredes, RN, CDI Specialist  [phone] or [email or web address]  Options provided:  -- Excisional debridement of skin  -- Excisional debridement of subcutaneous tissue  -- Excisional debridement of fascia  -- Excisional debridement of muscle  -- Other - I will add my own diagnosis  -- Disagree - Not applicable / Not valid  -- Disagree - Clinically unable to determine / Unknown  -- Refer to Clinical Documentation Reviewer    PROVIDER RESPONSE TEXT:    Excisional debridement of subcutaneous tissue of POSTERIOR SCALP was performed during procedure on .     Query created by: Pinky Alatorre on 2021 4:24 PM      Electronically signed by:  Crystal Colin MD 2021 11:56 AM

## 2021-02-23 NOTE — PROGRESS NOTES
Subjective Charley Last HPI Pleasant 77 y.o BM presenting for a post d/c f/u after a recent admission w scalp abscess. He underwent debridement on 02/05. MSSA isolated from wound Cx. Pt received Naficillin while hospitalized and was discharged on Dicloxacilin 500 mg QID x 2 wks. His daughter is helping w dressing changes and HHN pays a visit twice a wk. He reports intermittent pain and is requesting an analgesic. He is otherwise doing except for some exertional dyspnea. He denies acute complaints on assessment today. Pt w underlying CHF, DM, CKD, CAD, HTN and hyperlipidemia. ROS Review of Systems Constitutional: Negative. Respiratory: Positive for hemoptysis. Cardiovascular: Negative. Gastrointestinal: Negative. Genitourinary: Negative. Musculoskeletal: Negative. Skin: Negative. Scalp ulcer Neurological: Negative. Past Medical History:  
Diagnosis Date  CKD (chronic kidney disease) stage 3, GFR 30-59 ml/min 2/8/2021  Diabetes ([facility] [address]  Essential hypertension 2/8/2021  Hypertension  Insulin-treated type 2 diabetes mellitus ([facility] [address] 2/8/2021  Secondary hyperparathyroidism ([facility] [address] 2/8/2021  Vitamin D deficiency 2/8/2021 Past Surgical History:  
Procedure Laterality Date  HX ORTHOPAEDIC    
 amputation left big toe Social History Tobacco Use  Smoking status: Never Smoker  Smokeless tobacco: Never Used Substance Use Topics  Alcohol use: Yes Comment: rarely  Drug use: Never Family History Problem Relation Age of Onset  Diabetes Mother  Hypertension Mother  Cancer Mother  Diabetes Father  Hypertension Father  Cancer Father No Known Allergies Objective Physical Exam 
Constitutional:   
   Appearance: Normal appearance. Cardiovascular:  
   Rate and Rhythm: Normal rate and regular rhythm.   
Pulmonary:  
   Effort: Pulmonary effort is normal.  
 Breath sounds: Normal breath sounds. Abdominal:  
   General: Abdomen is flat. Palpations: Abdomen is soft. Musculoskeletal: Normal range of motion. Skin: 
   Comments: Healing scalp ulcer, + scab w surrounding purulent drainage, no evidence of tissue necrosis, or foul odor Neurological:  
   Mental Status: He is alert. Assessment & Plan Diagnoses and all orders for this visit: 1. Scalp abscess 
-     silver sulfADIAZINE (SILVADENE) 1 % topical cream; Apply  to affected area daily. Apply to scalp wound daily with dressing changes 
-Ulcerated area w heaped up margins, some purulent drainage expressed  
-Decreased slough, evidence of healing  
-Advised to complete Dicloxacillin as ordered 
-Topical Silverdene ordered to be applied to wound w daily dressing changes - Will reassess in 2 wks, refer to the [facility] if any evidence of worsening 2. MSSA infection, non-invasive 3. Scalp pain 
-     oxyCODONE-acetaminophen (PERCOCET) 5-325 mg per tablet; Take 1 Tab by mouth every twelve (12) hours as needed for Pain for up to 14 days. Max Daily Amount: 2 Tabs.

## 2021-03-04 PROBLEM — E87.20 METABOLIC ACIDOSIS, NORMAL ANION GAP (NAG): Status: ACTIVE | Noted: 2021-01-01

## 2021-03-04 PROBLEM — N39.0 UTI (URINARY TRACT INFECTION): Status: ACTIVE | Noted: 2021-01-01

## 2021-03-04 NOTE — ED TRIAGE NOTES
Patient states he was told by his kidney doc to come to ED. Patient says he does not know why he is supposed to come in and that this is a new doc for him. No complaints other than he says he is moving slow. Patient said doctor was Ray Bolton but he is not on dialysis

## 2021-03-04 NOTE — ED PROVIDER NOTES
EMERGENCY DEPARTMENT HISTORY AND PHYSICAL EXAM 
 
 
Date: 3/4/2021 Patient Name: Kendal Gerard History of Presenting Illness Chief Complaint Patient presents with  
 Other History Provided By: Patient HPI: Kendal Gerard, 77 y.o. male with PMHx as noted below presents the emergency department with abnormal lab results. Patient states that he is feeling at his baseline other than some mild fatigue and notes he was called by his \"kidney doctor\" and sent to the emergency department for a \"abnormal test\". Patient does not know what tests or the name of the doctor. Has no other complaints. Pt denies any other alleviating or exacerbating factors. Additionally, pt specifically denies any recent fever, chills, headache, nausea, vomiting, abdominal pain, CP, SOB, lightheadedness, dizziness, numbness, weakness, BLE swelling, heart palpitations, urinary sxs, diarrhea, constipation, melena, hematochezia, cough, or congestion. PCP: Alfredo Orellana MD 
 
Current Facility-Administered Medications Medication Dose Route Frequency Provider Last Rate Last Admin  sodium bicarbonate (8.4%) 100 mEq in dextrose 5% 900 mL infusion   IntraVENous CONTINUOUS Zcah Willams  mL/hr at 03/04/21 1506 New Bag at 03/04/21 1506 Current Outpatient Medications Medication Sig Dispense Refill  silver sulfADIAZINE (SILVADENE) 1 % topical cream Apply  to affected area daily. Apply to scalp wound daily with dressing changes 50 g 1  
 oxyCODONE-acetaminophen (PERCOCET) 5-325 mg per tablet Take 1 Tab by mouth every twelve (12) hours as needed for Pain for up to 14 days. Max Daily Amount: 2 Tabs. 20 Tab 0  
 furosemide (Lasix) 20 mg tablet Take 1 Tab by mouth daily. 30 Tab 0  
 NIFEdipine ER (PROCARDIA XL) 60 mg ER tablet Take 1 Tab by mouth two (2) times a day for 30 days. 60 Tab 0  
 potassium chloride (K-DUR, KLOR-CON) 20 mEq tablet Take 1 Tab by mouth daily for 30 days.  30 Tab 0  
  calcitRIOL (ROCALTROL) 0.5 mcg capsule Take 1 Cap by mouth daily for 30 days. 30 Cap 0  
 calcium carbonate (TUMS) 200 mg calcium (500 mg) chew Take 2 Tabs by mouth two (2) times a day for 30 days. 120 Tab 0  carvediloL (COREG) 12.5 mg tablet Take 1 Tab by mouth two (2) times a day for 30 days. 60 Tab 0  
 ergocalciferol (ERGOCALCIFEROL) 1,250 mcg (50,000 unit) capsule Take 1 Cap by mouth every seven (7) days for 30 days. 4 Cap 0  
 hydrALAZINE (APRESOLINE) 100 mg tablet Take 1 Tab by mouth three (3) times daily for 30 days. 90 Tab 0  
 docusate sodium (Colace) 100 mg capsule Take 1 Cap by mouth two (2) times a day for 90 days. 60 Cap 2  
 insulin glargine (Lantus Solostar U-100 Insulin) 100 unit/mL (3 mL) inpn by SubCUTAneous route nightly.  ferrous sulfate 325 mg (65 mg iron) tablet Take 325 mg by mouth Daily (before breakfast).  DULoxetine (CYMBALTA) 60 mg capsule Take 60 mg by mouth daily.  sodium bicarbonate 650 mg tablet Take 650 mg by mouth two (2) times a day.  pravastatin (PRAVACHOL) 20 mg tablet Take 20 mg by mouth daily. Past History Past Medical History: 
Past Medical History:  
Diagnosis Date  CKD (chronic kidney disease) stage 3, GFR 30-59 ml/min 2/8/2021  Diabetes ([facility] [address]  Essential hypertension 2/8/2021  Hypertension  Insulin-treated type 2 diabetes mellitus ([facility] [address] 2/8/2021  Secondary hyperparathyroidism ([facility] [address] 2/8/2021  Vitamin D deficiency 2/8/2021 Past Surgical History: 
Past Surgical History:  
Procedure Laterality Date  HX ORTHOPAEDIC    
 amputation left big toe Family History: 
Family History Problem Relation Age of Onset  Diabetes Mother  Hypertension Mother  Cancer Mother  Diabetes Father  Hypertension Father  Cancer Father Social History: 
Social History Tobacco Use  Smoking status: Never Smoker  Smokeless tobacco: Never Used Substance Use Topics  Alcohol use:  Yes  
 Comment: rarely  Drug use: Never Allergies: 
No Known Allergies Review of Systems Review of Systems Constitutional: Negative for fever, chills. Positive fatigue. HENT: Negative for congestion, sore throat, rhinorrhea, sneezing and neck stiffness Eyes: Negative for discharge and redness. Respiratory: Negative for  shortness of breath, wheezing Cardiovascular: Negative for chest pain, palpitations Gastrointestinal: Negative for nausea, vomiting, abdominal pain, constipation, diarrhea and blood in stool. Genitourinary: Negative for dysuria, urgency, frequency, hematuria, flank pain, decreased urine volume, discharge, Musculoskeletal: Negative for myalgias or joint pain . Skin: Negative for rash or lesions . Neurological: Negative weakness, light-headedness, numbness and headaches. Physical Exam  
Physical Exam 
 
GENERAL: alert and oriented, no acute distress EYES: PEERL, No injection, discharge or icterus. ENT: Mucous membranes pink and moist. 
NECK: Supple LUNGS: Airway patent. Non-labored respirations. Breath sounds clear with good air entry bilaterally. HEART: Regular rate and rhythm. No peripheral edema ABDOMEN: Non-distended and non-tender, without guarding or rebound. SKIN:  warm, dry EXTREMITIES: Without swelling, tenderness or deformity, symmetric with normal ROM 
NEUROLOGICAL: Alert, oriented Diagnostic Study Results Labs - Recent Results (from the past 12 hour(s)) CBC WITH AUTOMATED DIFF Collection Time: 03/04/21 11:45 AM  
Result Value Ref Range WBC 3.8 (L) 4.1 - 11.1 K/uL  
 RBC 2.46 (L) 4.10 - 5.70 M/uL HGB 7.4 (L) 12.1 - 17.0 g/dL HCT 23.7 (L) 36.6 - 50.3 % MCV 96.3 80.0 - 99.0 FL  
 MCH 30.1 26.0 - 34.0 PG  
 MCHC 31.2 30.0 - 36.5 g/dL  
 RDW 17.1 (H) 11.5 - 14.5 % PLATELET 193 904 - 160 K/uL MPV 11.0 8.9 - 12.9 FL  
 NEUTROPHILS 61 32 - 75 % LYMPHOCYTES 27 12 - 49 % MONOCYTES 9 5 - 13 % EOSINOPHILS 3 0 - 7 % BASOPHILS 0 0 - 1 % IMMATURE GRANULOCYTES 0 0.0 - 0.5 % ABS. NEUTROPHILS 2.3 1.8 - 8.0 K/UL  
 ABS. LYMPHOCYTES 1.0 0.8 - 3.5 K/UL  
 ABS. MONOCYTES 0.4 0.0 - 1.0 K/UL  
 ABS. EOSINOPHILS 0.1 0.0 - 0.4 K/UL  
 ABS. BASOPHILS 0.0 0.0 - 0.1 K/UL  
 ABS. IMM. GRANS. 0.0 0.00 - 0.04 K/UL  
 DF AUTOMATED METABOLIC PANEL, COMPREHENSIVE Collection Time: 03/04/21 11:45 AM  
Result Value Ref Range Sodium 141 136 - 145 mmol/L Potassium 3.9 3.5 - 5.1 mmol/L Chloride 117 (H) 97 - 108 mmol/L  
 CO2 13 (LL) 21 - 32 mmol/L Anion gap 11 5 - 15 mmol/L Glucose 150 (H) 65 - 100 mg/dL BUN 60 (H) 6 - 20 mg/dL Creatinine 3.80 (H) 0.70 - 1.30 mg/dL BUN/Creatinine ratio 16 12 - 20 GFR est AA 19 (L) >60 ml/min/1.73m2 GFR est non-AA 16 (L) >60 ml/min/1.73m2 Calcium 7.5 (L) 8.5 - 10.1 mg/dL Bilirubin, total 0.3 0.2 - 1.0 mg/dL AST (SGOT) 12 (L) 15 - 37 U/L  
 ALT (SGPT) 15 12 - 78 U/L Alk. phosphatase 179 (H) 45 - 117 U/L Protein, total 7.0 6.4 - 8.2 g/dL Albumin 2.9 (L) 3.5 - 5.0 g/dL Globulin 4.1 (H) 2.0 - 4.0 g/dL A-G Ratio 0.7 (L) 1.1 - 2.2 MAGNESIUM Collection Time: 03/04/21 11:45 AM  
Result Value Ref Range Magnesium 1.2 (L) 1.6 - 2.4 mg/dL URINALYSIS W/ RFLX MICROSCOPIC Collection Time: 03/04/21 11:48 AM  
Result Value Ref Range Color Yellow Appearance Hazy (A) Clear Specific gravity 1.015 1.003 - 1.030    
 pH (UA) 5.0 5.0 - 8.0 Protein Trace (A) Negative mg/dL Glucose Negative Negative mg/dL Ketone Negative Negative mg/dL Bilirubin Negative Negative Blood Negative Negative Urobilinogen Normal 0.1 - 1.0 EU/dL Nitrites Positive (A) Negative Leukocyte Esterase 3+ (A) Negative WBC >100 (H) 0 - 4 /hpf  
 RBC 0-5 0 - 5 /hpf Bacteria 3+ (A) Negative /hpf Mucus Trace /lpf Radiologic Studies - No orders to display CT Results  (Last 48 hours) None CXR Results  (Last 48 hours) None Medical Decision Making Chirag Enamorado MD am the first provider for this patient and am the attending of record for this patient encounter. I reviewed the vital signs, available nursing notes, past medical history, past surgical history, family history and social history. Vital Signs-Reviewed the patient's vital signs. Patient Vitals for the past 12 hrs: 
 Temp Pulse Resp BP SpO2  
03/04/21 1027 97.7 °F (36.5 °C) (!) 58 18 122/63 100 % Records Reviewed: Nursing Notes and Old Medical Records Provider Notes (Medical Decision Making): On presentation, the patient is well appearing, in no acute distress with reassuring disease. Patient sent by kidney doctor for unknown reason. Differential was broad and included acutely worsening renal failure, electrolyte abnormalities, the Bolick abnormalities, infection. Labs were notable for elevation the patient's serum creatinine from his baseline as well as what appears to be a new metabolic acidosis. Urinalysis is consistent with urinary tract infection. Will start on Rocephin pending culture results. I did discuss with Dr. Ihsan Santana with nephrology and he recommended placing patient on a bicarbonate drip and admitting for further management. Will consult on patient. ED Course:  
Initial assessment performed. The patients presenting problems have been discussed, and they are in agreement with the care plan formulated and outlined with them. I have encouraged them to ask questions as they arise throughout their visit. PROGRESS: 
The patient has been re-evaluated and sx have improved. Reviewed available results with patient and have counseled them on diagnosis and care plan. They have expressed understanding, and all their questions have been answered. They agree with plan for admission.  
 
 
CONSULT: 
 Pravin Singh MD spoke with the hospitalist.  Discussed HPI and PE, available diagnostic tests and clinical findings. He is in agreement with care plans as outlined and will evaluate for admission Admit Note Patient is being admitted to the hospitalist.  The results of their tests and reasons for their admission have been discussed with them and/or available family. They convey agreement and understanding for the need to be admitted and for their admission diagnosis. Consultation has been made with the inpatient physician specialist for hospitalization. Disposition: 
admission PLAN: 
1. Admit Diagnosis Clinical Impression: 1. JEREMIAS (acute kidney injury) ([address] 2. Hypocalcemia 3. Metabolic acidosis 4. Urinary tract infection without hematuria, site unspecified Please note that this dictation was completed with Dragon, computer voice recognition software. Quite often unanticipated grammatical, syntax, homophones, and other interpretive errors are inadvertently transcribed by the computer software. Please disregard these errors. Additionally, please excuse any errors that have escaped final proofreading.

## 2021-03-04 NOTE — H&P
History & Physical 
 
Primary Care Provider: Pastor Ronal MD 
Source of Information: Patient, records, ED staff History of Presenting Illness:  
Johnny Tenorio is a 77 y.o. male who presents with chronic kidney disease, diabetes mellitus, hypertension, here earlier this month secondary to a scalp abscess requiring IV antibiotics and surgical incision and drainage. He was discharged home with home health. States that his scalp lesion is better and has continued daily wound care with family. Has been following up with nephrology and had recent labs and was called today and told to  come to the emergency department as of abnormal labs. He states that he is feeling at his baseline other than some mild fatigue. Denies any fevers, chills, sore throat, exposure to Covid. He states that he has had some shortness of breath which has been chronic and no change recently. He did have a CHF exacerbation at his last visit and has preserved EF. Today, his labs reveal a creatinine of 3.8 when he was discharged on 2/10/2021 his last creatinine was 2.73. GFR now 16 and his magnesium is 1.2. Metabolic acidosis with normal anion gap is seen now. 2 is 13. Urine analysis suggest UTI. Patient denies any retention symptoms and states he was voiding adequately at home. We are asked to admit for further management of his acute on chronic renal failure as well as metabolic acidosis and apparent UTI. Review of Systems: 
Review of Systems Constitutional: Positive for malaise/fatigue. Negative for chills and fever. HENT: Negative. Eyes: Negative. Respiratory: Positive for shortness of breath. Cardiovascular: Negative. Gastrointestinal: Negative. Genitourinary: Negative. Musculoskeletal: Positive for joint pain (Chronic osteoarthritis knees and ankles). Skin: Negative. Past Medical History:  
Diagnosis Date  CKD (chronic kidney disease) stage 3, GFR 30-59 ml/min 2/8/2021  Diabetes ([facility] [address]  Essential hypertension 2/8/2021  Hypertension  Insulin-treated type 2 diabetes mellitus ([facility] [address] 2/8/2021  Secondary hyperparathyroidism ([facility] 2/8/2021  Vitamin D deficiency 2/8/2021 Past Surgical History:  
Procedure Laterality Date  HX ORTHOPAEDIC    
 amputation left big toe Prior to Admission medications Medication Sig Start Date End Date Taking? Authorizing Provider  
silver sulfADIAZINE (SILVADENE) 1 % topical cream Apply  to affected area daily. Apply to scalp wound daily with dressing changes 2/23/21   Abeba Portillo MD  
oxyCODONE-acetaminophen (PERCOCET) 5-325 mg per tablet Take 1 Tab by mouth every twelve (12) hours as needed for Pain for up to 14 days. Max Daily Amount: 2 Tabs. 2/23/21 3/9/21  Abeba Portillo MD  
furosemide (Lasix) 20 mg tablet Take 1 Tab by mouth daily. 2/9/21   Rody Sim MD  
NIFEdipine ER (PROCARDIA XL) 60 mg ER tablet Take 1 Tab by mouth two (2) times a day for 30 days. 2/8/21 3/10/21  Carrington Rossi MD  
potassium chloride (K-DUR, KLOR-CON) 20 mEq tablet Take 1 Tab by mouth daily for 30 days. 2/8/21 3/10/21  Carrington Rossi MD  
calcitRIOL (ROCALTROL) 0.5 mcg capsule Take 1 Cap by mouth daily for 30 days. 2/9/21 3/11/21  Carrington Rossi MD  
calcium carbonate (TUMS) 200 mg calcium (500 mg) chew Take 2 Tabs by mouth two (2) times a day for 30 days. 2/8/21 3/10/21  Carrington Rossi MD  
carvediloL (COREG) 12.5 mg tablet Take 1 Tab by mouth two (2) times a day for 30 days. 2/8/21 3/10/21  Carrington Rossi MD  
ergocalciferol (ERGOCALCIFEROL) 1,250 mcg (50,000 unit) capsule Take 1 Cap by mouth every seven (7) days for 30 days. 2/13/21 3/15/21  Carrington Rossi MD  
hydrALAZINE (APRESOLINE) 100 mg tablet Take 1 Tab by mouth three (3) times daily for 30 days.  2/8/21 3/10/21  Carrington Rossi MD  
docusate sodium (Colace) 100 mg capsule Take 1 Cap by mouth two (2) times a day for 90 days. 2/8/21 5/9/21  Kwame Garrido MD  
insulin glargine (Lantus Solostar U-100 Insulin) 100 unit/mL (3 mL) inpn by SubCUTAneous route nightly. Provider, Historical  
ferrous sulfate 325 mg (65 mg iron) tablet Take 325 mg by mouth Daily (before breakfast). Provider, Historical  
DULoxetine (CYMBALTA) 60 mg capsule Take 60 mg by mouth daily. Provider, Historical  
sodium bicarbonate 650 mg tablet Take 650 mg by mouth two (2) times a day. Provider, Historical  
pravastatin (PRAVACHOL) 20 mg tablet Take 20 mg by mouth daily. Provider, Historical  
 
No Known Allergies Family History Problem Relation Age of Onset  Diabetes Mother  Hypertension Mother  Cancer Mother  Diabetes Father  Hypertension Father  Cancer Father SOCIAL HISTORY: 
Patient resides: 
Independently x Assisted Living SNF With family care Smoking history:  
None Former x Chronic Alcohol history:  
None x Social   
Chronic Ambulates:  
Independently   
w/cane   
w/walker   
w/wc CODE STATUS: 
DNR Full x Other Objective:  
 
Physical Exam:  
 
Visit Vitals /63 Pulse (!) 58 Temp 97.7 °F (36.5 °C) Resp 18 Ht 6' 2\" (1.88 m) Wt 70.3 kg (155 lb) SpO2 100% BMI 19.90 kg/m² General:  Alert, cooperative, no distress, appears stated age. Head:   Scalp is wrapped in a Kerlix covering known prior occipital lesion. Dressing was not removed yet or visualized. Tenderness does not appear to be purulence on dressing. Eyes:  Conjunctivae/corneas clear. PERRL, EOMs intact. Nose: Nares normal. Septum midline. Mucosa normal. No drainage or sinus tenderness. Throat:  Dentition, dry mucosa Neck: Supple, symmetrical, trachea midline,no JVD. Back:   No CVA tenderness. Lungs:   Clear to auscultation bilaterally. Diminished diffusely. Heart:  Regular rate and rhythm, S1, S2 normal, no murmur, click, rub or gallop.   
Abdomen: Soft, non-tender. Bowel sounds normal. No masses,  No organomegaly. Extremities: Extremities normal, atraumatic, no cyanosis or edema. Pulses: 2+ and symmetric all extremities. Skin:  Creased turgor Neurologic: CNII-XII intact. No motor or sensory deficits. Data Review:  
 
Recent Days: 
Recent Labs 03/04/21 
1145 WBC 3.8* HGB 7.4* HCT 23.7*  
 Recent Labs 03/04/21 
1145   
K 3.9 * CO2 13* * BUN 60* CREA 3.80* CA 7.5* MG 1.2* ALB 2.9* TBILI 0.3 ALT 15 No results for input(s): PH, PCO2, PO2, HCO3, FIO2 in the last 72 hours. 24 Hour Results: 
Recent Results (from the past 24 hour(s)) CBC WITH AUTOMATED DIFF Collection Time: 03/04/21 11:45 AM  
Result Value Ref Range WBC 3.8 (L) 4.1 - 11.1 K/uL  
 RBC 2.46 (L) 4.10 - 5.70 M/uL HGB 7.4 (L) 12.1 - 17.0 g/dL HCT 23.7 (L) 36.6 - 50.3 % MCV 96.3 80.0 - 99.0 FL  
 MCH 30.1 26.0 - 34.0 PG  
 MCHC 31.2 30.0 - 36.5 g/dL  
 RDW 17.1 (H) 11.5 - 14.5 % PLATELET 753 709 - 613 K/uL MPV 11.0 8.9 - 12.9 FL  
 NEUTROPHILS 61 32 - 75 % LYMPHOCYTES 27 12 - 49 % MONOCYTES 9 5 - 13 % EOSINOPHILS 3 0 - 7 % BASOPHILS 0 0 - 1 % IMMATURE GRANULOCYTES 0 0.0 - 0.5 % ABS. NEUTROPHILS 2.3 1.8 - 8.0 K/UL  
 ABS. LYMPHOCYTES 1.0 0.8 - 3.5 K/UL  
 ABS. MONOCYTES 0.4 0.0 - 1.0 K/UL  
 ABS. EOSINOPHILS 0.1 0.0 - 0.4 K/UL  
 ABS. BASOPHILS 0.0 0.0 - 0.1 K/UL  
 ABS. IMM. GRANS. 0.0 0.00 - 0.04 K/UL  
 DF AUTOMATED METABOLIC PANEL, COMPREHENSIVE Collection Time: 03/04/21 11:45 AM  
Result Value Ref Range Sodium 141 136 - 145 mmol/L Potassium 3.9 3.5 - 5.1 mmol/L Chloride 117 (H) 97 - 108 mmol/L  
 CO2 13 (LL) 21 - 32 mmol/L Anion gap 11 5 - 15 mmol/L Glucose 150 (H) 65 - 100 mg/dL BUN 60 (H) 6 - 20 mg/dL Creatinine 3.80 (H) 0.70 - 1.30 mg/dL BUN/Creatinine ratio 16 12 - 20 GFR est AA 19 (L) >60 ml/min/1.73m2  GFR est non-AA 16 (L) >60 ml/min/1.73m2 Calcium 7.5 (L) 8.5 - 10.1 mg/dL Bilirubin, total 0.3 0.2 - 1.0 mg/dL AST (SGOT) 12 (L) 15 - 37 U/L  
 ALT (SGPT) 15 12 - 78 U/L Alk. phosphatase 179 (H) 45 - 117 U/L Protein, total 7.0 6.4 - 8.2 g/dL Albumin 2.9 (L) 3.5 - 5.0 g/dL Globulin 4.1 (H) 2.0 - 4.0 g/dL A-G Ratio 0.7 (L) 1.1 - 2.2 MAGNESIUM Collection Time: 03/04/21 11:45 AM  
Result Value Ref Range Magnesium 1.2 (L) 1.6 - 2.4 mg/dL URINALYSIS W/ RFLX MICROSCOPIC Collection Time: 03/04/21 11:48 AM  
Result Value Ref Range Color Yellow Appearance Hazy (A) Clear Specific gravity 1.015 1.003 - 1.030    
 pH (UA) 5.0 5.0 - 8.0 Protein Trace (A) Negative mg/dL Glucose Negative Negative mg/dL Ketone Negative Negative mg/dL Bilirubin Negative Negative Blood Negative Negative Urobilinogen Normal 0.1 - 1.0 EU/dL Nitrites Positive (A) Negative Leukocyte Esterase 3+ (A) Negative WBC >100 (H) 0 - 4 /hpf  
 RBC 0-5 0 - 5 /hpf Bacteria 3+ (A) Negative /hpf Mucus Trace /lpf Imaging:  
 
Assessment:  
 
Principal Problem: 
  JEREMIAS (acute kidney injury) ([facility] [address] (2/8/2021) Active Problems: 
  Secondary hyperparathyroidism ([address] (2/8/2021) Vitamin D deficiency (2/8/2021) Essential hypertension (2/8/2021) Insulin-treated type 2 diabetes mellitus ([address] (2/8/2021) Metabolic acidosis, normal anion gap (NAG) (3/4/2021) UTI (urinary tract infection) (3/4/2021) 50-year-old gentleman from home with a history of diabetes, hypertension and chronic kidney disease stage III who was here earlier this month with a scalp abscess, JEREMIAS and CHF with preserved EF. Discharged home with home health for continued wound care and surgical follow-up. Patient states his scalp wound is doing fine with daily dressing changes. He returns now sent by his nephrologist secondary to abnormal labs.   He is found to have JEREMIAS superimposed on his chronic kidney disease, GFR of 16 now. On his discharge last to 2/10/2021 his creatinine was 2.7 now it is roughly 3.8. UTI is suggested on urine analysis and culture is sent. Metabolic acidosis with normal anion gap secondary to his kidney disease. Hypomag, 1.2. Plan:  
 
-IP admission anticipating more than a 2 midnight stay in lieu of above. High risk for deterioration in lieu of his acute kidney injury. 
-Follow urine culture and continue ceftriaxone. 
-Renal ultrasound 
-Follow chest x-ray 
-Sodium bicarb drip started in ED and will continue, half-normal saline with 100 M EQ bicarb at 100 mL/h. Monitor closely for volume status. 
-Consult nephrology, patient states Dr. Efren Meade referred him here to the emergency department. -View and resume appropriate home medications. 
-Wound care in regards to scalp wound. -SSI/hypoglycemic protocol 
-will give 1 gm mgso4, follow as mag 1.2 VTEP: Heparin subcu Full CODE STATUS confirmed with patient Surrogate is daughter Disposition: Pending course, anticipate home with home health care to resume. Signed By: Etienne Sosa MD   
 March 4, 2021

## 2021-03-05 NOTE — ACP (ADVANCE CARE PLANNING)
consulted with nurse and visited pt, Mr. Artem Weber, on [address], in response to in basket request for Advance Medical Directive (AMD). Pt declined consult, indicating he was not interested in reviewing document at this time. He thanked  for stopping by. Visited by: Robin Johnston.  can be reached by calling the  at [facility] 
[phone]

## 2021-03-05 NOTE — PROGRESS NOTES
Reason for admission: chronic renal failure RUR score: 21% DME: None PCP: Dr. Michael Crook; patient had an appointment last week Pharmacy: [facility] POA: None AMD: Doesn't have Primary Decision Maker: (daughter) Alejandra Barrera [phone] Transition of Care: Patient currently receives home health services with [facility]. Patient has never received SNF Patient lives with his daughter Alejandra Barrera [phone]) in a one story home. Patient uses no DME, no history of falling and does not drive. Patient described himself as independent at home. Patient saw his PCP last week. Patient reported that he is under the care of a nephrologist as well. Patient does not receive dialysis. Patient uses [address] [facility] [address] DCP is home with home health. CM will send a referral via [employer]. Advance Care Planning Healthcare Decision Maker:  
 
  Primary Decision Maker: Litzy Mora - Daughter - [phone] Click here to complete [address] including selection of the Healthcare Decision Maker Relationship (ie \"Primary\")

## 2021-03-05 NOTE — PROGRESS NOTES
Hospitalist Progress Note Daily Progress Note: 3/5/2021 28-year-old gentleman presenting with chronic kidney disease diabetes mellitus hypertension earlier this month secondary to scalp abscess requiring IV antibiotics and surgical incision and drainage. Returns now is sent by nephrology with abnormal labs per Dr. Yifan Gilliland. CO2 was 9 in the outpatient labs creatinine 3.8 with a baseline creatinine around 2.1. To here in the ER was 13, magnesium was 1.2 and he is admitted secondary to acute on chronic kidney injury as well as metabolic acidosis with normal anion gap and apparent UTI per urinalysis. Subjective: The patient is seen for follow  up. Patient is alert, no distress, states that is feeling at baseline. UOP not recorded EKG pending. Tele sinus odell hr 48-60, did not yet receive pta coreg 12.5 mg bid which is held. Mag 1.2, k 3.7. Co2 better at 17, Cr 3.8-->3.62 Nephrology appreciated. Problem List: 
Problem List as of 3/5/2021 Date Reviewed: 2/8/2021 Codes Class Noted - Resolved Metabolic acidosis, normal anion gap (NAG) ICD-10-CM: M21.4 ICD-9-CM: 276.2  3/4/2021 - Present UTI (urinary tract infection) ICD-10-CM: N39.0 ICD-9-CM: 599.0  3/4/2021 - Present * (Principal) J[facility]EMIAS [facility] ICD-10-CM: N17.9 ICD-9-CM: 584.9  2/8/2021 - Present CKD (chronic kidney disease) stage 3, GFR 30-59 ml/min ICD-10-CM: N18.30 ICD-9-CM: 585.3  2/8/2021 - Present Secondary hyperparathyroidism ([facility] [address] ICD-10-CM: N25.81 ICD-9-CM: 588.81  2/8/2021 - Present Vitamin D deficiency ICD-10-CM: E55.9 ICD-9-CM: 268.9  2/8/2021 - Present Essential hypertension ICD-10-CM: I10 
ICD-9-CM: 401.9  2/8/2021 - Present Insulin-treated type 2 diabetes mellitus ([facility] [address] ICD-10-CM: E11.9, Z79.4 ICD-9-CM: 250.00, V58.67  2/8/2021 - Present Scalp abscess ICD-10-CM: L02.811 ICD-9-CM: 682.8  1/27/2021 - Present RESOLVED: Hypokalemia ICD-10-CM: E87.6 ICD-9-CM: 276.8  2/8/2021 - 2/8/2021 RESOLVED: Hypomagnesemia ICD-10-CM: K43.45 
ICD-9-CM: 275.2  2/8/2021 - 2/8/2021 RESOLVED: Hypocalcemia ICD-10-CM: E83.51 
ICD-9-CM: 275.41  2/8/2021 - 2/8/2021 Medications reviewed Current Facility-Administered Medications Medication Dose Route Frequency  [START ON 3/6/2021] calcitRIOL (ROCALTROL) capsule 0.5 mcg  0.5 mcg Oral DAILY  calcium carbonate (TUMS) chewable tablet 400 mg [elemental]  400 mg Oral BID  [Held by provider] carvediloL (COREG) tablet 12.5 mg  12.5 mg Oral BID  docusate sodium (COLACE) capsule 100 mg  100 mg Oral BID  [START ON 3/6/2021] DULoxetine (CYMBALTA) capsule 60 mg  60 mg Oral DAILY  ergocalciferol capsule 50,000 Units  50,000 Units Oral Q7D  
 [START ON 3/6/2021] ferrous sulfate tablet 325 mg  325 mg Oral ACB  hydrALAZINE (APRESOLINE) tablet 50 mg  50 mg Oral TID  insulin glargine (LANTUS) injection 8 Units  8 Units SubCUTAneous QHS  NIFEdipine ER (PROCARDIA XL) tablet 30 mg  30 mg Oral BID  pravastatin (PRAVACHOL) tablet 20 mg  20 mg Oral QHS  influenza vaccine 2020-21 (4 yrs+)(PF) (FLUCELVAX QUAD) injection 0.5 mL  0.5 mL IntraMUSCular PRIOR TO DISCHARGE  sodium bicarbonate (8.4%) 150 mEq in dextrose 5% 850 mL infusion   IntraVENous CONTINUOUS  
 sodium chloride (NS) flush 5-40 mL  5-40 mL IntraVENous PRN  
 heparin (porcine) injection 5,000 Units  5,000 Units SubCUTAneous Q8H  
 sodium chloride (NS) flush 5-40 mL  5-40 mL IntraVENous Q8H  
 sodium chloride (NS) flush 5-40 mL  5-40 mL IntraVENous PRN  
 acetaminophen (TYLENOL) tablet 650 mg  650 mg Oral Q6H PRN Or  
 acetaminophen (TYLENOL) suppository 650 mg  650 mg Rectal Q6H PRN  polyethylene glycol (MIRALAX) packet 17 g  17 g Oral DAILY PRN  promethazine (PHENERGAN) tablet 12.5 mg  12.5 mg Oral Q6H PRN  Or  
  ondansetron (ZOFRAN) injection 4 mg  4 mg IntraVENous Q6H PRN  
 cefTRIAXone (ROCEPHIN) 1 g in sterile water (preservative free) 10 mL IV syringe  1 g IntraVENous Q24H Review of Systems:  
Review of Systems Constitutional: Negative for chills, fever and malaise/fatigue. HENT: Negative. Eyes: Negative. Respiratory: Positive for shortness of breath. Cardiovascular: Negative. Gastrointestinal: Negative. Genitourinary: Negative. Musculoskeletal: Negative. Skin: Negative. Scalp lesion, not visualized. Here last month with scalp abscess, s/p I & D, continues wound care. Neurological: Negative. Endo/Heme/Allergies: Negative. Psychiatric/Behavioral: Negative. Objective:  
Physical Exam:  
 
Visit Vitals BP (!) 154/73 Pulse 60 Temp 98.1 °F (36.7 °C) Resp 16 Ht 6' 2\" (1.88 m) Wt 70.3 kg (154 lb 15.7 oz) SpO2 97% BMI 19.90 kg/m² O2 Device: Room air Temp (24hrs), Av.7 °F (36.5 °C), Min:97.3 °F (36.3 °C), Max:98.1 °F (36.7 °C) No intake/output data recorded. No intake/output data recorded. General:  Alert, cooperative, no distress, appears stated age. Head:   Scalp is wrapped in a Kerlix covering known prior occipital lesion. Dressing was not removed yet or visualized. Tenderness does not appear to be purulence on dressing. Eyes:  Conjunctivae/corneas clear. PERRL, EOMs intact. Nose: Nares normal. Septum midline. Mucosa normal. No drainage or sinus tenderness. Throat:  Dentition, dry mucosa Neck: Supple, symmetrical, trachea midline,no JVD. Back:   No CVA tenderness. Lungs:   Clear to auscultation bilaterally. Diminished diffusely. Heart:  Regular rate and rhythm, S1, S2 normal, no murmur, click, rub or gallop. Abdomen:   Soft, non-tender. Bowel sounds normal. No masses,  No organomegaly. Extremities: Extremities normal, atraumatic, no cyanosis or edema. Pulses: 2+ and symmetric all extremities. Skin:  Creased turgor Neurologic: CNII-XII intact. No motor or sensory deficits.   
  
 
Data Review:  
   
Recent Days: 
Recent Labs 03/05/21 
1100 03/04/21 
1145 WBC 4.6 3.8* HGB 7.5* 7.4* HCT 24.3* 23.7*  
 177 Recent Labs 03/05/21 
1100 03/04/21 
1145  141  
K 3.7 3.9 * 117* CO2 17* 13* * 150* BUN 58* 60* CREA 3.62* 3.80* CA 7.7* 7.5* MG 1.2* 1.2*  
PHOS 4.9*  --   
ALB  --  2.9* TBILI  --  0.3 ALT  --  15 No results for input(s): PH, PCO2, PO2, HCO3, FIO2 in the last 72 hours. 24 Hour Results: 
Recent Results (from the past 24 hour(s)) SODIUM, UR, RANDOM Collection Time: 03/04/21  6:45 PM  
Result Value Ref Range Sodium,urine random 36 mmol/L  
POTASSIUM, UR, RANDOM Collection Time: 03/04/21  6:45 PM  
Result Value Ref Range Potassium urine, random 7 mmol/L  
CREATININE, UR, RANDOM Collection Time: 03/04/21  6:45 PM  
Result Value Ref Range Creatinine, urine 101.00 mg/dL CK Collection Time: 03/05/21 11:00 AM  
Result Value Ref Range  39 - 308 U/L  
PHOSPHORUS Collection Time: 03/05/21 11:00 AM  
Result Value Ref Range Phosphorus 4.9 (H) 2.6 - 4.7 mg/dL METABOLIC PANEL, BASIC Collection Time: 03/05/21 11:00 AM  
Result Value Ref Range Sodium 141 136 - 145 mmol/L Potassium 3.7 3.5 - 5.1 mmol/L Chloride 114 (H) 97 - 108 mmol/L  
 CO2 17 (L) 21 - 32 mmol/L Anion gap 10 5 - 15 mmol/L Glucose 215 (H) 65 - 100 mg/dL BUN 58 (H) 6 - 20 mg/dL Creatinine 3.62 (H) 0.70 - 1.30 mg/dL BUN/Creatinine ratio 16 12 - 20 GFR est AA 20 (L) >60 ml/min/1.73m2 GFR est non-AA 17 (L) >60 ml/min/1.73m2 Calcium 7.7 (L) 8.5 - 10.1 mg/dL MAGNESIUM Collection Time: 03/05/21 11:00 AM  
Result Value Ref Range Magnesium 1.2 (L) 1.6 - 2.4 mg/dL CBC WITH AUTOMATED DIFF Collection Time: 03/05/21 11:00 AM  
Result Value Ref Range WBC 4.6 4.1 - 11.1 K/uL RBC 2.54 (L) 4.10 - 5.70 M/uL HGB 7.5 (L) 12.1 - 17.0 g/dL HCT 24.3 (L) 36.6 - 50.3 % MCV 95.7 80.0 - 99.0 FL  
 MCH 29.5 26.0 - 34.0 PG  
 MCHC 30.9 30.0 - 36.5 g/dL  
 RDW 16.8 (H) 11.5 - 14.5 % PLATELET 386 162 - 659 K/uL MPV 11.0 8.9 - 12.9 FL  
 NEUTROPHILS 68 32 - 75 % LYMPHOCYTES 19 12 - 49 % MONOCYTES 9 5 - 13 % EOSINOPHILS 4 0 - 7 % BASOPHILS 0 0 - 1 % IMMATURE GRANULOCYTES 0 0.0 - 0.5 % ABS. NEUTROPHILS 3.0 1.8 - 8.0 K/UL  
 ABS. LYMPHOCYTES 0.9 0.8 - 3.5 K/UL  
 ABS. MONOCYTES 0.4 0.0 - 1.0 K/UL  
 ABS. EOSINOPHILS 0.2 0.0 - 0.4 K/UL  
 ABS. BASOPHILS 0.0 0.0 - 0.1 K/UL  
 ABS. IMM. GRANS. 0.0 0.00 - 0.04 K/UL  
 DF AUTOMATED Assessment/  
 
Patient Active Problem List  
Diagnosis Code  Scalp abscess L02.811  JEREMIAS (acute kidney injury) ([facility] [address] N17.9  CKD (chronic kidney disease) stage 3, GFR 30-59 ml/min N18.30  Secondary hyperparathyroidism ([facility] [address] N25.81  Vitamin D deficiency E55.9  Essential hypertension I10  
 Insulin-treated type 2 diabetes mellitus ([facility] [address] E11.9, Z79.4  Metabolic acidosis, normal anion gap (NAG) E87.2  
 UTI (urinary tract infection) N39.0  
   
 
-JEREMIAS on CKD 3, baseline creatinine around 2.1 presents with a creatinine of 3.8. No hydro on renal ultrasound. On IV fluids 
-Metabolic acidosis, normal anion gap, improving CO2 outpatient initially 9 now 17 on bicarb drip 
-Uti , prelim gnr on ceftriaxone from 3/4/21. 
-Sinus odell- hr 48-60 prior to resuming pta usual coreg 12.5 mg bid, coreg held -Hypomagnesemia 
-Insulin treated dm2 
-Admit 2/21 with scalp abscess status post I&D, wound care is following. 
-essential hypertension Plan: 
-Continue bicarb drip 
-Continue ceftriaxone and follow urine culture 
-Coreg is held, continue telemetry, EKG is pending, cardiology is consulted for further Recs -BP remains adequate, blood pressure medications resumed at lesser doses, follow to titrate back to usual doses if tolerated. -Iron studies pending, EPO weekly per nephrology recommendations. 
-Continue PTA Lantus and SSI/hypoglycemic protocol 
-replete mag and follow am labs. VTEP: Heparin subcu Full CODE STATUS confirmed with patient Surrogate is daughter Disposition: Pending course, anticipate home with home health care to resume. Care Plan discussed with: Patient/Family,  and Consultant . Total time spent with patient: 30 minutes. This dictation was done by dragon, computer voice recognition software. Often unanticipated grammatical, syntax, phones and other interpretive errors are inadvertently transcribed. Please excuse errors that have escaped final proofreading.  
 
Marielle Mccabe MD

## 2021-03-05 NOTE — PROGRESS NOTES
Two person skin assessment performed by myself and Juan Billy RN. 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 All other skin is warm, dry and intact with no signs of breakdown.

## 2021-03-05 NOTE — WOUND CARE
IP WOUND CONSULT Joel Cook MEDICAL RECORD NUMBER:  [id number] AGE: 77 y.o. GENDER: male  : 1945 TODAY'S DATE:  3/5/2021 GENERAL  
 
[] Follow-up [x] New Consult Joel Cook is a 77 y.o. male referred by:  
[x] Physician 
[] Nursing 
[] Other: PAST MEDICAL HISTORY Past Medical History:  
Diagnosis Date  CKD (chronic kidney disease) stage 3, GFR 30-59 ml/min 2021  Diabetes ([facility]  Essential hypertension 2021  Hypertension  Insulin-treated type 2 diabetes mellitus ([facility] 2021  Secondary hyperparathyroidism ([facility] 2021  Vitamin D deficiency 2021 PAST SURGICAL HISTORY Past Surgical History:  
Procedure Laterality Date  HX ORTHOPAEDIC    
 amputation left big toe FAMILY HISTORY Family History Problem Relation Age of Onset  Diabetes Mother  Hypertension Mother  Cancer Mother  Diabetes Father  Hypertension Father  Cancer Father ALLERGIES No Known Allergies MEDICATIONS No current facility-administered medications on file prior to encounter. Current Outpatient Medications on File Prior to Encounter Medication Sig Dispense Refill  silver sulfADIAZINE (SILVADENE) 1 % topical cream Apply  to affected area daily. Apply to scalp wound daily with dressing changes 50 g 1  
 oxyCODONE-acetaminophen (PERCOCET) 5-325 mg per tablet Take 1 Tab by mouth every twelve (12) hours as needed for Pain for up to 14 days. Max Daily Amount: 2 Tabs. 20 Tab 0  
 furosemide (Lasix) 20 mg tablet Take 1 Tab by mouth daily. 30 Tab 0  
 NIFEdipine ER (PROCARDIA XL) 60 mg ER tablet Take 1 Tab by mouth two (2) times a day for 30 days. 60 Tab 0  
 potassium chloride (K-DUR, KLOR-CON) 20 mEq tablet Take 1 Tab by mouth daily for 30 days. 30 Tab 0  
 calcitRIOL (ROCALTROL) 0.5 mcg capsule Take 1 Cap by mouth daily for 30 days.  30 Cap 0  
 calcium carbonate (TUMS) 200 mg calcium (500 mg) chew Take 2 Tabs by mouth two (2) times a day for 30 days. 120 Tab 0  carvediloL (COREG) 12.5 mg tablet Take 1 Tab by mouth two (2) times a day for 30 days. 60 Tab 0  
 ergocalciferol (ERGOCALCIFEROL) 1,250 mcg (50,000 unit) capsule Take 1 Cap by mouth every seven (7) days for 30 days. 4 Cap 0  
 hydrALAZINE (APRESOLINE) 100 mg tablet Take 1 Tab by mouth three (3) times daily for 30 days. 90 Tab 0  
 docusate sodium (Colace) 100 mg capsule Take 1 Cap by mouth two (2) times a day for 90 days. 60 Cap 2  
 insulin glargine (Lantus Solostar U-100 Insulin) 100 unit/mL (3 mL) inpn by SubCUTAneous route nightly.  ferrous sulfate 325 mg (65 mg iron) tablet Take 325 mg by mouth Daily (before breakfast).  DULoxetine (CYMBALTA) 60 mg capsule Take 60 mg by mouth daily.  sodium bicarbonate 650 mg tablet Take 650 mg by mouth two (2) times a day.  pravastatin (PRAVACHOL) 20 mg tablet Take 20 mg by mouth daily. [unfilled] Visit Vitals BP (!) 165/74 Pulse 64 Temp 98.1 °F (36.7 °C) Resp 16 Ht 6' 2\" (1.88 m) Wt 70.3 kg (154 lb 15.7 oz) SpO2 97% BMI 19.90 kg/m² ASSESSMENT Wound Identification & Type: surgical incision to right occipital 
Dressing change: Exufiber Ag, Mepilex XT, and kerlix Verbal consent for picture: Yes Contributing Factors: anticoagulation therapy, diabetes and decreased mobility Wound Head Right;Posterior (Active) Wound Image   03/05/21 1729 Wound Etiology Surgical 03/05/21 1729 Dressing Status Intact; New dressing applied 03/05/21 1729 Cleansed Irrigated with saline 03/05/21 1729 Dressing/Treatment Alginate with Ag;Foam;Roll gauze 03/05/21 1729 Dressing Change Due 03/06/21 03/05/21 1729 Wound Length (cm) 7.5 cm 03/05/21 1729 Wound Width (cm) 4.5 cm 03/05/21 1729 Wound Depth (cm) 0.3 cm 03/05/21 1729 Wound Surface Area (cm^2) 33.75 cm^2 03/05/21 1729 Wound Volume (cm^3) 10.12 cm^3 03/05/21 1729 Wound Assessment [place]/red;Slough 03/05/21 1729 Drainage Amount Small 03/05/21 1729 Drainage Description Serosanguinous;Brown 03/05/21 1729 Wound Odor None 03/05/21 1729 Salma-Wound/Incision Assessment Dry/flaky 03/05/21 1729 Edges Undefined edges 03/05/21 1729 Number of days: 37 Wound Pretibial yellow exudate seeping from open wound on right lower extremity. rinsed with normal saling and dressed with ABD pad and kerlex (Active) Number of days: 32 Incision 02/05/21 Head (Active) Number of days: 28 PLAN Skin Care & Pressure Relief Recommendations Minimize layers of linen Turn/reposition approximately every 2 hours Pillow wedges Offload heels pillows [place] 21 Blood Glucose: 215 on 3/5/21 Albumin: 2.9 on 3/4/21 WBCs: 4.6 on 3/5/21 Physician/Provider notified:  
Recommendations: Surgical consult for possible debridement of necrotic tissue to wound. Dr. Edis Herrera consulted and informed of consult. Dressing order entered but may change pending surgical evaluation. Patient stood easily for me to assess buttocks, no skin breakdown. States he is going to the bathroom on his own. Denies incontinence or loose stools. Float heels while in bed and turn and reposition q2h. Will continue to follow. Teaching completed with:  
[] Patient          
[] Family member      
[] Caregiver         
[] Nursing 
[] Other Patient/Caregiver Teaching: 
Level of patient/caregiver understanding able to:  
[] Indicates understanding       [] Needs reinforcement 
[] Unsuccessful      [] Verbal Understanding 
[] Demonstrated understanding       [] No evidence of learning 
[] Refused teaching         [] N/A Electronically signed by Jaden Veronica RN on 3/5/2021 at 5:41 PM

## 2021-03-05 NOTE — CONSULTS
Renal Progress Note Patient: Mer Ware MRN: [id number]  SSN: [id number] YOB: 1945  Age: 77 y.o. Sex: male Admit Date: 3/4/2021 LOS: 1 day Subjective:  
Reason for the consultation: JEREMIAS on CKD IIIB: 
HPI: Mer Ware is a 77 y.o. male who presents with chronic kidney disease IIIB, f/u with Dr Mary Landis, diabetes mellitus, hypertension, recent scalp abscess requiring IV antibiotics and surgical incision and drainage, was sent to ER for and labs including CO2 9 only, Cr 3.8 from BL Cr around 2.1 . No feveres or leucocytosis. He states that he is feeling at his baseline other than some mild fatigue. No N/V/D. No voiding issues. He c/o sob. Current Facility-Administered Medications Medication Dose Route Frequency  influenza vaccine 2020-21 (4 yrs+)(PF) (FLUCELVAX QUAD) injection 0.5 mL  0.5 mL IntraMUSCular PRIOR TO DISCHARGE  sodium bicarbonate (8.4%) 100 mEq in dextrose 5% 900 mL infusion   IntraVENous CONTINUOUS  
 sodium chloride (NS) flush 5-40 mL  5-40 mL IntraVENous PRN  
 heparin (porcine) injection 5,000 Units  5,000 Units SubCUTAneous Q8H  
 sodium chloride (NS) flush 5-40 mL  5-40 mL IntraVENous Q8H  
 sodium chloride (NS) flush 5-40 mL  5-40 mL IntraVENous PRN  
 acetaminophen (TYLENOL) tablet 650 mg  650 mg Oral Q6H PRN Or  
 acetaminophen (TYLENOL) suppository 650 mg  650 mg Rectal Q6H PRN  polyethylene glycol (MIRALAX) packet 17 g  17 g Oral DAILY PRN  promethazine (PHENERGAN) tablet 12.5 mg  12.5 mg Oral Q6H PRN Or  
 ondansetron (ZOFRAN) injection 4 mg  4 mg IntraVENous Q6H PRN  
 cefTRIAXone (ROCEPHIN) 1 g in sterile water (preservative free) 10 mL IV syringe  1 g IntraVENous Q24H Vitals:  
 03/05/21 0783 03/05/21 3209 03/05/21 1975 03/05/21 7368 BP: 132/72   136/66 Pulse: (!) 52   (!) 57 Resp: 14   16 Temp:    97.3 °F (36.3 °C) SpO2: 100%  100% 99% Weight:  70.3 kg (154 lb 15.7 oz) Height: Objective:  
General: alert awake well-oriented, no acute distress. HEENT: EOMI, no Icterus, no Pallor, pupils reactive, mucosa moist, Neck: Neck is supple, No JVD, Lungs: breathsounds normal, no rhonchi, no rales, CVS: heart sounds normal,  no murmurs, no rubs. GI: soft, nontender, normal BS, Extremeties: no clubbing, no cyanosis, no edema, Neuro: Alert, awake, oriented x3, speech is normal, moving all extremeties well. Skin: normal skin turgor, scalp area swelling /pain+ Musculoskeletal: no acute joint swellings Intake and Output: 
Current Shift: No intake/output data recorded. Last three shifts: No intake/output data recorded. Lab/Data Review: 
Recent Labs 03/04/21 
1145 WBC 3.8* HGB 7.4* HCT 23.7*  
 Recent Labs 03/04/21 
1145   
K 3.9 * CO2 13* * BUN 60* CREA 3.80* CA 7.5* MG 1.2* ALB 2.9* TBILI 0.3 ALT 15 No results for input(s): PH, PCO2, PO2, HCO3, FIO2 in the last 72 hours. Recent Results (from the past 24 hour(s)) CBC WITH AUTOMATED DIFF Collection Time: 03/04/21 11:45 AM  
Result Value Ref Range WBC 3.8 (L) 4.1 - 11.1 K/uL  
 RBC 2.46 (L) 4.10 - 5.70 M/uL HGB 7.4 (L) 12.1 - 17.0 g/dL HCT 23.7 (L) 36.6 - 50.3 % MCV 96.3 80.0 - 99.0 FL  
 MCH 30.1 26.0 - 34.0 PG  
 MCHC 31.2 30.0 - 36.5 g/dL  
 RDW 17.1 (H) 11.5 - 14.5 % PLATELET 978 655 - 915 K/uL MPV 11.0 8.9 - 12.9 FL  
 NEUTROPHILS 61 32 - 75 % LYMPHOCYTES 27 12 - 49 % MONOCYTES 9 5 - 13 % EOSINOPHILS 3 0 - 7 % BASOPHILS 0 0 - 1 % IMMATURE GRANULOCYTES 0 0.0 - 0.5 % ABS. NEUTROPHILS 2.3 1.8 - 8.0 K/UL  
 ABS. LYMPHOCYTES 1.0 0.8 - 3.5 K/UL  
 ABS. MONOCYTES 0.4 0.0 - 1.0 K/UL  
 ABS. EOSINOPHILS 0.1 0.0 - 0.4 K/UL  
 ABS. BASOPHILS 0.0 0.0 - 0.1 K/UL  
 ABS. IMM. GRANS. 0.0 0.00 - 0.04 K/UL  
 DF AUTOMATED METABOLIC PANEL, COMPREHENSIVE Collection Time: 03/04/21 11:45 AM  
Result Value Ref Range Sodium 141 136 - 145 mmol/L Potassium 3.9 3.5 - 5.1 mmol/L Chloride 117 (H) 97 - 108 mmol/L  
 CO2 13 (LL) 21 - 32 mmol/L Anion gap 11 5 - 15 mmol/L Glucose 150 (H) 65 - 100 mg/dL BUN 60 (H) 6 - 20 mg/dL Creatinine 3.80 (H) 0.70 - 1.30 mg/dL BUN/Creatinine ratio 16 12 - 20 GFR est AA 19 (L) >60 ml/min/1.73m2 GFR est non-AA 16 (L) >60 ml/min/1.73m2 Calcium 7.5 (L) 8.5 - 10.1 mg/dL Bilirubin, total 0.3 0.2 - 1.0 mg/dL AST (SGOT) 12 (L) 15 - 37 U/L  
 ALT (SGPT) 15 12 - 78 U/L Alk. phosphatase 179 (H) 45 - 117 U/L Protein, total 7.0 6.4 - 8.2 g/dL Albumin 2.9 (L) 3.5 - 5.0 g/dL Globulin 4.1 (H) 2.0 - 4.0 g/dL A-G Ratio 0.7 (L) 1.1 - 2.2 MAGNESIUM Collection Time: 03/04/21 11:45 AM  
Result Value Ref Range Magnesium 1.2 (L) 1.6 - 2.4 mg/dL URINALYSIS W/ RFLX MICROSCOPIC Collection Time: 03/04/21 11:48 AM  
Result Value Ref Range Color Yellow Appearance Hazy (A) Clear Specific gravity 1.015 1.003 - 1.030    
 pH (UA) 5.0 5.0 - 8.0 Protein Trace (A) Negative mg/dL Glucose Negative Negative mg/dL Ketone Negative Negative mg/dL Bilirubin Negative Negative Blood Negative Negative Urobilinogen Normal 0.1 - 1.0 EU/dL Nitrites Positive (A) Negative Leukocyte Esterase 3+ (A) Negative WBC >100 (H) 0 - 4 /hpf  
 RBC 0-5 0 - 5 /hpf Bacteria 3+ (A) Negative /hpf Mucus Trace /lpf  
TYPE & SCREEN Collection Time: 03/04/21  3:00 PM  
Result Value Ref Range Crossmatch Expiration 03/07/2021,2359 ABO/Rh(D) O Positive Antibody screen Negative Comment CERNER HX:  O POS   
SODIUM, UR, RANDOM Collection Time: 03/04/21  6:45 PM  
Result Value Ref Range Sodium,urine random 36 mmol/L  
POTASSIUM, UR, RANDOM Collection Time: 03/04/21  6:45 PM  
Result Value Ref Range Potassium urine, random 7 mmol/L  
CREATININE, UR, RANDOM Collection Time: 03/04/21  6:45 PM  
Result Value Ref Range Creatinine, urine 101.00 mg/dL Assessment and Plan: 1. Acute Kidney Injury on CKD IIIB: 
-2/2  prerenal azotemia  
-Cr 3.8 
-BL Cr seems to be 2.1 
-UA c/w UTI, UC is pending  
-Renal US  No hydro 
-on IV fluids 2. Metabolic acidosis, NAG: 
-due to # 1 
-CO2 13  
-was 9 from outpt labs 
-on HCO3 drip 3-Anemia: 
-Hb 7.4 
-will send iron studies 
-Epo sq weekly 4. Renal osteodystrophy:  
-Ca 7.5 
-will check phos/iPTH and Vit  D Thank you for the consultation. Signed By: Brittany Casper MD   
 March 5, 2021

## 2021-03-05 NOTE — PROGRESS NOTES
Spiritual Care Assessment/Progress Note [facility] 
 
 
NAME: Stan Jimenez      MRN: [id number] AGE: 77 y.o. SEX: male [employer] Affiliation: No preference Language: English  
 
3/5/2021     Total Time (in minutes): 9 Spiritual Assessment begun in [facility] through conversation with: 
  
    [x]Patient        [] Family    [] Friend(s) Reason for Consult: Advance medical directive consult Spiritual beliefs: (Please include comment if needed) [x] Identifies with a aleksey tradition:     
   [] Supported by a aleksey community:        
   [] Claims no spiritual orientation:       
   [] Seeking spiritual identity:            
   [] Adheres to an individual form of spirituality:       
   [] Not able to assess:                   
 
    
Identified resources for coping:  
   [] Prayer                           
   [] Music                  [] Guided Imagery 
   [] Family/friends                 [] Pet visits [] Devotional reading                         [x] Unknown 
   [] Other:                                           
 
 
Interventions offered during this visit: (See comments for more details) Patient Interventions: Advance medical directive consult Plan of Care: 
 
 [] Support spiritual and/or cultural needs  
 [] Support AMD and/or advance care planning process    
 [] Support grieving process 
 [] Coordinate Rites and/or Rituals  
 [] Coordination with community clergy [] No spiritual needs identified at this time 
 [] Detailed Plan of Care below (See Comments)  [] Make referral to Music Therapy 
[] Make referral to Pet Therapy    
[] Make referral to Addiction services 
[] Make referral to [facility] 
[] Make referral to Spiritual Care Partner 
[] No future visits requested       
[x] Follow up upon further referrals Comments:  consulted with nurse and visited pt, Mr. Gino Caro, on [address], in response to in basket request for Advance Medical Directive (AMD).  initiated conversation for support. Pt indicated he was feelin much better. Tory and family are important coping sources. He expressed no need for spiritual support at this time. Pt also declined AMD consult, indicating he was not interested in reviewing document at this time. He thanked  for stopping by. Visited by: Tata Kendall.  can be reached by calling the  at [facility] 
[phone]

## 2021-03-05 NOTE — ROUTINE PROCESS
.. TRANSFER - OUT REPORT: 
 
Verbal report given to Shantell Espinoza on Kermit Schwab  being transferred to [place] [address] room 564 for routine progression of care Report consisted of patients Situation, Background, Assessment and  
Recommendations(SBAR). Information from the following report(s) SBAR was reviewed with the receiving nurse. Lines:  
Peripheral IV 03/04/21 Right Forearm (Active) Opportunity for questions and clarification was provided. Patient transported with: 
tech

## 2021-03-06 NOTE — PROGRESS NOTES
Hospitalist Progress Note Daily Progress Note: 3/6/2021 71-year-old gentleman presenting with chronic kidney disease diabetes mellitus hypertension earlier this month secondary to scalp abscess requiring IV antibiotics and surgical incision and drainage. Returns now is sent by nephrology with abnormal labs per Dr. Theodore Pedro. CO2 was 9 in the outpatient labs creatinine 3.8 with a baseline creatinine around 2.1. To here in the ER was 13, magnesium was 1.2 and he is admitted secondary to acute on chronic kidney injury as well as metabolic acidosis with normal anion gap and apparent UTI per urinalysis. Subjective: The patient is seen for follow  up. Patient is alert, no distress, states that is feeling at baseline. No odell today. Mag 1.4, k 3.2. Co2 better at 22, bicarb drip stopped Cr 3.8-->3.62-->2.82 Nephrology,surgery, cardio appreciated. Surgery eval'd 2/2 scalp abscess- cont wound care, no surgical debridement needed. Problem List: 
Problem List as of 3/6/2021 Date Reviewed: 2/8/2021 Codes Class Noted - Resolved Metabolic acidosis, normal anion gap (NAG) ICD-10-CM: Z95.6 ICD-9-CM: 276.2  3/4/2021 - Present UTI (urinary tract infection) ICD-10-CM: N39.0 ICD-9-CM: 599.0  3/4/2021 - Present * (Principal) JEREMIAS (acute kidney injury) ([facility] ICD-10-CM: N17.9 ICD-9-CM: 584.9  2/8/2021 - Present CKD (chronic kidney disease) stage 3, GFR 30-59 ml/min ICD-10-CM: N18.30 ICD-9-CM: 585.3  2/8/2021 - Present Secondary hyperparathyroidism ([facility] ICD-10-CM: N25.81 ICD-9-CM: 588.81  2/8/2021 - Present Vitamin D deficiency ICD-10-CM: E55.9 ICD-9-CM: 268.9  2/8/2021 - Present Essential hypertension ICD-10-CM: I10 
ICD-9-CM: 401.9  2/8/2021 - Present Insulin-treated type 2 diabetes mellitus ([facility] ICD-10-CM: E11.9, Z79.4 ICD-9-CM: 250.00, V58.67  2/8/2021 - Present  Scalp abscess ICD-10-CM: L02.811 
 ICD-9-CM: 682.8  1/27/2021 - Present RESOLVED: Hypokalemia ICD-10-CM: E87.6 ICD-9-CM: 276.8  2/8/2021 - 2/8/2021 RESOLVED: Hypomagnesemia ICD-10-CM: H45.77 
ICD-9-CM: 275.2  2/8/2021 - 2/8/2021 RESOLVED: Hypocalcemia ICD-10-CM: E83.51 
ICD-9-CM: 275.41  2/8/2021 - 2/8/2021 Medications reviewed Current Facility-Administered Medications Medication Dose Route Frequency  sodium chloride 4 MEQ/ML (23.4%) 0.225 % in sterile water 1,000 mL infusion   IntraVENous CONTINUOUS  
 NIFEdipine ER (PROCARDIA XL) tablet 60 mg  60 mg Oral BID  
 HYDROcodone-acetaminophen (NORCO)  mg tablet 1 Tab  1 Tab Oral Q6H PRN  
 calcitRIOL (ROCALTROL) capsule 0.5 mcg  0.5 mcg Oral DAILY  calcium carbonate (TUMS) chewable tablet 400 mg [elemental]  400 mg Oral BID  [Held by provider] carvediloL (COREG) tablet 12.5 mg  12.5 mg Oral BID  docusate sodium (COLACE) capsule 100 mg  100 mg Oral BID  DULoxetine (CYMBALTA) capsule 60 mg  60 mg Oral DAILY  ergocalciferol capsule 50,000 Units  50,000 Units Oral Q7D  
 ferrous sulfate tablet 325 mg  325 mg Oral ACB  hydrALAZINE (APRESOLINE) tablet 50 mg  50 mg Oral TID  insulin glargine (LANTUS) injection 8 Units  8 Units SubCUTAneous QHS  pravastatin (PRAVACHOL) tablet 20 mg  20 mg Oral QHS  influenza vaccine 2020-21 (4 yrs+)(PF) (FLUCELVAX QUAD) injection 0.5 mL  0.5 mL IntraMUSCular PRIOR TO DISCHARGE  sodium chloride (NS) flush 5-40 mL  5-40 mL IntraVENous PRN  
 heparin (porcine) injection 5,000 Units  5,000 Units SubCUTAneous Q8H  
 sodium chloride (NS) flush 5-40 mL  5-40 mL IntraVENous Q8H  
 sodium chloride (NS) flush 5-40 mL  5-40 mL IntraVENous PRN  
 acetaminophen (TYLENOL) tablet 650 mg  650 mg Oral Q6H PRN Or  
 acetaminophen (TYLENOL) suppository 650 mg  650 mg Rectal Q6H PRN  polyethylene glycol (MIRALAX) packet 17 g  17 g Oral DAILY PRN  
  promethazine (PHENERGAN) tablet 12.5 mg  12.5 mg Oral Q6H PRN Or  
 ondansetron (ZOFRAN) injection 4 mg  4 mg IntraVENous Q6H PRN  
 cefTRIAXone (ROCEPHIN) 1 g in sterile water (preservative free) 10 mL IV syringe  1 g IntraVENous Q24H Review of Systems:  
Review of Systems Constitutional: Negative for chills, fever and malaise/fatigue. HENT: Negative. Eyes: Negative. Respiratory: Positive for shortness of breath. Cardiovascular: Negative. Gastrointestinal: Negative. Genitourinary: Negative. Musculoskeletal: Negative. Skin: Negative. Scalp lesion, not visualized. Here last month with scalp abscess, s/p I & D, continues wound care. Neurological: Negative. Endo/Heme/Allergies: Negative. Psychiatric/Behavioral: Negative. Objective:  
Physical Exam:  
 
Visit Vitals BP (!) 166/84 (BP 1 Location: Left upper arm, BP Patient Position: At rest) Pulse 81 Temp 97.9 °F (36.6 °C) Resp 18 Ht 6' 2\" (1.88 m) Wt 67.1 kg (147 lb 14.9 oz) SpO2 97% BMI 18.99 kg/m² O2 Device: Room air Temp (24hrs), Av.9 °F (36.6 °C), Min:97.5 °F (36.4 °C), Max:98.2 °F (36.8 °C) No intake/output data recorded. No intake/output data recorded. General:  Alert, cooperative, no distress, appears stated age. Head:   Scalp is wrapped in a Kerlix covering known prior occipital lesion. Dressing was not removed yet or visualized. Tenderness does not appear to be purulence on dressing. Eyes:  Conjunctivae/corneas clear. PERRL, EOMs intact. Nose: Nares normal. Septum midline. Mucosa normal. No drainage or sinus tenderness. Throat:  Dentition, dry mucosa Neck: Supple, symmetrical, trachea midline,no JVD. Back:   No CVA tenderness. Lungs:   Clear to auscultation bilaterally. Diminished diffusely. Heart:  Regular rate and rhythm, S1, S2 normal, no murmur, click, rub or gallop. Abdomen:   Soft, non-tender. Bowel sounds normal. No masses,  No organomegaly. Extremities: Extremities normal, atraumatic, no cyanosis or edema. Pulses: 2+ and symmetric all extremities. Skin:  Creased turgor Neurologic: CNII-XII intact. No motor or sensory deficits.   
  
 
Data Review:  
   
Recent Days: 
Recent Labs 03/05/21 
1100 03/04/21 
1145 WBC 4.6 3.8* HGB 7.5* 7.4* HCT 24.3* 23.7*  
 177 Recent Labs 03/06/21 
0440 03/05/21 
1100 03/04/21 
1145 * 141 141  
K 3.2* 3.7 3.9 * 114* 117* CO2 22 17* 13* * 215* 150* BUN 47* 58* 60* CREA 2.85* 3.62* 3.80* CA 7.8* 7.7* 7.5* MG 1.4* 1.2* 1.2*  
PHOS 4.1 4.9*  --   
ALB 2.9*  --  2.9* TBILI  --   --  0.3 ALT  --   --  15 No results for input(s): PH, PCO2, PO2, HCO3, FIO2 in the last 72 hours. 24 Hour Results: 
Recent Results (from the past 24 hour(s)) GLUCOSE, POC Collection Time: 03/05/21 10:25 PM  
Result Value Ref Range Glucose (POC) 177 (H) 65 - 100 mg/dL Performed by To Berrios RENAL FUNCTION PANEL Collection Time: 03/06/21  4:40 AM  
Result Value Ref Range Sodium 146 (H) 136 - 145 mmol/L Potassium 3.2 (L) 3.5 - 5.1 mmol/L Chloride 114 (H) 97 - 108 mmol/L  
 CO2 22 21 - 32 mmol/L Anion gap 10 5 - 15 mmol/L Glucose 128 (H) 65 - 100 mg/dL BUN 47 (H) 6 - 20 mg/dL Creatinine 2.85 (H) 0.70 - 1.30 mg/dL BUN/Creatinine ratio 16 12 - 20 GFR est AA 26 (L) >60 ml/min/1.73m2 GFR est non-AA 22 (L) >60 ml/min/1.73m2 Calcium 7.8 (L) 8.5 - 10.1 mg/dL Phosphorus 4.1 2.6 - 4.7 mg/dL Albumin 2.9 (L) 3.5 - 5.0 g/dL MAGNESIUM Collection Time: 03/06/21  4:40 AM  
Result Value Ref Range Magnesium 1.4 (L) 1.6 - 2.4 mg/dL GLUCOSE, POC Collection Time: 03/06/21  8:11 AM  
Result Value Ref Range Glucose (POC) 170 (H) 65 - 100 mg/dL Performed by Rosmery Santana, POC  Collection Time: 03/06/21 12:07 PM  
 Result Value Ref Range Glucose (POC) 199 (H) 65 - 100 mg/dL Performed by Elroy Wright, POC Collection Time: 03/06/21  4:10 PM  
Result Value Ref Range Glucose (POC) 197 (H) 65 - 100 mg/dL Performed by Leandro Mcburney Assessment/  
 
Patient Active Problem List  
Diagnosis Code  Scalp abscess L02.811  JEREMIAS (acute kidney injury) ([facility] N17.9  CKD (chronic kidney disease) stage 3, GFR 30-59 ml/min N18.30  Secondary hyperparathyroidism ([facility] N25.81  Vitamin D deficiency E55.9  Essential hypertension I10  
 Insulin-treated type 2 diabetes mellitus ([facility] E11.9, Z79.4  Metabolic acidosis, normal anion gap (NAG) E87.2  
 UTI (urinary tract infection) N39.0  
   
 
-JEREMIAS on CKD 3, baseline creatinine around 2.1 presents with a creatinine of 3.8. No hydro on renal ultrasound. On IV fluids, cr trending improved to 22.822. Nephrology following. 
-Metabolic acidosis, normal anion gap, improving CO2 outpatient initially 9 now 22, bicarb stopped. -Uti , e coli on ceftriaxone from 3/4/21. 
-Sinus odell- hr 48-60, held coreg, resolved, resume at lesser dose -Hypomagnesemia repleting 
-Insulin treated dm2 
-Admit 2/21 with scalp abscess status post I&D, wound care is following. 
-essential hypertension -Hypokalemia- repleting Plan: 
-ivf-hypotonic 
-Continue ceftriaxone a 
-resume coreg 6.25 
-Continue PTA Lantus and SSI/hypoglycemic protocol 
-replete mag and follow am labs. -wound care appreciated, cont local care VTEP: Heparin subcu Full CODE STATUS confirmed with patient Surrogate is daughter Disposition: Pending course, anticipate home with home health care to resume. Care Plan discussed with: Patient/Family,  and Consultant . Total time spent with patient: 30 minutes. This dictation was done by dragon, computer voice recognition software. Often unanticipated grammatical, syntax, phones and other interpretive errors are inadvertently transcribed. Please excuse errors that have escaped final proofreading.  
 
Mai Whitaker MD

## 2021-03-06 NOTE — CONSULTS
Renal Progress Note Patient: Estuardo Dempsey MRN: [id number]  SSN: [id number] YOB: 1945  Age: 77 y.o. Sex: male Admit Date: 3/4/2021 LOS: 2 days Subjective:  
Patient seen in the room. His renal function is improving. Denies any complaints today. Mild pain at the scalp for which she is requesting some pain medication. Blood pressure is elevated Current Facility-Administered Medications Medication Dose Route Frequency  sodium chloride 4 MEQ/ML (23.4%) 0.225 % in sterile water 1,000 mL infusion   IntraVENous CONTINUOUS  
 NIFEdipine ER (PROCARDIA XL) tablet 60 mg  60 mg Oral BID  calcitRIOL (ROCALTROL) capsule 0.5 mcg  0.5 mcg Oral DAILY  calcium carbonate (TUMS) chewable tablet 400 mg [elemental]  400 mg Oral BID  [Held by provider] carvediloL (COREG) tablet 12.5 mg  12.5 mg Oral BID  docusate sodium (COLACE) capsule 100 mg  100 mg Oral BID  DULoxetine (CYMBALTA) capsule 60 mg  60 mg Oral DAILY  ergocalciferol capsule 50,000 Units  50,000 Units Oral Q7D  
 ferrous sulfate tablet 325 mg  325 mg Oral ACB  hydrALAZINE (APRESOLINE) tablet 50 mg  50 mg Oral TID  insulin glargine (LANTUS) injection 8 Units  8 Units SubCUTAneous QHS  pravastatin (PRAVACHOL) tablet 20 mg  20 mg Oral QHS  influenza vaccine 2020-21 (4 yrs+)(PF) (FLUCELVAX QUAD) injection 0.5 mL  0.5 mL IntraMUSCular PRIOR TO DISCHARGE  sodium chloride (NS) flush 5-40 mL  5-40 mL IntraVENous PRN  
 heparin (porcine) injection 5,000 Units  5,000 Units SubCUTAneous Q8H  
 sodium chloride (NS) flush 5-40 mL  5-40 mL IntraVENous Q8H  
 sodium chloride (NS) flush 5-40 mL  5-40 mL IntraVENous PRN  
 acetaminophen (TYLENOL) tablet 650 mg  650 mg Oral Q6H PRN Or  
 acetaminophen (TYLENOL) suppository 650 mg  650 mg Rectal Q6H PRN  polyethylene glycol (MIRALAX) packet 17 g  17 g Oral DAILY PRN  promethazine (PHENERGAN) tablet 12.5 mg  12.5 mg Oral Q6H PRN  Or  
 ondansetron (ZOFRAN) injection 4 mg  4 mg IntraVENous Q6H PRN  
 cefTRIAXone (ROCEPHIN) 1 g in sterile water (preservative free) 10 mL IV syringe  1 g IntraVENous Q24H Vitals:  
 03/06/21 0182 03/06/21 7248 03/06/21 5198 03/06/21 9927 BP: (!) 165/75  (!) 161/81 Pulse: 68  70 Resp:   18 Temp:   98.1 °F (36.7 °C) SpO2:   97% 99% Weight:  67.1 kg (147 lb 14.9 oz) Height:      
 
Objective:  
General: alert awake well-oriented, no acute distress. HEENT: EOMI, no Icterus, no Pallor, pupils reactive, mucosa moist, Neck: Neck is supple, No JVD, Lungs: breathsounds normal, no rhonchi, no rales, CVS: heart sounds normal,  no murmurs, no rubs. GI: soft, nontender, normal BS, Extremeties: no clubbing, no cyanosis, no edema, Neuro: Alert, awake, oriented x3, speech is normal, moving all extremeties well. Skin: normal skin turgor, scalp area swelling /pain+ Musculoskeletal: no acute joint swellings Intake and Output: 
Current Shift: No intake/output data recorded. Last three shifts: No intake/output data recorded. Lab/Data Review: 
Recent Labs 03/05/21 
1100 03/04/21 
1145 WBC 4.6 3.8* HGB 7.5* 7.4* HCT 24.3* 23.7*  
 177 Recent Labs 03/06/21 
0440 03/05/21 
1100 03/04/21 
1145 * 141 141  
K 3.2* 3.7 3.9 * 114* 117* CO2 22 17* 13* * 215* 150* BUN 47* 58* 60* CREA 2.85* 3.62* 3.80* CA 7.8* 7.7* 7.5* MG 1.4* 1.2* 1.2*  
PHOS 4.1 4.9*  --   
ALB 2.9*  --  2.9* TBILI  --   --  0.3 ALT  --   --  15 No results for input(s): PH, PCO2, PO2, HCO3, FIO2 in the last 72 hours. Recent Results (from the past 24 hour(s)) EKG, 12 LEAD, INITIAL Collection Time: 03/05/21  4:03 PM  
Result Value Ref Range Ventricular Rate 59 BPM  
 Atrial Rate 58 BPM  
 QRS Duration 108 ms Q-T Interval 470 ms QTC Calculation (Bezet) 465 ms Calculated R Axis 36 degrees Calculated T Axis 84 degrees Diagnosis   Baseline artifact Moderate voltage criteria for LVH, may be normal variant Nonspecific ST and T wave abnormality Prolonged QT Abnormal ECG Confirmed by [facility] Albin DE LOS SANTOS ([id number]) on 3/5/2021 4:38:05 PM 
  
GLUCOSE, POC Collection Time: 03/05/21 10:25 PM  
Result Value Ref Range Glucose (POC) 177 (H) 65 - 100 mg/dL Performed by Sherryle Brakeman RENAL FUNCTION PANEL Collection Time: 03/06/21  4:40 AM  
Result Value Ref Range Sodium 146 (H) 136 - 145 mmol/L Potassium 3.2 (L) 3.5 - 5.1 mmol/L Chloride 114 (H) 97 - 108 mmol/L  
 CO2 22 21 - 32 mmol/L Anion gap 10 5 - 15 mmol/L Glucose 128 (H) 65 - 100 mg/dL BUN 47 (H) 6 - 20 mg/dL Creatinine 2.85 (H) 0.70 - 1.30 mg/dL BUN/Creatinine ratio 16 12 - 20 GFR est AA 26 (L) >60 ml/min/1.73m2 GFR est non-AA 22 (L) >60 ml/min/1.73m2 Calcium 7.8 (L) 8.5 - 10.1 mg/dL Phosphorus 4.1 2.6 - 4.7 mg/dL Albumin 2.9 (L) 3.5 - 5.0 g/dL MAGNESIUM Collection Time: 03/06/21  4:40 AM  
Result Value Ref Range Magnesium 1.4 (L) 1.6 - 2.4 mg/dL GLUCOSE, POC Collection Time: 03/06/21  8:11 AM  
Result Value Ref Range Glucose (POC) 170 (H) 65 - 100 mg/dL Performed by Marli Vang, POC Collection Time: 03/06/21 12:07 PM  
Result Value Ref Range Glucose (POC) 199 (H) 65 - 100 mg/dL Performed by [facility] Assessment and Plan: 1. Acute Kidney Injury on CKD IIIB: 
-2/2  prerenal azotemia  
-Cr 3.8 which is improved to 2.8 today 
-BL Cr seems to be 2.1 
-UA c/w UTI, UC is pending  
-Renal US  No hydro 
-on IV fluids will change to hypotonic fluids for another 24 hours because of hypernatremia 2. Metabolic acidosis, NAG: 
-due to # 1 
-CO2 13  
-was 9 from outpt labs 
-on HCO3 drip which I will discontinue as CO2 has improved to 22 
 
3-Anemia: 
-Hb 7.4 
-will send iron studies 
-Epo sq weekly 4. Renal osteodystrophy:  
-Ca 7.5. Phosphorus is 4.1 
-will check phos/iPTH and Vit  D 
 
 5. Hypokalemia 
-K is 3.2 which I will replace with KCl 40 mEq now 6. Hypertension 
-Poorly controlled. I will increase nifedipine to 60 mg twice daily Signed By: Geoff Newell MD   
 March 6, 2021

## 2021-03-06 NOTE — CONSULTS
Consult NAME: Joel Cook :  1945 MRN:  [id number] Date/Time:  3/6/2021 11:22 AM 
 
Patient PCP: Liberty Chadwick MD 
________________________________________________________________________ Problem List:  
-History of cardiomyopathy with last known ejection fraction of 72% in 2021 
-Chronic kidney disease 
-Diabetes mellitus -Hypertension 
-Dyslipidemia 
-IDDM 
-Secondary hyperparathyroidism Assessment/Plan:  
-52-year-old -American male admitted to the hospital as he was recommended by the nephrologist to go to the hospital as he was recommended to have I&D of scalp abscess. 
-Cardiology consult was invited secondary to history of cardiomyopathy with last known ejection fraction of 45% but most recent echo as of 2021 shows normal ejection fraction of 72%. -Multiple coronary artery disease risk factors including male gender diabetes mellitus hypertension dyslipidemia and renal insufficiency. -EKG shows low voltage sinus bradycardia with artifact but no acute ST-T wave changes consistent with significant ischemia or injury pattern. 
-Based on my overall cardiac assessment patient is a stable and warranted no further cardiovascular testing at this time 
-Okay to proceed with noncardiac surgical procedure with low risk on board. Thank you for consultation and letting me participate in the care of your patient  
 
 []        High complexity decision making was performed Patient Active Problem List  
Diagnosis Code  Scalp abscess L02.811  JEREMIAS (acute kidney injury) ([address] N17.9  CKD (chronic kidney disease) stage 3, GFR 30-59 ml/min N18.30  Secondary hyperparathyroidism ([address] N25.81  Vitamin D deficiency E55.9  Essential hypertension I10  
 Insulin-treated type 2 diabetes mellitus ([address] E11.9, Z79.4  Metabolic acidosis, normal anion gap (NAG) E87.2  
 UTI (urinary tract infection) N39.0 Subjective: CHIEF COMPLAINT:  
 
 HISTORY OF PRESENT ILLNESS:    
Gerard Nguyen is a 77 y.o.  male who has no known established coronary artery disease but has had a history of nonspecific cardiomyopathy with most recent echocardiogram done in February 2021 shows normal ejection fraction of 72% and was referred to the hospital after he found to have a scalp abscess for I&D. At the time of my examination patient was lying comfortably in the bed and denies any symptoms of chest pain shortness of breath or any other anginal equivalent. EKG shows no acute changes and patient is otherwise clinically and hemodynamically stable. Based on my overall cardiac assessment patient would be of low risk for any noncardiac procedure. We were asked to consult for work up and evaluation of the above problems. Past Medical History:  
Diagnosis Date  CKD (chronic kidney disease) stage 3, GFR 30-59 ml/min 2/8/2021  Diabetes ([facility]  Essential hypertension 2/8/2021  Hypertension  Insulin-treated type 2 diabetes mellitus ([facility] 2/8/2021  Secondary hyperparathyroidism ([facility] [facility] 2/8/2021  Vitamin D deficiency 2/8/2021 Past Surgical History:  
Procedure Laterality Date  HX ORTHOPAEDIC    
 amputation left big toe No Known Allergies Meds:  See below Social History Tobacco Use  Smoking status: Never Smoker  Smokeless tobacco: Never Used Substance Use Topics  Alcohol use: Yes Comment: rarely Family History Problem Relation Age of Onset  Diabetes Mother  Hypertension Mother  Cancer Mother  Diabetes Father  Hypertension Father  Cancer Father REVIEW OF SYSTEMS:   
 []         Unable to obtain  ROS due to --- 
 [x]         Total of 12 systems reviewed as follows: 
 
Constitutional: negative fever, negative chills, negative weight loss Eyes:   negative visual changes ENT:   negative sore throat, tongue or lip swelling Respiratory:  negative cough, negative dyspnea Cards:  negative for chest pain, palpitations, lower extremity edema GI:   negative for nausea, vomiting, diarrhea, and abdominal pain Genitourinary: negative for frequency, dysuria Integument:  negative for rash Hematologic:  negative for easy bruising and gum/nose bleeding Musculoskel: negative for myalgias,  back pain Neurological:  negative for headaches, dizziness, vertigo, weakness Behavl/Psych: negative for feelings of anxiety, depression Pertinent Positives include : 
 
Objective:  
  
Physical Exam: 
 
Last 24hrs VS reviewed since prior progress note. Most recent are: 
 
Visit Vitals BP (!) 161/81 Pulse 70 Temp 98.1 °F (36.7 °C) Resp 18 Ht 6' 2\" (1.88 m) Wt 67.1 kg (147 lb 14.9 oz) SpO2 99% BMI 18.99 kg/m² No intake or output data in the 24 hours ending 03/06/21 1122 General Appearance: Well developed, well nourished, alert & oriented x 3,  
 no acute distress. Ears/Nose/Mouth/Throat: Pupils equal and round, Hearing grossly normal. 
Neck: Supple. JVP within normal limits. Carotids good upstrokes, with no bruit. Chest: Lungs clear to auscultation bilaterally. Cardiovascular: Grade 2/6 systolic murmur. No S3-S4 or gallop Abdomen: Soft, non-tender, bowel sounds are active. No organomegaly. Extremities: No edema bilaterally. Femoral pulses +2, Distal Pulses +1. Skin: Warm and dry. Neuro: CN II-XII grossly intact, Strength and sensation grossly intact. Extremities: No pedal edema 
 
[]         Post-cath site without hematoma, bruit, tenderness, or thrill. Distal pulses intact. US RETROPERITONEUM COMP Final Result Evidence for medical renal disease as above. No hydronephrosis. Nonspecific thick-walled bladder. XR CHEST SNGL V Final Result Hydrostatic edema and diffuse opacities in the lungs, improved. Opacity at the left base, increased. Data:  
  
Telemetry: 
 
EKG: 
[]  No new EKG for review. Prior to Admission medications Medication Sig Start Date End Date Taking? Authorizing Provider  
silver sulfADIAZINE (SILVADENE) 1 % topical cream Apply  to affected area daily. Apply to scalp wound daily with dressing changes 2/23/21   Ktia Leonard MD  
oxyCODONE-acetaminophen (PERCOCET) 5-325 mg per tablet Take 1 Tab by mouth every twelve (12) hours as needed for Pain for up to 14 days. Max Daily Amount: 2 Tabs. 2/23/21 3/9/21  Kita Leonard MD  
furosemide (Lasix) 20 mg tablet Take 1 Tab by mouth daily. 2/9/21   Demetrio Oswald MD  
NIFEdipine ER (PROCARDIA XL) 60 mg ER tablet Take 1 Tab by mouth two (2) times a day for 30 days. 2/8/21 3/10/21  Kwesi Lopez MD  
potassium chloride (K-DUR, KLOR-CON) 20 mEq tablet Take 1 Tab by mouth daily for 30 days. 2/8/21 3/10/21  Kwesi Lopez MD  
calcitRIOL (ROCALTROL) 0.5 mcg capsule Take 1 Cap by mouth daily for 30 days. 2/9/21 3/11/21  Kwesi Lopez MD  
calcium carbonate (TUMS) 200 mg calcium (500 mg) chew Take 2 Tabs by mouth two (2) times a day for 30 days. 2/8/21 3/10/21  Kwesi Lopez MD  
carvediloL (COREG) 12.5 mg tablet Take 1 Tab by mouth two (2) times a day for 30 days. 2/8/21 3/10/21  Kwesi Lopez MD  
ergocalciferol (ERGOCALCIFEROL) 1,250 mcg (50,000 unit) capsule Take 1 Cap by mouth every seven (7) days for 30 days. 2/13/21 3/15/21  Kwesi Lopez MD  
hydrALAZINE (APRESOLINE) 100 mg tablet Take 1 Tab by mouth three (3) times daily for 30 days. 2/8/21 3/10/21  Kwesi Lopez MD  
docusate sodium (Colace) 100 mg capsule Take 1 Cap by mouth two (2) times a day for 90 days. 2/8/21 5/9/21  Kwesi Lopez MD  
insulin glargine (Lantus Solostar U-100 Insulin) 100 unit/mL (3 mL) inpn by SubCUTAneous route nightly. Provider, Historical  
ferrous sulfate 325 mg (65 mg iron) tablet Take 325 mg by mouth Daily (before breakfast).     Provider, Historical  
 DULoxetine (CYMBALTA) 60 mg capsule Take 60 mg by mouth daily. Provider, Historical  
sodium bicarbonate 650 mg tablet Take 650 mg by mouth two (2) times a day. Provider, Historical  
pravastatin (PRAVACHOL) 20 mg tablet Take 20 mg by mouth daily. Provider, Historical  
 
 
Recent Results (from the past 24 hour(s)) EKG, 12 LEAD, INITIAL Collection Time: 03/05/21  4:03 PM  
Result Value Ref Range Ventricular Rate 59 BPM  
 Atrial Rate 58 BPM  
 QRS Duration 108 ms Q-T Interval 470 ms QTC Calculation (Bezet) 465 ms Calculated R Axis 36 degrees Calculated T Axis 84 degrees Diagnosis Baseline artifact Moderate voltage criteria for LVH, may be normal variant Nonspecific ST and T wave abnormality Prolonged QT Abnormal ECG Confirmed by [facility] JOSÉ MIGUELAlbin ORTIZ ([id number]) on 3/5/2021 4:38:05 PM 
  
GLUCOSE, POC Collection Time: 03/05/21 10:25 PM  
Result Value Ref Range Glucose (POC) 177 (H) 65 - 100 mg/dL Performed by To Berrios RENAL FUNCTION PANEL Collection Time: 03/06/21  4:40 AM  
Result Value Ref Range Sodium 146 (H) 136 - 145 mmol/L Potassium 3.2 (L) 3.5 - 5.1 mmol/L Chloride 114 (H) 97 - 108 mmol/L  
 CO2 22 21 - 32 mmol/L Anion gap 10 5 - 15 mmol/L Glucose 128 (H) 65 - 100 mg/dL BUN 47 (H) 6 - 20 mg/dL Creatinine 2.85 (H) 0.70 - 1.30 mg/dL BUN/Creatinine ratio 16 12 - 20 GFR est AA 26 (L) >60 ml/min/1.73m2 GFR est non-AA 22 (L) >60 ml/min/1.73m2 Calcium 7.8 (L) 8.5 - 10.1 mg/dL Phosphorus 4.1 2.6 - 4.7 mg/dL Albumin 2.9 (L) 3.5 - 5.0 g/dL MAGNESIUM Collection Time: 03/06/21  4:40 AM  
Result Value Ref Range Magnesium 1.4 (L) 1.6 - 2.4 mg/dL GLUCOSE, POC Collection Time: 03/06/21  8:11 AM  
Result Value Ref Range Glucose (POC) 170 (H) 65 - 100 mg/dL Performed by Daniele Chadwick MD

## 2021-03-06 NOTE — CONSULTS
Consult Date: 3/6/2021 Consults Subjective The patient is a 71-year-old gentleman well-known to me from his prior admission for a posterior scalp abscess. He was admitted back at the end of January and underwent an excisional debridement on February 5, 2021 by myself. He was discharged home on 2/10/2021 on doxycycline. He was to follow-up in my clinic but did not keep his appointment. He was admitted yesterday with acute on chronic kidney failure as well as possible UTI. Surgery was consulted for evaluation of his scalp wound. The patient himself states he feels the wound is improved. He reports decreased drainage. His white blood cell count at admission was normal. 
Past Medical History:  
Diagnosis Date  CKD (chronic kidney disease) stage 3, GFR 30-59 ml/min 2/8/2021  Diabetes ([facility] [address]  Essential hypertension 2/8/2021  Hypertension  Insulin-treated type 2 diabetes mellitus ([facility] [address] 2/8/2021  Secondary hyperparathyroidism ([facility] [address] 2/8/2021  Vitamin D deficiency 2/8/2021 Past Surgical History:  
Procedure Laterality Date  HX ORTHOPAEDIC    
 amputation left big toe Family History Problem Relation Age of Onset  Diabetes Mother  Hypertension Mother  Cancer Mother  Diabetes Father  Hypertension Father  Cancer Father Social History Tobacco Use  Smoking status: Never Smoker  Smokeless tobacco: Never Used Substance Use Topics  Alcohol use: Yes Comment: rarely Current Facility-Administered Medications Medication Dose Route Frequency Provider Last Rate Last Admin  calcitRIOL (ROCALTROL) capsule 0.5 mcg  0.5 mcg Oral DAILY Jackie LAIRD MD   0.5 mcg at 03/06/21 0055  calcium carbonate (TUMS) chewable tablet 400 mg [elemental]  400 mg Oral BID Jackie LAIRD MD   400 mg at 03/06/21 7743  [Held by provider] carvediloL (COREG) tablet 12.5 mg  12.5 mg Oral BID Joana Cruz MD   Stopped at 03/05/21 2100  
 docusate sodium (COLACE) capsule 100 mg  100 mg Oral BID Kirill LAIRD MD      
 DULoxetine (CYMBALTA) capsule 60 mg  60 mg Oral DAILY Kirill LAIRD MD   60 mg at 03/06/21 9352  ergocalciferol capsule 50,000 Units  50,000 Units Oral Q7D Glo Corona MD   50,000 Units at 03/05/21 1506  ferrous sulfate tablet 325 mg  325 mg Oral ACB Kirill LAIRD MD   325 mg at 03/06/21 7301  hydrALAZINE (APRESOLINE) tablet 50 mg  50 mg Oral TID Kirill LAIRD MD   50 mg at 03/06/21 5540  insulin glargine (LANTUS) injection 8 Units  8 Units SubCUTAneous QHS Ramu Feliciano MD      
 NIFEdipine ER (PROCARDIA XL) tablet 30 mg  30 mg Oral BID Kirill LAIRD MD   30 mg at 03/06/21 1722  pravastatin (PRAVACHOL) tablet 20 mg  20 mg Oral QHS Kirill LAIRD MD   20 mg at 03/05/21 2235  influenza vaccine 2020-21 (4 yrs+)(PF) (FLUCELVAX QUAD) injection 0.5 mL  0.5 mL IntraMUSCular PRIOR TO DISCHARGE Ramu Feliciano MD      
 sodium bicarbonate (8.4%) 150 mEq in dextrose 5% 850 mL infusion   IntraVENous CONTINUOUS Cosme MD Maulik 100 mL/hr at 03/06/21 0925 New Bag at 03/06/21 3102  sodium chloride (NS) flush 5-40 mL  5-40 mL IntraVENous PRN Glo Corona MD      
 heparin (porcine) injection 5,000 Units  5,000 Units SubCUTAneous Lulu Dorsey MD   5,000 Units at 03/06/21 4182  sodium chloride (NS) flush 5-40 mL  5-40 mL IntraVENous Q8H Ramu Feliciano MD   10 mL at 03/06/21 8214  sodium chloride (NS) flush 5-40 mL  5-40 mL IntraVENous PRN Glo Corona MD      
 acetaminophen (TYLENOL) tablet 650 mg  650 mg Oral Q6H PRN Kirill LAIRD MD   650 mg at 03/06/21 3002 Or  acetaminophen (TYLENOL) suppository 650 mg  650 mg Rectal Q6H PRN Glo Corona MD      
 polyethylene glycol (MIRALAX) packet 17 g  17 g Oral DAILY PRN Glo Corona MD      
 promethazine (PHENERGAN) tablet 12.5 mg  12.5 mg Oral Q6H PRN Ramu Feliciano MD      
 Or  
 ondansetron (ZOFRAN) injection 4 mg  4 mg IntraVENous Q6H PRN Carrington Rossi MD      
 cefTRIAXone (ROCEPHIN) 1 g in sterile water (preservative free) 10 mL IV syringe  1 g IntraVENous Q24H Malcolm LAIRD MD   1 g at 03/05/21 1713 Review of Systems All other systems reviewed and are negative. Objective Vital signs for last 24 hours: 
Visit Vitals BP (!) 161/81 Pulse 70 Temp 98.1 °F (36.7 °C) Resp 18 Ht 6' 2\" (1.88 m) Wt 147 lb 14.9 oz (67.1 kg) SpO2 99% BMI 18.99 kg/m² Intake/Output this shift: 
Current Shift: No intake/output data recorded. Last 3 Shifts: No intake/output data recorded. Data Review:  
Recent Results (from the past 24 hour(s)) EKG, 12 LEAD, INITIAL Collection Time: 03/05/21  4:03 PM  
Result Value Ref Range Ventricular Rate 59 BPM  
 Atrial Rate 58 BPM  
 QRS Duration 108 ms Q-T Interval 470 ms QTC Calculation (Bezet) 465 ms Calculated R Axis 36 degrees Calculated T Axis 84 degrees Diagnosis Baseline artifact Moderate voltage criteria for LVH, may be normal variant Nonspecific ST and T wave abnormality Prolonged QT Abnormal ECG Confirmed by [facility] FitRUST Susan ([id number]) on 3/5/2021 4:38:05 PM 
  
GLUCOSE, POC Collection Time: 03/05/21 10:25 PM  
Result Value Ref Range Glucose (POC) 177 (H) 65 - 100 mg/dL Performed by Arnaldo Castro RENAL FUNCTION PANEL Collection Time: 03/06/21  4:40 AM  
Result Value Ref Range Sodium 146 (H) 136 - 145 mmol/L Potassium 3.2 (L) 3.5 - 5.1 mmol/L Chloride 114 (H) 97 - 108 mmol/L  
 CO2 22 21 - 32 mmol/L Anion gap 10 5 - 15 mmol/L Glucose 128 (H) 65 - 100 mg/dL BUN 47 (H) 6 - 20 mg/dL Creatinine 2.85 (H) 0.70 - 1.30 mg/dL BUN/Creatinine ratio 16 12 - 20 GFR est AA 26 (L) >60 ml/min/1.73m2 GFR est non-AA 22 (L) >60 ml/min/1.73m2 Calcium 7.8 (L) 8.5 - 10.1 mg/dL Phosphorus 4.1 2.6 - 4.7 mg/dL  Albumin 2.9 (L) 3.5 - 5.0 g/dL MAGNESIUM Collection Time: 03/06/21  4:40 AM  
Result Value Ref Range Magnesium 1.4 (L) 1.6 - 2.4 mg/dL GLUCOSE, POC Collection Time: 03/06/21  8:11 AM  
Result Value Ref Range Glucose (POC) 170 (H) 65 - 100 mg/dL Performed by Lucy Wilde Physical Exam 
Constitutional:   
   General: He is not in acute distress. Appearance: Normal appearance. He is not ill-appearing. HENT:  
   Head: Normocephalic. Comments: Posterior scalp abscess improved in appearance small area of eschar which may ultimately slough off Neck: Musculoskeletal: Normal range of motion and neck supple. Cardiovascular:  
   Rate and Rhythm: Normal rate and regular rhythm. Pulmonary:  
   Breath sounds: Normal breath sounds. Abdominal:  
   General: There is no distension. Palpations: Abdomen is soft. Tenderness: There is no abdominal tenderness. Neurological:  
   Mental Status: He is alert. Assessment and plan: No need for any urgent surgical debridement at this time, continue local wound care, will follow while in hospital

## 2021-03-07 NOTE — CONSULTS
Renal Progress Note Patient: Karlo Coleman MRN: [id number]  SSN: [id number] YOB: 1945  Age: 77 y.o. Sex: male Admit Date: 3/4/2021 LOS: 3 days Subjective:  
Patient seen in the room. His renal function is improving. Denies any complaints today. Mild pain at the scalp for which she is requesting some pain medication. Blood pressure is elevated Current Facility-Administered Medications Medication Dose Route Frequency  sodium chloride 4 MEQ/ML (23.4%) 0.225 % in sterile water 1,000 mL infusion   IntraVENous CONTINUOUS  
 NIFEdipine ER (PROCARDIA XL) tablet 60 mg  60 mg Oral BID  
 HYDROcodone-acetaminophen (NORCO)  mg tablet 1 Tab  1 Tab Oral Q6H PRN  
 carvediloL (COREG) tablet 6.25 mg  6.25 mg Oral BID WITH MEALS  calcitRIOL (ROCALTROL) capsule 0.5 mcg  0.5 mcg Oral DAILY  calcium carbonate (TUMS) chewable tablet 400 mg [elemental]  400 mg Oral BID  docusate sodium (COLACE) capsule 100 mg  100 mg Oral BID  DULoxetine (CYMBALTA) capsule 60 mg  60 mg Oral DAILY  ergocalciferol capsule 50,000 Units  50,000 Units Oral Q7D  
 ferrous sulfate tablet 325 mg  325 mg Oral ACB  hydrALAZINE (APRESOLINE) tablet 50 mg  50 mg Oral TID  insulin glargine (LANTUS) injection 8 Units  8 Units SubCUTAneous QHS  pravastatin (PRAVACHOL) tablet 20 mg  20 mg Oral QHS  influenza vaccine 2020-21 (4 yrs+)(PF) (FLUCELVAX QUAD) injection 0.5 mL  0.5 mL IntraMUSCular PRIOR TO DISCHARGE  sodium chloride (NS) flush 5-40 mL  5-40 mL IntraVENous PRN  
 heparin (porcine) injection 5,000 Units  5,000 Units SubCUTAneous Q8H  
 sodium chloride (NS) flush 5-40 mL  5-40 mL IntraVENous Q8H  
 sodium chloride (NS) flush 5-40 mL  5-40 mL IntraVENous PRN  
 acetaminophen (TYLENOL) tablet 650 mg  650 mg Oral Q6H PRN Or  
 acetaminophen (TYLENOL) suppository 650 mg  650 mg Rectal Q6H PRN  polyethylene glycol (MIRALAX) packet 17 g  17 g Oral DAILY PRN  
 promethazine (PHENERGAN) tablet 12.5 mg  12.5 mg Oral Q6H PRN Or  
 ondansetron (ZOFRAN) injection 4 mg  4 mg IntraVENous Q6H PRN  
 cefTRIAXone (ROCEPHIN) 1 g in sterile water (preservative free) 10 mL IV syringe  1 g IntraVENous Q24H Vitals:  
 03/07/21 3007 03/07/21 8750 03/07/21 0815 03/07/21 0848 BP: (!) 141/84  (!) 156/81 Pulse: 70  78 Resp: 18  18 Temp: 98 °F (36.7 °C)  97.8 °F (36.6 °C) SpO2: 97%  91% 98% Weight:  67.8 kg (149 lb 7.6 oz) Height:      
 
Objective:  
General: alert awake well-oriented, no acute distress. HEENT: EOMI, no Icterus, no Pallor, pupils reactive, mucosa moist, Neck: Neck is supple, No JVD, Lungs: breathsounds normal, no rhonchi, no rales, CVS: heart sounds normal,  no murmurs, no rubs. GI: soft, nontender, normal BS, Extremeties: no clubbing, no cyanosis, no edema, Neuro: Alert, awake, oriented x3, speech is normal, moving all extremeties well. Skin: normal skin turgor, scalp area swelling /pain+ Musculoskeletal: no acute joint swellings Intake and Output: 
Current Shift: No intake/output data recorded. Last three shifts: No intake/output data recorded. Lab/Data Review: 
Recent Labs 03/07/21 
0400 03/05/21 
1100 WBC 5.8 4.6 HGB 8.5* 7.5* HCT 26.9* 24.3*  
* 177 Recent Labs 03/07/21 
0400 03/06/21 
1000 03/06/21 
0440 03/05/21 
1100   --  146* 141  
K 3.3*  --  3.2* 3.7 *  --  114* 114* CO2 22  --  22 17* *  --  128* 215* BUN 34*  --  47* 58* CREA 2.13*  --  2.85* 3.62* CA 7.6* 7.5* 7.8* 7.7* MG  --   --  1.4* 1.2*  
PHOS 3.1  --  4.1 4.9* ALB 2.6*  --  2.9*  -- No results for input(s): PH, PCO2, PO2, HCO3, FIO2 in the last 72 hours. Recent Results (from the past 24 hour(s)) GLUCOSE, POC Collection Time: 03/06/21  4:10 PM  
Result Value Ref Range Glucose (POC) 197 (H) 65 - 100 mg/dL Performed by Prosper Charles POC  Collection Time: 03/06/21  8:47 PM  
Result Value Ref Range Glucose (POC) 143 (H) 65 - 100 mg/dL Performed by Sue Charlton RENAL FUNCTION PANEL Collection Time: 03/07/21  4:00 AM  
Result Value Ref Range Sodium 143 136 - 145 mmol/L Potassium 3.3 (L) 3.5 - 5.1 mmol/L Chloride 109 (H) 97 - 108 mmol/L  
 CO2 22 21 - 32 mmol/L Anion gap 12 5 - 15 mmol/L Glucose 129 (H) 65 - 100 mg/dL BUN 34 (H) 6 - 20 mg/dL Creatinine 2.13 (H) 0.70 - 1.30 mg/dL BUN/Creatinine ratio 16 12 - 20 GFR est AA 37 (L) >60 ml/min/1.73m2 GFR est non-AA 30 (L) >60 ml/min/1.73m2 Calcium 7.6 (L) 8.5 - 10.1 mg/dL Phosphorus 3.1 2.6 - 4.7 mg/dL Albumin 2.6 (L) 3.5 - 5.0 g/dL CBC WITH AUTOMATED DIFF Collection Time: 03/07/21  4:00 AM  
Result Value Ref Range WBC 5.8 4.1 - 11.1 K/uL  
 RBC 2.86 (L) 4.10 - 5.70 M/uL HGB 8.5 (L) 12.1 - 17.0 g/dL HCT 26.9 (L) 36.6 - 50.3 % MCV 94.1 80.0 - 99.0 FL  
 MCH 29.7 26.0 - 34.0 PG  
 MCHC 31.6 30.0 - 36.5 g/dL  
 RDW 17.0 (H) 11.5 - 14.5 % PLATELET 023 (L) 613 - 400 K/uL MPV 11.4 8.9 - 12.9 FL  
 NEUTROPHILS 68 32 - 75 % LYMPHOCYTES 16 12 - 49 % MONOCYTES 12 5 - 13 % EOSINOPHILS 3 0 - 7 % BASOPHILS 1 0 - 1 % IMMATURE GRANULOCYTES 0 0.0 - 0.5 % ABS. NEUTROPHILS 4.1 1.8 - 8.0 K/UL  
 ABS. LYMPHOCYTES 0.9 0.8 - 3.5 K/UL  
 ABS. MONOCYTES 0.7 0.0 - 1.0 K/UL  
 ABS. EOSINOPHILS 0.2 0.0 - 0.4 K/UL  
 ABS. BASOPHILS 0.0 0.0 - 0.1 K/UL  
 ABS. IMM. GRANS. 0.0 0.00 - 0.04 K/UL  
 DF AUTOMATED    
GLUCOSE, POC Collection Time: 03/07/21  7:17 AM  
Result Value Ref Range Glucose (POC) 144 (H) 65 - 100 mg/dL Performed by Kevin Méndez, POC Collection Time: 03/07/21 11:01 AM  
Result Value Ref Range Glucose (POC) 194 (H) 65 - 100 mg/dL Performed by Salty Fitch Assessment and Plan: 1.  Acute Kidney Injury on CKD IIIB: 
-2/2  prerenal azotemia  
-Cr 3.8 which is improved to 2.1 today 
-This is his baseline based on previous labs 
-UA c/w UTI, UC is pending  
-Renal US  No hydro 
-I will discontinue IV fluids because of elevated blood pressure 2. Metabolic acidosis, NAG: 
-due to # 1 
-CO2 was 9 from outpt labs -Bicarbonate drip was discontinued yesterday which I will continue to hold. CO2 is 22 today 3-Anemia: 
-Hb 8.5 
-will send iron studies 
-Epo sq weekly 4. Renal osteodystrophy:  
-Ca 7.6. Phosphorus is 3.1 
-will check phos/iPTH and Vit  D 
 
 5. Hypokalemia 
-K is 3.3 which I will replace with KCl 40 mEq now 
-Mag was replaced yesterday. Recheck in a.m. 6.  Hypertension 
-Poorly controlled. I will increase nifedipine to 60 mg twice daily 
-Discontinue IV fluids Signed By: Anabel Savage MD   
 March 7, 2021

## 2021-03-08 NOTE — PROGRESS NOTES
Progress Note Patient: Teresa Dan MRN: [id number]  SSN: [id number] YOB: 1945  Age: 77 y.o. Sex: male Admit Date: 3/4/2021 LOS: 4 days Subjective: He is complaining of shortness of breath. Denied having any chest pain. Objective:  
 
Vitals:  
 03/07/21 2351 03/08/21 0400 03/08/21 0730 03/08/21 3806 BP: 134/76  (!) 141/75 Pulse: 72  73 Resp: 18  18 Temp: 97.9 °F (36.6 °C)  98.9 °F (37.2 °C) SpO2: 97%  95% 96% Weight:  67.8 kg (149 lb 7.6 oz) Height:      
  
 
Intake and Output: 
Current Shift: No intake/output data recorded. Last three shifts: No intake/output data recorded. Physical Exam:  
General:  Alert, cooperative, no distress, appears stated age. Eyes:  Conjunctivae/corneas clear. PERRL, EOMs intact. Fundi benign Ears:  Normal TMs and external ear canals both ears. Nose: Nares normal. Septum midline. Mucosa normal. No drainage or sinus tenderness. Mouth/Throat: Lips, mucosa, and tongue normal. Teeth and gums normal.  
Neck: Supple, symmetrical, trachea midline, no adenopathy, thyroid: no enlargment/tenderness/nodules, no carotid bruit and no JVD. Back:   Symmetric, no curvature. ROM normal. No CVA tenderness. Lungs:   Clear to auscultation bilaterally. Heart:  Regular rate and rhythm, S1, S2 normal, no murmur, click, rub or gallop. Abdomen:   Soft, non-tender. Bowel sounds normal. No masses,  No organomegaly. Extremities: Extremities normal, atraumatic, no cyanosis or edema. Pulses: 2+ and symmetric all extremities. Skin: Skin color, texture, turgor normal. No rashes or lesions Lymph nodes: Cervical, supraclavicular, and axillary nodes normal.  
Neurologic: CNII-XII intact. Normal strength, sensation and reflexes throughout. Lab/Data Review: All lab results for the last 24 hours reviewed. Assessment:  
 
Principal Problem: 
  JEREMIAS (acute kidney injury) ([address] (2/8/2021) Active Problems: CKD (chronic kidney disease) stage 3, GFR 30-59 ml/min (2/8/2021) Secondary hyperparathyroidism ([facility] [address] (2/8/2021) Vitamin D deficiency (2/8/2021) Essential hypertension (2/8/2021) Insulin-treated type 2 diabetes mellitus ([facility] [address] (2/8/2021) Metabolic acidosis, normal anion gap (NAG) (3/4/2021) UTI (urinary tract infection) (3/4/2021) Abbey Chery is a 36-year-old -American male with: 1.  Heart failure with preserved EF. 2.  Hypertension with HHD. 3.  Diabetes mellitus. 4.  Ex-smoker. 5.  Degenerative joint disease 6. CKD. 7.  Rule out pneumonia 8.  Scalp abscess Plan:  
 
Patient presented with a creatinine of 3.8. Is down to 2.1. BUN was 60 upon admission and is down to 34. Potassium 3.3 today and sodium 143. He is currently on carvedilol, hydralazine, nifedipine and pravastatin. Check BNP. His stress test was negative recently. His ejection fraction was greater than 70% with moderate LVH. Mild biatrial enlargement. Severe TR. Appreciated nephrology input. Nifedipine was increased yesterday to 60 mg twice a day. Signed By: Luana Rapp MD   
 March 8, 2021

## 2021-03-08 NOTE — PROGRESS NOTES
SPEECH LANGUAGE PATHOLOGY BEDSIDE SWALLOW EVALUATIONS Patient: Abdi Mills (89 y.o. male) Date: 3/8/2021 Primary Diagnosis: JEREMIAS (acute kidney injury) ([facility] [address] [N17.9] UTI (urinary tract infection) [N39.0] CKD (chronic kidney disease) stage 3, GFR 30-59 ml/min [N18.30] Metabolic acidosis, normal anion gap (NAG) [E87.2] Insulin-treated type 2 diabetes mellitus ([facility] [address] [E11.9, Z79.4] Precautions: aspiration ASSESSMENT : 
Based on the objective data described below, the patient does not present w/ a significant dysphagia at this time. However due to aspiration concerns and right infiltrate noted on chest XR, patient would benefit from MBS to r/o silent aspiration. Oral and pharyngeal swallow largely [facility] w/ thin , solid and nectar thick trials at bedside. Swallow initiation timely, HLE and protraction adequate to palpation. No overt s/sx of aspiration w/ all trials. Patient did exhibit increased WOB and reports he gets SOB. Will downgrade solids to chopped s/t sparse teeth. Patient will benefit from skilled intervention to address the above impairments. Patients rehabilitation potential is considered to be Good PLAN : 
Recommendations and Planned Interventions: 
Chopped diet and thin liquids. MBS to further assess swallow function and r/o aspiration. Frequency/Duration: Patient will be followed by speech-language pathology 3 times a week to address goals. Discharge Recommendations: To Be Determined SUBJECTIVE:  
Patient seen at bedside. Reports no difficulty w/ swallowing but becomes short of breath. OBJECTIVE:  
 
CXR Results  (Last 48 hours) 03/08/21 1133  XR CHEST PORT Final result Narrative:  1 view comparison March 4 New/increased patchy mixed infiltrate in the right mid lung. Left basilar  
atelectasis/infiltrate has cleared. The left hemidiaphragm remains elevated. Normal heart and mediastinum CT Results  (Last 48 hours) None  
 
  
  
 
Past Medical History:  
Diagnosis Date  
 CKD (chronic kidney disease) stage 3, GFR 30-59 ml/min 2/8/2021 Diabetes ([facility] [address] Essential hypertension 2/8/2021 Hypertension Insulin-treated type 2 diabetes mellitus ([facility] [address] 2/8/2021 Secondary hyperparathyroidism ([facility] [address] 2/8/2021 Vitamin D deficiency 2/8/2021 Past Surgical History:  
Procedure Laterality Date HX ORTHOPAEDIC    
 amputation left big toe Prior Level of Function/Home Situation: unknown Home Situation Home Environment: Private residence One/Two Story Residence: One story Living Alone: No 
Support Systems: Family member(s), Friends \ neighbors Patient Expects to be Discharged to[de-identified] Private residence Current DME Used/Available at Home: None Diet prior to admission: regular, thin Current Diet:  regular, thin  
Cognitive and Communication Status: 
Neurologic State: Alert Orientation Level: Oriented X4 Cognition: Follows commands Swallowing Evaluation:  
Oral Assessment: 
Oral Assessment Labial: No impairment Dentition: Limited Lingual: No impairment Velum: No impairment P.O. Trials: 
Patient Position: upright in bed Vocal quality prior to P.O.: Wet Consistency Presented: Thin liquid; Solid; Nectar thick liquid How Presented: SLP-fed/presented;Self-fed/presented Bolus Acceptance: No impairment Bolus Formation/Control: No impairment Propulsion: Delayed (# of seconds) Oral Residue: None Initiation of Swallow: No impairment Laryngeal Elevation: Functional 
Aspiration Signs/Symptoms: None Pharyngeal Phase Characteristics: No impairment, issues, or problems Oral Phase Severity: No impairment Pharyngeal Phase Severity : No impairment Voice: 
 
  
Vocal Quality: Wet After treatment:  
Patient left in no apparent distress in bed COMMUNICATION/EDUCATION:  
 Patient was educated regarding purpose of SLP assessment, POC, diet recs and sw safety precautions. Patient demonstrated Good understanding as evidenced by verbal understanding. The patient's plan of care including recommendations, planned interventions, and recommended diet changes were discussed with: Physician. Patient/family have participated as able in goal setting and plan of care. Thank you for this referral. 
NICOLAS Gutierrez M.S. [id number] [facility] Time Calculation: 15 mins Problem: Dysphagia (Adult) Goal: *Acute Goals and Plan of Care (Insert Text) Description: Speech Therapy Swallow Goals Initiated 3/8/2021 
-Patient will tolerate chopped diet with thin liquids without clinical indicators of aspiration given no cues within 5-7 day(s). [ ] Not met  [ ]  MET   [ ] Progressing  [ ] Linda Power 
-Patient will tolerate PO trials without clinical indicators of aspiration given no cues within 5-7day(s). [ ] Not met  [ ]  MET   [ ] Progressing  [ ] Linda Kelley 
-Patient will participate in modified barium swallow study within 5-7 day(s). [ ] Not met  [ ]  MET   [ ] Progressing  [ ] Linda Power 
-Patient will demonstrate understanding of swallow safety precautions and aspiration precautions, diet recs with no cues within 5-7 day(s). [ ] Not met  [ ]  MET   [ ] Devyn Holt  [ ] Linda Kelley Outcome: Not Progressing Towards Goal

## 2021-03-08 NOTE — PROGRESS NOTES
Cm reviewed clinical chart. Discharge plan remains home with home health. Patient has been accepted with [facility]. CM team to continue to follow case as needs arise.

## 2021-03-08 NOTE — PROGRESS NOTES
Hospitalist Progress Note Daily Progress Note: 3/7/2021 46-year-old gentleman presenting with chronic kidney disease diabetes mellitus hypertension earlier this month secondary to scalp abscess requiring IV antibiotics and surgical incision and drainage. Returns now is sent by nephrology with abnormal labs per Dr. Herman Laurent. CO2 was 9 in the outpatient labs creatinine 3.8 with a baseline creatinine around 2.1. To here in the ER was 13, magnesium was 1.2 and he is admitted secondary to acute on chronic kidney injury as well as metabolic acidosis with normal anion gap and apparent UTI per urinalysis. Subjective: The patient is seen for follow  up. Patient is alert, no distress, states that is feeling at baseline. No odell today. Co2 better at 22, bicarb drip stopped Cr 3.8-->3.62-->2.82-->2.13 Nephrology,surgery, cardio appreciated. Surgery eval'd 2/2 scalp abscess- cont wound care, no surgical debridement needed. Problem List: 
Problem List as of 3/7/2021 Date Reviewed: 2/8/2021 Codes Class Noted - Resolved Metabolic acidosis, normal anion gap (NAG) ICD-10-CM: P38.3 ICD-9-CM: 276.2  3/4/2021 - Present UTI (urinary tract infection) ICD-10-CM: N39.0 ICD-9-CM: 599.0  3/4/2021 - Present * (Principal) JEREMIAS (acute kidney injury) ([facility] ICD-10-CM: N17.9 ICD-9-CM: 584.9  2/8/2021 - Present CKD (chronic kidney disease) stage 3, GFR 30-59 ml/min ICD-10-CM: N18.30 ICD-9-CM: 585.3  2/8/2021 - Present Secondary hyperparathyroidism ([facility] ICD-10-CM: N25.81 ICD-9-CM: 588.81  2/8/2021 - Present Vitamin D deficiency ICD-10-CM: E55.9 ICD-9-CM: 268.9  2/8/2021 - Present Essential hypertension ICD-10-CM: I10 
ICD-9-CM: 401.9  2/8/2021 - Present Insulin-treated type 2 diabetes mellitus ([address] ICD-10-CM: E11.9, Z79.4 ICD-9-CM: 250.00, V58.67  2/8/2021 - Present  Scalp abscess ICD-10-CM: L02.811 
 ICD-9-CM: 682.8  1/27/2021 - Present RESOLVED: Hypokalemia ICD-10-CM: E87.6 ICD-9-CM: 276.8  2/8/2021 - 2/8/2021 RESOLVED: Hypomagnesemia ICD-10-CM: A33.15 
ICD-9-CM: 275.2  2/8/2021 - 2/8/2021 RESOLVED: Hypocalcemia ICD-10-CM: E83.51 
ICD-9-CM: 275.41  2/8/2021 - 2/8/2021 Medications reviewed Current Facility-Administered Medications Medication Dose Route Frequency  NIFEdipine ER (PROCARDIA XL) tablet 60 mg  60 mg Oral BID  
 HYDROcodone-acetaminophen (NORCO)  mg tablet 1 Tab  1 Tab Oral Q6H PRN  
 carvediloL (COREG) tablet 6.25 mg  6.25 mg Oral BID WITH MEALS  calcitRIOL (ROCALTROL) capsule 0.5 mcg  0.5 mcg Oral DAILY  calcium carbonate (TUMS) chewable tablet 400 mg [elemental]  400 mg Oral BID  docusate sodium (COLACE) capsule 100 mg  100 mg Oral BID  DULoxetine (CYMBALTA) capsule 60 mg  60 mg Oral DAILY  ergocalciferol capsule 50,000 Units  50,000 Units Oral Q7D  
 ferrous sulfate tablet 325 mg  325 mg Oral ACB  hydrALAZINE (APRESOLINE) tablet 50 mg  50 mg Oral TID  insulin glargine (LANTUS) injection 8 Units  8 Units SubCUTAneous QHS  pravastatin (PRAVACHOL) tablet 20 mg  20 mg Oral QHS  influenza vaccine 2020-21 (4 yrs+)(PF) (FLUCELVAX QUAD) injection 0.5 mL  0.5 mL IntraMUSCular PRIOR TO DISCHARGE  sodium chloride (NS) flush 5-40 mL  5-40 mL IntraVENous PRN  
 heparin (porcine) injection 5,000 Units  5,000 Units SubCUTAneous Q8H  
 sodium chloride (NS) flush 5-40 mL  5-40 mL IntraVENous Q8H  
 sodium chloride (NS) flush 5-40 mL  5-40 mL IntraVENous PRN  
 acetaminophen (TYLENOL) tablet 650 mg  650 mg Oral Q6H PRN Or  
 acetaminophen (TYLENOL) suppository 650 mg  650 mg Rectal Q6H PRN  polyethylene glycol (MIRALAX) packet 17 g  17 g Oral DAILY PRN  promethazine (PHENERGAN) tablet 12.5 mg  12.5 mg Oral Q6H PRN  Or  
 ondansetron (ZOFRAN) injection 4 mg  4 mg IntraVENous Q6H PRN  
  cefTRIAXone (ROCEPHIN) 1 g in sterile water (preservative free) 10 mL IV syringe  1 g IntraVENous Q24H Review of Systems:  
Review of Systems Constitutional: Negative for chills, fever and malaise/fatigue. HENT: Negative. Eyes: Negative. Respiratory: Positive for shortness of breath. Cardiovascular: Negative. Gastrointestinal: Negative. Genitourinary: Negative. Musculoskeletal: Negative. Skin: Negative. Scalp lesion, not visualized. Here last month with scalp abscess, s/p I & D, continues wound care. Neurological: Negative. Endo/Heme/Allergies: Negative. Psychiatric/Behavioral: Negative. Objective:  
Physical Exam:  
 
Visit Vitals BP (!) 144/77 Pulse 68 Temp 98.1 °F (36.7 °C) Resp 18 Ht 6' 2\" (1.88 m) Wt 67.8 kg (149 lb 7.6 oz) SpO2 98% BMI 19.19 kg/m² O2 Device: Nasal cannula Temp (24hrs), Av °F (36.7 °C), Min:97.8 °F (36.6 °C), Max:98.1 °F (36.7 °C) No intake/output data recorded. No intake/output data recorded. General:  Alert, cooperative, no distress, appears stated age. Head:   Scalp is wrapped in a Kerlix covering known prior occipital lesion. Dressing was not removed yet or visualized. Tenderness does not appear to be purulence on dressing. Eyes:  Conjunctivae/corneas clear. PERRL, EOMs intact. Nose: Nares normal. Septum midline. Mucosa normal. No drainage or sinus tenderness. Throat:  Dentition, dry mucosa Neck: Supple, symmetrical, trachea midline,no JVD. Back:   No CVA tenderness. Lungs:   Clear to auscultation bilaterally. Diminished diffusely. Heart:  Regular rate and rhythm, S1, S2 normal, no murmur, click, rub or gallop. Abdomen:   Soft, non-tender. Bowel sounds normal. No masses,  No organomegaly. Extremities: Extremities normal, atraumatic, no cyanosis or edema. Pulses: 2+ and symmetric all extremities. Skin:  Creased turgor Neurologic: CNII-XII intact. No motor or sensory deficits.   
  
 
Data Review:  
   
Recent Days: 
Recent Labs 03/07/21 
0400 03/05/21 
1100 WBC 5.8 4.6 HGB 8.5* 7.5* HCT 26.9* 24.3*  
* 177 Recent Labs 03/07/21 
0400 03/06/21 
1000 03/06/21 
0440 03/05/21 
1100   --  146* 141  
K 3.3*  --  3.2* 3.7 *  --  114* 114* CO2 22  --  22 17* *  --  128* 215* BUN 34*  --  47* 58* CREA 2.13*  --  2.85* 3.62* CA 7.6* 7.5* 7.8* 7.7* MG  --   --  1.4* 1.2*  
PHOS 3.1  --  4.1 4.9* ALB 2.6*  --  2.9*  -- No results for input(s): PH, PCO2, PO2, HCO3, FIO2 in the last 72 hours. 24 Hour Results: 
Recent Results (from the past 24 hour(s)) GLUCOSE, POC Collection Time: 03/06/21  8:47 PM  
Result Value Ref Range Glucose (POC) 143 (H) 65 - 100 mg/dL Performed by [facility] RENAL FUNCTION PANEL Collection Time: 03/07/21  4:00 AM  
Result Value Ref Range Sodium 143 136 - 145 mmol/L Potassium 3.3 (L) 3.5 - 5.1 mmol/L Chloride 109 (H) 97 - 108 mmol/L  
 CO2 22 21 - 32 mmol/L Anion gap 12 5 - 15 mmol/L Glucose 129 (H) 65 - 100 mg/dL BUN 34 (H) 6 - 20 mg/dL Creatinine 2.13 (H) 0.70 - 1.30 mg/dL BUN/Creatinine ratio 16 12 - 20 GFR est AA 37 (L) >60 ml/min/1.73m2 GFR est non-AA 30 (L) >60 ml/min/1.73m2 Calcium 7.6 (L) 8.5 - 10.1 mg/dL Phosphorus 3.1 2.6 - 4.7 mg/dL Albumin 2.6 (L) 3.5 - 5.0 g/dL CBC WITH AUTOMATED DIFF Collection Time: 03/07/21  4:00 AM  
Result Value Ref Range WBC 5.8 4.1 - 11.1 K/uL  
 RBC 2.86 (L) 4.10 - 5.70 M/uL HGB 8.5 (L) 12.1 - 17.0 g/dL HCT 26.9 (L) 36.6 - 50.3 % MCV 94.1 80.0 - 99.0 FL  
 MCH 29.7 26.0 - 34.0 PG  
 MCHC 31.6 30.0 - 36.5 g/dL  
 RDW 17.0 (H) 11.5 - 14.5 % PLATELET 835 (L) 521 - 400 K/uL MPV 11.4 8.9 - 12.9 FL  
 NEUTROPHILS 68 32 - 75 % LYMPHOCYTES 16 12 - 49 % MONOCYTES 12 5 - 13 % EOSINOPHILS 3 0 - 7 % BASOPHILS 1 0 - 1 % IMMATURE GRANULOCYTES 0 0.0 - 0.5 % ABS. NEUTROPHILS 4.1 1.8 - 8.0 K/UL  
 ABS. LYMPHOCYTES 0.9 0.8 - 3.5 K/UL  
 ABS. MONOCYTES 0.7 0.0 - 1.0 K/UL  
 ABS. EOSINOPHILS 0.2 0.0 - 0.4 K/UL  
 ABS. BASOPHILS 0.0 0.0 - 0.1 K/UL  
 ABS. IMM. GRANS. 0.0 0.00 - 0.04 K/UL  
 DF AUTOMATED    
GLUCOSE, POC Collection Time: 03/07/21  7:17 AM  
Result Value Ref Range Glucose (POC) 144 (H) 65 - 100 mg/dL Performed by [email or web address], POC Collection Time: 03/07/21 11:01 AM  
Result Value Ref Range Glucose (POC) 194 (H) 65 - 100 mg/dL Performed by [employer], POC Collection Time: 03/07/21  3:11 PM  
Result Value Ref Range Glucose (POC) 195 (H) 65 - 100 mg/dL Performed by Adis Moody Assessment/  
 
Patient Active Problem List  
Diagnosis Code  Scalp abscess L02.811  JEREMIAS (acute kidney injury) ([facility] N17.9  CKD (chronic kidney disease) stage 3, GFR 30-59 ml/min N18.30  Secondary hyperparathyroidism ([facility] N25.81  Vitamin D deficiency E55.9  Essential hypertension I10  
 Insulin-treated type 2 diabetes mellitus ([facility] E11.9, Z79.4  Metabolic acidosis, normal anion gap (NAG) E87.2  
 UTI (urinary tract infection) N39.0  
   
 
-JEREMIAS on CKD 3, baseline creatinine around 2.1 presents with a creatinine of 3.8. No hydro on renal ultrasound. On IV fluids, cr trending improved to 22.822. Nephrology following. 
-Metabolic acidosis, normal anion gap, improving CO2 outpatient initially 9 now 22, bicarb stopped. -Uti , e coli on ceftriaxone from 3/4/21. 
-Sinus odell- hr 48-60, held coreg, resolved, resume at lesser dose -Hypomagnesemia repleting 
-Insulin treated dm2 
-Admit 2/21 with scalp abscess status post I&D, wound care is following. 
-essential hypertension -Hypokalemia- repleting Plan: 
-ivf-hypotonic 
-Continue ceftriaxone a 
-resume coreg 6.25 
-Continue PTA Lantus and SSI/hypoglycemic protocol 
-replete mag and follow am labs. -wound care appreciated, cont local care VTEP: Heparin subcu Full CODE STATUS confirmed with patient Surrogate is daughter Disposition: Pending course, anticipate home with home health care to resume. Care Plan discussed with: Patient/Family,  and Consultant . Total time spent with patient: 30 minutes. This dictation was done by dragon, computer voice recognition software. Often unanticipated grammatical, syntax, phones and other interpretive errors are inadvertently transcribed. Please excuse errors that have escaped final proofreading.  
 
Darlene Nicolas MD

## 2021-03-08 NOTE — PROGRESS NOTES
Progress Note Patient: Devaughn Ovalles MRN: [id number]  SSN: [id number] YOB: 1945  Age: 77 y.o. Sex: male Admit Date: 3/4/2021 LOS: 4 days Subjective:  
Patient seen in bed Complains of shortness of breath otherwise without complaints Objective:  
 
Vitals:  
 03/07/21 1440 03/07/21 2122 03/07/21 2351 03/08/21 0400 BP: (!) 144/77 135/76 134/76 Pulse: 68 67 72 Resp: 18 18 18 Temp: 98.1 °F (36.7 °C) 97.7 °F (36.5 °C) 97.9 °F (36.6 °C) SpO2: 98% 95% 97% Weight:    149 lb 7.6 oz (67.8 kg) Height:      
  
 
Intake and Output: 
Current Shift: No intake/output data recorded. Last three shifts: No intake/output data recorded. Review of Systems: ROS Physical Exam:  
Physical Exam 
HENT:  
   Head:  
 
   Comments: Posterior scalp abscess/wound has eschar superiorly otherwise clean with granulation tissue Lab/Data Review: 
Recent Results (from the past 24 hour(s)) GLUCOSE, POC Collection Time: 03/07/21 11:01 AM  
Result Value Ref Range Glucose (POC) 194 (H) 65 - 100 mg/dL Performed by Becky Almanza, POC Collection Time: 03/07/21  3:11 PM  
Result Value Ref Range Glucose (POC) 195 (H) 65 - 100 mg/dL Performed by Becky Almanza, POC Collection Time: 03/07/21  7:56 PM  
Result Value Ref Range Glucose (POC) 170 (H) 65 - 100 mg/dL Performed by Giovanni Vallecillo Assessment:  
 
Principal Problem: 
  JEREMIAS (acute kidney injury) ([address] (2/8/2021) Active Problems: 
  CKD (chronic kidney disease) stage 3, GFR 30-59 ml/min (2/8/2021) Secondary hyperparathyroidism ([address] (2/8/2021) Vitamin D deficiency (2/8/2021) Essential hypertension (2/8/2021) Insulin-treated type 2 diabetes mellitus ([address] (2/8/2021) Metabolic acidosis, normal anion gap (NAG) (3/4/2021) UTI (urinary tract infection) (3/4/2021) Plan:  
Changed to Medihoney dressings twice daily for scalp wound No need for surgical debridement at this time Signed By: Gavin Esquivel MD   
 March 8, 2021

## 2021-03-08 NOTE — PROGRESS NOTES
Hospitalist Progress Note Daily Progress Note: 3/8/2021 76-year-old gentleman presenting with chronic kidney disease diabetes mellitus hypertension earlier this month secondary to scalp abscess requiring IV antibiotics and surgical incision and drainage. Returns now is sent by nephrology with abnormal labs per Dr. Isai Cornell. CO2 was 9 in the outpatient labs creatinine 3.8 with a baseline creatinine around 2.1. To here in the ER was 13, magnesium was 1.2 and he is admitted secondary to acute on chronic kidney injury as well as metabolic acidosis with normal anion gap and apparent UTI per urinalysis. Nephrology followed improvement in renal function 2/2 pre-renal azotemia. Uti with e.coli, pansensitive treated empirically from admission with ceftriaxone. C/o chronic sob, worse here with exertion, cxr f/u 33/8 shows new rt lung infiltrate, left cleared. Switching abx to zosyn which will cover possible aspiration and his uti and will get slp to eval. He is followed by cardiology for heart failure with preserved ef. Echo with ef greater than 70% and severe TR. TR cause of his chronic sob? May benefit from op pft's and pulmonology evaluation. Surgery and wound care following 2/2 scalp abscess debrided during his last admit. Surgery indicates no need for debridement now and rec cont local wound care. Subjective: The patient is seen for follow  up. Patient is alert, no distress, He complains of increased exertional sob, cxr today revealing new rt sided infiltrate with left side cleared now. No odell today. BP adequate and no bradycardia. Met acidosis resolved, co2 @ 23 Cr had improved, bumped 2.1)~baseline) to 2.4 today. Nephro following. Cr 3.8-->3.62-->2.82-->2.13-->2.4 Nephrology,surgery, cardio appreciated. Surgery eval'd 2/2 scalp abscess- cont wound care, no surgical debridement needed. Problem List: 
Problem List as of 3/8/2021 Date Reviewed: 2/8/2021 Codes Class Noted - Resolved Metabolic acidosis, normal anion gap (NAG) ICD-10-CM: W10.7 ICD-9-CM: 276.2  3/4/2021 - Present UTI (urinary tract infection) ICD-10-CM: N39.0 ICD-9-CM: 599.0  3/4/2021 - Present * (Principal) JEREMIAS (acute kidney injury) ([facility] ICD-10-CM: N17.9 ICD-9-CM: 584.9  2/8/2021 - Present CKD (chronic kidney disease) stage 3, GFR 30-59 ml/min ICD-10-CM: N18.30 ICD-9-CM: 585.3  2/8/2021 - Present Secondary hyperparathyroidism ([facility] ICD-10-CM: N25.81 ICD-9-CM: 588.81  2/8/2021 - Present Vitamin D deficiency ICD-10-CM: E55.9 ICD-9-CM: 268.9  2/8/2021 - Present Essential hypertension ICD-10-CM: I10 
ICD-9-CM: 401.9  2/8/2021 - Present Insulin-treated type 2 diabetes mellitus ([facility] ICD-10-CM: E11.9, Z79.4 ICD-9-CM: 250.00, V58.67  2/8/2021 - Present Scalp abscess ICD-10-CM: L02.811 ICD-9-CM: 682.8  1/27/2021 - Present RESOLVED: Hypokalemia ICD-10-CM: E87.6 ICD-9-CM: 276.8  2/8/2021 - 2/8/2021 RESOLVED: Hypomagnesemia ICD-10-CM: C34.14 
ICD-9-CM: 275.2  2/8/2021 - 2/8/2021 RESOLVED: Hypocalcemia ICD-10-CM: E83.51 
ICD-9-CM: 275.41  2/8/2021 - 2/8/2021 Medications reviewed Current Facility-Administered Medications Medication Dose Route Frequency  NIFEdipine ER (PROCARDIA XL) tablet 60 mg  60 mg Oral BID  
 HYDROcodone-acetaminophen (NORCO)  mg tablet 1 Tab  1 Tab Oral Q6H PRN  
 carvediloL (COREG) tablet 6.25 mg  6.25 mg Oral BID WITH MEALS  calcitRIOL (ROCALTROL) capsule 0.5 mcg  0.5 mcg Oral DAILY  calcium carbonate (TUMS) chewable tablet 400 mg [elemental]  400 mg Oral BID  docusate sodium (COLACE) capsule 100 mg  100 mg Oral BID  DULoxetine (CYMBALTA) capsule 60 mg  60 mg Oral DAILY  ergocalciferol capsule 50,000 Units  50,000 Units Oral Q7D  
 ferrous sulfate tablet 325 mg  325 mg Oral ACB  hydrALAZINE (APRESOLINE) tablet 50 mg  50 mg Oral TID  insulin glargine (LANTUS) injection 8 Units  8 Units SubCUTAneous QHS  pravastatin (PRAVACHOL) tablet 20 mg  20 mg Oral QHS  influenza vaccine 2020- (4 yrs+)(PF) (FLUCELVAX QUAD) injection 0.5 mL  0.5 mL IntraMUSCular PRIOR TO DISCHARGE  sodium chloride (NS) flush 5-40 mL  5-40 mL IntraVENous PRN  
 heparin (porcine) injection 5,000 Units  5,000 Units SubCUTAneous Q8H  
 sodium chloride (NS) flush 5-40 mL  5-40 mL IntraVENous Q8H  
 sodium chloride (NS) flush 5-40 mL  5-40 mL IntraVENous PRN  
 acetaminophen (TYLENOL) tablet 650 mg  650 mg Oral Q6H PRN Or  
 acetaminophen (TYLENOL) suppository 650 mg  650 mg Rectal Q6H PRN  polyethylene glycol (MIRALAX) packet 17 g  17 g Oral DAILY PRN  promethazine (PHENERGAN) tablet 12.5 mg  12.5 mg Oral Q6H PRN Or  
 ondansetron (ZOFRAN) injection 4 mg  4 mg IntraVENous Q6H PRN  
 cefTRIAXone (ROCEPHIN) 1 g in sterile water (preservative free) 10 mL IV syringe  1 g IntraVENous Q24H Review of Systems:  
Review of Systems Constitutional: Negative for chills, fever and malaise/fatigue. HENT: Negative. Eyes: Negative. Respiratory: Positive for shortness of breath. Cardiovascular: Negative. Gastrointestinal: Negative. Genitourinary: Negative. Musculoskeletal: Negative. Skin: Negative. Scalp lesion, not visualized. Here last month with scalp abscess, s/p I & D, continues wound care. Neurological: Negative. Endo/Heme/Allergies: Negative. Psychiatric/Behavioral: Negative. Objective:  
Physical Exam:  
 
Visit Vitals BP (!) 141/75 Pulse 73 Temp 98.9 °F (37.2 °C) Resp 18 Ht 6' 2\" (1.88 m) Wt 67.8 kg (149 lb 7.6 oz) SpO2 96% BMI 19.19 kg/m² O2 Device: Room air Temp (24hrs), Av.2 °F (36.8 °C), Min:97.7 °F (36.5 °C), Max:98.9 °F (37.2 °C) No intake/output data recorded. No intake/output data recorded. General:  Alert, cooperative, no distress, appears stated age. Head:   Scalp is wrapped in a Kerlix covering known prior occipital lesion. Dressing was not removed yet or visualized. Tenderness does not appear to be purulence on dressing. Eyes:  Conjunctivae/corneas clear. PERRL, EOMs intact. Throat:  Dentition, moist mucosa Neck: Supple, symmetrical, trachea midline,no JVD. Back:   No CVA tenderness. Lungs:   Crackles rt lower lung. Not labored. Diminished diffusely. Heart:  Regular rate and rhythm, S1, S2 normal, no murmur, click, rub or gallop. Abdomen:   Soft, non-tender. Bowel sounds normal. No masses,  No organomegaly. Extremities: Extremities normal, atraumatic, no cyanosis or edema. Pulses: 2+ and symmetric all extremities. Skin:  Decreased turgor Neurologic: CNII-XII intact. No motor or sensory deficits.   
  
 
Data Review:  
   
Recent Days: 
Recent Labs 03/08/21 
0545 03/07/21 
0400 WBC 6.1 5.8 HGB 8.2* 8.5* HCT 26.3* 26.9*  
* 149* Recent Labs 03/08/21 
0545 03/07/21 
0400 03/06/21 
1000 03/06/21 
0440  143  --  146*  
K 4.1 3.3*  --  3.2*  
* 109*  --  114* CO2 23 22  --  22 * 129*  --  128* BUN 37* 34*  --  47* CREA 2.40* 2.13*  --  2.85* CA 8.4* 7.6* 7.5* 7.8*  
MG 1.6  --   --  1.4* PHOS 3.2 3.1  --  4.1 ALB 2.6* 2.6*  --  2.9* No results for input(s): PH, PCO2, PO2, HCO3, FIO2 in the last 72 hours. 24 Hour Results: 
Recent Results (from the past 24 hour(s)) GLUCOSE, POC Collection Time: 03/07/21  3:11 PM  
Result Value Ref Range Glucose (POC) 195 (H) 65 - 100 mg/dL Performed by Rose Chung, POC Collection Time: 03/07/21  7:56 PM  
Result Value Ref Range Glucose (POC) 170 (H) 65 - 100 mg/dL Performed by Lennox Salmon RENAL FUNCTION PANEL Collection Time: 03/08/21  5:45 AM  
Result Value Ref Range Sodium 141 136 - 145 mmol/L Potassium 4.1 3.5 - 5.1 mmol/L  Chloride 111 (H) 97 - 108 mmol/L  
 CO2 23 21 - 32 mmol/L  
 Anion gap 7 5 - 15 mmol/L Glucose 123 (H) 65 - 100 mg/dL BUN 37 (H) 6 - 20 mg/dL Creatinine 2.40 (H) 0.70 - 1.30 mg/dL BUN/Creatinine ratio 15 12 - 20 GFR est AA 32 (L) >60 ml/min/1.73m2 GFR est non-AA 27 (L) >60 ml/min/1.73m2 Calcium 8.4 (L) 8.5 - 10.1 mg/dL Phosphorus 3.2 2.6 - 4.7 mg/dL Albumin 2.6 (L) 3.5 - 5.0 g/dL CBC WITH AUTOMATED DIFF Collection Time: 03/08/21  5:45 AM  
Result Value Ref Range WBC 6.1 4.1 - 11.1 K/uL  
 RBC 2.80 (L) 4.10 - 5.70 M/uL HGB 8.2 (L) 12.1 - 17.0 g/dL HCT 26.3 (L) 36.6 - 50.3 % MCV 93.9 80.0 - 99.0 FL  
 MCH 29.3 26.0 - 34.0 PG  
 MCHC 31.2 30.0 - 36.5 g/dL  
 RDW 16.7 (H) 11.5 - 14.5 % PLATELET 537 (L) 071 - 400 K/uL MPV 11.6 8.9 - 12.9 FL  
 NEUTROPHILS 67 32 - 75 % LYMPHOCYTES 15 12 - 49 % MONOCYTES 13 5 - 13 % EOSINOPHILS 4 0 - 7 % BASOPHILS 1 0 - 1 % IMMATURE GRANULOCYTES 0 0.0 - 0.5 % ABS. NEUTROPHILS 4.1 1.8 - 8.0 K/UL  
 ABS. LYMPHOCYTES 0.9 0.8 - 3.5 K/UL  
 ABS. MONOCYTES 0.8 0.0 - 1.0 K/UL  
 ABS. EOSINOPHILS 0.3 0.0 - 0.4 K/UL  
 ABS. BASOPHILS 0.0 0.0 - 0.1 K/UL  
 ABS. IMM. GRANS. 0.0 0.00 - 0.04 K/UL  
 DF AUTOMATED MAGNESIUM Collection Time: 03/08/21  5:45 AM  
Result Value Ref Range Magnesium 1.6 1.6 - 2.4 mg/dL GLUCOSE, POC Collection Time: 03/08/21  7:29 AM  
Result Value Ref Range Glucose (POC) 135 (H) 65 - 100 mg/dL Performed by YVETTE [employer] Collection Time: 03/08/21 11:18 AM  
Result Value Ref Range Glucose (POC) 157 (H) 65 - 100 mg/dL Performed by [employer] CXR Results  (Last 48 hours) 03/08/21 1133  XR CHEST PORT Final result Narrative:  1 view comparison March 4 New/increased patchy mixed infiltrate in the right mid lung. Left basilar  
atelectasis/infiltrate has cleared. The left hemidiaphragm remains elevated. Normal heart and mediastinum Assessment/  
 
 Patient Active Problem List  
Diagnosis Code  Scalp abscess L02.811  JEREMIAS (acute kidney injury) ([facility] N17.9  CKD (chronic kidney disease) stage 3, GFR 30-59 ml/min N18.30  Secondary hyperparathyroidism ([facility] N25.81  Vitamin D deficiency E55.9  Essential hypertension I10  
 Insulin-treated type 2 diabetes mellitus ([facility] E11.9, Z79.4  Metabolic acidosis, normal anion gap (NAG) E87.2  
 UTI (urinary tract infection) N39.0  
   
 
-JEREMIAS on CKD 3, baseline creatinine around 2.1 presents with a creatinine of 3.8. No hydro on renal ultrasound. On IV fluids, cr trending improved to round baaseline ~2.1, bump to 2.4 today 3/8. Nephrology following. 
-Metabolic acidosis, normal anion gap, improving CO2 outpatient initially 9 now 22, bicarb stopped. Resolved 
-Uti , e coli on ceftriaxone from 3/4/21. Switch to zosyn as suspect aspiration pna. 
-Pneumonia: cxr 3/8 shows new rt lung infiltrate- aspiration? SLP cx'd. -Sinus odell- hr 48-60, held coreg, resolved, resume at lesser dose w/o recurrent odell. -CHF with preserved ef, severe tr. C/o chronic sob, worse with exertion now and suspect element of pneumonia though chronically is it severe TR contributing or underlying lung issues. May benefit from op pft's/pulmonology follow up. -Hypomagnesemia repleting 
-Insulin treated dm2, adequate control- a1c was 6.2 in 1/2021.  
-Admit 2/21 with scalp abscess status post I&D, wound care is following. Seen by Dr Betty Hamilton, no need for I&D and recommends continued local wound care. [facility] on dc with wound care.  
-essential hypertension, adequate control today. -Hypokalemia- repleted Plan: 
-switch abx to zosyn- to cover presumptive aspiration pna and will cover e coli uti as well.  
-slp 
-continue coreg 6.25, hydralazine, nifedipine. 
-Continue PTA Lantus and SSI/hypoglycemic protocol 
-replete mag and follow am labs. -wound care appreciated, cont local care VTEP: Heparin subcu Full CODE STATUS confirmed with patient Surrogate is daughter Disposition: Pending course, anticipate home with home health care to resume. Care Plan discussed with: Patient/Family,  and Consultant . Total time spent with patient: 30 minutes. This dictation was done by dragon, computer voice recognition software. Often unanticipated grammatical, syntax, phones and other interpretive errors are inadvertently transcribed. Please excuse errors that have escaped final proofreading.  
 
Renee Alva MD

## 2021-03-09 NOTE — PROGRESS NOTES
Hospitalist Progress Note Daily Progress Note: 3/9/2021 73-year-old gentleman presenting with chronic kidney disease diabetes mellitus hypertension earlier this month secondary to scalp abscess requiring IV antibiotics and surgical incision and drainage. Returns now is sent by nephrology with abnormal labs per Dr. Beto Smith. CO2 was 9 in the outpatient labs creatinine 3.8 with a baseline creatinine around 2.1. To here in the ER was 13, magnesium was 1.2 and he is admitted secondary to acute on chronic kidney injury as well as metabolic acidosis with normal anion gap and apparent UTI per urinalysis. Nephrology followed improvement in renal function 2/2 pre-renal azotemia. Uti with e.coli, pansensitive treated empirically from admission with ceftriaxone. C/o chronic sob, worse here with exertion, cxr f/u 33/8 shows new rt lung infiltrate, left cleared. Switching abx to zosyn which will cover possible aspiration and his uti and will get slp to eval. He is followed by cardiology for heart failure with preserved ef. Echo with ef greater than 70% and severe TR. TR cause of his chronic sob? May benefit from op pft's and pulmonology evaluation. Surgery and wound care following 2/2 scalp abscess debrided during his last admit. Surgery indicates no need for debridement now and rec cont local wound care. Subjective:  
Patient seen and examined, chart was reviewed, undergoing modified barium swallow   No labs done today, blood pressure is stable, case management home health has been arranged. He feels weak and has some shortness of breath and does not want to go home today he feels he may be able to go home tomorrow. Problem List: 
Problem List as of 3/9/2021 Date Reviewed: 2/8/2021 Codes Class Noted - Resolved Metabolic acidosis, normal anion gap (NAG) ICD-10-CM: K74.2 ICD-9-CM: 276.2  3/4/2021 - Present UTI (urinary tract infection) ICD-10-CM: N39.0 ICD-9-CM: 599.0  3/4/2021 - Present * (Principal) JEREMIAS (acute kidney injury) ([facility] ICD-10-CM: N17.9 ICD-9-CM: 584.9  2/8/2021 - Present CKD (chronic kidney disease) stage 3, GFR 30-59 ml/min ICD-10-CM: N18.30 ICD-9-CM: 585.3  2/8/2021 - Present Secondary hyperparathyroidism ([place] [address].) ICD-10-CM: N25.81 ICD-9-CM: 588.81  2/8/2021 - Present Vitamin D deficiency ICD-10-CM: E55.9 ICD-9-CM: 268.9  2/8/2021 - Present Essential hypertension ICD-10-CM: I10 
ICD-9-CM: 401.9  2/8/2021 - Present Insulin-treated type 2 diabetes mellitus ([place] [address].) ICD-10-CM: E11.9, Z79.4 ICD-9-CM: 250.00, V58.67  2/8/2021 - Present Scalp abscess ICD-10-CM: L02.811 ICD-9-CM: 682.8  1/27/2021 - Present RESOLVED: Hypokalemia ICD-10-CM: E87.6 ICD-9-CM: 276.8  2/8/2021 - 2/8/2021 RESOLVED: Hypomagnesemia ICD-10-CM: I89.90 
ICD-9-CM: 275.2  2/8/2021 - 2/8/2021 RESOLVED: Hypocalcemia ICD-10-CM: E83.51 
ICD-9-CM: 275.41  2/8/2021 - 2/8/2021 Medications reviewed Current Facility-Administered Medications Medication Dose Route Frequency  piperacillin-tazobactam (ZOSYN) 3.375 g in 0.9% sodium chloride (MBP/ADV) 100 mL MBP  3.375 g IntraVENous Q8H  
 iron sucrose (VENOFER) injection 100 mg  100 mg IntraVENous Q24H  
 NIFEdipine ER (PROCARDIA XL) tablet 60 mg  60 mg Oral BID  
 HYDROcodone-acetaminophen (NORCO)  mg tablet 1 Tab  1 Tab Oral Q6H PRN  
 calcitRIOL (ROCALTROL) capsule 0.5 mcg  0.5 mcg Oral DAILY  calcium carbonate (TUMS) chewable tablet 400 mg [elemental]  400 mg Oral BID  docusate sodium (COLACE) capsule 100 mg  100 mg Oral BID  DULoxetine (CYMBALTA) capsule 60 mg  60 mg Oral DAILY  ergocalciferol capsule 50,000 Units  50,000 Units Oral Q7D  
 ferrous sulfate tablet 325 mg  325 mg Oral ACB  hydrALAZINE (APRESOLINE) tablet 50 mg  50 mg Oral TID  insulin glargine (LANTUS) injection 8 Units  8 Units SubCUTAneous QHS  pravastatin (PRAVACHOL) tablet 20 mg  20 mg Oral QHS  influenza vaccine - (4 yrs+)(PF) (FLUCELVAX QUAD) injection 0.5 mL  0.5 mL IntraMUSCular PRIOR TO DISCHARGE  sodium chloride (NS) flush 5-40 mL  5-40 mL IntraVENous PRN  
 heparin (porcine) injection 5,000 Units  5,000 Units SubCUTAneous Q8H  
 sodium chloride (NS) flush 5-40 mL  5-40 mL IntraVENous Q8H  
 sodium chloride (NS) flush 5-40 mL  5-40 mL IntraVENous PRN  
 acetaminophen (TYLENOL) tablet 650 mg  650 mg Oral Q6H PRN Or  
 acetaminophen (TYLENOL) suppository 650 mg  650 mg Rectal Q6H PRN  polyethylene glycol (MIRALAX) packet 17 g  17 g Oral DAILY PRN  promethazine (PHENERGAN) tablet 12.5 mg  12.5 mg Oral Q6H PRN Or  
 ondansetron (ZOFRAN) injection 4 mg  4 mg IntraVENous Q6H PRN  
 cefTRIAXone (ROCEPHIN) 1 g in sterile water (preservative free) 10 mL IV syringe  1 g IntraVENous Q24H Objective:  
Physical Exam:  
 
Visit Vitals /75 (BP 1 Location: Left upper arm, BP Patient Position: At rest) Pulse 66 Temp 97.4 °F (36.3 °C) Resp 18 Ht 6' 2\" (1.88 m) Wt 68.4 kg (150 lb 12.7 oz) SpO2 96% BMI 19.36 kg/m² O2 Device: Room air Temp (24hrs), Av.6 °F (36.4 °C), Min:97.4 °F (36.3 °C), Max:97.8 °F (36.6 °C) No intake/output data recorded. No intake/output data recorded. General:    no distress, appears stated age. Head:   Scalp is wrapped in a Kerlix covering known prior occipital lesion. Dressing was not removed yet or visualized. Tenderness does not appear to be purulence on dressing. Eyes:  Conjunctivae/corneas clear. Neck: Supple, D. Lungs:    Symmetric expansion Abdomen:    Not distended Extremities: Extremities  Moves all. Pulses: symmetric all extremities. Neurologic:  No motor or sensory deficits.   
  
 
Data Review:  
   
Recent Days: 
Recent Labs 21 
0545 21 
0400 WBC 6.1 5.8 HGB 8.2* 8.5* HCT 26.3* 26.9*  
* 149* Recent Labs   21 
0545 03/07/21 
0400 03/06/21 
1000  143  --   
K 4.1 3.3*  --   
* 109*  --   
CO2 23 22  --   
* 129*  --   
BUN 37* 34*  --   
CREA 2.40* 2.13*  --   
CA 8.4* 7.6* 7.5* MG 1.6  --   --   
PHOS 3.2 3.1  --   
ALB 2.6* 2.6*  -- No results for input(s): PH, PCO2, PO2, HCO3, FIO2 in the last 72 hours. 24 Hour Results: 
Recent Results (from the past 24 hour(s)) GLUCOSE, POC Collection Time: 03/08/21 11:18 AM  
Result Value Ref Range Glucose (POC) 157 (H) 65 - 100 mg/dL Performed by Placido, POC Collection Time: 03/08/21  3:34 PM  
Result Value Ref Range Glucose (POC) 233 (H) 65 - 100 mg/dL Performed by Placido, POC Collection Time: 03/08/21 10:46 PM  
Result Value Ref Range Glucose (POC) 212 (H) 65 - 100 mg/dL Performed by Miller Hoffman, POC Collection Time: 03/09/21  7:23 AM  
Result Value Ref Range Glucose (POC) 133 (H) 65 - 100 mg/dL Performed by Lotus Catherine CXR Results  (Last 48 hours) 03/08/21 1133  XR CHEST PORT Final result Narrative:  1 view comparison March 4 New/increased patchy mixed infiltrate in the right mid lung. Left basilar  
atelectasis/infiltrate has cleared. The left hemidiaphragm remains elevated. Normal heart and mediastinum Assessment/Plan Patient Active Problem List  
Diagnosis Code  Scalp abscess L02.811  JEREMIAS (acute kidney injury) ([facility] N17.9  CKD (chronic kidney disease) stage 3, GFR 30-59 ml/min N18.30  Secondary hyperparathyroidism ([facility] N25.81  Vitamin D deficiency E55.9  Essential hypertension I10  
 Insulin-treated type 2 diabetes mellitus ([facility] E11.9, Z79.4  Metabolic acidosis, normal anion gap (NAG) E87.2  
 UTI (urinary tract infection) N39.0  
   
 
-JEREMIAS on CKD 3, baseline creatinine around 2.1 presents with a creatinine of 3.8. No hydro on renal ultrasound.   On IV fluids, cr trending improved to round baaseline ~2.1, bump to 2.4   Nephrology following needs OP f/u  
-Metabolic acidosis, normal anion gap, improving CO2 outpatient initially 9 now 22, bicarb stopped. Resolved 
-Uti , e coli on ceftriaxone from 3/4/21. Switch to zosyn as suspect aspiration pna can change to oral augmentin at d/c. 
-Pneumonia from aspitation: cxr 3/8 shows new rt lung infiltrate- aspiration? SLP cx'd. F/u MBS 
-Sinus odell- hr 48-60, held coreg, resolved, resume at lesser dose w/o recurrent odell. -CHF with preserved ef, severe tr. C/o chronic sob, worse with exertion now and suspect element of pneumonia though chronically is it severe TR contributing or underlying lung issues. May benefit from op pft's/pulmonology follow up.  
- dm2, adequate control- a1c was 6.2 in 1/2021.  
-Admit 2/21 with scalp abscess status post I&D, wound care is following. Seen by Dr Pablo Russell, no need for I&D and recommends continued local wound care. C on dc with wound care.  
-essential HTN, adequate control today. VTEP: Heparin subcu Full CODE STATUS confirmed with patient Surrogate is daughter Disposition:  home with home health care pending MBS This dictation was done by dragon, computer voice recognition software. Often unanticipated grammatical, syntax, phones and other interpretive errors are inadvertently transcribed. Please excuse errors that have escaped final proofreading.  
 
Devante Sparrow MD

## 2021-03-09 NOTE — PROGRESS NOTES
Renal Progress Note Patient: Bryant Naik MRN: [id number]  SSN: [id number] YOB: 1945  Age: 77 y.o. Sex: male Admit Date: 3/4/2021 LOS: 5 days Subjective:  
Patient seen in the room. Patient is alert and awake, confused, no new complaints today Creatinine up to 2.7 today Current Facility-Administered Medications Medication Dose Route Frequency  piperacillin-tazobactam (ZOSYN) 3.375 g in 0.9% sodium chloride (MBP/ADV) 100 mL MBP  3.375 g IntraVENous Q8H  
 iron sucrose (VENOFER) injection 100 mg  100 mg IntraVENous Q24H  
 NIFEdipine ER (PROCARDIA XL) tablet 60 mg  60 mg Oral BID  
 HYDROcodone-acetaminophen (NORCO)  mg tablet 1 Tab  1 Tab Oral Q6H PRN  
 calcitRIOL (ROCALTROL) capsule 0.5 mcg  0.5 mcg Oral DAILY  calcium carbonate (TUMS) chewable tablet 400 mg [elemental]  400 mg Oral BID  docusate sodium (COLACE) capsule 100 mg  100 mg Oral BID  DULoxetine (CYMBALTA) capsule 60 mg  60 mg Oral DAILY  ergocalciferol capsule 50,000 Units  50,000 Units Oral Q7D  
 ferrous sulfate tablet 325 mg  325 mg Oral ACB  hydrALAZINE (APRESOLINE) tablet 50 mg  50 mg Oral TID  insulin glargine (LANTUS) injection 8 Units  8 Units SubCUTAneous QHS  pravastatin (PRAVACHOL) tablet 20 mg  20 mg Oral QHS  influenza vaccine 2020-21 (4 yrs+)(PF) (FLUCELVAX QUAD) injection 0.5 mL  0.5 mL IntraMUSCular PRIOR TO DISCHARGE  sodium chloride (NS) flush 5-40 mL  5-40 mL IntraVENous PRN  
 heparin (porcine) injection 5,000 Units  5,000 Units SubCUTAneous Q8H  
 sodium chloride (NS) flush 5-40 mL  5-40 mL IntraVENous Q8H  
 sodium chloride (NS) flush 5-40 mL  5-40 mL IntraVENous PRN  
 acetaminophen (TYLENOL) tablet 650 mg  650 mg Oral Q6H PRN Or  
 acetaminophen (TYLENOL) suppository 650 mg  650 mg Rectal Q6H PRN  polyethylene glycol (MIRALAX) packet 17 g  17 g Oral DAILY PRN  promethazine (PHENERGAN) tablet 12.5 mg  12.5 mg Oral Q6H PRN  Or  
 ondansetron (ZOFRAN) injection 4 mg  4 mg IntraVENous Q6H PRN  
 cefTRIAXone (ROCEPHIN) 1 g in sterile water (preservative free) 10 mL IV syringe  1 g IntraVENous Q24H Vitals:  
 03/09/21 0227 03/09/21 8594 03/09/21 0800 03/09/21 1509 BP: 135/73  137/75 136/67 Pulse: 67  66 69 Resp: 18  18 18 Temp: 97.5 °F (36.4 °C)  97.4 °F (36.3 °C) 97.4 °F (36.3 °C) SpO2: 97%  96% 95% Weight:  68.4 kg (150 lb 12.7 oz) Height:      
 
Objective:  
General: alert awake , no acute distress. HEENT: EOMI, no Icterus, no Pallor, pupils reactive, mucosa moist, Neck: Neck is supple, No JVD, Lungs: breathsounds normal, no rhonchi, no rales, CVS: heart sounds normal,  no murmurs, no rubs. GI: soft, nontender, normal BS, Extremeties: no clubbing, no cyanosis, no edema, Neuro: Alert, awake, slow to respond, speech is normal, some confusion present , moving all extremeties well. Skin: normal skin turgor, scalp area dressing present Intake and Output: 
Current Shift: No intake/output data recorded. Last three shifts: No intake/output data recorded. Lab/Data Review: 
Recent Labs 03/08/21 
0545 03/07/21 
0400 WBC 6.1 5.8 HGB 8.2* 8.5* HCT 26.3* 26.9*  
* 149* Recent Labs 03/09/21 
1057 03/08/21 
0545 03/07/21 
0400  141 143  
K 3.6 4.1 3.3*  
* 111* 109* CO2 23 23 22 * 123* 129* BUN 37* 37* 34* CREA 2.71* 2.40* 2.13* CA 8.6 8.4* 7.6*  
MG 1.8 1.6  --   
PHOS 3.5 3.2 3.1 ALB 2.6* 2.6* 2.6* No results for input(s): PH, PCO2, PO2, HCO3, FIO2 in the last 72 hours. Recent Results (from the past 24 hour(s)) GLUCOSE, POC Collection Time: 03/08/21 10:46 PM  
Result Value Ref Range Glucose (POC) 212 (H) 65 - 100 mg/dL Performed by Meera Garcia, POC Collection Time: 03/09/21  7:23 AM  
Result Value Ref Range Glucose (POC) 133 (H) 65 - 100 mg/dL Performed by Nghia Colvin MAGNESIUM  Collection Time: 03/09/21 10:57 AM  
Result Value Ref Range Magnesium 1.8 1.6 - 2.4 mg/dL RENAL FUNCTION PANEL Collection Time: 03/09/21 10:57 AM  
Result Value Ref Range Sodium 143 136 - 145 mmol/L Potassium 3.6 3.5 - 5.1 mmol/L Chloride 111 (H) 97 - 108 mmol/L  
 CO2 23 21 - 32 mmol/L Anion gap 9 5 - 15 mmol/L Glucose 170 (H) 65 - 100 mg/dL BUN 37 (H) 6 - 20 mg/dL Creatinine 2.71 (H) 0.70 - 1.30 mg/dL BUN/Creatinine ratio 14 12 - 20 GFR est AA 28 (L) >60 ml/min/1.73m2 GFR est non-AA 23 (L) >60 ml/min/1.73m2 Calcium 8.6 8.5 - 10.1 mg/dL Phosphorus 3.5 2.6 - 4.7 mg/dL Albumin 2.6 (L) 3.5 - 5.0 g/dL GLUCOSE, POC Collection Time: 03/09/21 11:30 AM  
Result Value Ref Range Glucose (POC) 180 (H) 65 - 100 mg/dL Performed by Stephanie Welch, POC Collection Time: 03/09/21  3:44 PM  
Result Value Ref Range Glucose (POC) 200 (H) 65 - 100 mg/dL Performed by Franck Zacarias Assessment and Plan: 1. Acute Kidney Injury on CKD IIIB: 
-2/2  prerenal azotemia  
-Cr 3.8 which is improved to 2.1-- 2.4--2.7 today 
-Renal US  No hydro Worsening renal functions are probably related to poor p.o. intake and dehydration Recommend to give IV hydration with half-normal saline at 100 mL/h Repeat renal functions tomorrow 2. Metabolic acidosis, NAG: 
-Resolved now 3-Anemia:-Hb 8.2 
-Iron deficiency present, recommend to add Venofer IV 
-Epo sq weekly, monitor H&H 4.  Renal osteodystrophy:  
-Calcium and phosphorus are stable 5. Hypokalemia: Improved with supplementation, continue to monitor Potassium level 6. Hypertension: Well-controlled today 
-Continue current medications and monitor blood pressure Signed By: Becki Andrews MD   
 March 9, 2021

## 2021-03-09 NOTE — PROGRESS NOTES
Renal Progress Note Patient: Fernando Tracey MRN: [id number]  SSN: [id number] YOB: 1945  Age: 77 y.o. Sex: male Admit Date: 3/4/2021 LOS: 4 days Subjective:  
Patient seen in the room. Patient is alert and awake, answering simple questions, no new complaints today Creatinine at 2.4 today Current Facility-Administered Medications Medication Dose Route Frequency  piperacillin-tazobactam (ZOSYN) 3.375 g in 0.9% sodium chloride (MBP/ADV) 100 mL MBP  3.375 g IntraVENous Q8H  
 NIFEdipine ER (PROCARDIA XL) tablet 60 mg  60 mg Oral BID  
 HYDROcodone-acetaminophen (NORCO)  mg tablet 1 Tab  1 Tab Oral Q6H PRN  
 calcitRIOL (ROCALTROL) capsule 0.5 mcg  0.5 mcg Oral DAILY  calcium carbonate (TUMS) chewable tablet 400 mg [elemental]  400 mg Oral BID  docusate sodium (COLACE) capsule 100 mg  100 mg Oral BID  DULoxetine (CYMBALTA) capsule 60 mg  60 mg Oral DAILY  ergocalciferol capsule 50,000 Units  50,000 Units Oral Q7D  
 ferrous sulfate tablet 325 mg  325 mg Oral ACB  hydrALAZINE (APRESOLINE) tablet 50 mg  50 mg Oral TID  insulin glargine (LANTUS) injection 8 Units  8 Units SubCUTAneous QHS  pravastatin (PRAVACHOL) tablet 20 mg  20 mg Oral QHS  influenza vaccine 2020-21 (4 yrs+)(PF) (FLUCELVAX QUAD) injection 0.5 mL  0.5 mL IntraMUSCular PRIOR TO DISCHARGE  sodium chloride (NS) flush 5-40 mL  5-40 mL IntraVENous PRN  
 heparin (porcine) injection 5,000 Units  5,000 Units SubCUTAneous Q8H  
 sodium chloride (NS) flush 5-40 mL  5-40 mL IntraVENous Q8H  
 sodium chloride (NS) flush 5-40 mL  5-40 mL IntraVENous PRN  
 acetaminophen (TYLENOL) tablet 650 mg  650 mg Oral Q6H PRN Or  
 acetaminophen (TYLENOL) suppository 650 mg  650 mg Rectal Q6H PRN  polyethylene glycol (MIRALAX) packet 17 g  17 g Oral DAILY PRN  promethazine (PHENERGAN) tablet 12.5 mg  12.5 mg Oral Q6H PRN  Or  
 ondansetron (ZOFRAN) injection 4 mg  4 mg IntraVENous Q6H PRN  
 cefTRIAXone (ROCEPHIN) 1 g in sterile water (preservative free) 10 mL IV syringe  1 g IntraVENous Q24H Vitals:  
 03/08/21 0400 03/08/21 0730 03/08/21 0738 03/08/21 1536 BP:  (!) 141/75  129/70 Pulse:  73  71 Resp:  18  18 Temp:  98.9 °F (37.2 °C)  97.8 °F (36.6 °C) SpO2:  95% 96% 95% Weight: 67.8 kg (149 lb 7.6 oz) Height:      
 
Objective:  
General: alert awake well-oriented, no acute distress. HEENT: EOMI, no Icterus, no Pallor, pupils reactive, mucosa moist, Neck: Neck is supple, No JVD, Lungs: breathsounds normal, no rhonchi, no rales, CVS: heart sounds normal,  no murmurs, no rubs. GI: soft, nontender, normal BS, Extremeties: no clubbing, no cyanosis, no edema, Neuro: Alert, awake, oriented x3, speech is normal, moving all extremeties well. Skin: normal skin turgor, scalp area dressing present musculoskeletal: no acute joint swellings Intake and Output: 
Current Shift: No intake/output data recorded. Last three shifts: No intake/output data recorded. Lab/Data Review: 
Recent Labs 03/08/21 
0545 03/07/21 
0400 WBC 6.1 5.8 HGB 8.2* 8.5* HCT 26.3* 26.9*  
* 149* Recent Labs 03/08/21 
0545 03/07/21 
0400 03/06/21 
1000 03/06/21 
0440  143  --  146*  
K 4.1 3.3*  --  3.2*  
* 109*  --  114* CO2 23 22  --  22 * 129*  --  128* BUN 37* 34*  --  47* CREA 2.40* 2.13*  --  2.85* CA 8.4* 7.6* 7.5* 7.8*  
MG 1.6  --   --  1.4* PHOS 3.2 3.1  --  4.1 ALB 2.6* 2.6*  --  2.9* No results for input(s): PH, PCO2, PO2, HCO3, FIO2 in the last 72 hours. Recent Results (from the past 24 hour(s)) RENAL FUNCTION PANEL Collection Time: 03/08/21  5:45 AM  
Result Value Ref Range Sodium 141 136 - 145 mmol/L Potassium 4.1 3.5 - 5.1 mmol/L Chloride 111 (H) 97 - 108 mmol/L  
 CO2 23 21 - 32 mmol/L Anion gap 7 5 - 15 mmol/L Glucose 123 (H) 65 - 100 mg/dL BUN 37 (H) 6 - 20 mg/dL  Creatinine 2.40 (H) 0.70 - 1.30 mg/dL BUN/Creatinine ratio 15 12 - 20 GFR est AA 32 (L) >60 ml/min/1.73m2 GFR est non-AA 27 (L) >60 ml/min/1.73m2 Calcium 8.4 (L) 8.5 - 10.1 mg/dL Phosphorus 3.2 2.6 - 4.7 mg/dL Albumin 2.6 (L) 3.5 - 5.0 g/dL CBC WITH AUTOMATED DIFF Collection Time: 03/08/21  5:45 AM  
Result Value Ref Range WBC 6.1 4.1 - 11.1 K/uL  
 RBC 2.80 (L) 4.10 - 5.70 M/uL HGB 8.2 (L) 12.1 - 17.0 g/dL HCT 26.3 (L) 36.6 - 50.3 % MCV 93.9 80.0 - 99.0 FL  
 MCH 29.3 26.0 - 34.0 PG  
 MCHC 31.2 30.0 - 36.5 g/dL  
 RDW 16.7 (H) 11.5 - 14.5 % PLATELET 371 (L) 562 - 400 K/uL MPV 11.6 8.9 - 12.9 FL  
 NEUTROPHILS 67 32 - 75 % LYMPHOCYTES 15 12 - 49 % MONOCYTES 13 5 - 13 % EOSINOPHILS 4 0 - 7 % BASOPHILS 1 0 - 1 % IMMATURE GRANULOCYTES 0 0.0 - 0.5 % ABS. NEUTROPHILS 4.1 1.8 - 8.0 K/UL  
 ABS. LYMPHOCYTES 0.9 0.8 - 3.5 K/UL  
 ABS. MONOCYTES 0.8 0.0 - 1.0 K/UL  
 ABS. EOSINOPHILS 0.3 0.0 - 0.4 K/UL  
 ABS. BASOPHILS 0.0 0.0 - 0.1 K/UL  
 ABS. IMM. GRANS. 0.0 0.00 - 0.04 K/UL  
 DF AUTOMATED MAGNESIUM Collection Time: 03/08/21  5:45 AM  
Result Value Ref Range Magnesium 1.6 1.6 - 2.4 mg/dL GLUCOSE, POC Collection Time: 03/08/21  7:29 AM  
Result Value Ref Range Glucose (POC) 135 (H) 65 - 100 mg/dL Performed by [facility], POC Collection Time: 03/08/21 11:18 AM  
Result Value Ref Range Glucose (POC) 157 (H) 65 - 100 mg/dL Performed by YVETTE Cummins Collection Time: 03/08/21  3:34 PM  
Result Value Ref Range Glucose (POC) 233 (H) 65 - 100 mg/dL Performed by oroeco Assessment and Plan: 1. Acute Kidney Injury on CKD IIIB: 
-2/2  prerenal azotemia  
-Cr 3.8 which is improved to 2.1-- 2.4 today 
-Renal US  No hydro 
-off IV fluids, advised to improved p.o. fluid intake We will continue to monitor renal functions 2. Metabolic acidosis, NAG: 
-Resolved now 3-Anemia:-Hb 8.2 
-Iron deficiency present, recommend to add Venofer IV 
-Epo sq weekly, monitor H&H 4.  Renal osteodystrophy:  
-Calcium and phosphorus are stable 5. Hypokalemia: Improved with supplementation, continue to monitor Potassium level 6. Hypertension: Well-controlled today 
-Continue current medications and monitor blood pressure Signed By: Osorio Tay MD   
 March 8, 2021

## 2021-03-09 NOTE — PROGRESS NOTES
Cm reviewed chart. Patient has been accepted by [facility] when ready for discharge. CM has uploaded clinical updates to [facility] for [address] review.

## 2021-03-09 NOTE — PROGRESS NOTES
Physician Progress Note July De Los Santos 
CSN #:                  [id number] :                       1945 ADMIT DATE:       3/4/2021 10:32 AM 
DISCH DATE: 
RESPONDING 
PROVIDER #:        DANIEL BUSTOS MD 
 
 
 
 
QUERY TEXT: 
 
Pt admitted with renal failure  and has diastolic CHF documented. If possible, please document in progress notes and discharge summary further specificity regarding the  acuity of CHF: 
 
 
The medical record reflects the following: 
Risk Factors: HX of CHF Clinical Indicators: 
2/10/21 echo Result status: Final result 
? LV: Calculated LVEF is 72%. Normal cavity size, systolic function (ejection fraction normal) and diastolic function. Moderate concentric hypertrophy. Wall motion: normal. Normal left ventricular strain. ? LA: Mildly dilated left atrium. ? RV: Mildly dilated right ventricle. ? RA: Mildly dilated right atrium. ? MV: Mild mitral valve regurgitation is present. ? TV: Severe tricuspid valve regurgitation is present. ? No pericardial effusion seen on this study 3/8-cardiology Olive Coffman is a 61-year-old -American male with: 
1. Heart failure with preserved EF. Treatment: cardiology consult, Coreg, apresoline Thank you, 
Kamille Hernandez RN, CDI, CCDS Certified Clinical  
[phone] Options provided: 
-- Acute on Chronic Diastolic CHF/HFpEF 
-- Acute Diastolic CHF/HFpEF 
-- Chronic Diastolic CHF/HFpEF 
-- Other - I will add my own diagnosis -- Disagree - Not applicable / Not valid -- Disagree - Clinically unable to determine / Unknown 
-- Refer to Clinical Documentation Reviewer PROVIDER RESPONSE TEXT: 
 
This patient has chronic diastolic CHF/HFpEF. Query created by: Anurag Driscoll on 3/8/2021 10:53 AM 
 
 
QUERY TEXT: 
 
Pt admitted with renal failure , noted documentation of cardiomyopathy. .  If possible, please document in progress notes and discharge summary further specificity regarding the type of cardiomyopathy: 
 
The medical record reflects the following: 
Risk Factors: dilated ventricle and atrium  last echo Clinical Indicators: 
 
2/10/21 echo Result status: Final result 
? LV: Calculated LVEF is 72%. Normal cavity size, systolic function (ejection fraction normal) and diastolic function. Moderate concentric hypertrophy. Wall motion: normal. Normal left ventricular strain. ? LA: Mildly dilated left atrium. ? RV: Mildly dilated right ventricle. ? RA: Mildly dilated right atrium. ? MV: Mild mitral valve regurgitation is present. ? TV: Severe tricuspid valve regurgitation is present. ? No pericardial effusion seen on this study 3/6-cardiology History of cardiomyopathy with last known ejection fraction of 72% in February 2021 Treatment: cardiology consult, Lashawn Faye Thank you, 
Clint Espinoza RN, CDI, CCDS Certified Clinical  
[phone] Options provided: 
-- Dilated cardiomyopathy -- Hypertensive cardiomyopathy -- Hypertrophic cardiomyopathy 
-- Idiopathic cardiomyopathy 
-- Ischemic cardiomyopathy 
-- Obstructive cardiomyopathy 
-- Restrictive cardiomyopathy 
-- Other - I will add my own diagnosis -- Disagree - Not applicable / Not valid -- Disagree - Clinically unable to determine / Unknown 
-- Refer to Clinical Documentation Reviewer PROVIDER RESPONSE TEXT: 
 
This patient has hypertensive cardiomyopathy.  
 
Query created by: Terell Carrasco on 3/8/2021 10:57 AM 
 
 
Electronically signed by:  Prakash Chawla MD 3/9/2021 9:06 AM

## 2021-03-09 NOTE — PROGRESS NOTES
Modified Barium Swallow Evaluation Patient: Alba Rae (5 y.o. male) Date: 3/9/2021 Primary Diagnosis: JEREMIAS (acute kidney injury) ([facility] [address] [N17.9] UTI (urinary tract infection) [N39.0] CKD (chronic kidney disease) stage 3, GFR 30-59 ml/min [N18.30] Metabolic acidosis, normal anion gap (NAG) [E87.2] Insulin-treated type 2 diabetes mellitus ([facility] [address] [E11.9, Z79.4] Precautions: aspiration ASSESSMENT : 
Based on the objective data described below, the patient does not present w/ a significant swallow dysfunction at this time. Oral and pharyngeal swallow WFL for age. No aspiration or penetration was observed w/ all consistencies administered. Oral phase c/b prolonged mastication of solid due to sparse teeth. Lingual residue present after the swallow requiring a liquid wash. Prolonged oral transit. Delay in the initiation of swallow. Pharyngeal phase c/b hyolaryngeal excursion and protraction adequate. Epiglottis fully inverts for adequate airway protection. There is trace residue in the pyriforms. No penetration or aspiration is observed w/ thin single sips, large volume from cup and consecutive swallows of thin. No penetration or aspiration observed w/ nectar, honey, pudding and solid consistency. Patient does have a cervical large bony spur w/ a shelf like protrusion, but this does not appear to negatively impact patient's swallow function at this time. UES appears [facility] [place] Esophageal not significant retention or retrograde flow. Patient will benefit from skilled intervention to address the above impairments. Patients rehabilitation potential is considered to be Good PLAN : 
Recommendations and Planned Interventions: 
Chopped diet and thin liquids. Educated patient to use of small controlled sips. SLP to follow-up x1 to ensure diet tolerance. Frequency/Duration: Patient will be followed by speech-language pathology 1 time a week to address goals.  
Discharge Recommendations: To Be Determined SUBJECTIVE:  
Patient alert and agreeable to MBS. OBJECTIVE:  
 
Past Medical History:  
Diagnosis Date  
 CKD (chronic kidney disease) stage 3, GFR 30-59 ml/min 2/8/2021 Diabetes ([facility] Essential hypertension 2/8/2021 Hypertension Insulin-treated type 2 diabetes mellitus ([facility] 2/8/2021 Secondary hyperparathyroidism ([facility] 2/8/2021 Vitamin D deficiency 2/8/2021 Past Surgical History:  
Procedure Laterality Date HX ORTHOPAEDIC    
 amputation left big toe CXR Results  (Last 48 hours) 03/08/21 1133  XR CHEST PORT Final result Narrative:  1 view comparison March 4 New/increased patchy mixed infiltrate in the right mid lung. Left basilar  
atelectasis/infiltrate has cleared. The left hemidiaphragm remains elevated. Normal heart and mediastinum Diet prior to admission: regular, thin Current Diet:  chopped, thin Radiologist:  Dr. Ivon Mcmanus. Video Flouroscopic Procedures 
[x] Lateral View   [] A-P View [] Scanned to level of Sternum    [] Seated at 90 deg. [] Other: 
 
Presentation:   [x] Spoon   [] Cup   [] Straw   [] Syringe   [] Consecutive Swallows 
[] Other: 
 
Consistencies:   [x] Ba+ liquid   [x] Ba+ liquid (nectar)   [x] Ba+ liquid (honey) [x] Ba+ pudding   [] Ba+ crunched cookie   [] Ba+ cookie   [] Other:  
 
Testing Discontinued: [] Due to: 
 
Treatment Techniques Attempted    
[] Head Turn: [] Right [] Left    
[] Head Tilt: [] Right [] Left    
[] Chin Down: 
[] Thermal Sensitization: 
[] Supraglottic Swallow: 
[] Mendelson's Maneuver: 
[] Other: 
 
Results Dysphagia Present:    [] Yes  [x] No 
 
Ratings of Dysphagia:    [] Mild  [] Moderate [] Severe Stages of Breakdown:  
[] Oral      [] Oral Preparatory [] Pharyngeal   [] Esophageal 
 
Aspiration: [] Yes    [x] No  [] At Risk 
[] Trace (<10%) [] Significant (>10%):     % 
[] Penetration: Level of:   Cough: [] Yes      [] No 
 Consistency Aspirated: 
[] Thin Liquid   [] Nectar   [] Honey   [] Pureed     [] Mech-soft  [] Solid Motility Problems with: 
[] Lip Closure:  
[] Sucking:  
[x] Mastication:  
[] Bolus Formation:  
[] Bolus Control: 
[] A-P Transport: 
[] Posterior Tongue Elevation: 
[] Swallow Response (delayed): 
[] Velopharyngeal Closure: 
[] Laryngeal Elevation: 
[] Laryngeal Adduction: 
[] Cricopharyngeal Relaxation: 
[] Esophageal Peristalsis: 
[] Reflux: 
[] Other: 
 
Timing of Aspiration: 
[] Before Swallow: 
[] During Swallow: 
[] After Swallow: 
 
Transit Time Delay: 
[] >1 Second  Oral 
[] >1 Second Pharyngeal 
[] >20 Second Esophageal  
 
Residuals: 
[] Buccal Cavity [] Velum/posterior pharyngeal wall 
[] Valleculae 
[] Pyriforms Pain: 
 
8-point Penetration-Aspiration Scale Score:1 Clinical Judgement COMMUNICATION/EDUCATION:  
Patient receptive of education regarding MBS results and diet modifications. his 
 demonstrated Good understanding as evidenced by verbal understanding. Patient/family have participated as able in goal setting and plan of care. Thank you for this referral.  
Samir Henao M.S. [id number] [facility] Problem: Dysphagia (Adult) Goal: *Acute Goals and Plan of Care (Insert Text) Description: Speech Therapy Swallow Goals Initiated 3/8/2021 
-Patient will tolerate chopped diet with thin liquids without clinical indicators of aspiration given no cues within 5-7 day(s). [ ] Not met  [ ]  MET   [ Sandre Bety  [ ] Discontinue 
-Patient will tolerate PO trials without clinical indicators of aspiration given no cues within 5-7day(s). [ ] Not met  [ ]  MET   Azaelk ] Progressing  [ ] Quenten Less 
-Patient will participate in modified barium swallow study within 5-7 day(s). [ ] Not met  [ Ninoska Necessary  MET   [ ] Progressing  [ ] Quenten Less 
-Patient will demonstrate understanding of swallow safety precautions and aspiration precautions, diet recs with no cues within 5-7 day(s). [ ] Not met  [ ]  MET   [ Adri Gilmore  [ ] Brigitte Billy Outcome: Progressing Towards Goal

## 2021-03-10 NOTE — PROGRESS NOTES
Progress Note Patient: Stan Jimenez MRN: [id number]  SSN: [id number] YOB: 1945  Age: 77 y.o. Sex: male Admit Date: 3/4/2021 LOS: 6 days Subjective: Only complaining of generalized weakness and being sluggish Objective:  
 
Vitals:  
 03/09/21 1509 03/09/21 2003 03/10/21 0000 03/10/21 0400 BP: 136/67 137/70 (!) 144/77 Pulse: 69 74 77 Resp: 18 17 18 Temp: 97.4 °F (36.3 °C) 97.9 °F (36.6 °C) 97.7 °F (36.5 °C) SpO2: 95% 98% 98% Weight:    68.4 kg (150 lb 12.7 oz) Height:      
  
 
Intake and Output: 
Current Shift: No intake/output data recorded. Last three shifts: No intake/output data recorded. Physical Exam:  
General:  Alert, cooperative, no distress, appears stated age. Eyes:  Conjunctivae/corneas clear. PERRL, EOMs intact. Fundi benign Ears:  Normal TMs and external ear canals both ears. Nose: Nares normal. Septum midline. Mucosa normal. No drainage or sinus tenderness. Mouth/Throat: Lips, mucosa, and tongue normal. Teeth and gums normal.  
Neck: Supple, symmetrical, trachea midline, no adenopathy, thyroid: no enlargment/tenderness/nodules, no carotid bruit and no JVD. Back:   Symmetric, no curvature. ROM normal. No CVA tenderness. Lungs:   Clear to auscultation bilaterally. Heart:  Regular rate and rhythm, S1, S2 normal, no murmur, click, rub or gallop. Abdomen:   Soft, non-tender. Bowel sounds normal. No masses,  No organomegaly. Extremities: Extremities normal, atraumatic, no cyanosis or edema. Pulses: 2+ and symmetric all extremities. Skin: Skin color, texture, turgor normal. No rashes or lesions Lymph nodes: Cervical, supraclavicular, and axillary nodes normal.  
Neurologic: CNII-XII intact. Normal strength, sensation and reflexes throughout. Lab/Data Review: All lab results for the last 24 hours reviewed. Assessment:  
 
Principal Problem: 
  JEREMIAS (acute kidney injury) ([facility] [address] (2/8/2021) Active Problems: 
  CKD (chronic kidney disease) stage 3, GFR 30-59 ml/min (2/8/2021) Secondary hyperparathyroidism ([facility] [address] (2/8/2021) Vitamin D deficiency (2/8/2021) Essential hypertension (2/8/2021) Insulin-treated type 2 diabetes mellitus ([facility] [address] (2/8/2021) Metabolic acidosis, normal anion gap (NAG) (3/4/2021) UTI (urinary tract infection) (3/4/2021) Ro Recio is a 78-year-old -American male with: 1.  Heart failure with preserved EF. 2.  Hypertension with HHD. 3.  Diabetes mellitus. 4.  Ex-smoker. 5.  Degenerative joint disease 6. CKD. 7.  Rule out pneumonia 8.  Scalp abscess Plan: No further cardiac testing planned at this time. Okay to discharge from cardiac standpoint. I will be happy to see patient in 2 to 4 weeks after discharge or sooner if needed. Currently on hydralazine, nifedipine, pravastatin. Blood pressures well controlled Signed By: Ashtyn Stock MD   
 March 10, 2021

## 2021-03-10 NOTE — PROGRESS NOTES
Comprehensive Nutrition Assessment Type and Reason for Visit: Initial(?decreased PO intake) Nutrition Recommendations/Plan:  
 
Continue Diabetic/Chopped diet Add Ensure Compact BID (vanilla) - 440kcal, 18g pro Nursing to document %meal and supplement intakes in I/Os Obtain weekly measured wts Nutrition Assessment:  Admitted for abnormal labs reported from OP visit. Dx JEREMIAS, UTI, asp PNA. Nephrology managing closely for JEREMIAS on CKD III  noted worsened renal fx likely 2/2 poor oral intake. RD screened for LOS, concerned for poor PO. Ordered Diabetic diet with no intakes available per EMR to assess. SLP jose angel, recdiaz Chopped/thin  adjusted prior to visit. RD visualized 75% intake of lunch, pt reports eating eggs, toast and sausage at breakfast. Reports eating 50-75% of trays, agreeable to try some supplementation (vanilla). Pt is a vague historian, unable to detail intakes throughout admit. Labs: H/H: 8.2/26.3, K 3.4, BUN 34, Cr 2.60, , POC -212. Meds: 1/2NS +KCl 100mL/hr, zosyn, calcitriol, colace, D2, heparin, iron sucrose, Norco PRN. Malnutrition Assessment: 
Malnutrition Status:  Mild malnutrition Context:  Acute illness Findings of the 6 clinical characteristics of malnutrition:  
Energy Intake:  Mild decrease in energy intake (specify)(intakes 50-75% per pt, poor oral intake per MD notes) Weight Loss:  No significant weight loss Body Fat Loss:  No significant body fat loss, Muscle Mass Loss:  1 - Mild muscle mass loss, Clavicles (pectoralis & deltoids), Hand (interosseous) Fluid Accumulation:  No significant fluid accumulation,   
 
Estimated Daily Nutrient Needs: 
Energy (kcal): 1710kcal (25kcal/kg); Weight Used for Energy Requirements: Current Protein (g): 55g pro (0.8g/kg); Weight Used for Protein Requirements: Current Fluid (ml/day): 1710mL; Method Used for Fluid Requirements: 1 ml/kcal 
    Needs for wt maintenance/abscess healing vs CKD Nutrition Related Findings:  NFPE finding mild UE wasting. Pt appears very tall and thin. Pt endorsed some chewing difficulty d/t missing front teeth, has swallowing difficulty \"sometimes\" however denied coughing or choking on liquids, SLP following inpatient. Pt denied n/v or c/d. Reports BM this AM. No edema. Wounds:   
(scalp abscess s/p I&D) Current Nutrition Therapies: DIET DIABETIC CONSISTENT CARB Anthropometric Measures: 
· Height:  6' 2.02\" (188 cm) · Current Body Wt:  68.4 kg (150 lb 12.7 oz)(3/9) · Usual Body Wt:  68 kg (150 lb) · Ideal Body Wt:  190 lbs:  79.4 % · BMI Category:  Underweight (BMI less than 22) age over 73 Wt Readings from Last 5 Encounters:  
03/10/21 68.4 kg (150 lb 12.7 oz) 02/23/21 75.8 kg (167 lb) 01/31/21 68.9 kg (151 lb 14.4 oz) Appears pt wt stable per measured wts, wt from 2/23 appears incorrect and inconsistent with pt report. Nutrition Diagnosis: No nutrition diagnosis at this time Nutrition Interventions:  
Food and/or Nutrient Delivery: Continue current diet, Start oral nutrition supplement Nutrition Education and Counseling: No recommendations at this time Coordination of Nutrition Care: Continue to monitor while inpatient Goals: 
Meet >65% EENs x 5-7 days, Wt maintenance +/- 1kg while acutely ill, Lytes WNL, BG <180mg/dL Nutrition Monitoring and Evaluation:  
Behavioral-Environmental Outcomes: None identified Food/Nutrient Intake Outcomes: Food and nutrient intake, Supplement intake Physical Signs/Symptoms Outcomes: GI status, Weight, Skin, Biochemical data Discharge Planning:   
No discharge needs at this time Electronically signed by Jeff Ma on 3/10/2021 at 3:50 PM 
 
Contact: EXT 1913 or via [employer]

## 2021-03-10 NOTE — PROGRESS NOTES
Wound care complete by lilia oh and myself. Cleaned with NS, covered with aquacel, mepilex lite, and gauze roll

## 2021-03-10 NOTE — PROGRESS NOTES
Problem: Discharge Planning Goal: *Discharge to safe environment Outcome: Progressing Towards Goal 
Goal: *Knowledge of medication management Outcome: Progressing Towards Goal 
Goal: *Knowledge of discharge instructions Outcome: Progressing Towards Goal 
  
Problem: Patient Education: Go to Patient Education Activity Goal: Patient/Family Education Outcome: Progressing Towards Goal 
  
Problem: Falls - Risk of 
Goal: *Absence of Falls Description: Document Leafy Ash Fall Risk and appropriate interventions in the flowsheet. Outcome: Progressing Towards Goal 
Note: Fall Risk Interventions: 
  
 
Mentation Interventions: Adequate sleep, hydration, pain control, Door open when patient unattended Medication Interventions: Patient to call before getting OOB, Teach patient to arise slowly Problem: Patient Education: Go to Patient Education Activity Goal: Patient/Family Education Outcome: Progressing Towards Goal 
  
Problem: Patient Education: Go to Patient Education Activity Goal: Patient/Family Education Outcome: Progressing Towards Goal

## 2021-03-10 NOTE — PROGRESS NOTES
Hospitalist Progress Note Daily Progress Note: 3/10/2021 60-year-old gentleman presenting with chronic kidney disease diabetes mellitus hypertension earlier this month secondary to scalp abscess requiring IV antibiotics and surgical incision and drainage. Returns now is sent by nephrology with abnormal labs per Dr. David Schwarz. CO2 was 9 in the outpatient labs creatinine 3.8 with a baseline creatinine around 2.1. To here in the ER was 13, magnesium was 1.2 and he is admitted secondary to acute on chronic kidney injury as well as metabolic acidosis with normal anion gap and apparent UTI per urinalysis. Nephrology followed improvement in renal function 2/2 pre-renal azotemia. Uti with e.coli, pansensitive treated empirically from admission with ceftriaxone. C/o chronic sob, worse here with exertion, cxr f/u 33/8 shows new rt lung infiltrate, left cleared. Switching abx to zosyn which will cover possible aspiration and his uti and will get slp to eval. He is followed by cardiology for heart failure with preserved ef. Echo with ef greater than 70% and severe TR. TR cause of his chronic sob? May benefit from op pft's and pulmonology evaluation. Surgery and wound care following 2/2 scalp abscess debrided during his last admit. Surgery indicates no need for debridement now and rec cont local wound care. Subjective:  
Patient seen and examined, chart was reviewed, MBS noted and diet as per speech therapy started. His creatinine has slowly rising, IV fluids were started by nephrology yesterday we will monitor another creatinine tomorrow. His potassium is low. He has decreased p.o. intake has not been eating as well. He denies nausea, vomiting, diarrhea, chest pain shortness of breath to me at this time. Problem List: 
Problem List as of 3/10/2021 Date Reviewed: 2/8/2021 Codes Class Noted - Resolved Metabolic acidosis, normal anion gap (NAG) ICD-10-CM: U21.7 ICD-9-CM: 276.2  3/4/2021 - Present UTI (urinary tract infection) ICD-10-CM: N39.0 ICD-9-CM: 599.0  3/4/2021 - Present * (Principal) JEREMIAS (acute kidney injury) ([place] [address].) ICD-10-CM: N17.9 ICD-9-CM: 584.9  2/8/2021 - Present CKD (chronic kidney disease) stage 3, GFR 30-59 ml/min ICD-10-CM: N18.30 ICD-9-CM: 585.3  2/8/2021 - Present Secondary hyperparathyroidism ([place] [address].) ICD-10-CM: N25.81 ICD-9-CM: 588.81  2/8/2021 - Present Vitamin D deficiency ICD-10-CM: E55.9 ICD-9-CM: 268.9  2/8/2021 - Present Essential hypertension ICD-10-CM: I10 
ICD-9-CM: 401.9  2/8/2021 - Present Insulin-treated type 2 diabetes mellitus ([place] [address].) ICD-10-CM: E11.9, Z79.4 ICD-9-CM: 250.00, V58.67  2/8/2021 - Present Scalp abscess ICD-10-CM: L02.811 ICD-9-CM: 682.8  1/27/2021 - Present RESOLVED: Hypokalemia ICD-10-CM: E87.6 ICD-9-CM: 276.8  2/8/2021 - 2/8/2021 RESOLVED: Hypomagnesemia ICD-10-CM: M65.85 
ICD-9-CM: 275.2  2/8/2021 - 2/8/2021 RESOLVED: Hypocalcemia ICD-10-CM: E83.51 
ICD-9-CM: 275.41  2/8/2021 - 2/8/2021 Medications reviewed Current Facility-Administered Medications Medication Dose Route Frequency  heparin (porcine) injection 5,000 Units  5,000 Units SubCUTAneous Q8H  potassium chloride SR (KLOR-CON 10) tablet 40 mEq  40 mEq Oral ONCE  
 0.45% sodium chloride with KCl 20 mEq/L infusion   IntraVENous CONTINUOUS  piperacillin-tazobactam (ZOSYN) 3.375 g in 0.9% sodium chloride (MBP/ADV) 100 mL MBP  3.375 g IntraVENous Q8H  
 iron sucrose (VENOFER) injection 100 mg  100 mg IntraVENous Q24H  
 NIFEdipine ER (PROCARDIA XL) tablet 60 mg  60 mg Oral BID  
 HYDROcodone-acetaminophen (NORCO)  mg tablet 1 Tab  1 Tab Oral Q6H PRN  
 calcitRIOL (ROCALTROL) capsule 0.5 mcg  0.5 mcg Oral DAILY  calcium carbonate (TUMS) chewable tablet 400 mg [elemental]  400 mg Oral BID  docusate sodium (COLACE) capsule 100 mg  100 mg Oral BID  DULoxetine (CYMBALTA) capsule 60 mg  60 mg Oral DAILY  ergocalciferol capsule 50,000 Units  50,000 Units Oral Q7D  
 ferrous sulfate tablet 325 mg  325 mg Oral ACB  hydrALAZINE (APRESOLINE) tablet 50 mg  50 mg Oral TID  insulin glargine (LANTUS) injection 8 Units  8 Units SubCUTAneous QHS  pravastatin (PRAVACHOL) tablet 20 mg  20 mg Oral QHS  influenza vaccine 2020-21 (4 yrs+)(PF) (FLUCELVAX QUAD) injection 0.5 mL  0.5 mL IntraMUSCular PRIOR TO DISCHARGE  sodium chloride (NS) flush 5-40 mL  5-40 mL IntraVENous PRN  
 sodium chloride (NS) flush 5-40 mL  5-40 mL IntraVENous Q8H  
 sodium chloride (NS) flush 5-40 mL  5-40 mL IntraVENous PRN  
 acetaminophen (TYLENOL) tablet 650 mg  650 mg Oral Q6H PRN Or  
 acetaminophen (TYLENOL) suppository 650 mg  650 mg Rectal Q6H PRN  polyethylene glycol (MIRALAX) packet 17 g  17 g Oral DAILY PRN  promethazine (PHENERGAN) tablet 12.5 mg  12.5 mg Oral Q6H PRN Or  
 ondansetron (ZOFRAN) injection 4 mg  4 mg IntraVENous Q6H PRN Objective:  
Physical Exam:  
 
Visit Vitals BP (!) 148/78 (BP 1 Location: Right upper arm, BP Patient Position: At rest) Pulse 71 Temp 98.1 °F (36.7 °C) Resp 18 Ht 6' 2\" (1.88 m) Wt 68.4 kg (150 lb 12.7 oz) SpO2 98% BMI 19.36 kg/m² O2 Device: Room air Temp (24hrs), Av.8 °F (36.6 °C), Min:97.4 °F (36.3 °C), Max:98.1 °F (36.7 °C) No intake/output data recorded. No intake/output data recorded. General:    no distress, appears stated age. Head:   Scalp is wrapped in a Kerlix covering known prior occipital lesion. Dressing was not removed yet or visualized. Tenderness does not appear to be purulence on dressing. Eyes:  Conjunctivae/corneas clear. Neck: Supple, D. Lungs:    Symmetric expansion Abdomen:    Not distended Extremities: Extremities  Moves all. Pulses: symmetric all extremities.   
   
Neurologic:  No motor or sensory deficits.   
  
 
Data Review:  
   
Recent Days: 
Recent Labs 03/08/21 
0545 WBC 6.1 HGB 8.2* HCT 26.3*  
* Recent Labs  
  03/10/21 
0625 03/09/21 
1057 03/08/21 
0545  143 141  
K 3.4* 3.6 4.1 * 111* 111* CO2 22 23 23 * 170* 123* BUN 34* 37* 37* CREA 2.60* 2.71* 2.40* CA 8.6 8.6 8.4* MG  --  1.8 1.6 PHOS 3.2 3.5 3.2 ALB 2.6* 2.6* 2.6* No results for input(s): PH, PCO2, PO2, HCO3, FIO2 in the last 72 hours. 24 Hour Results: 
Recent Results (from the past 24 hour(s)) MAGNESIUM Collection Time: 03/09/21 10:57 AM  
Result Value Ref Range Magnesium 1.8 1.6 - 2.4 mg/dL RENAL FUNCTION PANEL Collection Time: 03/09/21 10:57 AM  
Result Value Ref Range Sodium 143 136 - 145 mmol/L Potassium 3.6 3.5 - 5.1 mmol/L Chloride 111 (H) 97 - 108 mmol/L  
 CO2 23 21 - 32 mmol/L Anion gap 9 5 - 15 mmol/L Glucose 170 (H) 65 - 100 mg/dL BUN 37 (H) 6 - 20 mg/dL Creatinine 2.71 (H) 0.70 - 1.30 mg/dL BUN/Creatinine ratio 14 12 - 20 GFR est AA 28 (L) >60 ml/min/1.73m2 GFR est non-AA 23 (L) >60 ml/min/1.73m2 Calcium 8.6 8.5 - 10.1 mg/dL Phosphorus 3.5 2.6 - 4.7 mg/dL Albumin 2.6 (L) 3.5 - 5.0 g/dL GLUCOSE, POC Collection Time: 03/09/21 11:30 AM  
Result Value Ref Range Glucose (POC) 180 (H) 65 - 100 mg/dL Performed by Dana Mendiola, POC Collection Time: 03/09/21  3:44 PM  
Result Value Ref Range Glucose (POC) 200 (H) 65 - 100 mg/dL Performed by Jaxon Godwin, POC Collection Time: 03/09/21  8:32 PM  
Result Value Ref Range Glucose (POC) 168 (H) 65 - 100 mg/dL Performed by Myla Runner RENAL FUNCTION PANEL Collection Time: 03/10/21  6:25 AM  
Result Value Ref Range Sodium 142 136 - 145 mmol/L Potassium 3.4 (L) 3.5 - 5.1 mmol/L Chloride 112 (H) 97 - 108 mmol/L  
 CO2 22 21 - 32 mmol/L Anion gap 8 5 - 15 mmol/L Glucose 125 (H) 65 - 100 mg/dL BUN 34 (H) 6 - 20 mg/dL Creatinine 2.60 (H) 0.70 - 1.30 mg/dL BUN/Creatinine ratio 13 12 - 20 GFR est AA 29 (L) >60 ml/min/1.73m2 GFR est non-AA 24 (L) >60 ml/min/1.73m2 Calcium 8.6 8.5 - 10.1 mg/dL Phosphorus 3.2 2.6 - 4.7 mg/dL Albumin 2.6 (L) 3.5 - 5.0 g/dL GLUCOSE, POC Collection Time: 03/10/21  7:52 AM  
Result Value Ref Range Glucose (POC) 124 (H) 65 - 100 mg/dL Performed by [employer] CXR Results  (Last 48 hours) 03/08/21 1133  XR CHEST PORT Final result Narrative:  1 view comparison March 4 New/increased patchy mixed infiltrate in the right mid lung. Left basilar  
atelectasis/infiltrate has cleared. The left hemidiaphragm remains elevated. Normal heart and mediastinum Assessment/Plan Patient Active Problem List  
Diagnosis Code  Scalp abscess L02.811  JEREMIAS (acute kidney injury) ([facility] [address] N17.9  CKD (chronic kidney disease) stage 3, GFR 30-59 ml/min N18.30  Secondary hyperparathyroidism ([address] N25.81  Vitamin D deficiency E55.9  Essential hypertension I10  
 Insulin-treated type 2 diabetes mellitus ([facility] [address] E11.9, Z79.4  Metabolic acidosis, normal anion gap (NAG) E87.2  
 UTI (urinary tract infection) N39.0  
   
 
-JEREMIAS on CKD 3, baseline creatinine around 2.1 presents with a creatinine of 3.8. No hydro on renal ultrasound. On IV fluids per renal restarted 3/9/ as crt went up  2. 6,  Will f/u renal recommendations. -Metabolic acidosis, normal anion gap, improving CO2 outpatient initially 9 now 22, bicarb stopped. Resolved 
 
-Uti , e coli on ceftriaxone from 3/4/21. Completed abx course with ceftriaxone 
 
-Pneumonia from aspitation: cxr 3/8 shows new rt lung infiltrate-  Diet as per SLP  Cont zosyn and transition t PO augmentin at d/c and repeat CXR 4-6 weeks as OP 
 
-Sinus odell- hr 48-60, held coreg, resolved, resume at lesser dose w/o recurrent odell.  Monitor for now 
 
-CHF with preserved ef, severe tr. C/o chronic sob, worse with exertion now and suspect element of pneumonia though chronically is it severe TR contributing or underlying lung issues. May benefit from op pft's/pulmonology follow up. Monitor clinically unable to use diuretics as renal failure 
 
- dm2, adequate control- a1c was 6.2 in 1/2021. BG stable 
 
-Admit 2/21 with scalp abscess status post I&D, wound care is following. Seen by Dr Chemo Cadena, no need for I&D and recommends continued local wound care. HHC on dc with wound care.  
 
-essential HTN, adequate control monitor BP 
 
- Hypokalemia replete K 
 
 
VTEP: Heparin subcu Full CODE STATUS confirmed with patient Surrogate is daughter Disposition:  home with home health care if medically stable 1--2 days This dictation was done by dragon, computer voice recognition software. Often unanticipated grammatical, syntax, phones and other interpretive errors are inadvertently transcribed. Please excuse errors that have escaped final proofreading.  
 
Devante Burk MD

## 2021-03-10 NOTE — PROGRESS NOTES
SPEECH LANGUAGE PATHOLOGY DYSPHAGIA TREATMENT Patient: Joel Cook (7 y.o. male) Date: 3/10/2021 Diagnosis: JEREMIAS (acute kidney injury) ([facility] [address] [N17.9] UTI (urinary tract infection) [N39.0] CKD (chronic kidney disease) stage 3, GFR 30-59 ml/min [N18.30] Metabolic acidosis, normal anion gap (NAG) [E87.2] Insulin-treated type 2 diabetes mellitus (HCC) [E11.9, Z79.4] JEREMIAS (acute kidney injury) ([facility] [address] Precautions: Aspiration ASSESSMENT: 
At this time patient does not present with significant dysphagia, however does benefit from compensatory swallow strategies to reduce the risk of aspiration. Administered thin liquids via straw and hard solid. Timely mastication and A-P transit. Swallow initiation timely. HLE and protraction adequate to palpation, however patient w/ increase pharyngeal discordination w/ consecutive sips of thin liquids. No overt clinical indicators of penetration/aspiration noted on all trails presented. Patient educated to take small, single sips due to increased WOB after consecutive swallows. PLAN: 
Recommendations and Planned Interventions: 
Continue with chopped diet, thin liquids. Aspiration precautions. Small, single sips w/ thin liquids. ST no longer warranted at this time. Please re consult as needed. SUBJECTIVE:  
Patient seen at bedside. Patient reported he is tolerating his chopped, thin liquid diet. OBJECTIVE:  
 
CXR Results  (Last 48 hours) None CT Results  (Last 48 hours) None Cognitive and Communication Status: 
Neurologic State: Alert Orientation Level: Oriented X4 Cognition: Appropriate decision making, Appropriate for age attention/concentration, Appropriate safety awareness, Follows commands Pain: 
Pain Scale 1: Numeric (0 - 10) Pain Intensity 1: 7 After treatment:  
Patient left in no apparent distress in bed COMMUNICATION/EDUCATION:  
Patient was educated regarding his deficit(s) of dysphagia, swallow safety precautions, diet recs and POC. He demonstrated Excellent understanding as evidenced by verbal understanding. Maria Guadalupe Valdez, SLP M.S. [id number] [facility] Time Calculation: 8 mins Student clinician, Jennifer Bernardo, treated and documented. Clinician supervised during treatment session and reviewed above documentation. Problem: Dysphagia (Adult) Goal: *Acute Goals and Plan of Care (Insert Text) Description: Speech Therapy Swallow Goals Initiated 3/8/2021 
-Patient will tolerate chopped diet with thin liquids without clinical indicators of aspiration given no cues within 5-7 day(s). [ ] Not met  [ Sienna Cast  MET   [ ] Progressing  [ ] Elnita Purchase 
-Patient will tolerate PO trials without clinical indicators of aspiration given no cues within 5-7day(s). [ ] Not met  [ Sienna Cast  MET   [] Progressing  [ ] Elnita Purchase 
-Patient will participate in modified barium swallow study within 5-7 day(s). [ ] Not met  [ Sienna Cast  MET   [ ] Progressing  [ ] Elnita Purchase 
-Patient will demonstrate understanding of swallow safety precautions and aspiration precautions, diet recs with no cues within 5-7 day(s). [ ] Not met  [ Sienna Cast  MET   [ ] Ge Tobin  [ ] Elnita Purchase Outcome: Resolved/Met

## 2021-03-10 NOTE — PROGRESS NOTES
LOS skin assessment performed by Jacque Baker RN and Iveth Wong RN. Patient has open wound on posterior head, covered with mepilex and gauze wrap. Skin tags noted on left shoulder and abdomen. Left big toe amputation, thick and discolored toenails noted on bilateral feet. All other skin clean, dry and intact.

## 2021-03-10 NOTE — PROGRESS NOTES
Problem: Discharge Planning Goal: *Discharge to safe environment Outcome: Progressing Towards Goal 
Goal: *Knowledge of medication management Outcome: Progressing Towards Goal 
Goal: *Knowledge of discharge instructions Outcome: Progressing Towards Goal 
  
Problem: Patient Education: Go to Patient Education Activity Goal: Patient/Family Education Outcome: Progressing Towards Goal 
  
Problem: Falls - Risk of 
Goal: *Absence of Falls Description: Document Rupinder Cabrera Fall Risk and appropriate interventions in the flowsheet. Outcome: Progressing Towards Goal 
Note: Fall Risk Interventions: 
  
 
Mentation Interventions: Door open when patient unattended Medication Interventions: Teach patient to arise slowly, Patient to call before getting OOB Problem: Patient Education: Go to Patient Education Activity Goal: Patient/Family Education Outcome: Progressing Towards Goal 
  
Problem: Patient Education: Go to Patient Education Activity Goal: Patient/Family Education Outcome: Progressing Towards Goal

## 2021-03-11 NOTE — PROGRESS NOTES
Problem: Discharge Planning Goal: *Discharge to safe environment Outcome: Progressing Towards Goal 
Goal: *Knowledge of medication management Outcome: Progressing Towards Goal 
Goal: *Knowledge of discharge instructions Outcome: Progressing Towards Goal 
  
Problem: Patient Education: Go to Patient Education Activity Goal: Patient/Family Education Outcome: Progressing Towards Goal 
  
Problem: Falls - Risk of 
Goal: *Absence of Falls Description: Document Abigail Reynolds Fall Risk and appropriate interventions in the flowsheet. Outcome: Progressing Towards Goal 
Note: Fall Risk Interventions: 
  
 
Mentation Interventions: Adequate sleep, hydration, pain control Medication Interventions: Bed/chair exit alarm Problem: Patient Education: Go to Patient Education Activity Goal: Patient/Family Education Outcome: Progressing Towards Goal 
  
Problem: Patient Education: Go to Patient Education Activity Goal: Patient/Family Education Outcome: Progressing Towards Goal 
  
Problem: Nutrition Deficit Goal: *Optimize nutritional status Outcome: Progressing Towards Goal

## 2021-03-11 NOTE — PROGRESS NOTES
CM reviewed clinical chart. Patient has been accepted with [facility] when medically stable for discharge.

## 2021-03-11 NOTE — PROGRESS NOTES
Renal Progress Note Patient: Natacha Ferguson MRN: [id number]  SSN: [id number] YOB: 1945  Age: 77 y.o. Sex: male Admit Date: 3/4/2021 LOS: 6 days Subjective:  
Patient seen in the room. Patient is alert and awake, confused, no new complaints today Creatinine at 2.6 today Current Facility-Administered Medications Medication Dose Route Frequency  heparin (porcine) injection 5,000 Units  5,000 Units SubCUTAneous Q8H  
 0.45% sodium chloride with KCl 20 mEq/L infusion   IntraVENous CONTINUOUS  piperacillin-tazobactam (ZOSYN) 3.375 g in 0.9% sodium chloride (MBP/ADV) 100 mL MBP  3.375 g IntraVENous Q8H  
 iron sucrose (VENOFER) injection 100 mg  100 mg IntraVENous Q24H  
 NIFEdipine ER (PROCARDIA XL) tablet 60 mg  60 mg Oral BID  
 HYDROcodone-acetaminophen (NORCO)  mg tablet 1 Tab  1 Tab Oral Q6H PRN  
 calcitRIOL (ROCALTROL) capsule 0.5 mcg  0.5 mcg Oral DAILY  calcium carbonate (TUMS) chewable tablet 400 mg [elemental]  400 mg Oral BID  docusate sodium (COLACE) capsule 100 mg  100 mg Oral BID  DULoxetine (CYMBALTA) capsule 60 mg  60 mg Oral DAILY  ergocalciferol capsule 50,000 Units  50,000 Units Oral Q7D  
 ferrous sulfate tablet 325 mg  325 mg Oral ACB  hydrALAZINE (APRESOLINE) tablet 50 mg  50 mg Oral TID  insulin glargine (LANTUS) injection 8 Units  8 Units SubCUTAneous QHS  pravastatin (PRAVACHOL) tablet 20 mg  20 mg Oral QHS  influenza vaccine 2020-21 (4 yrs+)(PF) (FLUCELVAX QUAD) injection 0.5 mL  0.5 mL IntraMUSCular PRIOR TO DISCHARGE  sodium chloride (NS) flush 5-40 mL  5-40 mL IntraVENous PRN  
 sodium chloride (NS) flush 5-40 mL  5-40 mL IntraVENous Q8H  
 sodium chloride (NS) flush 5-40 mL  5-40 mL IntraVENous PRN  
 acetaminophen (TYLENOL) tablet 650 mg  650 mg Oral Q6H PRN Or  
 acetaminophen (TYLENOL) suppository 650 mg  650 mg Rectal Q6H PRN  polyethylene glycol (MIRALAX) packet 17 g  17 g Oral DAILY PRN  
 promethazine (PHENERGAN) tablet 12.5 mg  12.5 mg Oral Q6H PRN Or  
 ondansetron (ZOFRAN) injection 4 mg  4 mg IntraVENous Q6H PRN Vitals:  
 03/10/21 0753 03/10/21 1220 03/10/21 1428 03/10/21 1931 BP: (!) 148/78  (!) 149/75 (!) 161/81 Pulse: 71  68 75 Resp: 18  18 18 Temp: 98.1 °F (36.7 °C)  98.1 °F (36.7 °C) 98 °F (36.7 °C) SpO2:   96% 95% Weight:      
Height:  6' 2.02\" (1.88 m) Objective:  
General: alert awake , no acute distress. HEENT: EOMI, no Icterus, no Pallor, pupils reactive, mucosa moist, Neck: Neck is supple, No JVD, Lungs: breathsounds normal, no rhonchi, no rales, CVS: heart sounds normal,  no murmurs, no rubs. GI: soft, nontender, normal BS, Extremeties: no clubbing, no cyanosis, no edema, Neuro: Alert, awake, slow to respond, speech is normal, some confusion present , moving all extremeties well. Skin: normal skin turgor, scalp area dressing present Intake and Output: 
Current Shift: No intake/output data recorded. Last three shifts: No intake/output data recorded. Lab/Data Review: 
Recent Labs 03/08/21 
0545 WBC 6.1 HGB 8.2* HCT 26.3*  
* Recent Labs  
  03/10/21 
0625 03/09/21 
1057 03/08/21 
0545  143 141  
K 3.4* 3.6 4.1 * 111* 111* CO2 22 23 23 * 170* 123* BUN 34* 37* 37* CREA 2.60* 2.71* 2.40* CA 8.6 8.6 8.4* MG  --  1.8 1.6 PHOS 3.2 3.5 3.2 ALB 2.6* 2.6* 2.6* No results for input(s): PH, PCO2, PO2, HCO3, FIO2 in the last 72 hours. Recent Results (from the past 24 hour(s)) RENAL FUNCTION PANEL Collection Time: 03/10/21  6:25 AM  
Result Value Ref Range Sodium 142 136 - 145 mmol/L Potassium 3.4 (L) 3.5 - 5.1 mmol/L Chloride 112 (H) 97 - 108 mmol/L  
 CO2 22 21 - 32 mmol/L Anion gap 8 5 - 15 mmol/L Glucose 125 (H) 65 - 100 mg/dL BUN 34 (H) 6 - 20 mg/dL Creatinine 2.60 (H) 0.70 - 1.30 mg/dL BUN/Creatinine ratio 13 12 - 20  GFR est AA 29 (L) >60 ml/min/1.73m2 GFR est non-AA 24 (L) >60 ml/min/1.73m2 Calcium 8.6 8.5 - 10.1 mg/dL Phosphorus 3.2 2.6 - 4.7 mg/dL Albumin 2.6 (L) 3.5 - 5.0 g/dL GLUCOSE, POC Collection Time: 03/10/21  7:52 AM  
Result Value Ref Range Glucose (POC) 124 (H) 65 - 100 mg/dL Performed by Placido, POC Collection Time: 03/10/21 10:50 AM  
Result Value Ref Range Glucose (POC) 212 (H) 65 - 100 mg/dL Performed by [address], POC Collection Time: 03/10/21  4:18 PM  
Result Value Ref Range Glucose (POC) 199 (H) 65 - 100 mg/dL Performed by Placido, POC Collection Time: 03/10/21  7:34 PM  
Result Value Ref Range Glucose (POC) 212 (H) 65 - 100 mg/dL Performed by Janee Mac Assessment and Plan: 1. Acute Kidney Injury on CKD IIIB: 
-2/2  prerenal azotemia  
-Cr 3.8 which is improved to 2.1-- 2.4--2.7--2.6 today 
-Renal US  No hydro Worsening renal functions are probably related to poor p.o. intake and dehydration Recommend to give IV hydration with half-normal saline at 100 mL/h Repeat renal functions tomorrow 2. Metabolic acidosis, NAG: 
-Resolved now 3-Anemia:-Hb 8.2 
-Iron deficiency present, recommend to add Venofer IV 
-Epo sq weekly, monitor H&H 4.  Renal osteodystrophy:  
-Calcium and phosphorus are stable 5. Hypokalemia: continue kcl in IVF, repeat K tomorrow 6. Hypertension: Well-controlled today 
-Continue current medications and monitor blood pressure Signed By: Laci Heredia MD   
 March 10, 2021

## 2021-03-11 NOTE — DISCHARGE INSTRUCTIONS

## 2021-03-11 NOTE — DISCHARGE SUMMARY
Hospitalist Discharge Summary Patient ID: 
Vanessa Gama [id number] 
66 y.o. 
1945 
3/4/2021 PCP on record: Matt Bolton MD 
 
Admit date: 3/4/2021 Discharge date and time: 3/11/2021 DISCHARGE DIAGNOSIS: 
 
Acute on chronic kidney disease stage III, normal anion gap metabolic acidosis, aspiration pneumonia, sinus bradycardia, hypokalemia, CHF with preserved ejection fraction CONSULTATIONS: 
IP CONSULT TO NEPHROLOGY 
IP CONSULT TO NEPHROLOGY 
IP CONSULT TO CARDIOLOGY 
IP CONSULT TO GENERAL SURGERY Excerpted HPI from H&P of Tommy Marcus MD: 
Vanessa Gama is a 77 y.o. male who presents with chronic kidney disease, diabetes mellitus, hypertension, here earlier this month secondary to a scalp abscess requiring IV antibiotics and surgical incision and drainage. He was discharged home with home health. States that his scalp lesion is better and has continued daily wound care with family. Has been following up with nephrology and had recent labs and was called today and told to  come to the emergency department as of abnormal labs. He states that he is feeling at his baseline other than some mild fatigue. Denies any fevers, chills, sore throat, exposure to Covid. He states that he has had some shortness of breath which has been chronic and no change recently. He did have a CHF exacerbation at his last visit and has preserved EF. Today, his labs reveal a creatinine of 3.8 when he was discharged on 2/10/2021 his last creatinine was 2.73. GFR now 16 and his magnesium is 1.2. Metabolic acidosis with normal anion gap is seen now. 2 is 13. Urine analysis suggest UTI. Patient denies any retention symptoms and states he was voiding adequately at home. We are asked to admit for further management of his acute on chronic renal failure as well as metabolic acidosis and apparent UTI. 
______________________________________________________________________ DISCHARGE SUMMARY/HOSPITAL COURSE: for full details see H&P, daily progress notes, labs, consult notes. Patient was subsequently admitted to [facility] with a diagnosis of acute on chronic kidney disease stage III as well as normal anion gap metabolic acidosis as well as aspiration pneumonia as well as having found to have sinus bradycardia, of note patient received IV fluids following which patient serum creatinine improved, patient was evaluated by nephrology during the course of the admission, patient's non-anion gap metabolic acidosis subsequently resolved, of note patient was placed on IV Zosyn for aspiration pneumonia, patient was found to have sinus bradycardia subsequent to which patient's Coreg was held, patient was evaluated by cardiology following which as patient's clinical status improved overall patient was deemed stable for discharge on oral antibiotics with home with home health with close outpatient follow-up with primary care physician as well as cardiology as well as nephrology as well as general surgery, the plan was explained to the patient who is agreeable with current plan. 
 
 
 
_______________________________________________________________________ Patient seen and examined by me on discharge day. Pertinent Findings: 
Gen:    Not in distress Chest: Decreased air entry in right lower lung zone, rest of lungs clear CVS:   Regular rhythm, s1/s2 no m/r/g  No edema Abd:  Soft, not distended, not tender Neuro:  Alert, Oriented x 4 
_______________________________________________________________________ DISCHARGE MEDICATIONS:  
Current Discharge Medication List  
  
START taking these medications Details  
insulin glargine (LANTUS) 100 unit/mL injection Take 8 units every day at bedtime 
Qty: 1 Vial, Refills: 0  
  
amoxicillin-clavulanate (Augmentin) 875-125 mg per tablet Take 1 Tab by mouth every twelve (12) hours. Qty: 14 Tab, Refills: 0 CONTINUE these medications which have CHANGED Details  
calcium carbonate (TUMS) 200 mg calcium (500 mg) chew Take 2 Tabs by mouth two (2) times a day for 30 days. Qty: 120 Tab, Refills: 0 DULoxetine (CYMBALTA) 60 mg capsule Take 1 Cap by mouth daily. Qty: 30 Cap, Refills: 0  
  
hydrALAZINE (APRESOLINE) 50 mg tablet Take 1 Tab by mouth three (3) times daily. Qty: 90 Tab, Refills: 0  
  
NIFEdipine ER (PROCARDIA XL) 60 mg ER tablet Take 1 Tab by mouth two (2) times a day. Qty: 60 Tab, Refills: 0 CONTINUE these medications which have NOT CHANGED Details  
silver sulfADIAZINE (SILVADENE) 1 % topical cream Apply  to affected area daily. Apply to scalp wound daily with dressing changes Qty: 50 g, Refills: 1 Associated Diagnoses: Scalp abscess  
  
calcitRIOL (ROCALTROL) 0.5 mcg capsule Take 1 Cap by mouth daily for 30 days. Qty: 30 Cap, Refills: 0  
  
ergocalciferol (ERGOCALCIFEROL) 1,250 mcg (50,000 unit) capsule Take 1 Cap by mouth every seven (7) days for 30 days. Qty: 4 Cap, Refills: 0  
  
docusate sodium (Colace) 100 mg capsule Take 1 Cap by mouth two (2) times a day for 90 days. Qty: 60 Cap, Refills: 2  
  
ferrous sulfate 325 mg (65 mg iron) tablet Take 325 mg by mouth Daily (before breakfast). sodium bicarbonate 650 mg tablet Take 650 mg by mouth two (2) times a day. pravastatin (PRAVACHOL) 20 mg tablet Take 20 mg by mouth daily. STOP taking these medications  
  
 oxyCODONE-acetaminophen (PERCOCET) 5-325 mg per tablet Comments:  
Reason for Stopping:   
   
 furosemide (Lasix) 20 mg tablet Comments:  
Reason for Stopping:   
   
 potassium chloride (K-DUR, KLOR-CON) 20 mEq tablet Comments:  
Reason for Stopping:   
   
 carvediloL (COREG) 12.5 mg tablet Comments:  
Reason for Stopping:   
   
 insulin glargine (Lantus Solostar U-100 Insulin) 100 unit/mL (3 mL) inpn Comments:  
Reason for Stopping:   
   
  
 
 
 
Patient Follow Up Instructions: Activity: Activity as tolerated Diet: Diabetic Diet Wound Care: As directed Follow-up with PCP/Nephrology/Cardiology/General surgery in 1 week. Follow-up tests/labs As per above physicians Follow-up Information Follow up With Specialties Details Why Contact Sidra Sosa MD Internal Medicine On 3/15/2021 At 11:00am [address] [facility] [address] [facility] [address] 
[phone] Harlan Velásquez MD Nephrology In 1 week  [address] [facility] [address] [facility] [address] 
[phone] Janice Rider MD Cardiology, Cardio Vascular Surgery In 5 weeks  [place] [id number] [phone] 
  
  
 
________________________________________________________________ Risk of deterioration: Low 
 
Condition at Discharge:  Stable 
__________________________________________________________________ Disposition Home with family and home health services 
 
____________________________________________________________________ Code Status: Full Code 
___________________________________________________________________ Total time in minutes spent coordinating this discharge (includes going over instructions, follow-up, prescriptions, and preparing report for sign off to her PCP) :  40 minutes Signed:  
Adriana Mcdonald MD

## 2021-03-11 NOTE — PROGRESS NOTES
I spoke with the pt at the bedside regarding the plan to discharge home with home health [facility]), he is satisfied with that discharge plan. I made a phone call to Anup Jacobo (daughter), she is aware that her father is being discharged home with home health as well, I told her that the pt would be ready to go around 2:30 p.m., she said that a nephew Alpesh Tobar) would be coming to pick the pt up. Discharge plan of care/case management plan validated with provider discharge order.

## 2021-03-11 NOTE — PROGRESS NOTES
Progress Note Patient: Caitlin Rodriguez MRN: [id number]  SSN: [id number] YOB: 1945  Age: 77 y.o. Sex: male Admit Date: 3/4/2021 LOS: 7 days Subjective:  
Patient seen in bed No complaints this a.m. Objective:  
 
Vitals:  
 03/10/21 1428 03/10/21 1931 03/10/21 2346 03/11/21 0400 BP: (!) 149/75 (!) 161/81 (!) 166/89 Pulse: 68 75 70 Resp: 18 18 19 Temp: 98.1 °F (36.7 °C) 98 °F (36.7 °C) 97.9 °F (36.6 °C) SpO2: 96% 95% 100% Weight:    150 lb 12.7 oz (68.4 kg) Height:      
  
 
Intake and Output: 
Current Shift: No intake/output data recorded. Last three shifts: No intake/output data recorded. Review of Systems: ROS Physical Exam:  
Physical Exam 
HENT:  
   Head:  
 
   Comments: Wound posterior scalp clean superior aspect there is a eschar present Lab/Data Review: 
Recent Results (from the past 24 hour(s)) GLUCOSE, POC Collection Time: 03/10/21  7:52 AM  
Result Value Ref Range Glucose (POC) 124 (H) 65 - 100 mg/dL Performed by Placido, POC Collection Time: 03/10/21 10:50 AM  
Result Value Ref Range Glucose (POC) 212 (H) 65 - 100 mg/dL Performed by [address], POC Collection Time: 03/10/21  4:18 PM  
Result Value Ref Range Glucose (POC) 199 (H) 65 - 100 mg/dL Performed by Placido, POC Collection Time: 03/10/21  7:34 PM  
Result Value Ref Range Glucose (POC) 212 (H) 65 - 100 mg/dL Performed by Tiffany Don RENAL FUNCTION PANEL Collection Time: 03/11/21  6:00 AM  
Result Value Ref Range Sodium 142 136 - 145 mmol/L Potassium 3.7 3.5 - 5.1 mmol/L Chloride 113 (H) 97 - 108 mmol/L  
 CO2 21 21 - 32 mmol/L Anion gap 8 5 - 15 mmol/L Glucose 121 (H) 65 - 100 mg/dL BUN 28 (H) 6 - 20 mg/dL Creatinine 2.42 (H) 0.70 - 1.30 mg/dL BUN/Creatinine ratio 12 12 - 20 GFR est AA 32 (L) >60 ml/min/1.73m2  GFR est non-AA 26 (L) >60 ml/min/1.73m2 Calcium 8.7 8.5 - 10.1 mg/dL Phosphorus 2.9 2.6 - 4.7 mg/dL Albumin 2.6 (L) 3.5 - 5.0 g/dL Assessment:  
 
Principal Problem: 
  JEREMIAS (acute kidney injury) ([facility] [address] (2/8/2021) Active Problems: 
  CKD (chronic kidney disease) stage 3, GFR 30-59 ml/min (2/8/2021) Secondary hyperparathyroidism ([address] (2/8/2021) Vitamin D deficiency (2/8/2021) Essential hypertension (2/8/2021) Insulin-treated type 2 diabetes mellitus ([address] (2/8/2021) Metabolic acidosis, normal anion gap (NAG) (3/4/2021) UTI (urinary tract infection) (3/4/2021) Plan:  
Continue twice daily Medihoney dressings to posterior scalp wound Signed By: Naty Saunders MD   
 March 11, 2021

## 2021-03-11 NOTE — PROGRESS NOTES
Patient has been set up with [facility] at this time. CM has sent [place] order and DC summary to [address] for initiation of services. IMM given to patient at bedside. CM and Primary nurse have completed discharge checklist. Discharge plan of care/case management plan validated with provider discharge order.

## 2021-04-07 NOTE — PROGRESS NOTES
OFFICE VISIT NOTE Juan Luis Arredondo is a 77 y.o. male who presents to the office today for: 
 
No chief complaint on file. Mr. Savanna Velazquez comes in today for follow-up of his posterior scalp abscess. He has no complaints today. Incision and drainage and debridement of his posterior scalp on February 5, 2021. After that he was hospitalized back in the beginning of March again. Today he has no complaints. He denies any drainage from the site. He denies any pain. Past Medical History:  
Diagnosis Date  CKD (chronic kidney disease) stage 3, GFR 30-59 ml/min ([facility]) 2/8/2021  Diabetes ([facility] [address]  Essential hypertension 2/8/2021  Hypertension  Insulin-treated type 2 diabetes mellitus ([facility] [address] 2/8/2021  Secondary hyperparathyroidism ([facility] [address] 2/8/2021  Vitamin D deficiency 2/8/2021 Past Surgical History:  
Procedure Laterality Date  HX ORTHOPAEDIC    
 amputation left big toe Family History Problem Relation Age of Onset  Diabetes Mother  Hypertension Mother  Cancer Mother  Diabetes Father  Hypertension Father  Cancer Father Social History Socioeconomic History  Marital status: SINGLE Spouse name: Not on file  Number of children: Not on file  Years of education: Not on file  Highest education level: Not on file Occupational History  Not on file Social Needs  Financial resource strain: Not on file  Food insecurity Worry: Not on file Inability: Not on file  Transportation needs Medical: Not on file Non-medical: Not on file Tobacco Use  Smoking status: Never Smoker  Smokeless tobacco: Never Used Substance and Sexual Activity  Alcohol use: Yes Comment: rarely  Drug use: Never  Sexual activity: Not on file Lifestyle  Physical activity Days per week: Not on file Minutes per session: Not on file  Stress: Not on file Relationships  Social connections Talks on phone: Not on file Gets together: Not on file Attends [employer] service: Not on file Active member of club or organization: Not on file Attends meetings of clubs or organizations: Not on file Relationship status: Not on file  Intimate partner violence Fear of current or ex partner: Not on file Emotionally abused: Not on file Physically abused: Not on file Forced sexual activity: Not on file Other Topics Concern  Not on file Social History Narrative  Not on file No Known Allergies Current Outpatient Medications Medication Sig  
 calcium carbonate (TUMS) 200 mg calcium (500 mg) chew Take 2 Tabs by mouth two (2) times a day for 30 days.  DULoxetine (CYMBALTA) 60 mg capsule Take 1 Cap by mouth daily.  hydrALAZINE (APRESOLINE) 50 mg tablet Take 1 Tab by mouth three (3) times daily.  insulin glargine (LANTUS) 100 unit/mL injection Take 8 units every day at bedtime  NIFEdipine ER (PROCARDIA XL) 60 mg ER tablet Take 1 Tab by mouth two (2) times a day.  docusate sodium (Colace) 100 mg capsule Take 1 Cap by mouth two (2) times a day for 90 days.  ferrous sulfate 325 mg (65 mg iron) tablet Take 325 mg by mouth Daily (before breakfast).  sodium bicarbonate 650 mg tablet Take 650 mg by mouth two (2) times a day.  pravastatin (PRAVACHOL) 20 mg tablet Take 20 mg by mouth daily.  amoxicillin-clavulanate (Augmentin) 875-125 mg per tablet Take 1 Tab by mouth every twelve (12) hours.  silver sulfADIAZINE (SILVADENE) 1 % topical cream Apply  to affected area daily. Apply to scalp wound daily with dressing changes No current facility-administered medications for this visit. Review of Systems All other systems reviewed and are negative.  
 
 
BP (!) 140/58 (BP 1 Location: Left upper arm, BP Patient Position: Sitting, BP Cuff Size: Adult)   Pulse 67   Temp 98.9 °F (37.2 °C) (Temporal)   Resp 20   Ht 6' 2\" (1.88 m)   Wt 164 lb 6.4 oz (74.6 kg)   SpO2 94%   BMI 21.11 kg/m² Physical Exam 
HENT:  
   Head:  
   Comments: On examination he has almost completely healed the area of debridement. There is no undrained collections. I have expressed nothing on palpation. Problem List Items Addressed This Visit Integumentary Scalp abscess - Primary Assessment and Plan: 
I am pleased to see that Mr. Brit Rai is doing well. He has almost completely healed his scalp abscess. He will follow-up with me on an as-needed basis.  
 
 
 
 
 
Mary Dupont MD

## 2021-07-20 NOTE — ED NOTES
Assumed care of patient. Pt resting on stretcher moaning and has garbled speech hard to understand. Pt has  adult female at bedside. Will continue to monitor.

## 2021-07-20 NOTE — ED PROVIDER NOTES
EMERGENCY DEPARTMENT HISTORY AND PHYSICAL EXAM      Date: 7/20/2021  Patient Name: Alie Hennessy      History of Presenting Illness     Chief Complaint   Patient presents with    Altered mental status       History Provided By: Patient, Patient's Daughter and Patient's Sister    HPI: Alie Hennessy, 77 y.o. male with a past medical history significant diabetes, hypertension and renal insufficiency presents to the ED with cc of with altered mental status. Patient was noted to be normal at 7 PM yesterday. He was noted to have facial drooping yesterday and today and have generalized weakness. Upon arrival to the emergency department, patient was evaluated and was found to have altered mental status. History was very limited and was obtained through his daughter and sister. They were unable to provide any additional history. There are no other complaints, changes, or physical findings at this time. PCP: Etienne Alicea MD    Current Outpatient Medications   Medication Sig Dispense Refill    glipiZIDE (GLUCOTROL) 5 mg tablet Take 5 mg by mouth two (2) times a day. Last filled 6/21/21 for #60      DULoxetine (CYMBALTA) 60 mg capsule Take 1 Cap by mouth daily. 30 Cap 0    hydrALAZINE (APRESOLINE) 50 mg tablet Take 1 Tab by mouth three (3) times daily. 90 Tab 0    insulin glargine (LANTUS) 100 unit/mL injection Take 8 units every day at bedtime 1 Vial 0    amoxicillin-clavulanate (Augmentin) 875-125 mg per tablet Take 1 Tab by mouth every twelve (12) hours. 14 Tab 0    NIFEdipine ER (PROCARDIA XL) 60 mg ER tablet Take 1 Tab by mouth two (2) times a day. (Patient taking differently: Take 30 mg by mouth daily. Last filled 5/21 for nifedipine ER 30mg 1QD #30) 60 Tab 0    silver sulfADIAZINE (SILVADENE) 1 % topical cream Apply  to affected area daily. Apply to scalp wound daily with dressing changes 50 g 1    ferrous sulfate 325 mg (65 mg iron) tablet Take 325 mg by mouth Daily (before breakfast).       sodium bicarbonate 650 mg tablet Take 650 mg by mouth two (2) times a day.  pravastatin (PRAVACHOL) 20 mg tablet Take 20 mg by mouth daily.        Facility-Administered Medications Ordered in Other Encounters   Medication Dose Route Frequency Provider Last Rate Last Admin    DULoxetine (CYMBALTA) capsule 60 mg  60 mg Oral DAILY Kellie Kelley MD        ferrous sulfate tablet 325 mg  325 mg Oral ACB Kellie Kelley MD        hydrALAZINE (APRESOLINE) tablet 50 mg  50 mg Oral TID Kellie Kelley MD        NIFEdipine ER (PROCARDIA XL) tablet 60 mg  60 mg Oral BID Kellie Kelley MD        pravastatin (PRAVACHOL) tablet 20 mg  20 mg Oral QHS Kellie Kelley MD        sodium bicarbonate tablet 650 mg  650 mg Oral BID Kellie Kelley MD        insulin glargine (LANTUS) injection 8 Units  8 Units SubCUTAneous QHS Kellie Kelley MD        sodium chloride (NS) flush 5-40 mL  5-40 mL IntraVENous Q8H Kellie Kelley MD        sodium chloride (NS) flush 5-40 mL  5-40 mL IntraVENous PRN Kellie Kelley MD        acetaminophen (TYLENOL) tablet 650 mg  650 mg Oral Q6H PRN Kellie Kelley MD        Or    acetaminophen (TYLENOL) suppository 650 mg  650 mg Rectal Q6H PRN Kellie Kelley MD        polyethylene glycol (MIRALAX) packet 17 g  17 g Oral DAILY PRN Kellie Kelley MD        ondansetron (ZOFRAN ODT) tablet 4 mg  4 mg Oral Q8H PRN Kellie Kelley MD        Or    ondansetron (ZOFRAN) injection 4 mg  4 mg IntraVENous Q6H PRN Kellie Kelley MD        heparin (porcine) injection 5,000 Units  5,000 Units SubCUTAneous Q8H Kellie Kelley MD        insulin lispro (HUMALOG) injection   SubCUTAneous AC&HS Kellie Kelley MD        glucose chewable tablet 16 g  4 Tablet Oral PRN Kellie Kelley MD        dextrose (D50W) injection syrg 12.5-25 g  12.5-25 g IntraVENous PRN Kellie Kelley MD        glucagon (GLUCAGEN) injection 1 mg  1 mg IntraMUSCular PRN Elsa Holt MD        L.acidophilus-paracasei-S.thermophil-bifidobacter (RISAQUAD) 8 billion cell capsule  1 Capsule Oral DAILY Kellie Kelley MD        doxycycline (VIBRAMYCIN) 100 mg in 0.9% sodium chloride (MBP/ADV) 100 mL MBP  100 mg IntraVENous Q12H Kellie Kelley  mL/hr at 07/21/21 1239 100 mg at 07/21/21 1239    cefTRIAXone (ROCEPHIN) 1 g in 0.9% sodium chloride (MBP/ADV) 50 mL MBP  1 g IntraVENous Q24H Kellie Kelley  mL/hr at 07/21/21 0943 1 g at 07/21/21 0943    calcium acetate (phosphate binder) (PHOSLO) tablet 667 mg  1 Tablet Oral TID WITH MEALS Santos Arenas MD           Past History     Past Medical History:  Past Medical History:   Diagnosis Date    CKD (chronic kidney disease) stage 3, GFR 30-59 ml/min ([address] 2/8/2021    Diabetes [facility]     Essential hypertension 2/8/2021    Hypertension     Insulin-treated type 2 diabetes mellitus ([address] 2/8/2021    Secondary hyperparathyroidism ([facility] [address] 2/8/2021    Vitamin D deficiency 2/8/2021       Past Surgical History:  Past Surgical History:   Procedure Laterality Date    HX ORTHOPAEDIC      amputation left big toe       Family History:  Family History   Problem Relation Age of Onset    Diabetes Mother     Hypertension Mother     Cancer Mother     Diabetes Father     Hypertension Father     Cancer Father        Social History:  Social History     Tobacco Use    Smoking status: Never Smoker    Smokeless tobacco: Never Used   Vaping Use    Vaping Use: Never used   Substance Use Topics    Alcohol use: Yes     Comment: rarely    Drug use: Never       Allergies:  No Known Allergies      Review of Systems     Review of Systems   Unable to perform ROS: Mental status change       Physical Exam     Physical Exam  Vitals and nursing note reviewed. Constitutional:       Comments: Alert and intermittently following commands. HENT:      Head: Normocephalic and atraumatic. Eyes:      Extraocular Movements: Extraocular movements intact. Cardiovascular:      Rate and Rhythm: Normal rate and regular rhythm. Pulmonary:      Comments: Mild tachypnea and basal rales   Abdominal:      Palpations: Abdomen is soft. Tenderness: There is no abdominal tenderness. Musculoskeletal:         General: No swelling or tenderness. Cervical back: Normal range of motion and neck supple. Neurological:      Mental Status: He is alert. He is confused. GCS: GCS eye subscore is 4. GCS verbal subscore is 3. GCS motor subscore is 5. Motor: Weakness (Globally weak effort) present. Comments:  And was limited due to patient confusion and cooperation         Lab and Diagnostic Study Results     Labs -     Recent Results (from the past 12 hour(s))   METABOLIC PANEL, BASIC    Collection Time: 07/21/21  3:17 AM   Result Value Ref Range    Sodium 147 (H) 136 - 145 mmol/L    Potassium 4.5 3.5 - 5.1 mmol/L    Chloride 124 (H) 97 - 108 mmol/L    CO2 8 (LL) 21 - 32 mmol/L    Anion gap 15 5 - 15 mmol/L    Glucose 169 (H) 65 - 100 mg/dL     (H) 6 - 20 MG/DL    Creatinine 7.41 (H) 0.70 - 1.30 MG/DL    BUN/Creatinine ratio 14 12 - 20      GFR est AA 9 (L) >60 ml/min/1.73m2    GFR est non-AA 7 (L) >60 ml/min/1.73m2    Calcium 7.1 (L) 8.5 - 10.1 MG/DL   PHOSPHORUS    Collection Time: 07/21/21  3:17 AM   Result Value Ref Range    Phosphorus 7.1 (H) 2.6 - 4.7 MG/DL   MAGNESIUM    Collection Time: 07/21/21  3:17 AM   Result Value Ref Range    Magnesium 1.4 (L) 1.6 - 2.4 mg/dL   EKG, 12 LEAD, INITIAL    Collection Time: 07/21/21  3:18 AM   Result Value Ref Range    Ventricular Rate 72 BPM    Atrial Rate 75 BPM    QRS Duration 92 ms    Q-T Interval 438 ms    QTC Calculation (Bezet) 479 ms    Calculated R Axis 25 degrees    Calculated T Axis 104 degrees    Diagnosis       Accelerated Junctional rhythm  ST & T wave abnormality, consider lateral ischemia  No previous ECGs available  Confirmed by Best Guallpa ([id number]) on 7/21/2021 9:57:21 AM     POC G3 - PUL Collection Time: 07/21/21  3:44 AM   Result Value Ref Range    FIO2 (POC) 2 %    pH (POC) 7.13 (LL) 7.35 - 7.45      pCO2 (POC) 23.2 (L) 35.0 - 45.0 MMHG    pO2 (POC) 57 (L) 80 - 100 MMHG    HCO3 (POC) 7.6 (L) 22 - 26 MMOL/L    sO2 (POC) 80.3 (L) 92 - 97 %    Base deficit (POC) 19.9 mmol/L    Site RIGHT RADIAL      Device: NASAL CANNULA      Allens test (POC) Positive      Specimen type (POC) ARTERIAL      Critical value read back Mook Haider MD    URINALYSIS W/ RFLX MICROSCOPIC    Collection Time: 07/21/21  6:25 AM   Result Value Ref Range    Color YELLOW      Appearance TURBID (A) CLEAR      Specific gravity 1.009 1.003 - 1.030      pH (UA) 5.0 5.0 - 8.0      Protein 100 (A) NEG mg/dL    Glucose Negative NEG mg/dL    Ketone Negative NEG mg/dL    Bilirubin Negative NEG      Blood LARGE (A) NEG      Urobilinogen 0.2 0.2 - 1.0 EU/dL    Nitrites Negative NEG      Leukocyte Esterase LARGE (A) NEG      WBC >100 (H) 0 - 4 /hpf    RBC >100 (H) 0 - 5 /hpf    Epithelial cells FEW FEW /lpf    Bacteria Negative NEG /hpf    Hyaline cast 2-5 0 - 5 /lpf   SODIUM, UR, RANDOM    Collection Time: 07/21/21  6:25 AM   Result Value Ref Range    Sodium,urine random 118 MMOL/L   CREATININE, UR, RANDOM    Collection Time: 07/21/21  6:25 AM   Result Value Ref Range    Creatinine, urine 24.20 mg/dL   PROTEIN URINE, RANDOM    Collection Time: 07/21/21  6:25 AM   Result Value Ref Range    Protein, urine random 85 (H) 0.0 - 11.9 mg/dL   EOSINOPHILS, URINE    Collection Time: 07/21/21  6:25 AM   Result Value Ref Range    Eosinophils,urine Negative     SAMPLES BEING HELD    Collection Time: 07/21/21  6:25 AM   Result Value Ref Range    SAMPLES BEING HELD 1 ua cup     COMMENT        Add-on orders for these samples will be processed based on acceptable specimen integrity and analyte stability, which may vary by analyte.    IRON PROFILE    Collection Time: 07/21/21  6:27 AM   Result Value Ref Range    Iron 21 (L) 35 - 150 ug/dL    TIBC 209 (L) 250 - 450 ug/dL    Iron % saturation 10 (L) 20 - 50 %   FERRITIN    Collection Time: 07/21/21  6:27 AM   Result Value Ref Range    Ferritin 494 (H) 26 - 388 NG/ML   TSH 3RD GENERATION    Collection Time: 07/21/21  8:54 AM   Result Value Ref Range    TSH 2.17 0.36 - 3.74 uIU/mL   MAGNESIUM    Collection Time: 07/21/21  8:54 AM   Result Value Ref Range    Magnesium 1.2 (L) 1.6 - 2.4 mg/dL   FOLATE    Collection Time: 07/21/21  8:54 AM   Result Value Ref Range    Folate 18.6 5.0 - 21.0 ng/mL   VITAMIN B12    Collection Time: 07/21/21  8:54 AM   Result Value Ref Range    Vitamin B12 598 193 - 986 pg/mL   IRON    Collection Time: 07/21/21  8:54 AM   Result Value Ref Range    Iron 17 (L) 35 - 150 ug/dL   TROPONIN I    Collection Time: 07/21/21  8:54 AM   Result Value Ref Range    Troponin-I, Qt. 0.05 (H) <0.05 ng/mL   HEP B SURFACE AG    Collection Time: 07/21/21  8:54 AM   Result Value Ref Range    Hepatitis B surface Ag <0.10 Index    Hep B surface Ag Interp. Negative NEG     HEP B SURFACE AB    Collection Time: 07/21/21  8:54 AM   Result Value Ref Range    Hepatitis B surface Ab 13.29 mIU/mL    Hep B surface Ab Interp. REACTIVE (A) NR     GLUCOSE, POC    Collection Time: 07/21/21 12:45 PM   Result Value Ref Range    Glucose (POC) 102 65 - 117 mg/dL    Performed by Codie Graham    TROPONIN I    Collection Time: 07/21/21 12:53 PM   Result Value Ref Range    Troponin-I, Qt. 0.06 (H) <0.05 ng/mL       Radiologic Studies -   [unfilled]  CT Results  (Last 48 hours)               07/20/21 1904  CT HEAD WO CONT Final result    Impression:  Negative exam.                   Dose reduction: All CT scans at this facility are performed using radiation dose   reduction optimization techniques as appropriate to a performed exam, including   the following: Automated exposure control (8 cc), adjustment of the MAA and/or   KUB according to the patient's size, or the patient's use of iterative   reconstruction techniques (ASIR). Narrative:  CT SCAN OF THE HEAD WITHOUT IV CONTRAST:       INDICATION: Stroke symptoms       COMPARISON:Prior CT scan from 1/26/2021       The exam is negative for hemorrhage, infarct, mass lesion, midline shift or   abnormal extra-axial fluid collection. There is age-appropriate central and   cortical atrophy. The ventricles and cisterns are normal.  The orbits, paranasal   sinuses and temporal bones are normal.               CXR Results  (Last 48 hours)               07/21/21 0554  XR CHEST PORT Final result    Impression:  Status post right IJ central venous catheter placement without   evidence of pneumothorax. Diffuse interstitial and airspace opacities are noted       Narrative:  EXAM: XR CHEST PORT       INDICATION: check dialysis catheter position       COMPARISON: 7/20/2021       FINDINGS: A portable AP radiograph of the chest was obtained at 551 hours. New   right IJ central venous catheter with the tip in the right atrium. . Diffuse   interstitial and airspace opacities. No pneumothorax. . The cardiac and   mediastinal contours and pulmonary vascularity are normal.  The bones and soft   tissues are grossly within normal limits. 07/20/21 1842  XR CHEST PORT Final result    Impression:  Bilateral dense airspace disease. Narrative:  A single upright portable view is compared with a prior exam from 3/8/2021. The   heart, mediastinum and pulmonary vasculature are normal. The lungs are   significant for diffuse bilateral dense airspace disease with mild   consolidation. This has worsened considerably since the prior exam. There is   mild gaseous distention of the stomach. Scattered calcifications in the upper   abdomen are consistent with chronic pancreatitis. Medical Decision Making and ED Course   - I am the first and primary provider for this patient AND AM THE PRIMARY PROVIDER OF RECORD.     - I reviewed the vital signs, available nursing notes, past medical history, past surgical history, family history and social history. - Initial assessment performed. The patients presenting problems have been discussed, and the staff are in agreement with the care plan formulated and outlined with them. I have encouraged them to ask questions as they arise throughout their visit. Vital Signs-Reviewed the patient's vital signs. No data found. Records Reviewed: Nursing Notes and Old Medical Records    Mr. Gertrude Ye presented with mental status change and concerns for stroke for the last 24 hours. The patient was taken to CT scan, which CT brain w/out contrast was done and was unremarkable. The patient appeared to be altered, and no apparent source was identifiable at this point. Labs were sent initially as part of stroke workup protocol. Labs did not show leukocytosis but showed anemia which is not different from his baseline. He also had mild thrombocytopenia, which is probably a part of his overall illness. His chemistry shows that he has acute decompensation of chronic kidney injury. Patient creatinine now is 7.35, which is different from before 2.42 approximately three months ago. Additionally, his CPK was 356, and his proBNP was more than 12,000. The patient is altered because of metabolic derangement, which is likely secondary to his acute kidney failure. When we obtain a urine sample, it was evident that the patient also has a concurrent UTI. I did involve the hospitalist and nephrologist for dialysis. They advised giving bicarbonate to help with acidosis and arranging for dialysis at an outside facility as our hospital did not have dialysis at nighttime. I arranged with Dr. Holland Marrero, the nephrologist at [facility], and Dr. Deb Steward, the ED physician at [facility], who accepted the patient. The patient has received 4 amps of bicarbonate, vancomycin, and Zosyn and is getting a bicarbonate drip.  The patient received 500 mL of normal saline initially, and when it was evident that he was volume overloaded, he was not given additional volume. He is being transported to [facility] on a Bicarb drip. CRITICAL CARE NOTE :  7:34 PM  Amount of Critical Care Time: 45(minutes)    IMPENDING DETERIORATION -Cardiovascular and Metabolic  ASSOCIATED RISK FACTORS - Metabolic changes and CNS Decompensation  MANAGEMENT- Bedside Assessment and Transfer  INTERPRETATION -  Blood Gases  INTERVENTIONS - Metobolic interventions   CASE REVIEW -hospitalist and nephrologist at [facility], nephrologist and ER physician at [place] [id number] - Self    NOTES   :  I have spent critical care time involved in lab review, consultations with specialist, family decision- making, bedside attention and documentation. This time excludes time spent in any separate billed procedures. During this entire length of time I was immediately available to the patient . Sofia Small MD        Disposition     Disposition: Condition stable  Transferred to [address] [place] [address] patient guardian agreed to transfer and understand the risks involved as outlined in the EMTALA form. Transferred to Another Facility    Diagnosis     Clinical Impression:   1. Altered mental status, unspecified altered mental status type    2. JEREMIAS (acute kidney injury) ([facility] [address] [address]    3. Urinary tract infection without hematuria, site unspecified        Attestations: Sofia Small MD    Please note that this dictation was completed with [employer], the computer voice recognition software. Quite often unanticipated grammatical, syntax, homophones, and other interpretive errors are inadvertently transcribed by the computer software. Please disregard these errors. Please excuse any errors that have escaped final proofreading. Thank you.

## 2021-07-20 NOTE — ED TRIAGE NOTES
Per EMS, family has noticed pt has right sided facial droop with slurred speech and bilateral extremity weakness, has gotten worse since yesterday, lives at home with family

## 2021-07-21 PROBLEM — R41.0 ACUTE DELIRIUM: Status: ACTIVE | Noted: 2021-01-01

## 2021-07-21 NOTE — PROGRESS NOTES
Problem: Pressure Injury - Risk of  Goal: *Prevention of pressure injury  Description: Document Newton Scale and appropriate interventions in the flowsheet. Outcome: Progressing Towards Goal  Note: Pressure Injury Interventions:  Sensory Interventions: Assess changes in LOC, Assess need for specialty bed, Check visual cues for pain, Discuss PT/OT consult with provider, Float heels, Keep linens dry and wrinkle-free, Maintain/enhance activity level, Minimize linen layers, Monitor skin under medical devices, Turn and reposition approx. every two hours (pillows and wedges if needed)    Moisture Interventions: Absorbent underpads, Apply protective barrier, creams and emollients, Assess need for specialty bed, Check for incontinence Q2 hours and as needed, Internal/External urinary devices, Limit adult briefs, Maintain skin hydration (lotion/cream), Minimize layers, Moisture barrier, Offer toileting Q_hr    Activity Interventions: Assess need for specialty bed, Increase time out of bed, Pressure redistribution bed/mattress(bed type), PT/OT evaluation    Mobility Interventions: Assess need for specialty bed, Float heels, HOB 30 degrees or less, Pressure redistribution bed/mattress (bed type), PT/OT evaluation, Turn and reposition approx.  every two hours(pillow and wedges)    Nutrition Interventions: Document food/fluid/supplement intake    Friction and Shear Interventions: Apply protective barrier, creams and emollients, HOB 30 degrees or less, Lift sheet, Minimize layers                Problem: Patient Education: Go to Patient Education Activity  Goal: Patient/Family Education  Outcome: Progressing Towards Goal     Problem: Pain  Goal: *Control of Pain  Outcome: Progressing Towards Goal     Problem: Patient Education: Go to Patient Education Activity  Goal: Patient/Family Education  Outcome: Progressing Towards Goal     Problem: Falls - Risk of  Goal: *Absence of Falls  Description: Document Ryan Fall Risk and appropriate interventions in the flowsheet.   Outcome: Progressing Towards Goal  Note: Fall Risk Interventions:       Mentation Interventions: Adequate sleep, hydration, pain control, Bed/chair exit alarm, Door open when patient unattended, Evaluate medications/consider consulting pharmacy, Eyeglasses and hearing aids, Increase mobility, More frequent rounding, Reorient patient, Room close to nurse's station, Self-releasing belt, Toileting rounds, Update white board    Medication Interventions: Bed/chair exit alarm, Evaluate medications/consider consulting pharmacy, Patient to call before getting OOB, Teach patient to arise slowly    Elimination Interventions: Bed/chair exit alarm, Call light in reach, Elevated toilet seat, Patient to call for help with toileting needs, Stay With Me (per policy), Toileting schedule/hourly rounds, Toilet paper/wipes in reach    History of Falls Interventions: Bed/chair exit alarm, Door open when patient unattended, Evaluate medications/consider consulting pharmacy, Room close to nurse's station, Assess for delayed presentation/identification of injury for 48 hrs (comment for end date), Vital signs minimum Q4HRs X 24 hrs (comment for end date)         Problem: Patient Education: Go to Patient Education Activity  Goal: Patient/Family Education  Outcome: Progressing Towards Goal

## 2021-07-21 NOTE — ED TRIAGE NOTES
Transfer for emergent dialysis. Patient was seen for AMS and found creatinine to be 7.35. Patient currently is not getting dialysis that we are aware of.

## 2021-07-21 NOTE — PROCEDURES
Interventional Radiology  Procedure Note        7/21/2021 5:40 AM    Patient: Lauren Brewer     Informed consent obtained    Diagnosis: JEREMIAS    Procedure(s): nontunneled hemodialysis catheter placement    Specimens removed:  none    Complications: None    Primary Physician: Rubens Villegas MD    Recommendations: Mary Ann Hammonds to use after CXR confirms catheter position    Discharge Disposition: to be admitted    Full dictated report to follow    Rubens Villegas MD  Interventional Radiology  [facility].  5:40 AM, 7/21/2021

## 2021-07-21 NOTE — PROGRESS NOTES
Hospitalist note  Asked to admit Mr. Carole Montero who presented to the emergency department with obtundation. Found to have severe acute on chronic kidney injury with creatinine of 7.35, BUN is 97. Metabolic acidosis with bicarb of 6, blood gas pending. Patient is fluid overloaded with bilateral dense airspace disease. Spoke with nephrologist on call Dr. Jarrell Burnett who advised emergency dialysis unfortunately not available in this facility at this time. Nephrologist advised to transfer patient emergently to a facility with capable of doing emergency dialysis. In the meantime nephrologist advised to give patient 4 ampoules of sodium bicarb, Place Roland catheter, 100 mg of IV Lasix.   ED provider is putting those orders in.  ED provider is working on arranging ED to ED transfer

## 2021-07-21 NOTE — H&P
[address]   HISTORY AND PHYSICAL    Name:  Mayo Apgar  MR#:  [id number]  :  1945  ACCOUNT #:  [de-identified]  ADMIT DATE:  2021      The patient was seen, evaluated and admitted by me on 2021. PRIMARY CARE PHYSICIAN:  Abby Castro MD    SOURCE OF INFORMATION:  Review of ED and old electronic medical records. CHIEF COMPLAINT:  Change in mental status. HISTORY OF PRESENT ILLNESS:  This is a 45-year-old man with a past medical history significant for type 2 diabetes, hypertension, chronic diastolic congestive heart failure, dyslipidemia, chronic kidney disease was in his usual state of health until the day of his presentation at the emergency room when it was reported that the patient developed change in mental status. The patient also has weakness, which was described as generalized weakness. The patient was taken to [facility] for further evaluation. When the patient arrived at the emergency room, the patient was found to have worsening of his renal function. Chest x-ray shows evidence of pneumonia. The patient required Nephrology consult for initiation of hemodialysis. This service is not available at [facility]. The patient was transferred to the emergency room at [facility] for further evaluation. When the patient arrived at the emergency room, the worsening renal function was confirmed. The emergency room physician consulted nephrologist on-call. The patient was seen by nephrologist while he was in the emergency room. The patient was subsequently referred to the hospitalist service for evaluation for admission. The patient is not able to provide history because of his change in mental status, but there was no history of fever, rigors, or chills reported. He was last admitted to the hospital from 2021 to 2021.   The patient was admitted for evaluation and treatment of scalp abscess for which the patient underwent incision and drainage and was discharged home on antibiotics. During that hospitalization, the patient was seen by the nephrologist for his chronic kidney disease. PAST MEDICAL HISTORY:  Type 2 diabetes, chronic diastolic congestive heart failure, dyslipidemia, hypertension, chronic kidney disease. ALLERGIES:  NO KNOWN DRUG ALLERGIES. MEDICATIONS:  Cymbalta 60 mg daily, ferrous sulfate 325 mg daily, hydralazine 50 mg 3 times daily, Lantus insulin 8 units subcutaneously daily at bedtime, Procardia XL 60 mg twice daily, Pravachol 20 mg daily, sodium bicarbonate 650 mg twice daily. FAMILY HISTORY:  This was reviewed. His father had cancer, diabetes, hypertension. His mother also had cancer, diabetes and hypertension. PAST SURGICAL HISTORY:  Not significant. SOCIAL HISTORY:  The patient is a former smoker. No history of alcohol abuse. REVIEW OF SYSTEMS:  Unable to obtain because of the patient's mental status. PHYSICAL EXAMINATION:  GENERAL APPEARANCE:  The patient appeared ill in moderate distress. VITAL SIGNS:  On arrival at the emergency room, temperature not available, pulse 73, respiratory rate 15, blood pressure 159/68, oxygen saturation 95% on 2 liters of oxygen. HEAD:  Normocephalic, atraumatic. EYES:  Unable to assess eye movement but no redness, no drainage, no discharge. EARS:  Normal external ears with no evidence of drainage. NOSE:  No deformity, no drainage. MOUTH AND THROAT:  No visible oral lesion. Neck:  Neck is supple. Mild JVD. No thyromegaly. CHEST:  Bilateral basilar crackles and a few expiratory wheezing. HEART:  Normal S1 and S2, regular. No clinically appreciable murmur. ABDOMEN:  Soft, nontender. Normal bowel sounds. CENTRAL NERVOUS SYSTEM:  Alert. Not oriented. No gross focal neurological deficit. EXTREMITIES:  No edema. Pulses 2+ bilaterally. MUSCULOSKELETAL SYSTEM:  No obvious joint deformity or swelling.   SKIN:  No active skin lesions seen in the exposed parts of the body. PSYCHIATRY:  Unable to assess mood and affect. LYMPHATIC SYSTEM:  No cervical lymphadenopathy. DIAGNOSTIC DATA:  EKG shows sinus rhythm and nonspecific ST and T-wave abnormalities. Chest x-ray shows bilateral dense air space disease. CT scan of the head without contrast negative. LABORATORY DATA:  Hematology:  WBC 10.7, hemoglobin 7.7, hematocrit 24.2, platelets 002. Lactic acid level 0.7. Chemistry:  Sodium 140, potassium 4.9, chloride 119, CO2 6, glucose 201, BUN 97, creatinine 7.35, calcium 6.8, total bilirubin 0.3, AST 24, ALT 33, alkaline phosphatase 188, total protein at 7.9, albumin level 3.5, globulin at 4.4. Pro-BNP level 12,330. Urinalysis: This is significant for 3+ bacteria, negative nitrite, large leukocyte esterase, large blood. Urine drug screen negative. Magnesium level 1.4, phosphorus 7.1. ASSESSMENT:  1. Acute delirium. 2.  Bacterial pneumonia. 3.  Acute cystitis with hematuria. 4.  Anemia. 5.  Thrombocytopenia. 6.  Type 2 diabetes with hyperglycemia. 7.  Metabolic acidosis. 8.  Acute-on-chronic diastolic congestive heart failure. 9.  Hypomagnesemia. 10.  Hyperphosphatemia. 11.  Dyslipidemia. 12.  Hypertension. 13.  Acute-on-chronic kidney disease stage V. PLAN:  1. Acute delirium. We will admit the patient for further evaluation and treatment. This is most likely as a result of the metabolic event including acute cystitis and chronic kidney disease. This will be discussed below. CT scan of the head is negative for acute pathology. The patient may require further evaluation. If the patient's acute delirium is persistent after treatment of underlying metabolic etiological factors, further evaluation may include MRI of the brain as well as Neurology consult. 2.  Bacterial pneumonia. This is contributing to the patient's acute delirium. We will start the patient on Rocephin and doxycycline.   3. Acute cystitis with hematuria. The patient will be started on Rocephin. We will await urine culture. This is also contributing to the patient's acute delirium. 4.  Anemia. This most likely due to the patient's chronic kidney disease. We will carry out anemia workup including stool guaiac to rule out occult GI bleed. 5.  Thrombocytopenia. This is mild. We will monitor the patient's platelet count. The patient is asymptomatic. 6.  Type 2 diabetes with hyperglycemia. The patient will be started on sliding scale with insulin coverage. We will check hemoglobin A1c level if one has not been checked recently. 7.  Metabolic acidosis. This is due to the patient's chronic kidney disease. We will continue with sodium bicarbonate. The patient is also awaiting the hemodialysis, which will help with the metabolic acidosis. 8.  Acute-on-chronic diastolic congestive heart failure. The patient is awaiting hemodialysis. This will help with the patient's acute-on-chronic diastolic congestive heart failure. We will obtain echocardiogram.  The patient may require Cardiology consult. We will check serial cardiac markers to rule out acute myocardial infarction. 9.  Hypomagnesemia. We will replace magnesium loosely because of the patient's chronic kidney disease. 10.  Hyperphosphatemia. We will start the patient on PhosLo. We will await further recommendation from the nephrologist.  11.  Dyslipidemia. We will continue with preadmission medication. 12.  Hypertension. We will resume preadmission medication and monitor the patient's blood pressure closely. 13.  Acute-on-chronic kidney disease stage V.  Vascular access has been placed by Interventional Radiology. Nephrology consult has been requested. The patient is now awaiting hemodialysis. OTHER ISSUES:  Code status, the patient is a full code. We will place the patient on heparin for DVT prophylaxis.   If there is a further drop in the patient's platelet count, we will discontinue heparin and request SCD for DVT prophylaxis. FUNCTIONAL STATUS PRIOR TO ADMISSION:  The patient came from home. It is not clear whether the patient is ambulatory with assistive device since the patient is not able to provide history. COVID PRECAUTION:  The patient was wearing a face mask. I was wearing a face mask and gloves for this patient's encounter.       Mckayla Arango MD      [id number]  D:  07/21/2021 6:55  T:  07/21/2021 8:23  JOB #:  [id number]  CC:  Linda Gilmore MD

## 2021-07-21 NOTE — PROGRESS NOTES
Seen by Dr. Ludy Bazan this AM  On dialysis now  Plan HD tomorrow then MWF  CM to work on outpt unit placement  Will f/u again in [address]39 [facility] MD [place]  [address]  Phone - [phone]   Fax - [phone]  [email or web address]

## 2021-07-21 NOTE — CONSULTS
Patient of Dr. Charisse Coffey is transferred from [facility] for dialysis. He has CKD and worsening renal function, acidosis and AMS. Line placed. Consent obtained from daughter.  Will dialyze this am.    Full consult dict

## 2021-07-21 NOTE — ED NOTES
Nurse spoke with Dr. Sindhu Whitaker about pt co2 and kidney function. BNP added to order set and will continue to monitor pt.

## 2021-07-21 NOTE — PROGRESS NOTES
[address] [facility]  Hospitalist Group                                                                                          Hospitalist Progress Note  John Little MD  Answering service: [phone] from in house phone        Date of Service:  2021  NAME:  Dewayne Robert  :  1945  MRN:  [id number]      Admission Summary: This is a 52-year-old man with a past medical history significant for type 2 diabetes, hypertension, chronic diastolic congestive heart failure, dyslipidemia, chronic kidney disease was in his usual state of health until the day of his presentation at the emergency room when it was reported that the patient developed change in mental status. The patient also has weakness, which was described as generalized weakness. The patient was taken to [facility] for further evaluation. When the patient arrived at the emergency room, the patient was found to have worsening of his renal function. Chest x-ray shows evidence of pneumonia. The patient required Nephrology consult for initiation of hemodialysis. Interval history / Subjective:   Overnight admission , chart reviewed , pt seen in ED 8     Assessment & Plan:     1. Metabolic encephalopathy - mult factorial  -  CT scan of the head is negative for acute pathology. - Uremia anf UTI and PNA by CXR - Bilateral dense airspace disease. most likely  - cont abx on ceftriaxone     2. Bacterial pneumonia. Plan as above may broaden abx with atypical coverage    3. Acute cystitis with hematuria. plan as above    4. Anemia. This most likely due to the patient's chronic kidney disease.  - check guaic cont epo with HD   - I wilson olsen give IV iron    5. Thrombocytopenia. This is mild. 6.  Type 2 diabetes with hyperglycemia. check A1C    7. Metabolic acidosis. This is due to the patient's chronic kidney disease.   - Started on Urgent HD    8.   Acute-on-chronic diastolic congestive heart failure due to volume overload from ESRD   - The patient is awaiting hemodialysis. This will help with the patient's acute-on-chronic diastolic congestive heart failure. We will obtain echocardiogram.      9.  Hypomagnesemia. We will replace magnesium loosely because of the patient's chronic kidney disease. 10.  Hyperphosphatemia. We will start the patient on PhosLo. We will await further recommendation from the nephrologist.    11.  Dyslipidemia. We will continue with preadmission medication  . 12. Hypertension. We will resume preadmission medication and monitor the patient's blood pressure closely. 13.  Acute-on-chronic kidney disease stage V.  Vascular access has been placed by Interventional Radiology. HD per nephrology     Code status: Full  DVT prophylaxis: SCD    Care Plan discussed with: Patient/Family and Nurse  Anticipated Disposition: SNF/LTC  Anticipated Discharge: 24 hours to 48 hours     Hospital Problems  Date Reviewed: 7/21/2021        Codes Class Noted POA    Acute delirium ICD-10-CM: R41.0  ICD-9-CM: 780.09  7/21/2021 Unknown                Review of Systems:   Review of systems not obtained due to patient factors. Vital Signs:    Last 24hrs VS reviewed since prior progress note. Most recent are:  Visit Vitals  BP (!) 154/71   Pulse 63   Temp (!) 96.7 °F (35.9 °C) (Axillary)   Resp 13   SpO2 94%         Intake/Output Summary (Last 24 hours) at 7/21/2021 1045  Last data filed at 7/21/2021 1057  Gross per 24 hour   Intake    Output 325 ml   Net -325 ml        Physical Examination:     I had a face to face encounter with this patient and independently examined them on 7/21/2021 as outlined below:          Constitutional:  No acute distress, cooperative, pleasant    ENT:  Oral mucosa moist, oropharynx benign. Resp:  CTA bilaterally. No wheezing/rhonchi/rales.  No accessory muscle use   CV:  Regular rhythm, normal rate, no murmurs, gallops, rubs    GI:  Soft, non distended, non tender. normoactive bowel sounds, no hepatosplenomegaly     Musculoskeletal:  No edema, warm, 2+ pulses throughout    Neurologic:  Moves all extremities. AAOx3, CN II-XII reviewed            Data Review:    I personally reviewed  Image and labs      Labs:     Recent Labs     07/20/21  1840   WBC 10.7   HGB 7.7*   HCT 24.2*   *     Recent Labs     07/21/21  0854 07/21/21  0317 07/21/21  0100 07/20/21  1840   NA  --  147* 147* 140   K  --  4.5 4.2 4.9   CL  --  124* 121* 119*   CO2  --  8* 9* 6*   BUN  --  103* 98* 97*   CREA  --  7.41* 7.08* 7.35*   GLU  --  169* 161* 201*   CA  --  7.1* 6.6* 6.8*   MG 1.2* 1.4*  --   --    PHOS  --  7.1*  --   --      Recent Labs     07/20/21  1840   ALT 33   *   TBILI 0.3   TP 7.9   ALB 3.5   GLOB 4.4*     No results for input(s): INR, PTP, APTT, INREXT in the last 72 hours. Recent Labs     07/21/21  0627   TIBC 209*   PSAT 10*   FERR 494*      Lab Results   Component Value Date/Time    Folate 18.6 07/21/2021 08:54 AM      No results for input(s): PH, PCO2, PO2 in the last 72 hours.   Recent Labs     07/21/21  0854 07/20/21  1840   CPK  --  356*   TROIQ 0.05*  --      No results found for: CHOL, CHOLX, CHLST, CHOLV, HDL, HDLP, LDL, LDLC, DLDLP, TGLX, TRIGL, TRIGP, CHHD, CHHDX  Lab Results   Component Value Date/Time    Glucose (POC) 225 (H) 07/20/2021 06:19 PM    Glucose (POC) 118 (H) 03/11/2021 11:37 AM    Glucose (POC) 146 (H) 03/11/2021 08:21 AM    Glucose (POC) 212 (H) 03/10/2021 07:34 PM    Glucose (POC) 199 (H) 03/10/2021 04:18 PM     Lab Results   Component Value Date/Time    Color YELLOW 07/21/2021 06:25 AM    Appearance TURBID (A) 07/21/2021 06:25 AM    Specific gravity 1.009 07/21/2021 06:25 AM    Specific gravity 1.010 07/20/2021 09:00 PM    pH (UA) 5.0 07/21/2021 06:25 AM    Protein 100 (A) 07/21/2021 06:25 AM    Glucose Negative 07/21/2021 06:25 AM    Ketone Negative 07/21/2021 06:25 AM    Bilirubin Negative 07/21/2021 06:25 AM    Urobilinogen 0.2 07/21/2021 06:25 AM    Nitrites Negative 07/21/2021 06:25 AM    Leukocyte Esterase LARGE (A) 07/21/2021 06:25 AM    Epithelial cells FEW 07/21/2021 06:25 AM    Bacteria Negative 07/21/2021 06:25 AM    WBC >100 (H) 07/21/2021 06:25 AM    RBC >100 (H) 07/21/2021 06:25 AM         Medications Reviewed:     Current Facility-Administered Medications   Medication Dose Route Frequency    DULoxetine (CYMBALTA) capsule 60 mg  60 mg Oral DAILY    ferrous sulfate tablet 325 mg  325 mg Oral ACB    hydrALAZINE (APRESOLINE) tablet 50 mg  50 mg Oral TID    NIFEdipine ER (PROCARDIA XL) tablet 60 mg  60 mg Oral BID    pravastatin (PRAVACHOL) tablet 20 mg  20 mg Oral QHS    sodium bicarbonate tablet 650 mg  650 mg Oral BID    insulin glargine (LANTUS) injection 8 Units  8 Units SubCUTAneous QHS    sodium chloride (NS) flush 5-40 mL  5-40 mL IntraVENous Q8H    sodium chloride (NS) flush 5-40 mL  5-40 mL IntraVENous PRN    acetaminophen (TYLENOL) tablet 650 mg  650 mg Oral Q6H PRN    Or    acetaminophen (TYLENOL) suppository 650 mg  650 mg Rectal Q6H PRN    polyethylene glycol (MIRALAX) packet 17 g  17 g Oral DAILY PRN    ondansetron (ZOFRAN ODT) tablet 4 mg  4 mg Oral Q8H PRN    Or    ondansetron (ZOFRAN) injection 4 mg  4 mg IntraVENous Q6H PRN    heparin (porcine) injection 5,000 Units  5,000 Units SubCUTAneous Q8H    insulin lispro (HUMALOG) injection   SubCUTAneous AC&HS    glucose chewable tablet 16 g  4 Tablet Oral PRN    dextrose (D50W) injection syrg 12.5-25 g  12.5-25 g IntraVENous PRN    glucagon (GLUCAGEN) injection 1 mg  1 mg IntraMUSCular PRN    L.acidophilus-paracasei-S.thermophil-bifidobacter (RISAQUAD) 8 billion cell capsule  1 Capsule Oral DAILY    doxycycline (VIBRAMYCIN) 100 mg in 0.9% sodium chloride (MBP/ADV) 100 mL MBP  100 mg IntraVENous Q12H    cefTRIAXone (ROCEPHIN) 1 g in 0.9% sodium chloride (MBP/ADV) 50 mL MBP  1 g IntraVENous Q24H    calcium acetate (phosphate binder) (PHOSLO) tablet 667 mg  1 Tablet Oral TID WITH MEALS     Current Outpatient Medications   Medication Sig    DULoxetine (CYMBALTA) 60 mg capsule Take 1 Cap by mouth daily.  hydrALAZINE (APRESOLINE) 50 mg tablet Take 1 Tab by mouth three (3) times daily.  insulin glargine (LANTUS) 100 unit/mL injection Take 8 units every day at bedtime    amoxicillin-clavulanate (Augmentin) 875-125 mg per tablet Take 1 Tab by mouth every twelve (12) hours.  NIFEdipine ER (PROCARDIA XL) 60 mg ER tablet Take 1 Tab by mouth two (2) times a day.  silver sulfADIAZINE (SILVADENE) 1 % topical cream Apply  to affected area daily. Apply to scalp wound daily with dressing changes    ferrous sulfate 325 mg (65 mg iron) tablet Take 325 mg by mouth Daily (before breakfast).  sodium bicarbonate 650 mg tablet Take 650 mg by mouth two (2) times a day.  pravastatin (PRAVACHOL) 20 mg tablet Take 20 mg by mouth daily.      ______________________________________________________________________  EXPECTED LENGTH OF STAY: - - -  ACTUAL LENGTH OF STAY:          0                 Fernando Boucher MD

## 2021-07-21 NOTE — PROCEDURES
[facility]  [phone]    Vitals   Pre   Post   Assessment   Pre   Post     Temp  Temp: (!) 96.7 °F (35.9 °C) (07/21/21 0841)  97.7 LOC  lethargic lethargic   HR   Pulse (Heart Rate): 68 (07/21/21 0841) 61 Lungs   diminished  diminished   B/P   BP: (!) 159/81 (07/21/21 0841) 139/73 Cardiac   NSR  NSR   Resp   Resp Rate: 14 (07/21/21 0841) 11 Skin   Warm and dry  warm and dry   Pain level   0 0 Edema  1-2+ TAI     1-2+TAI   Orders:    Duration:   Start:    0840 End:    1140 Total:   3 hours   Dialyzer:   Dialyzer/Set Up Inspection: Jeanine Rubinstein (07/21/21 0841)   K Bath:   Dialysate K (mEq/L): 3 (07/21/21 0841)   Ca Bath:   Dialysate CA (mEq/L): 2.5 (07/21/21 0841)   Na/Bicarb:   Dialysate NA (mEq/L): 138 (07/21/21 0841)   Target Fluid Removal:   Goal/Amount of Fluid to Remove (mL): 2000 mL (07/21/21 0841)   Access     Type & Location:   MARLY [facility]. Use approved by xray. Each catheter limb disinfected per p&p, caps removed, hubs disinfected per p&p. Each dialysis catheter limb disinfected per p&p, blood returned per p&p, each dialysis hub disinfected per p&p, post dialysis catheter dwell instilled per order, and caps applied.    Labs     Obtained/Reviewed   Critical Results Called   Date when labs were drawn-  Hgb-    HGB   Date Value Ref Range Status   07/20/2021 7.7 (L) 12.1 - 17.0 g/dL Final     K-    Potassium   Date Value Ref Range Status   07/21/2021 4.5 3.5 - 5.1 mmol/L Final     Ca-   Calcium   Date Value Ref Range Status   07/21/2021 7.1 (L) 8.5 - 10.1 MG/DL Final     Bun-   BUN   Date Value Ref Range Status   07/21/2021 103 (H) 6 - 20 MG/DL Final     Creat-   Creatinine   Date Value Ref Range Status   07/21/2021 7.41 (H) 0.70 - 1.30 MG/DL Final        Medications/ Blood Products Given     Name   Dose   Route and Time     none                Blood Volume Processed (BVP):    54.3 liters Net Fluid   Removed:  2000   Comments Patients vitals stable throughout tx but unresponsive. No problems. Report to Josseline Agosto RN  Time Out Done: yes, 0840  Primary Nurse Rpt Pre: Josseline Agosto RN  Primary Nurse Rpt Post: Josseline Agosto RN  Pt Education: procedural  Care Plan: 1st time HD  Tx Summary: 3 hours on 3 K 2.5 Ca 138/35 removed 2000 ml net. Admiting Diagnosis:  Pt's previous clinic-n/a  Consent signed - Informed Consent Verified: Yes (07/21/21 6798)  [facility] Consent -   Hepatitis Status- antigen negative, immune 7-21-21  Machine #- Machine Number: [id number] (07/21/21 2784)  Telemetry status-on tele  Pre-dialysis wt. -

## 2021-07-21 NOTE — ED PROVIDER NOTES
The patient is a 29-year-old male with a past medical history significant for chronic kidney disease, diabetes, hypertension, hypercholesterolemia, secondary hyperparathyroidism, vitamin deficiency who presents to the ED as a transfer from [facility] for evaluation for acute renal failure and altered mental status. The patient was found to have a creatinine of 7.2 which is different than previous potassium 3 months ago that was at 2.5. He was also diagnosed of pneumonia and a UTI. Nephrology was consulted at [facility] and the patient was placed on bicarb drip then transferred to this ER for evaluation by nephrology and emergent dialysis. The patient is alert, responsive but nonconversant at this time. The patient also received vancomycin and Zosyn while in route to the ER.             Past Medical History:   Diagnosis Date    CKD (chronic kidney disease) stage 3, GFR 30-59 ml/min ([facility] [address] 2/8/2021    Diabetes ([facility] [address] [facility]     Essential hypertension 2/8/2021    Hypertension     Insulin-treated type 2 diabetes mellitus ([facility] [address] 2/8/2021    Secondary hyperparathyroidism ([facility] [address] 2/8/2021    Vitamin D deficiency 2/8/2021       Past Surgical History:   Procedure Laterality Date    HX ORTHOPAEDIC      amputation left big toe         Family History:   Problem Relation Age of Onset    Diabetes Mother     Hypertension Mother     Cancer Mother     Diabetes Father     Hypertension Father     Cancer Father        Social History     Socioeconomic History    Marital status: SINGLE     Spouse name: Not on file    Number of children: Not on file    Years of education: Not on file    Highest education level: Not on file   Occupational History    Not on file   Tobacco Use    Smoking status: Never Smoker    Smokeless tobacco: Never Used   Vaping Use    Vaping Use: Never used   Substance and Sexual Activity    Alcohol use: Yes     Comment: rarely    Drug use: Never    Sexual activity: Not on file   Other Topics Concern    Not on file   Social History Narrative    Not on file     Social Determinants of Health     Financial Resource Strain:     Difficulty of Paying Living Expenses:    Food Insecurity:     Worried About Running Out of Food in the Last Year:     [address] in the Last Year:    Transportation Needs:     Lack of Transportation (Medical):  Lack of Transportation (Non-Medical):    Physical Activity:     Days of Exercise per Week:     Minutes of Exercise per Session:    Stress:     Feeling of Stress :    Social Connections:     Frequency of Communication with Friends and Family:     Frequency of Social Gatherings with Friends and Family:     Attends Caoda[employer] Services:     Active Member of Clubs or Organizations:     Attends Club or Organization Meetings:     Marital Status:    Intimate Partner Violence:     Fear of Current or Ex-Partner:     Emotionally Abused:     Physically Abused:     Sexually Abused: ALLERGIES: Patient has no known allergies. Review of Systems   Unable to perform ROS: Acuity of condition       Vitals:    07/21/21 0307   BP: (!) 159/68   Pulse: 73   Resp: 15   SpO2: 95%            Physical Exam  Vitals and nursing note reviewed. CONSTITUTIONAL: Frail elderly male; in no apparent distress  HEAD: Normocephalic; atraumatic  EYES: PERRL; EOM intact; conjunctiva and sclera are clear bilaterally. ENT: No rhinorrhea; normal pharynx with no tonsillar hypertrophy; mucous membranes pink/moist, no erythema, no exudate. NECK: Supple; non-tender; no cervical lymphadenopathy  CARD: Normal S1, S2; no murmurs, rubs, or gallops. Regular rate and rhythm. RESP: Normal respiratory effort; breath sounds clear and equal bilaterally; no wheezes, rhonchi, or rales. ABD: Normal bowel sounds; non-distended; non-tender; no palpable organomegaly, no masses, no bruits.   Back Exam: Normal inspection; no vertebral point tenderness, no CVA tenderness. Normal range of motion. EXT: Normal ROM in all four extremities; non-tender to palpation; no swelling or deformity; distal pulses are normal, no edema. SKIN: Warm; dry; no rash. NEURO:Alert and oriented x self, incoherent, LUCY-XII grossly intact, sensory and motor are non-focal.      MDM  Number of Diagnoses or Management Options  Acute delirium  Acute renal failure, unspecified acute renal failure type ([facility] [address]  Community acquired bilateral lower lobe pneumonia  Urinary tract infection without hematuria, site unspecified  Diagnosis management comments: Assessment: Severe metabolic acidosis secondary to uremia and acute renal failure. the patient was transferred to this ER for evaluation by nephrology and possible emergent dialysis. Plan: Repeat lab/IV fluid/EKG/consult hospitalist and nephrology/ Monitor and Reevaluate. Amount and/or Complexity of Data Reviewed  Clinical lab tests: ordered and reviewed  Tests in the radiology section of CPT®: ordered and reviewed  Tests in the medicine section of CPT®: reviewed and ordered  Discussion of test results with the performing providers: yes  Decide to obtain previous medical records or to obtain history from someone other than the patient: yes  Obtain history from someone other than the patient: yes  Review and summarize past medical records: yes  Discuss the patient with other providers: yes  Independent visualization of images, tracings, or specimens: yes    Risk of Complications, Morbidity, and/or Mortality  Presenting problems: moderate  Diagnostic procedures: moderate  Management options: moderate  General comments: Total critical care time spent exclusive of procedures:  45 minutes    Patient Progress  Patient progress: stable         Procedures    ED EKG interpretation:  Rhythm: Accelerated junctional rhythm; and regular . Rate (approx.): 72;  Axis: normal; P wave: normal; QRS interval: normal ; ST/T wave: non-specific changes; in  Lead: Diffusely; Other findings: abnormal ekg. This EKG was interpreted by Melinda Benitez MD,ED Provider. CONSULT NOTE:  Darrius Del Real MD spoke with Dr. Rowan Wong of nephrology discussed patient's presentation, history, physical assessment, and available diagnostic results. Wants to with full result of repeat lab before making a decision on emergent versus nonemergent dialysis. PROGRESS NOTE:  Pt has been reexamined by Darrius Del Real MD all available results have been reviewed with pt and any available family. Pt understands sx, dx, and tx in ED. labs were reviewed with Dr. Jazmine Stone who recommends at this time that the patient gets emergent dialysis and Ilan catheter needs to be placed for central access for this procedure. care plan has been outlined and questions have been answered. Pt and any available family understands and agrees to need for admission to hospital for further tx not available in ED. Pt is ready for admission. Written by Melinda Benitez MD,  4:49 AM    CONSULT NOTE:  Darrius Del Real MD spoke with Dr. Alexis Arnold of the interventional radiology team discussed patient's presentation, history, physical assessment, and available diagnostic results. He will evaluate, and place dialysis catheter for emergent dialysis. 4:49 AM    PROGRESS NOTE:  I spoke to the patient's daughter who consented for the procedure. The consent was verified by the patient's bedside nurse as well. .     .     Willy Holloway for Admission  04:44 AM    ED Room Number: ER08/08  Patient Name and age:  Hansel Koehler 77 y.o.  male  Working Diagnosis:   1. Acute renal failure, unspecified acute renal failure type ([address]    2. Community acquired bilateral lower lobe pneumonia    3. Urinary tract infection without hematuria, site unspecified    4.  Acute delirium        COVID-19 Suspicion:  no  Sepsis present:  no  Reassessment needed: no  Code Status:  Full Code  Readmission: no  Isolation Requirements:  no  Recommended Level of Care: telemetry  Department:[facility] Adult ED - [phone]  Other:      CONSULT NOTE:  Sandi Kay MD spoke with Dr. Mo Barragan of the adult hospitalist team. Discussed patient's presentation, history, physical assessment, and available diagnostic results.  He will evaluate, write orders and admit the patient to the hospital. 04:45 AM

## 2021-07-21 NOTE — PROGRESS NOTES
Bedside and Verbal shift change report given to [address] [place] [address] (oncoming nurse) by Martinez Alexander (offgoing nurse). Report included the following information SBAR, Kardex, MAR, Med Rec Status, Cardiac Rhythm NSR, Quality Measures and Dual Neuro Assessment.

## 2021-07-21 NOTE — ED NOTES
Verbal shift change report given to Mj Painter RN (oncoming nurse) by Sendy Adames RN (offgoing nurse). Report included the following information SBAR, ED Summary, Procedure Summary, Intake/Output, MAR and Recent Results.

## 2021-07-21 NOTE — CONSULTS
[address]    Name:  Sheila Tello  MR#:  [id number]  :  1945  ACCOUNT #:  [de-identified]  DATE OF SERVICE:  2021    NEPHROLOGY CONSULTATION    REQUESTING PHYSICIAN:  Tanner Starr MD    REASON FOR CONSULTATION:  Worsening renal function. HISTORY OF PRESENT ILLNESS:  The patient is a 80-year-old -American male, who has a history of chronic kidney disease stage IIIB, followed by Dr. Daryl Lou. He was brought to [facility] with altered mental status. His creatinine was 7.2, up from 2.5 in March. Bicarbonate was 8. He is hypoxic and there are bilateral infiltrates on chest x-ray. He is started on bicarbonate drip and transferred to [facility] for dialysis. The patient is unable to provide any history due to altered mental status. A dialysis line has been placed and we have been asked to provide dialysis. He required transfer because [facility] does not have dialysis available after hours. PAST MEDICAL HISTORY:  1. Chronic kidney disease stage IIIB, followed by Dr. Daryl Lou. 2.  Hypertension. 3.  Type 2 diabetes mellitus. 4.  Secondary hyperparathyroidism. 5.  Anemia. 6.  Hyperlipidemia. SOCIAL HISTORY:  Currently, he lives alone and does not smoke. He has a daughter, who provides consent on his behalf. FAMILY HISTORY:  No documented family history of renal disease. REVIEW OF SYSTEMS:  Unable to obtain due to altered mental status. PHYSICAL EXAMINATION:  GENERAL:  Chronically ill and acutely ill -American male. VITAL SIGNS:  Pulse 68, blood pressure 156/87, pulse ox 100% on 2 L nasal cannula. EYES:  Eyes are anicteric. Pupils are reactive. ENMT:  Mucous membranes are moist.  There is no epistaxis. NECK:  Nontender without mass. RESPIRATORY:  Respiratory effort is diminished with scattered crackles. He is on oxygen. HEART:  Regular. GASTROINTESTINAL:  Abdomen is soft and nontender. There is no guarding or rebound. Bowel sounds are active. EXTREMITIES:  There is 1+ lower extremity edema. SKIN:  Warm and edematous. There is no rash. MUSCULOSKELETAL:  There is no cyanosis or clubbing. There is no synovitis in the fingers or wrist bilaterally. LABORATORY DATA:  Sodium 147, potassium 4.5, chloride 124, bicarbonate 8, , and creatinine 7.41. White count 10.7, hemoglobin 7.7, and platelets 566. RADIOGRAPHIC STUDIES:  Head CT was negative. Chest x-ray shows diffuse interstitial and air space opacities. ASSESSMENT:  Acute renal failure, superimposed on chronic kidney disease. The patient presents with altered mental status and acidosis. He has received some IV fluids overnight in the process of being evaluated and transferred to [facility] and repeat labs showed no improvement. Clinically, he does appear to be volume plus, and overall, I am not optimistic he will improve with fluids. I think we should proceed with dialysis to address his metabolic abnormalities and volume while investigating the cause of his decline in renal function. There may or may not be a reversible component to his renal function, but the creatinine was significantly better as recently as four months ago. PLAN:  1. A dialysis line has been placed. We will initiate dialysis this morning. 2.  I spoke with the patient's daughter, Frances Araujo. Discussed rationale for dialysis and risks with emphasis on infectious complications and hypotension. I have recommended dialysis as preferable in my opinion to continued medical management. She provides consent for dialysis on the patient's behalf. 3.  We will check urinalysis, urine electrolytes, urine eosinophils, serum protein electrophoresis, and renal ultrasound.   Consider additional diagnostic testing based on clinical course and results of the initial round of test.  4.  We will go ahead and stop the bicarbonate drip at this point to avoid further volume. 5.  We will follow with you to assist in his management during this hospitalization.       Reagan Gallardo MD      DG/[email or web address]/[email or web address]  D:  07/21/2021 6:22  T:  07/21/2021 8:05  JOB #:  [id number]

## 2021-07-21 NOTE — ROUTINE PROCESS
TRANSFER - OUT REPORT:    Verbal report given to SULEIMAN Esparza(name) on Patrice Bray  being transferred to 6S, room 672(unit) for routine progression of care       Report consisted of patients Situation, Background, Assessment and   Recommendations(SBAR). Information from the following report(s) SBAR, ED Summary and MAR was reviewed with the receiving nurse. Lines:   Peripheral IV 07/21/21 Right Forearm (Active)   Site Assessment Clean, dry, & intact 07/21/21 0319   Phlebitis Assessment 0 07/21/21 0319   Infiltration Assessment 0 07/21/21 0319       Peripheral IV 07/21/21 Left Forearm (Active)       Peripheral IV 07/21/21 Left Antecubital (Active)        Opportunity for questions and clarification was provided.       Patient transported with:   Registered Nurse

## 2021-07-21 NOTE — PROGRESS NOTES
Pharmacist Note - Vancomycin Dosing    Consult provided for this 77 y.o. male for indication of sepsis. Antibiotic regimen(s): vancomycin  Patient on vancomycin PTA? YES x 1 doseat [facility]. Recent Labs     21  0317 21  0100 21  1840   WBC  --   --  10.7   CREA 7.41* 7.08* 7.35*   * 98* 97*     Frequency of BMP: Daily x 3 days  Height: 182 cm  Weight: 68 kg  Est CrCl: 9 ml/min; UO: n/a ml/kg/hr  Temp (24hrs), Av.2 °F (36.8 °C), Min:98.2 °F (36.8 °C), Max:98.2 °F (36.8 °C)    Cultures:   urine - pending    MRSA Swab ordered (if applicable)? Yes    Therapy will be initiated with a loading dose of 1250 mg IV x 1 21 @ 0156. Pharmacy will continue to monitor this patient daily for changes in clinical status and renal function.

## 2021-07-22 NOTE — PROCEDURES
[facility]                         [phone]  Vitals Pre Post Assessment Pre Post   /77 135/92 LOC Alert x 1 / confused & restless Alert x 1 / confused  & restless   HR 76 90 Lungs diminished diminished   Resp 17 17 Cardiac NSR NSR   Temp 98.7 99.1 Skin Warm/dry Warm/dry   Weight 62kg 60kg Edema trace none   Tele status tele tele Pain denies denies     Orders   Duration: Start: 0800 End: 1100 Total: 3hrs   Dialyzer: Dialyzer/Set Up Inspection: Catrina Rai (07/22/21 0745)   K Bath: Dialysate K (mEq/L): 4 (07/22/21 0800)   Ca Bath: Dialysate CA (mEq/L): 2.5 (07/22/21 0800)   Na: Dialysate NA (mEq/L): 140 (07/22/21 0800)   Bicarb: 35   Target Fluid Removal: Goal/Amount of Fluid to Remove (mL): 2000 mL (07/22/21 0745)     Access   Type & Location: R IJ temp catheter   Comments: Prepped per policy. Dsg changed per policy due to lifting & CHG disk saturated with blood. No redness or drainage noted at insertion site. Cath flushed with NS only post tx. Secured with sterile Q-Syte and PureHub caps per policy .                                          Labs   HBsAg (Antigen) / date: Neg 7/21/21                                                  HBsAb (Antibody) / date: Immune 7/21/21   Source: Epic   Obtained/Reviewed  Critical Results Called HGB   Date Value Ref Range Status   07/22/2021 7.0 (L) 12.1 - 17.0 g/dL Final     Potassium   Date Value Ref Range Status   07/22/2021 3.0 (L) 3.5 - 5.1 mmol/L Final     Comment:     INVESTIGATED PER DELTA CHECK PROTOCOL     Calcium   Date Value Ref Range Status   07/22/2021 6.8 (L) 8.5 - 10.1 MG/DL Final     BUN   Date Value Ref Range Status   07/22/2021 57 (H) 6 - 20 MG/DL Final     Comment:     INVESTIGATED PER DELTA CHECK PROTOCOL     Creatinine   Date Value Ref Range Status   07/22/2021 4.39 (H) 0.70 - 1.30 MG/DL Final     Comment:     INVESTIGATED PER DELTA CHECK PROTOCOL        Meds Given   Name Dose Route          Adequacy / Fluid    Total Liters Process: 72 liters   Net Fluid Removed: 2 kgs      Comments   Time Out Done: 6440   Admitting Diagnosis: Acute delirium / JEREMIAS   Consent obtained/signed: Informed Consent Verified: Yes (located under \"media\" section of Epic-dated 7/21/21) (07/22/21 0745)   Rosalie Darryl / RO # Machine Number: [id number] / [id number] (07/22/21 0745)   Primary Nurse Rpt Pre: Yomi Clement RN   Primary Nurse Rpt Post: Yomi Clement RN   Pt Education: Procedural   Care Plan: HD as ordered by nephrologist   Pts outpatient clinic: To be determined     Tx Summary   Comments: Pt tolerated 2nd tx well. Very restless with lots of movement requiring redirection multiple times. Report given to primary nurse. HD planned for Fri to start MWF schedule.

## 2021-07-22 NOTE — PROGRESS NOTES
Transition of Care Plan   RUR- 30% High Risk   DISPOSITION: The disposition plan is pending medical progression and recommendation.  F/U with PCP/Specialist     Transport: [facility]/family     At 2:27pm - CM messaged Dr. Amparo Granger to confirm HD placement consult. At 3:40pm - CM contacted Amadou Stoddard with [phone] to get the process started with the outpatient dialysis unit. CM left a voice message for a call back. CM will continue to follow, provide support and assist with ADAM needs as they arise. CM to defer initial assessment to tomorrow.     Maria Del Rosario Hayward, KELBY, CRM, LMHP-e

## 2021-07-22 NOTE — PROGRESS NOTES
Bedside shift change report given to SULEIMAN Esparza (oncoming nurse) by Samuel Person (offgoing nurse). Report included the following information SBAR, Cardiac Rhythm NSR and Dual Neuro Assessment.

## 2021-07-22 NOTE — PROGRESS NOTES
Problem: Dysphagia (Adult)  Goal: *Acute Goals and Plan of Care (Insert Text)  Description: Initiated 7/22/2021  1. Patient will tolerate minced and moist diet, thin liquids free of s/s of aspiration and with functional oral clearance within 7 days. 2. Patient will tolerate solid trials with functional oral clearance within 7 days. Outcome: Not Met   SPEECH LANGUAGE PATHOLOGY BEDSIDE SWALLOW EVALUATION  Patient: Debbie Stacy (75 y.o. 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 Oral swallow is characterized by prolonged oral manipulation with holding of solids and liquids when used as a flush. He did eventually clear his oral cavity with cues. He is confused and drowsy and dependent for feeding, which may increase his aspiration risk. He does have a previous MBS documented 3/9/21 showing no penetration or aspiration. Patient will benefit from skilled intervention to address the above impairments. Patients rehabilitation potential is considered to be Fair     PLAN :  Recommendations and Planned Interventions:  Minced and moist diet, thin liquids  Feed when awake and alert  Meds crushed if possible  Frequency/Duration: Patient will be followed by speech-language pathology 3 times a week to address goals. Discharge Recommendations: To Be Determined     SUBJECTIVE:   Patient nonverbal, but did nod in acceptance.     OBJECTIVE:     Past Medical History:   Diagnosis Date    CKD (chronic kidney disease) stage 3, GFR 30-59 ml/min ([address] 2/8/2021    Diabetes ([address]     Essential hypertension 2/8/2021    Hypertension     Insulin-treated type 2 diabetes mellitus ([address] 2/8/2021    Secondary hyperparathyroidism ([address] 2/8/2021    Vitamin D deficiency 2/8/2021     Past Surgical History:   Procedure Laterality Date    HX ORTHOPAEDIC      amputation left big toe    IR INSERT NON TUNL CVC OVER 5 YRS  7/21/2021          Diet prior to admission: regular with thins  Current Diet:  regular with thins   Cognitive and Communication Status:  Neurologic State: Confused  Orientation Level: Unable to verbalize  Cognition: Decreased command following     Perseveration: No perseveration noted     Oral Assessment:  Oral Assessment  Labial:  (decreased command following. No gross asymmetry)  Dentition: Natural;Limited  Oral Hygiene: moist, clean  Lingual: No impairment  Mandible: No impairment  P.O. Trials:  Patient Position: semi upright in bed  Vocal quality prior to P.O.: Low volume  Consistency Presented: Thin liquid; Solid;Puree  How Presented: SLP-fed/presented;Straw;Spoon     Bolus Acceptance: No impairment  Bolus Formation/Control: Impaired  Type of Impairment: Delayed  Propulsion: Delayed (# of seconds)  Oral Residue: 10-50% of bolus        Aspiration Signs/Symptoms: None                Oral Phase Severity: Moderate       NOMS:   The NOMS functional outcome measure was used to quantify this patient's level of swallowing impairment. Based on the NOMS, the patient was determined to be at level 4 for swallow function       NOMS Swallowing Levels:  Level 1 (CN): NPO  Level 2 (CM): NPO but takes consistency in therapy  Level 3 (CL): Takes less than 50% of nutrition p.o. and continues with nonoral feedings; and/or safe with mod cues; and/or max diet restriction  Level 4 (CK): Safe swallow but needs mod cues; and/or mod diet restriction; and/or still requires some nonoral feeding/supplements  Level 5 (CJ): Safe swallow with min diet restriction; and/or needs min cues  Level 6 (CI): Independent with p.o.; rare cues; usually self cues; may need to avoid some foods or needs extra time  Level 7 ([address] [place] [address]): Independent for all p.o.  BRIANA. (2003). National Outcomes Measurement System (NOMS): Adult Speech-Language Pathology User's Guide.        Pain:  Pain Scale 1: Numeric (0 - 10)  Pain Intensity 1: 0       After treatment:   Patient left in no apparent distress in bed, Call bell within reach, and Nursing notified    COMMUNICATION/EDUCATION:   Patient was educated regarding plan for diet. He made no attempt to communicate understanding. The patient's plan of care including recommendations, planned interventions, and recommended diet changes were discussed with: Registered nurse. Patient is unable to participate in goal setting and plan of care.     Thank you for this referral.  NICOLAS Gonzalez  Time Calculation: 13 mins

## 2021-07-22 NOTE — PROGRESS NOTES
Problem: Pressure Injury - Risk of  Goal: *Prevention of pressure injury  Description: Document Newton Scale and appropriate interventions in the flowsheet. Outcome: Progressing Towards Goal  Note: Pressure Injury Interventions:  Sensory Interventions: Assess changes in LOC    Moisture Interventions: Absorbent underpads    Activity Interventions: Assess need for specialty bed    Mobility Interventions: Assess need for specialty bed    Nutrition Interventions: Document food/fluid/supplement intake    Friction and Shear Interventions: Apply protective barrier, creams and emollients                Problem: Patient Education: Go to Patient Education Activity  Goal: Patient/Family Education  Outcome: Progressing Towards Goal     Problem: Pain  Goal: *Control of Pain  Outcome: Progressing Towards Goal  Goal: *PALLIATIVE CARE:  Alleviation of Pain  Outcome: Progressing Towards Goal     Problem: Patient Education: Go to Patient Education Activity  Goal: Patient/Family Education  Outcome: Progressing Towards Goal     Problem: Falls - Risk of  Goal: *Absence of Falls  Description: Document Ryan Fall Risk and appropriate interventions in the flowsheet.   Outcome: Progressing Towards Goal  Note: Fall Risk Interventions:       Mentation Interventions: Adequate sleep, hydration, pain control    Medication Interventions: Bed/chair exit alarm    Elimination Interventions: Bed/chair exit alarm    History of Falls Interventions: Bed/chair exit alarm         Problem: Patient Education: Go to Patient Education Activity  Goal: Patient/Family Education  Outcome: Progressing Towards Goal     Problem: Patient Education: Go to Patient Education Activity  Goal: Patient/Family Education  Outcome: Progressing Towards Goal

## 2021-07-22 NOTE — PROGRESS NOTES
[facility]  Hospitalist Group                                                                                          Hospitalist Progress Note  Raul Le MD  Answering service: [phone] from in house phone        Date of Service:  2021  NAME:  Gerardo Rivera  :  1945  MRN:  [id number]    Admission Summary: This is a 30-year-old man with a past medical history significant for type 2 diabetes, hypertension, chronic diastolic congestive heart failure, dyslipidemia, chronic kidney disease was in his usual state of health until the day of his presentation at the emergency room when it was reported that the patient developed change in mental status. The patient also has weakness, which was described as generalized weakness. The patient was taken to [facility] for further evaluation. When the patient arrived at the emergency room, the patient was found to have worsening of his renal function. Chest x-ray shows evidence of pneumonia. The patient required Nephrology consult for initiation of hemodialysis. Interval history / Subjective:   Pt seen and examined. Looks comfortable, not much interactive. Assessment & Plan:     #. ESRD: Vascular access has been placed by Interventional Radiology. HD per nephrology   1. Metabolic encephalopathy - multifactorial  -  CT scan of the head is negative for acute pathology. - Uremia anf UTI and PNA by CXR - Bilateral dense airspace disease. most likely  - cont abx on ceftriaxone     2. Bacterial pneumonia. Plan as above may broaden abx with atypical coverage  3. Acute cystitis with hematuria. plan as above  4. Anemia. Of CKD- check guaic cont epo with HD - Iron def may give IV iron  5. Thrombocytopenia. This is mild. 6.  Type 2 diabetes with hyperglycemia. check A1C  7. Metabolic acidosis. - Started on Urgent HD  8.   Acute-on-chronic diastolic congestive heart failure due to volume overload from ESRD   - The patient is awaiting hemodialysis. This will help with the patient's acute-on-chronic diastolic congestive heart failure. We will obtain echocardiogram.      #. HypoMagnesemia: replace, monitor  10. Hyperphosphatemia. We will start the patient on PhosLo. HD. 11.  Dyslipidemia. We will continue with preadmission medication  #. HTN: chronic, stable, home regimen. Code status: Full  DVT prophylaxis: SCD  Care Plan discussed with: Patient/Family and Nurse  Anticipated Disposition: SNF/LTC 24 hours to 50 hours     Hospital Problems  Date Reviewed: 7/21/2021        Codes Class Noted POA    Acute delirium ICD-10-CM: R41.0  ICD-9-CM: 780.09  7/21/2021 Unknown            Review of Systems:   Review of systems not obtained due to patient factors. Vital Signs:    Last 24hrs VS reviewed since prior progress note. Most recent are:  Visit Vitals  BP (!) 135/92   Pulse 90   Temp 99.1 °F (37.3 °C)   Resp 17   Wt 62.3 kg (137 lb 6.4 oz)   SpO2 100%   BMI 18.63 kg/m²         Intake/Output Summary (Last 24 hours) at 7/22/2021 1321  Last data filed at 7/22/2021 1100  Gross per 24 hour   Intake    Output 2950 ml   Net -2950 ml        Physical Examination:     I had a face to face encounter with this patient and independently examined them on 7/22/2021 as outlined below:    Constitutional:  No acute distress, cooperative, pleasant        Resp:  CTA bilaterally. No wheezing/rhonchi/rales. No accessory muscle use   CV:  Regular rhythm, normal rate, no murmurs    GI:  Soft, non distended, non tender. normoactive bowel sounds, no hepatosplenomegaly     Musculoskeletal:  No edema, warm,    Neurologic:  Moves all extremities.   AAOx3       Data Review:    I personally reviewed  Image and labs    Labs:     Recent Labs     07/22/21  0453 07/20/21  1840   WBC 9.7 10.7   HGB 7.0* 7.7*   HCT 20.6* 24.2*   * 131*     Recent Labs     07/22/21  0453 07/21/21  0854 07/21/21  0317 07/21/21  0100     --  147* 147*   K 3.0*  --  4.5 4.2   *  --  124* 121*   CO2 19*  --  8* 9*   BUN 57*  --  103* 98*   CREA 4.39*  --  7.41* 7.08*   *  --  169* 161*   CA 6.8*  --  7.1* 6.6*   MG  --  1.2* 1.4*  --    PHOS  --   --  7.1*  --      Recent Labs     07/20/21  1840   ALT 33   *   TBILI 0.3   TP 7.9   ALB 3.5   GLOB 4.4*     No results for input(s): INR, PTP, APTT, INREXT, INREXT in the last 72 hours. Recent Labs     07/21/21  0627   TIBC 209*   PSAT 10*   FERR 494*      Lab Results   Component Value Date/Time    Folate 18.6 07/21/2021 08:54 AM      No results for input(s): PH, PCO2, PO2 in the last 72 hours.   Recent Labs     07/21/21  1253 07/21/21  0854 07/20/21  1840   CPK  --   --  356*   TROIQ 0.06* 0.05*  --      No results found for: CHOL, CHOLX, CHLST, CHOLV, HDL, HDLP, LDL, LDLC, DLDLP, TGLX, TRIGL, TRIGP, CHHD, CHHDX  Lab Results   Component Value Date/Time    Glucose (POC) 80 07/22/2021 11:04 AM    Glucose (POC) 104 07/22/2021 07:45 AM    Glucose (POC) 99 07/21/2021 10:12 PM    Glucose (POC) 88 07/21/2021 05:49 PM    Glucose (POC) 102 07/21/2021 12:45 PM     Lab Results   Component Value Date/Time    Color YELLOW 07/21/2021 06:25 AM    Appearance TURBID (A) 07/21/2021 06:25 AM    Specific gravity 1.009 07/21/2021 06:25 AM    Specific gravity 1.010 07/20/2021 09:00 PM    pH (UA) 5.0 07/21/2021 06:25 AM    Protein 100 (A) 07/21/2021 06:25 AM    Glucose Negative 07/21/2021 06:25 AM    Ketone Negative 07/21/2021 06:25 AM    Bilirubin Negative 07/21/2021 06:25 AM    Urobilinogen 0.2 07/21/2021 06:25 AM    Nitrites Negative 07/21/2021 06:25 AM    Leukocyte Esterase LARGE (A) 07/21/2021 06:25 AM    Epithelial cells FEW 07/21/2021 06:25 AM    Bacteria Negative 07/21/2021 06:25 AM    WBC >100 (H) 07/21/2021 06:25 AM    RBC >100 (H) 07/21/2021 06:25 AM         Medications Reviewed:     Current Facility-Administered Medications   Medication Dose Route Frequency    iron sucrose (VENOFER) 200 mg in 0.9% sodium chloride 100 mL IVPB  200 mg IntraVENous Q24H    DULoxetine (CYMBALTA) capsule 60 mg  60 mg Oral DAILY    hydrALAZINE (APRESOLINE) tablet 50 mg  50 mg Oral TID    NIFEdipine ER (PROCARDIA XL) tablet 60 mg  60 mg Oral BID    pravastatin (PRAVACHOL) tablet 20 mg  20 mg Oral QHS    insulin glargine (LANTUS) injection 8 Units  8 Units SubCUTAneous QHS    sodium chloride (NS) flush 5-40 mL  5-40 mL IntraVENous Q8H    sodium chloride (NS) flush 5-40 mL  5-40 mL IntraVENous PRN    acetaminophen (TYLENOL) tablet 650 mg  650 mg Oral Q6H PRN    Or    acetaminophen (TYLENOL) suppository 650 mg  650 mg Rectal Q6H PRN    polyethylene glycol (MIRALAX) packet 17 g  17 g Oral DAILY PRN    ondansetron (ZOFRAN ODT) tablet 4 mg  4 mg Oral Q8H PRN    Or    ondansetron (ZOFRAN) injection 4 mg  4 mg IntraVENous Q6H PRN    heparin (porcine) injection 5,000 Units  5,000 Units SubCUTAneous Q8H    insulin lispro (HUMALOG) injection   SubCUTAneous AC&HS    glucose chewable tablet 16 g  4 Tablet Oral PRN    dextrose (D50W) injection syrg 12.5-25 g  12.5-25 g IntraVENous PRN    glucagon (GLUCAGEN) injection 1 mg  1 mg IntraMUSCular PRN    L.acidophilus-paracasei-S.thermophil-bifidobacter (RISAQUAD) 8 billion cell capsule  1 Capsule Oral DAILY    doxycycline (VIBRAMYCIN) 100 mg in 0.9% sodium chloride (MBP/ADV) 100 mL MBP  100 mg IntraVENous Q12H    cefTRIAXone (ROCEPHIN) 1 g in 0.9% sodium chloride (MBP/ADV) 50 mL MBP  1 g IntraVENous Q24H    calcium acetate (phosphate binder) (PHOSLO) tablet 667 mg  1 Tablet Oral TID WITH MEALS     ______________________________________________________________________  EXPECTED LENGTH OF STAY: - - -  ACTUAL LENGTH OF STAY:          1                 Rodrigo Razo MD

## 2021-07-22 NOTE — PROGRESS NOTES
Speech Therapy    Chart reviewed and spoke with RN. Patient is currently receiving dialysis until 11:00. Will follow up as appropriate. Thank you. Kenzie Pond M.S., CCC-SLP

## 2021-07-22 NOTE — PROGRESS NOTES
Nephrology Progress Note  Gerardo Rivera  Date of Admission : 7/21/2021    CC: Follow up for JEREMIAS on CKD       Assessment and Plan     JEREMIAS on CKD:  - ? Cause, likely progression of underlying CKD  - renal U/S neg  - HD #2 today  - HD tomorrow then MWF next week  - CM to work on outpt unit    CKD 3b:  - pt of Dr. Migue Aguirre    Anemia of CKD:  - JULISA w/ HD  - start venofer    Secondary HPT:  - on phoslo    PNA:  - on rocephin    HTN:  - cont current meds       Interval History:  Seen and examined on dialysis. Feeling better. No cp, n/v/d, fevers or chills. SOB improving. Current Medications: all current  Medications have been eviewed in EPIC  Review of Systems: Pertinent items are noted in HPI. Objective:  Vitals:    Vitals:    07/22/21 0815 07/22/21 0830 07/22/21 0845 07/22/21 0900   BP: 136/63 (!) 146/87 (!) 157/78 126/75   Pulse: 78 79 78 85   Resp: 15 21 17 23   Temp:       TempSrc:       SpO2: 96% 98% 95% 93%   Weight:         Intake and Output:  No intake/output data recorded. 07/20 1901 - 07/22 0700  In: -   Out: 5739 [Urine:1275]    Physical Examination:  General: NAD,Conversant   Neck:  Supple, no mass  Resp:  Lungs CTA B/L, no wheezing , normal respiratory effort  CV:  RRR,  no murmur or rub, trace LE edema  GI:  Soft, NT, + Bowel sounds, no hepatosplenomegaly  Neurologic:  Non focal  Psych:             AAO x 3 appropriate affect   Skin:  No Rash  Access:           [employer]    []    High complexity decision making was performed  []    Patient is at high-risk of decompensation with multiple organ involvement    Lab Data Personally Reviewed: I have reviewed all the pertinent labs, microbiology data and radiology studies during assessment.     Recent Labs     07/22/21  0453 07/21/21  0854 07/21/21  0317 07/21/21  0100 07/20/21  1840 07/20/21  1840     --  147* 147*   < > 140   K 3.0*  --  4.5 4.2   < > 4.9   *  --  124* 121*   < > 119*   CO2 19*  --  8* 9*   < > 6*   *  --  169* 161*   < > 201*   BUN 57*  --  103* 98*   < > 97*   CREA 4.39*  --  7.41* 7.08*   < > 7.35*   CA 6.8*  --  7.1* 6.6*   < > 6.8*   MG  --  1.2* 1.4*  --   --   --    PHOS  --   --  7.1*  --   --   --    ALB  --   --   --   --   --  3.5   ALT  --   --   --   --   --  33    < > = values in this interval not displayed.      Recent Labs     07/22/21  0453 07/20/21  1840   WBC 9.7 10.7   HGB 7.0* 7.7*   HCT 20.6* 24.2*   * 131*     No results found for: [facility]  Lab Results   Component Value Date/Time    Culture result: Escherichia coli (A) 03/04/2021 01:15 PM    Culture result: No growth 6 days 01/26/2021 06:20 PM    Culture result: Heavy Staphylococcus aureus 01/26/2021 05:43 PM     Recent Results (from the past 24 hour(s))   GLUCOSE, POC    Collection Time: 07/21/21 12:45 PM   Result Value Ref Range    Glucose (POC) 102 65 - 117 mg/dL    Performed by Sandro Hare    TROPONIN I    Collection Time: 07/21/21 12:53 PM   Result Value Ref Range    Troponin-I, Qt. 0.06 (H) <0.05 ng/mL   GLUCOSE, POC    Collection Time: 07/21/21  5:49 PM   Result Value Ref Range    Glucose (POC) 88 65 - 117 mg/dL    Performed by aJmee Puga (CON)    GLUCOSE, POC    Collection Time: 07/21/21 10:12 PM   Result Value Ref Range    Glucose (POC) 99 65 - 117 mg/dL    Performed by [facility]    Collection Time: 07/22/21  4:53 AM   Result Value Ref Range    Sodium 144 136 - 145 mmol/L    Potassium 3.0 (L) 3.5 - 5.1 mmol/L    Chloride 113 (H) 97 - 108 mmol/L    CO2 19 (L) 21 - 32 mmol/L    Anion gap 12 5 - 15 mmol/L    Glucose 104 (H) 65 - 100 mg/dL    BUN 57 (H) 6 - 20 MG/DL    Creatinine 4.39 (H) 0.70 - 1.30 MG/DL    BUN/Creatinine ratio 13 12 - 20      GFR est AA 16 (L) >60 ml/min/1.73m2    GFR est non-AA 13 (L) >60 ml/min/1.73m2    Calcium 6.8 (L) 8.5 - 10.1 MG/DL   CBC WITH AUTOMATED DIFF    Collection Time: 07/22/21  4:53 AM   Result Value Ref Range    WBC 9.7 4.1 - 11.1 K/uL    RBC 2.47 (L) 4.10 - 5.70 M/uL HGB 7.0 (L) 12.1 - 17.0 g/dL    HCT 20.6 (L) 36.6 - 50.3 %    MCV 83.4 80.0 - 99.0 FL    MCH 28.3 26.0 - 34.0 PG    MCHC 34.0 30.0 - 36.5 g/dL    RDW 17.3 (H) 11.5 - 14.5 %    PLATELET 774 (L) 528 - 400 K/uL    MPV 11.6 8.9 - 12.9 FL    NRBC 0.8 (H) 0  WBC    ABSOLUTE NRBC 0.08 (H) 0.00 - 0.01 K/uL    NEUTROPHILS 85 (H) 32 - 75 %    LYMPHOCYTES 5 (L) 12 - 49 %    MONOCYTES 8 5 - 13 %    EOSINOPHILS 1 0 - 7 %    BASOPHILS 0 0 - 1 %    IMMATURE GRANULOCYTES 1 (H) 0.0 - 0.5 %    ABS. NEUTROPHILS 8.2 (H) 1.8 - 8.0 K/UL    ABS. LYMPHOCYTES 0.5 (L) 0.8 - 3.5 K/UL    ABS. MONOCYTES 0.8 0.0 - 1.0 K/UL    ABS. EOSINOPHILS 0.1 0.0 - 0.4 K/UL    ABS. BASOPHILS 0.0 0.0 - 0.1 K/UL    ABS. IMM. GRANS. 0.1 (H) 0.00 - 0.04 K/UL    DF MANUAL      RBC COMMENTS ANISOCYTOSIS  1+        RBC COMMENTS TARGET CELLS  1+       GLUCOSE, POC    Collection Time: 07/22/21  7:45 AM   Result Value Ref Range    Glucose (POC) 104 65 - 117 mg/dL    Performed by Mary Abdalla MD  [address]  Phone - [phone]   Fax - [phone]  www. [facility]

## 2021-07-22 NOTE — PROGRESS NOTES
Bedside and Verbal shift change report given to Elsy Bender (oncoming nurse) by Korin Thacker (offgoing nurse). Report included the following information SBAR, ED Summary, MAR, Cardiac Rhythm NSR, Quality Measures and Dual Neuro Assessment.

## 2021-07-22 NOTE — PROGRESS NOTES
Problem: Pressure Injury - Risk of  Goal: *Prevention of pressure injury  Description: Document Newton Scale and appropriate interventions in the flowsheet. Outcome: Progressing Towards Goal  Note: Pressure Injury Interventions:  Sensory Interventions: Assess changes in LOC, Assess need for specialty bed, Avoid rigorous massage over bony prominences, Check visual cues for pain, Discuss PT/OT consult with provider, Float heels, Keep linens dry and wrinkle-free, Maintain/enhance activity level, Minimize linen layers, Turn and reposition approx. every two hours (pillows and wedges if needed)    Moisture Interventions: Absorbent underpads, Apply protective barrier, creams and emollients, Assess need for specialty bed, Check for incontinence Q2 hours and as needed, Contain wound drainage, Internal/External urinary devices, Limit adult briefs, Maintain skin hydration (lotion/cream), Minimize layers, Moisture barrier, Offer toileting Q_hr    Activity Interventions: Assess need for specialty bed, Pressure redistribution bed/mattress(bed type), Increase time out of bed, PT/OT evaluation    Mobility Interventions: Assess need for specialty bed, Float heels, HOB 30 degrees or less, Pressure redistribution bed/mattress (bed type), PT/OT evaluation, Turn and reposition approx.  every two hours(pillow and wedges)    Nutrition Interventions: Document food/fluid/supplement intake    Friction and Shear Interventions: Apply protective barrier, creams and emollients, Feet elevated on foot rest, HOB 30 degrees or less, Lift sheet, Lift team/patient mobility team, Minimize layers                Problem: Patient Education: Go to Patient Education Activity  Goal: Patient/Family Education  Outcome: Progressing Towards Goal     Problem: Pain  Goal: *Control of Pain  Outcome: Progressing Towards Goal     Problem: Patient Education: Go to Patient Education Activity  Goal: Patient/Family Education  Outcome: Progressing Towards Goal Problem: Falls - Risk of  Goal: *Absence of Falls  Description: Document Christal Radford Fall Risk and appropriate interventions in the flowsheet.   Outcome: Progressing Towards Goal  Note: Fall Risk Interventions:       Mentation Interventions: Adequate sleep, hydration, pain control, Bed/chair exit alarm, Door open when patient unattended, Evaluate medications/consider consulting pharmacy, Eyeglasses and hearing aids, Increase mobility, Reorient patient, More frequent rounding, Room close to nurse's station, Self-releasing belt, Toileting rounds, Update white board    Medication Interventions: Bed/chair exit alarm, Evaluate medications/consider consulting pharmacy, Patient to call before getting OOB, Teach patient to arise slowly    Elimination Interventions: Bed/chair exit alarm, Call light in reach, Elevated toilet seat, Patient to call for help with toileting needs, Stay With Me (per policy), Toilet paper/wipes in reach, Toileting schedule/hourly rounds    History of Falls Interventions: Bed/chair exit alarm, Consult care management for discharge planning, Evaluate medications/consider consulting pharmacy, Door open when patient unattended, Assess for delayed presentation/identification of injury for 48 hrs (comment for end date), Vital signs minimum Q4HRs X 24 hrs (comment for end date)         Problem: Patient Education: Go to Patient Education Activity  Goal: Patient/Family Education  Outcome: Progressing Towards Goal     Problem: Patient Education: Go to Patient Education Activity  Goal: Patient/Family Education  Outcome: Progressing Towards Goal

## 2021-07-23 NOTE — PROGRESS NOTES
Nephrology Progress Note  Gisela Bird  Date of Admission : 7/21/2021    CC: Follow up for JEREMIAS on CKD       Assessment and Plan     JEREMIAS on CKD:  - ? Cause, likely progression of underlying CKD  - renal U/S neg  - HD today then MWF  - keep alonso  - daily labs and I/Os    CKD 3b:  - pt of Dr. Mojgan Roajs    Anemia of CKD:  - JULISA MWF + venofer    Secondary HPT:  - on phoslo    PNA:  - on rocephin    HTN:  - cont current meds       Interval History:  Seen and examined on dialysis. Feeling better. No cp, n/v/d, fevers or chills. SOB improving. UOP 500cc the past 24 hrs    Current Medications: all current  Medications have been eviewed in EPIC  Review of Systems: Pertinent items are noted in HPI. Objective:  Vitals:    Vitals:    07/23/21 0800 07/23/21 0815 07/23/21 0830 07/23/21 0845   BP: 122/67 (!) 129/112 111/74 110/68   Pulse: 64 65 65 87   Resp: 17 19 19 19   Temp:       TempSrc:       SpO2: 96% 96% 100%    Weight:         Intake and Output:  No intake/output data recorded. 07/21 1901 - 07/23 0700  In: -   Out: 2500 [Urine:500]    Physical Examination:  General: NAD,Conversant   Neck:  Supple, no mass  Resp:  Lungs CTA B/L, no wheezing , normal respiratory effort  CV:  RRR,  no murmur or rub, trace LE edema  GI:  Soft, NT, + Bowel sounds, no hepatosplenomegaly  Neurologic:  Non focal  Psych:             AAO x 3 appropriate affect   Skin:  No Rash  Access:           Anna Duran    []    High complexity decision making was performed  []    Patient is at high-risk of decompensation with multiple organ involvement    Lab Data Personally Reviewed: I have reviewed all the pertinent labs, microbiology data and radiology studies during assessment.     Recent Labs     07/23/21  0455 07/22/21  0453 07/21/21  0854 07/21/21  0317 07/21/21  0100 07/20/21  1840    144  --  147*   < > 140   K 2.5* 3.0*  --  4.5   < > 4.9    113*  --  124*   < > 119*   CO2 22 19*  --  8*   < > 6*   * 104*  --  169*   < > 201* BUN 42* 57*  --  103*   < > 97*   CREA 3.60* 4.39*  --  7.41*   < > 7.35*   CA 7.0* 6.8*  --  7.1*   < > 6.8*   MG  --   --  1.2* 1.4*  --   --    PHOS  --   --   --  7.1*  --   --    ALB  --   --   --   --   --  3.5   ALT  --   --   --   --   --  33    < > = values in this interval not displayed. Recent Labs     07/23/21  0455 07/22/21  0453 07/20/21  1840   WBC 9.1 9.7 10.7   HGB 7.1* 7.0* 7.7*   HCT 21.0* 20.6* 24.2*   * 114* 131*     No results found for: [facility]  Lab Results   Component Value Date/Time    Culture result:  07/21/2021 08:54 AM     MRSA NOT PRESENT. Apparent Staphylococus aureus (not MRSA noted). Culture result:  07/21/2021 08:54 AM     Screening of patient nares for MRSA is for surveillance purposes and, if positive, to facilitate isolation considerations in high risk settings. It is not intended for automatic decolonization interventions per se as regimens are not sufficiently effective to warrant routine use.     Culture result: No Growth (<1000 cfu/mL) 07/20/2021 09:00 PM     Recent Results (from the past 24 hour(s))   GLUCOSE, POC    Collection Time: 07/22/21 11:04 AM   Result Value Ref Range    Glucose (POC) 80 65 - 117 mg/dL    Performed by [facility] (CON)    GLUCOSE, POC    Collection Time: 07/22/21  6:03 PM   Result Value Ref Range    Glucose (POC) 87 65 - 117 mg/dL    Performed by [facility] (CON)    GLUCOSE, POC    Collection Time: 07/22/21  9:04 PM   Result Value Ref Range    Glucose (POC) 119 (H) 65 - 117 mg/dL    Performed by Ondina Apple    METABOLIC PANEL, BASIC    Collection Time: 07/23/21  4:55 AM   Result Value Ref Range    Sodium 141 136 - 145 mmol/L    Potassium 2.5 (LL) 3.5 - 5.1 mmol/L    Chloride 108 97 - 108 mmol/L    CO2 22 21 - 32 mmol/L    Anion gap 11 5 - 15 mmol/L    Glucose 124 (H) 65 - 100 mg/dL    BUN 42 (H) 6 - 20 MG/DL    Creatinine 3.60 (H) 0.70 - 1.30 MG/DL    BUN/Creatinine ratio 12 12 - 20      GFR est AA 20 (L) >60 ml/min/1.73m2 GFR est non-AA 17 (L) >60 ml/min/1.73m2    Calcium 7.0 (L) 8.5 - 10.1 MG/DL   CBC W/O DIFF    Collection Time: 07/23/21  4:55 AM   Result Value Ref Range    WBC 9.1 4.1 - 11.1 K/uL    RBC 2.52 (L) 4.10 - 5.70 M/uL    HGB 7.1 (L) 12.1 - 17.0 g/dL    HCT 21.0 (L) 36.6 - 50.3 %    MCV 83.3 80.0 - 99.0 FL    MCH 28.2 26.0 - 34.0 PG    MCHC 33.8 30.0 - 36.5 g/dL    RDW 17.3 (H) 11.5 - 14.5 %    PLATELET 888 (L) 456 - 400 K/uL    MPV 11.0 8.9 - 12.9 FL    NRBC 0.9 (H) 0  WBC    ABSOLUTE NRBC 0.08 (H) 0.00 - 0.01 K/uL   GLUCOSE, POC    Collection Time: 07/23/21  8:14 AM   Result Value Ref Range    Glucose (POC) 104 65 - 117 mg/dL    Performed by KYUNG MCPHERSON(CON)                Marcin Madison MD  [address]  Phone - [phone]   Fax - [phone]  www. [facility]

## 2021-07-23 NOTE — PROGRESS NOTES
Problem: Pressure Injury - Risk of  Goal: *Prevention of pressure injury  Description: Document Newton Scale and appropriate interventions in the flowsheet. Outcome: Progressing Towards Goal  Note: Pressure Injury Interventions:  Sensory Interventions: Discuss PT/OT consult with provider, Float heels, Keep linens dry and wrinkle-free, Maintain/enhance activity level, Minimize linen layers    Moisture Interventions: Absorbent underpads, Check for incontinence Q2 hours and as needed, Internal/External urinary devices, Limit adult briefs    Activity Interventions: Increase time out of bed, Pressure redistribution bed/mattress(bed type), PT/OT evaluation    Mobility Interventions: HOB 30 degrees or less, Pressure redistribution bed/mattress (bed type), PT/OT evaluation    Nutrition Interventions: Document food/fluid/supplement intake, Offer support with meals,snacks and hydration    Friction and Shear Interventions: Apply protective barrier, creams and emollients, HOB 30 degrees or less                Problem: Patient Education: Go to Patient Education Activity  Goal: Patient/Family Education  Outcome: Progressing Towards Goal     Problem: Pain  Goal: *Control of Pain  Outcome: Progressing Towards Goal  Goal: *PALLIATIVE CARE:  Alleviation of Pain  Outcome: Progressing Towards Goal     Problem: Patient Education: Go to Patient Education Activity  Goal: Patient/Family Education  Outcome: Progressing Towards Goal     Problem: Falls - Risk of  Goal: *Absence of Falls  Description: Document Ryan Fall Risk and appropriate interventions in the flowsheet.   Outcome: Progressing Towards Goal  Note: Fall Risk Interventions:       Mentation Interventions: Bed/chair exit alarm, Increase mobility, More frequent rounding, Reorient patient    Medication Interventions: Bed/chair exit alarm, Evaluate medications/consider consulting pharmacy, Patient to call before getting OOB    Elimination Interventions: Bed/chair exit alarm, Toileting schedule/hourly rounds, Call light in reach    History of Falls Interventions: Bed/chair exit alarm, Investigate reason for fall, Utilize gait belt for transfer/ambulation         Problem: Patient Education: Go to Patient Education Activity  Goal: Patient/Family Education  Outcome: Progressing Towards Goal     Problem: Patient Education: Go to Patient Education Activity  Goal: Patient/Family Education  Outcome: Progressing Towards Goal

## 2021-07-23 NOTE — PROGRESS NOTES
Transition of Care Plan   RUR- 31% High Risk   DISPOSITION: The disposition plan is pending medical progression and recommendation.  F/U with PCP/Specialist     Transport: [facility]family     At 8:17am -  contacted Amadou Stoddard with [facility]/[phone] to follow up regarding patients consult (outpatient dialysis unit). Betty Brooke reports she will begin working on it and will follow up with .    At 2:38pm -  received a call from Amadou Stoddard with [address] reporting that patient is missing on lab (Hep B Total Core. ). Farzana Granger regarding this matter. CM awaiting response. CM will continue to follow, provide support and assist with ADAM needs as they arise.     KELBY Francois, CRM, LMHP-e

## 2021-07-23 NOTE — PROGRESS NOTES
Occupational Therapy  07/23/21    Order acknowledged, chart reviewed. Attempted to see for OT evaluation however patient on bedside HD, will follow up later today as able & appropriate.      Thank you,   Dorcas Farrar, OTD, OTR/L

## 2021-07-23 NOTE — PROGRESS NOTES
[address] [facility]                                                                                          Hospitalist Progress Note  Alejandro Cazares MD  Answering service: [phone] from in house phone        Date of Service:  2021  NAME:  Taylor Barrera  :  1945  MRN:  [id number]    Admission Summary: This is a 78-year-old man with a past medical history significant for type 2 diabetes, hypertension, chronic diastolic congestive heart failure, dyslipidemia, chronic kidney disease was in his usual state of health until the day of his presentation at the emergency room when it was reported that the patient developed change in mental status. The patient also has weakness, which was described as generalized weakness. The patient was taken to [facility] for further evaluation. When the patient arrived at the emergency room, the patient was found to have worsening of his renal function. Chest x-ray shows evidence of pneumonia. The patient required Nephrology consult for initiation of hemodialysis. Interval history / Subjective:   Pt seen and examined. Looks much improved, holding a conversation, alert, oriented, chronically sick looking. Lives with his daughter. Will get PT/OT eval and start planning dc. Assessment & Plan:     #. ESRD: Vascular access has been placed by Interventional Radiology. HD per nephrology   #. Metabolic encephalopathy - multifactorial ?- Uremia and PNA  - CT head: NAD- CXR - Bilateral dense airspace disease. most likely- ceftriaxone    2. Bacterial pneumonia. Plan as above may broaden abx with atypical coverage  3. UTI ruled out. Urine culture -ve. 4. Anemia Of CKD: - cont epo with HD - Iron def, getting IV iron  5. Thrombocytopenia: mild, monitor  #. DM2: with hyperglycemia. A1C 6.2  7. Metabolic acidosis. - resolved with HD.  8.  Acute-on-chronic diastolic CHF: due to volume overload from ESRD - resolved with HD. #. HypoMagnesemia: replace, monitor  10. Hyperphosphatemia. We will start the patient on PhosLo. HD. 11.  Dyslipidemia. We will continue with preadmission medication  #. HTN: chronic, stable, home regimen. Code status: Full  DVT prophylaxis: SCD  Care Plan discussed with: Patient/Family and Nurse  Disposition: SNF/LTC 24 hours to 50 hours     Hospital Problems  Date Reviewed: 7/21/2021        Codes Class Noted POA    Acute delirium ICD-10-CM: R41.0  ICD-9-CM: 780.09  7/21/2021 Unknown            Review of Systems:   Review of systems not obtained due to patient factors. Vital Signs:    Last 24hrs VS reviewed since prior progress note. Most recent are:  Visit Vitals  /69 (BP Patient Position: Sitting)   Pulse 73   Temp 98.7 °F (37.1 °C) (Oral)   Resp 18   Wt 61.5 kg (135 lb 9.3 oz)   SpO2 96%   BMI 18.39 kg/m²         Intake/Output Summary (Last 24 hours) at 7/23/2021 1226  Last data filed at 7/22/2021 1600  Gross per 24 hour   Intake    Output 500 ml   Net -500 ml        Physical Examination:     I had a face to face encounter with this patient and independently examined them on 7/23/2021 as outlined below:    Constitutional:  No acute distress, cooperative, pleasant, chronically sick looking, cachectic elderly BM. Resp:  CTA bilaterally. No wheezing/rhonchi/rales. No accessory muscle use   CV:  Regular rhythm, normal rate, no murmurs    GI:  Soft, non distended, non tender. normoactive bowel sounds, no hepatosplenomegaly     Musculoskeletal:  No edema, warm,    Neurologic:  Moves all extremities.   AAOx3       Data Review:    I personally reviewed  Image and labs    Labs:     Recent Labs     07/23/21 0455 07/22/21  0453   WBC 9.1 9.7   HGB 7.1* 7.0*   HCT 21.0* 20.6*   * 114*     Recent Labs     07/23/21  0455 07/22/21  0453 07/21/21  0854 07/21/21  0317    144  --  147*   K 2.5* 3.0*  --  4.5    113*  --  124*   CO2 22 19*  --  8*   BUN 42* 57*  --  103*   CREA 3.60* 4.39*  --  7.41*   * 104*  --  169*   CA 7.0* 6.8*  --  7.1*   MG  --   --  1.2* 1.4*   PHOS  --   --   --  7.1*     Recent Labs     07/20/21  1840   ALT 33   *   TBILI 0.3   TP 7.9   ALB 3.5   GLOB 4.4*     No results for input(s): INR, PTP, APTT, INREXT, INREXT in the last 72 hours. Recent Labs     07/21/21  0627   TIBC 209*   PSAT 10*   FERR 494*      Lab Results   Component Value Date/Time    Folate 18.6 07/21/2021 08:54 AM      No results for input(s): PH, PCO2, PO2 in the last 72 hours.   Recent Labs     07/21/21  1253 07/21/21  0854 07/20/21  1840   CPK  --   --  356*   TROIQ 0.06* 0.05*  --      No results found for: CHOL, CHOLX, CHLST, CHOLV, HDL, HDLP, LDL, LDLC, DLDLP, TGLX, TRIGL, TRIGP, CHHD, CHHDX  Lab Results   Component Value Date/Time    Glucose (POC) 89 07/23/2021 12:08 PM    Glucose (POC) 72 07/23/2021 11:51 AM    Glucose (POC) 104 07/23/2021 08:14 AM    Glucose (POC) 119 (H) 07/22/2021 09:04 PM    Glucose (POC) 87 07/22/2021 06:03 PM     Lab Results   Component Value Date/Time    Color YELLOW 07/21/2021 06:25 AM    Appearance TURBID (A) 07/21/2021 06:25 AM    Specific gravity 1.009 07/21/2021 06:25 AM    Specific gravity 1.010 07/20/2021 09:00 PM    pH (UA) 5.0 07/21/2021 06:25 AM    Protein 100 (A) 07/21/2021 06:25 AM    Glucose Negative 07/21/2021 06:25 AM    Ketone Negative 07/21/2021 06:25 AM    Bilirubin Negative 07/21/2021 06:25 AM    Urobilinogen 0.2 07/21/2021 06:25 AM    Nitrites Negative 07/21/2021 06:25 AM    Leukocyte Esterase LARGE (A) 07/21/2021 06:25 AM    Epithelial cells FEW 07/21/2021 06:25 AM    Bacteria Negative 07/21/2021 06:25 AM    WBC >100 (H) 07/21/2021 06:25 AM    RBC >100 (H) 07/21/2021 06:25 AM         Medications Reviewed:     Current Facility-Administered Medications   Medication Dose Route Frequency    iron sucrose (VENOFER) 200 mg in 0.9% sodium chloride 100 mL IVPB  200 mg IntraVENous Q24H    DULoxetine (CYMBALTA) capsule 60 mg  60 mg Oral DAILY    hydrALAZINE (APRESOLINE) tablet 50 mg  50 mg Oral TID    NIFEdipine ER (PROCARDIA XL) tablet 60 mg  60 mg Oral BID    pravastatin (PRAVACHOL) tablet 20 mg  20 mg Oral QHS    insulin glargine (LANTUS) injection 8 Units  8 Units SubCUTAneous QHS    sodium chloride (NS) flush 5-40 mL  5-40 mL IntraVENous Q8H    sodium chloride (NS) flush 5-40 mL  5-40 mL IntraVENous PRN    acetaminophen (TYLENOL) tablet 650 mg  650 mg Oral Q6H PRN    Or    acetaminophen (TYLENOL) suppository 650 mg  650 mg Rectal Q6H PRN    polyethylene glycol (MIRALAX) packet 17 g  17 g Oral DAILY PRN    ondansetron (ZOFRAN ODT) tablet 4 mg  4 mg Oral Q8H PRN    Or    ondansetron (ZOFRAN) injection 4 mg  4 mg IntraVENous Q6H PRN    heparin (porcine) injection 5,000 Units  5,000 Units SubCUTAneous Q8H    insulin lispro (HUMALOG) injection   SubCUTAneous AC&HS    glucose chewable tablet 16 g  4 Tablet Oral PRN    dextrose (D50W) injection syrg 12.5-25 g  12.5-25 g IntraVENous PRN    glucagon (GLUCAGEN) injection 1 mg  1 mg IntraMUSCular PRN    L.acidophilus-paracasei-S.thermophil-bifidobacter (RISAQUAD) 8 billion cell capsule  1 Capsule Oral DAILY    doxycycline (VIBRAMYCIN) 100 mg in 0.9% sodium chloride (MBP/ADV) 100 mL MBP  100 mg IntraVENous Q12H    cefTRIAXone (ROCEPHIN) 1 g in 0.9% sodium chloride (MBP/ADV) 50 mL MBP  1 g IntraVENous Q24H    calcium acetate (phosphate binder) (PHOSLO) tablet 667 mg  1 Tablet Oral TID WITH MEALS     ______________________________________________________________________  EXPECTED LENGTH OF STAY: - - -  ACTUAL LENGTH OF STAY:          2                 Michelle Monae MD

## 2021-07-23 NOTE — PROGRESS NOTES
Problem: Mobility Impaired (Adult and Pediatric)  Goal: *Acute Goals and Plan of Care (Insert Text)  Description: FUNCTIONAL STATUS PRIOR TO ADMISSION: Patient was independent and active without use of DME.    HOME SUPPORT PRIOR TO ADMISSION: The patient lived with daughter but did not require assist.    Physical Therapy Goals  Initiated 7/23/2021  1. Patient will move from supine to sit and sit to supine  and roll side to side in bed with modified independence within 7 day(s). 2.  Patient will transfer from bed to chair and chair to bed with modified independence using the least restrictive device within 7 day(s). 3.  Patient will perform sit to stand with modified independence within 7 day(s). 4.  Patient will ambulate with modified independence for 200 feet with the least restrictive device within 7 day(s). 5.  Patient will ascend/descend 8 stairs with 1 handrail(s) with modified independence within 7 day(s). Outcome: Progressing Towards Goal   PHYSICAL THERAPY EVALUATION  Patient: Jennifer Zayas (1 y.o. male)  Date: 7/23/2021  Primary Diagnosis: Acute delirium [R41.0]        Precautions:   Fall      ASSESSMENT  Based on the objective data described below, the patient presents with decreased mobility compared to baseline after onset of weakness and CKD. He reports that prior to the end of last week, he was independent and out of the house several days a week with no need for assistive device. He lives with his daughter. At this time, he demonstrates global weakness and generalized fatigue with minimal activity. He was able to sit EOB with mod assist, but unable to attempt standing. He could laterally scoot while sitting EOB. VSS during activity, as well. Expect that he will progress well as his medical status improves, noting that he started HD today. He is well below his baseline mobility, however.   At this point, he needs assist with all mobility and self care and is not tolerating getting OOB to chair even. If he does not progress quickly, he may need rehab prior to return home. Current Level of Function Impacting Discharge (mobility/balance): mod assist for bed mobility, unable to stand or transfer OOB    Functional Outcome Measure: The patient scored Total: 30/100 on the Barthel Index which is indicative of severely impaired ability to care for basic self needs/dependency on others. Other factors to consider for discharge: 8 steps to enter the home, independent prior to admission     Patient will benefit from skilled therapy intervention to address the above noted impairments. PLAN :  Recommendations and Planned Interventions: bed mobility training, transfer training, gait training, therapeutic exercises, patient and family training/education, and therapeutic activities      Frequency/Duration: Patient will be followed by physical therapy:  5 times a week to address goals. Recommendation for discharge: (in order for the patient to meet his/her long term goals)  To be determined: pending progress    This discharge recommendation:  Has not yet been discussed the attending provider and/or case management    IF patient discharges home will need the following DME: to be determined (TBD)         SUBJECTIVE:   Patient stated I need some time to get my strength back.     OBJECTIVE DATA SUMMARY:   HISTORY:    Past Medical History:   Diagnosis Date    CKD (chronic kidney disease) stage 3, GFR 30-59 ml/min ([address] 2/8/2021    Diabetes ([address]     Essential hypertension 2/8/2021    Hypertension     Insulin-treated type 2 diabetes mellitus ([address] 2/8/2021    Secondary hyperparathyroidism ([facility] 2/8/2021    Vitamin D deficiency 2/8/2021     Past Surgical History:   Procedure Laterality Date    HX ORTHOPAEDIC      amputation left big toe    IR INSERT NON TUNL CVC OVER 5 YRS  7/21/2021       Personal factors and/or comorbidities impacting plan of care: as noted above    Home Situation  # Steps to Enter: 8  Rails to [facility]: Yes  [employer] : Bilateral  One/Two Story Residence: One story  Living Alone: No  Support Systems: Family member(s)    EXAMINATION/PRESENTATION/DECISION MAKING:   Critical Behavior:  Neurologic State: Alert, Eyes open spontaneously  Orientation Level: Disoriented to situation, Oriented to person, Oriented to place, Oriented to time  Cognition: Follows commands     Hearing:     Skin:  per nursing   Edema: none  Range Of Motion:  AROM: Generally decreased, functional                       Strength:    Strength: Generally decreased, functional (no focal deficits or asymmetry noted)                    Tone & Sensation:   Tone: Normal              Sensation: Intact               Coordination:  Coordination: Generally decreased, functional  Vision:      Functional Mobility:  Bed Mobility:     Supine to Sit: Moderate assistance; Additional time;Bed Modified (HOB elevated)  Sit to Supine: Minimum assistance; Additional time     Transfers:                 Lateral Transfers: Minimum assistance (lateral scoot sitting EOB)           Balance:   Sitting: Intact; Without support  Standing:  (unable to attempt due to fatigue)  Ambulation/Gait Training:                                                         Stairs: Therapeutic Exercises:   Sitting lateral scoot    Functional Measure:  Barthel Index:    Bathin  Bladder: 0 (alonso)  Bowels: 5  Groomin  Dressin  Feeding: 10  Mobility: 0  Stairs: 0  Toilet Use: 5  Transfer (Bed to Chair and Back): 5  Total: 30/100       The Barthel ADL Index: Guidelines  1. The index should be used as a record of what a patient does, not as a record of what a patient could do. 2. The main aim is to establish degree of independence from any help, physical or verbal, however minor and for whatever reason. 3. The need for supervision renders the patient not independent. 4. A patient's performance should be established using the best available evidence. Asking the patient, friends/relatives and nurses are the usual sources, but direct observation and common sense are also important. However direct testing is not needed. 5. Usually the patient's performance over the preceding 24-48 hours is important, but occasionally longer periods will be relevant. 6. Middle categories imply that the patient supplies over 50 per cent of the effort. 7. Use of aids to be independent is allowed. Lakisha Fagan, Barthel, DSheilaW. ([id number]). Functional evaluation: the Barthel Index. [address] [facility] (14)2. Mojohnnie Martinez nelson EBONY Solo, Kathaleen Boas., Hernatalee Yepez., Francine, [address] (1999). Measuring the change indisability after inpatient rehabilitation; comparison of the responsiveness of the Barthel Index and Functional [place] Measure. Journal of Neurology, Neurosurgery, and Psychiatry, 66(4), 628-362. LORETO Silverman, TONY Foster, & Rosa M Polanco M.A. (2004.) Assessment of post-stroke quality of life in cost-effectiveness studies: The usefulness of the Barthel Index and the EuroQoL-5D. Quality of Life Research, 15, 286-10        Physical Therapy Evaluation Charge Determination   History Examination Presentation Decision-Making   HIGH Complexity :3+ comorbidities / personal factors will impact the outcome/ POC  MEDIUM Complexity : 3 Standardized tests and measures addressing body structure, function, activity limitation and / or participation in recreation  MEDIUM Complexity : Evolving with changing characteristics  MEDIUM Complexity : FOTO score of 26-74      Based on the above components, the patient evaluation is determined to be of the following complexity level: MEDIUM    Pain Rating:  No complaints of pain    Activity Tolerance:   Fair and requires rest breaks    After treatment patient left in no apparent distress:   Supine in bed, Call bell within reach, and Side rails x 3    COMMUNICATION/EDUCATION:   The patients plan of care was discussed with: Registered nurse. Fall prevention education was provided and the patient/caregiver indicated understanding., Patient/family have participated as able in goal setting and plan of care. , and Patient/family agree to work toward stated goals and plan of care.     Thank you for this referral.  Bernarda Leblanc, PT   Time Calculation: 16 mins

## 2021-07-23 NOTE — PROGRESS NOTES
Transition of Care Plan   RUR- 31% High Risk   DISPOSITION: The disposition plan is pending medical progression and recommendation.  F/U with PCP/Specialist     Transport: [facility]/family     Reason for Admission: Altered mental status                    RUR Score:  31% High Risk                   Plan for utilizing home health: N/A       PCP: First and Last name:  Carlie Johansen MD     Name of Practice: [facility]   Are you a current patient: Yes/No: Yes   Approximate date of last visit: July 14, 2021   Can you participate in a virtual visit with your PCP: N/A                    Current Advanced Directive/Advance Care Plan: Full Code  Has no ACP documentation on file at this time. Healthcare Decision Maker:   Click here to complete [address] including selection of the Healthcare Decision Maker Relationship (ie \"Primary\")             Primary Decision Maker: Morgan Officer - Daughter - [phone]                  Transition of Care Plan:  Pending recommendation. Reviewed chart for transitions of care and discussed in rounds. CM met with patient at bedside to explain role and offer support. Patient is alert and oriented x4 and confirmed demographics. Baseline: Does not utilize DMEs  ADLs/IDALS: Independent   Previous Home Health: No  Previous SNF/IPR: No  ER Contact: Daughter Rozina Madrid - [phone]    Patient lives in a 1 level house with 0 steps to enter. Patient reports that he lives with his daughter. Patient is independent with ADLs/IDALs. Patient reports he does not utilize DMEs to ambulate. Patient's preferred pharmacy is [address] [place] [address] for his perscriptions. Patient's daughter is expected to transport at discharge. Care Management Interventions  PCP Verified by CM: Yes  Palliative Care Criteria Met (RRAT>21 & CHF Dx)?: No  Mode of Transport at Discharge:  Other (see comment)  Transition of Care Consult (CM Consult): Discharge Planning  MyChart Signup: No  Discharge Durable Medical Equipment: No  Physical Therapy Consult: No  Occupational Therapy Consult: No  Speech Therapy Consult: No  Current Support Network: Family Lives Nearby, Other (Patient reports that he lives with his daughter.)  Confirm Follow Up Transport: Family  The Patient and/or Patient Representative was Provided with a Choice of Provider and Agrees with the Discharge Plan?: Yes  Freedom of Choice List was Provided with Basic Dialogue that Supports the Patient's Individualized Plan of Care/Goals, Treatment Preferences and Shares the Quality Data Associated with the Providers?: Yes  The [employer] Information Provided?: No    CM will continue to follow, provide support and assist with ADAM needs as they arise.     KELBY Crane, CRM, LMHP-e

## 2021-07-23 NOTE — PROGRESS NOTES
Physician Progress Note      Wendie Chua  CSN #:                  [id number]  :                       1945  ADMIT DATE:       2021 2:53 AM  DISCH DATE:  RESPONDING  PROVIDER #:        KEVIN Pavon MD          QUERY TEXT:    Dear Attending,    Pt admitted with abnormal renal function from baseline. Nephrology Consult  Temi Jones MD reports that the patient has a baseline history of CKD IIIB sCr 2.5 in 2021. Patient presents with sCr 7.2 this encounter.  Clint Addison MD Radiology placed non tunneled HD catheter and patient was started on HD. Nephrology Consultant Temi Jones MD diagnoses the patient as \"Acute renal failure, superimposed on chronic kidney disease\". Additionally, he states \"There may or may not be a reversible component to his renal function\". H&P by Tejal Rceio MD H&P states Acute-on-chronic kidney disease stage Jaden Tucker MD continues that documentation. Seamus CULLEN diagnoses the patient as ESRD. Nephrology team continues to state JEREMIAS on CKD. After further study can the patient's elevated sCr be clarified: The medical record reflects the following:    Risk Factors: 76M CKD IIIB baseline admitted with sCr above baseline    Clinical Indicators:    3/11/21 sCr 2.42 BUN 28 GFR 32  21 sCr 7.35 BUN 97 GFR 9     Temi Jones MD Nephrology Consult    . ..history of chronic kidney disease stage IIIB, followed by Dr. Bereket Crowley. He was brought to [facility] with altered mental status. His creatinine was 7.2, up from 2.5 in March  A dialysis line has been placed and we have been asked to provide dialysis. PAST MEDICAL HISTORY:  1. Chronic kidney disease stage IIIB, followed by Dr. Morteza Loera:  Acute renal failure, superimposed on chronic kidney disease. The patient presents with altered mental status and acidosis.   He has received some IV fluids overnight in the process of being evaluated and transferred to [facility] and repeat labs showed no improvement. Clinically, he does appear to be volume plus, and overall, I am not optimistic he will improve with fluids. I think we should proceed with dialysis to address his metabolic abnormalities and volume while investigating the cause of his decline in renal function. There may or may not be a reversible component to his renal function, but the creatinine was significantly better as recently as four months ago. Edward Gutierrez MD H&P  13.  Acute-on-chronic kidney disease stage V    7/21 Jake King MD Progress Note  13. Acute-on-chronic kidney disease stage V.    7/22 Seamus CULLEN Progress Note  ESRD: Vascular access has been placed by Interventional Radiology. HD per nephrology    7/23 Sam CULLEN Progress Note Nephrology  JEREMIAS on CKD  ? cause  likely progression of underlying CKD  CKD 3b    Treatment: Nephrology Consult, HD catheter placement and initiation of HD MWF + venofer, alonso, I&O, renal US, renal labs, daily labwork    Thank you,    Radha Rashid BSN RN CRCR  Clinical Documentation Improvement  [phone]  Options provided:  -- Acute Renal Failure on CKD IIIB confirmed present on admission and ESRD ruled out  -- ESRD confirmed present on admission and Acute Renal Failure on CKD IIIB ruled out  -- Other - I will add my own diagnosis  -- Disagree - Not applicable / Not valid  -- Disagree - Clinically unable to determine / Unknown  -- Refer to Clinical Documentation Reviewer    PROVIDER RESPONSE TEXT:    The diagnosis of Acute Renal Failure on CKD IIIB was confirmed as present on admission and ESRD ruled out.     Query created by: Sandra Lemon on 7/23/2021 2:25 PM      Electronically signed by:  KEVIN CULLEN [facility] MD 7/23/2021 5:05 PM

## 2021-07-23 NOTE — PROGRESS NOTES
Bedside and Verbal shift change report given to [employer] (oncoming nurse) by Glendy Cornell (offgoing nurse). Report included the following information SBAR, Kardex, Intake/Output, MAR, Accordion and Recent Results.

## 2021-07-23 NOTE — PROCEDURES
[facility] Dialysis Team [place]  [phone]  PRETX ASSESSMENT:    07/23/21 0655   Assessment  Type   Assessment Type Shift assessment(HEMODIALYSIS)   Psychosocial   Psychosocial (WDL) WDL   Patient Behaviors Calm; Cooperative   Neuro   Neurologic State Alert;Eyes open to voice   Orientation Level Oriented to person;Oriented to place;Oriented to time   Cognition Follows commands   Cardiac   Cardiac Rhythm Sinus Rhythm   Cardiac Regularity Regular   B/P Outside of Defined Limits No   Respiratory   Respiratory (WDL) X   Respiratory Pattern Regular; Shallow(O2 REQUIREMENTS)   Abdominal    Abdominal (WDL) WDL   Abdominal Assessment Flat; Intact; Semi-soft   Genitourinary   Genitourinary (WDL) X   Urine Assessment   Urinary Status Roland; Patent;Voiding   Skin Integumentary   Skin Integumentary (WDL) X   Skin Color Appropriate for ethnicity   Skin Condition/Temp Dry; Warm   Activity and Safety   Activity Level Bed Rest   Neuro   Neuro (WDL) X   Cardiac   Cardiac (WDL) WDL   VITAL SIGNS:  Temp: 98.7 °F (37.1 °C) (07/23/21 0655)  Pulse (Heart Rate): 67 (07/23/21 0715)  BP: 115/81 (07/23/21 0715)  Resp Rate: 18 (07/23/21 0715)     Orders:    Duration:   Start:   0700 End:   1000 Total:   3   Dialyzer:   Dialyzer/Set Up Inspection: Lizzie Griffiths (07/23/21 0655)   K Bath:   Dialysate K (mEq/L): 4 (07/23/21 0655)   Ca Bath:   Dialysate CA (mEq/L): 2.5 (07/23/21 0655)   Na/Bicarb:   Dialysate NA (mEq/L): 140 (07/23/21 0655)   Target Removal:   Goal/Amount of Fluid to Remove (mL): 2000 mL (07/23/21 0655)   Access     Type & Location:      07/23/21 0655   Hemodialysis Access 07/21/21   Placement Date/Time: 07/21/21 0545   Number of Attempts: 1  Inserted By: Edna Montelongo MD  Present on Admission/Arrival: No  Access Location: Internal jugular, right  Access Type: Temporary  Temporary Type: Other (comment)  Size: 14 g  CVC Insertion P...    Central Line Being Utilized Yes   Criteria for Appropriate Use Dialysis/apheresis   Date Accessed  07/23/21   Access Time  0700   Site Assessment Clean, dry, & intact   Date of Last Dressing Change 07/22/21   Dressing Status Clean;Dry;Loose   Dressing Type Disk with Chlorhexadine gluconate (CHG); Tape;Transparent   Proximal Hub Color/Line Status Blue   Distal Hub Color/Line Status Red      Labs      Hgb-    HGB   Date Value Ref Range Status   07/23/2021 7.1 (L) 12.1 - 17.0 g/dL Final   K-    Potassium   Date Value Ref Range Status   07/23/2021 2.5 (LL) 3.5 - 5.1 mmol/L Final     Comment:     RESULTS VERIFIED, PHONED TO AND READ BACK BY  SULEIMAN JETT @05/[facility]     Ca-   Calcium   Date Value Ref Range Status   07/23/2021 7.0 (L) 8.5 - 10.1 MG/DL Final   Bun-   BUN   Date Value Ref Range Status   07/23/2021 42 (H) 6 - 20 MG/DL Final   Creat-   Creatinine   Date Value Ref Range Status   07/23/2021 3.60 (H) 0.70 - 1.30 MG/DL Final      Medications/ Blood Products Given     Name   Dose   Route and Time                      (BVP):    62.9 Net Removed:  2500   Comments   Time Out Done: YES  Primary Nurse Rpt Pre: Danni Layton RN  Primary Nurse Rpt Post:  Pt Education: K+, PROCEDURE  Tx Summary:  @0700 TX STARTED W/O ISSUES, GOOD FLOW TO EACH LIMB  @0745 BEDSIDE SHIFT REPORT   @0845 PT FINISHED BREAKFAST  @0855 DR CREWS @ BEDSIDE  @1000 tx completed w/o issues. Pt cath locked w/ NS d/t platelet count. Drsg to cath insertion site changed, drsg was coming loose. Admiting Diagnosis: <principal problem not specified>  ACUTE DELIRIUM  Pt's previous clinic- ACUTE PT   Consent signed - Informed Consent Verified: Yes (07/23/21 0655)  Hepatitis Status-   Lab Results   Component Value Date/Time    Hepatitis B surface Ag <0.10 07/21/2021 08:54 AM    Hepatitis B surface Ab 13.29 07/21/2021 08:54 AM   Machine #- Machine Number: Vikki Jorge (07/23/21 0655)   DIALYZER LOT # [id number]  LINE LOT # [id number]  Telemetry status- MONITORED IN ROOM   Pre-dialysis wt. - Pre-Dialysis Weight: 57.2 kg (126 lb 1.7 oz) (bed scale) (07/23/21 0775)  POST HD WT: 55kg   POST HD ASSESSMENT:   07/23/21 1015   During Hemodialysis    Temp 98.7 °F (37.1 °C)   Temp source Oral   Pulse (Heart Rate) 67   Resp Rate 21   O2 Sat (%) 93 %   /73   MAP (Calculated) 88   NET Fluid Removed (mL) 2000 ml   Post-Dialysis   Rinseback Volume (ml) 200 ml   Duration of Treatment (hours) 3 hours   Patient Response to Treatment Well   Patient Disposition Other (comment)  (remained in room during HD tx)   Condition of Dialyzer Filter Good   Hemodialysis End Time 1000      07/23/21 1015   Assessment  Type   Assessment Type Reassessment   Reassessment Performed No change to previous assessment

## 2021-07-24 NOTE — PROGRESS NOTES
Bedside and Verbal shift change report given to [employer] (oncoming nurse) by Eder Ramos (offgoing nurse). Report included the following information SBAR, Kardex, Procedure Summary, Intake/Output, MAR, Accordion and Recent Results.

## 2021-07-24 NOTE — PROGRESS NOTES
[address] [facility]  Hospitalist Group                                                                                          Hospitalist Progress Note  Alondra Vazquez MD  Answering service: [phone] from in house phone        Date of Service:  2021  NAME:  Isabella Saunders  :  1945  MRN:  [id number]    Admission Summary: This is a 67-year-old man with a past medical history significant for type 2 diabetes, hypertension, chronic diastolic congestive heart failure, dyslipidemia, chronic kidney disease was in his usual state of health until the day of his presentation at the emergency room when it was reported that the patient developed change in mental status. The patient also has weakness, which was described as generalized weakness. The patient was taken to [facility] for further evaluation. When the patient arrived at the emergency room, the patient was found to have worsening of his renal function. Chest x-ray shows evidence of pneumonia. The patient required Nephrology consult for initiation of hemodialysis. Interval history / Subjective:     :  No distress  Some rt rib pain       Assessment & Plan:     #. ESRD: Vascular access has been placed by Interventional Radiology. HD per nephrology   #. Metabolic encephalopathy - multifactorial ?- Uremia and PNA  - CT head: NAD- CXR - Bilateral dense airspace disease. most likely- ceftriaxone    2. Bacterial pneumonia. Plan as above may broaden abx with atypical coverage- cont for today   3. UTI ruled out. Urine culture -ve. 4. Anemia Of CKD: - cont epo with HD - Iron def, getting IV iron  5. Thrombocytopenia: mild, monitor - stable   #. DM2: with hyperglycemia. A1C 6.2 - stable   7. Metabolic acidosis. - resolved with HD.  8.  Acute-on-chronic diastolic CHF: due to volume overload from ESRD - resolved with HD.  - no s/s of hf   #. HypoMagnesemia: replace, monitor  10. Hyperphosphatemia.   We will start the patient on PhosLo. HD. 11.  Dyslipidemia. We will continue with preadmission medication  #. HTN: chronic, stable, home regimen. Code status: Full  DVT prophylaxis: SCD  Care Plan discussed with: Patient/Family and Nurse  Disposition: SNF/LTC 24 hours to 50 hours     Hospital Problems  Date Reviewed: 7/21/2021        Codes Class Noted POA    Acute delirium ICD-10-CM: R41.0  ICD-9-CM: 780.09  7/21/2021 Unknown            Review of Systems:   Review of systems not obtained due to patient factors. Vital Signs:    Last 24hrs VS reviewed since prior progress note. Most recent are:  Visit Vitals  /70 (BP 1 Location: Left upper arm, BP Patient Position: At rest)   Pulse 68   Temp 98.1 °F (36.7 °C)   Resp 16   Wt 55.7 kg (122 lb 14.4 oz)   SpO2 99%   BMI 16.67 kg/m²         Intake/Output Summary (Last 24 hours) at 7/24/2021 1135  Last data filed at 7/23/2021 2000  Gross per 24 hour   Intake    Output 300 ml   Net -300 ml        Physical Examination:     I had a face to face encounter with this patient and independently examined them on 7/24/2021 as outlined below:    Constitutional:  No acute distress, cooperative, pleasant, chronically sick looking, cachectic elderly BM. Resp:  CTA bilaterally. No wheezing/rhonchi/rales. No accessory muscle use   CV:  Regular rhythm, normal rate, no murmurs    GI:  Soft, non distended, non tender. normoactive bowel sounds, no hepatosplenomegaly     Musculoskeletal:  No edema, warm,    Neurologic:  Moves all extremities.   AAOx3       Data Review:    I personally reviewed  Image and labs    Labs:     Recent Labs     07/23/21 0455 07/22/21 0453   WBC 9.1 9.7   HGB 7.1* 7.0*   HCT 21.0* 20.6*   * 114*     Recent Labs     07/23/21 0455 07/22/21 0453    144   K 2.5* 3.0*    113*   CO2 22 19*   BUN 42* 57*   CREA 3.60* 4.39*   * 104*   CA 7.0* 6.8*     No results for input(s): ALT, AP, TBIL, TBILI, TP, ALB, GLOB, GGT, AML, LPSE in the last 72 hours. No lab exists for component: SGOT, GPT, AMYP, HLPSE  No results for input(s): INR, PTP, APTT, INREXT, INREXT in the last 72 hours. No results for input(s): FE, TIBC, PSAT, FERR in the last 72 hours. Lab Results   Component Value Date/Time    Folate 18.6 07/21/2021 08:54 AM      No results for input(s): PH, PCO2, PO2 in the last 72 hours.   Recent Labs     07/21/21  1253   TROIQ 0.06*     No results found for: CHOL, CHOLX, CHLST, CHOLV, HDL, HDLP, LDL, LDLC, DLDLP, TGLX, TRIGL, TRIGP, CHHD, CHHDX  Lab Results   Component Value Date/Time    Glucose (POC) 141 (H) 07/24/2021 11:17 AM    Glucose (POC) 116 07/24/2021 07:47 AM    Glucose (POC) 173 (H) 07/23/2021 10:40 PM    Glucose (POC) 146 (H) 07/23/2021 04:29 PM    Glucose (POC) 89 07/23/2021 12:08 PM     Lab Results   Component Value Date/Time    Color YELLOW 07/21/2021 06:25 AM    Appearance TURBID (A) 07/21/2021 06:25 AM    Specific gravity 1.009 07/21/2021 06:25 AM    Specific gravity 1.010 07/20/2021 09:00 PM    pH (UA) 5.0 07/21/2021 06:25 AM    Protein 100 (A) 07/21/2021 06:25 AM    Glucose Negative 07/21/2021 06:25 AM    Ketone Negative 07/21/2021 06:25 AM    Bilirubin Negative 07/21/2021 06:25 AM    Urobilinogen 0.2 07/21/2021 06:25 AM    Nitrites Negative 07/21/2021 06:25 AM    Leukocyte Esterase LARGE (A) 07/21/2021 06:25 AM    Epithelial cells FEW 07/21/2021 06:25 AM    Bacteria Negative 07/21/2021 06:25 AM    WBC >100 (H) 07/21/2021 06:25 AM    RBC >100 (H) 07/21/2021 06:25 AM         Medications Reviewed:     Current Facility-Administered Medications   Medication Dose Route Frequency    iron sucrose (VENOFER) 200 mg in 0.9% sodium chloride 100 mL IVPB  200 mg IntraVENous Q24H    DULoxetine (CYMBALTA) capsule 60 mg  60 mg Oral DAILY    hydrALAZINE (APRESOLINE) tablet 50 mg  50 mg Oral TID    NIFEdipine ER (PROCARDIA XL) tablet 60 mg  60 mg Oral BID    pravastatin (PRAVACHOL) tablet 20 mg  20 mg Oral QHS    insulin glargine (LANTUS) injection 8 Units  8 Units SubCUTAneous QHS    sodium chloride (NS) flush 5-40 mL  5-40 mL IntraVENous Q8H    sodium chloride (NS) flush 5-40 mL  5-40 mL IntraVENous PRN    acetaminophen (TYLENOL) tablet 650 mg  650 mg Oral Q6H PRN    Or    acetaminophen (TYLENOL) suppository 650 mg  650 mg Rectal Q6H PRN    polyethylene glycol (MIRALAX) packet 17 g  17 g Oral DAILY PRN    ondansetron (ZOFRAN ODT) tablet 4 mg  4 mg Oral Q8H PRN    Or    ondansetron (ZOFRAN) injection 4 mg  4 mg IntraVENous Q6H PRN    heparin (porcine) injection 5,000 Units  5,000 Units SubCUTAneous Q8H    insulin lispro (HUMALOG) injection   SubCUTAneous AC&HS    glucose chewable tablet 16 g  4 Tablet Oral PRN    dextrose (D50W) injection syrg 12.5-25 g  12.5-25 g IntraVENous PRN    glucagon (GLUCAGEN) injection 1 mg  1 mg IntraMUSCular PRN    L.acidophilus-paracasei-S.thermophil-bifidobacter (RISAQUAD) 8 billion cell capsule  1 Capsule Oral DAILY    doxycycline (VIBRAMYCIN) 100 mg in 0.9% sodium chloride (MBP/ADV) 100 mL MBP  100 mg IntraVENous Q12H    cefTRIAXone (ROCEPHIN) 1 g in 0.9% sodium chloride (MBP/ADV) 50 mL MBP  1 g IntraVENous Q24H    calcium acetate (phosphate binder) (PHOSLO) tablet 667 mg  1 Tablet Oral TID WITH MEALS     ______________________________________________________________________  EXPECTED LENGTH OF STAY: 4d 0h  ACTUAL LENGTH OF STAY:          3                 Thomas Santana MD

## 2021-07-24 NOTE — PROGRESS NOTES
Problem: Pressure Injury - Risk of  Goal: *Prevention of pressure injury  Description: Document Newton Scale and appropriate interventions in the flowsheet. Outcome: Progressing Towards Goal  Note: Pressure Injury Interventions:  Sensory Interventions: Assess changes in LOC, Float heels, Keep linens dry and wrinkle-free, Maintain/enhance activity level, Minimize linen layers, Monitor skin under medical devices    Moisture Interventions: Absorbent underpads, Apply protective barrier, creams and emollients, Internal/External urinary devices    Activity Interventions: Increase time out of bed, Pressure redistribution bed/mattress(bed type), PT/OT evaluation    Mobility Interventions: HOB 30 degrees or less, Pressure redistribution bed/mattress (bed type), PT/OT evaluation    Nutrition Interventions: Document food/fluid/supplement intake, Offer support with meals,snacks and hydration    Friction and Shear Interventions: Apply protective barrier, creams and emollients, HOB 30 degrees or less, Minimize layers                Problem: Patient Education: Go to Patient Education Activity  Goal: Patient/Family Education  Outcome: Progressing Towards Goal     Problem: Pain  Goal: *Control of Pain  Outcome: Progressing Towards Goal  Goal: *PALLIATIVE CARE:  Alleviation of Pain  Outcome: Progressing Towards Goal     Problem: Patient Education: Go to Patient Education Activity  Goal: Patient/Family Education  Outcome: Progressing Towards Goal     Problem: Falls - Risk of  Goal: *Absence of Falls  Description: Document Ryan Fall Risk and appropriate interventions in the flowsheet.   Outcome: Progressing Towards Goal  Note: Fall Risk Interventions:       Mentation Interventions: Bed/chair exit alarm, Adequate sleep, hydration, pain control, Evaluate medications/consider consulting pharmacy, Increase mobility, More frequent rounding, Reorient patient    Medication Interventions: Bed/chair exit alarm, Patient to call before getting OOB    Elimination Interventions: Bed/chair exit alarm, Call light in reach    History of Falls Interventions: Bed/chair exit alarm, Investigate reason for fall, Room close to nurse's station         Problem: Patient Education: Go to Patient Education Activity  Goal: Patient/Family Education  Outcome: Progressing Towards Goal     Problem: Patient Education: Go to Patient Education Activity  Goal: Patient/Family Education  Outcome: Progressing Towards Goal     Problem: Patient Education: Go to Patient Education Activity  Goal: Patient/Family Education  Outcome: Progressing Towards Goal

## 2021-07-25 NOTE — PROGRESS NOTES
[facility] Adult  Hospitalist Group                                                                                          Hospitalist Progress Note  Mahendra Lucas MD  Answering service: [phone] from in house phone        Date of Service:  2021  NAME:  Debbie Stacy  :  1945  MRN:  [id number]    Admission Summary: This is a 61-year-old man with a past medical history significant for type 2 diabetes, hypertension, chronic diastolic congestive heart failure, dyslipidemia, chronic kidney disease was in his usual state of health until the day of his presentation at the emergency room when it was reported that the patient developed change in mental status. The patient also has weakness, which was described as generalized weakness. The patient was taken to [facility] for further evaluation. When the patient arrived at the emergency room, the patient was found to have worsening of his renal function. Chest x-ray shows evidence of pneumonia. The patient required Nephrology consult for initiation of hemodialysis. Interval history / Subjective:     :  No distress  Yet some rib pain, rt,   Eating well        Assessment & Plan:     #. ESRD: Vascular access has been placed by Interventional Radiology. HD per nephrology   #. Metabolic encephalopathy - multifactorial ?- Uremia and PNA  - CT head: NAD- CXR - Bilateral dense airspace disease. most likely- ceftriaxone/doxy, completing abx     2. Bacterial pneumonia. Plan as above may broaden abx with atypical coverage- cont for today - as above   3. UTI ruled out. Urine culture -ve. 4. Anemia Of CKD: - cont epo with HD - Iron def, getting IV iron  5. Thrombocytopenia: mild, monitor - stable   #. DM2: with hyperglycemia. A1C 6.2 - stable   7. Metabolic acidosis. - resolved with HD.  8.  Acute-on-chronic diastolic CHF: due to volume overload from ESRD - resolved with HD.  - no s/s of hf  -    #. HypoMagnesemia: replace, monitor  10. Hyperphosphatemia. We will start the patient on PhosLo. HD. 11.  Dyslipidemia. We will continue with preadmission medication  #. HTN: chronic, stable, home regimen. Code status: Full  DVT prophylaxis: SCD  Care Plan discussed with: Patient/Family and Nurse  Disposition: SNF/LTC 24 hours to 50 hours     Hospital Problems  Date Reviewed: 7/21/2021        Codes Class Noted POA    Acute delirium ICD-10-CM: R41.0  ICD-9-CM: 780.09  7/21/2021 Unknown            Review of Systems:   Review of systems not obtained due to patient factors. Vital Signs:    Last 24hrs VS reviewed since prior progress note. Most recent are:  Visit Vitals  /63 (BP 1 Location: Left upper arm, BP Patient Position: At rest)   Pulse 65   Temp 97.5 °F (36.4 °C)   Resp 13   Wt 57.1 kg (125 lb 14.4 oz)   SpO2 100%   BMI 17.08 kg/m²       No intake or output data in the 24 hours ending 07/25/21 1238     Physical Examination:     I had a face to face encounter with this patient and independently examined them on 7/25/2021 as outlined below:    Constitutional:  No acute distress, cooperative, pleasant, chronically sick looking, cachectic elderly BM. Resp:  CTA bilaterally. No wheezing/rhonchi/rales. No accessory muscle use   CV:  Regular rhythm, normal rate, no murmurs    GI:  Soft, non distended, non tender. normoactive bowel sounds, no hepatosplenomegaly     Musculoskeletal:  No edema, warm,    Neurologic:  Moves all extremities. Data Review:    I personally reviewed  Image and labs    Labs:     Recent Labs     07/23/21  0455   WBC 9.1   HGB 7.1*   HCT 21.0*   *     Recent Labs     07/23/21  0455      K 2.5*      CO2 22   BUN 42*   CREA 3.60*   *   CA 7.0*     No results for input(s): ALT, AP, TBIL, TBILI, TP, ALB, GLOB, GGT, AML, LPSE in the last 72 hours.     No lab exists for component: SGOT, GPT, AMYP, HLPSE  No results for input(s): INR, PTP, APTT, INREXT, INREXT in the last 72 hours. No results for input(s): FE, TIBC, PSAT, FERR in the last 72 hours. Lab Results   Component Value Date/Time    Folate 18.6 07/21/2021 08:54 AM      No results for input(s): PH, PCO2, PO2 in the last 72 hours. No results for input(s): CPK, CKNDX, TROIQ in the last 72 hours.     No lab exists for component: CPKMB  No results found for: CHOL, CHOLX, CHLST, CHOLV, HDL, HDLP, LDL, LDLC, DLDLP, TGLX, TRIGL, TRIGP, CHHD, CHHDX  Lab Results   Component Value Date/Time    Glucose (POC) 108 07/25/2021 12:01 PM    Glucose (POC) 107 07/25/2021 07:52 AM    Glucose (POC) 142 (H) 07/24/2021 09:00 PM    Glucose (POC) 166 (H) 07/24/2021 05:08 PM    Glucose (POC) 141 (H) 07/24/2021 11:17 AM     Lab Results   Component Value Date/Time    Color YELLOW 07/21/2021 06:25 AM    Appearance TURBID (A) 07/21/2021 06:25 AM    Specific gravity 1.009 07/21/2021 06:25 AM    Specific gravity 1.010 07/20/2021 09:00 PM    pH (UA) 5.0 07/21/2021 06:25 AM    Protein 100 (A) 07/21/2021 06:25 AM    Glucose Negative 07/21/2021 06:25 AM    Ketone Negative 07/21/2021 06:25 AM    Bilirubin Negative 07/21/2021 06:25 AM    Urobilinogen 0.2 07/21/2021 06:25 AM    Nitrites Negative 07/21/2021 06:25 AM    Leukocyte Esterase LARGE (A) 07/21/2021 06:25 AM    Epithelial cells FEW 07/21/2021 06:25 AM    Bacteria Negative 07/21/2021 06:25 AM    WBC >100 (H) 07/21/2021 06:25 AM    RBC >100 (H) 07/21/2021 06:25 AM         Medications Reviewed:     Current Facility-Administered Medications   Medication Dose Route Frequency    iron sucrose (VENOFER) 200 mg in 0.9% sodium chloride 100 mL IVPB  200 mg IntraVENous Q24H    DULoxetine (CYMBALTA) capsule 60 mg  60 mg Oral DAILY    hydrALAZINE (APRESOLINE) tablet 50 mg  50 mg Oral TID    NIFEdipine ER (PROCARDIA XL) tablet 60 mg  60 mg Oral BID    pravastatin (PRAVACHOL) tablet 20 mg  20 mg Oral QHS    insulin glargine (LANTUS) injection 8 Units  8 Units SubCUTAneous QHS    sodium chloride (NS) flush 5-40 mL  5-40 mL IntraVENous Q8H    sodium chloride (NS) flush 5-40 mL  5-40 mL IntraVENous PRN    acetaminophen (TYLENOL) tablet 650 mg  650 mg Oral Q6H PRN    Or    acetaminophen (TYLENOL) suppository 650 mg  650 mg Rectal Q6H PRN    polyethylene glycol (MIRALAX) packet 17 g  17 g Oral DAILY PRN    ondansetron (ZOFRAN ODT) tablet 4 mg  4 mg Oral Q8H PRN    Or    ondansetron (ZOFRAN) injection 4 mg  4 mg IntraVENous Q6H PRN    heparin (porcine) injection 5,000 Units  5,000 Units SubCUTAneous Q8H    insulin lispro (HUMALOG) injection   SubCUTAneous AC&HS    glucose chewable tablet 16 g  4 Tablet Oral PRN    dextrose (D50W) injection syrg 12.5-25 g  12.5-25 g IntraVENous PRN    glucagon (GLUCAGEN) injection 1 mg  1 mg IntraMUSCular PRN    L.acidophilus-paracasei-S.thermophil-bifidobacter (RISAQUAD) 8 billion cell capsule  1 Capsule Oral DAILY    doxycycline (VIBRAMYCIN) 100 mg in 0.9% sodium chloride (MBP/ADV) 100 mL MBP  100 mg IntraVENous Q12H    calcium acetate (phosphate binder) (PHOSLO) tablet 667 mg  1 Tablet Oral TID WITH MEALS     ______________________________________________________________________  EXPECTED LENGTH OF STAY: 4d 0h  ACTUAL LENGTH OF STAY:          4                 Varun Levin MD

## 2021-07-25 NOTE — PROGRESS NOTES
Problem: Pressure Injury - Risk of  Goal: *Prevention of pressure injury  Description: Document Newton Scale and appropriate interventions in the flowsheet.   Outcome: Progressing Towards Goal  Note: Pressure Injury Interventions:  Sensory Interventions: Assess changes in LOC, Assess need for specialty bed, Avoid rigorous massage over bony prominences, Discuss PT/OT consult with provider    Moisture Interventions: Check for incontinence Q2 hours and as needed, Internal/External urinary devices    Activity Interventions: Increase time out of bed, Pressure redistribution bed/mattress(bed type), PT/OT evaluation    Mobility Interventions: HOB 30 degrees or less, Pressure redistribution bed/mattress (bed type), PT/OT evaluation    Nutrition Interventions: Document food/fluid/supplement intake, Discuss nutritional consult with provider    Friction and Shear Interventions: HOB 30 degrees or less, Lift sheet                Problem: Pain  Goal: *Control of Pain  Outcome: Progressing Towards Goal

## 2021-07-25 NOTE — PROGRESS NOTES
A Spiritual Care Partner Volunteer visited patient in [place] [address] on 7/25/2021  Documented by:  Chaplain Lmobardo MDiv, MS, [address] ([id number])

## 2021-07-25 NOTE — PROGRESS NOTES
Bedside shift change report given to Hao Cyr RN (oncoming nurse) by Arti Brody RN (offgoing nurse). Report included the following information SBAR, Kardex, Intake/Output, MAR, Med Rec Status, Cardiac Rhythm NSR, Quality Measures and Dual Neuro Assessment.

## 2021-07-25 NOTE — PROGRESS NOTES
Problem: Pressure Injury - Risk of  Goal: *Prevention of pressure injury  Description: Document Newton Scale and appropriate interventions in the flowsheet. Outcome: Progressing Towards Goal  Note: Pressure Injury Interventions:  Sensory Interventions: Assess changes in LOC, Assess need for specialty bed, Avoid rigorous massage over bony prominences    Moisture Interventions: Absorbent underpads, Apply protective barrier, creams and emollients, Assess need for specialty bed    Activity Interventions: Increase time out of bed    Mobility Interventions: HOB 30 degrees or less    Nutrition Interventions: Document food/fluid/supplement intake    Friction and Shear Interventions: Apply protective barrier, creams and emollients, Feet elevated on foot rest, Foam dressings/transparent film/skin sealants                Problem: Patient Education: Go to Patient Education Activity  Goal: Patient/Family Education  Outcome: Progressing Towards Goal     Problem: Pain  Goal: *Control of Pain  Outcome: Progressing Towards Goal  Goal: *PALLIATIVE CARE:  Alleviation of Pain  Outcome: Progressing Towards Goal     Problem: Patient Education: Go to Patient Education Activity  Goal: Patient/Family Education  Outcome: Progressing Towards Goal     Problem: Falls - Risk of  Goal: *Absence of Falls  Description: Document Rayn Fall Risk and appropriate interventions in the flowsheet.   Outcome: Progressing Towards Goal  Note: Fall Risk Interventions:       Mentation Interventions: Bed/chair exit alarm    Medication Interventions: Bed/chair exit alarm    Elimination Interventions: Bed/chair exit alarm    History of Falls Interventions: Bed/chair exit alarm         Problem: Patient Education: Go to Patient Education Activity  Goal: Patient/Family Education  Outcome: Progressing Towards Goal     Problem: Patient Education: Go to Patient Education Activity  Goal: Patient/Family Education  Outcome: Progressing Towards Goal     Problem: Patient Education: Go to Patient Education Activity  Goal: Patient/Family Education  Outcome: Progressing Towards Goal

## 2021-07-26 NOTE — PROGRESS NOTES
Hospitalist Progress Note          Whit Prakash MD  Please call  and page for questions. Call physician on-call through the  7pm-7am    Daily Progress Note: 7/26/2021    Primary care provider:Ahmed, Jeralene Spatz, MD    Date of admission: 7/21/2021  2:53 AM    Admission summery and hospital course:  HPI: 25-year-old man with a past medical history significant for type 2 diabetes, hypertension, chronic diastolic congestive heart failure, dyslipidemia, chronic kidney disease was in his usual state of health until the day of his presentation at the emergency room when it was reported that the patient developed change in mental status.  The patient also has weakness, which was described as generalized weakness.  The patient was taken to [facility] for further evaluation.  When the patient arrived at the emergency room, the patient was found to have worsening of his renal function.  Chest x-ray shows evidence of pneumonia.  The patient required Nephrology consult for initiation of hemodialysis. Subjective:   Patient said he is doing fine. Patient is sleepy. He knows he is at the hospital.  He cannot tell me the date and the place where he lives. Assessment/Plan:   Acute kidney injury   Etiology probably progression of the chronic kidney disease stage IIIb. Vascular access placed by IR. Continue hemodialysis M/W/F, none per permacath as per nephrology tomorrow. Urine cultures negative. Metabolic encephalopathy  Probably multifactorial including uremia and pneumonia. CT head negative for acute pathology      Bacterial pneumonia. Patient is afebrile monitor CBC. Patient completed the course of antibiotic. Liquid diarrhea  Check C. difficile, CBC and CMP. Patient is afebrile. Anemia of chronic disease   Iron infusion and EPO during dialysis as per nephrology. Thrombocytopenia: mild, monitor, monitor CBC.     Type 2 diabetes mellitus   Controlled with HB A1c 6.2%. Continue current regimen. Metabolic acidosis. - resolved with HD. Acute-on-chronic diastolic CHF:   due to volume overload from ESRD - resolved with HD. Fluid/electrolytes/nutrition   Patient is tolerating diet. HypoMagnesemia: replace, monitor  Hyperphosphatemia.  Replace and monitor. Hypokalemia replace and monitor. Patient is on hemodialysis.       Dyslipidemia.  Continue pravastatin. We will continue with preadmission medication    Hypertension, blood pressure is normal with current regimen. See orders for other plans. VTE prophylaxis: SCDs  Code status: Full code  Discussed plan of care with Patient/Family and Nurse. Pre-admission lived at home. Discharge planning: pending. Continue PT/OT, monitor lab. For permacath as per nephrology tomorrow. Review of Systems:     Review of Systems:  Symptom  Y/N  Comments   Symptom  Y/N  Comments    Fever/Chills  n    Chest Pain  n    Poor Appetite  n    Edema  n     Cough  n   Abdominal Pain  n     Sputum  n   Joint Pain      SOB/ROMEO  n   Pruritis/Rash      Nausea/vomit  n   Tolerating PT/OT      Diarrhea     Tolerating Diet      Constipation     Other      Could not obtain due to:         Objective:   Physical Exam:     Visit Vitals  /68 (BP 1 Location: Left upper arm, BP Patient Position: Supine)   Pulse 66   Temp 97.6 °F (36.4 °C)   Resp 13   Wt 57.4 kg (126 lb 9.6 oz)   SpO2 100%   BMI 17.17 kg/m²    O2 Flow Rate (L/min): 2 l/min O2 Device: Nasal cannula    Temp (24hrs), Av.7 °F (36.5 °C), Min:97.6 °F (36.4 °C), Max:97.9 °F (36.6 °C)    No intake/output data recorded. 1901 -  0700  In: -   Out: 500 [Urine:500]      General:   Thin and cachectic patient was resting covering his face with blanket. He is sleepy but easily arousable. He is cooperative. He is not in acute distress. Lungs:   Clear to auscultation bilaterally.    Chest wall:  No tenderness or deformity. Heart:  Regular rate and rhythm, S1, S2 normal, no murmur, click, rub or gallop. Abdomen:   Soft, non-tender. Bowel sounds normal. No masses,  No organomegaly. Extremities: Extremities normal, atraumatic, no cyanosis or edema. Neurologic:  Patient has clear voice. He follows commands. It seems patient is not interested in conversation. Data Review:       Recent Days:  No results for input(s): WBC, HGB, HCT, PLT, HGBEXT, HCTEXT, PLTEXT in the last 72 hours. No results for input(s): NA, K, CL, CO2, GLU, BUN, CREA, CA, MG, PHOS, ALB, TBIL, ALT, INR, INREXT in the last 72 hours. No lab exists for component: SGOT  No results for input(s): PH, PCO2, PO2, HCO3, FIO2 in the last 72 hours.     24 Hour Results:  Recent Results (from the past 24 hour(s))   GLUCOSE, POC    Collection Time: 07/25/21 12:01 PM   Result Value Ref Range    Glucose (POC) 108 65 - 117 mg/dL    Performed by Alex Lyn, POC    Collection Time: 07/25/21  4:34 PM   Result Value Ref Range    Glucose (POC) 136 (H) 65 - 117 mg/dL    Performed by Alex Lyn, POC    Collection Time: 07/25/21 10:15 PM   Result Value Ref Range    Glucose (POC) 159 (H) 65 - 117 mg/dL    Performed by Raven Berry, POC    Collection Time: 07/26/21  6:45 AM   Result Value Ref Range    Glucose (POC) 154 (H) 65 - 117 mg/dL    Performed by DELMI BACH        Problem List:  Problem List as of 7/26/2021 Date Reviewed: 7/21/2021        Codes Class Noted - Resolved    Acute delirium ICD-10-CM: R41.0  ICD-9-CM: 780.09  7/21/2021 - Present        Metabolic acidosis, normal anion gap (NAG) ICD-10-CM: E87.2  ICD-9-CM: 276.2  3/4/2021 - Present        UTI (urinary tract infection) ICD-10-CM: N39.0  ICD-9-CM: 599.0  3/4/2021 - Present        JEREMIAS (acute kidney injury) ([address] ICD-10-CM: N17.9  ICD-9-CM: 584.9  2/8/2021 - Present        CKD (chronic kidney disease) stage 3, GFR 30-59 ml/min ([facility] ICD-10-CM: N18.30  ICD-9-CM: 585.3  2/8/2021 - Present        Secondary hyperparathyroidism ([facility] ICD-10-CM: N25.81  ICD-9-CM: 588.81  2/8/2021 - Present        Vitamin D deficiency ICD-10-CM: E55.9  ICD-9-CM: 268.9  2/8/2021 - Present        Essential hypertension ICD-10-CM: I10  ICD-9-CM: 401.9  2/8/2021 - Present        Insulin-treated type 2 diabetes mellitus ([facility] ICD-10-CM: E11.9, Z79.4  ICD-9-CM: 250.00, V58.67  2/8/2021 - Present        Scalp abscess ICD-10-CM: L02.811  ICD-9-CM: 682.8  1/27/2021 - Present        RESOLVED: Hypokalemia ICD-10-CM: E87.6  ICD-9-CM: 276.8  2/8/2021 - 2/8/2021        RESOLVED: Hypomagnesemia ICD-10-CM: E83.42  ICD-9-CM: 275.2  2/8/2021 - 2/8/2021        RESOLVED: Hypocalcemia ICD-10-CM: E83.51  ICD-9-CM: 275.41  2/8/2021 - 2/8/2021              Medications reviewed  Current Facility-Administered Medications   Medication Dose Route Frequency    iron sucrose (VENOFER) 200 mg in 0.9% sodium chloride 100 mL IVPB  200 mg IntraVENous Q24H    DULoxetine (CYMBALTA) capsule 60 mg  60 mg Oral DAILY    hydrALAZINE (APRESOLINE) tablet 50 mg  50 mg Oral TID    NIFEdipine ER (PROCARDIA XL) tablet 60 mg  60 mg Oral BID    pravastatin (PRAVACHOL) tablet 20 mg  20 mg Oral QHS    insulin glargine (LANTUS) injection 8 Units  8 Units SubCUTAneous QHS    sodium chloride (NS) flush 5-40 mL  5-40 mL IntraVENous Q8H    sodium chloride (NS) flush 5-40 mL  5-40 mL IntraVENous PRN    acetaminophen (TYLENOL) tablet 650 mg  650 mg Oral Q6H PRN    Or    acetaminophen (TYLENOL) suppository 650 mg  650 mg Rectal Q6H PRN    polyethylene glycol (MIRALAX) packet 17 g  17 g Oral DAILY PRN    ondansetron (ZOFRAN ODT) tablet 4 mg  4 mg Oral Q8H PRN    Or    ondansetron (ZOFRAN) injection 4 mg  4 mg IntraVENous Q6H PRN    heparin (porcine) injection 5,000 Units  5,000 Units SubCUTAneous Q8H    insulin lispro (HUMALOG) injection   SubCUTAneous AC&HS    glucose chewable tablet 16 g  4 Tablet Oral PRN    dextrose (D50W) injection syrg 12.5-25 g  12.5-25 g IntraVENous PRN    glucagon (GLUCAGEN) injection 1 mg  1 mg IntraMUSCular PRN    L.acidophilus-paracasei-S.thermophil-bifidobacter (RISAQUAD) 8 billion cell capsule  1 Capsule Oral DAILY    calcium acetate (phosphate binder) (PHOSLO) tablet 667 mg  1 Tablet Oral TID WITH MEALS       Care Plan discussed with: Patient/Family, Nurse and     Total time spent with patient: 40 minutes.     Flash Higginbotham MD

## 2021-07-26 NOTE — PROGRESS NOTES
Transition of Care Plan   RUR- 20% Medium Risk   DISPOSITION: The disposition plan is to transition to SNF - provided choice.  F/U with PCP/Specialist     Transport: [facility]/family     At 8:11am -  contacted Harley Martinez with [facility]/[phone] to follow up regarding patients consult (outpatient dialysis unit). Afia Raphael reports that the Hep B Total Core has not been completed per requested last Friday, July 23, 2021. At 8:17am -  messaged Dr. Danay Velazco regarding the missing Hep B Test needing to be completed. At 8:17am - CM informed nursing supervisor Katelynn Paniagua. At 11:20am -  contacted patients daughter Negrita Henderson to provide update. Patient has been recommended for SNF. CM messaged attending physician to confirm recommendation, which was confirmed. Order was created. CM provided SNF list to patients daughter. CM encouraged patients daughter to select between 3-5 choices that will be submitted for review. The Plan for Transition of Care is related to the following treatment goals: SNF    The Patient and/or patient representative was provided with a choice of provider and agrees   with the discharge plan. [x] Yes [] No    Freedom of choice list was provided with basic dialogue that supports the patient's individualized plan of care/goals, treatment preferences and shares the quality data associated with the providers. [x] Yes [] No    CM will continue to follow, provide support and assist with ADAM needs as they arise.     Pegge Manual, MSW, CRM, LMHP-e

## 2021-07-26 NOTE — PROGRESS NOTES
Occupational Therapy  07/26/21    Orders received, chart reviewed and patient evaluated by occupational therapy. Pending progression with skilled acute occupational therapy, recommend:  Therapy up to 5 days/week in SNF setting     Recommend with nursing ADLs with supervision/setup, OOB to chair 3x/day and toileting via bedside commode with 1 assist and gait belt. Thank you for completing as able in order to maintain patient strength, endurance and independence. Full evaluation to follow.     Thank you,   NINA Okeefe, OTR/L

## 2021-07-26 NOTE — PROGRESS NOTES
Nephrology Progress Note  Ashkan Wong  Date of Admission : 7/21/2021    CC: Follow up for JEREMIAS on CKD       Assessment and Plan     JEREMIAS on CKD:  - ? Cause, likely progression of underlying CKD  - renal U/S neg  - HD MWF for now  - will need PC prior to d/c, will arrange for tomorrow    CKD 3b:  - pt of Dr. Tanika Michelle    Anemia of CKD:  - JULISA MWF + venofer    Secondary HPT:  - on phoslo    PNA:  - on rocephin    HTN:  - cont current meds       Interval History:  Seen and examined. No labs since 7/23. No cp or sob reported at this time. For HD later today. Current Medications: all current  Medications have been eviewed in EPIC  Review of Systems: Pertinent items are noted in HPI. Objective:  Vitals:    Vitals:    07/25/21 2205 07/25/21 2250 07/26/21 0210 07/26/21 0547   BP: 132/73 132/73 (!) 132/56 124/61   Pulse: 68 79 85 66   Resp: 15  13 13   Temp:   97.9 °F (36.6 °C) 97.6 °F (36.4 °C)   TempSrc:       SpO2: 99%  99% 100%   Weight:   57.4 kg (126 lb 9.6 oz)      Intake and Output:  No intake/output data recorded. 07/24 1901 - 07/26 0700  In: -   Out: 500 [Urine:500]    Physical Examination:  General: NAD,Conversant   Neck:  Supple, no mass  Resp:  Lungs CTA B/L, no wheezing , normal respiratory effort  CV:  RRR,  no murmur or rub, trace LE edema  GI:  Soft, NT, + Bowel sounds, no hepatosplenomegaly  Neurologic:  Non focal  Psych:             AAO x 3 appropriate affect   Skin:  No Rash  Access:           Hemant Gastelum    []    High complexity decision making was performed  []    Patient is at high-risk of decompensation with multiple organ involvement    Lab Data Personally Reviewed: I have reviewed all the pertinent labs, microbiology data and radiology studies during assessment. No results for input(s): NA, K, CL, CO2, GLU, BUN, CREA, CA, MG, PHOS, ALB, TBIL, ALT, INR, INREXT, INREXT in the last 72 hours.     No lab exists for component: SGOT  No results for input(s): WBC, HGB, HCT, PLT, HGBEXT, HCTEXT, PLTEXT, HGBEXT, HCTEXT, PLTEXT in the last 72 hours. No results found for: SDES  Lab Results   Component Value Date/Time    Culture result:  07/21/2021 08:54 AM     MRSA NOT PRESENT. Apparent Staphylococus aureus (not MRSA noted). Culture result:  07/21/2021 08:54 AM     Screening of patient nares for MRSA is for surveillance purposes and, if positive, to facilitate isolation considerations in high risk settings. It is not intended for automatic decolonization interventions per se as regimens are not sufficiently effective to warrant routine use. Culture result: No Growth (<1000 cfu/mL) 07/20/2021 09:00 PM     Recent Results (from the past 24 hour(s))   GLUCOSE, POC    Collection Time: 07/25/21 12:01 PM   Result Value Ref Range    Glucose (POC) 108 65 - 117 mg/dL    Performed by Jenni Trujillo, POC    Collection Time: 07/25/21  4:34 PM   Result Value Ref Range    Glucose (POC) 136 (H) 65 - 117 mg/dL    Performed by Iona Suero    GLUCOSE, POC    Collection Time: 07/25/21 10:15 PM   Result Value Ref Range    Glucose (POC) 159 (H) 65 - 117 mg/dL    Performed by Raven 146, POC    Collection Time: 07/26/21  6:45 AM   Result Value Ref Range    Glucose (POC) 154 (H) 65 - 117 mg/dL    Performed by [id number], MD  [address]  Phone - [phone]   Fax - [phone]  [email or web address]

## 2021-07-26 NOTE — PROGRESS NOTES
Problem: Pressure Injury - Risk of  Goal: *Prevention of pressure injury  Description: Document Newton Scale and appropriate interventions in the flowsheet. Outcome: Progressing Towards Goal  Note: Pressure Injury Interventions:  Sensory Interventions: Assess changes in LOC, Assess need for specialty bed, Avoid rigorous massage over bony prominences, Discuss PT/OT consult with provider    Moisture Interventions: Check for incontinence Q2 hours and as needed, Internal/External urinary devices    Activity Interventions: Increase time out of bed, Pressure redistribution bed/mattress(bed type), PT/OT evaluation    Mobility Interventions: HOB 30 degrees or less, Pressure redistribution bed/mattress (bed type), PT/OT evaluation    Nutrition Interventions: Document food/fluid/supplement intake, Discuss nutritional consult with provider    Friction and Shear Interventions: HOB 30 degrees or less, Lift sheet                Problem: Patient Education: Go to Patient Education Activity  Goal: Patient/Family Education  Outcome: Progressing Towards Goal     Problem: Pain  Goal: *Control of Pain  Outcome: Progressing Towards Goal  Goal: *PALLIATIVE CARE:  Alleviation of Pain  Outcome: Progressing Towards Goal     Problem: Patient Education: Go to Patient Education Activity  Goal: Patient/Family Education  Outcome: Progressing Towards Goal     Problem: Falls - Risk of  Goal: *Absence of Falls  Description: Document Ryan Fall Risk and appropriate interventions in the flowsheet.   Outcome: Progressing Towards Goal  Note: Fall Risk Interventions:       Mentation Interventions: Adequate sleep, hydration, pain control, Bed/chair exit alarm, Door open when patient unattended    Medication Interventions: Bed/chair exit alarm, Evaluate medications/consider consulting pharmacy, Patient to call before getting OOB    Elimination Interventions: Bed/chair exit alarm, Call light in reach, Patient to call for help with toileting needs    History of Falls Interventions: Bed/chair exit alarm, Consult care management for discharge planning, Door open when patient unattended, Evaluate medications/consider consulting pharmacy         Problem: Patient Education: Go to Patient Education Activity  Goal: Patient/Family Education  Outcome: Progressing Towards Goal     Problem: Patient Education: Go to Patient Education Activity  Goal: Patient/Family Education  Outcome: Progressing Towards Goal     Problem: Patient Education: Go to Patient Education Activity  Goal: Patient/Family Education  Outcome: Progressing Towards Goal

## 2021-07-26 NOTE — PROGRESS NOTES
Bedside and Verbal shift change report given to Vicenta Krause (oncoming nurse) by MELCHOR SABA [facility] (offgoing nurse). Report included the following information SBAR, Kardex, ED Summary, Procedure Summary, MAR, Recent Results, Cardiac Rhythm SR and Dual Neuro Assessment.

## 2021-07-26 NOTE — DIALYSIS
[facility] Dialysis Team [facility] [place]  [phone]    Vitals   Pre   Post   Assessment   Pre   Post     Temp  Temp: 97.6 °F (36.4 °C) (07/26/21 1730)  97.4 LOC  AXOX2 AXOX2   HR   Pulse (Heart Rate): 61 (07/26/21 1730) 71 Lungs   CTA, diminished bases, RA  CTA, RA   B/P   BP: (!) 141/63 (07/26/21 1730) 118/63 Cardiac   Sinus R Sinus R   Resp   Resp Rate: 11 (07/26/21 1730) 15 Skin   W/D, thin  W/D   Pain level  0 R shoulder pain 4/10. Primary RN aware. Edema    None   None   Orders:    Duration:   Start:    9815 End:    2100 Total:   3.5 hrs   Dialyzer:   Dialyzer/Set Up Inspection: Phil Levine (07/26/21 1730)   K Bath:   Dialysate K (mEq/L): 4 (07/26/21 1730)   Ca Bath:   Dialysate CA (mEq/L): 2.5 (07/26/21 1730)   Na/Bicarb:   Dialysate NA (mEq/L): 138 (07/26/21 1730)   Target Fluid Removal:   Goal/Amount of Fluid to Remove (mL): 3000 mL (07/26/21 1730)   Access     Type & Location:   R IJ non tunneled CVC, dsg CDI dated for 07/23/21. Visible biopatch. Both ports accessed per policy. Good aspiration and NS flush. Lines reversal at initiation of tx d/t high AP causing mach to alarm.    Labs     Obtained/Reviewed   Critical Results Called   Date when labs were drawn-  Hgb-    HGB   Date Value Ref Range Status   07/26/2021 8.2 (L) 12.1 - 17.0 g/dL Final     K-    Potassium   Date Value Ref Range Status   07/26/2021 2.1 (LL) 3.5 - 5.1 mmol/L Final     Comment:     RESULTS VERIFIED, PHONED TO AND READ BACK BY  SAADIA BEARDEN @1211/KMM       Ca-   Calcium   Date Value Ref Range Status   07/26/2021 7.1 (L) 8.5 - 10.1 MG/DL Final     Bun-   BUN   Date Value Ref Range Status   07/26/2021 66 (H) 6 - 20 MG/DL Final     Creat-   Creatinine   Date Value Ref Range Status   07/26/2021 6.79 (H) 0.70 - 1.30 MG/DL Final     Comment:     INVESTIGATED PER DELTA CHECK PROTOCOL        Medications/ Blood Products Given     Name   Dose   Route and Time                     Blood Volume Processed (BVP):    77 Net Fluid   Removed:  2000 ml Comments   Time Out Done: 7199  Primary Nurse Rpt Yessica Caicedo, RN  Primary Nurse Rpt Post:Nora Pham, SULEIMAN  Pt Education:Procedure, labs-K+  Care Plan:Monitor labs/ bowel elimination. HD per Nephrologists. Tx Summary:Pt with ongoing episodes of diarrhea. Uf goal adjusted. Pt responded fair to tx with soft BP. No hypotensive episodes. No meds given intra HD. All blood rinsed back post tx. Ports flush and packed with NS only. Q -Syte and pure hub caps applied, clamps secured. Pt was left in bed alert and in NAD. Admiting Diagnosis:  Pt's previous clinic-N/A  Consent signed - Informed Consent Verified: Yes (07/26/21 1730)  Kristi Consent -   Hepatitis Status- Ag neg/ Immune 07/21/21 per [facility]. Machine #- Machine Number: [id number] (07/26/21 1730)  Telemetry status-Bedside/ Remote  Pre-dialysis wt. - Pre-Dialysis Weight: 57.4 kg (126 lb 8.7 oz) (07/26/21 1730)

## 2021-07-26 NOTE — PROGRESS NOTES
Problem: Self Care Deficits Care Plan (Adult)  Goal: *Acute Goals and Plan of Care (Insert Text)  Description:   FUNCTIONAL STATUS PRIOR TO ADMISSION: Patient was independent and active without use of DME, was independent for basic and instrumental ADLs. HOME SUPPORT: The patient lived with his daughter but did not require assist.    Occupational Therapy Goals  Initiated 7/26/2021  1. Patient will perform grooming with minimal assistance/contact guard assist within 7 day(s). 2.  Patient will perform bathing with moderate assistance & AE PRN within 7 day(s). 3.  Patient will perform lower body dressing with moderate assistance within 7 day(s). 4.  Patient will perform toilet transfers to/from [facility] with minimal assistance/contact guard assist within 7 day(s). 5.  Patient will perform all aspects of toileting with moderate assistance  within 7 day(s). 6.  Patient will participate in upper extremity therapeutic exercise/activities with supervision/set-up for 5 minutes within 7 day(s). 7.  Patient will utilize energy conservation techniques during functional activities with verbal and visual cues within 7 day(s). Outcome: Not Met     OCCUPATIONAL THERAPY EVALUATION  Patient: Yuri Herrera (76 y.o. male)  Date: 7/26/2021  Primary Diagnosis: Acute delirium [R41.0]        Precautions: Fall, SBP <180    ASSESSMENT  Based on the objective data described below, the patient presents with decreased balance, strength, endurance, ROM, coordination, arousal, activity tolerance, safety awareness, and cognition s/p admission for AMS, found to have an ileus and now on HD. At baseline, he reports he is IND, lives with his daughter. He now presents far from this baseline, requiring Mod-Total A for ADLs and Mod A for OOB mobility d/t poor standing balance and debility, limited functional reach, and continued confusion.  Mod cues required for initiation and carryover of >80% of tasks/commands, returned to supine with all needs met d/t worsening activity tolerance. Considering his PLOF, recommend d/c to SNF to maximize safety & functional independence as he is a high fall & safety risk. Current Level of Function Impacting Discharge (ADLs/self-care): Mod-Total A for ADLs, Min-Mod A for limited OOB mobility with no AD    Functional Outcome Measure: The patient scored Total: 15/100 on the Barthel Index outcome measure which is indicative of 85% impaired ability to care for basic self needs/dependency on others; inferred 100% dependency on others for instrumental ADLs. Other factors to consider for discharge: fall risk, PMH, PLOF, new HD patient      Patient will benefit from skilled therapy intervention to address the above noted impairments. PLAN :  Recommendations and Planned Interventions: self care training, functional mobility training, therapeutic exercise, balance training, therapeutic activities, cognitive retraining, endurance activities, patient education, home safety training, and family training/education    Frequency/Duration: Patient will be followed by occupational therapy 5 times a week to address goals. Recommendation for discharge: (in order for the patient to meet his/her long term goals)  Therapy up to 5 days/week in SNF setting    This discharge recommendation:  Has been made in collaboration with the attending provider and/or case management    IF patient discharges home will need the following DME: To be determined (TBD)         SUBJECTIVE:   Patient stated I lost my wind.  re: SOB reported with all (yet minimal) activity    OBJECTIVE DATA SUMMARY:   HISTORY:   Past Medical History:   Diagnosis Date    CKD (chronic kidney disease) stage 3, GFR 30-59 ml/min ([address] 2/8/2021    Diabetes ([address]     Essential hypertension 2/8/2021    Hypertension     Insulin-treated type 2 diabetes mellitus ([address] 2/8/2021    Secondary hyperparathyroidism ([address] 2/8/2021    Vitamin D deficiency 2/8/2021     Past Surgical History:   Procedure Laterality Date    HX ORTHOPAEDIC      amputation left big toe    IR INSERT NON TUNL CVC OVER 5 YRS  7/21/2021       Expanded or extensive additional review of patient history:     Home Situation  # Steps to Enter: 8  Rails to Enter: Yes  Hand Rails : Bilateral  One/Two Story Residence: One story  Living Alone: No  Support Systems: Family member(s)  Current DME Used/Available at Home: None  Tub or Shower Type: Tub/Shower combination    Hand dominance: Right    EXAMINATION OF PERFORMANCE DEFICITS:  Cognitive/Behavioral Status:  Neurologic State: Drowsy  Orientation Level: Oriented to person;Oriented to place;Oriented to time;Disoriented to situation  Cognition: Decreased attention/concentration; Follows commands;Poor safety awareness  Perception: Cues to maintain midline in standing (posterior lean)  Perseveration: No perseveration noted  Safety/Judgement: Decreased awareness of environment;Decreased awareness of need for assistance;Decreased awareness of need for safety;Decreased insight into deficits    Skin: Appears intact    Edema: None    Hearing:       Vision/Perceptual:    Tracking: Unable to test secondary due to decreased visual attention                           Corrective Lenses: Glasses    Range of Motion:  AROM: Generally decreased, functional                         Strength:  Strength: Generally decreased, functional                Coordination:  Coordination: Generally decreased, functional  Fine Motor Skills-Upper: Left Intact; Right Intact    Gross Motor Skills-Upper: Left Impaired;Right Impaired (decreased proximally in BUEs)    Tone & Sensation:  Tone: Normal                         Balance:  Sitting: Intact; Without support  Standing: Impaired; With support  Standing - Static: Poor;Constant support  Standing - Dynamic : Poor;Constant support    Functional Mobility and Transfers for ADLs:  Bed Mobility:  Supine to Sit: Minimum assistance; Additional time  Sit to Supine: Minimum assistance; Additional time  Scooting: Contact guard assistance    Transfers:  Sit to Stand: Moderate assistance  Stand to Sit: Moderate assistance  Toilet Transfer : Moderate assistance (infer to [facility] per mobility)  Assistive Device : Gait Belt    ADL Assessment:  Feeding: Minimum assistance    Oral Facial Hygiene/Grooming: Moderate assistance    Bathing: Maximum assistance    Upper Body Dressing: Moderate assistance    Lower Body Dressing: Total assistance    Toileting: Total assistance                ADL Intervention and task modifications:   Lower Body Dressing Assistance  Dressing Assistance: Total assistance(dependent)  Pants With Elastic Waist: Total assistance(dependent) (simulated)  Socks: Total assistance (dependent) (poor reach & volition)  Leg Crossed Method Used: No  Position Performed: Seated edge of bed  Cues: Physical assistance;Visual cues provided;Verbal cues provided; Tactile cues provided    Toileting  Toileting Assistance: Total assistance(dependent) (brief & catheter)    Cognitive Retraining  Safety/Judgement: Decreased awareness of environment;Decreased awareness of need for assistance;Decreased awareness of need for safety;Decreased insight into deficits    Functional Measure:  Barthel Index:    Bathin  Bladder: 0  Bowels: 5  Groomin  Dressin  Feedin  Mobility: 0  Stairs: 0  Toilet Use: 0  Transfer (Bed to Chair and Back): 5  Total: 15/100        The Barthel ADL Index: Guidelines  1. The index should be used as a record of what a patient does, not as a record of what a patient could do. 2. The main aim is to establish degree of independence from any help, physical or verbal, however minor and for whatever reason. 3. The need for supervision renders the patient not independent. 4. A patient's performance should be established using the best available evidence.  Asking the patient, friends/relatives and nurses are the usual sources, but direct observation and common sense are also important. However direct testing is not needed. 5. Usually the patient's performance over the preceding 24-48 hours is important, but occasionally longer periods will be relevant. 6. Middle categories imply that the patient supplies over 50 per cent of the effort. 7. Use of aids to be independent is allowed. Rajni Childs., Barthel, D.W. (5009). Functional evaluation: the Barthel Index. [facility] (14)2. Sebas Right nelson [place], J.J.M.F, Anshul Garcia., Mayank Gregory., [place], [address] [place] [address] (1999). Measuring the change indisability after inpatient rehabilitation; comparison of the responsiveness of the Barthel Index and Functional Spink Measure. Journal of Neurology, Neurosurgery, and Psychiatry, 66(4), 603-927. LORETO Lock, TONY Foster, & Lucia Bobo M.A. (2004.) Assessment of post-stroke quality of life in cost-effectiveness studies: The usefulness of the Barthel Index and the EuroQoL-5D. Quality of Life Research, 15, 126-95         Occupational Therapy Evaluation Charge Determination   History Examination Decision-Making   LOW Complexity : Brief history review  MEDIUM Complexity : 3-5 performance deficits relating to physical, cognitive , or psychosocial skils that result in activity limitations and / or participation restrictions LOW Complexity : No comorbidities that affect functional and no verbal or physical assistance needed to complete eval tasks       Based on the above components, the patient evaluation is determined to be of the following complexity level: LOW   Pain Rating:  None reported    Activity Tolerance:   Fair and requires rest breaks    After treatment patient left in no apparent distress:    Supine in bed, Call bell within reach, Bed / chair alarm activated, and Side rails x 3    COMMUNICATION/EDUCATION:   The patients plan of care was discussed with: Physical therapist and Registered nurse.      Home safety education was provided and the patient/caregiver indicated understanding., Patient/family have participated as able in goal setting and plan of care. , and Patient/family agree to work toward stated goals and plan of care. This patients plan of care is appropriate for delegation to SOLOMON.     Thank you for this referral.  Alyne Nissen, OTD, OTR/L  Time Calculation: 26 mins

## 2021-07-26 NOTE — PROGRESS NOTES
Physical Therapy:     Chart reviewed and attempted to see pt for PT treatment. Pt resting in bed, minimally interactive with therapist and declined to work with PT at this time. Provided education on the importance of mobility and provided max encouragement to participate but pt continued to decline. Will defer and continue to follow.     Gigi Thompson, PT, DPT

## 2021-07-26 NOTE — PROGRESS NOTES
Speech pathology  Attempted to see patient for dysphagia follow up; however, patient lethargic, only moaning in response to my voice. RN reporting patient has had diarrhea today and has been intentionally not eating. He took his pills without issue. Slp to follow up to ensure diet tolerance as appropriate. He is on an easy to chew diet/ thin liquids.      Thanks,  Chrissy Pruett M.S. CCC-SLP

## 2021-07-27 NOTE — H&P
Interventional and Vascular Radiology History and Physical    Patient: Ricardo Magana 77 y.o. male       Chief Complaint: ESRD    History of Present Illness: 77year old male with ESRD presents for [facility] to [facility]. History:    Past Medical History:   Diagnosis Date    CKD (chronic kidney disease) stage 3, GFR 30-59 ml/min ([facility]) 2/8/2021    Diabetes ([facility]     Essential hypertension 2/8/2021    Hypertension     Insulin-treated type 2 diabetes mellitus ([facility] 2/8/2021    Secondary hyperparathyroidism ([facility] 2/8/2021    Vitamin D deficiency 2/8/2021     Family History   Problem Relation Age of Onset    Diabetes Mother     Hypertension Mother     Cancer Mother     Diabetes Father     Hypertension Father     Cancer Father      Social History     Socioeconomic History    Marital status: SINGLE     Spouse name: Not on file    Number of children: Not on file    Years of education: Not on file    Highest education level: Not on file   Occupational History    Not on file   Tobacco Use    Smoking status: Never Smoker    Smokeless tobacco: Never Used   Vaping Use    Vaping Use: Never used   Substance and Sexual Activity    Alcohol use: Yes     Comment: rarely    Drug use: Never    Sexual activity: Not on file   Other Topics Concern    Not on file   Social History Narrative    Not on file     Social Determinants of Health     Financial Resource Strain:     Difficulty of Paying Living Expenses:    Food Insecurity:     Worried About Running Out of Food in the Last Year:     Ran Out of Food in the Last Year:    Transportation Needs:     Lack of Transportation (Medical):      Lack of Transportation (Non-Medical):    Physical Activity:     Days of Exercise per Week:     Minutes of Exercise per Session:    Stress:     Feeling of Stress :    Social Connections:     Frequency of Communication with Friends and Family:     Frequency of Social Gatherings with Friends and Family:     Attends [employer] Services:     Active Member of Clubs or Organizations:     Attends Club or Organization Meetings:     Marital Status:    Intimate Partner Violence:     Fear of Current or Ex-Partner:     Emotionally Abused:     Physically Abused:     Sexually Abused: Allergies: No Known Allergies    Current Medications:  Current Facility-Administered Medications   Medication Dose Route Frequency    lidocaine (XYLOCAINE) 20 mg/mL (2 %) injection 400 mg  20 mL SubCUTAneous RAD ONCE    heparin (porcine) 1,000 unit/mL injection 10,000 Units  10,000 Units IntraVENous RAD ONCE    iopamidoL (ISOVUE 300) 61 % contrast injection 100 mL  100 mL IntraCATHeter RAD ONCE     No current outpatient medications on file.      Facility-Administered Medications Ordered in Other Encounters   Medication Dose Route Frequency    fentaNYL citrate (PF) injection  mcg   mcg IntraVENous Rad Multiple    midazolam (VERSED) injection 5 mg  5 mg IntraVENous Rad Multiple    DULoxetine (CYMBALTA) capsule 60 mg  60 mg Oral DAILY    hydrALAZINE (APRESOLINE) tablet 50 mg  50 mg Oral TID    NIFEdipine ER (PROCARDIA XL) tablet 60 mg  60 mg Oral BID    pravastatin (PRAVACHOL) tablet 20 mg  20 mg Oral QHS    insulin glargine (LANTUS) injection 8 Units  8 Units SubCUTAneous QHS    sodium chloride (NS) flush 5-40 mL  5-40 mL IntraVENous Q8H    sodium chloride (NS) flush 5-40 mL  5-40 mL IntraVENous PRN    acetaminophen (TYLENOL) tablet 650 mg  650 mg Oral Q6H PRN    Or    acetaminophen (TYLENOL) suppository 650 mg  650 mg Rectal Q6H PRN    polyethylene glycol (MIRALAX) packet 17 g  17 g Oral DAILY PRN    ondansetron (ZOFRAN ODT) tablet 4 mg  4 mg Oral Q8H PRN    Or    ondansetron (ZOFRAN) injection 4 mg  4 mg IntraVENous Q6H PRN    heparin (porcine) injection 5,000 Units  5,000 Units SubCUTAneous Q8H    insulin lispro (HUMALOG) injection   SubCUTAneous AC&HS    glucose chewable tablet 16 g  4 Tablet Oral PRN    dextrose (D50W) injection syrg 12.5-25 g  12.5-25 g IntraVENous PRN    glucagon (GLUCAGEN) injection 1 mg  1 mg IntraMUSCular PRN    L.acidophilus-paracasei-S.thermophil-bifidobacter (RISAQUAD) 8 billion cell capsule  1 Capsule Oral DAILY    calcium acetate (phosphate binder) (PHOSLO) tablet 667 mg  1 Tablet Oral TID WITH MEALS        Physical Exam:  Blood pressure 122/67, pulse 67, resp. rate 17, SpO2 100 %. No acute distress   mallampati 3  nonlabored respiration  Regular rhythm, normal rate  Right neck/chest site ok for procedure      Alerts:    Hospital Problems  Date Reviewed: 7/21/2021    None          Laboratory:      Recent Labs     07/27/21  0149 07/27/21  0146 07/27/21  0121 07/27/21  0121 07/26/21  1108   HGB 9.1*  --   --   --    < >   HCT 28.5*  --   --   --    < >   WBC 8.3  --   --   --    < >     --   --   --    < >   INR  --   --   --  1.2*  --    BUN  --  25*   < > 25*  --    CREA  --  3.61*   < > 3.61*  --    K  --  2.5*   < > 2.5*  --     < > = values in this interval not displayed. Plan of Care/Planned Procedure:  Risks, benefits, and alternatives reviewed with patient's daughter, POA, who agreed to proceed with the procedure. Conscious sedation will be performed with IV fentanyl and versed.        Zully Metzger MD

## 2021-07-27 NOTE — PROGRESS NOTES
Physical Therapy: Defer    Chart reviewed in prep for PT treatment. Pt currently GEORGIE for procedure. Will defer and continue to follow.     Nae Hilliard, PT, DPT

## 2021-07-27 NOTE — PROGRESS NOTES
Hospitalist Progress Note          Carli Pompa MD  Please call  and page for questions. Call physician on-call through the  7pm-7am    Daily Progress Note: 7/27/2021    Primary care provider:Norma Davis MD    Date of admission: 7/21/2021  2:53 AM    Admission summery and hospital course:  HPI: 78-year-old man with a past medical history significant for type 2 diabetes, hypertension, chronic diastolic congestive heart failure, dyslipidemia, chronic kidney disease was in his usual state of health until the day of his presentation at the emergency room when it was reported that the patient developed change in mental status.  The patient also has weakness, which was described as generalized weakness.  The patient was taken to [facility] for further evaluation.  When the patient arrived at the emergency room, the patient was found to have worsening of his renal function. Chest x-ray shows evidence of pneumonia.  The patient required Nephrology consult for initiation of hemodialysis. Subjective:   Patient complained of continuous watery diarrhea. Patient does not have any appetite. Patient said his abdomen is distended today. Patient was seen after the permacath today. Assessment/Plan:   Acute kidney injury   Etiology probably progression of the chronic kidney disease stage IIIb. Vascular access placed by IR. Continue hemodialysis M/W/F,  permacath placed on 07/27/2021   Urine cultures negative.     Metabolic encephalopathy  Probably multifactorial including uremia and pneumonia. CT head negative for acute pathology. Liquid diarrhea  With abdominal distention and soft appetite. Possible ileus or obstruction or colon enlargement with C. Difficile. We will get CT scan abdomen. Check C. difficile, stool culture, ova and parasite, CBC and CMP. Patient is afebrile.    We have been placing C. difficile for couple of days, has been refused as he did not meet the hospital protocol.       Bacterial pneumonia. Patient is afebrile monitor CBC. Patient completed the course of antibiotic.        Anemia of chronic disease   Iron infusion and EPO during dialysis as per nephrology.       Thrombocytopenia: mild, monitor, monitor CBC.     Type 2 diabetes mellitus   Controlled with HB A1c 6.2%. Continue current regimen.     Metabolic acidosis. - resolved with HD.     Acute-on-chronic diastolic CHF:   due to volume overload from ESRD - resolved with HD.     Fluid/electrolytes/nutrition   Patient is tolerating diet. HypoMagnesemia: replace, monitor  Hyperphosphatemia.  Replace and monitor. Hypokalemia replace and monitor. Patient is on hemodialysis. Follow lab from this morning. .     Dyslipidemia.  Continue pravastatin. We will continue with preadmission medication     Hypertension, blood pressure is normal with current regimen.      See orders for other plans. VTE prophylaxis: SCDs  Code status: Full code  Discussed plan of care with Patient/Family and Nurse. Pre-admission lived at home. Discharge planning: pending. Pending CT abdomen , ruling out C. Difficile. continue PT/OT and monitor potassium. 1500 PM:   Patient looked this morning and afternoon toxic. CT scan showed megacolon. Probably due to C. difficile colitis. Patient completed IV antibiotic including doxycycline and ceftriaxone possible pneumonia. C. difficile was ordered yesterday because of the diarrhea but it was not run. I ordered this morning to but it was not done. I ordered C. difficile again now. I will start him on high-dose of p.o. vancomycin and IV Flagyl after the collection of stool.   Consult general surgery and gastroenterologist.       Review of Systems:     Review of Systems:  Symptom  Y/N  Comments   Symptom  Y/N  Comments    Fever/Chills  n    Chest Pain  n    Poor Appetite  y    Edema  ny     Cough  n   Abdominal Pain       Sputum  n Joint Pain      SOB/ROMEO  n   Pruritis/Rash      Nausea/vomit  y   Tolerating PT/OT      Diarrhea     Tolerating Diet      Constipation     Other      Could not obtain due to:         Objective:   Physical Exam:     Visit Vitals  /71 (BP 1 Location: Left upper arm, BP Patient Position: At rest)   Pulse 68   Temp 98.5 °F (36.9 °C)   Resp 15   Wt 57.4 kg (126 lb 9.6 oz)   SpO2 99%   BMI 17.17 kg/m²    O2 Flow Rate (L/min): 2 l/min O2 Device: None (Room air)    Temp (24hrs), Av °F (36.7 °C), Min:97.6 °F (36.4 °C), Max:98.5 °F (36.9 °C)    No intake/output data recorded.  1901 -  0700  In: -   Out: 2800 [Urine:800]      General:   Thin and cachectic patient. Cooperative, no distress, appears stated age. Lungs:   Clear to auscultation bilaterally. Chest wall:  No tenderness or deformity. Heart:  Regular rate and rhythm, S1, S2 normal, no murmur, click, rub or gallop. Abdomen:    Abdomen firm and distended. Bowel sounds are absent. Extremities: Extremities normal, atraumatic, no cyanosis or edema. Neurologic:  Patient had fentanyl for permacath this morning. Still is sleepy. His voice is clear, he follows command. .      Data Review:       Recent Days:  Recent Labs     21  0149 21  1108   WBC 8.3 9.0   HGB 9.1* 8.2*   HCT 28.5* 25.3*    172     Recent Labs     21  0146 21  0121 21  1108    142 136   K 2.5* 2.5* 2.1*    103 102   CO2 22 24 18*   GLU 77 86 124*   BUN 25* 25* 66*   CREA 3.61* 3.61* 6.79*   CA 8.2* 8.6 7.1*   MG 2.1  --   --    PHOS 3.7  --   --    ALB 3.5 3.5 3.3*   ALT 26 25 26   INR  --  1.2*  --      No results for input(s): PH, PCO2, PO2, HCO3, FIO2 in the last 72 hours.     24 Hour Results:  Recent Results (from the past 24 hour(s))   CBC WITH AUTOMATED DIFF    Collection Time: 21 11:08 AM   Result Value Ref Range    WBC 9.0 4.1 - 11.1 K/uL    RBC 2.84 (L) 4.10 - 5.70 M/uL    HGB 8.2 (L) 12.1 - 17.0 g/dL    HCT 25.3 (L) 36.6 - 50.3 %    MCV 89.1 80.0 - 99.0 FL    MCH 28.9 26.0 - 34.0 PG    MCHC 32.4 30.0 - 36.5 g/dL    RDW 17.3 (H) 11.5 - 14.5 %    PLATELET 954 483 - 622 K/uL    MPV 10.9 8.9 - 12.9 FL    NRBC 4.3 (H) 0  WBC    ABSOLUTE NRBC 0.39 (H) 0.00 - 0.01 K/uL    NEUTROPHILS 79 (H) 32 - 75 %    LYMPHOCYTES 8 (L) 12 - 49 %    MONOCYTES 11 5 - 13 %    EOSINOPHILS 1 0 - 7 %    BASOPHILS 0 0 - 1 %    IMMATURE GRANULOCYTES 1 (H) 0.0 - 0.5 %    ABS. NEUTROPHILS 7.1 1.8 - 8.0 K/UL    ABS. LYMPHOCYTES 0.7 (L) 0.8 - 3.5 K/UL    ABS. MONOCYTES 1.0 0.0 - 1.0 K/UL    ABS. EOSINOPHILS 0.1 0.0 - 0.4 K/UL    ABS. BASOPHILS 0.0 0.0 - 0.1 K/UL    ABS. IMM. GRANS. 0.1 (H) 0.00 - 0.04 K/UL    DF SMEAR SCANNED      RBC COMMENTS POLYCHROMASIA  1+        RBC COMMENTS ANISOCYTOSIS  1+        RBC COMMENTS ADRIANA CELLS  PRESENT       METABOLIC PANEL, COMPREHENSIVE    Collection Time: 07/26/21 11:08 AM   Result Value Ref Range    Sodium 136 136 - 145 mmol/L    Potassium 2.1 (LL) 3.5 - 5.1 mmol/L    Chloride 102 97 - 108 mmol/L    CO2 18 (L) 21 - 32 mmol/L    Anion gap 16 (H) 5 - 15 mmol/L    Glucose 124 (H) 65 - 100 mg/dL    BUN 66 (H) 6 - 20 MG/DL    Creatinine 6.79 (H) 0.70 - 1.30 MG/DL    BUN/Creatinine ratio 10 (L) 12 - 20      GFR est AA 10 (L) >60 ml/min/1.73m2    GFR est non-AA 8 (L) >60 ml/min/1.73m2    Calcium 7.1 (L) 8.5 - 10.1 MG/DL    Bilirubin, total 0.4 0.2 - 1.0 MG/DL    ALT (SGPT) 26 12 - 78 U/L    AST (SGOT) 18 15 - 37 U/L    Alk.  phosphatase 131 (H) 45 - 117 U/L    Protein, total 7.2 6.4 - 8.2 g/dL    Albumin 3.3 (L) 3.5 - 5.0 g/dL    Globulin 3.9 2.0 - 4.0 g/dL    A-G Ratio 0.8 (L) 1.1 - 2.2     HEPATITIS B CORE AB, TOTAL    Collection Time: 07/26/21 11:08 AM   Result Value Ref Range    Hep B Core Ab, total Positive (A) Negative     GLUCOSE, POC    Collection Time: 07/26/21 11:33 AM   Result Value Ref Range    Glucose (POC) 127 (H) 65 - 117 mg/dL    Performed by DELMI BACH    GLUCOSE, POC    Collection Time: 07/26/21  4:30 PM   Result Value Ref Range    Glucose (POC) 118 (H) 65 - 117 mg/dL    Performed by Justina Tierney    METABOLIC PANEL, COMPREHENSIVE    Collection Time: 07/27/21  1:21 AM   Result Value Ref Range    Sodium 142 136 - 145 mmol/L    Potassium 2.5 (LL) 3.5 - 5.1 mmol/L    Chloride 103 97 - 108 mmol/L    CO2 24 21 - 32 mmol/L    Anion gap 15 5 - 15 mmol/L    Glucose 86 65 - 100 mg/dL    BUN 25 (H) 6 - 20 MG/DL    Creatinine 3.61 (H) 0.70 - 1.30 MG/DL    BUN/Creatinine ratio 7 (L) 12 - 20      GFR est AA 20 (L) >60 ml/min/1.73m2    GFR est non-AA 17 (L) >60 ml/min/1.73m2    Calcium 8.6 8.5 - 10.1 MG/DL    Bilirubin, total 0.5 0.2 - 1.0 MG/DL    ALT (SGPT) 25 12 - 78 U/L    AST (SGOT) 22 15 - 37 U/L    Alk. phosphatase 137 (H) 45 - 117 U/L    Protein, total 8.4 (H) 6.4 - 8.2 g/dL    Albumin 3.5 3.5 - 5.0 g/dL    Globulin 4.9 (H) 2.0 - 4.0 g/dL    A-G Ratio 0.7 (L) 1.1 - 2.2     PROTHROMBIN TIME + INR    Collection Time: 07/27/21  1:21 AM   Result Value Ref Range    INR 1.2 (H) 0.9 - 1.1      Prothrombin time 12.1 (H) 9.0 - 11.1 sec   MAGNESIUM    Collection Time: 07/27/21  1:46 AM   Result Value Ref Range    Magnesium 2.1 1.6 - 2.4 mg/dL   PHOSPHORUS    Collection Time: 07/27/21  1:46 AM   Result Value Ref Range    Phosphorus 3.7 2.6 - 4.7 MG/DL   METABOLIC PANEL, COMPREHENSIVE    Collection Time: 07/27/21  1:46 AM   Result Value Ref Range    Sodium 140 136 - 145 mmol/L    Potassium 2.5 (LL) 3.5 - 5.1 mmol/L    Chloride 102 97 - 108 mmol/L    CO2 22 21 - 32 mmol/L    Anion gap 16 (H) 5 - 15 mmol/L    Glucose 77 65 - 100 mg/dL    BUN 25 (H) 6 - 20 MG/DL    Creatinine 3.61 (H) 0.70 - 1.30 MG/DL    BUN/Creatinine ratio 7 (L) 12 - 20      GFR est AA 20 (L) >60 ml/min/1.73m2    GFR est non-AA 17 (L) >60 ml/min/1.73m2    Calcium 8.2 (L) 8.5 - 10.1 MG/DL    Bilirubin, total 0.5 0.2 - 1.0 MG/DL    ALT (SGPT) 26 12 - 78 U/L    AST (SGOT) 23 15 - 37 U/L    Alk.  phosphatase 139 (H) 45 - 117 U/L    Protein, total 7.7 6.4 - 8.2 g/dL    Albumin 3.5 3.5 - 5.0 g/dL    Globulin 4.2 (H) 2.0 - 4.0 g/dL    A-G Ratio 0.8 (L) 1.1 - 2.2     CBC W/O DIFF    Collection Time: 07/27/21  1:49 AM   Result Value Ref Range    WBC 8.3 4.1 - 11.1 K/uL    RBC 3.24 (L) 4.10 - 5.70 M/uL    HGB 9.1 (L) 12.1 - 17.0 g/dL    HCT 28.5 (L) 36.6 - 50.3 %    MCV 88.0 80.0 - 99.0 FL    MCH 28.1 26.0 - 34.0 PG    MCHC 31.9 30.0 - 36.5 g/dL    RDW 17.2 (H) 11.5 - 14.5 %    PLATELET 510 153 - 968 K/uL    MPV 10.9 8.9 - 12.9 FL    NRBC 3.6 (H) 0  WBC    ABSOLUTE NRBC 0.30 (H) 0.00 - 0.01 K/uL       Problem List:  Problem List as of 7/27/2021 Date Reviewed: 7/21/2021        Codes Class Noted - Resolved    Acute delirium ICD-10-CM: R41.0  ICD-9-CM: 780.09  7/21/2021 - Present        Metabolic acidosis, normal anion gap (NAG) ICD-10-CM: E87.2  ICD-9-CM: 276.2  3/4/2021 - Present        UTI (urinary tract infection) ICD-10-CM: N39.0  ICD-9-CM: 599.0  3/4/2021 - Present        JEREMIAS (acute kidney injury) ([facility] ICD-10-CM: N17.9  ICD-9-CM: 584.9  2/8/2021 - Present        CKD (chronic kidney disease) stage 3, GFR 30-59 ml/min ([facility]) ICD-10-CM: N18.30  ICD-9-CM: 585.3  2/8/2021 - Present        Secondary hyperparathyroidism ([facility] ICD-10-CM: N25.81  ICD-9-CM: 588.81  2/8/2021 - Present        Vitamin D deficiency ICD-10-CM: E55.9  ICD-9-CM: 268.9  2/8/2021 - Present        Essential hypertension ICD-10-CM: I10  ICD-9-CM: 401.9  2/8/2021 - Present        Insulin-treated type 2 diabetes mellitus ([facility] ICD-10-CM: E11.9, Z79.4  ICD-9-CM: 250.00, V58.67  2/8/2021 - Present        Scalp abscess ICD-10-CM: L02.811  ICD-9-CM: 682.8  1/27/2021 - Present        RESOLVED: Hypokalemia ICD-10-CM: E87.6  ICD-9-CM: 276.8  2/8/2021 - 2/8/2021        RESOLVED: Hypomagnesemia ICD-10-CM: E83.42  ICD-9-CM: 275.2  2/8/2021 - 2/8/2021        RESOLVED: Hypocalcemia ICD-10-CM: E83.51  ICD-9-CM: 275.41  2/8/2021 - 2/8/2021              Medications reviewed  Current Facility-Administered Medications Medication Dose Route Frequency    fentaNYL citrate (PF) injection  mcg   mcg IntraVENous Rad Multiple    midazolam (VERSED) injection 5 mg  5 mg IntraVENous Rad Multiple    DULoxetine (CYMBALTA) capsule 60 mg  60 mg Oral DAILY    hydrALAZINE (APRESOLINE) tablet 50 mg  50 mg Oral TID    NIFEdipine ER (PROCARDIA XL) tablet 60 mg  60 mg Oral BID    pravastatin (PRAVACHOL) tablet 20 mg  20 mg Oral QHS    insulin glargine (LANTUS) injection 8 Units  8 Units SubCUTAneous QHS    sodium chloride (NS) flush 5-40 mL  5-40 mL IntraVENous Q8H    sodium chloride (NS) flush 5-40 mL  5-40 mL IntraVENous PRN    acetaminophen (TYLENOL) tablet 650 mg  650 mg Oral Q6H PRN    Or    acetaminophen (TYLENOL) suppository 650 mg  650 mg Rectal Q6H PRN    polyethylene glycol (MIRALAX) packet 17 g  17 g Oral DAILY PRN    ondansetron (ZOFRAN ODT) tablet 4 mg  4 mg Oral Q8H PRN    Or    ondansetron (ZOFRAN) injection 4 mg  4 mg IntraVENous Q6H PRN    heparin (porcine) injection 5,000 Units  5,000 Units SubCUTAneous Q8H    insulin lispro (HUMALOG) injection   SubCUTAneous AC&HS    glucose chewable tablet 16 g  4 Tablet Oral PRN    dextrose (D50W) injection syrg 12.5-25 g  12.5-25 g IntraVENous PRN    glucagon (GLUCAGEN) injection 1 mg  1 mg IntraMUSCular PRN    L.acidophilus-paracasei-S.thermophil-bifidobacter (RISAQUAD) 8 billion cell capsule  1 Capsule Oral DAILY    calcium acetate (phosphate binder) (PHOSLO) tablet 667 mg  1 Tablet Oral TID WITH MEALS     Facility-Administered Medications Ordered in Other Encounters   Medication Dose Route Frequency    lidocaine (XYLOCAINE) 20 mg/mL (2 %) injection 400 mg  20 mL SubCUTAneous RAD ONCE    heparin (porcine) 1,000 unit/mL injection 10,000 Units  10,000 Units IntraVENous RAD ONCE    iopamidoL (ISOVUE 300) 61 % contrast injection 100 mL  100 mL IntraCATHeter RAD ONCE       Care Plan discussed with: Patient/Family, Nurse and     Total time spent with patient: 50 minutes.     Cesia Haley MD

## 2021-07-27 NOTE — PROGRESS NOTES
Speech pathology note  Reviewed chart and note patient off the floor for procedure. Will follow as patient available and cleared for PO intake. Thank you.     Marques Pagan, Joshua Santana., CCC-SLP

## 2021-07-27 NOTE — PROGRESS NOTES
Transition of Care Plan  · RUR- 20% Medium Risk  · DISPOSITION: The disposition plan is to transition to SNF  - pending review. · F/U with PCP/Specialist    · Transport: [facility]    At 8:37am -  contacted Kevin Morales with [facility]/[phone] to follow up regarding patients consult (outpatient dialysis unit). Hawa Graves reports that she will contact TW once she has reviewed if lab has been completed per request on Friday July, 23, 2021. At 8:39am -  contacted patients daughter Chris Chiang who reports that she will follow up TW this afternoon with choices for SNF. At 9:45am -  received a call from Kevin Morales who reports patients Hep B Total Core lab came back positive and is requesting the Hep B Total Core IGM. CM messaged attending physician to complete requested lab today. At 2:17pm - CM received a call from patients daughter who provided choices for SNF:  [id number] [facility] (fax)  [facility]  [phone] ([facility]    At 2:20pm - CM contacted admin coordinator at [address] [facility] - to follow up. CM left contact information, CM was informed    At 2:37pm - CM received a call from [address] with [address] and [address] [facility] encouraged to fax clinical chart to [phone]. Monster Gaviria reports that at this time, she has not beds available but will provide update to TW upon completion of review of the referral.     At 2:44pm  - JELENA submitted referral via [facility] to [facility]. CM contacted representative at [address] [facility] who reports there is bed availability. CM will continue to follow, provide support and assist with ADAM needs as they arise.     Facundo Khalil, MSW, CRM, LMHP-e

## 2021-07-27 NOTE — PROGRESS NOTES
Physician Progress Note      [facility] #:                  [id number]  :                       1945  ADMIT DATE:       2021 2:53 AM  DISCH DATE:  RESPONDING  PROVIDER #:        Teresa Clark MD          QUERY TEXT:    Dear Attending,    Patient admitted with BMI 17.17. Patient is 6' 57.4kg. Nursing assessment document loss of sub q tissue on skin assessment and weakness x4 extremities. If possible, please document in progress notes and discharge summary if you are evaluating and /or treating any of the following: The medical record reflects the following:  Risk Factors: 76M CKD IIIB with disease progression, ACDHF  Clinical Indicators: 6' 57.4kg BMI 17.17    Treatment: Pressure redistribution mattress, assist patient with turns, Easy to Chew Adult Diet, Daily BMP, repletion of electrolytes    ASPEN Criteria:  https://aspenjournals. onlinelibrary. nieto. com/doi/full/10.1177/5478685021968019    Thank you,    Elizabeth Brewster BSN RN CRCR  Clinical Documentation Improvement  [phone]  Options provided:  -- Underweight with BMI 17.17  -- BMI not clinically significant  -- Other - I will add my own diagnosis  -- Disagree - Not applicable / Not valid  -- Disagree - Clinically unable to determine / Unknown  -- Refer to Clinical Documentation Reviewer    PROVIDER RESPONSE TEXT:    This patient is underweight with a BMI of 17.17.     Query created by: Jayson Regan on 2021 10:08 AM      Electronically signed by:  Teresa Clark MD 2021 2:07 PM

## 2021-07-27 NOTE — PROGRESS NOTES
Bedside and Verbal shift change report given to MELCHOR SABA [facility] (oncoming nurse) by [facility] (offgoing nurse). Report included the following information SBAR, Kardex, ED Summary, Procedure Summary, Intake/Output, MAR, Recent Results, Cardiac Rhythm NSR, Quality Measures and Dual Neuro Assessment.

## 2021-07-27 NOTE — PROGRESS NOTES
Problem: Pressure Injury - Risk of  Goal: *Prevention of pressure injury  Description: Document Newton Scale and appropriate interventions in the flowsheet.   Outcome: Progressing Towards Goal  Note: Pressure Injury Interventions:  Sensory Interventions: Assess changes in LOC, Assess need for specialty bed, Check visual cues for pain, Avoid rigorous massage over bony prominences, Float heels, Discuss PT/OT consult with provider, Keep linens dry and wrinkle-free, Minimize linen layers    Moisture Interventions: Absorbent underpads, Apply protective barrier, creams and emollients, Assess need for specialty bed, Check for incontinence Q2 hours and as needed, Internal/External urinary devices, Limit adult briefs, Maintain skin hydration (lotion/cream), Minimize layers    Activity Interventions: Increase time out of bed, Pressure redistribution bed/mattress(bed type), PT/OT evaluation, Assess need for specialty bed, Chair cushion    Mobility Interventions: Chair cushion, Float heels, HOB 30 degrees or less, Pressure redistribution bed/mattress (bed type), PT/OT evaluation, Assess need for specialty bed    Nutrition Interventions: Document food/fluid/supplement intake    Friction and Shear Interventions: Apply protective barrier, creams and emollients, Feet elevated on foot rest, Foam dressings/transparent film/skin sealants, HOB 30 degrees or less, Lift sheet, Lift team/patient mobility team, Minimize layers                Problem: Patient Education: Go to Patient Education Activity  Goal: Patient/Family Education  Outcome: Progressing Towards Goal     Problem: Pain  Goal: *Control of Pain  Outcome: Progressing Towards Goal  Goal: *PALLIATIVE CARE:  Alleviation of Pain  Outcome: Progressing Towards Goal     Problem: Patient Education: Go to Patient Education Activity  Goal: Patient/Family Education  Outcome: Progressing Towards Goal     Problem: Falls - Risk of  Goal: *Absence of Falls  Description: Document [employer] Risk and appropriate interventions in the flowsheet. Outcome: Progressing Towards Goal  Note: Fall Risk Interventions:       Mentation Interventions: Adequate sleep, hydration, pain control, Bed/chair exit alarm, Door open when patient unattended, Eyeglasses and hearing aids, Evaluate medications/consider consulting pharmacy, Increase mobility, More frequent rounding, Reorient patient, Room close to nurse's station, Toileting rounds, Update white board    Medication Interventions: Assess postural VS orthostatic hypotension, Bed/chair exit alarm, Patient to call before getting OOB, Evaluate medications/consider consulting pharmacy, Teach patient to arise slowly    Elimination Interventions: Bed/chair exit alarm, Call light in reach, Patient to call for help with toileting needs, Stay With Me (per policy), Toilet paper/wipes in reach, Toileting schedule/hourly rounds    History of Falls Interventions: Bed/chair exit alarm, Consult care management for discharge planning, Door open when patient unattended, Evaluate medications/consider consulting pharmacy, Investigate reason for fall, Room close to nurse's station         Problem: Patient Education: Go to Patient Education Activity  Goal: Patient/Family Education  Outcome: Progressing Towards Goal     Problem: Patient Education: Go to Patient Education Activity  Goal: Patient/Family Education  Outcome: Progressing Towards Goal     Problem: Patient Education: Go to Patient Education Activity  Goal: Patient/Family Education  Outcome: Progressing Towards Goal     Problem: Risk for Spread of Infection  Goal: Prevent transmission of infectious organism to others  Description: Prevent the transmission of infectious organisms to other patients, staff members, and visitors.   Outcome: Progressing Towards Goal     Problem: Patient Education: Go to Patient Education Activity  Goal: Patient/Family Education  Outcome: Progressing Towards Goal

## 2021-07-27 NOTE — CONSULTS
[facility]  Inpatient Consultation        Admit Date: 7/21/2021  Reason for Consultation: megacolon    HPI:  Herman Lopez is a 77 y.o. male w/ PMHx as outlined below whom we are asked to see in consultation by Dr. Rudy Pham for the above complaint. Pt presented to the [facility] ED on 7/21 w/ AMS. He was found to have worsening kidney failure and pneumonia on CXR; also a UTI. He was then transferred to [facility] for dialysis and further mgmt. Yesterday he began having abd distention and watery diarrhea. It has persisted today and a C diff specimen is ordered. CT was done which shows -  1. The study is limited by lack of oral and intravenous contrast.  2.  Markedly distended colon and rectum with air, stool, fluid. There is mild  small bowel dilatation which is difficult to evaluate but is likely secondary to  colonic distention. 3.  Diffuse sclerosis of the osseous structures. This is likely secondary to  metabolic bone disease.            Patient Active Problem List    Diagnosis Date Noted    Acute delirium 09/21/2104    Metabolic acidosis, normal anion gap (NAG) 03/04/2021    UTI (urinary tract infection) 03/04/2021    JEREMIAS (acute kidney injury) ([address] 02/08/2021    CKD (chronic kidney disease) stage 3, GFR 30-59 ml/min ([address] 02/08/2021    Secondary hyperparathyroidism ([address] 02/08/2021    Vitamin D deficiency 02/08/2021    Essential hypertension 02/08/2021    Insulin-treated type 2 diabetes mellitus ([address] 02/08/2021    Scalp abscess 01/27/2021     Past Medical History:   Diagnosis Date    CKD (chronic kidney disease) stage 3, GFR 30-59 ml/min ([address] 2/8/2021    Diabetes ([address]     Essential hypertension 2/8/2021    Hypertension     Insulin-treated type 2 diabetes mellitus ([address] 2/8/2021    Secondary hyperparathyroidism ([address] 2/8/2021    Vitamin D deficiency 2/8/2021      Past Surgical History:   Procedure Laterality Date    HX ORTHOPAEDIC      amputation left big toe    IR INSERT NON TUNL CVC OVER 5 YRS  7/21/2021    IR INSERT TUNL CVC W/O PORT OVER 5 YR  7/27/2021      Social History     Tobacco Use    Smoking status: Never Smoker    Smokeless tobacco: Never Used   Substance Use Topics    Alcohol use: Yes     Comment: rarely      Family History   Problem Relation Age of Onset    Diabetes Mother     Hypertension Mother     Cancer Mother     Diabetes Father     Hypertension Father     Cancer Father       Prior to Admission medications    Medication Sig Start Date End Date Taking? Authorizing Provider   glipiZIDE (GLUCOTROL) 5 mg tablet Take 5 mg by mouth two (2) times a day. Last filled 6/21/21 for #60   Yes Provider, Historical   DULoxetine (CYMBALTA) 60 mg capsule Take 1 Cap by mouth daily. 3/11/21   Claudetta Sames, MD   hydrALAZINE (APRESOLINE) 50 mg tablet Take 1 Tab by mouth three (3) times daily. 3/11/21   Claudetta Sames, MD   insulin glargine (LANTUS) 100 unit/mL injection Take 8 units every day at bedtime 3/11/21   Claudetta Sames, MD   amoxicillin-clavulanate (Augmentin) 875-125 mg per tablet Take 1 Tab by mouth every twelve (12) hours. 3/11/21   Claudetta Sames, MD   NIFEdipine ER (PROCARDIA XL) 60 mg ER tablet Take 1 Tab by mouth two (2) times a day. Patient taking differently: Take 30 mg by mouth daily. Last filled 5/21 for nifedipine ER 30mg 1QD #30 3/11/21   Claudetta Sames, MD   silver sulfADIAZINE (SILVADENE) 1 % topical cream Apply  to affected area daily. Apply to scalp wound daily with dressing changes 2/23/21   Waelska Johnson MD   ferrous sulfate 325 mg (65 mg iron) tablet Take 325 mg by mouth Daily (before breakfast). Provider, Historical   sodium bicarbonate 650 mg tablet Take 650 mg by mouth two (2) times a day. Provider, Historical   pravastatin (PRAVACHOL) 20 mg tablet Take 20 mg by mouth daily.     Provider, Historical     Current Facility-Administered Medications   Medication Dose Route Frequency    potassium chloride 10 mEq in 50 ml IVPB  10 mEq IntraVENous Q1H    [START ON 7/28/2021] epoetin mya-epbx (RETACRIT) injection 10,000 Units  10,000 Units SubCUTAneous DIALYSIS MON, WED & FRI    vancomycin (FIRVANQ) 50 mg/mL oral solution 500 mg  500 mg Oral Q6H    metroNIDAZOLE (FLAGYL) IVPB premix 500 mg  500 mg IntraVENous Q8H    DULoxetine (CYMBALTA) capsule 60 mg  60 mg Oral DAILY    hydrALAZINE (APRESOLINE) tablet 50 mg  50 mg Oral TID    NIFEdipine ER (PROCARDIA XL) tablet 60 mg  60 mg Oral BID    pravastatin (PRAVACHOL) tablet 20 mg  20 mg Oral QHS    insulin glargine (LANTUS) injection 8 Units  8 Units SubCUTAneous QHS    sodium chloride (NS) flush 5-40 mL  5-40 mL IntraVENous Q8H    sodium chloride (NS) flush 5-40 mL  5-40 mL IntraVENous PRN    acetaminophen (TYLENOL) tablet 650 mg  650 mg Oral Q6H PRN    Or    acetaminophen (TYLENOL) suppository 650 mg  650 mg Rectal Q6H PRN    polyethylene glycol (MIRALAX) packet 17 g  17 g Oral DAILY PRN    ondansetron (ZOFRAN ODT) tablet 4 mg  4 mg Oral Q8H PRN    Or    ondansetron (ZOFRAN) injection 4 mg  4 mg IntraVENous Q6H PRN    heparin (porcine) injection 5,000 Units  5,000 Units SubCUTAneous Q8H    insulin lispro (HUMALOG) injection   SubCUTAneous AC&HS    glucose chewable tablet 16 g  4 Tablet Oral PRN    dextrose (D50W) injection syrg 12.5-25 g  12.5-25 g IntraVENous PRN    glucagon (GLUCAGEN) injection 1 mg  1 mg IntraMUSCular PRN    L.acidophilus-paracasei-S.thermophil-bifidobacter (RISAQUAD) 8 billion cell capsule  1 Capsule Oral DAILY    calcium acetate (phosphate binder) (PHOSLO) tablet 667 mg  1 Tablet Oral TID WITH MEALS     Facility-Administered Medications Ordered in Other Encounters   Medication Dose Route Frequency    lidocaine (XYLOCAINE) 20 mg/mL (2 %) injection 400 mg  20 mL SubCUTAneous RAD ONCE    heparin (porcine) 1,000 unit/mL injection 10,000 Units  10,000 Units IntraVENous RAD ONCE    iopamidoL (ISOVUE 300) 61 % contrast injection 100 mL  100 mL IntraCATHeter RAD ONCE     No Known Allergies       Subjective:     Review of Systems:    A comprehensive review of systems was negative except for that written in the History of Present Illness. Objective:     Blood pressure 101/63, pulse 65, temperature 98.6 °F (37 °C), resp. rate 15, height 6' (1.829 m), weight 126 lb 9.6 oz (57.4 kg), SpO2 98 %. Temp (24hrs), Av.2 °F (36.8 °C), Min:97.6 °F (36.4 °C), Max:98.6 °F (37 °C)      Recent Labs     21  1032 21  0149 21  1108   WBC 6.6 8.3 9.0   HGB 8.7* 9.1* 8.2*   HCT 27.8* 28.5* 25.3*    176 172     Recent Labs     21  1032 21  0146 21  0121 21  1108 21  1108    140 142   < > 136   K 2.4* 2.5* 2.5*   < > 2.1*    102 103   < > 102   CO2 18* 22 24   < > 18*   GLU 97 77 86   < > 124*   BUN 34* 25* 25*   < > 66*   CREA 4.42* 3.61* 3.61*   < > 6.79*   CA 8.0* 8.2* 8.6   < > 7.1*   MG  --  2.1  --   --   --    PHOS  --  3.7  --   --   --    ALB  --  3.5 3.5  --  3.3*   TBILI  --  0.5 0.5  --  0.4   ALT  --  26 25  --  26   INR  --   --  1.2*  --   --     < > = values in this interval not displayed. No results for input(s): AML, LPSE in the last 72 hours. Intake/Output Summary (Last 24 hours) at 2021 1633  Last data filed at 2021 2100  Gross per 24 hour   Intake    Output 2000 ml   Net -2000 ml       _____________________  Physical Exam:     General:  Alert, cooperative, no distress, appears stated age, frail   Eyes:   Sclera clear. Throat: Lips, mucosa, and tongue normal.   Neck: Supple, symmetrical, trachea midline. Lungs:   Clear to auscultation bilaterally. Heart:  Regular rate and rhythm. Abdomen:   Distended, NT, no BS, ? Hernia to R of umbilicus   Extremities: Extremities normal, atraumatic, no cyanosis or edema. Skin: Skin color, texture, turgor normal. No rashes or lesions.              Assessment: Active Problems:    Acute delirium (7/21/2021)            Plan:     GI seeing as well  Dr Felicia Mcnally to see  Cont flagyl and oral vanc  Dialysis     Thank you for allowing us to participate in the care of this patient. Total time spent with patient: 30 minutes. Signed By: Bhupinder Hyman NP     July 27, 2021            ADDENDUM:  Jennifer Singleton MD  Pt seen and examined. Pt consulted for megacolon. History was poor from patient but he did report abdomen is more distended than he remembered. Mild pain. No nausea or vomiting. RN reported he has had multiple loose stools. No acute indication for operation. Patient does not appear to have toxic megacolon. Pt having some diarrhea - stool study pending. Will defer to GI for possible endoscopic decompression.

## 2021-07-27 NOTE — PROGRESS NOTES
Bedside and Verbal shift change report given to [address] (oncoming nurse) by Aguila Marcano (offgoing nurse). Report included the following information SBAR, Kardex, ED Summary, Procedure Summary, MAR, Recent Results, Cardiac Rhythm NSR and Dual Neuro Assessment.

## 2021-07-27 NOTE — CONSULTS
[address]   [address] [facility] [address]        GASTROENTEROLOGY CONSULTATION NOTE  Will Elliot Cadena  [phone] office  [phone] NP/PA in-hospital cell phone M-F until 4:30PM  After 5PM or on weekends, please call  for physician on call        NAME:  Patrice Bray   :   1945   MRN:   [id number]       Referring Physician: Dr. Linh Sheikh Date: 2021 4:12 PM    Chief Complaint: diarrhea and abdominal distension     History of Present Illness:  Patient is a 77 y.o. who is seen in consultation at the request of Dr. Adela Jimenez for megacolon. Patient has a past medical history significant for hypertension, dyslipidemia, diabetes mellitus, congestive heart failure, and chronic kidney disease. He was admitted to the hospital on 21 after transfer from [facility] for acute delirium, pneumonia, acute cystitis, and acute kidney injury. History was obtained from the patient and RN at the bedside. Patient reportedly started having diarrhea yesterday with 10+ watery bowel movement. RN reports at least 6 watery bowel movements today. No hematochezia or melena. Denies history of diarrhea or constipation. There is associated abdominal bloating and distension. He reports some abdominal pain and nausea. No vomiting. Afebrile. No documented history of abdominal surgeries. Denies history of colonoscopy. I have reviewed the emergency room note, hospital admission note, notes by all other clinicians who have seen the patient during this hospitalization to date. I have reviewed the problem list and the reason for this hospitalization. I have reviewed the allergies and the medications the patient was taking at home prior to this hospitalization.       PMH:  Past Medical History:   Diagnosis Date    CKD (chronic kidney disease) stage 3, GFR 30-59 ml/min ([address] 2021    Diabetes [facility]     Essential hypertension 2021  Hypertension     Insulin-treated type 2 diabetes mellitus ([facility] 2/8/2021    Secondary hyperparathyroidism ([facility] 2/8/2021    Vitamin D deficiency 2/8/2021       PSH:  Past Surgical History:   Procedure Laterality Date    HX ORTHOPAEDIC      amputation left big toe    IR INSERT NON TUNL CVC OVER 5 YRS  7/21/2021    IR INSERT TUNL CVC W/O PORT OVER 5 YR  7/27/2021       Allergies:  No Known Allergies    Home Medications:  Prior to Admission Medications   Prescriptions Last Dose Informant Patient Reported? Taking? DULoxetine (CYMBALTA) 60 mg capsule   No No   Sig: Take 1 Cap by mouth daily. NIFEdipine ER (PROCARDIA XL) 60 mg ER tablet   No No   Sig: Take 1 Tab by mouth two (2) times a day. Patient taking differently: Take 30 mg by mouth daily. Last filled 5/21 for nifedipine ER 30mg 1QD #30   amoxicillin-clavulanate (Augmentin) 875-125 mg per tablet   No No   Sig: Take 1 Tab by mouth every twelve (12) hours. ferrous sulfate 325 mg (65 mg iron) tablet   Yes No   Sig: Take 325 mg by mouth Daily (before breakfast). glipiZIDE (GLUCOTROL) 5 mg tablet   Yes Yes   Sig: Take 5 mg by mouth two (2) times a day. Last filled 6/21/21 for #60   hydrALAZINE (APRESOLINE) 50 mg tablet   No No   Sig: Take 1 Tab by mouth three (3) times daily. insulin glargine (LANTUS) 100 unit/mL injection   No No   Sig: Take 8 units every day at bedtime   pravastatin (PRAVACHOL) 20 mg tablet   Yes No   Sig: Take 20 mg by mouth daily. silver sulfADIAZINE (SILVADENE) 1 % topical cream   No No   Sig: Apply  to affected area daily. Apply to scalp wound daily with dressing changes   sodium bicarbonate 650 mg tablet   Yes No   Sig: Take 650 mg by mouth two (2) times a day.       Facility-Administered Medications: None       Hospital Medications:  Current Facility-Administered Medications   Medication Dose Route Frequency    potassium chloride 10 mEq in 50 ml IVPB  10 mEq IntraVENous Q1H    [START ON 7/28/2021] epoetin mya-epbx (RETACRIT) injection 10,000 Units  10,000 Units SubCUTAneous DIALYSIS MON, WED & FRI    vancomycin (FIRVANQ) 50 mg/mL oral solution 500 mg  500 mg Oral Q6H    metroNIDAZOLE (FLAGYL) IVPB premix 500 mg  500 mg IntraVENous Q8H    DULoxetine (CYMBALTA) capsule 60 mg  60 mg Oral DAILY    hydrALAZINE (APRESOLINE) tablet 50 mg  50 mg Oral TID    NIFEdipine ER (PROCARDIA XL) tablet 60 mg  60 mg Oral BID    pravastatin (PRAVACHOL) tablet 20 mg  20 mg Oral QHS    insulin glargine (LANTUS) injection 8 Units  8 Units SubCUTAneous QHS    sodium chloride (NS) flush 5-40 mL  5-40 mL IntraVENous Q8H    sodium chloride (NS) flush 5-40 mL  5-40 mL IntraVENous PRN    acetaminophen (TYLENOL) tablet 650 mg  650 mg Oral Q6H PRN    Or    acetaminophen (TYLENOL) suppository 650 mg  650 mg Rectal Q6H PRN    polyethylene glycol (MIRALAX) packet 17 g  17 g Oral DAILY PRN    ondansetron (ZOFRAN ODT) tablet 4 mg  4 mg Oral Q8H PRN    Or    ondansetron (ZOFRAN) injection 4 mg  4 mg IntraVENous Q6H PRN    heparin (porcine) injection 5,000 Units  5,000 Units SubCUTAneous Q8H    insulin lispro (HUMALOG) injection   SubCUTAneous AC&HS    glucose chewable tablet 16 g  4 Tablet Oral PRN    dextrose (D50W) injection syrg 12.5-25 g  12.5-25 g IntraVENous PRN    glucagon (GLUCAGEN) injection 1 mg  1 mg IntraMUSCular PRN    L.acidophilus-paracasei-S.thermophil-bifidobacter (RISAQUAD) 8 billion cell capsule  1 Capsule Oral DAILY    calcium acetate (phosphate binder) (PHOSLO) tablet 667 mg  1 Tablet Oral TID WITH MEALS     Facility-Administered Medications Ordered in Other Encounters   Medication Dose Route Frequency    lidocaine (XYLOCAINE) 20 mg/mL (2 %) injection 400 mg  20 mL SubCUTAneous RAD ONCE    heparin (porcine) 1,000 unit/mL injection 10,000 Units  10,000 Units IntraVENous RAD ONCE    iopamidoL (ISOVUE 300) 61 % contrast injection 100 mL  100 mL IntraCATHeter RAD ONCE       Social History:  Social History     Tobacco Use  Smoking status: Never Smoker    Smokeless tobacco: Never Used   Substance Use Topics    Alcohol use: Yes     Comment: rarely       Family History:  Family History   Problem Relation Age of Onset    Diabetes Mother     Hypertension Mother     Cancer Mother     Diabetes Father     Hypertension Father     Cancer Father        Review of Systems:  Constitutional: negative fever, negative chills, negative weight loss  Eyes:   negative visual changes  ENT:   negative sore throat, tongue or lip swelling  Respiratory:  negative cough, negative dyspnea  Cards:  negative for chest pain, palpitations, lower extremity edema  GI:   See HPI  :  negative for frequency, dysuria  Integument:  negative for rash and pruritus  Heme:  negative for easy bruising and gum/nose bleeding  Musculoskeletal:negative for myalgias, back pain and muscle weakness  Neuro:    negative for headaches, dizziness  Psych: negative for feelings of anxiety, depression       Objective:     Patient Vitals for the past 8 hrs:   BP Temp Pulse Resp SpO2 Height   07/27/21 1400 101/63 98.6 °F (37 °C) 65 15 98 %    07/27/21 1205      6' (1.829 m)   07/27/21 1058 117/67  62 11 92 %      No intake/output data recorded. 07/25 1901 - 07/27 0700  In: -   Out: 2800 [Urine:800]    EXAM:     CONST:  Pleasant male lying in bed, no acute distress   NEURO:  Alert and oriented x 3   HEENT: EOMI, no scleral icterus   LUNGS: No acute respiratory distress   CARD:  S1 S2   ABD:  Distended, mild tenderness, no rebound, no guarding. Few high-pitched bowel sounds.     EXT:  Warm   PSYCH: Not anxious or agitated       Data Review     Recent Labs     07/27/21  1032 07/27/21  0149   WBC 6.6 8.3   HGB 8.7* 9.1*   HCT 27.8* 28.5*    176     Recent Labs     07/27/21  1032 07/27/21  0146    140   K 2.4* 2.5*    102   CO2 18* 22   BUN 34* 25*   CREA 4.42* 3.61*   GLU 97 77   PHOS  --  3.7   CA 8.0* 8.2*     Recent Labs     07/27/21  0146 07/27/21  0121 * 137*   TP 7.7 8.4*   ALB 3.5 3.5   GLOB 4.2* 4.9*     Recent Labs     07/27/21  0121   INR 1.2*   PTP 12.1*       Assessment:   · Diarrhea and abdominal distension: WBC 6.6, Hgb 8.7. CT abdomen/pelvis without contrast (7/27/21): markedly distended colon and rectum with air, stool, and fluid; mild small bowel dilatation which is difficult to evaluate but likely secondary to colonic distension; large diaphragmatic hernia with air-filled and stool-filled loops of colon extending into the lower chest. Differential includes pseudo-obstruction versus megacolon. · Metabolic encephalopathy  · Electrolyte abnormalities  · Acute kidney injury on chronic kidney disease: nephrology following, on hemodialysis  · Pneumonia: completed course of antibiotics  · Anemia  · Hypertension  · Dyslipidemia  · Diabetes mellitus     Patient Active Problem List   Diagnosis Code    Scalp abscess L02.811    JEREMIAS (acute kidney injury) ([facility] [address] N17.9    CKD (chronic kidney disease) stage 3, GFR 30-59 ml/min ([facility]) N18.30    Secondary hyperparathyroidism ([facility] N25.81    Vitamin D deficiency E55.9    Essential hypertension I10    Insulin-treated type 2 diabetes mellitus ([facility] [facility] E11.9, F15.0    Metabolic acidosis, normal anion gap (NAG) E87.2    UTI (urinary tract infection) N39.0    Acute delirium R41.0     Plan:     · NPO with sips of clear  · On vancomycin and Flagyl  · Potassium repletion  · Stool studies  · Surgery consulted  · No plan for colonoscopy at this time  · Patient was discussed with Dr. Alem Mackey  · Thank you for allowing me to participate in care of Ricardo Magana     Signed By: Fani Villar AlaEn[facility]     7/27/2021  4:12 PM       Gastroenterology Attending Physician attestation statement and comments. This patient was seen and examined by me in a face-to-face visit today. I reviewed the medical record including lab work, imaging and other provider notes. I confirmed the history as described above.  I spoke to the patient, reviewed the medical record including lab work, imaging and other provider notes. I discussed this case in detail with Darian CURRY. I formulated an  assessment of this patient and developed a treatment plan. I agree with the above consultation note. I agree with the history, exam and assessment and plan as outlined in the note. I would like to add the following:     Abd: hypoactive BS, distended, mild diffuse tenderness, no rebound/involuntary guarding. Dilated colon-concern for C diff associated megacolon. Other possibility is colonic pseudo-obstruction. NPO. Agree with empiric Vanco/Flagyl. K repletion. They have finally been able to send C diff stool test. Of note, normal WBC, hemodynamically stable. Surgery to see. If C diff megacolon-no role for colonoscopy. If colonic pseudo-obstruction-would do NGT to ILWS and nursing protocol rectal decompression prior to consideration of colonoscope.      Dr. Bharat Funez

## 2021-07-27 NOTE — PROGRESS NOTES
Problem: Pressure Injury - Risk of  Goal: *Prevention of pressure injury  Description: Document Newton Scale and appropriate interventions in the flowsheet. Outcome: Progressing Towards Goal  Note: Pressure Injury Interventions:  Sensory Interventions: Assess changes in LOC, Discuss PT/OT consult with provider, Turn and reposition approx. every two hours (pillows and wedges if needed)    Moisture Interventions: Absorbent underpads, Apply protective barrier, creams and emollients, Check for incontinence Q2 hours and as needed, Assess need for specialty bed, Internal/External urinary devices, Maintain skin hydration (lotion/cream), Limit adult briefs, Minimize layers    Activity Interventions: Pressure redistribution bed/mattress(bed type)    Mobility Interventions: PT/OT evaluation, Pressure redistribution bed/mattress (bed type), Turn and reposition approx. every two hours(pillow and wedges)    Nutrition Interventions: Document food/fluid/supplement intake    Friction and Shear Interventions: Transferring/repositioning devices, Feet elevated on foot rest                Problem: Patient Education: Go to Patient Education Activity  Goal: Patient/Family Education  Outcome: Progressing Towards Goal     Problem: Pain  Goal: *Control of Pain  Outcome: Progressing Towards Goal  Goal: *PALLIATIVE CARE:  Alleviation of Pain  Outcome: Progressing Towards Goal     Problem: Patient Education: Go to Patient Education Activity  Goal: Patient/Family Education  Outcome: Progressing Towards Goal     Problem: Falls - Risk of  Goal: *Absence of Falls  Description: Document Ryan Fall Risk and appropriate interventions in the flowsheet.   Outcome: Progressing Towards Goal  Note: Fall Risk Interventions:       Mentation Interventions: Bed/chair exit alarm, Door open when patient unattended    Medication Interventions: Bed/chair exit alarm, Teach patient to arise slowly, Patient to call before getting OOB    Elimination Interventions: Bed/chair exit alarm, Call light in reach, Stay With Me (per policy), Toileting schedule/hourly rounds    History of Falls Interventions: Bed/chair exit alarm, Door open when patient unattended, Room close to nurse's station         Problem: Patient Education: Go to Patient Education Activity  Goal: Patient/Family Education  Outcome: Progressing Towards Goal     Problem: Patient Education: Go to Patient Education Activity  Goal: Patient/Family Education  Outcome: Progressing Towards Goal     Problem: Patient Education: Go to Patient Education Activity  Goal: Patient/Family Education  Outcome: Progressing Towards Goal     Problem: Risk for Spread of Infection  Goal: Prevent transmission of infectious organism to others  Description: Prevent the transmission of infectious organisms to other patients, staff members, and visitors.   Outcome: Progressing Towards Goal     Problem: Patient Education:  Go to Education Activity  Goal: Patient/Family Education  Outcome: Progressing Towards Goal     Problem: Patient Education: Go to Patient Education Activity  Goal: Patient/Family Education  Outcome: Progressing Towards Goal

## 2021-07-27 NOTE — PROCEDURES
Interventional Radiology    7/27/2021      INFORMED CONSENT OBTAINED    Indication: dialysis access    Procedure(s): right neck dennis to permcath conversion     Specimens removed:  none    Complications: None    Primary Physician: Ashlie Valenzuela MD    Recomendations: New catheter ready for immediate use.     Discharge Disposition: floor    Full dictated report to follow    Signed By: Ashlie Valenzuela MD

## 2021-07-27 NOTE — PROGRESS NOTES
Nephrology Progress Note  Gerardo Rivera  Date of Admission : 7/21/2021    CC: Follow up for JEREMIAS on CKD       Assessment and Plan     JEREMIAS on CKD:  - ? Cause, likely progression of underlying CKD  - renal U/S neg  - HD MWF for now  - PC this AM  - HD tomorrow per routine    Hypokalemia:  - IV repletion ordered    CKD 3b:  - pt of Dr. Migue Aguirre    Anemia of CKD:  - JULISA MWF + venofer    Secondary HPT:  - on phoslo    ABD pain/distention:  - for CT A/P today    PNA:  - on rocephin    HTN:  - cont current meds       Interval History:  Seen and examined. Had PC placed this AM.  Lethargic now post procedure. Going for CT scan A/P this afternoon. Unable to obtain ROS at this time. Still having loose stools per RN. Current Medications: all current  Medications have been eviewed in EPIC  Review of Systems: Pertinent items are noted in HPI. Objective:  Vitals:    Vitals:    07/26/21 2353 07/27/21 0157 07/27/21 1058 07/27/21 1205   BP: 133/62 121/71 117/67    Pulse: 73 68 62    Resp:  15 11    Temp:  98.5 °F (36.9 °C)     TempSrc:       SpO2:  99% 92%    Weight:       Height:    6' (1.829 m)     Intake and Output:  No intake/output data recorded. 07/25 1901 - 07/27 0700  In: -   Out: 2800 [Urine:800]    Physical Examination:  General: leghargic  Neck:  Supple, no mass  Resp:  Lungs CTA B/L, no wheezing , normal respiratory effort  CV:  RRR,  no murmur or rub, trace LE edema  GI:  + distention, pain, + bowel sounds  Neurologic:  Confused, lethargic  Psych:             Unable to assess  Skin:  No Rash  Access:           [facility] PC    []    High complexity decision making was performed  []    Patient is at high-risk of decompensation with multiple organ involvement    Lab Data Personally Reviewed: I have reviewed all the pertinent labs, microbiology data and radiology studies during assessment.     Recent Labs     07/27/21  1032 07/27/21  0146 07/27/21  0121 07/26/21  1108 07/26/21  1108    140 142   < > 136   K 2.4* 2. 5* 2.5*   < > 2.1*    102 103   < > 102   CO2 18* 22 24   < > 18*   GLU 97 77 86   < > 124*   BUN 34* 25* 25*   < > 66*   CREA 4.42* 3.61* 3.61*   < > 6.79*   CA 8.0* 8.2* 8.6   < > 7.1*   MG  --  2.1  --   --   --    PHOS  --  3.7  --   --   --    ALB  --  3.5 3.5  --  3.3*   ALT  --  26 25  --  26   INR  --   --  1.2*  --   --     < > = values in this interval not displayed. Recent Labs     07/27/21  1032 07/27/21  0149 07/26/21  1108   WBC 6.6 8.3 9.0   HGB 8.7* 9.1* 8.2*   HCT 27.8* 28.5* 25.3*    176 172     No results found for: [facility]  Lab Results   Component Value Date/Time    Culture result:  07/21/2021 08:54 AM     MRSA NOT PRESENT. Apparent Staphylococus aureus (not MRSA noted). Culture result:  07/21/2021 08:54 AM     Screening of patient nares for MRSA is for surveillance purposes and, if positive, to facilitate isolation considerations in high risk settings. It is not intended for automatic decolonization interventions per se as regimens are not sufficiently effective to warrant routine use. Culture result: No Growth (<1000 cfu/mL) 07/20/2021 09:00 PM     Recent Results (from the past 24 hour(s))   GLUCOSE, POC    Collection Time: 07/26/21  4:30 PM   Result Value Ref Range    Glucose (POC) 118 (H) 65 - 117 mg/dL    Performed by [facility]    METABOLIC PANEL, COMPREHENSIVE    Collection Time: 07/27/21  1:21 AM   Result Value Ref Range    Sodium 142 136 - 145 mmol/L    Potassium 2.5 (LL) 3.5 - 5.1 mmol/L    Chloride 103 97 - 108 mmol/L    CO2 24 21 - 32 mmol/L    Anion gap 15 5 - 15 mmol/L    Glucose 86 65 - 100 mg/dL    BUN 25 (H) 6 - 20 MG/DL    Creatinine 3.61 (H) 0.70 - 1.30 MG/DL    BUN/Creatinine ratio 7 (L) 12 - 20      GFR est AA 20 (L) >60 ml/min/1.73m2    GFR est non-AA 17 (L) >60 ml/min/1.73m2    Calcium 8.6 8.5 - 10.1 MG/DL    Bilirubin, total 0.5 0.2 - 1.0 MG/DL    ALT (SGPT) 25 12 - 78 U/L    AST (SGOT) 22 15 - 37 U/L    Alk.  phosphatase 137 (H) 45 - 117 U/L Protein, total 8.4 (H) 6.4 - 8.2 g/dL    Albumin 3.5 3.5 - 5.0 g/dL    Globulin 4.9 (H) 2.0 - 4.0 g/dL    A-G Ratio 0.7 (L) 1.1 - 2.2     PROTHROMBIN TIME + INR    Collection Time: 07/27/21  1:21 AM   Result Value Ref Range    INR 1.2 (H) 0.9 - 1.1      Prothrombin time 12.1 (H) 9.0 - 11.1 sec   MAGNESIUM    Collection Time: 07/27/21  1:46 AM   Result Value Ref Range    Magnesium 2.1 1.6 - 2.4 mg/dL   PHOSPHORUS    Collection Time: 07/27/21  1:46 AM   Result Value Ref Range    Phosphorus 3.7 2.6 - 4.7 MG/DL   METABOLIC PANEL, COMPREHENSIVE    Collection Time: 07/27/21  1:46 AM   Result Value Ref Range    Sodium 140 136 - 145 mmol/L    Potassium 2.5 (LL) 3.5 - 5.1 mmol/L    Chloride 102 97 - 108 mmol/L    CO2 22 21 - 32 mmol/L    Anion gap 16 (H) 5 - 15 mmol/L    Glucose 77 65 - 100 mg/dL    BUN 25 (H) 6 - 20 MG/DL    Creatinine 3.61 (H) 0.70 - 1.30 MG/DL    BUN/Creatinine ratio 7 (L) 12 - 20      GFR est AA 20 (L) >60 ml/min/1.73m2    GFR est non-AA 17 (L) >60 ml/min/1.73m2    Calcium 8.2 (L) 8.5 - 10.1 MG/DL    Bilirubin, total 0.5 0.2 - 1.0 MG/DL    ALT (SGPT) 26 12 - 78 U/L    AST (SGOT) 23 15 - 37 U/L    Alk.  phosphatase 139 (H) 45 - 117 U/L    Protein, total 7.7 6.4 - 8.2 g/dL    Albumin 3.5 3.5 - 5.0 g/dL    Globulin 4.2 (H) 2.0 - 4.0 g/dL    A-G Ratio 0.8 (L) 1.1 - 2.2     CBC W/O DIFF    Collection Time: 07/27/21  1:49 AM   Result Value Ref Range    WBC 8.3 4.1 - 11.1 K/uL    RBC 3.24 (L) 4.10 - 5.70 M/uL    HGB 9.1 (L) 12.1 - 17.0 g/dL    HCT 28.5 (L) 36.6 - 50.3 %    MCV 88.0 80.0 - 99.0 FL    MCH 28.1 26.0 - 34.0 PG    MCHC 31.9 30.0 - 36.5 g/dL    RDW 17.2 (H) 11.5 - 14.5 %    PLATELET 447 479 - 995 K/uL    MPV 10.9 8.9 - 12.9 FL    NRBC 3.6 (H) 0  WBC    ABSOLUTE NRBC 0.30 (H) 0.00 - 2.30 K/uL   METABOLIC PANEL, BASIC    Collection Time: 07/27/21 10:32 AM   Result Value Ref Range    Sodium 138 136 - 145 mmol/L    Potassium 2.4 (LL) 3.5 - 5.1 mmol/L    Chloride 103 97 - 108 mmol/L    CO2 18 (L) 21 - 32 mmol/L    Anion gap 17 (H) 5 - 15 mmol/L    Glucose 97 65 - 100 mg/dL    BUN 34 (H) 6 - 20 MG/DL    Creatinine 4.42 (H) 0.70 - 1.30 MG/DL    BUN/Creatinine ratio 8 (L) 12 - 20      GFR est AA 16 (L) >60 ml/min/1.73m2    GFR est non-AA 13 (L) >60 ml/min/1.73m2    Calcium 8.0 (L) 8.5 - 10.1 MG/DL   CBC W/O DIFF    Collection Time: 07/27/21 10:32 AM   Result Value Ref Range    WBC 6.6 4.1 - 11.1 K/uL    RBC 3.08 (L) 4.10 - 5.70 M/uL    HGB 8.7 (L) 12.1 - 17.0 g/dL    HCT 27.8 (L) 36.6 - 50.3 %    MCV 90.3 80.0 - 99.0 FL    MCH 28.2 26.0 - 34.0 PG    MCHC 31.3 30.0 - 36.5 g/dL    RDW 17.8 (H) 11.5 - 14.5 %    PLATELET 272 476 - 891 K/uL    MPV 10.8 8.9 - 12.9 FL    NRBC 4.2 (H) 0  WBC    ABSOLUTE NRBC 0.28 (H) 0.00 - 0.01 K/uL   GLUCOSE, POC    Collection Time: 07/27/21 11:26 AM   Result Value Ref Range    Glucose (POC) 110 65 - 117 mg/dL    Performed by Gautam Awan MD  [address]  Phone - [phone]   Fax - [phone]  [email or web address]

## 2021-07-27 NOTE — PROGRESS NOTES
Pt arrives in bed to angio department accompanied by transporter for dialysis catheter placement procedure. All assessments completed and consent was reviewed. Education given was regarding procedure, moderate sedation, post-procedure care and  management/follow-up. Opportunity for questions was provided and all questions and concerns were addressed.

## 2021-07-28 NOTE — PROGRESS NOTES
MT called RN and expressed concern for a decreasing heart rate. Patient normally sinus rhythm. When RN entered room, patient's heart rate at 40 and slowly decreasing. Patient unresponsive. Rapid Response called. Code Blue called on minute later. Weak pulse palpated at groin. Compressions started immediately and patient intubated. Code medications given. Rosc obtained. Patient will go to ICU when bed available.

## 2021-07-28 NOTE — ROUTINE PROCESS
Patient: Hinton Severe MRN: [id number]  SSN: [id number]   YOB: 1945  Age: 77 y.o. Sex: male     Patient is status post Procedure(s):  LAPAROTOMY EXPLORATORY with decompression of small and large bowel. Surgeon(s) and Role:     * Thanh Moraes MD - Primary    Local/Dose/Irrigation: SEE MAR              Quad Lumen central line 07/28/21 Anterior; Left Neck (Active)        Peripheral IV 07/28/21 Left Other(comment) (Active)   Site Assessment Clean, dry, & intact 07/28/21 1215   Phlebitis Assessment 0 07/28/21 1215   Infiltration Assessment 0 07/28/21 1215   Dressing Status Clean, dry, & intact 07/28/21 1215   Dressing Type Transparent 07/28/21 1215   Hub Color/Line Status Green; Infusing 07/28/21 1215   Action Taken Open ports on tubing capped 07/28/21 1215   Alcohol Cap Used Yes 07/28/21 1215        Hemodialysis Access 07/27/21 (Active)   Central Line Being Utilized Yes 07/28/21 1215   Criteria for Appropriate Use Dialysis/apheresis 07/28/21 1215   Site Assessment Clean, dry, & intact 07/28/21 1215   Date of Last Dressing Change 07/27/21 07/28/21 0400   Dressing Status Clean, dry, & intact 07/28/21 1215   Dressing Type Disk with Chlorhexadine gluconate (CHG); Transparent 07/28/21 1215   Proximal Hub Color/Line Status Capped 07/28/21 1215   Distal Hub Color/Line Status Capped 07/28/21 1215      Trae January #1 07/28/21 Left; Lower Abdomen (Active)      Airway - Endotracheal Tube 07/28/21 Oral (Active)   Insertion Depth (cm) 23 cm 07/28/21 1215   Line Ashok Teeth 07/28/21 1215   Side Secured Device; Centered 07/28/21 1215   Site Assessment Clean, dry, & intact 07/28/21 1215                   Dressing/Packing:  Incision 07/28/21 Abdomen-Dressing/Treatment: Gauze dressing/dressing sponge;Tape/Soft cloth adhesive tape (Skin Staples) (07/28/21 7799)

## 2021-07-28 NOTE — PROGRESS NOTES
Problem: Pressure Injury - Risk of  Goal: *Prevention of pressure injury  Description: Document Newton Scale and appropriate interventions in the flowsheet. Outcome: Progressing Towards Goal  Note: Pressure Injury Interventions:  Sensory Interventions: Assess changes in LOC, Minimize linen layers    Moisture Interventions: Absorbent underpads, Offer toileting Q_hr, Moisture barrier, Internal/External urinary devices    Activity Interventions: PT/OT evaluation, Assess need for specialty bed    Mobility Interventions: Chair cushion, PT/OT evaluation, HOB 30 degrees or less    Nutrition Interventions: Document food/fluid/supplement intake, Discuss nutritional consult with provider    Friction and Shear Interventions: HOB 30 degrees or less, Lift sheet                Problem: Patient Education: Go to Patient Education Activity  Goal: Patient/Family Education  Outcome: Progressing Towards Goal     Problem: Pain  Goal: *Control of Pain  Outcome: Progressing Towards Goal  Goal: *PALLIATIVE CARE:  Alleviation of Pain  Outcome: Progressing Towards Goal     Problem: Patient Education: Go to Patient Education Activity  Goal: Patient/Family Education  Outcome: Progressing Towards Goal     Problem: Falls - Risk of  Goal: *Absence of Falls  Description: Document Ryan Fall Risk and appropriate interventions in the flowsheet.   Outcome: Progressing Towards Goal  Note: Fall Risk Interventions:       Mentation Interventions: Bed/chair exit alarm, Reorient patient    Medication Interventions: Bed/chair exit alarm, Patient to call before getting OOB    Elimination Interventions: Bed/chair exit alarm, Call light in reach, Toileting schedule/hourly rounds    History of Falls Interventions: Bed/chair exit alarm         Problem: Patient Education: Go to Patient Education Activity  Goal: Patient/Family Education  Outcome: Progressing Towards Goal     Problem: Patient Education: Go to Patient Education Activity  Goal: Patient/Family Education  Outcome: Progressing Towards Goal     Problem: Patient Education: Go to Patient Education Activity  Goal: Patient/Family Education  Outcome: Progressing Towards Goal     Problem: Risk for Spread of Infection  Goal: Prevent transmission of infectious organism to others  Description: Prevent the transmission of infectious organisms to other patients, staff members, and visitors.   Outcome: Progressing Towards Goal     Problem: Patient Education:  Go to Education Activity  Goal: Patient/Family Education  Outcome: Progressing Towards Goal     Problem: Patient Education: Go to Patient Education Activity  Goal: Patient/Family Education  Outcome: Progressing Towards Goal

## 2021-07-28 NOTE — PROGRESS NOTES
Ilda Bianchi [address] [place] M.D.  [phone]               GASTROENTEROLOGY PROGRESS NOTE        NAME: Jose Mora   :  1945   MRN:  [id number]       Subjective:   C diff pending, mild abdominal discomfort    Objective:   VITALS:   Last 24hrs VS reviewed. Most recent are:  Visit Vitals  BP (!) 101/47   Pulse 64   Temp 97.6 °F (36.4 °C)   Resp 18   Ht 6' (1.829 m)   Wt 55.2 kg (121 lb 9.6 oz)   SpO2 93%   BMI 16.49 kg/m²       Intake/Output Summary (Last 24 hours) at 2021 0934  Last data filed at 2021 1600  Gross per 24 hour   Intake    Output 100 ml   Net -100 ml       PHYSICAL EXAM:  General: Alert, in no acute distress    HEENT: Anicteric conjunctiva. Lungs:            CTA Bilaterally  Heart:  Normal S1, S2    Abdomen: Soft, distended, mild diffuse tender. Hypoactive bowel sounds, no rebound/invol guarding  MSK:   Poor muscle tone  Skin:   Warm to touch  Extremities: Negative bilateral pedal edema   Psych:   Not anxious nor agitated. Lab Data Reviewed:   Recent Labs     21  0336 21  1644   WBC 11.5* 9.2   HGB 9.2* 8.7*   HCT 29.2* 27.9*    173     Recent Labs     21  0336 21  1644 21  1032 21  0146    140   < > 140   K 2.9* 2.6*   < > 2.5*    104   < > 102   CO2 13* 17*   < > 22   BUN 49* 41*   < > 25*   CREA 5.89* 4.83*   < > 3.61*   * 137*   < > 77   CA 8.5 8.4*   < > 8.2*   MG  --   --   --  2.1   PHOS  --   --   --  3.7    < > = values in this interval not displayed. Recent Labs     21  0146 21  012   * 137*   TP 7.7 8.4*   ALB 3.5 3.5   GLOB 4.2* 4.9*     Recent Labs     21  0121   INR 1.2*   PTP 12.1*      No results for input(s): FE, TIBC, PSAT, FERR in the last 72 hours. No results for input(s): CPK, CKMB in the last 72 hours.     No lab exists for component: SEPIDEH    See Electronic Medical Record for all procedure/radiology reports and details which were not copied into this note but were reviewed prior to the creation of the Plan.       Assessment:   · Diarrhea and abdominal distension: WBC 6.6, Hgb 8.7. CT abdomen/pelvis without contrast (7/27/21): markedly distended colon and rectum with air, stool, and fluid; mild small bowel dilatation which is difficult to evaluate but likely secondary to colonic distension; large diaphragmatic hernia with air-filled and stool-filled loops of colon extending into the lower chest. Differential includes C diff megacolon versus colonic pseudoobstruction. · Metabolic encephalopathy  · Electrolyte abnormalities  · Acute kidney injury on chronic kidney disease: nephrology following, on hemodialysis  · Pneumonia: completed course of antibiotics  · Anemia  · Hypertension  · Dyslipidemia  · Diabetes mellitus           Patient Active Problem List   Diagnosis Code    Scalp abscess L02.811    [facility] N17.9    CKD (chronic kidney disease) stage 3, GFR 30-59 ml/min ([facility]) N18.30    [facility] N25.81    Vitamin D deficiency E55.9    Essential hypertension I5    Insulin-treated type 2 diabetes mellitus ([facility]) E11.9, Y33.0    Metabolic acidosis, normal anion gap (NAG) E87.2    UTI (urinary tract infection) N39.0    Acute delirium R41.0          Plan:       · NPO with sips of clear  · On vancomycin and Flagyl  · Potassium repletion  · Stool studies. Hopefully C diff back soon. If negative, broaden antibiotic coverage and start with nursing protocol rectal decompression  · Surgery following  · No role for colonoscope at this time  · High risk for clinical deterioration. Hemodynamically stable. Signed by:  Jeremi Tavarez MD         7/28/2021  9:34 AM

## 2021-07-28 NOTE — BRIEF OP NOTE
Brief Postoperative Note    Patient: Hinton Severe  YOB: 1945  MRN: [id number]    Date of Procedure: 7/28/2021     Pre-Op Diagnosis: Colonic Ischemia    Post-Op Diagnosis: Worthington's Colonic pseudo-obstruction    Procedure(s):  LAPAROTOMY EXPLORATORY with decompression of small and large bowel    Surgeon(s):  Harry Palacios MD    Surgical Assistant: Surg Asst-1: Martha Koch    Anesthesia: General     Estimated Blood Loss (mL): Minimal    Complications: None    Specimens: * No specimens in log *     Implants: * No implants in log *    Drains:   Trae January #1 07/28/21 Left; Lower Abdomen (Active)       Findings: The small bowel and colon were severely distended. There was no evidence of small or large bowel ischemia. There was feculization of the distal small bowel. 2,500 ml of enteric content was evacuated from small and large bowel.       Electronically Signed by Juan Encinas MD on 7/28/2021 at 5:02 PM

## 2021-07-28 NOTE — ANESTHESIA POSTPROCEDURE EVALUATION
Procedure(s):  LAPAROTOMY EXPLORATORY with decompression of small and large bowel. general    Anesthesia Post Evaluation        Patient location during evaluation: PACU  Patient participation: complete - patient participated  Level of consciousness: awake and alert  Pain management: adequate  Airway patency: patent  Anesthetic complications: no  Cardiovascular status: acceptable  Respiratory status: acceptable  Hydration status: acceptable  Comments: I have seen and evaluated the patient and is ready for discharge. Anirudh Watson MD    Post anesthesia nausea and vomiting:  none      INITIAL Post-op Vital signs:   Vitals Value Taken Time   /89 07/28/21 1720   Temp 34.7 °C (94.5 °F) 07/28/21 1720   Pulse 82 07/28/21 1722   Resp 18 07/28/21 1722   SpO2 100 % 07/28/21 1722   Vitals shown include unvalidated device data.

## 2021-07-28 NOTE — PROGRESS NOTES
Patient was seen as I attended Code blue this morning. Patient's history noted. ICU team attended and patient didn't lose pulse, but was pretty bradycardic with very weak pulse and no respiration. Got intubated and will be transferred to ICU level of care.

## 2021-07-28 NOTE — PROGRESS NOTES
Speech pathology  Chart reviewed. Patient with Code Blue called this morning and patient now intubated and transferred to ICU. Will follow up to determine plans for extubation in the next couple days.   Jonathan Heller M.S. SUE-SLP

## 2021-07-28 NOTE — PROGRESS NOTES
Nephrology Progress Note  Douglas Kapadia  Date of Admission : 7/21/2021    CC: Follow up for JEREMIAS on CKD       Assessment and Plan     JEREMIAS on CKD:  - ? Cause, likely progression of underlying CKD  - renal U/S neg  - too unstable for IHD  - start CRRT today  - no factor  - daily labs    Hypokalemia:  - 4k bath    CKD 3b:  - pt of Dr. Staci Fang    Anemia of CKD:  - JULISA MWF     Secondary HPT:  - on phoslo    Megacolon:  - surgery eval    PNA:  - on rocephin    HTN:  - cont current meds       Interval History:  Seen and examined. Transferred to the ICU after code blue. Ct scan showing megacolon from 7/27. Now on favian drip. .      Current Medications: all current  Medications have been eviewed in EPIC  Review of Systems: Pertinent items are noted in HPI. Objective:  Vitals:    Vitals:    07/28/21 1100 07/28/21 1108 07/28/21 1125 07/28/21 1143   BP: (!) 77/56 (!) 73/52 (!) 82/55 (!) 85/51   Pulse: 70 68 61 66   Resp:       Temp:       TempSrc:       SpO2:       Weight:       Height:         Intake and Output:  No intake/output data recorded. 07/26 1901 - 07/28 0700  In: -   Out: 2100 [Urine:100]    Physical Examination:  General: Sedated on the vent  Neck:  Supple, no mass  Resp:  Lungs CTA B/L, no wheezing , normal respiratory effort  CV:  RRR,  no murmur or rub, trace LE edema  GI:  + distention, reduced bowel sounds  Neurologic:  Sedated on the vent  Psych:             Unable to assess  Skin:  No Rash  Access:           [facility]    []    High complexity decision making was performed  []    Patient is at high-risk of decompensation with multiple organ involvement    Lab Data Personally Reviewed: I have reviewed all the pertinent labs, microbiology data and radiology studies during assessment.     Recent Labs     07/28/21  1034 07/28/21  0336 07/27/21  1644 07/27/21  1032 07/27/21  0146 07/27/21  0121 07/27/21  0121    137 140   < > 140   < > 142   K 3.0* 2.9* 2.6*   < > 2.5*   < > 2.5*    104 104   < > 102   < > 103   CO2 16* 13* 17*   < > 22   < > 24   * 165* 137*   < > 77   < > 86   BUN 52* 49* 41*   < > 25*   < > 25*   CREA 6.54* 5.89* 4.83*   < > 3.61*   < > 3.61*   CA 7.6* 8.5 8.4*   < > 8.2*   < > 8.6   MG 2.5*  --   --   --  2.1  --   --    PHOS 9.8*  --   --   --  3.7  --   --    ALB 3.0*  --   --   --  3.5  --  3.5   ALT 54  --   --   --  26  --  25   INR 1.5*  --   --   --   --   --  1.2*    < > = values in this interval not displayed. Recent Labs     07/28/21  1034 07/28/21  0336 07/27/21  1644   WBC 14.3* 11.5* 9.2   HGB 8.8* 9.2* 8.7*   HCT 29.1* 29.2* 27.9*    182 173     No results found for: [facility]  Lab Results   Component Value Date/Time    Culture result:  07/21/2021 08:54 AM     MRSA NOT PRESENT. Apparent Staphylococus aureus (not MRSA noted). Culture result:  07/21/2021 08:54 AM     Screening of patient nares for MRSA is for surveillance purposes and, if positive, to facilitate isolation considerations in high risk settings. It is not intended for automatic decolonization interventions per se as regimens are not sufficiently effective to warrant routine use.     Culture result: No Growth (<1000 cfu/mL) 07/20/2021 09:00 PM     Recent Results (from the past 24 hour(s))   HBV CORE AB, IGG/IGM    Collection Time: 07/27/21  3:01 PM   Result Value Ref Range    Hep B Core Ab, IgM Negative Negative      Hep B Core Ab, total Positive (A) Negative     GLUCOSE, POC    Collection Time: 07/27/21  4:42 PM   Result Value Ref Range    Glucose (POC) 130 (H) 65 - 117 mg/dL    Performed by Ady Harper    CBC W/O DIFF    Collection Time: 07/27/21  4:44 PM   Result Value Ref Range    WBC 9.2 4.1 - 11.1 K/uL    RBC 3.06 (L) 4.10 - 5.70 M/uL    HGB 8.7 (L) 12.1 - 17.0 g/dL    HCT 27.9 (L) 36.6 - 50.3 %    MCV 91.2 80.0 - 99.0 FL    MCH 28.4 26.0 - 34.0 PG    MCHC 31.2 30.0 - 36.5 g/dL    RDW 18.8 (H) 11.5 - 14.5 %    PLATELET 796 848 - 374 K/uL    MPV 10.1 8.9 - 12.9 FL    NRBC 3.4 (H) 0  WBC ABSOLUTE NRBC 0.31 (H) 0.00 - 0.96 K/uL   METABOLIC PANEL, BASIC    Collection Time: 07/27/21  4:44 PM   Result Value Ref Range    Sodium 140 136 - 145 mmol/L    Potassium 2.6 (LL) 3.5 - 5.1 mmol/L    Chloride 104 97 - 108 mmol/L    CO2 17 (L) 21 - 32 mmol/L    Anion gap 19 (H) 5 - 15 mmol/L    Glucose 137 (H) 65 - 100 mg/dL    BUN 41 (H) 6 - 20 MG/DL    Creatinine 4.83 (H) 0.70 - 1.30 MG/DL    BUN/Creatinine ratio 8 (L) 12 - 20      GFR est AA 14 (L) >60 ml/min/1.73m2    GFR est non-AA 12 (L) >60 ml/min/1.73m2    Calcium 8.4 (L) 8.5 - 10.1 MG/DL   HEPATITIS B CORE AB, IGM    Collection Time: 07/28/21  3:36 AM   Result Value Ref Range    Hepatitis B core, IgM NONREACTIVE NR     CBC WITH AUTOMATED DIFF    Collection Time: 07/28/21  3:36 AM   Result Value Ref Range    WBC 11.5 (H) 4.1 - 11.1 K/uL    RBC 3.22 (L) 4.10 - 5.70 M/uL    HGB 9.2 (L) 12.1 - 17.0 g/dL    HCT 29.2 (L) 36.6 - 50.3 %    MCV 90.7 80.0 - 99.0 FL    MCH 28.6 26.0 - 34.0 PG    MCHC 31.5 30.0 - 36.5 g/dL    RDW 18.7 (H) 11.5 - 14.5 %    PLATELET 189 084 - 253 K/uL    MPV 10.5 8.9 - 12.9 FL    NRBC 2.7 (H) 0  WBC    ABSOLUTE NRBC 0.31 (H) 0.00 - 0.01 K/uL    NEUTROPHILS 85 (H) 32 - 75 %    LYMPHOCYTES 6 (L) 12 - 49 %    MONOCYTES 8 5 - 13 %    EOSINOPHILS 0 0 - 7 %    BASOPHILS 0 0 - 1 %    IMMATURE GRANULOCYTES 1 (H) 0.0 - 0.5 %    ABS. NEUTROPHILS 9.8 (H) 1.8 - 8.0 K/UL    ABS. LYMPHOCYTES 0.7 (L) 0.8 - 3.5 K/UL    ABS. MONOCYTES 0.9 0.0 - 1.0 K/UL    ABS. EOSINOPHILS 0.0 0.0 - 0.4 K/UL    ABS. BASOPHILS 0.0 0.0 - 0.1 K/UL    ABS. IMM.  GRANS. 0.1 (H) 0.00 - 0.04 K/UL    DF SMEAR SCANNED      PLATELET COMMENTS Large Platelets      RBC COMMENTS ANISOCYTOSIS  1+        RBC COMMENTS OVALOCYTES  PRESENT        RBC COMMENTS ADRIANA CELLS  PRESENT       METABOLIC PANEL, BASIC    Collection Time: 07/28/21  3:36 AM   Result Value Ref Range    Sodium 137 136 - 145 mmol/L    Potassium 2.9 (L) 3.5 - 5.1 mmol/L    Chloride 104 97 - 108 mmol/L    CO2 13 (LL) 21 - 32 mmol/L    Anion gap 20 (H) 5 - 15 mmol/L    Glucose 165 (H) 65 - 100 mg/dL    BUN 49 (H) 6 - 20 MG/DL    Creatinine 5.89 (H) 0.70 - 1.30 MG/DL    BUN/Creatinine ratio 8 (L) 12 - 20      GFR est AA 11 (L) >60 ml/min/1.73m2    GFR est non-AA 9 (L) >60 ml/min/1.73m2    Calcium 8.5 8.5 - 10.1 MG/DL   SAMPLES BEING HELD    Collection Time: 07/28/21  3:36 AM   Result Value Ref Range    SAMPLES BEING HELD 1RED 2PST     COMMENT        Add-on orders for these samples will be processed based on acceptable specimen integrity and analyte stability, which may vary by analyte. SAMPLES BEING HELD    Collection Time: 07/28/21  6:30 AM   Result Value Ref Range    SAMPLES BEING HELD 1CUP STOOL     COMMENT        Add-on orders for these samples will be processed based on acceptable specimen integrity and analyte stability, which may vary by analyte. CBC W/O DIFF    Collection Time: 07/28/21 10:34 AM   Result Value Ref Range    WBC 14.3 (H) 4.1 - 11.1 K/uL    RBC 3.10 (L) 4.10 - 5.70 M/uL    HGB 8.8 (L) 12.1 - 17.0 g/dL    HCT 29.1 (L) 36.6 - 50.3 %    MCV 93.9 80.0 - 99.0 FL    MCH 28.4 26.0 - 34.0 PG    MCHC 30.2 30.0 - 36.5 g/dL    RDW 19.0 (H) 11.5 - 14.5 %    PLATELET 001 369 - 868 K/uL    MPV 11.0 8.9 - 12.9 FL    NRBC 5.2 (H) 0  WBC    ABSOLUTE NRBC 0.75 (H) 0.00 - 9.49 K/uL   METABOLIC PANEL, COMPREHENSIVE    Collection Time: 07/28/21 10:34 AM   Result Value Ref Range    Sodium 143 136 - 145 mmol/L    Potassium 3.0 (L) 3.5 - 5.1 mmol/L    Chloride 106 97 - 108 mmol/L    CO2 16 (L) 21 - 32 mmol/L    Anion gap 21 (H) 5 - 15 mmol/L    Glucose 108 (H) 65 - 100 mg/dL    BUN 52 (H) 6 - 20 MG/DL    Creatinine 6.54 (H) 0.70 - 1.30 MG/DL    BUN/Creatinine ratio 8 (L) 12 - 20      GFR est AA 10 (L) >60 ml/min/1.73m2    GFR est non-AA 8 (L) >60 ml/min/1.73m2    Calcium 7.6 (L) 8.5 - 10.1 MG/DL    Bilirubin, total 0.6 0.2 - 1.0 MG/DL    ALT (SGPT) 54 12 - 78 U/L    AST (SGOT) 106 (H) 15 - 37 U/L    Alk.  phosphatase 139 (H) 45 - 117 U/L    Protein, total 7.7 6.4 - 8.2 g/dL    Albumin 3.0 (L) 3.5 - 5.0 g/dL    Globulin 4.7 (H) 2.0 - 4.0 g/dL    A-G Ratio 0.6 (L) 1.1 - 2.2     MAGNESIUM    Collection Time: 07/28/21 10:34 AM   Result Value Ref Range    Magnesium 2.5 (H) 1.6 - 2.4 mg/dL   PHOSPHORUS    Collection Time: 07/28/21 10:34 AM   Result Value Ref Range    Phosphorus 9.8 (H) 2.6 - 4.7 MG/DL   PROTHROMBIN TIME + INR    Collection Time: 07/28/21 10:34 AM   Result Value Ref Range    INR 1.5 (H) 0.9 - 1.1      Prothrombin time 15.2 (H) 9.0 - 11.1 sec   LACTIC ACID    Collection Time: 07/28/21 10:34 AM   Result Value Ref Range    Lactic acid 10.0 (HH) 0.4 - 2.0 MMOL/L   TROPONIN I    Collection Time: 07/28/21 10:34 AM   Result Value Ref Range    Troponin-I, Qt. 0.11 (H) <0.05 ng/mL               Waleska Sandoval MD  [address]  Phone - [phone]   Fax - [phone]  www. [email or web address]

## 2021-07-28 NOTE — PROGRESS NOTES
Spiritual Care Assessment/Progress Note  [facility]      NAME: Ricardo Magana      MRN: [id number]  AGE: 77 y.o. SEX: male  Oriental orthodox Affiliation: No preference   Language: English     7/28/2021     Total Time (in minutes): 16     Spiritual Assessment begun in [facility] 6S NEURO-SCI TELE through conversation with:         [x]Patient        [] Family    [] Friend(s)        Reason for Consult: Code Blue/99     Spiritual beliefs: (Please include comment if needed)     [] Identifies with a aleksey tradition:         [] Supported by a aleksey community:            [] Claims no spiritual orientation:           [] Seeking spiritual identity:                [] Adheres to an individual form of spirituality:           [x] Not able to assess:                           Identified resources for coping:      [] Prayer                               [] Music                  [] Guided Imagery     [] Family/friends                 [] Pet visits     [] Devotional reading                         [x] Unknown     [] Other:                                               Interventions offered during this visit: (See comments for more details)    Patient Interventions: Spiritual care volunteer support           Plan of Care:     [] Support spiritual and/or cultural needs    [] Support AMD and/or advance care planning process      [] Support grieving process   [] Coordinate Rites and/or Rituals    [] Coordination with community clergy   [] No spiritual needs identified at this time   [] Detailed Plan of Care below (See Comments)  [] Make referral to Music Therapy  [] Make referral to Pet Therapy     [] Make referral to Addiction services  [] Make referral to [facility]  [] Make referral to Spiritual Care Partner  [] No future visits requested        [x] Follow up upon further referrals     Comments:  responded to code blue. Pt is being attended to and no family present at this time.  Please contact [phone] [address] for further support.      [address], [facility]   [phone]

## 2021-07-28 NOTE — PROCEDURES
[facility]      Procedure Note - Central Venous Access:   Performed by Jai Alcazar MD  Diagnosis: Septic shock  Insertion Date: 07/28/21  Time:1:59 PM  Obtained Consent? no; emergent   Procedure Location:  ICU. Immediately prior to the procedure, the patient was reevaluated and found suitable for the planned procedure and any planned medications. Immediately prior to the procedure a time out was called to verify the correct patient, procedure, equipment, staff, and marking as appropriate. Central line Bundle:  Full sterile barrier precautions used. 7-Step Sterility Protocol followed. (cap, mask sterile gown, sterile gloves, large sterile sheet, hand hygiene, 2% chlorhexidine for cutaneous antisepsis)  5 mL 1% Lidocaine placed at insertion site. Patient positioned in Trendelenburg?no   The site was prepped with ChloraPrep. Catheter inserted into a new site. Using Seldinger technique a Arrow quad lumen was was placed in the Left, Internal Jugular Vein via direct cannulation with 1 number of attempts for IV Access. Ultrasound Guidance was utilized. There was good dark, non-pulsatile blood return in all ports. Catheter secured. Biopatch in place? yes. Sterile Bio-occlusive dressing placed. The following complications were encountered: None. A follow-up chest x-ray was ordered post procedure. The procedure was tolerated well.         Jai Alcazar MD  Critical Care Medicine  [facility]

## 2021-07-28 NOTE — PROCEDURES
[facility]      Procedure Note - Intubation: EMERGENT DURING CODE BLUE  Performed by Clovis Santizo MD .     Immediately prior to the procedure, the patient was reevaluated and found suitable for the planned procedure and any planned medications. Immediately prior to the procedure a time out was called to verify the correct patient, procedure, equipment, staff, and marking as appropriate. Medications given were none. A number 7.5 cuffed   ETT was placed to 23 cm at the teeth. Placement was evaluated by noting bilateral, symmetric breath sounds, good end-tidal CO2 detector color change  and no breath sounds over stomach. Attempts required: 1. Complications: none. The procedure was tolerated well.

## 2021-07-28 NOTE — PROGRESS NOTES
Physical Therapy: Hold    Code Blue called on patient this morning with plans for transfer to ICU, will defer and follow up for PT re-evaluation as able & medically appropriate.      Nae Hilliard, PT, DPT

## 2021-07-28 NOTE — CONSULTS
[facility]    ICU TEAM CONSULT    Name: Christina Olson   : 1945   MRN: [id number]   Date: 2021      Reason for ICU Admission: CODE BLUE    HPI:   52-year-old gentleman with multiple medical problem admitted to the hospital as a transfer from outside facility on  for acute on chronic kidney disease requiring hemodialysis for hypoxia and pulmonary edema and possible pneumonia. Patient was dialyzed and his condition improved slightly and decision was made to transfer him to regular hemodialysis 3 times a week and he had a permacath inserted on the . Over the last few days there was progressive distention of his abdomen, C. difficile was suspected and stool sample was sent but no results yet. He was evaluated by CAT scan abdomen followed by surgical and GI evaluation on the  but at that time there was no indication for immediate intervention and treatment for C. difficile colitis was started empirically. The morning of  he had progressive increase in his abdominal pain and suddenly he started to be in agonal breathing and bradycardia. CODE BLUE was called and resuscitation started, he received 2 amp of epinephrine and 1 amp of bicarb and he was emergently intubated. ROSC was achieved [not clear if he lost pulse completely or it was very weak that was not detected]. Patient obtunded however he moves 4 limbs and was reaching out to the ET tube shortly after resuscitation so he was given fentanyl and Ativan. He is hypotensive and requiring pressors. His abdomen is severely distended and x-ray showed diffuse gas but no clear free air. Discussed this with the general surgeon as my suspicion for bowel perforation is high.   General surgeon at bedside assessing the patient    Active Problem List:     Problem List  Date Reviewed: 2021        Codes Class    Acute delirium ICD-10-CM: R41.0  ICD-9-CM: 427.99         Metabolic acidosis, normal anion gap (NAG) ICD-10-CM: E87.2  ICD-9-CM: 276.2         UTI (urinary tract infection) ICD-10-CM: N39.0  ICD-9-CM: 599.0         JEREMIAS (acute kidney injury) ([facility] ICD-10-CM: N17.9  ICD-9-CM: 584.9         CKD (chronic kidney disease) stage 3, GFR 30-59 ml/min ([facility]) ICD-10-CM: N18.30  ICD-9-CM: 585.3         Secondary hyperparathyroidism ([facility] ICD-10-CM: N25.81  ICD-9-CM: 588.81         Vitamin D deficiency ICD-10-CM: E55.9  ICD-9-CM: 268.9         Essential hypertension ICD-10-CM: I10  ICD-9-CM: 401.9         Insulin-treated type 2 diabetes mellitus ([facility] ICD-10-CM: E11.9, Z79.4  ICD-9-CM: 250.00, V58.67         Scalp abscess ICD-10-CM: L02.811  ICD-9-CM: 682.8               Past Medical History:      has a past medical history of CKD (chronic kidney disease) stage 3, GFR 30-59 ml/min ([facility] (2/8/2021), Diabetes ([facility] Essential hypertension (2/8/2021), Hypertension, Insulin-treated type 2 diabetes mellitus ([facility] (2/8/2021), Secondary hyperparathyroidism ([facility] (2/8/2021), and Vitamin D deficiency (2/8/2021). Past Surgical History:      has a past surgical history that includes hx orthopaedic; ir insert non tunl cvc over 5 yrs (7/21/2021); and ir insert tunl cvc w/o port over 5 yr (7/27/2021). Home Medications:     Prior to Admission medications    Medication Sig Start Date End Date Taking? Authorizing Provider   glipiZIDE (GLUCOTROL) 5 mg tablet Take 5 mg by mouth two (2) times a day. Last filled 6/21/21 for #60   Yes Provider, Historical   DULoxetine (CYMBALTA) 60 mg capsule Take 1 Cap by mouth daily. 3/11/21   Minus MD Jessica   hydrALAZINE (APRESOLINE) 50 mg tablet Take 1 Tab by mouth three (3) times daily. 3/11/21   Minus MD Jessica   insulin glargine (LANTUS) 100 unit/mL injection Take 8 units every day at bedtime 3/11/21   Minus MD Jessica   amoxicillin-clavulanate (Augmentin) 875-125 mg per tablet Take 1 Tab by mouth every twelve (12) hours.  3/11/21   Nehemiah Patrick, MD   NIFEdipine ER (PROCARDIA XL) 60 mg ER tablet Take 1 Tab by mouth two (2) times a day. Patient taking differently: Take 30 mg by mouth daily. Last filled  for nifedipine ER 30mg 1QD #30 3/11/21   Josey Stacy MD   silver sulfADIAZINE (SILVADENE) 1 % topical cream Apply  to affected area daily. Apply to scalp wound daily with dressing changes 21   Neal Nation MD   ferrous sulfate 325 mg (65 mg iron) tablet Take 325 mg by mouth Daily (before breakfast). Provider, Historical   sodium bicarbonate 650 mg tablet Take 650 mg by mouth two (2) times a day. Provider, Historical   pravastatin (PRAVACHOL) 20 mg tablet Take 20 mg by mouth daily. Provider, Historical       Allergies/Social/Family History:     No Known Allergies   Social History     Tobacco Use    Smoking status: Never Smoker    Smokeless tobacco: Never Used   Substance Use Topics    Alcohol use: Yes     Comment: rarely      Family History   Problem Relation Age of Onset    Diabetes Mother     Hypertension Mother     Cancer Mother    [employer] Diabetes Father     Hypertension Father     Cancer Father        Review of Systems:   Not able to obtain due to patient medical condition    Objective:   Vital Signs:  Visit Vitals  BP (!) 80/54   Pulse 61   Temp (!) 92.7 °F (33.7 °C)   Resp 16   Ht 6' (1.829 m)   Wt 55.2 kg (121 lb 9.6 oz)   SpO2 100%   BMI 16.49 kg/m²    O2 Flow Rate (L/min): 2 l/min O2 Device: None (Room air) Temp (24hrs), Av.3 °F (35.7 °C), Min:91.8 °F (33.2 °C), Max:98.6 °F (37 °C)           Intake/Output:     Intake/Output Summary (Last 24 hours) at 2021 1337  Last data filed at 2021 1215  Gross per 24 hour   Intake 32.46 ml   Output 100 ml   Net -67.54 ml       Physical Exam:    General:   Poorly responsive on mechanical ventilation   Eyes:  Sclera anicteric. Pupils equally round and reactive to light.    Mouth/Throat: Mucous membranes normal, oral pharynx clear   Neck: Supple   Lungs:    Fine crepitation bilaterally   CV:  Regular rate and rhythm,no murmur, click, rub or gallop   Abdomen:    Distended firm tympanic to percussion no bowel sound   Extremities: No cyanosis or edema   Skin: Skin color, texture, turgor normal. no acute rash or lesions   Lymph nodes: Cervical and supraclavicular normal   Musculoskeletal: No swelling or deformity   Lines/Devices:  Intact, no erythema, drainage or tenderness   Neuro:  Obtunded poorly responsive was moving 4 limbs upper limbs stronger than lower, not following command. LABS AND  DATA: Personally reviewed  Recent Labs     07/28/21  1034 07/28/21  0336   WBC 14.3* 11.5*   HGB 8.8* 9.2*   HCT 29.1* 29.2*    182     Recent Labs     07/28/21  1034 07/28/21  0336 07/27/21  1032 07/27/21  0146    137   < > 140   K 3.0* 2.9*   < > 2.5*    104   < > 102   CO2 16* 13*   < > 22   BUN 52* 49*   < > 25*   CREA 6.54* 5.89*   < > 3.61*   * 165*   < > 77   CA 7.6* 8.5   < > 8.2*   MG 2.5*  --   --  2.1   PHOS 9.8*  --   --  3.7    < > = values in this interval not displayed. Recent Labs     07/28/21  1034 07/27/21  0146   * 139*   TP 7.7 7.7   ALB 3.0* 3.5   GLOB 4.7* 4.2*     Recent Labs     07/28/21  1034 07/27/21  0121   INR 1.5* 1.2*   PTP 15.2* 12.1*      No results for input(s): PHI, PCO2I, PO2I, FIO2I in the last 72 hours. Recent Labs     07/28/21  1034   TROIQ 0.11*       Hemodynamics:   PAP:   CO:     Wedge:   CI:     CVP:    SVR:       PVR:       Ventilator Settings:  Mode Rate Tidal Volume Pressure FiO2 PEEP   Pressure control   400 ml    50 % 5 cm H20     Peak airway pressure: 21 cm H2O    Minute ventilation: 9.17 l/min        MEDS: Reviewed    Chest X-Ray:  CXR Results  (Last 48 hours)               07/28/21 1054  XR CHEST PORT Final result    Impression:  1. Improved pulmonary edema           Narrative:  INDICATION:  code blue        EXAM: Portable chest 1034 hours.  Comparison 7/21/2021       FINDINGS: Cardiac silhouette is partially obscured. The right neck central   venous catheter has been exchanged. Tip now overlies the right atrium. Patient   is intubated with endotracheal tube overlying the trachea at the level of the   clavicles. Elevation of the left hemidiaphragm unchanged. Pulmonary edema   pattern has significantly improved with no new consolidation. No pneumothorax or   effusion. There are calcifications in the upper abdomen compatible with chronic   calcific pancreatitis                   Assessment and Plan:   Septic shock:  Suspected bowel perforation:  Abdominal compartment syndrome: Intra-abdominal pressure measured at 27  End-stage renal disease on hemodialysis:  Lactic acidosis:  Acute respiratory failure due to #1 above:    Clinically patient in shock, history, physical examination and work-up highly suggestive of bowel perforation with abdominal compartment syndrome. This was discussed in person with the surgeon and plan for urgent surgical exploration in the OR. Resuscitation with crystalloid and colloid. Started home on norepinephrine and vasopressin, keep map above 65. Follow-up on lactic acid level. Start bicarb drip, nephrology notified planning CRRT, appreciate her input. Replace potassium. Patient already on oral vancomycin and Flagyl IV, will add Zosyn and Eraxis. Ventilator management, monitor closely for increased peak pressure. Keep antihypertensive medication on hold, insulin sliding scale, goal blood sugar below 180, avoid hypoglycemia. Patient is critically ill, prognosis guarded. Family discussed the case with the surgeon. We will update them postoperatively. CRITICAL CARE CONSULTANT NOTE  I had a face to face encounter with the patient, reviewed and interpreted patient data including clinical events, labs, images, vital signs, I/O's, and examined patient.   I have discussed the case and the plan and management of the patient's care with the consulting services, the bedside nurses and the respiratory therapist.      NOTE OF PERSONAL INVOLVEMENT IN CARE   This patient has a high probability of imminent, clinically significant deterioration, which requires the highest level of preparedness to intervene urgently. I participated in the decision-making and personally managed or directed the management of the following life and organ supporting interventions that required my frequent assessment to treat or prevent imminent deterioration. I personally spent 60 minutes of critical care time. This is time spent at this critically ill patient's bedside actively involved in patient care as well as the coordination of care and discussions with the patient's family. This does not include any procedural time which has been billed separately. Robert Carranza M.D.   Staff Intensivist/Pulmonologist  [facility]  7/28/2021

## 2021-07-28 NOTE — PROGRESS NOTES
Progress Note    Patient: Milly Stacy MRN: [id number]  SSN: [id number]    YOB: 1945  Age: 77 y.o. Sex: male      Admit Date: 2021    Day of Surgery    Procedure:  Procedure(s):  LAPAROTOMY EXPLORATORY COLOSTOMY POSSIBLE OSTOMY/ 7119    Subjective:     Pt clinically deteriorated this morning with code blue called and intubated. Pt noted to have worsening abdominal distention and lactic acidosis.        Objective:     Visit Vitals  BP (!) 80/54   Pulse 61   Temp (!) 92.7 °F (33.7 °C)   Resp 16   Ht 6' (1.829 m)   Wt 121 lb 9.6 oz (55.2 kg)   SpO2 100%   BMI 16.49 kg/m²       Temp (24hrs), Av.3 °F (35.7 °C), Min:91.8 °F (33.2 °C), Max:98.6 °F (37 °C)        Physical Exam:    Gen:  Intubated and sedated on pressor  Pulm:  Unlabored  Abd:  S/distended/unable to assess tenderness    Recent Results (from the past 24 hour(s))   HBV CORE AB, IGG/IGM    Collection Time: 21  3:01 PM   Result Value Ref Range    Hep B Core Ab, IgM Negative Negative      Hep B Core Ab, total Positive (A) Negative     GLUCOSE, POC    Collection Time: 21  4:42 PM   Result Value Ref Range    Glucose (POC) 130 (H) 65 - 117 mg/dL    Performed by [facility]    CBC W/O DIFF    Collection Time: 21  4:44 PM   Result Value Ref Range    WBC 9.2 4.1 - 11.1 K/uL    RBC 3.06 (L) 4.10 - 5.70 M/uL    HGB 8.7 (L) 12.1 - 17.0 g/dL    HCT 27.9 (L) 36.6 - 50.3 %    MCV 91.2 80.0 - 99.0 FL    MCH 28.4 26.0 - 34.0 PG    MCHC 31.2 30.0 - 36.5 g/dL    RDW 18.8 (H) 11.5 - 14.5 %    PLATELET 536 509 - 511 K/uL    MPV 10.1 8.9 - 12.9 FL    NRBC 3.4 (H) 0  WBC    ABSOLUTE NRBC 0.31 (H) 0.00 - 2.03 K/uL   METABOLIC PANEL, BASIC    Collection Time: 21  4:44 PM   Result Value Ref Range    Sodium 140 136 - 145 mmol/L    Potassium 2.6 (LL) 3.5 - 5.1 mmol/L    Chloride 104 97 - 108 mmol/L    CO2 17 (L) 21 - 32 mmol/L    Anion gap 19 (H) 5 - 15 mmol/L    Glucose 137 (H) 65 - 100 mg/dL    BUN 41 (H) 6 - 20 MG/DL Creatinine 4.83 (H) 0.70 - 1.30 MG/DL    BUN/Creatinine ratio 8 (L) 12 - 20      GFR est AA 14 (L) >60 ml/min/1.73m2    GFR est non-AA 12 (L) >60 ml/min/1.73m2    Calcium 8.4 (L) 8.5 - 10.1 MG/DL   HEPATITIS B CORE AB, IGM    Collection Time: 07/28/21  3:36 AM   Result Value Ref Range    Hepatitis B core, IgM NONREACTIVE NR     CBC WITH AUTOMATED DIFF    Collection Time: 07/28/21  3:36 AM   Result Value Ref Range    WBC 11.5 (H) 4.1 - 11.1 K/uL    RBC 3.22 (L) 4.10 - 5.70 M/uL    HGB 9.2 (L) 12.1 - 17.0 g/dL    HCT 29.2 (L) 36.6 - 50.3 %    MCV 90.7 80.0 - 99.0 FL    MCH 28.6 26.0 - 34.0 PG    MCHC 31.5 30.0 - 36.5 g/dL    RDW 18.7 (H) 11.5 - 14.5 %    PLATELET 564 039 - 283 K/uL    MPV 10.5 8.9 - 12.9 FL    NRBC 2.7 (H) 0  WBC    ABSOLUTE NRBC 0.31 (H) 0.00 - 0.01 K/uL    NEUTROPHILS 85 (H) 32 - 75 %    LYMPHOCYTES 6 (L) 12 - 49 %    MONOCYTES 8 5 - 13 %    EOSINOPHILS 0 0 - 7 %    BASOPHILS 0 0 - 1 %    IMMATURE GRANULOCYTES 1 (H) 0.0 - 0.5 %    ABS. NEUTROPHILS 9.8 (H) 1.8 - 8.0 K/UL    ABS. LYMPHOCYTES 0.7 (L) 0.8 - 3.5 K/UL    ABS. MONOCYTES 0.9 0.0 - 1.0 K/UL    ABS. EOSINOPHILS 0.0 0.0 - 0.4 K/UL    ABS. BASOPHILS 0.0 0.0 - 0.1 K/UL    ABS. IMM.  GRANS. 0.1 (H) 0.00 - 0.04 K/UL    DF SMEAR SCANNED      PLATELET COMMENTS Large Platelets      RBC COMMENTS ANISOCYTOSIS  1+        RBC COMMENTS OVALOCYTES  PRESENT        RBC COMMENTS ADRIANA CELLS  PRESENT       METABOLIC PANEL, BASIC    Collection Time: 07/28/21  3:36 AM   Result Value Ref Range    Sodium 137 136 - 145 mmol/L    Potassium 2.9 (L) 3.5 - 5.1 mmol/L    Chloride 104 97 - 108 mmol/L    CO2 13 (LL) 21 - 32 mmol/L    Anion gap 20 (H) 5 - 15 mmol/L    Glucose 165 (H) 65 - 100 mg/dL    BUN 49 (H) 6 - 20 MG/DL    Creatinine 5.89 (H) 0.70 - 1.30 MG/DL    BUN/Creatinine ratio 8 (L) 12 - 20      GFR est AA 11 (L) >60 ml/min/1.73m2    GFR est non-AA 9 (L) >60 ml/min/1.73m2    Calcium 8.5 8.5 - 10.1 MG/DL   SAMPLES BEING HELD    Collection Time: 07/28/21 3:36 AM   Result Value Ref Range    SAMPLES BEING HELD 1RED 2PST     COMMENT        Add-on orders for these samples will be processed based on acceptable specimen integrity and analyte stability, which may vary by analyte. SAMPLES BEING HELD    Collection Time: 07/28/21  6:30 AM   Result Value Ref Range    SAMPLES BEING HELD 1CUP STOOL     COMMENT        Add-on orders for these samples will be processed based on acceptable specimen integrity and analyte stability, which may vary by analyte. CBC W/O DIFF    Collection Time: 07/28/21 10:34 AM   Result Value Ref Range    WBC 14.3 (H) 4.1 - 11.1 K/uL    RBC 3.10 (L) 4.10 - 5.70 M/uL    HGB 8.8 (L) 12.1 - 17.0 g/dL    HCT 29.1 (L) 36.6 - 50.3 %    MCV 93.9 80.0 - 99.0 FL    MCH 28.4 26.0 - 34.0 PG    MCHC 30.2 30.0 - 36.5 g/dL    RDW 19.0 (H) 11.5 - 14.5 %    PLATELET 298 539 - 620 K/uL    MPV 11.0 8.9 - 12.9 FL    NRBC 5.2 (H) 0  WBC    ABSOLUTE NRBC 0.75 (H) 0.00 - 6.44 K/uL   METABOLIC PANEL, COMPREHENSIVE    Collection Time: 07/28/21 10:34 AM   Result Value Ref Range    Sodium 143 136 - 145 mmol/L    Potassium 3.0 (L) 3.5 - 5.1 mmol/L    Chloride 106 97 - 108 mmol/L    CO2 16 (L) 21 - 32 mmol/L    Anion gap 21 (H) 5 - 15 mmol/L    Glucose 108 (H) 65 - 100 mg/dL    BUN 52 (H) 6 - 20 MG/DL    Creatinine 6.54 (H) 0.70 - 1.30 MG/DL    BUN/Creatinine ratio 8 (L) 12 - 20      GFR est AA 10 (L) >60 ml/min/1.73m2    GFR est non-AA 8 (L) >60 ml/min/1.73m2    Calcium 7.6 (L) 8.5 - 10.1 MG/DL    Bilirubin, total 0.6 0.2 - 1.0 MG/DL    ALT (SGPT) 54 12 - 78 U/L    AST (SGOT) 106 (H) 15 - 37 U/L    Alk.  phosphatase 139 (H) 45 - 117 U/L    Protein, total 7.7 6.4 - 8.2 g/dL    Albumin 3.0 (L) 3.5 - 5.0 g/dL    Globulin 4.7 (H) 2.0 - 4.0 g/dL    A-G Ratio 0.6 (L) 1.1 - 2.2     MAGNESIUM    Collection Time: 07/28/21 10:34 AM   Result Value Ref Range    Magnesium 2.5 (H) 1.6 - 2.4 mg/dL   PHOSPHORUS    Collection Time: 07/28/21 10:34 AM   Result Value Ref Range Phosphorus 9.8 (H) 2.6 - 4.7 MG/DL   PROTHROMBIN TIME + INR    Collection Time: 07/28/21 10:34 AM   Result Value Ref Range    INR 1.5 (H) 0.9 - 1.1      Prothrombin time 15.2 (H) 9.0 - 11.1 sec   LACTIC ACID    Collection Time: 07/28/21 10:34 AM   Result Value Ref Range    Lactic acid 10.0 (HH) 0.4 - 2.0 MMOL/L   TROPONIN I    Collection Time: 07/28/21 10:34 AM   Result Value Ref Range    Troponin-I, Qt. 0.11 (H) <0.05 ng/mL   BLOOD GAS, ARTERIAL    Collection Time: 07/28/21 10:36 AM   Result Value Ref Range    pH 7.06 (LL) 7.35 - 7.45      PCO2 40 35 - 45 mmHg    PO2 408 (H) 80 - 100 mmHg    O2  (H) 92 - 97 %    BICARBONATE 11 (L) 22 - 26 mmol/L    BASE DEFICIT 18.5 mmol/L    O2 METHOD AMBU      FIO2 1.0 %    Sample source ARTERIAL      SITE RIGHT BRACHIAL      PATTI'S TEST NOT APPLICABLE      Critical value read back Called to Dalton Slater MD on 07/28/2021 at 10:38            Assessment:     Hospital Problems  Date Reviewed: 7/21/2021        Codes Class Noted POA    Acute delirium ICD-10-CM: R41.0  ICD-9-CM: 780.09  7/21/2021 Unknown              Plan/Recommendations/Medical Decision Making:     - Abdominal distention:  Unclear if this is abdominal compartment versus ischemic colitis. Pt is clinically deteriorating.   Discussed findings with patient's daughter Tay Griffiths who is agreeable to operation  - To OR for ex lap with colectomy possible colostomy.  - Continue resuscitation and supportive measures  - Vit K for coagulopathy

## 2021-07-28 NOTE — PROGRESS NOTES
Transition of Care Plan   RUR- 32% High Risk   DISPOSITION: The disposition plan is to transition to a SNF - pending review.  F/U with PCP/Specialist     Transport: [facility]/family     At 8:24am - CM contacted Isaac Rodriguez/[phone] - Dialysis coordinator to follow up regarding the requested lab from yesterday. (Hep B Total Core IGM. )    At 8:30am - Rolette List contacted TW and reports that the Hep B Total Core IGM is no longer need. [facility]  [phone] - pending review. [facility]  [phone] - pending review. [facility]  [phone] - pending review. Patient has been recommended for SNF. Current facilities above are reviewing patients clinical chart. CM will continue to follow, provide support and assist with ADAM needs as they arise.     Erline Ganser, MSW, CRM, LMHP-e

## 2021-07-28 NOTE — ANESTHESIA PREPROCEDURE EVALUATION
Relevant Problems   NEUROLOGY   (+) Acute delirium      CARDIOVASCULAR   (+) Essential hypertension      RENAL FAILURE   (+) JEREMIAS (acute kidney injury) (HCC)   (+) CKD (chronic kidney disease) stage 3, GFR 30-59 ml/min (HCC)      ENDOCRINE   (+) Insulin-treated type 2 diabetes mellitus (HCC)       Anesthetic History   No history of anesthetic complications            Review of Systems / Medical History  Patient summary reviewed, nursing notes reviewed and pertinent labs reviewed    Pulmonary  Within defined limits                 Neuro/Psych   Within defined limits           Cardiovascular  Within defined limits  Hypertension                   GI/Hepatic/Renal  Within defined limits       Renal disease       Endo/Other  Within defined limits  Diabetes         Other Findings              Physical Exam    Airway  Mallampati: II    Neck ROM: normal range of motion   Mouth opening: Normal  Intubated   Cardiovascular  Regular rate and rhythm,  S1 and S2 normal,  no murmur, click, rub, or gallop             Dental  No notable dental hx       Pulmonary  Breath sounds clear to auscultation               Abdominal  GI exam deferred       Other Findings            Anesthetic Plan    ASA: 4, emergent  Anesthesia type: general          Induction: Intravenous  Anesthetic plan and risks discussed with: Patient

## 2021-07-29 NOTE — PROGRESS NOTES
Nephrology Progress Note  Javier Victoria  Date of Admission : 7/21/2021    CC: Follow up for JEREMIAS on CKD       Assessment and Plan     JEREMIAS on CKD:  - ? Cause, likely progression of underlying CKD  - was on HD MWF, now CRRT  - cont CVVHD, no factor  - BID labs for now    Hypokalemia:  - 4k bath  - IV repletion ordered    CKD 3b:  - pt of Dr. 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 On CRRT, no factor. On pressors. S/p ex-lap and decompression yesterday for megacolon. Remains sedated on the vent. Current Medications: all current  Medications have been eviewed in EPIC  Review of Systems: Pertinent items are noted in HPI. Objective:  Vitals:    Vitals:    07/29/21 0600 07/29/21 0700 07/29/21 0744 07/29/21 0800   BP: (!) 121/58 (!) 114/56  117/60   Pulse: 80 91 80 79   Resp: 18 19 18 18   Temp:    97.9 °F (36.6 °C)   TempSrc:       SpO2: 100% 100% 99% 99%   Weight:       Height:         Intake and Output:  07/29 0701 - 07/29 1900  In: 75.4 [I.V.:75.4]  Out: 131 [Drains:30]  07/27 1901 - 07/29 0700  In: 4491.4 [I.V.:4491.4]  Out: 1893 [Urine:70; Drains:240]    Physical Examination:  General: Sedated on the vent  Neck:  Supple, no mass  Resp:  Lungs CTA B/L, no wheezing , normal respiratory effort  CV:  RRR,  no murmur or rub, trace LE edema  GI:  + distention, reduced bowel sounds  Neurologic:  Sedated on the vent  Psych:             Unable to assess  Skin:  No Rash  Access:           [facility]    []    High complexity decision making was performed  []    Patient is at high-risk of decompensation with multiple organ involvement    Lab Data Personally Reviewed: I have reviewed all the pertinent labs, microbiology data and radiology studies during assessment.     Recent Labs     07/29/21  0094 07/28/21  1731 07/28/21  1034 07/27/21  1032 07/27/21  0146 07/27/21  0121 07/27/21  0121     141 143 143   < > 140   < > 142   K 2.7*  2.7* 3.6 3.0*   < > 2.5*   < > 2.5*     107 107 106   < > 102   < > 103   CO2 25  25 17* 16*   < > 22   < > 24   *  203* 134* 108*   < > 77   < > 86   BUN 45*  46* 55* 52*   < > 25*   < > 25*   CREA 4.77*  4.70* 6.24* 6.54*   < > 3.61*   < > 3.61*   CA 7.1*  6.6* 7.0* 7.6*   < > 8.2*   < > 8.6   MG 2.2 2.2 2.5*  --  2.1   < >  --    PHOS 3.8  --  9.8*  --  3.7  --   --    ALB 3.3*  3.3*  --  3.0*  --  3.5   < > 3.5   *  --  54  --  26   < > 25   INR  --   --  1.5*  --   --   --  1.2*    < > = values in this interval not displayed. Recent Labs     07/29/21  0503 07/29/21  0059 07/28/21  1034   WBC 20.9* 19.4* 14.3*   HGB 7.8* 7.9* 8.8*   HCT 24.0* 23.9* 29.1*    170 191     No results found for: SDES  Lab Results   Component Value Date/Time    Culture result:  07/21/2021 08:54 AM     MRSA NOT PRESENT. Apparent Staphylococus aureus (not MRSA noted). Culture result:  07/21/2021 08:54 AM     Screening of patient nares for MRSA is for surveillance purposes and, if positive, to facilitate isolation considerations in high risk settings. It is not intended for automatic decolonization interventions per se as regimens are not sufficiently effective to warrant routine use.     Culture result: No Growth (<1000 cfu/mL) 07/20/2021 09:00 PM     Recent Results (from the past 24 hour(s))   CBC W/O DIFF    Collection Time: 07/28/21 10:34 AM   Result Value Ref Range    WBC 14.3 (H) 4.1 - 11.1 K/uL    RBC 3.10 (L) 4.10 - 5.70 M/uL    HGB 8.8 (L) 12.1 - 17.0 g/dL    HCT 29.1 (L) 36.6 - 50.3 %    MCV 93.9 80.0 - 99.0 FL    MCH 28.4 26.0 - 34.0 PG    MCHC 30.2 30.0 - 36.5 g/dL    RDW 19.0 (H) 11.5 - 14.5 %    PLATELET 973 291 - 822 K/uL    MPV 11.0 8.9 - 12.9 FL    NRBC 5.2 (H) 0  WBC    ABSOLUTE NRBC 0.75 (H) 0.00 - 3.09 K/uL   METABOLIC PANEL, COMPREHENSIVE    Collection Time: 07/28/21 10:34 AM   Result Value Ref Range    Sodium 143 136 - 145 mmol/L    Potassium 3.0 (L) 3.5 - 5.1 mmol/L    Chloride 106 97 - 108 mmol/L    CO2 16 (L) 21 - 32 mmol/L    Anion gap 21 (H) 5 - 15 mmol/L    Glucose 108 (H) 65 - 100 mg/dL    BUN 52 (H) 6 - 20 MG/DL    Creatinine 6.54 (H) 0.70 - 1.30 MG/DL    BUN/Creatinine ratio 8 (L) 12 - 20      GFR est AA 10 (L) >60 ml/min/1.73m2    GFR est non-AA 8 (L) >60 ml/min/1.73m2    Calcium 7.6 (L) 8.5 - 10.1 MG/DL    Bilirubin, total 0.6 0.2 - 1.0 MG/DL    ALT (SGPT) 54 12 - 78 U/L    AST (SGOT) 106 (H) 15 - 37 U/L    Alk.  phosphatase 139 (H) 45 - 117 U/L    Protein, total 7.7 6.4 - 8.2 g/dL    Albumin 3.0 (L) 3.5 - 5.0 g/dL    Globulin 4.7 (H) 2.0 - 4.0 g/dL    A-G Ratio 0.6 (L) 1.1 - 2.2     MAGNESIUM    Collection Time: 07/28/21 10:34 AM   Result Value Ref Range    Magnesium 2.5 (H) 1.6 - 2.4 mg/dL   PHOSPHORUS    Collection Time: 07/28/21 10:34 AM   Result Value Ref Range    Phosphorus 9.8 (H) 2.6 - 4.7 MG/DL   PROTHROMBIN TIME + INR    Collection Time: 07/28/21 10:34 AM   Result Value Ref Range    INR 1.5 (H) 0.9 - 1.1      Prothrombin time 15.2 (H) 9.0 - 11.1 sec   LACTIC ACID    Collection Time: 07/28/21 10:34 AM   Result Value Ref Range    Lactic acid 10.0 (HH) 0.4 - 2.0 MMOL/L   TROPONIN I    Collection Time: 07/28/21 10:34 AM   Result Value Ref Range    Troponin-I, Qt. 0.11 (H) <0.05 ng/mL   BLOOD GAS, ARTERIAL    Collection Time: 07/28/21 10:36 AM   Result Value Ref Range    pH 7.06 (LL) 7.35 - 7.45      PCO2 40 35 - 45 mmHg    PO2 408 (H) 80 - 100 mmHg    O2  (H) 92 - 97 %    BICARBONATE 11 (L) 22 - 26 mmol/L    BASE DEFICIT 18.5 mmol/L    O2 METHOD AMBU      FIO2 1.0 %    Sample source ARTERIAL      SITE RIGHT BRACHIAL      PATTI'S TEST NOT APPLICABLE      Critical value read back Called to Bharat Randhawa MD on 07/28/2021 at 10:38    TYPE & SCREEN    Collection Time: 07/28/21  2:15 PM   Result Value Ref Range Crossmatch Expiration 07/31/2021,2352     ABO/Rh(D) Katrin Schultz POSITIVE     Antibody screen NEG    RBC, ALLOCATE    Collection Time: 07/28/21  2:15 PM   Result Value Ref Range    HISTORY CHECKED?  Historical check performed    BLOOD GAS,CHEM8,LACTIC ACID POC    Collection Time: 07/28/21  4:41 PM   Result Value Ref Range    Calcium, ionized (POC) 0.88 (L) 1.12 - 1.32 mmol/L    pH (POC) 7.04 (LL) 7.35 - 7.45      pCO2 (POC) 51.1 (H) 35.0 - 45.0 MMHG    pO2 (POC) 426 (H) 80 - 100 MMHG    BICARBONATE 14 mmol/L    Base deficit (POC) 16.1 mmol/L    Sample source ARTERIAL      CO2, POC 16 (L) 19 - 24 MMOL/L    Sodium,  136 - 145 MMOL/L    Potassium, POC 3.2 (L) 3.5 - 5.5 MMOL/L    Chloride,  (H) 100 - 108 MMOL/L    Glucose,  74 - 106 MG/DL    Creatinine, POC 5.5 (H) 0.6 - 1.3 MG/DL    Lactic Acid (POC) 6.60 (HH) 0.40 - 2.14 mmol/L   METABOLIC PANEL, BASIC    Collection Time: 07/28/21  5:31 PM   Result Value Ref Range    Sodium 143 136 - 145 mmol/L    Potassium 3.6 3.5 - 5.1 mmol/L    Chloride 107 97 - 108 mmol/L    CO2 17 (L) 21 - 32 mmol/L    Anion gap 19 (H) 5 - 15 mmol/L    Glucose 134 (H) 65 - 100 mg/dL    BUN 55 (H) 6 - 20 MG/DL    Creatinine 6.24 (H) 0.70 - 1.30 MG/DL    BUN/Creatinine ratio 9 (L) 12 - 20      GFR est AA 11 (L) >60 ml/min/1.73m2    GFR est non-AA 9 (L) >60 ml/min/1.73m2    Calcium 7.0 (L) 8.5 - 10.1 MG/DL   LACTIC ACID    Collection Time: 07/28/21  5:31 PM   Result Value Ref Range    Lactic acid 8.2 (HH) 0.4 - 2.0 MMOL/L   MAGNESIUM    Collection Time: 07/28/21  5:31 PM   Result Value Ref Range    Magnesium 2.2 1.6 - 2.4 mg/dL   GLUCOSE, POC    Collection Time: 07/28/21  5:31 PM   Result Value Ref Range    Glucose (POC) 136 (H) 65 - 117 mg/dL    Performed by Ubaldo Ponce RN    GLUCOSE, POC    Collection Time: 07/28/21  8:43 PM   Result Value Ref Range    Glucose (POC) 234 (H) 65 - 117 mg/dL    Performed by Claus Guerrero    BLOOD GAS, ARTERIAL    Collection Time: 07/28/21  8:54 PM   Result Value Ref Range    pH 7.26 (L) 7.35 - 7.45      PCO2 37 35 - 45 mmHg    PO2 132 (H) 80 - 100 mmHg    O2 SAT 98 (H) 92 - 97 %    BICARBONATE 16 (L) 22 - 26 mmol/L    BASE DEFICIT 10.2 mmol/L    O2 METHOD VENT      Sample source ARTERIAL      SITE DRAWN FROM ARTERIAL LINE      PATTI'S TEST NOT APPLICABLE     LACTIC ACID    Collection Time: 07/28/21 11:11 PM   Result Value Ref Range    Lactic acid 3.0 (HH) 0.4 - 2.0 MMOL/L   POC EG7    Collection Time: 07/28/21 11:20 PM   Result Value Ref Range    Calcium, ionized (POC) 0.82 (L) 1.12 - 1.32 mmol/L    FIO2 (POC) 40 %    pH (POC) 7.30 (L) 7.35 - 7.45      pCO2 (POC) 41.1 35.0 - 45.0 MMHG    pO2 (POC) 109 (H) 80 - 100 MMHG    HCO3 (POC) 20.3 (L) 22 - 26 MMOL/L    Base deficit (POC) 5.8 mmol/L    sO2 (POC) 97.7 (H) 92 - 97 %    Site DRAWN FROM ARTERIAL LINE      Device: ADULT VENT      Mode PRESSURE CONTROL      Set Rate 18 bpm    PEEP/CPAP (POC) 5 cmH2O    PIP (POC) 15      Allens test (POC) NOT APPLICABLE      Specimen type (POC) ARTERIAL     GLUCOSE, POC    Collection Time: 07/29/21 12:00 AM   Result Value Ref Range    Glucose (POC) 203 (H) 65 - 117 mg/dL    Performed by Temple Brass    CBC WITH AUTOMATED DIFF    Collection Time: 07/29/21 12:59 AM   Result Value Ref Range    WBC 19.4 (H) 4.1 - 11.1 K/uL    RBC 2.70 (L) 4.10 - 5.70 M/uL    HGB 7.9 (L) 12.1 - 17.0 g/dL    HCT 23.9 (L) 36.6 - 50.3 %    MCV 88.5 80.0 - 99.0 FL    MCH 29.3 26.0 - 34.0 PG    MCHC 33.1 30.0 - 36.5 g/dL    RDW 18.1 (H) 11.5 - 14.5 %    PLATELET 541 075 - 256 K/uL    MPV 11.6 8.9 - 12.9 FL    NRBC 0.9 (H) 0  WBC    ABSOLUTE NRBC 0.18 (H) 0.00 - 0.01 K/uL    NEUTROPHILS 91 (H) 32 - 75 %    BAND NEUTROPHILS 1 0 - 6 %    LYMPHOCYTES 5 (L) 12 - 49 %    MONOCYTES 3 (L) 5 - 13 %    EOSINOPHILS 0 0 - 7 %    BASOPHILS 0 0 - 1 %    IMMATURE GRANULOCYTES 0 %    ABS. NEUTROPHILS 17.8 (H) 1.8 - 8.0 K/UL    ABS. LYMPHOCYTES 1.0 0.8 - 3.5 K/UL    ABS. MONOCYTES 0.6 0.0 - 1.0 K/UL    ABS.  EOSINOPHILS 0.0 0.0 - 0.4 K/UL    ABS. BASOPHILS 0.0 0.0 - 0.1 K/UL    ABS. IMM. GRANS. 0.0 K/UL    DF MANUAL      RBC COMMENTS ANISOCYTOSIS  1+       GLUCOSE, POC    Collection Time: 07/29/21  3:58 AM   Result Value Ref Range    Glucose (POC) 184 (H) 65 - 117 mg/dL    Performed by [facility]    METABOLIC PANEL, COMPREHENSIVE    Collection Time: 07/29/21  5:03 AM   Result Value Ref Range    Sodium 141 136 - 145 mmol/L    Potassium 2.7 (LL) 3.5 - 5.1 mmol/L    Chloride 107 97 - 108 mmol/L    CO2 25 21 - 32 mmol/L    Anion gap 9 5 - 15 mmol/L    Glucose 203 (H) 65 - 100 mg/dL    BUN 46 (H) 6 - 20 MG/DL    Creatinine 4.70 (H) 0.70 - 1.30 MG/DL    BUN/Creatinine ratio 10 (L) 12 - 20      GFR est AA 15 (L) >60 ml/min/1.73m2    GFR est non-AA 12 (L) >60 ml/min/1.73m2    Calcium 6.6 (L) 8.5 - 10.1 MG/DL    Bilirubin, total 0.7 0.2 - 1.0 MG/DL    ALT (SGPT) 235 (H) 12 - 78 U/L    AST (SGOT) 728 (H) 15 - 37 U/L    Alk. phosphatase 104 45 - 117 U/L    Protein, total 6.9 6.4 - 8.2 g/dL    Albumin 3.3 (L) 3.5 - 5.0 g/dL    Globulin 3.6 2.0 - 4.0 g/dL    A-G Ratio 0.9 (L) 1.1 - 2.2     CBC WITH AUTOMATED DIFF    Collection Time: 07/29/21  5:03 AM   Result Value Ref Range    WBC 20.9 (H) 4.1 - 11.1 K/uL    RBC 2.72 (L) 4.10 - 5.70 M/uL    HGB 7.8 (L) 12.1 - 17.0 g/dL    HCT 24.0 (L) 36.6 - 50.3 %    MCV 88.2 80.0 - 99.0 FL    MCH 28.7 26.0 - 34.0 PG    MCHC 32.5 30.0 - 36.5 g/dL    RDW 17.9 (H) 11.5 - 14.5 %    PLATELET 561 972 - 645 K/uL    MPV 10.8 8.9 - 12.9 FL    NRBC 0.9 (H) 0  WBC    ABSOLUTE NRBC 0.18 (H) 0.00 - 0.01 K/uL    NEUTROPHILS 86 (H) 32 - 75 %    BAND NEUTROPHILS 5 0 - 6 %    LYMPHOCYTES 4 (L) 12 - 49 %    MONOCYTES 4 (L) 5 - 13 %    EOSINOPHILS 0 0 - 7 %    BASOPHILS 0 0 - 1 %    METAMYELOCYTES 1 (H) 0 %    IMMATURE GRANULOCYTES 0 %    ABS. NEUTROPHILS 19.0 (H) 1.8 - 8.0 K/UL    ABS. LYMPHOCYTES 0.8 0.8 - 3.5 K/UL    ABS. MONOCYTES 0.8 0.0 - 1.0 K/UL    ABS. EOSINOPHILS 0.0 0.0 - 0.4 K/UL    ABS.  BASOPHILS 0.0 0.0 - 0.1 K/UL    ABS. IMM. GRANS. 0.0 K/UL    DF MANUAL      PLATELET COMMENTS Large Platelets      RBC COMMENTS ANISOCYTOSIS  1+        RBC COMMENTS ADRIANA CELLS  PRESENT       RENAL FUNCTION PANEL    Collection Time: 07/29/21  5:03 AM   Result Value Ref Range    Sodium 142 136 - 145 mmol/L    Potassium 2.7 (LL) 3.5 - 5.1 mmol/L    Chloride 107 97 - 108 mmol/L    CO2 25 21 - 32 mmol/L    Anion gap 10 5 - 15 mmol/L    Glucose 199 (H) 65 - 100 mg/dL    BUN 45 (H) 6 - 20 MG/DL    Creatinine 4.77 (H) 0.70 - 1.30 MG/DL    BUN/Creatinine ratio 9 (L) 12 - 20      GFR est AA 15 (L) >60 ml/min/1.73m2    GFR est non-AA 12 (L) >60 ml/min/1.73m2    Calcium 7.1 (L) 8.5 - 10.1 MG/DL    Phosphorus 3.8 2.6 - 4.7 MG/DL    Albumin 3.3 (L) 3.5 - 5.0 g/dL   MAGNESIUM    Collection Time: 07/29/21  5:03 AM   Result Value Ref Range    Magnesium 2.2 1.6 - 2.4 mg/dL   LACTIC ACID    Collection Time: 07/29/21  5:03 AM   Result Value Ref Range    Lactic acid 1.7 0.4 - 2.0 MMOL/L   GLUCOSE, POC    Collection Time: 07/29/21  8:38 AM   Result Value Ref Range    Glucose (POC) 201 (H) 65 - 117 mg/dL    Performed by Thom Aranda MD  [address]  Phone - [phone]   Fax - [phone]  [email or web address]

## 2021-07-29 NOTE — DIALYSIS
[facility]              [phone]    Orders  Mode: CVVHD restarted @ 4662   Blood Flow Rate: 180 ml/min   Dialysate Flow Rate: 1400 ml/hr   Factor: 0 ml/hr      Metrics  BP/HR: 139/38  //  90   Access Pressure: -60   Filter Pressure: 80   Return Pressure: 43   TMP: 20   PD: 10   Dialysate Flow Rate: 1400 ml/hr      Comments / Plan:   RN at bedside to change filter d/t \"pt clotted. \" Patient, code status, labs, and orders verified. Consents on chart. Time out completed. Primary Rn able to return all pt blood from circuit (165 mls.) Old set discarded in red biohazard bin. HF-1000 filter set-up, primed w/ 1L NS, tested and running well. RIJ tunneled CVC, dressing C/D/I with bio-patch dated 07/28/21. No signs of redness, drainage, or infection visualized. Each catheter limb disinfected for 60 seconds per limb with alcohol swabs. Caps removed, dialysis CVC hub scrubbed with Prevantics for 15 seconds, followed by a 5 second dry time per Hospital P&P. +asp/+flush x 2 ports. Lines visible and connections secure with blood warmer to return line at 37*C. Education, pre and post to GABBY Jain primary RN.

## 2021-07-29 NOTE — PROGRESS NOTES
Progress Note    Patient: Alie Hennessy MRN: [id number]  SSN: [id number]    YOB: 1945  Age: 77 y.o. Sex: male      Admit Date: 2021    1 Day Post-Op    Procedure:  Procedure(s):  LAPAROTOMY EXPLORATORY with decompression of small and large bowel    Subjective:     No acute surgical issues. Pt is still intubated and sedated on norepinephrine. Abdomen is somewhat distended again. Objective:     Visit Vitals  /62   Pulse 84   Temp 97.9 °F (36.6 °C)   Resp 20   Ht 6' (1.829 m)   Wt 127 lb 6.8 oz (57.8 kg)   SpO2 100%   BMI 17.28 kg/m²       Temp (24hrs), Av.8 °F (36 °C), Min:94.5 °F (34.7 °C), Max:98.1 °F (36.7 °C)      Physical Exam:    Gen:  NAD  Pulm:  Unlabored  Abd:  S/moderately distended/appropriate TTP  Wound:  C/D/I    Recent Results (from the past 24 hour(s))   TYPE & SCREEN    Collection Time: 21  2:15 PM   Result Value Ref Range    Crossmatch Expiration 2021,2359     ABO/Rh(D) Bo Sicard POSITIVE     Antibody screen NEG    RBC, ALLOCATE    Collection Time: 21  2:15 PM   Result Value Ref Range    HISTORY CHECKED?  Historical check performed    BLOOD GAS,CHEM8,LACTIC ACID POC    Collection Time: 21  4:41 PM   Result Value Ref Range    Calcium, ionized (POC) 0.88 (L) 1.12 - 1.32 mmol/L    pH (POC) 7.04 (LL) 7.35 - 7.45      pCO2 (POC) 51.1 (H) 35.0 - 45.0 MMHG    pO2 (POC) 426 (H) 80 - 100 MMHG    BICARBONATE 14 mmol/L    Base deficit (POC) 16.1 mmol/L    Sample source ARTERIAL      CO2, POC 16 (L) 19 - 24 MMOL/L    Sodium,  136 - 145 MMOL/L    Potassium, POC 3.2 (L) 3.5 - 5.5 MMOL/L    Chloride,  (H) 100 - 108 MMOL/L    Glucose,  74 - 106 MG/DL    Creatinine, POC 5.5 (H) 0.6 - 1.3 MG/DL    Lactic Acid (POC) 6.60 (HH) 0.40 - 3.88 mmol/L   METABOLIC PANEL, BASIC    Collection Time: 21  5:31 PM   Result Value Ref Range    Sodium 143 136 - 145 mmol/L    Potassium 3.6 3.5 - 5.1 mmol/L    Chloride 107 97 - 108 mmol/L    CO2 17 (L) 21 - 32 mmol/L    Anion gap 19 (H) 5 - 15 mmol/L    Glucose 134 (H) 65 - 100 mg/dL    BUN 55 (H) 6 - 20 MG/DL    Creatinine 6.24 (H) 0.70 - 1.30 MG/DL    BUN/Creatinine ratio 9 (L) 12 - 20      GFR est AA 11 (L) >60 ml/min/1.73m2    GFR est non-AA 9 (L) >60 ml/min/1.73m2    Calcium 7.0 (L) 8.5 - 10.1 MG/DL   LACTIC ACID    Collection Time: 07/28/21  5:31 PM   Result Value Ref Range    Lactic acid 8.2 (HH) 0.4 - 2.0 MMOL/L   MAGNESIUM    Collection Time: 07/28/21  5:31 PM   Result Value Ref Range    Magnesium 2.2 1.6 - 2.4 mg/dL   GLUCOSE, POC    Collection Time: 07/28/21  5:31 PM   Result Value Ref Range    Glucose (POC) 136 (H) 65 - 117 mg/dL    Performed by Vimal Marquez RN    GLUCOSE, POC    Collection Time: 07/28/21  8:43 PM   Result Value Ref Range    Glucose (POC) 234 (H) 65 - 117 mg/dL    Performed by Edilia Morel    BLOOD GAS, ARTERIAL    Collection Time: 07/28/21  8:54 PM   Result Value Ref Range    pH 7.26 (L) 7.35 - 7.45      PCO2 37 35 - 45 mmHg    PO2 132 (H) 80 - 100 mmHg    O2 SAT 98 (H) 92 - 97 %    BICARBONATE 16 (L) 22 - 26 mmol/L    BASE DEFICIT 10.2 mmol/L    O2 METHOD VENT      Sample source ARTERIAL      SITE DRAWN FROM ARTERIAL LINE      PATTI'S TEST NOT APPLICABLE     LACTIC ACID    Collection Time: 07/28/21 11:11 PM   Result Value Ref Range    Lactic acid 3.0 (HH) 0.4 - 2.0 MMOL/L   POC EG7    Collection Time: 07/28/21 11:20 PM   Result Value Ref Range    Calcium, ionized (POC) 0.82 (L) 1.12 - 1.32 mmol/L    FIO2 (POC) 40 %    pH (POC) 7.30 (L) 7.35 - 7.45      pCO2 (POC) 41.1 35.0 - 45.0 MMHG    pO2 (POC) 109 (H) 80 - 100 MMHG    HCO3 (POC) 20.3 (L) 22 - 26 MMOL/L    Base deficit (POC) 5.8 mmol/L    sO2 (POC) 97.7 (H) 92 - 97 %    Site DRAWN FROM ARTERIAL LINE      Device: ADULT VENT      Mode PRESSURE CONTROL      Set Rate 18 bpm    PEEP/CPAP (POC) 5 cmH2O    PIP (POC) 15      Allens test (POC) NOT APPLICABLE      Specimen type (POC) ARTERIAL     GLUCOSE, POC    Collection Time: 07/29/21 12:00 AM Result Value Ref Range    Glucose (POC) 203 (H) 65 - 117 mg/dL    Performed by [facility]    CBC WITH AUTOMATED DIFF    Collection Time: 07/29/21 12:59 AM   Result Value Ref Range    WBC 19.4 (H) 4.1 - 11.1 K/uL    RBC 2.70 (L) 4.10 - 5.70 M/uL    HGB 7.9 (L) 12.1 - 17.0 g/dL    HCT 23.9 (L) 36.6 - 50.3 %    MCV 88.5 80.0 - 99.0 FL    MCH 29.3 26.0 - 34.0 PG    MCHC 33.1 30.0 - 36.5 g/dL    RDW 18.1 (H) 11.5 - 14.5 %    PLATELET 456 512 - 887 K/uL    MPV 11.6 8.9 - 12.9 FL    NRBC 0.9 (H) 0  WBC    ABSOLUTE NRBC 0.18 (H) 0.00 - 0.01 K/uL    NEUTROPHILS 91 (H) 32 - 75 %    BAND NEUTROPHILS 1 0 - 6 %    LYMPHOCYTES 5 (L) 12 - 49 %    MONOCYTES 3 (L) 5 - 13 %    EOSINOPHILS 0 0 - 7 %    BASOPHILS 0 0 - 1 %    IMMATURE GRANULOCYTES 0 %    ABS. NEUTROPHILS 17.8 (H) 1.8 - 8.0 K/UL    ABS. LYMPHOCYTES 1.0 0.8 - 3.5 K/UL    ABS. MONOCYTES 0.6 0.0 - 1.0 K/UL    ABS. EOSINOPHILS 0.0 0.0 - 0.4 K/UL    ABS. BASOPHILS 0.0 0.0 - 0.1 K/UL    ABS. IMM. GRANS. 0.0 K/UL    DF MANUAL      RBC COMMENTS ANISOCYTOSIS  1+       GLUCOSE, POC    Collection Time: 07/29/21  3:58 AM   Result Value Ref Range    Glucose (POC) 184 (H) 65 - 117 mg/dL    Performed by [facility]    METABOLIC PANEL, COMPREHENSIVE    Collection Time: 07/29/21  5:03 AM   Result Value Ref Range    Sodium 141 136 - 145 mmol/L    Potassium 2.7 (LL) 3.5 - 5.1 mmol/L    Chloride 107 97 - 108 mmol/L    CO2 25 21 - 32 mmol/L    Anion gap 9 5 - 15 mmol/L    Glucose 203 (H) 65 - 100 mg/dL    BUN 46 (H) 6 - 20 MG/DL    Creatinine 4.70 (H) 0.70 - 1.30 MG/DL    BUN/Creatinine ratio 10 (L) 12 - 20      GFR est AA 15 (L) >60 ml/min/1.73m2    GFR est non-AA 12 (L) >60 ml/min/1.73m2    Calcium 6.6 (L) 8.5 - 10.1 MG/DL    Bilirubin, total 0.7 0.2 - 1.0 MG/DL    ALT (SGPT) 235 (H) 12 - 78 U/L    AST (SGOT) 728 (H) 15 - 37 U/L    Alk.  phosphatase 104 45 - 117 U/L    Protein, total 6.9 6.4 - 8.2 g/dL    Albumin 3.3 (L) 3.5 - 5.0 g/dL    Globulin 3.6 2.0 - 4.0 g/dL    A-G Ratio 0.9 (L) 1.1 - 2.2     CBC WITH AUTOMATED DIFF    Collection Time: 07/29/21  5:03 AM   Result Value Ref Range    WBC 20.9 (H) 4.1 - 11.1 K/uL    RBC 2.72 (L) 4.10 - 5.70 M/uL    HGB 7.8 (L) 12.1 - 17.0 g/dL    HCT 24.0 (L) 36.6 - 50.3 %    MCV 88.2 80.0 - 99.0 FL    MCH 28.7 26.0 - 34.0 PG    MCHC 32.5 30.0 - 36.5 g/dL    RDW 17.9 (H) 11.5 - 14.5 %    PLATELET 858 282 - 150 K/uL    MPV 10.8 8.9 - 12.9 FL    NRBC 0.9 (H) 0  WBC    ABSOLUTE NRBC 0.18 (H) 0.00 - 0.01 K/uL    NEUTROPHILS 86 (H) 32 - 75 %    BAND NEUTROPHILS 5 0 - 6 %    LYMPHOCYTES 4 (L) 12 - 49 %    MONOCYTES 4 (L) 5 - 13 %    EOSINOPHILS 0 0 - 7 %    BASOPHILS 0 0 - 1 %    METAMYELOCYTES 1 (H) 0 %    IMMATURE GRANULOCYTES 0 %    ABS. NEUTROPHILS 19.0 (H) 1.8 - 8.0 K/UL    ABS. LYMPHOCYTES 0.8 0.8 - 3.5 K/UL    ABS. MONOCYTES 0.8 0.0 - 1.0 K/UL    ABS. EOSINOPHILS 0.0 0.0 - 0.4 K/UL    ABS. BASOPHILS 0.0 0.0 - 0.1 K/UL    ABS. IMM.  GRANS. 0.0 K/UL    DF MANUAL      PLATELET COMMENTS Large Platelets      RBC COMMENTS ANISOCYTOSIS  1+        RBC COMMENTS ADRIANA CELLS  PRESENT       RENAL FUNCTION PANEL    Collection Time: 07/29/21  5:03 AM   Result Value Ref Range    Sodium 142 136 - 145 mmol/L    Potassium 2.7 (LL) 3.5 - 5.1 mmol/L    Chloride 107 97 - 108 mmol/L    CO2 25 21 - 32 mmol/L    Anion gap 10 5 - 15 mmol/L    Glucose 199 (H) 65 - 100 mg/dL    BUN 45 (H) 6 - 20 MG/DL    Creatinine 4.77 (H) 0.70 - 1.30 MG/DL    BUN/Creatinine ratio 9 (L) 12 - 20      GFR est AA 15 (L) >60 ml/min/1.73m2    GFR est non-AA 12 (L) >60 ml/min/1.73m2    Calcium 7.1 (L) 8.5 - 10.1 MG/DL    Phosphorus 3.8 2.6 - 4.7 MG/DL    Albumin 3.3 (L) 3.5 - 5.0 g/dL   MAGNESIUM    Collection Time: 07/29/21  5:03 AM   Result Value Ref Range    Magnesium 2.2 1.6 - 2.4 mg/dL   LACTIC ACID    Collection Time: 07/29/21  5:03 AM   Result Value Ref Range    Lactic acid 1.7 0.4 - 2.0 MMOL/L   GLUCOSE, POC    Collection Time: 07/29/21  8:38 AM   Result Value Ref Range    Glucose (POC) 201 (H) 65 - 117 mg/dL    Performed by [facility] 210, BASIC    Collection Time: 07/29/21 10:15 AM   Result Value Ref Range    Sodium 142 136 - 145 mmol/L    Potassium 2.9 (L) 3.5 - 5.1 mmol/L    Chloride 107 97 - 108 mmol/L    CO2 26 21 - 32 mmol/L    Anion gap 9 5 - 15 mmol/L    Glucose 178 (H) 65 - 100 mg/dL    BUN 42 (H) 6 - 20 MG/DL    Creatinine 4.30 (H) 0.70 - 1.30 MG/DL    BUN/Creatinine ratio 10 (L) 12 - 20      GFR est AA 16 (L) >60 ml/min/1.73m2    GFR est non-AA 14 (L) >60 ml/min/1.73m2    Calcium 7.2 (L) 8.5 - 10.1 MG/DL   MAGNESIUM    Collection Time: 07/29/21 10:15 AM   Result Value Ref Range    Magnesium 2.2 1.6 - 2.4 mg/dL   CBC W/O DIFF    Collection Time: 07/29/21 10:15 AM   Result Value Ref Range    WBC 18.3 (H) 4.1 - 11.1 K/uL    RBC 2.78 (L) 4.10 - 5.70 M/uL    HGB 7.8 (L) 12.1 - 17.0 g/dL    HCT 24.7 (L) 36.6 - 50.3 %    MCV 88.8 80.0 - 99.0 FL    MCH 28.1 26.0 - 34.0 PG    MCHC 31.6 30.0 - 36.5 g/dL    RDW 18.2 (H) 11.5 - 14.5 %    PLATELET 346 517 - 488 K/uL    MPV 10.6 8.9 - 12.9 FL    NRBC 1.0 (H) 0  WBC    ABSOLUTE NRBC 0.18 (H) 0.00 - 0.01 K/uL   LACTIC ACID    Collection Time: 07/29/21 10:15 AM   Result Value Ref Range    Lactic acid 1.5 0.4 - 2.0 MMOL/L   GLUCOSE, POC    Collection Time: 07/29/21 12:35 PM   Result Value Ref Range    Glucose (POC) 127 (H) 65 - 117 mg/dL    Performed by Hewitt Skiff          Assessment:     Hospital Problems  Date Reviewed: 7/21/2021        Codes Class Noted POA    Acute delirium ICD-10-CM: R41.0  ICD-9-CM: 780.09  7/21/2021 Unknown              Plan/Recommendations/Medical Decision Making:     - Colonic pseudo-obstruction:  Recommend rectal tube placement if distention persists. No surgical intervention. Bowel is viable without C-diff or toxic megacolon.   - Continue antibiotic therapy  - Consider starting TPN as patient is not likely awake enough in next few days to tolerate PO  - bowel regiment  - okay to heparinize patient

## 2021-07-29 NOTE — PROGRESS NOTES
Occupational Therapy 4175 -   07.29.2021     Chart reviewed in prep for OT re-eval.  Pt with code blue 7/28/21, now intubated (FiO2 40%, PEEP 5), and transferred to ICU for higher level of care. Pt currently on CVVH and only responding to pain. Not appropriate for skilled therapy at this time, will hold and continue to follow. Thank you. Meng Rivas MS, OTR/L

## 2021-07-29 NOTE — PROGRESS NOTES
1930: Bedside and Verbal shift change report given to [address] (oncoming nurse) by Maggie Archibald RN (offgoing nurse). Report included the following information SBAR, Kardex, ED Summary, OR Summary, MAR, Recent Results, and Cardiac Rhythm sinus tach .

## 2021-07-29 NOTE — PROGRESS NOTES
0730: Bedside, Verbal and Written shift change report given to Sigrid Singer RN (oncoming nurse) by Comfort Tomlinsno RN (offgoing nurse). Report included the following information SBAR, Kardex, Intake/Output, MAR, Recent Results and Cardiac Rhythm NSR.     1300: Primary Nurse Agatha Bragg and SULEIMAN Martinez performed a dual skin assessment on this patient Impairment noted- see wound doc flow sheet  Newton score is 10    1550: Pt temp low. Stephany Hugger restarted. 1700: CRRT filter clotted. Pt rinsed back, Diane Primus notified. Lines not heparinized secondary to recent surgery. 1930: Bedside, Verbal and Written shift change report given to Comfort Tomlinson RN (oncoming nurse) by Sigrid Singer RN (offgoing nurse). Report included the following information SBAR, Kardex, Intake/Output, MAR, Recent Results and Cardiac Rhythm NSR.

## 2021-07-29 NOTE — PROGRESS NOTES
Speech Therapy    Chart reviewed. Note patient remains intubated and inappropriate for SLP intervention. Will f/u x1 to determine if patient is approaching readiness for SLP services. Thank you. Kenzie Cardona M.S., CCC-SLP

## 2021-07-29 NOTE — PROGRESS NOTES
Day #2 of Zosyn - Renal Dosing Update  Indication:  Sepsis 2/ unclear etiology - possible intra-abdominal process  Current regimen:  3.375 gm IV Q 12 hr  Abx regimen: Eraxis + Zosyn  Recent Labs     21  1015 21  0503 21  0059 21  1731 21  1034   WBC 18.3* 20.9* 19.4*  --    < >   CREA 4.30* 4.77*  4.70*  --  6.24*  --    BUN 42* 45*  46*  --  55*  --     < > = values in this interval not displayed. Est CrCl: CVVHD   Temp (24hrs), Av.8 °F (36 °C), Min:94.5 °F (34.7 °C), Max:98.1 °F (36.7 °C)    Cultures:    MRSA screen: (-)   C.diff: (-)   Blood: ordered    Plan: Now that the patient has transitioned to CVVHD, the dose of Zosyn has been adjusted to 3.375 gm IV Q 8 hr per [facility] P&T Committee Protocol with respect to renal function. Pharmacy will continue to monitor patient daily and will make dosage adjustments based upon changing renal function.

## 2021-07-29 NOTE — PROGRESS NOTES
Transition of Care Plan   RUR- High Risks    DISPOSITION: SNF   F/U with PCP/Specialist     Transport: [facility]  Patient is s/p LAPAROTOMY EXPLORATORY with decompression of small and large bowel. In ICU intubated on CRRT.     Care manager noted consult for OP dialysis. CM spoke with Amadou Stoddard with [facility] [phone] to update her on patient's clinical condition. Per [facility] patient has been set with with Dialysis at [facility] in [facility]) for a Tues-Thurs-Sat schedule with a 11:30 am chair time. Phone # [phone].  Mela Lerma RN,Care Management

## 2021-07-29 NOTE — DIALYSIS
[facility]       [phone]    Orders  Mode: CVVHD   Blood Flow Rate: 180 ml/min   Dialysate Flow Rate: 1400 ml/hr   Factor: 0 ml/hr     Metrics  BP/HR: 149/48  //  84   Access Pressure: -82   Filter Pressure: 111   Return Pressure: 48   TMP: 28   PD: 51   Dialysate Flow Rate: 1400 ml/hr     Comments / Plan:   Patient, orders, line and consent verified. Labs, notes and code status reviewed. [id number] filter running well with no indications for change at this time. RIJ tunneled CVC C/D/I with transparent dressing and biopatch in place dated 07/28/21. Lines visible/secure with blood warmer attached to the return line and set to 37C*. Education and Pre/Post with Kristina primary RN.

## 2021-07-29 NOTE — PROGRESS NOTES
Comprehensive Nutrition Assessment    Type and Reason for Visit: Reassess, Consult    Nutrition Recommendations/Plan:      Slowly advance TPN to goal:     Day 1: 5% AA D15 @ 42 ml/hr     Day 2: 7.5% AA D15 @ 63 ml/hr     Day 3: Continue above and add 500 ml 20% lipid 3 x/week    Monitor lytes daily and replace prn    Nutrition Assessment:    Pt initially admitted to [facility] for AMS and worsening kidney function. PMHx: DM, HTN, DM 2. Transferred to [facility] for Nephrology consult d/t progression of kidney disease; HD initiated. Permacath placed 7/27. GI consulted 7/27 for liquid diarrhea. CT scan-?megacolon. 7/28 Pt coded-intubated. Taken to OR emergently-Exploratory lap/decompression of small and large bowel d/t Katherine's pesudo-obstruction. Patient meets criteria for severe malnutrition. Spoke with daughter (whom me lives with) who reports pt normally eats all day and is pretty active. Mr Vida Jackson has always been a small person-daughter not aware of any weight loss recently. Question it pt was hiding any GI symptoms/etc from daughter? Plan to start TPN today; slowly advance to goal d/t refeeding risk. Will start this evening-5% AA D15 @ 42 ml/hr with 100 mg B1. Suggested goal: 7.5% AA D15 @ 63 ml/hr with 500 ml 20% lipid 3 x/week. This will provide 6786-3196 ml, 113 gm protein and 1645 average daily calories to meet estimated needs. Potassium replaced today. Will need to monitor lytes closely for refeeding. Per A1c BG well controlled PTA. May need to resume Lantus once on TPN.     Malnutrition Assessment:  Malnutrition Status:  Severe malnutrition    Context:  Acute illness     Findings of the 6 clinical characteristics of malnutrition:   Energy Intake:  7 - 50% or less of est energy requirements for 5 or more days  Weight Loss:  7.00 - Greater than 7.5% over 3 months     Body Fat Loss:  7 - Moderate body fat loss, Orbital, Buccal region, Triceps   Muscle Mass Loss:  7 - Moderate muscle mass loss, Clavicles (pectoralis & deltoids), Temples (temporalis), Hand (interosseous)  Fluid Accumulation:  No significant fluid accumulation,     Strength:  Not performed     Nutritionally Significant Medications:   Propofol, Levophed, Vasopressin, Dulcolax, Phoslo (on hold), hydrocortisone, Lantus (on hold), Humalog, Risaquad (on hold), KCL    Estimated Daily Nutrient Needs:  Energy (kcal): 5534-4156 (25-28 kcal/kg); Weight Used for Energy Requirements: Current (58 kg)  Protein (g): CRRT: 116 (2g/kg); Weight Used for Protein Requirements: Current (58 kg)  Fluid (ml/day): ~1900; Method Used for Fluid Requirements: 1 ml/kcal    Nutrition Related Findings:       BM: 7/28  Edema: None  Wounds:  Moisture associate skin damage       Current Nutrition Therapies:   Diet: NPO  Additional Caloric Sources: Propofol    Meal intake: No data found. Supplement Intake: No data found.      Anthropometric Measures:  · Height:  6' (182.9 cm)  · Current Body Wt:  57.8 kg (127 lb 6.8 oz)   · Admission Body Wt:  149 lb 14.6 oz    · Usual Body Wt:    see below  · Ideal Body Wt:  178:  71.6 %   · BMI Categories:  Underweight (BMI less than 22) age over 72     Wt Readings from Last 10 Encounters:   07/29/21 57.8 kg (127 lb 6.8 oz)   07/20/21 68 kg (150 lb)   04/07/21 74.6 kg (164 lb 6.4 oz)   03/11/21 68.4 kg (150 lb 12.7 oz)   02/23/21 75.8 kg (167 lb)   01/31/21 68.9 kg (151 lb 14.4 oz)       Nutrition Diagnosis:   · Inadequate oral intake related to altered GI function as evidenced by NPO or clear liquid status due to medical condition, intubation    · Severe malnutrition related to altered GI function, inadequate protein-energy intake as evidenced by severe loss of subcutaneous fat, severe muscle loss, weight loss      Nutrition Interventions:   Food and/or Nutrient Delivery: Start parenteral nutrition  Nutrition Education and Counseling: No recommendations at this time  Coordination of Nutrition Care: Continue to monitor while inpatient, Interdisciplinary rounds    Goals:  PN to meet at least 85% estimated needs in next 3-4 days. Nutrition Monitoring and Evaluation:   Behavioral-Environmental Outcomes: None identified  Food/Nutrient Intake Outcomes: Parenteral nutrition intake/tolerance  Physical Signs/Symptoms Outcomes: Biochemical data, GI status, Weight, Fluid status or edema, Hemodynamic status    Discharge Planning:     Too soon to determine     Junior Brasher RD CNSC  Contact: [employer]

## 2021-07-29 NOTE — PROGRESS NOTES
Physical Therapy: Hold    Chart reviewed in prep for PT re-eval.  Pt with code blue 7/28/21, now intubated (FiO2 40%, PEEP 5), and transferred to ICU for higher level of care. Pt currently on CVVH and only responding to pain. Not appropriate for skilled therapy at this time, will hold and continue to follow.     Kristie Salamanca, PT, DPT

## 2021-07-29 NOTE — PROGRESS NOTES
[facility]    ICU TEAM Progress Note    Name: Elizabeth Cobos   : 1945   MRN: [id number]   Date: 2021      Assessment/Plan:     1. Septic shock  a. Likely intra-abdominal origin given bowel dilation and likely bacterial translocation. Patient completed course of abx for pneumonia- does not appear to be contributory currently. b. C diff negative- PO vanc and IV flagyl stopped  c. Continue Zosyn and Eraxis  d. Started hydrocortisone 50mg q6h  e. Wean norepi and vasopressin as tolerated to maintain MAP > 65  2. Colonic pseudo-obstruction  a. Ex-lap done overnight of  revealed dilation of small and large bowel. No perforation, no necrosis. C diff negative. b. General surgery following  c. Patient with slightly worsening distention today- if persists will place rectal tube for decompression  d. Continue abx as above  3. Respiratory insufficiency requiring intubation  a. Code blue called on  given respiratory status. Likely in the setting of shock and significant abdominal distention. Patient intubated but without loss of pulse. b. Patient currently on minimal ventilator settings  c. Will hold on SBT today  4. JEREMIAS on CKD  a. Nephrology following  b. Likely progression of underlying CKD. Was on iHD MWF now on CRRT  c. Continue CRRT with no factor  d. BID labs  5. Severe malnutrition  a. Per nutrition/dietician patient with clinical characteristics of severe malnutrition  b. Started TPN today  6. Anemia of chronic disease  a.  Daily CBC           F - Feeding:  Yes TPN  A - Analgesia: Fentanyl  S - Sedation: Propofol  T - DVT Prophylaxis: SCD's or Sequential Compression Device and Heparin   H - Head of Bed: > 30 Degrees  U - Ulcer Prophylaxis: Pepcid (famotidine)   G - Glycemic Control: Insulin  S - Spontaneous Breathing Trial: Yes  B - Bowel Regimen: None needed at this time  I - Indwelling Catheter:   Tubes: ETT and Orogastric Tube  Lines: Arterial Line and Central Line  Drains: John-Rose Drain (LOBO)  D - De-escalation of Antibiotics: Zosyn    Subjective:   Progress Note: 7/29/2021      Reason for ICU Admission: Septic shock and respiratory failure     HPI:  44-year-old gentleman with multiple medical problem admitted to the hospital as a transfer from outside facility on July 21 for acute on chronic kidney disease requiring hemodialysis for hypoxia and pulmonary edema and possible pneumonia. Patient was dialyzed and his condition improved slightly and decision was made to transfer him to regular hemodialysis 3 times a week and he had a permacath inserted on the 27th. Over the last few days there was progressive distention of his abdomen, C. difficile was suspected and stool sample was sent but no results yet. He was evaluated by CAT scan abdomen followed by surgical and GI evaluation on the 27th but at that time there was no indication for immediate intervention and treatment for C. difficile colitis was started empirically. The morning of 28th he had progressive increase in his abdominal pain and suddenly he started to be in agonal breathing and bradycardia. CODE BLUE was called and resuscitation started, he received 2 amp of epinephrine and 1 amp of bicarb and he was emergently intubated. ROSC was achieved [not clear if he lost pulse completely or it was very weak that was not detected]. Patient obtunded however he moves 4 limbs and was reaching out to the ET tube shortly after resuscitation so he was given fentanyl and Ativan. He is hypotensive and requiring pressors. His abdomen is severely distended and x-ray showed diffuse gas but no clear free air. Discussed this with the general surgeon as my suspicion for bowel perforation is high.   General surgeon at bedside assessing the patient       Overnight Events:   7/29/2021  - Patient to OR overnight for ex-lap    POD:  1 Day Post-Op    S/P:   Procedure(s):  LAPAROTOMY EXPLORATORY with decompression of small and large bowel    Objective:   Vital Signs:  Visit Vitals  /62   Pulse 84   Temp 97.9 °F (36.6 °C)   Resp 20   Ht 6' (1.829 m)   Wt 57.8 kg (127 lb 6.8 oz)   SpO2 100%   BMI 17.28 kg/m²    O2 Flow Rate (L/min): 2 l/min O2 Device: Endotracheal tube Temp (24hrs), Av.8 °F (36 °C), Min:94.5 °F (34.7 °C), Max:98.1 °F (36.7 °C)           Intake/Output:     Intake/Output Summary (Last 24 hours) at 2021 1422  Last data filed at 2021 1200  Gross per 24 hour   Intake 4534.31 ml   Output 2234 ml   Net 2300.31 ml       Physical Exam:    General:  Sedated and on the ventilator. Opens eyes to voice. No acute distress. Eyes:  Sclera anicteric. Pupils equal, round, reactive to light. Mouth/Throat: Orotracheal tube in place. Neck: Supple. Lungs:   Clear to auscultation bilaterally, good effort. Cardiovascular:  Regular rate and rhythm, no murmur, click, rub, or gallop. Abdomen:   Midline incision. Dressing intact with small amount of shadowing. L abd LOBO drain with serosanguinous output. Mildly distended. Extremities: No cyanosis or edema. Skin: No acute rash or lesions. Lymph Nodes: Cervical and supraclavicular normal.   Musculoskeletal:  No swelling or deformity. Lines/Devices:  Intact, no erythema, drainage, or tenderness. Neuro/Psych: Sedated and appears comfortable on ventilator. Will open eyes to voice and follow commands x4.          LABS AND  DATA: Personally reviewed  Recent Labs     21  1015 21  0503   WBC 18.3* 20.9*   HGB 7.8* 7.8*   HCT 24.7* 24.0*    160     Recent Labs     21  1015 21  0503 21  1731 21  1034    142  141   < > 143   K 2.9* 2.7*  2.7*   < > 3.0*    107  107   < > 106   CO2 26 25  25   < > 16*   BUN 42* 45*  46*   < > 52*   CREA 4.30* 4.77*  4.70*   < > 6.54*   * 199*  203*   < > 108*   CA 7.2* 7.1*  6.6*   < > 7.6*   MG 2.2 2.2   < > 2.5*   PHOS  --  3.8  --  9.8*    < > = values in this interval not displayed. Recent Labs     07/29/21  0503 07/28/21  1034    139*   TP 6.9 7.7   ALB 3.3*  3.3* 3.0*   GLOB 3.6 4.7*     Recent Labs     07/28/21  1034 07/27/21  0121   INR 1.5* 1.2*   PTP 15.2* 12.1*      Recent Labs     07/28/21  2320 07/28/21  1641   PHI 7.30* 7.04*   PCO2I 41.1 51.1*   PO2I 109* 426*   FIO2I 40  --      Recent Labs     07/28/21  1034   TROIQ 0.11*         Ventilator Settings:  Mode Rate Tidal Volume Pressure FiO2 PEEP   Assist control, Pressure control   400 ml    40 % 5 cm H20     Peak airway pressure: 20 cm H2O    Minute ventilation: 10.2 l/min        MEDS: Reviewed    Chest X-Ray:  CXR Results  (Last 48 hours)               07/28/21 1333  XR CHEST PORT Final result    Impression:  Status post left IJ central venous catheter placement without   evidence of pneumothorax. Narrative:  EXAM: XR CHEST PORT       INDICATION: for central line placement       COMPARISON: 7/28/2021 at 1034       FINDINGS: A portable AP radiograph of the chest was obtained at 1330 hours. The   patient is on a cardiac monitor. IJ central venous catheter is noted with the tip in the proximal SVC. No   pneumothorax is identified. Other tubes and lines are stable. [place] Heart size is   enlarged with chronic elevation left hemidiaphragm. Mild pulmonary edema   unchanged. The bones and soft tissues are grossly within normal limits. 07/28/21 1054  XR CHEST PORT Final result    Impression:  1. Improved pulmonary edema           Narrative:  INDICATION:  code blue        EXAM: Portable chest 1034 hours. Comparison 7/21/2021       FINDINGS: Cardiac silhouette is partially obscured. The right neck central   venous catheter has been exchanged. Tip now overlies the right atrium. Patient   is intubated with endotracheal tube overlying the trachea at the level of the   clavicles. Elevation of the left hemidiaphragm unchanged. Pulmonary edema   pattern has significantly improved with no new consolidation. No pneumothorax or   effusion. There are calcifications in the upper abdomen compatible with chronic   calcific pancreatitis                 Multidisciplinary Rounds Completed: Yes    ABCDEF Bundle/Checklist Completed:  Yes    SPECIAL EQUIPMENT  CRRT    DISPOSITION  Stay in ICU    CRITICAL CARE CONSULTANT NOTE  I had a face to face encounter with the patient, reviewed and interpreted patient data including clinical events, labs, images, vital signs, I/O's, and examined patient. I have discussed the case and the plan and management of the patient's care with the consulting services, the bedside nurses and the respiratory therapist.      NOTE OF PERSONAL INVOLVEMENT IN CARE   This patient has a high probability of imminent, clinically significant deterioration, which requires the highest level of preparedness to intervene urgently. I participated in the decision-making and personally managed or directed the management of the following life and organ supporting interventions that required my frequent assessment to treat or prevent imminent deterioration. I personally spent 45 minutes of critical care time. This is time spent at this critically ill patient's bedside actively involved in patient care as well as the coordination of care and discussions with the patient's family. This does not include any procedural time which has been billed separately.     WALE Trevino  [facility] [place] [facility]  7/29/2021

## 2021-07-29 NOTE — DIALYSIS
[facility]       [phone]    Orders  Mode: CVVHD initiated @ 2030   Prismasol Bath: 4K/2.5Ca   Blood Flow Rate: 180 ml/min   Dialysate Flow Rate: 25 ml/kg/hr    25 x 55.2kg = 1380 ml rounded to 1400 ml/hr   Factor: 0 ml/hr     Metrics  BP/HR: 154/49  //  100   Access Pressure: -60   Filter Pressure: 82   Return Pressure: 47   TMP: 22   PD: 11   Dialysate Flow Rate; 1400 ml/hr     Comments / Plan:   Psychiatric RN at bedside to initiate CRRT per MD orders. Patient, code status, labs, and orders verified. Consents obtained. Time out completed. HF-1000 filter set-up, primed w/ 1L NS, tested and running well. RIJ tunneled CVC, dressing C/D/I with bio-patch changed by Psychiatric RN dated 07/28/21. No signs of redness, drainage, or infection visualized. Each catheter limb disinfected for 60 seconds per limb with alcohol swabs. Caps removed, dialysis CVC hub scrubbed with Prevantics for 15 seconds, followed by a 5 second dry time per Hospital P&P. +asp/+flush x 2 ports. Lines visible and connections secure with blood warmer to return line at 37*C. Education, pre and post to [address], primary RN.

## 2021-07-30 NOTE — PROGRESS NOTES
Physical Therapy   07/30/2021    Chart reviewed in prep for PT re-eval - remains intubated and sedated on CVVH and not appropriate for skilled PT at this time. Will defer and follow peripherally. If significant improvement occurs over the weekend, please place new consult. Recommend nursing to complete with patient, as able and per protocol, in order to promote cardiopulmonary systems, maintain strength, endurance and independence:   -bed in chair position or maximize full reverse Trendelenburg position to facilitate upright activity with foot board and non-skid footwear on 3x/day ~30-60 mins each   -ROM during bathing B UEs and LEs  -positioning to prevent contractures and edema. RASS -1/0/+1 (during SAT)  Active ROM   Sitting (bed in chair position)   Standing (reverse Trendelenburg)   ADLs   RASS -3/2  Passive ROM   Sit (bed in chair position)   RASS -5/-4  Passive ROM     Thank you for your assistance.      Melanie Faulkner, PT, DPT

## 2021-07-30 NOTE — PROGRESS NOTES
1930: Bedside and Verbal shift change report given to Nadya Tapia (oncoming nurse) by Everett Roblero RN (offgoing nurse). Report included the following information SBAR, Kardex, ED Summary, Procedure Summary, MAR, Recent Results, and Cardiac Rhythm NSR .

## 2021-07-30 NOTE — PROGRESS NOTES
[facility]    ICU TEAM Progress Note    Name: Whitley Singh   : 1945   MRN: [id number]   Date: 2021      Assessment/Plan:     1. Septic shock  a. Likely intra-abdominal origin given bowel dilation and likely bacterial translocation. Patient completed course of abx for pneumonia- does not appear to be contributory currently. b. C diff negative- PO vanc and IV flagyl stopped  c. Continue Zosyn and Eraxis  d. Decreased hydrocortisone to 50mg q12h- stopped tomorrow if off pressors  e. Wean norepi and vasopressin as tolerated to maintain MAP > 65  2. Colonic pseudo-obstruction  a. Ex-lap done overnight of  revealed dilation of small and large bowel. No perforation, no necrosis. C diff negative. b. General surgery following  c. Distention appears improved today, patient having bowel movements  d. Continue abx as above  3. Respiratory insufficiency requiring intubation  a. Code blue called on  given respiratory status. Likely in the setting of shock and significant abdominal distention. Patient intubated but without loss of pulse. b. Patient passed SBT and was extubated to 3L NC  4. JEREMIAS on CKD  a. Nephrology following  b. Likely progression of underlying CKD. Was on iHD MWF now on CRRT  c. Continue CRRT with no factor  d. BID labs  5. Severe malnutrition  a. Per nutrition/dietician patient with clinical characteristics of severe malnutrition  b. Continue TPN today  c. Lipids starting tomorrow  d. Will hold on PO until surgery approves  6. Anemia of chronic disease  a.  Daily CBC           F - Feeding:  Yes TPN  A - Analgesia: None  S - Sedation: None  T - DVT Prophylaxis: SCD's or Sequential Compression Device and Heparin   H - Head of Bed: > 30 Degrees  U - Ulcer Prophylaxis: Pepcid (famotidine)   G - Glycemic Control: Insulin  S - Spontaneous Breathing Trial: Yes  B - Bowel Regimen: None needed at this time  I - Indwelling Catheter:   Tubes: None  Lines: Arterial Line and Central Line  Drains: John-Rose Drain (LOBO)  D - De-escalation of Antibiotics: Zosyn    Subjective:   Progress Note: 7/30/2021      Reason for ICU Admission: Septic shock and respiratory failure     HPI:  80-year-old gentleman with multiple medical problem admitted to the hospital as a transfer from outside facility on July 21 for acute on chronic kidney disease requiring hemodialysis for hypoxia and pulmonary edema and possible pneumonia. Patient was dialyzed and his condition improved slightly and decision was made to transfer him to regular hemodialysis 3 times a week and he had a permacath inserted on the 27th. Over the last few days there was progressive distention of his abdomen, C. difficile was suspected and stool sample was sent but no results yet. He was evaluated by CAT scan abdomen followed by surgical and GI evaluation on the 27th but at that time there was no indication for immediate intervention and treatment for C. difficile colitis was started empirically. The morning of 28th he had progressive increase in his abdominal pain and suddenly he started to be in agonal breathing and bradycardia. CODE BLUE was called and resuscitation started, he received 2 amp of epinephrine and 1 amp of bicarb and he was emergently intubated. ROSC was achieved [not clear if he lost pulse completely or it was very weak that was not detected]. Patient obtunded however he moves 4 limbs and was reaching out to the ET tube shortly after resuscitation so he was given fentanyl and Ativan. He is hypotensive and requiring pressors. His abdomen is severely distended and x-ray showed diffuse gas but no clear free air. Discussed this with the general surgeon as my suspicion for bowel perforation is high.   General surgeon at bedside assessing the patient       Overnight Events:   7/30/2021  - No acute events overnight    POD:  1 Day Post-Op    S/P:   Procedure(s):  LAPAROTOMY EXPLORATORY with decompression of small and large bowel    Objective:   Vital Signs:  Visit Vitals  /62   Pulse 69   Temp 98 °F (36.7 °C)   Resp 18   Ht 6' (1.829 m)   Wt 54.3 kg (119 lb 11.4 oz)   SpO2 99%   BMI 16.24 kg/m²    O2 Flow Rate (L/min): 2 l/min O2 Device: Endotracheal tube, Ventilator Temp (24hrs), Av.4 °F (36.3 °C), Min:96.1 °F (35.6 °C), Max:98 °F (36.7 °C)           Intake/Output:     Intake/Output Summary (Last 24 hours) at 2021 09  Last data filed at 2021 0900  Gross per 24 hour   Intake 1768.89 ml   Output 1926 ml   Net -157.11 ml       Physical Exam:    General:  Sleepy. Opens eyes to voice. No acute distress. Cachetic. Temporal wasting   Eyes:  Sclera anicteric. Pupils equal, round, reactive to light. Mouth/Throat: Mucous membranes dry. Neck: Supple. Lungs:   Clear to auscultation bilaterally, good effort. Cardiovascular:  Regular rate and rhythm, no murmur, click, rub, or gallop. Abdomen:   Midline incision. Dressing intact with small amount of shadowing. L abd LOBO drain with serosanguinous output. Mildly distended. Extremities: No cyanosis or edema. Skin: No acute rash or lesions. Lymph Nodes: Cervical and supraclavicular normal.   Musculoskeletal:  No swelling or deformity. Lines/Devices:  Intact, no erythema, drainage, or tenderness. Neuro/Psych: Sleepy, opens eyes to voice, oriented x2, follows commands in all extremities.          LABS AND  DATA: Personally reviewed  Recent Labs     21  0607 21  1015   WBC 18.2* 18.3*   HGB 7.6* 7.8*   HCT 23.7* 24.7*   * 155     Recent Labs     21  0607 21  1709 21  1015 21  0503     138 141   < > 142  141   K 2.7*  2.7* 3.1*   < > 2.7*  2.7*     106 107   < > 107  107   CO2 22  24 24   < > 25  25   BUN 36*  36* 39*   < > 45*  46*   CREA 3.20*  3.23* 3.93*   < > 4.77*  4.70*   *  265* 144*   < > 199*  203*   CA 8.1*  7.8* 7.6*   < > 7.1*  6.6*   MG 2.4  2.4 2.2   < > 2.2   PHOS 3.9  -- --  3.8    < > = values in this interval not displayed. Recent Labs     07/30/21  0607 07/29/21  0503 07/28/21  1034 07/28/21  1034   AP  --  104  --  139*   TP  --  6.9  --  7.7   ALB 3.0* 3.3*  3.3*   < > 3.0*   GLOB  --  3.6  --  4.7*    < > = values in this interval not displayed. Recent Labs     07/28/21  1034   INR 1.5*   PTP 15.2*      Recent Labs     07/28/21  2320 07/28/21  1641   PHI 7.30* 7.04*   PCO2I 41.1 51.1*   PO2I 109* 426*   FIO2I 40  --      Recent Labs     07/28/21  1034   TROIQ 0.11*         Ventilator Settings:  Mode Rate Tidal Volume Pressure FiO2 PEEP   Assist control, Pressure control   400 ml    40 % 5 cm H20     Peak airway pressure: 20 cm H2O    Minute ventilation: 7.41 l/min        MEDS: Reviewed    Chest X-Ray:  CXR Results  (Last 48 hours)               07/28/21 1333  XR CHEST PORT Final result    Impression:  Status post left IJ central venous catheter placement without   evidence of pneumothorax. Narrative:  EXAM: XR CHEST PORT       INDICATION: for central line placement       COMPARISON: 7/28/2021 at 1034       FINDINGS: A portable AP radiograph of the chest was obtained at 1330 hours. The   patient is on a cardiac monitor. IJ central venous catheter is noted with the tip in the proximal SVC. No   pneumothorax is identified. Other tubes and lines are stable. [place] Heart size is   enlarged with chronic elevation left hemidiaphragm. Mild pulmonary edema   unchanged. The bones and soft tissues are grossly within normal limits. 07/28/21 1054  XR CHEST PORT Final result    Impression:  1. Improved pulmonary edema           Narrative:  INDICATION:  code blue        EXAM: Portable chest 1034 hours. Comparison 7/21/2021       FINDINGS: Cardiac silhouette is partially obscured. The right neck central   venous catheter has been exchanged. Tip now overlies the right atrium.  Patient   is intubated with endotracheal tube overlying the trachea at the level of the clavicles. Elevation of the left hemidiaphragm unchanged. Pulmonary edema   pattern has significantly improved with no new consolidation. No pneumothorax or   effusion. There are calcifications in the upper abdomen compatible with chronic   calcific pancreatitis                 Multidisciplinary Rounds Completed: Yes    ABCDEF Bundle/Checklist Completed:  Yes    SPECIAL EQUIPMENT  CRRT    DISPOSITION  Stay in ICU    CRITICAL CARE CONSULTANT NOTE  I had a face to face encounter with the patient, reviewed and interpreted patient data including clinical events, labs, images, vital signs, I/O's, and examined patient. I have discussed the case and the plan and management of the patient's care with the consulting services, the bedside nurses and the respiratory therapist.      NOTE OF PERSONAL INVOLVEMENT IN CARE   This patient has a high probability of imminent, clinically significant deterioration, which requires the highest level of preparedness to intervene urgently. I participated in the decision-making and personally managed or directed the management of the following life and organ supporting interventions that required my frequent assessment to treat or prevent imminent deterioration. I personally spent 45 minutes of critical care time. This is time spent at this critically ill patient's bedside actively involved in patient care as well as the coordination of care and discussions with the patient's family. This does not include any procedural time which has been billed separately.     Daniella Waters [facility]  7/30/2021

## 2021-07-30 NOTE — PROGRESS NOTES
Nephrology Progress Note  Sarah Aguiar  Date of Admission : 7/21/2021    CC: Follow up for JEREMIAS on CKD       Assessment and Plan     JEREMIAS on CKD:  - ? Cause, likely progression of underlying CKD  - was on HD MWF, now CRRT  - cont CVVHD, no factor  - BID labs for now  - ok to remove alonso    Hypokalemia:  - 4k bath  - IV repletion ordered    CKD 3b:  - pt of Dr. Isa Lugo    Anemia of CKD:  - JULISA MWF     Secondary HPT:  - on phoslo    [employer]'s syndrome:  - s/p lap ex and decompression of large and small bowel 7/28  - per surgery    Nutrition:  - TPN per CCM team    PNA:  - on rocephin    HTN:  - cont current meds       Interval History:  Seen and examined. On CRRT, no factor. On pressors. Stable BP now. No UOP. Current Medications: all current  Medications have been eviewed in EPIC  Review of Systems: Pertinent items are noted in HPI. Objective:  Vitals:    Vitals:    07/30/21 0500 07/30/21 0600 07/30/21 0700 07/30/21 0753   BP: (!) 150/65 133/69 131/64    Pulse: 75 70 65 67   Resp: 13 15 18 22   Temp:       TempSrc:       SpO2: 100% 99% 99% 98%   Weight:       Height:         Intake and Output:  07/30 0701 - 07/30 1900  In: -   Out: 100   07/28 1901 - 07/30 0700  In: 3134.2 [I.V.:3134.2]  Out: 3987 [Urine:32; Drains:320]    Physical Examination:  General: Sedated on the vent  Neck:  Supple, no mass  Resp:  Lungs CTA B/L, no wheezing , normal respiratory effort  CV:  RRR,  no murmur or rub, trace LE edema  GI:  + distention, reduced bowel sounds  Neurologic:  Sedated on the vent  Psych:             Unable to assess  Skin:  No Rash  Access:           [facility]    []    High complexity decision making was performed  []    Patient is at high-risk of decompensation with multiple organ involvement    Lab Data Personally Reviewed: I have reviewed all the pertinent labs, microbiology data and radiology studies during assessment.     Recent Labs     07/30/21  0607 07/29/21  1709 07/29/21  1015 07/29/21  0501 07/29/21  0503 07/28/21  1731 07/28/21  1034     138 141 142   < > 142  141   < > 143   K 2.7*  2.7* 3.1* 2.9*   < > 2.7*  2.7*   < > 3.0*     106 107 107   < > 107  107   < > 106   CO2 22  24 24 26   < > 25  25   < > 16*   *  265* 144* 178*   < > 199*  203*   < > 108*   BUN 36*  36* 39* 42*   < > 45*  46*   < > 52*   CREA 3.20*  3.23* 3.93* 4.30*   < > 4.77*  4.70*   < > 6.54*   CA 8.1*  7.8* 7.6* 7.2*   < > 7.1*  6.6*   < > 7.6*   MG 2.4  2.4 2.2 2.2   < > 2.2   < > 2.5*   PHOS 3.9  --   --   --  3.8  --  9.8*   ALB 3.0*  --   --   --  3.3*  3.3*  --  3.0*   ALT  --   --   --   --  235*  --  54   INR  --   --   --   --   --   --  1.5*    < > = values in this interval not displayed. Recent Labs     07/30/21  0607 07/29/21  1015 07/29/21  0503   WBC 18.2* 18.3* 20.9*   HGB 7.6* 7.8* 7.8*   HCT 23.7* 24.7* 24.0*   * 155 160     No results found for: SDES  Lab Results   Component Value Date/Time    Culture result: NO GROWTH AFTER 16 HOURS 07/29/2021 01:37 PM    Culture result:  07/21/2021 08:54 AM     MRSA NOT PRESENT. Apparent Staphylococus aureus (not MRSA noted). Culture result:  07/21/2021 08:54 AM     Screening of patient nares for MRSA is for surveillance purposes and, if positive, to facilitate isolation considerations in high risk settings. It is not intended for automatic decolonization interventions per se as regimens are not sufficiently effective to warrant routine use.      Recent Results (from the past 24 hour(s))   METABOLIC PANEL, BASIC    Collection Time: 07/29/21 10:15 AM   Result Value Ref Range    Sodium 142 136 - 145 mmol/L    Potassium 2.9 (L) 3.5 - 5.1 mmol/L    Chloride 107 97 - 108 mmol/L    CO2 26 21 - 32 mmol/L    Anion gap 9 5 - 15 mmol/L    Glucose 178 (H) 65 - 100 mg/dL    BUN 42 (H) 6 - 20 MG/DL    Creatinine 4.30 (H) 0.70 - 1.30 MG/DL    BUN/Creatinine ratio 10 (L) 12 - 20      GFR est AA 16 (L) >60 ml/min/1.73m2    GFR est non-AA 14 (L) >60 ml/min/1.73m2    Calcium 7.2 (L) 8.5 - 10.1 MG/DL   MAGNESIUM    Collection Time: 07/29/21 10:15 AM   Result Value Ref Range    Magnesium 2.2 1.6 - 2.4 mg/dL   CBC W/O DIFF    Collection Time: 07/29/21 10:15 AM   Result Value Ref Range    WBC 18.3 (H) 4.1 - 11.1 K/uL    RBC 2.78 (L) 4.10 - 5.70 M/uL    HGB 7.8 (L) 12.1 - 17.0 g/dL    HCT 24.7 (L) 36.6 - 50.3 %    MCV 88.8 80.0 - 99.0 FL    MCH 28.1 26.0 - 34.0 PG    MCHC 31.6 30.0 - 36.5 g/dL    RDW 18.2 (H) 11.5 - 14.5 %    PLATELET 263 116 - 986 K/uL    MPV 10.6 8.9 - 12.9 FL    NRBC 1.0 (H) 0  WBC    ABSOLUTE NRBC 0.18 (H) 0.00 - 0.01 K/uL   LACTIC ACID    Collection Time: 07/29/21 10:15 AM   Result Value Ref Range    Lactic acid 1.5 0.4 - 2.0 MMOL/L   GLUCOSE, POC    Collection Time: 07/29/21 12:35 PM   Result Value Ref Range    Glucose (POC) 127 (H) 65 - 117 mg/dL    Performed by Leandro Elena    CULTURE, BLOOD, PAIRED    Collection Time: 07/29/21  1:37 PM    Specimen: Blood   Result Value Ref Range    Special Requests: NO SPECIAL REQUESTS      Culture result: NO GROWTH AFTER 16 HOURS     LACTIC ACID    Collection Time: 07/29/21  1:37 PM   Result Value Ref Range    Lactic acid 1.5 0.4 - 2.0 MMOL/L   GLUCOSE, POC    Collection Time: 07/29/21  3:47 PM   Result Value Ref Range    Glucose (POC) 121 (H) 65 - 117 mg/dL    Performed by Leandro Elena    METABOLIC PANEL, BASIC    Collection Time: 07/29/21  5:09 PM   Result Value Ref Range    Sodium 141 136 - 145 mmol/L    Potassium 3.1 (L) 3.5 - 5.1 mmol/L    Chloride 107 97 - 108 mmol/L    CO2 24 21 - 32 mmol/L    Anion gap 10 5 - 15 mmol/L    Glucose 144 (H) 65 - 100 mg/dL    BUN 39 (H) 6 - 20 MG/DL    Creatinine 3.93 (H) 0.70 - 1.30 MG/DL    BUN/Creatinine ratio 10 (L) 12 - 20      GFR est AA 18 (L) >60 ml/min/1.73m2    GFR est non-AA 15 (L) >60 ml/min/1.73m2    Calcium 7.6 (L) 8.5 - 10.1 MG/DL   MAGNESIUM    Collection Time: 07/29/21  5:09 PM   Result Value Ref Range    Magnesium 2.2 1.6 - 2.4 mg/dL   GLUCOSE, POC    Collection Time: 07/29/21  8:06 PM   Result Value Ref Range    Glucose (POC) 193 (H) 65 - 117 mg/dL    Performed by [facility]    LACTIC ACID    Collection Time: 07/29/21  8:21 PM   Result Value Ref Range    Lactic acid 0.9 0.4 - 2.0 MMOL/L   GLUCOSE, POC    Collection Time: 07/30/21 12:04 AM   Result Value Ref Range    Glucose (POC) 241 (H) 65 - 117 mg/dL    Performed by [facility]    LACTIC ACID    Collection Time: 07/30/21  2:01 AM   Result Value Ref Range    Lactic acid 0.9 0.4 - 2.0 MMOL/L   GLUCOSE, POC    Collection Time: 07/30/21  4:04 AM   Result Value Ref Range    Glucose (POC) 284 (H) 65 - 117 mg/dL    Performed by [facility]    RENAL FUNCTION PANEL    Collection Time: 07/30/21  6:07 AM   Result Value Ref Range    Sodium 138 136 - 145 mmol/L    Potassium 2.7 (LL) 3.5 - 5.1 mmol/L    Chloride 106 97 - 108 mmol/L    CO2 24 21 - 32 mmol/L    Anion gap 8 5 - 15 mmol/L    Glucose 265 (H) 65 - 100 mg/dL    BUN 36 (H) 6 - 20 MG/DL    Creatinine 3.23 (H) 0.70 - 1.30 MG/DL    BUN/Creatinine ratio 11 (L) 12 - 20      GFR est AA 23 (L) >60 ml/min/1.73m2    GFR est non-AA 19 (L) >60 ml/min/1.73m2    Calcium 7.8 (L) 8.5 - 10.1 MG/DL    Phosphorus 3.9 2.6 - 4.7 MG/DL    Albumin 3.0 (L) 3.5 - 5.0 g/dL   MAGNESIUM    Collection Time: 07/30/21  6:07 AM   Result Value Ref Range    Magnesium 2.4 1.6 - 2.4 mg/dL   METABOLIC PANEL, BASIC    Collection Time: 07/30/21  6:07 AM   Result Value Ref Range    Sodium 139 136 - 145 mmol/L    Potassium 2.7 (LL) 3.5 - 5.1 mmol/L    Chloride 106 97 - 108 mmol/L    CO2 22 21 - 32 mmol/L    Anion gap 11 5 - 15 mmol/L    Glucose 263 (H) 65 - 100 mg/dL    BUN 36 (H) 6 - 20 MG/DL    Creatinine 3.20 (H) 0.70 - 1.30 MG/DL    BUN/Creatinine ratio 11 (L) 12 - 20      GFR est AA 23 (L) >60 ml/min/1.73m2    GFR est non-AA 19 (L) >60 ml/min/1.73m2    Calcium 8.1 (L) 8.5 - 10.1 MG/DL   MAGNESIUM    Collection Time: 07/30/21  6:07 AM   Result Value Ref Range    Magnesium 2.4 1.6 - 2.4 mg/dL   CBC WITH AUTOMATED DIFF    Collection Time: 07/30/21  6:07 AM   Result Value Ref Range    WBC 18.2 (H) 4.1 - 11.1 K/uL    RBC 2.65 (L) 4.10 - 5.70 M/uL    HGB 7.6 (L) 12.1 - 17.0 g/dL    HCT 23.7 (L) 36.6 - 50.3 %    MCV 89.4 80.0 - 99.0 FL    MCH 28.7 26.0 - 34.0 PG    MCHC 32.1 30.0 - 36.5 g/dL    RDW 18.6 (H) 11.5 - 14.5 %    PLATELET 424 (L) 471 - 400 K/uL    MPV 10.3 8.9 - 12.9 FL    NRBC 0.9 (H) 0  WBC    ABSOLUTE NRBC 0.16 (H) 0.00 - 0.01 K/uL    NEUTROPHILS 92 (H) 32 - 75 %    LYMPHOCYTES 2 (L) 12 - 49 %    MONOCYTES 5 5 - 13 %    EOSINOPHILS 0 0 - 7 %    BASOPHILS 0 0 - 1 %    IMMATURE GRANULOCYTES 1 (H) 0.0 - 0.5 %    ABS. NEUTROPHILS 16.7 (H) 1.8 - 8.0 K/UL    ABS. LYMPHOCYTES 0.4 (L) 0.8 - 3.5 K/UL    ABS. MONOCYTES 0.9 0.0 - 1.0 K/UL    ABS. EOSINOPHILS 0.0 0.0 - 0.4 K/UL    ABS. BASOPHILS 0.0 0.0 - 0.1 K/UL    ABS. IMM. GRANS. 0.2 (H) 0.00 - 0.04 K/UL    DF SMEAR SCANNED      RBC COMMENTS ANISOCYTOSIS  1+        RBC COMMENTS ADRIANA CELLS  1+                   Kevin Arroyo MD  [facility]   [address] [address]  Phone - [phone]   Fax - [phone]  [email or web address]

## 2021-07-30 NOTE — PROGRESS NOTES
SLP Contact Note    Noted pt is now extubated, however, per NP, pt needs to remain NPO. Therefore, will hold SLP for now. Can certainly complete STAND over the weekend if pt is cleared for PO. Otherwise, SLP will follow-up on Monday.       Thank you,  LEE ANN CardenasEd, [address] [facility]  Speech-Language Pathologist

## 2021-07-30 NOTE — PROGRESS NOTES
07/30/21 0953   Weaning Parameters   Spontaneous Breathing Trial Complete   (currently on SBT 8/5)   Resp Rate Observed 13   Ve 7.5      RSBI 22

## 2021-07-30 NOTE — PROGRESS NOTES
Occupational Therapy 5021 -   07.30.2021     Chart reviewed in prep for OT re-eval.  continues to be intubated and sedated. Pt currently on CVVH and per RN  Not appropriate for skilled therapy at this time. Patient has not been appropriate for skilled intervention for 3 consecutive days, will sign off. Please re-consult when patient approrpaite for skilled intervention. Thank you.      Francine Rivas MS, OTR/L

## 2021-07-30 NOTE — DIALYSIS
[facility]              [phone]     Orders  Mode: CVVHD   Blood Flow Rate: 180 ml/min   Dialysate Flow Rate: 1400 ml/hr   Factor: 0 ml/hr      Metrics  BP/HR: 155/43  //  93   Access Pressure: -101   Filter Pressure: 113   Return Pressure: 43   TMP: 40   PD: 41   Dialysate Flow Rate: 1400 ml/hr      Comments / Plan:   Patient, orders, line and consent verified. Labs, notes and code status reviewed. [id number] filter running well with no indications for change at this time.  RIJ tunneled CVC C/D/I with transparent dressing and biopatch in place dated 07/28/21. Lines visible/secure with blood warmer attached to the return line and set to 37C*. Education and Pre/Post with Kristina primary RN.

## 2021-07-31 NOTE — PROGRESS NOTES
Nephrology Progress Note  Roland Palm  Date of Admission : 7/21/2021    CC: Follow up for JEREMIAS on CKD       Assessment and Plan     JEREMIAS on CKD:  - ? Cause, likely progression of underlying CKD  - was on HD MWF, now CRRT  - cont CVVHD, no factor  - BID labs for now      Hypokalemia:  - 4k bath      CKD 3b:  - pt of Dr. Makayla Snyder    Anemia of CKD:  - JULISA MWF     Secondary HPT:  - on phoslo    [employer]atherine's syndrome:  - s/p lap ex and decompression of large and small bowel 7/28  - per surgery    Nutrition:  - TPN per CCM team    PNA:  - on rocephin    HTN:  - cont current meds       Interval History:  Seen and examined. On CRRT, no factor. On pressors on and off   Stable BP now. No UOP. Current Medications: all current  Medications have been eviewed in EPIC  Review of Systems: Pertinent items are noted in HPI. Objective:  Vitals:    Vitals:    07/31/21 1200 07/31/21 1300 07/31/21 1400 07/31/21 1500   BP: (!) 152/64 125/71 131/67    Pulse: 62 84 (!) 58 (!) 56   Resp: 15 16 13 21   Temp: 98.7 °F (37.1 °C)      TempSrc:       SpO2: 100% 100% 100% 99%   Weight:       Height:         Intake and Output:  07/31 0701 - 07/31 1900  In: 1010.2 [I.V.:1010.2]  Out: 7073 [Drains:425]  07/29 1901 - 07/31 0700  In: 3225.2 [I.V.:3225.2]  Out: 7573 [Urine:12; Drains:1030]    Physical Examination:  General: Sedated on the vent  Neck:  Supple, no mass  Resp:  Lungs CTA B/L, no wheezing , normal respiratory effort  CV:  RRR,  no murmur or rub, trace LE edema  GI:  + distention, reduced bowel sounds  Neurologic:  Sedated on the vent  Psych:             Unable to assess  Skin:  No Rash  Access:           [facility] PC    [x]    High complexity decision making was performed  []    Patient is at high-risk of decompensation with multiple organ involvement    Lab Data Personally Reviewed: I have reviewed all the pertinent labs, microbiology data and radiology studies during assessment.     Recent Labs     07/31/21  0210 07/31/21  0203 07/30/21  1405 07/30/21  0607 07/30/21  0607 07/29/21  1015 07/29/21  0503    139 140  140   < > 139  138   < > 142  141   K 2.8* 2.8* 3.1*  3.0*   < > 2.7*  2.7*   < > 2.7*  2.7*   * 109* 109*  109*   < > 106  106   < > 107  107   CO2 22 22 22  23   < > 22  24   < > 25  25   * 245* 213*  221*   < > 263*  265*   < > 199*  203*   BUN 41* 42* 36*  37*   < > 36*  36*   < > 45*  46*   CREA 2.98* 3.01* 2.87*  2.85*   < > 3.20*  3.23*   < > 4.77*  4.70*   CA 7.9* 8.1* 8.3*  8.2*   < > 8.1*  7.8*   < > 7.1*  6.6*   MG  --  2.5* 2.6*  --  2.4  2.4   < > 2.2   PHOS  --  2.8 3.1  --  3.9  --  3.8   ALB  --  2.8* 3.0*  --  3.0*  --  3.3*  3.3*   ALT  --   --   --   --   --   --  235*    < > = values in this interval not displayed. Recent Labs     07/31/21  0209 07/30/21  0607 07/29/21  1015   WBC 20.3* 18.2* 18.3*   HGB 7.3* 7.6* 7.8*   HCT 23.0* 23.7* 24.7*   * 147* 155     No results found for: SDES  Lab Results   Component Value Date/Time    Culture result: NO GROWTH 2 DAYS 07/29/2021 01:37 PM    Culture result:  07/21/2021 08:54 AM     MRSA NOT PRESENT. Apparent Staphylococus aureus (not MRSA noted). Culture result:  07/21/2021 08:54 AM     Screening of patient nares for MRSA is for surveillance purposes and, if positive, to facilitate isolation considerations in high risk settings. It is not intended for automatic decolonization interventions per se as regimens are not sufficiently effective to warrant routine use.      Recent Results (from the past 24 hour(s))   GLUCOSE, POC    Collection Time: 07/30/21  4:00 PM   Result Value Ref Range    Glucose (POC) 192 (H) 65 - 117 mg/dL    Performed by Cammie Diaz    GLUCOSE, POC    Collection Time: 07/30/21  7:55 PM   Result Value Ref Range    Glucose (POC) 180 (H) 65 - 117 mg/dL    Performed by Candance Charlette, POC    Collection Time: 07/31/21 12:13 AM   Result Value Ref Range    Glucose (POC) 251 (H) 65 - 117 mg/dL Performed by Erin Mercado    RENAL FUNCTION PANEL    Collection Time: 07/31/21  2:09 AM   Result Value Ref Range    Sodium 139 136 - 145 mmol/L    Potassium 2.8 (L) 3.5 - 5.1 mmol/L    Chloride 109 (H) 97 - 108 mmol/L    CO2 22 21 - 32 mmol/L    Anion gap 8 5 - 15 mmol/L    Glucose 245 (H) 65 - 100 mg/dL    BUN 42 (H) 6 - 20 MG/DL    Creatinine 3.01 (H) 0.70 - 1.30 MG/DL    BUN/Creatinine ratio 14 12 - 20      GFR est AA 25 (L) >60 ml/min/1.73m2    GFR est non-AA 20 (L) >60 ml/min/1.73m2    Calcium 8.1 (L) 8.5 - 10.1 MG/DL    Phosphorus 2.8 2.6 - 4.7 MG/DL    Albumin 2.8 (L) 3.5 - 5.0 g/dL   MAGNESIUM    Collection Time: 07/31/21  2:09 AM   Result Value Ref Range    Magnesium 2.5 (H) 1.6 - 2.4 mg/dL   CBC WITH AUTOMATED DIFF    Collection Time: 07/31/21  2:09 AM   Result Value Ref Range    WBC 20.3 (H) 4.1 - 11.1 K/uL    RBC 2.50 (L) 4.10 - 5.70 M/uL    HGB 7.3 (L) 12.1 - 17.0 g/dL    HCT 23.0 (L) 36.6 - 50.3 %    MCV 92.0 80.0 - 99.0 FL    MCH 29.2 26.0 - 34.0 PG    MCHC 31.7 30.0 - 36.5 g/dL    RDW 19.1 (H) 11.5 - 14.5 %    PLATELET 755 (L) 934 - 400 K/uL    MPV 10.8 8.9 - 12.9 FL    NRBC 1.9 (H) 0  WBC    ABSOLUTE NRBC 0.39 (H) 0.00 - 0.01 K/uL    NEUTROPHILS 91 (H) 32 - 75 %    LYMPHOCYTES 2 (L) 12 - 49 %    MONOCYTES 5 5 - 13 %    EOSINOPHILS 0 0 - 7 %    BASOPHILS 0 0 - 1 %    IMMATURE GRANULOCYTES 2 (H) 0.0 - 0.5 %    ABS. NEUTROPHILS 18.5 (H) 1.8 - 8.0 K/UL    ABS. LYMPHOCYTES 0.4 (L) 0.8 - 3.5 K/UL    ABS. MONOCYTES 1.0 0.0 - 1.0 K/UL    ABS. EOSINOPHILS 0.0 0.0 - 0.4 K/UL    ABS. BASOPHILS 0.0 0.0 - 0.1 K/UL    ABS. IMM.  GRANS. 0.4 (H) 0.00 - 0.04 K/UL    DF SMEAR SCANNED      PLATELET COMMENTS Large Platelets      RBC COMMENTS ANISOCYTOSIS  1+        RBC COMMENTS BASOPHILIC STIPPLING  POLYCHROMASIA  1+        WBC COMMENTS REACTIVE LYMPHS     METABOLIC PANEL, BASIC    Collection Time: 07/31/21  2:10 AM   Result Value Ref Range    Sodium 139 136 - 145 mmol/L    Potassium 2.8 (L) 3.5 - 5.1 mmol/L Chloride 109 (H) 97 - 108 mmol/L    CO2 22 21 - 32 mmol/L    Anion gap 8 5 - 15 mmol/L    Glucose 244 (H) 65 - 100 mg/dL    BUN 41 (H) 6 - 20 MG/DL    Creatinine 2.98 (H) 0.70 - 1.30 MG/DL    BUN/Creatinine ratio 14 12 - 20      GFR est AA 25 (L) >60 ml/min/1.73m2    GFR est non-AA 21 (L) >60 ml/min/1.73m2    Calcium 7.9 (L) 8.5 - 10.1 MG/DL   GLUCOSE, POC    Collection Time: 07/31/21  3:56 AM   Result Value Ref Range    Glucose (POC) 259 (H) 65 - 117 mg/dL    Performed by Asenath Eisenmenger, POC    Collection Time: 07/31/21  8:09 AM   Result Value Ref Range    Glucose (POC) 288 (H) 65 - 117 mg/dL    Performed by [address] [place] [address], POC    Collection Time: 07/31/21 11:57 AM   Result Value Ref Range    Glucose (POC) 218 (H) 65 - 117 mg/dL    Performed by Julisa Thompson MD  [address]  Phone - [phone]   Fax - [phone]  www. [facility] [employer]

## 2021-07-31 NOTE — PROGRESS NOTES
Progress Note    Patient: Gisela Bird MRN: [id number]  SSN: [id number]    YOB: 1945  Age: 77 y.o. Sex: male      Admit Date: 2021    3 Days Post-Op    Procedure:  Procedure(s):  LAPAROTOMY EXPLORATORY with decompression of small and large bowel    Subjective:     Patient extubated. Has no complaints. Per nursing has been putting out a lot of stool. NG tube has had 0 output. Objective:     Visit Vitals  /65   Pulse (!) 56   Temp (!) 96.1 °F (35.6 °C)   Resp 21   Ht 6' (1.829 m)   Wt 112 lb 3.4 oz (50.9 kg)   SpO2 99%   BMI 15.22 kg/m²       Temp (24hrs), Av.8 °F (36.6 °C), Min:96.1 °F (35.6 °C), Max:98.7 °F (37.1 °C)      Physical Exam:    General alert and oriented in no acute distress  Lungs clear bilaterally  Heart regular rate and rhythm  Abdomen soft nontender nondistended incision healing well  Few bowel sounds    Data Review: images and reports reviewed    Lab Review: All lab results for the last 24 hours reviewed.   Recent Results (from the past 24 hour(s))   GLUCOSE, POC    Collection Time: 21  7:55 PM   Result Value Ref Range    Glucose (POC) 180 (H) 65 - 117 mg/dL    Performed by Pastor Everett, POC    Collection Time: 21 12:13 AM   Result Value Ref Range    Glucose (POC) 251 (H) 65 - 117 mg/dL    Performed by Christian Waterman    RENAL FUNCTION PANEL    Collection Time: 21  2:09 AM   Result Value Ref Range    Sodium 139 136 - 145 mmol/L    Potassium 2.8 (L) 3.5 - 5.1 mmol/L    Chloride 109 (H) 97 - 108 mmol/L    CO2 22 21 - 32 mmol/L    Anion gap 8 5 - 15 mmol/L    Glucose 245 (H) 65 - 100 mg/dL    BUN 42 (H) 6 - 20 MG/DL    Creatinine 3.01 (H) 0.70 - 1.30 MG/DL    BUN/Creatinine ratio 14 12 - 20      GFR est AA 25 (L) >60 ml/min/1.73m2    GFR est non-AA 20 (L) >60 ml/min/1.73m2    Calcium 8.1 (L) 8.5 - 10.1 MG/DL    Phosphorus 2.8 2.6 - 4.7 MG/DL    Albumin 2.8 (L) 3.5 - 5.0 g/dL   MAGNESIUM    Collection Time: 21  2:09 AM   Result Value Ref Range    Magnesium 2.5 (H) 1.6 - 2.4 mg/dL   CBC WITH AUTOMATED DIFF    Collection Time: 07/31/21  2:09 AM   Result Value Ref Range    WBC 20.3 (H) 4.1 - 11.1 K/uL    RBC 2.50 (L) 4.10 - 5.70 M/uL    HGB 7.3 (L) 12.1 - 17.0 g/dL    HCT 23.0 (L) 36.6 - 50.3 %    MCV 92.0 80.0 - 99.0 FL    MCH 29.2 26.0 - 34.0 PG    MCHC 31.7 30.0 - 36.5 g/dL    RDW 19.1 (H) 11.5 - 14.5 %    PLATELET 430 (L) 389 - 400 K/uL    MPV 10.8 8.9 - 12.9 FL    NRBC 1.9 (H) 0  WBC    ABSOLUTE NRBC 0.39 (H) 0.00 - 0.01 K/uL    NEUTROPHILS 91 (H) 32 - 75 %    LYMPHOCYTES 2 (L) 12 - 49 %    MONOCYTES 5 5 - 13 %    EOSINOPHILS 0 0 - 7 %    BASOPHILS 0 0 - 1 %    IMMATURE GRANULOCYTES 2 (H) 0.0 - 0.5 %    ABS. NEUTROPHILS 18.5 (H) 1.8 - 8.0 K/UL    ABS. LYMPHOCYTES 0.4 (L) 0.8 - 3.5 K/UL    ABS. MONOCYTES 1.0 0.0 - 1.0 K/UL    ABS. EOSINOPHILS 0.0 0.0 - 0.4 K/UL    ABS. BASOPHILS 0.0 0.0 - 0.1 K/UL    ABS. IMM.  GRANS. 0.4 (H) 0.00 - 0.04 K/UL    DF SMEAR SCANNED      PLATELET COMMENTS Large Platelets      RBC COMMENTS ANISOCYTOSIS  1+        RBC COMMENTS BASOPHILIC STIPPLING  POLYCHROMASIA  1+        WBC COMMENTS REACTIVE LYMPHS     METABOLIC PANEL, BASIC    Collection Time: 07/31/21  2:10 AM   Result Value Ref Range    Sodium 139 136 - 145 mmol/L    Potassium 2.8 (L) 3.5 - 5.1 mmol/L    Chloride 109 (H) 97 - 108 mmol/L    CO2 22 21 - 32 mmol/L    Anion gap 8 5 - 15 mmol/L    Glucose 244 (H) 65 - 100 mg/dL    BUN 41 (H) 6 - 20 MG/DL    Creatinine 2.98 (H) 0.70 - 1.30 MG/DL    BUN/Creatinine ratio 14 12 - 20      GFR est AA 25 (L) >60 ml/min/1.73m2    GFR est non-AA 21 (L) >60 ml/min/1.73m2    Calcium 7.9 (L) 8.5 - 10.1 MG/DL   GLUCOSE, POC    Collection Time: 07/31/21  3:56 AM   Result Value Ref Range    Glucose (POC) 259 (H) 65 - 117 mg/dL    Performed by Sarath Crump, POC    Collection Time: 07/31/21  8:09 AM   Result Value Ref Range    Glucose (POC) 288 (H) 65 - 117 mg/dL    Performed by [address] [place] [address], POC    Collection Time: 07/31/21 11:57 AM   Result Value Ref Range    Glucose (POC) 218 (H) 65 - 117 mg/dL    Performed by Carlos Manuel Redmond    GLUCOSE, POC    Collection Time: 07/31/21  3:54 PM   Result Value Ref Range    Glucose (POC) 184 (H) 65 - 117 mg/dL    Performed by Carlos Manuel Redmond        Assessment:     Hospital Problems  Date Reviewed: 7/21/2021        Codes Class Noted POA    Acute delirium ICD-10-CM: R41.0  ICD-9-CM: 780.09  7/21/2021 Unknown              Plan/Recommendations/Medical Decision Making:   Overall appears to be improving. Having resumption of bowel function. Should be okay to begin trickle feeds and see how he does. Would continue TPN for now. Continue antibiotics  Wean steroids as able  Should distention return will need rectal tube.

## 2021-07-31 NOTE — DIALYSIS
[facility]              [phone]     Orders  Mode: CVVHD restart @ 0330   Blood Flow Rate: 180 ml/min   Dialysate Flow Rate: 1400 ml/hr   Factor: 0 ml/hr      Metrics  BP/HR: 149/44   80   Access Pressure: -80   Filter Pressure: 84   Return Pressure: 46   TMP: 24   PD: 6   Dialysate Flow Rate: 1400 ml/hr      Comments / Plan:   Filter changed secondary to rinse back to due to [place] TMP. All possible blood (165 ml) returned by primary RN. Consents, patient, code status, labs and orders verified. Old set discarded in red bio-hazard bag. New [id number] filter set-up, primed, tested and running well at this time. RIJ temporary CVC, dressing CDI with bio-patch dated 7/28/21. No signs of redness, drainage, or infection visualized. Each catheter limb disinfected for 60 seconds per limb with alcohol swabs. Caps removed, dialysis CVC hub scrubbed with Prevantics for 5 seconds, followed by a 5 second dry time per Hospital P&P. +asp/+flush x 2 ports. Lines visible and connections secure with blood warmer to return line at 37*C. Education, pre and post to RN.

## 2021-07-31 NOTE — PROGRESS NOTES
Bedside and Verbal shift change report given to Jess (oncoming nurse) by Indira Valentino (offgoing nurse). Report included the following information SBAR, Kardex, Intake/Output, MAR, Accordion and Recent Results. Uneventful shift. Tolerating CRRT with no factor. Per Gen surg, okay to begin using NG tube to give PO meds and tube feed. Midline incision clean dry and intact. Pt having diarrhea; flexi in place. On 1L NC. On and off pressors.

## 2021-07-31 NOTE — PROGRESS NOTES
[facility]    ICU TEAM Progress Note    Name: Whitley Rojas   : 1945   MRN: [id number]   Date: 2021      Assessment/Plan:     1. Septic shock  a. Likely intra-abdominal origin given bowel dilation and likely bacterial translocation. Patient completed course of abx for pneumonia- does not appear to be contributory currently. b. C diff negative- PO vanc and IV flagyl stopped  c. Continue Zosyn and Eraxis  d. Decreased hydrocortisone to 50mg q12h- stopped tomorrow if off pressors  e. Wean norepi and vasopressin as tolerated to maintain MAP > 65  2. Colonic pseudo-obstruction  a. Ex-lap done overnight of  revealed dilation of small and large bowel. No perforation, no necrosis. C diff negative. b. General surgery following  c. Distention appears improved today, patient having bowel movements, FMS in place  d. Continue abx as above  3. Respiratory insufficiency requiring intubation  a. Code blue called on  given respiratory status. Likely in the setting of shock and significant abdominal distention. Patient intubated but without loss of pulse. b. Patient passed SBT and was extubated to 3L NC 2021  4. JEREMIAS on CKD  a. Nephrology following  b. Likely progression of underlying CKD. Was on iHD MWF now on CRRT  c. Continue CRRT with no factor  d. BID labs  5. Severe malnutrition  a. Per nutrition/dietician patient with clinical characteristics of severe malnutrition  b. Continue TPN today  c. Lipids starting tomorrow  d. Will hold on PO until surgery approves  6. Anemia of chronic disease  a.  Daily CBC           F - Feeding:  Yes TPN  A - Analgesia: None  S - Sedation: None  T - DVT Prophylaxis: SCD's or Sequential Compression Device and Heparin   H - Head of Bed: > 30 Degrees  U - Ulcer Prophylaxis: Pepcid (famotidine)   G - Glycemic Control: Insulin  S - Spontaneous Breathing Trial: Yes  B - Bowel Regimen: None needed at this time  I - Indwelling Catheter:   Tubes: None  Lines: Arterial Line and [employer]  Drains: John-Rose Drain (LOBO)  D - De-escalation of Antibiotics: Zosyn    Subjective:   Progress Note: 7/31/2021      Reason for ICU Admission: Septic shock and respiratory failure     HPI:  55-year-old gentleman with multiple medical problem admitted to the hospital as a transfer from outside facility on July 21 for acute on chronic kidney disease requiring hemodialysis for hypoxia and pulmonary edema and possible pneumonia. Patient was dialyzed and his condition improved slightly and decision was made to transfer him to regular hemodialysis 3 times a week and he had a permacath inserted on the 27th. Over the last few days there was progressive distention of his abdomen, C. difficile was suspected and stool sample was sent but no results yet. He was evaluated by CAT scan abdomen followed by surgical and GI evaluation on the 27th but at that time there was no indication for immediate intervention and treatment for C. difficile colitis was started empirically. The morning of 28th he had progressive increase in his abdominal pain and suddenly he started to be in agonal breathing and bradycardia. CODE BLUE was called and resuscitation started, he received 2 amp of epinephrine and 1 amp of bicarb and he was emergently intubated. ROSC was achieved [not clear if he lost pulse completely or it was very weak that was not detected]. Patient obtunded however he moves 4 limbs and was reaching out to the ET tube shortly after resuscitation so he was given fentanyl and Ativan. He is hypotensive and requiring pressors. His abdomen is severely distended and x-ray showed diffuse gas but no clear free air. Discussed this with the general surgeon as my suspicion for bowel perforation is high.   General surgeon at bedside assessing the patient       Overnight Events:   7/31/2021  - No acute events overnight    POD:  3 Day Post-Op    S/P:   Procedure(s):  LAPAROTOMY EXPLORATORY with decompression of small and large bowel    Objective:   Vital Signs:  Visit Vitals  /65   Pulse 80   Temp 97.9 °F (36.6 °C)   Resp 12   Ht 6' (1.829 m)   Wt 50.9 kg (112 lb 3.4 oz)   SpO2 100%   BMI 15.22 kg/m²    O2 Flow Rate (L/min): 1 l/min O2 Device: Nasal cannula Temp (24hrs), Av.8 °F (36.6 °C), Min:96 °F (35.6 °C), Max:98.6 °F (37 °C)           Intake/Output:     Intake/Output Summary (Last 24 hours) at 2021 1112  Last data filed at 2021 1100  Gross per 24 hour   Intake 2275.9 ml   Output 3521 ml   Net -1245.1 ml       Physical Exam:    General:  Sleepy. Opens eyes to voice. No acute distress. Cachetic. Temporal wasting   Eyes:  Sclera anicteric. Pupils equal, round, reactive to light. Mouth/Throat: Mucous membranes dry. Neck: Supple. Lungs:   Clear to auscultation bilaterally, good effort. Cardiovascular:  Regular rate and rhythm, no murmur, click, rub, or gallop. Abdomen:   Midline incision. Dressing intact with small amount of shadowing. L abd LOBO drain with serosanguinous output. Mildly distended. Extremities: No cyanosis or edema. Skin: No acute rash or lesions. Lymph Nodes: Cervical and supraclavicular normal.   Musculoskeletal:  No swelling or deformity. Lines/Devices:  Intact, no erythema, drainage, or tenderness. Neuro/Psych: Sleepy, opens eyes to voice, oriented x2, follows commands in all extremities.          LABS AND  DATA: Personally reviewed  Recent Labs     21  0209 21  0607   WBC 20.3* 18.2*   HGB 7.3* 7.6*   HCT 23.0* 23.7*   * 147*     Recent Labs     21  0210 21  0209 21  1405 21  1405    139   < > 140  140   K 2.8* 2.8*   < > 3.1*  3.0*   * 109*   < > 109*  109*   CO2 22 22   < > 22  23   BUN 41* 42*   < > 36*  37*   CREA 2.98* 3.01*   < > 2.87*  2.85*   * 245*   < > 213*  221*   CA 7.9* 8.1*   < > 8.3*  8.2*   MG  --  2.5*  --  2.6*   PHOS  --  2.8  --  3.1    < > = values in this interval not displayed. Recent Labs     07/31/21  0209 07/30/21  1405 07/30/21  0607 07/29/21  0503   AP  --   --   --  104   TP  --   --   --  6.9   ALB 2.8* 3.0*   < > 3.3*  3.3*   GLOB  --   --   --  3.6    < > = values in this interval not displayed. No results for input(s): INR, PTP, APTT, INREXT, INREXT in the last 72 hours. Recent Labs     07/28/21  2320 07/28/21  1641   PHI 7.30* 7.04*   PCO2I 41.1 51.1*   PO2I 109* 426*   FIO2I 40  --      No results for input(s): CPK, CKMB, TROIQ, BNPP in the last 72 hours. Ventilator Settings:  Mode Rate Tidal Volume Pressure FiO2 PEEP   Spontaneous   400 ml  8 cm H2O 40 % 5 cm H20     Peak airway pressure: 20 cm H2O    Minute ventilation: 7.41 l/min        MEDS: Reviewed    Chest X-Ray:  CXR Results  (Last 48 hours)    None          Multidisciplinary Rounds Completed: Yes    ABCDEF Bundle/Checklist Completed:  Yes    SPECIAL EQUIPMENT  CRRT    DISPOSITION  Stay in ICU    CRITICAL CARE CONSULTANT NOTE  I had a face to face encounter with the patient, reviewed and interpreted patient data including clinical events, labs, images, vital signs, I/O's, and examined patient. I have discussed the case and the plan and management of the patient's care with the consulting services, the bedside nurses and the respiratory therapist.      NOTE OF PERSONAL INVOLVEMENT IN CARE   This patient has a high probability of imminent, clinically significant deterioration, which requires the highest level of preparedness to intervene urgently. I participated in the decision-making and personally managed or directed the management of the following life and organ supporting interventions that required my frequent assessment to treat or prevent imminent deterioration. I personally spent 45 minutes of critical care time.   This is time spent at this critically ill patient's bedside actively involved in patient care as well as the coordination of care and discussions with the patient's family. This does not include any procedural time which has been billed separately.       [facility]     Critical Care Medicine  [employer] [facility]      7/31/2021

## 2021-08-01 NOTE — PROGRESS NOTES
[facility]    ICU TEAM Progress Note    Name: Herman Lopez   : 1945   MRN: [id number]   Date: 2021      Assessment/Plan:     1. Septic shock  a. Likely intra-abdominal origin given bowel dilation and likely bacterial translocation. Patient completed course of abx for pneumonia- does not appear to be contributory currently. b. C diff negative- PO vanc and IV flagyl stopped  c. Continue Zosyn and Eraxis  d. Discontinued hydrocortisone since off pressors  e. Off pressors  2. Colonic pseudo-obstruction  a. Ex-lap done overnight of  revealed dilation of small and large bowel. No perforation, no necrosis. C diff negative. b. General surgery following  c. Distention appears improved today, patient having bowel movements, FMS removed, rectal tube placed intermittently per nursing protocol per GI  d. Continue abx as above  3. Respiratory insufficiency requiring intubation  a. Code blue called on  given respiratory status. Likely in the setting of shock and significant abdominal distention. Patient intubated but without loss of pulse. b. Patient passed SBT and was extubated to 3L NC 2021  4. JEREMIAS on CKD  a. Nephrology following  b. Likely progression of underlying CKD. Was on iHD MWF now on CRRT  c. Continue CRRT with no factor  d. BID labs  5. Severe malnutrition  a. Per nutrition/dietician patient with clinical characteristics of severe malnutrition  b. Continue TPN today, add insulin, increase potassium  c. Lipids to continue 3x/ week  d. Possible start trickle feeds today if okay with surgery  6. Anemia of chronic disease  a.  Daily CBC           F - Feeding:  Yes TPN  A - Analgesia: None  S - Sedation: None  T - DVT Prophylaxis: SCD's or Sequential Compression Device and Heparin   H - Head of Bed: > 30 Degrees  U - Ulcer Prophylaxis: Pepcid (famotidine)   G - Glycemic Control: Insulin  S - Spontaneous Breathing Trial: Yes  B - Bowel Regimen: None needed at this time  I - Indwelling Catheter:   Tubes: None  Lines: Arterial Line and Central Line  Drains: John-Rose Drain (LOBO)  D - De-escalation of Antibiotics: Zosyn    Subjective:   Progress Note: 8/1/2021      Reason for ICU Admission: Septic shock and respiratory failure     HPI:  28-year-old gentleman with multiple medical problem admitted to the hospital as a transfer from outside facility on July 21 for acute on chronic kidney disease requiring hemodialysis for hypoxia and pulmonary edema and possible pneumonia. Patient was dialyzed and his condition improved slightly and decision was made to transfer him to regular hemodialysis 3 times a week and he had a permacath inserted on the 27th. Over the last few days there was progressive distention of his abdomen, C. difficile was suspected and stool sample was sent but no results yet. He was evaluated by CAT scan abdomen followed by surgical and GI evaluation on the 27th but at that time there was no indication for immediate intervention and treatment for C. difficile colitis was started empirically. The morning of 28th he had progressive increase in his abdominal pain and suddenly he started to be in agonal breathing and bradycardia. CODE BLUE was called and resuscitation started, he received 2 amp of epinephrine and 1 amp of bicarb and he was emergently intubated. ROSC was achieved [not clear if he lost pulse completely or it was very weak that was not detected]. Patient obtunded however he moves 4 limbs and was reaching out to the ET tube shortly after resuscitation so he was given fentanyl and Ativan. He is hypotensive and requiring pressors. His abdomen is severely distended and x-ray showed diffuse gas but no clear free air.   Discussed this with the general surgeon as suspicion for bowel perforation is high.        Overnight Events:   8/1/2021  - No acute events overnight    POD:  4 Day Post-Op    S/P:   Procedure(s):  LAPAROTOMY EXPLORATORY with decompression of small and large bowel    Objective:   Vital Signs:  Visit Vitals  BP (!) 161/49   Pulse (!) 58   Temp 97.9 °F (36.6 °C)   Resp 15   Ht 6' (1.829 m)   Wt 49.6 kg (109 lb 5.6 oz)   SpO2 100%   BMI 14.83 kg/m²    O2 Flow Rate (L/min): 2 l/min O2 Device: Nasal cannula Temp (24hrs), Av.3 °F (36.3 °C), Min:96.1 °F (35.6 °C), Max:98.7 °F (37.1 °C)           Intake/Output:     Intake/Output Summary (Last 24 hours) at 2021 1150  Last data filed at 2021 1100  Gross per 24 hour   Intake 2982.38 ml   Output 3695 ml   Net -712.62 ml       Physical Exam:    General:  Sleepy. Opens eyes to voice. No acute distress. Cachetic. Temporal wasting   Eyes:  Sclera anicteric. Pupils equal, round, reactive to light. Mouth/Throat: Mucous membranes dry. Neck: Supple. Lungs:   Clear to auscultation bilaterally, good effort. Cardiovascular:  Regular rate and rhythm, no murmur, click, rub, or gallop. Abdomen:   Midline incision. Dressing intact with small amount of shadowing. L abd LOBO drain with serosanguinous output. Mildly distended. Extremities: No cyanosis or edema. Skin: No acute rash or lesions. Lymph Nodes: Cervical and supraclavicular normal.   Musculoskeletal:  No swelling or deformity. Lines/Devices:  Intact, no erythema, drainage, or tenderness. Neuro/Psych: Sleepy, opens eyes to voice, oriented x2, follows commands in all extremities.          LABS AND  DATA: Personally reviewed  Recent Labs     21  0404 21  0209   WBC 26.9* 20.3*   HGB 8.7* 7.3*   HCT 27.4* 23.0*   * 144*     Recent Labs     21  0404 21  1605 21  0210 21  0209     136 139   < > 139   K 2.3*  2.3* 2.8*   < > 2.8*     105 109*   < > 109*   CO2 19*  20* 25   < > 22   BUN 42*  45* 42*   < > 42*   CREA 2.47*  2.49* 2.59*   < > 3.01*   *  382* 181*   < > 245*   CA 7.8*  7.9* 8.1*   < > 8.1*   MG 2.5*  2.6* 2.6*   < > 2.5*   PHOS 2.1*  --   --  2.8    < > = values in this interval not displayed. Recent Labs     08/01/21  0404 07/31/21  0209   ALB 2.9* 2.8*     No results for input(s): INR, PTP, APTT, INREXT, INREXT in the last 72 hours. No results for input(s): PHI, PCO2I, PO2I, FIO2I in the last 72 hours. No results for input(s): CPK, CKMB, TROIQ, BNPP in the last 72 hours. Ventilator Settings:  Mode Rate Tidal Volume Pressure FiO2 PEEP   Spontaneous   400 ml  8 cm H2O 40 % 5 cm H20     Peak airway pressure: 20 cm H2O    Minute ventilation: 7.41 l/min        MEDS: Reviewed    Chest X-Ray:  CXR Results  (Last 48 hours)    None          Multidisciplinary Rounds Completed: Yes    ABCDEF Bundle/Checklist Completed:  Yes    SPECIAL EQUIPMENT  CRRT    DISPOSITION  Stay in ICU    CRITICAL CARE CONSULTANT NOTE  I had a face to face encounter with the patient, reviewed and interpreted patient data including clinical events, labs, images, vital signs, I/O's, and examined patient. I have discussed the case and the plan and management of the patient's care with the consulting services, the bedside nurses and the respiratory therapist.      NOTE OF PERSONAL INVOLVEMENT IN CARE   This patient has a high probability of imminent, clinically significant deterioration, which requires the highest level of preparedness to intervene urgently. I participated in the decision-making and personally managed or directed the management of the following life and organ supporting interventions that required my frequent assessment to treat or prevent imminent deterioration. I personally spent 40 minutes of critical care time. This is time spent at this critically ill patient's bedside actively involved in patient care as well as the coordination of care and discussions with the patient's family. This does not include any procedural time which has been billed separately.       Yuly Guillen [facility]     Critical Care Medicine  [employer]      8/1/2021

## 2021-08-01 NOTE — DIALYSIS
[facility]              [phone]     Orders  Mode: CVVHD restarted @ 0100   Blood Flow Rate: 180 ml/min   Prismasol Dose: 25 ml/kg/hr   Factor: 0 ml/hr      Metrics  BP/HR: 161/49  //  65   Access Pressure: -67   Filter Pressure: 98   Return Pressure: 60   TMP: 34   PD: 11   DFR: 1,400 ml/hr  PBP: 0 ml/hr      Comments / Plan:      Filter changed due to visible clotting in top of filter & in deaeration chamber. All possible blood returned 165 mls by this RN. Consents, patient, code status, labs and orders verified. Old set discarded in red bio-hazard bag. New [id number] filter set-up, primed 1L NS, tested and running well at this time. Right tunneled CVC tegaderm dressing with bio-patch C/D/I. Each catheter limb disinfected for 60 seconds per limb with alcohol swabs & hubs scrubbed with [employer]vant[employer] for 5 seconds, followed by a 5 second dry time per Hospital P&P. +asp/+flush x 2 ports. Lines visible & connections secure with blood warmer on return line at 37*C. Education, pre and post report to VALE Cruz, primary RN.

## 2021-08-01 NOTE — PROGRESS NOTES
Nephrology Progress Note  Shae Mayers  Date of Admission : 7/21/2021    CC: Follow up for JEREMIAS on CKD       Assessment and Plan     JEREMIAS on CKD:  - ? Cause, likely progression of underlying CKD  - was on HD MWF, now CRRT  - cont CVVHD,start factor 50 as tolerated   - BID labs for now      Hypokalemia:  - 4k bath      CKD 3b:  - pt of Dr. Melisa Fernández    Anemia of CKD:  - JULISA MWF     Secondary HPT:  - on phoslo    [place]'s syndrome:  - s/p lap ex and decompression of large and small bowel 7/28  - per surgery    Nutrition:  - TPN per CCM team    PNA:  - on rocephin    HTN:  - cont current meds       Interval History:  Seen and examined. On CRRT,off pressore from last night and start factor agt 50. D./w nurse   Current Medications: all current  Medications have been eviewed in EPIC  Review of Systems: Pertinent items are noted in HPI. Objective:  Vitals:    Vitals:    08/01/21 1000 08/01/21 1100 08/01/21 1200 08/01/21 1300   BP:       Pulse: 61 (!) 58 (!) 57 (!) 57   Resp: 15 15 13 13   Temp:   97.2 °F (36.2 °C)    TempSrc:       SpO2: 98% 100% 100% 93%   Weight:       Height:         Intake and Output:  08/01 0701 - 08/01 1900  In: 433 [I.V.:665]  Out: 1092 [Drains:375]  07/30 1901 - 08/01 0700  In: 3839.4 [I.V.:3779.4]  Out: 7989 [Drains:1800]    Physical Examination:  General: Sedated on the vent  Neck:  Supple, no mass  Resp:  Lungs CTA B/L, no wheezing , normal respiratory effort  CV:  RRR,  no murmur or rub, trace LE edema  GI:  + distention, reduced bowel sounds  Neurologic:  Sedated on the vent  Psych:             Unable to assess  Skin:  No Rash  Access:           [facility]    [x]    High complexity decision making was performed  []    Patient is at high-risk of decompensation with multiple organ involvement    Lab Data Personally Reviewed: I have reviewed all the pertinent labs, microbiology data and radiology studies during assessment.     Recent Labs     08/01/21  0404 07/31/21  1605 07/31/21  0215 07/31/21  0209 07/31/21  0209 07/30/21  1405 07/30/21  1405     136 139 139   < > 139   < > 140  140   K 2.3*  2.3* 2.8* 2.8*   < > 2.8*   < > 3.1*  3.0*     105 109* 109*   < > 109*   < > 109*  109*   CO2 19*  20* 25 22   < > 22   < > 22  23   *  382* 181* 244*   < > 245*   < > 213*  221*   BUN 42*  45* 42* 41*   < > 42*   < > 36*  37*   CREA 2.47*  2.49* 2.59* 2.98*   < > 3.01*   < > 2.87*  2.85*   CA 7.8*  7.9* 8.1* 7.9*   < > 8.1*   < > 8.3*  8.2*   MG 2.5*  2.6* 2.6*  --   --  2.5*   < > 2.6*   PHOS 2.1*  --   --   --  2.8  --  3.1   ALB 2.9*  --   --   --  2.8*  --  3.0*    < > = values in this interval not displayed. Recent Labs     08/01/21  0404 07/31/21  0209 07/30/21  0607   WBC 26.9* 20.3* 18.2*   HGB 8.7* 7.3* 7.6*   HCT 27.4* 23.0* 23.7*   * 144* 147*     No results found for: [facility]  Lab Results   Component Value Date/Time    Culture result: NO GROWTH 3 DAYS 07/29/2021 01:37 PM    Culture result:  07/21/2021 08:54 AM     MRSA NOT PRESENT. Apparent Staphylococus aureus (not MRSA noted). Culture result:  07/21/2021 08:54 AM     Screening of patient nares for MRSA is for surveillance purposes and, if positive, to facilitate isolation considerations in high risk settings. It is not intended for automatic decolonization interventions per se as regimens are not sufficiently effective to warrant routine use.      Recent Results (from the past 24 hour(s))   GLUCOSE, POC    Collection Time: 07/31/21  3:54 PM   Result Value Ref Range    Glucose (POC) 184 (H) 65 - 117 mg/dL    Performed by Fabi Farrell    METABOLIC PANEL, BASIC    Collection Time: 07/31/21  4:05 PM   Result Value Ref Range    Sodium 139 136 - 145 mmol/L    Potassium 2.8 (L) 3.5 - 5.1 mmol/L    Chloride 109 (H) 97 - 108 mmol/L    CO2 25 21 - 32 mmol/L    Anion gap 5 5 - 15 mmol/L    Glucose 181 (H) 65 - 100 mg/dL    BUN 42 (H) 6 - 20 MG/DL    Creatinine 2.59 (H) 0.70 - 1.30 MG/DL    BUN/Creatinine ratio 16 12 - 20      GFR est AA 29 (L) >60 ml/min/1.73m2    GFR est non-AA 24 (L) >60 ml/min/1.73m2    Calcium 8.1 (L) 8.5 - 10.1 MG/DL   MAGNESIUM    Collection Time: 07/31/21  4:05 PM   Result Value Ref Range    Magnesium 2.6 (H) 1.6 - 2.4 mg/dL   GLUCOSE, POC    Collection Time: 07/31/21  8:20 PM   Result Value Ref Range    Glucose (POC) 229 (H) 65 - 117 mg/dL    Performed by [address], POC    Collection Time: 07/31/21 11:04 PM   Result Value Ref Range    Glucose (POC) 298 (H) 65 - 117 mg/dL    Performed by [address], POC    Collection Time: 08/01/21  3:06 AM   Result Value Ref Range    Glucose (POC) 362 (H) 65 - 117 mg/dL    Performed by Americo Celaya    RENAL FUNCTION PANEL    Collection Time: 08/01/21  4:04 AM   Result Value Ref Range    Sodium 136 136 - 145 mmol/L    Potassium 2.3 (LL) 3.5 - 5.1 mmol/L    Chloride 105 97 - 108 mmol/L    CO2 20 (L) 21 - 32 mmol/L    Anion gap 11 5 - 15 mmol/L    Glucose 382 (H) 65 - 100 mg/dL    BUN 45 (H) 6 - 20 MG/DL    Creatinine 2.49 (H) 0.70 - 1.30 MG/DL    BUN/Creatinine ratio 18 12 - 20      GFR est AA 31 (L) >60 ml/min/1.73m2    GFR est non-AA 25 (L) >60 ml/min/1.73m2    Calcium 7.9 (L) 8.5 - 10.1 MG/DL    Phosphorus 2.1 (L) 2.6 - 4.7 MG/DL    Albumin 2.9 (L) 3.5 - 5.0 g/dL   MAGNESIUM    Collection Time: 08/01/21  4:04 AM   Result Value Ref Range    Magnesium 2.6 (H) 1.6 - 2.4 mg/dL   METABOLIC PANEL, BASIC    Collection Time: 08/01/21  4:04 AM   Result Value Ref Range    Sodium 138 136 - 145 mmol/L    Potassium 2.3 (LL) 3.5 - 5.1 mmol/L    Chloride 106 97 - 108 mmol/L    CO2 19 (L) 21 - 32 mmol/L    Anion gap 13 5 - 15 mmol/L    Glucose 384 (H) 65 - 100 mg/dL    BUN 42 (H) 6 - 20 MG/DL    Creatinine 2.47 (H) 0.70 - 1.30 MG/DL    BUN/Creatinine ratio 17 12 - 20      GFR est AA 31 (L) >60 ml/min/1.73m2    GFR est non-AA 26 (L) >60 ml/min/1.73m2    Calcium 7.8 (L) 8.5 - 10.1 MG/DL   MAGNESIUM    Collection Time: 08/01/21  4:04 AM   Result Value Ref Range    Magnesium 2.5 (H) 1.6 - 2.4 mg/dL   CBC WITH AUTOMATED DIFF    Collection Time: 08/01/21  4:04 AM   Result Value Ref Range    WBC 26.9 (H) 4.1 - 11.1 K/uL    RBC 2.90 (L) 4.10 - 5.70 M/uL    HGB 8.7 (L) 12.1 - 17.0 g/dL    HCT 27.4 (L) 36.6 - 50.3 %    MCV 94.5 80.0 - 99.0 FL    MCH 30.0 26.0 - 34.0 PG    MCHC 31.8 30.0 - 36.5 g/dL    RDW 20.1 (H) 11.5 - 14.5 %    PLATELET 478 (L) 954 - 400 K/uL    MPV 10.7 8.9 - 12.9 FL    NRBC 6.0 (H) 0  WBC    ABSOLUTE NRBC 1.61 (H) 0.00 - 0.01 K/uL    NEUTROPHILS 84 (H) 32 - 75 %    BAND NEUTROPHILS 2 0 - 6 %    LYMPHOCYTES 4 (L) 12 - 49 %    MONOCYTES 4 (L) 5 - 13 %    EOSINOPHILS 0 0 - 7 %    BASOPHILS 0 0 - 1 %    METAMYELOCYTES 3 (H) 0 %    MYELOCYTES 3 (H) 0 %    IMMATURE GRANULOCYTES 0 %    ABS. NEUTROPHILS 23.1 (H) 1.8 - 8.0 K/UL    ABS. LYMPHOCYTES 1.1 0.8 - 3.5 K/UL    ABS. MONOCYTES 1.1 (H) 0.0 - 1.0 K/UL    ABS. EOSINOPHILS 0.0 0.0 - 0.4 K/UL    ABS. BASOPHILS 0.0 0.0 - 0.1 K/UL    ABS. IMM.  GRANS. 0.0 K/UL    DF MANUAL      RBC COMMENTS ANISOCYTOSIS  2+        RBC COMMENTS MACROCYTOSIS  1+        RBC COMMENTS SMUDGE CELLS SEEN  POLYCHROMASIA  1+        RBC COMMENTS OVALOCYTES  1+        RBC COMMENTS NRBC,PST    LACTIC ACID    Collection Time: 08/01/21  5:14 AM   Result Value Ref Range    Lactic acid 0.9 0.4 - 2.0 MMOL/L   GLUCOSE, POC    Collection Time: 08/01/21  5:31 AM   Result Value Ref Range    Glucose (POC) 316 (H) 65 - 117 mg/dL    Performed by [address], POC    Collection Time: 08/01/21  8:09 AM   Result Value Ref Range    Glucose (POC) 260 (H) 65 - 117 mg/dL    Performed by Gautam Frost    GLUCOSE, POC    Collection Time: 08/01/21 11:48 AM   Result Value Ref Range    Glucose (POC) 185 (H) 65 - 117 mg/dL    Performed by Williams Tillman MD  [facility]   [address] [address]  Phone - [phone]   Fax - [phone]  [email or web address]

## 2021-08-01 NOTE — PROGRESS NOTES
Progress Note    Patient: Neema Reyes MRN: [id number]  SSN: [id number]    YOB: 1945  Age: 77 y.o. Sex: male      Admit Date: 2021    4 Days Post-Op    Procedure:  Procedure(s):  LAPAROTOMY EXPLORATORY with decompression of small and large bowel    Subjective:     Patient without complaints. He continues to put out a large amount of stool. He has no nausea or vomiting. Objective:     Visit Vitals  BP (!) 161/49   Pulse 60   Temp (!) 96.6 °F (35.9 °C)   Resp 15   Ht 6' (1.829 m)   Wt 109 lb 5.6 oz (49.6 kg)   SpO2 97%   BMI 14.83 kg/m²       Temp (24hrs), Av.2 °F (36.2 °C), Min:96.6 °F (35.9 °C), Max:97.9 °F (36.6 °C)      Physical Exam:    General alert in no acute distress  Lungs clear bilaterally  Heart regular rate and rhythm  Abdomen soft nontender nondistended incision healing well  LOBO serosanguineous    Data Review: images and reports reviewed    Lab Review: All lab results for the last 24 hours reviewed.   Recent Results (from the past 24 hour(s))   GLUCOSE, POC    Collection Time: 21  8:20 PM   Result Value Ref Range    Glucose (POC) 229 (H) 65 - 117 mg/dL    Performed by [address], POC    Collection Time: 21 11:04 PM   Result Value Ref Range    Glucose (POC) 298 (H) 65 - 117 mg/dL    Performed by [address], POC    Collection Time: 21  3:06 AM   Result Value Ref Range    Glucose (POC) 362 (H) 65 - 117 mg/dL    Performed by [facility]    RENAL FUNCTION PANEL    Collection Time: 21  4:04 AM   Result Value Ref Range    Sodium 136 136 - 145 mmol/L    Potassium 2.3 (LL) 3.5 - 5.1 mmol/L    Chloride 105 97 - 108 mmol/L    CO2 20 (L) 21 - 32 mmol/L    Anion gap 11 5 - 15 mmol/L    Glucose 382 (H) 65 - 100 mg/dL    BUN 45 (H) 6 - 20 MG/DL    Creatinine 2.49 (H) 0.70 - 1.30 MG/DL    BUN/Creatinine ratio 18 12 - 20      GFR est AA 31 (L) >60 ml/min/1.73m2    GFR est non-AA 25 (L) >60 ml/min/1.73m2    Calcium 7.9 (L) 8.5 - 10.1 MG/DL    Phosphorus 2.1 (L) 2.6 - 4.7 MG/DL    Albumin 2.9 (L) 3.5 - 5.0 g/dL   MAGNESIUM    Collection Time: 08/01/21  4:04 AM   Result Value Ref Range    Magnesium 2.6 (H) 1.6 - 2.4 mg/dL   METABOLIC PANEL, BASIC    Collection Time: 08/01/21  4:04 AM   Result Value Ref Range    Sodium 138 136 - 145 mmol/L    Potassium 2.3 (LL) 3.5 - 5.1 mmol/L    Chloride 106 97 - 108 mmol/L    CO2 19 (L) 21 - 32 mmol/L    Anion gap 13 5 - 15 mmol/L    Glucose 384 (H) 65 - 100 mg/dL    BUN 42 (H) 6 - 20 MG/DL    Creatinine 2.47 (H) 0.70 - 1.30 MG/DL    BUN/Creatinine ratio 17 12 - 20      GFR est AA 31 (L) >60 ml/min/1.73m2    GFR est non-AA 26 (L) >60 ml/min/1.73m2    Calcium 7.8 (L) 8.5 - 10.1 MG/DL   MAGNESIUM    Collection Time: 08/01/21  4:04 AM   Result Value Ref Range    Magnesium 2.5 (H) 1.6 - 2.4 mg/dL   CBC WITH AUTOMATED DIFF    Collection Time: 08/01/21  4:04 AM   Result Value Ref Range    WBC 26.9 (H) 4.1 - 11.1 K/uL    RBC 2.90 (L) 4.10 - 5.70 M/uL    HGB 8.7 (L) 12.1 - 17.0 g/dL    HCT 27.4 (L) 36.6 - 50.3 %    MCV 94.5 80.0 - 99.0 FL    MCH 30.0 26.0 - 34.0 PG    MCHC 31.8 30.0 - 36.5 g/dL    RDW 20.1 (H) 11.5 - 14.5 %    PLATELET 292 (L) 463 - 400 K/uL    MPV 10.7 8.9 - 12.9 FL    NRBC 6.0 (H) 0  WBC    ABSOLUTE NRBC 1.61 (H) 0.00 - 0.01 K/uL    NEUTROPHILS 84 (H) 32 - 75 %    BAND NEUTROPHILS 2 0 - 6 %    LYMPHOCYTES 4 (L) 12 - 49 %    MONOCYTES 4 (L) 5 - 13 %    EOSINOPHILS 0 0 - 7 %    BASOPHILS 0 0 - 1 %    METAMYELOCYTES 3 (H) 0 %    MYELOCYTES 3 (H) 0 %    IMMATURE GRANULOCYTES 0 %    ABS. NEUTROPHILS 23.1 (H) 1.8 - 8.0 K/UL    ABS. LYMPHOCYTES 1.1 0.8 - 3.5 K/UL    ABS. MONOCYTES 1.1 (H) 0.0 - 1.0 K/UL    ABS. EOSINOPHILS 0.0 0.0 - 0.4 K/UL    ABS. BASOPHILS 0.0 0.0 - 0.1 K/UL    ABS. IMM.  GRANS. 0.0 K/UL    DF MANUAL      RBC COMMENTS ANISOCYTOSIS  2+        RBC COMMENTS MACROCYTOSIS  1+        RBC COMMENTS SMUDGE CELLS SEEN  POLYCHROMASIA  1+        RBC COMMENTS OVALOCYTES  1+        RBC COMMENTS NRBC,PST    LACTIC ACID    Collection Time: 08/01/21  5:14 AM   Result Value Ref Range    Lactic acid 0.9 0.4 - 2.0 MMOL/L   GLUCOSE, POC    Collection Time: 08/01/21  5:31 AM   Result Value Ref Range    Glucose (POC) 316 (H) 65 - 117 mg/dL    Performed by Dayami Krishnan, POC    Collection Time: 08/01/21  8:09 AM   Result Value Ref Range    Glucose (POC) 260 (H) 65 - 117 mg/dL    Performed by Lizeth Enriquez    GLUCOSE, POC    Collection Time: 08/01/21 11:48 AM   Result Value Ref Range    Glucose (POC) 185 (H) 65 - 117 mg/dL    Performed by Lizeth Enriquez    GLUCOSE, POC    Collection Time: 08/01/21  4:09 PM   Result Value Ref Range    Glucose (POC) 170 (H) 65 - 117 mg/dL    Performed by Lizeth Enriquez    METABOLIC PANEL, BASIC    Collection Time: 08/01/21  4:32 PM   Result Value Ref Range    Sodium 138 136 - 145 mmol/L    Potassium 2.9 (L) 3.5 - 5.1 mmol/L    Chloride 107 97 - 108 mmol/L    CO2 23 21 - 32 mmol/L    Anion gap 8 5 - 15 mmol/L    Glucose 202 (H) 65 - 100 mg/dL    BUN 39 (H) 6 - 20 MG/DL    Creatinine 2.19 (H) 0.70 - 1.30 MG/DL    BUN/Creatinine ratio 18 12 - 20      GFR est AA 36 (L) >60 ml/min/1.73m2    GFR est non-AA 29 (L) >60 ml/min/1.73m2    Calcium 8.0 (L) 8.5 - 10.1 MG/DL   MAGNESIUM    Collection Time: 08/01/21  4:32 PM   Result Value Ref Range    Magnesium 2.5 (H) 1.6 - 2.4 mg/dL       Assessment:     Hospital Problems  Date Reviewed: 7/21/2021        Codes Class Noted POA    Acute delirium ICD-10-CM: R41.0  ICD-9-CM: 780.09  7/21/2021 Unknown              Plan/Recommendations/Medical Decision Making:   While his WBC is going up his abdomen feels completely benign. Additionally it seems as though he is having resumption of bowel function. LOBO drain does not show any sign of enteric contents making me concerned for a leak. Okay to start tube feeds or diet when medically able. Continue antibiotics for enterotomies  Continue TPN until taking enough p.o.

## 2021-08-01 NOTE — PROGRESS NOTES
Bedside and Verbal shift change report given to Jess (oncoming nurse) by Milena Araya (offgoing nurse). Report included the following information SBAR, Kardex, Intake/Output, MAR, Accordion and Recent Results. Pulled factor of 50 for a few hours in afternoon. BP dropped. Factor discontinued. Will resume if pressures remain stable. Off pressors today. Frequent loose stool from flexi. Anuric. No complaints of pain. NP notified that gen surg has cleared patient to have trickle feeds.

## 2021-08-01 NOTE — PROGRESS NOTES
1930 Bedside and Verbal shift change report given to RANDALL Cruz RN (oncoming nurse) by Johnathan Ojeda RN (offgoing nurse). Report included the following information SBAR, Kardex, Intake/Output, MAR, Recent Results, Cardiac Rhythm NSR/SB and Alarm Parameters . Shift Summary: Patient confused AAO to self, occasionally time. HUSAIN spontaneously with generalized weakness. CVVHD running with factor 0, tolerated well. Blood glucose elevated 229-362. Additional 20 units of lispro & 10 units Lantus given, future order changed to 10 units of Lantus BID. Patient continues to have diarrhea, FMS in place. Scheduled colace & reglan D/C.    0730 Bedside and Verbal shift change report given to Johnathan Ojeda RN (oncoming nurse) by Leann Rai RN (offgoing nurse). Report included the following information SBAR, Kardex, Intake/Output, MAR, Recent Results, Cardiac Rhythm NSR/SB and Alarm Parameters .

## 2021-08-02 NOTE — PROGRESS NOTES
ADAM PLAN:  RUR-40%  Disposition-TBD  Transportation-TBD    CRRT-discontinued    To transition to HD on T-TH-Sat starting tomorrow    TPN/TF. To remain NPO per SLP    PT signed off, to re-consult when medically stable. Previous Care manager noted consult for OP dialysis. Previous CM spoke with Soledad Colbert with [facility] [phone] to update her on patient's clinical condition. Per Rashawn Arango patient has been set with with Dialysis at [facility] in [place] for a Tues-Thurs-Sat schedule with a 11:30 am chair time. Phone # [phone]. CM will continue to follow for discharge needs.     Bea Mcnamara MSA, RN,CRM

## 2021-08-02 NOTE — PROGRESS NOTES
[facility]    ICU TEAM Progress Note    Name: Christina Olson   : 1945   MRN: [id number]   Date: 2021      Assessment/Plan:     1. Septic shock  a. Likely intra-abdominal origin given bowel dilation and likely bacterial translocation. Patient completed course of abx for pneumonia- does not appear to be contributory currently. b. C diff negative- PO vanc and IV flagyl stopped  c. Continue Zosyn and Eraxis  d. Discontinued hydrocortisone since off pressors  2. Colonic pseudo-obstruction  a. Ex-lap done overnight of  revealed dilation of small and large bowel. No perforation, no necrosis. C diff negative. b. General surgery following  c. Distention appears improved today, patient having bowel movements, FMS removed, rectal tube placed intermittently per nursing protocol per GI  d. Continue abx as above  3. Respiratory insufficiency requiring intubation  a. Code blue called on  given respiratory status. Likely in the setting of shock and significant abdominal distention. Patient intubated but without loss of pulse. b. Patient passed SBT and was extubated to 3L NC 2021  4. JEREMIAS on CKD  a. Nephrology following  b. Likely progression of underlying CKD. Was on iHD MWF now on CRRT  c. Likely discontniue CRRT today, off pressors, no other indication  d. BID labs  5. Severe malnutrition  a. Per nutrition/dietician patient with clinical characteristics of severe malnutrition  b. Continue TPN today, add insulin, increase potassium  c. Lipids to continue 3x/ week  d. Possible start trickle feeds today, need to avoid refeeding syndrome, will follow closely with dietician  6. Anemia of chronic disease  a.  Daily CBC           F - Feeding:  Yes TPN& tube feeds  A - Analgesia: None  S - Sedation: None  T - DVT Prophylaxis: SCD's or Sequential Compression Device and Heparin   H - Head of Bed: > 30 Degrees  U - Ulcer Prophylaxis: Pepcid (famotidine)   G - Glycemic Control: Insulin  S - Spontaneous Breathing Trial: Yes  B - Bowel Regimen: None needed at this time  I - Indwelling Catheter:   Tubes: None  Lines: Arterial Line and Central Line  Drains: John-Rose Drain (LOBO)  D - De-escalation of Antibiotics: Zosyn    Subjective:   Progress Note: 8/2/2021      Reason for ICU Admission: Septic shock and respiratory failure     HPI:  71-year-old gentleman with multiple medical problem admitted to the hospital as a transfer from outside facility on July 21 for acute on chronic kidney disease requiring hemodialysis for hypoxia and pulmonary edema and possible pneumonia. Patient was dialyzed and his condition improved slightly and decision was made to transfer him to regular hemodialysis 3 times a week and he had a permacath inserted on the 27th. Over the last few days there was progressive distention of his abdomen, C. difficile was suspected and stool sample was sent but no results yet. He was evaluated by CAT scan abdomen followed by surgical and GI evaluation on the 27th but at that time there was no indication for immediate intervention and treatment for C. difficile colitis was started empirically. The morning of 28th he had progressive increase in his abdominal pain and suddenly he started to be in agonal breathing and bradycardia. CODE BLUE was called and resuscitation started, he received 2 amp of epinephrine and 1 amp of bicarb and he was emergently intubated. ROSC was achieved [not clear if he lost pulse completely or it was very weak that was not detected]. Patient obtunded however he moves 4 limbs and was reaching out to the ET tube shortly after resuscitation so he was given fentanyl and Ativan. He is hypotensive and requiring pressors. His abdomen is severely distended and x-ray showed diffuse gas but no clear free air.   Discussed this with the general surgeon as suspicion for bowel perforation is high.        Overnight Events:   8/2/2021  - No acute events overnight    POD:  4 Day Post-Op    S/P:   Procedure(s):  LAPAROTOMY EXPLORATORY with decompression of small and large bowel    Objective:   Vital Signs:  Visit Vitals  BP (!) 115/39   Pulse (!) 54   Temp 97.5 °F (36.4 °C)   Resp 15   Ht 6' (1.829 m)   Wt 50.1 kg (110 lb 7.2 oz)   SpO2 96%   BMI 14.98 kg/m²    O2 Flow Rate (L/min): 2 l/min O2 Device: None (Room air) Temp (24hrs), Av.1 °F (36.2 °C), Min:96.3 °F (35.7 °C), Max:97.5 °F (36.4 °C)           Intake/Output:     Intake/Output Summary (Last 24 hours) at 2021 1006  Last data filed at 2021 1000  Gross per 24 hour   Intake 2130 ml   Output 4985 ml   Net -2855 ml       Physical Exam:    General:  Sleepy. Opens eyes to voice. No acute distress. Cachetic. Temporal wasting   Eyes:  Sclera anicteric. Pupils equal, round, reactive to light. Mouth/Throat: Mucous membranes dry. Neck: Supple. Lungs:   Clear to auscultation bilaterally, good effort. Cardiovascular:  Regular rate and rhythm, no murmur, click, rub, or gallop. Abdomen:   Midline incision. Dressing intact with small amount of shadowing. L abd LOBO drain with serosanguinous output. Mildly distended. Extremities: No cyanosis or edema. Skin: No acute rash or lesions. Lymph Nodes: Cervical and supraclavicular normal.   Musculoskeletal:  No swelling or deformity. Lines/Devices:  Intact, no erythema, drainage, or tenderness. Neuro/Psych: Sleepy, opens eyes to voice, oriented x2, follows commands in all extremities.          LABS AND  DATA: Personally reviewed  Recent Labs     21  0507 21  0404   WBC 20.6* 26.9*   HGB 9.8* 8.7*   HCT 32.1* 27.4*   * 145*     Recent Labs     21  0507 21  1632 21  0404 21  0404 21  0210 21  0209    138   < > 138  136   < > 139   K 3.5 2.9*   < > 2.3*  2.3*   < > 2.8*    107   < > 106  105   < > 109*   CO2 23 23   < > 19*  20*   < > 22   BUN 35* 39*   < > 42*  45*   < > 42*   CREA 1.98* 2.19*   < > 2.47* 2.49*   < > 3.01*   * 202*   < > 384*  382*   < > 245*   CA 8.2* 8.0*   < > 7.8*  7.9*   < > 8.1*   MG 2.7* 2.5*   < > 2.5*  2.6*   < > 2.5*   PHOS  --   --   --  2.1*  --  2.8    < > = values in this interval not displayed. Recent Labs     08/02/21  0507 08/01/21  0404     --    TP 7.8  --    ALB 3.0* 2.9*   GLOB 4.8*  --      No results for input(s): INR, PTP, APTT, INREXT, INREXT in the last 72 hours. No results for input(s): PHI, PCO2I, PO2I, FIO2I in the last 72 hours. No results for input(s): CPK, CKMB, TROIQ, BNPP in the last 72 hours. Ventilator Settings:  Mode Rate Tidal Volume Pressure FiO2 PEEP   Spontaneous   400 ml  8 cm H2O 40 % 5 cm H20     Peak airway pressure: 20 cm H2O    Minute ventilation: 7.41 l/min        MEDS: Reviewed    Chest X-Ray:  CXR Results  (Last 48 hours)    None          Multidisciplinary Rounds Completed: Yes    ABCDEF Bundle/Checklist Completed:  Yes    SPECIAL EQUIPMENT  CRRT    DISPOSITION  Stay in ICU    CRITICAL CARE CONSULTANT NOTE  I had a face to face encounter with the patient, reviewed and interpreted patient data including clinical events, labs, images, vital signs, I/O's, and examined patient. I have discussed the case and the plan and management of the patient's care with the consulting services, the bedside nurses and the respiratory therapist.      NOTE OF PERSONAL INVOLVEMENT IN CARE   This patient has a high probability of imminent, clinically significant deterioration, which requires the highest level of preparedness to intervene urgently. I participated in the decision-making and personally managed or directed the management of the following life and organ supporting interventions that required my frequent assessment to treat or prevent imminent deterioration. I personally spent 40 minutes of critical care time.   This is time spent at this critically ill patient's bedside actively involved in patient care as well as the coordination of care and discussions with the patient's family. This does not include any procedural time which has been billed separately.       Margret Barrios [facility]     Critical Care Medicine  [facility]      8/2/2021

## 2021-08-02 NOTE — PROGRESS NOTES
Admit Date: 7/21/2021      POD 5 Days Post-Op  7/28/2021      Procedure:  Procedure(s):  LAPAROTOMY EXPLORATORY with decompression of small and large bowel        HOSPITAL DAY:     ANTIBIOTICS:      HPI:  Patient neurologically does not participate with any history, no significant reported NG output.     Review of Systems  Cannot give since patient obtunded neurologically    Temp:  [96 °F (35.6 °C)-98.7 °F (37.1 °C)]   Pulse (Heart Rate):  [52-87]   BP: (111-161)/(39-79)   Resp Rate:  [10-22]   O2 Sat (%):  [91 %-100 %]   Weight:  [49.6 kg (109 lb 5.6 oz)-50.9 kg (112 lb 3.4 oz)]     Physical Exam  Cachectic appearing male no acute distress abdomen flat small lower midline hematoma evacuated at bedside through the skin on exam does not appear to be hernia or bowel and hematoma completely reduced on exam, patient appears chronically ill    Active Problems:    Acute delirium (7/21/2021)          ASSESSMENT/PLAN  Status post laparotomy and decompression of small and large bowel, nothing surgical to do at this point, okay to start tube feedings or oral intake when okay with speech therapy, I understand he is on TPN for now but okay to advance to enteral feeds      FACE TO FACE time including review of any indicated imaging, discussion with patient, and other providers, exam and discussion with patient:   *20       minutes    END:

## 2021-08-02 NOTE — PROGRESS NOTES
Nephrology Progress Note  Hansel Koehler  Date of Admission : 7/21/2021    CC: Follow up for JEREMIAS on CKD       Assessment and Plan     JEREMIAS on CKD3b :  - pt of Dr J Luis Castro   - ? 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 On CRRT,off pressore from last night and start factor agt 50. D./w nurse   Off pressors. ANURIC. Current Medications: all current  Medications have been eviewed in EPIC  Review of Systems: Review of systems not obtained due to patient factors. Objective:  Vitals:    Vitals:    08/02/21 0800 08/02/21 0848 08/02/21 0900 08/02/21 1000   BP:  (!) 115/39     Pulse: (!) 54 (!) 54 (!) 54 (!) 54   Resp: 15  12 15   Temp: 97.5 °F (36.4 °C)      TempSrc:       SpO2: 96%      Weight:       Height:         Intake and Output:  08/02 0701 - 08/02 1900  In: 526 [I.V.:389]  Out: 468 [Drains:140]  07/31 1901 - 08/02 0700  In: 3876.4 [I.V.:3706.4]  Out: 6989 [Drains:2515]    Physical Examination:  General: Cachectic,   Neck:  Lines +  Resp:  Lungs CTA B/L, no wheezing , normal respiratory effort  CV:  RRR,  no murmur or rub, trace LE edema  GI:  Non tender, midline incision w/ staples, hematoma   Neurologic:  Confused, lethargic   Psych:             Unable to assess  Access:           MARLY BONILLA    [x]    High complexity decision making was performed  []    Patient is at high-risk of decompensation with multiple organ involvement    Lab Data Personally Reviewed: I have reviewed all the pertinent labs, microbiology data and radiology studies during assessment.     Recent Labs 08/02/21  0507 08/01/21  1632 08/01/21  0404 07/31/21  0210 07/31/21  0209    138 138  136   < > 139   K 3.5 2.9* 2.3*  2.3*   < > 2.8*    107 106  105   < > 109*   CO2 23 23 19*  20*   < > 22   * 202* 384*  382*   < > 245*   BUN 35* 39* 42*  45*   < > 42*   CREA 1.98* 2.19* 2.47*  2.49*   < > 3.01*   CA 8.2* 8.0* 7.8*  7.9*   < > 8.1*   MG 2.7* 2.5* 2.5*  2.6*   < > 2.5*   PHOS 2.8  --  2.1*  --  2.8   ALB 3.0*  --  2.9*  --  2.8*   *  --   --   --   --     < > = values in this interval not displayed. Recent Labs     08/02/21  0507 08/01/21  0404 07/31/21  0209   WBC 20.6* 26.9* 20.3*   HGB 9.8* 8.7* 7.3*   HCT 32.1* 27.4* 23.0*   * 145* 144*     No results found for: SDES  Lab Results   Component Value Date/Time    Culture result: NO GROWTH 4 DAYS 07/29/2021 01:37 PM    Culture result:  07/21/2021 08:54 AM     MRSA NOT PRESENT. Apparent Staphylococus aureus (not MRSA noted). Culture result:  07/21/2021 08:54 AM     Screening of patient nares for MRSA is for surveillance purposes and, if positive, to facilitate isolation considerations in high risk settings. It is not intended for automatic decolonization interventions per se as regimens are not sufficiently effective to warrant routine use.      Recent Results (from the past 24 hour(s))   GLUCOSE, POC    Collection Time: 08/01/21 11:48 AM   Result Value Ref Range    Glucose (POC) 185 (H) 65 - 117 mg/dL    Performed by Bernice Wang    GLUCOSE, POC    Collection Time: 08/01/21  4:09 PM   Result Value Ref Range    Glucose (POC) 170 (H) 65 - 117 mg/dL    Performed by Halle BASIC    Collection Time: 08/01/21  4:32 PM   Result Value Ref Range    Sodium 138 136 - 145 mmol/L    Potassium 2.9 (L) 3.5 - 5.1 mmol/L    Chloride 107 97 - 108 mmol/L    CO2 23 21 - 32 mmol/L    Anion gap 8 5 - 15 mmol/L    Glucose 202 (H) 65 - 100 mg/dL    BUN 39 (H) 6 - 20 MG/DL    Creatinine 2.19 (H) 0.70 - 1.30 MG/DL    BUN/Creatinine ratio 18 12 - 20      GFR est AA 36 (L) >60 ml/min/1.73m2    GFR est non-AA 29 (L) >60 ml/min/1.73m2    Calcium 8.0 (L) 8.5 - 10.1 MG/DL   MAGNESIUM    Collection Time: 08/01/21  4:32 PM   Result Value Ref Range    Magnesium 2.5 (H) 1.6 - 2.4 mg/dL   GLUCOSE, POC    Collection Time: 08/01/21  9:19 PM   Result Value Ref Range    Glucose (POC) 196 (H) 65 - 117 mg/dL    Performed by [address] [place] [id number], POC    Collection Time: 08/02/21 12:32 AM   Result Value Ref Range    Glucose (POC) 228 (H) 65 - 117 mg/dL    Performed by [address] [place] [id number], POC    Collection Time: 08/02/21  4:10 AM   Result Value Ref Range    Glucose (POC) 159 (H) 65 - 117 mg/dL    Performed by Ute Figueroa    MAGNESIUM    Collection Time: 08/02/21  5:07 AM   Result Value Ref Range    Magnesium 2.7 (H) 1.6 - 2.4 mg/dL   CBC W/O DIFF    Collection Time: 08/02/21  5:07 AM   Result Value Ref Range    WBC 20.6 (H) 4.1 - 11.1 K/uL    RBC 3.41 (L) 4.10 - 5.70 M/uL    HGB 9.8 (L) 12.1 - 17.0 g/dL    HCT 32.1 (L) 36.6 - 50.3 %    MCV 94.1 80.0 - 99.0 FL    MCH 28.7 26.0 - 34.0 PG    MCHC 30.5 30.0 - 36.5 g/dL    RDW 20.1 (H) 11.5 - 14.5 %    PLATELET 457 (L) 296 - 400 K/uL    MPV 11.4 8.9 - 12.9 FL    NRBC 4.5 (H) 0  WBC    ABSOLUTE NRBC 0.92 (H) 0.00 - 8.02 K/uL   METABOLIC PANEL, COMPREHENSIVE    Collection Time: 08/02/21  5:07 AM   Result Value Ref Range    Sodium 139 136 - 145 mmol/L    Potassium 3.5 3.5 - 5.1 mmol/L    Chloride 108 97 - 108 mmol/L    CO2 23 21 - 32 mmol/L    Anion gap 8 5 - 15 mmol/L    Glucose 158 (H) 65 - 100 mg/dL    BUN 35 (H) 6 - 20 MG/DL    Creatinine 1.98 (H) 0.70 - 1.30 MG/DL    BUN/Creatinine ratio 18 12 - 20      GFR est AA 40 (L) >60 ml/min/1.73m2    GFR est non-AA 33 (L) >60 ml/min/1.73m2    Calcium 8.2 (L) 8.5 - 10.1 MG/DL    Bilirubin, total 0.4 0.2 - 1.0 MG/DL    ALT (SGPT) 152 (H) 12 - 78 U/L    AST (SGOT) 66 (H) 15 - 37 U/L    Alk.  phosphatase 105 45 - 117 U/L    Protein, total 7.8 6.4 - 8.2 g/dL    Albumin 3.0 (L) 3.5 - 5.0 g/dL    Globulin 4.8 (H) 2.0 - 4.0 g/dL    A-G Ratio 0.6 (L) 1.1 - 2.2     PHOSPHORUS    Collection Time: 08/02/21  5:07 AM   Result Value Ref Range    Phosphorus 2.8 2.6 - 4.7 MG/DL   GLUCOSE, POC    Collection Time: 08/02/21  8:31 AM   Result Value Ref Range    Glucose (POC) 144 (H) 65 - 117 mg/dL    Performed by Sagar Davis MD  [address]  Phone - [phone]   Fax - [phone]  [email or web address]

## 2021-08-02 NOTE — PROGRESS NOTES
1930: Bedside shift change report given to Almita Gold, RN and Molly Allen RN (oncoming nurse) by Carmelita Paez RN (offgoing nurse). Report included the following information SBAR, Kardex, Intake/Output, MAR, Recent Results, Cardiac Rhythm Sinus odell with PVCs and Alarm Parameters . 2200: Reported potassium of 2.9 to Diana Garza RN. New orders received.     0600: Reported potassium recheck of 3.5 to Diana Garza NP. New orders received. 0730: Bedside shift change report given to Alda Vasquez RN (oncoming nurse) by Almita Gold RN and Molly Allen RN (offgoing nurse). Report included the following information SBAR, Kardex, Intake/Output, MAR, Recent Results, Cardiac Rhythm Sinus odell with PVCs and Alarm Parameters .

## 2021-08-02 NOTE — PROGRESS NOTES
Physical Therapy: Hold    Chart reviewed in prep for PT re-eval and discussed with RN. Pt currently on CRRT and with bear christophergger on. Per RN, he is demonstrating minimal command following and is not able to actively participate with mobility. Pt has not been appropriate for skilled PT services for 6 consecutive days. Will sign off. Please re-consult if/when pt status improves and he is appropriate for PT services.       Chantelle Wilkins, PT, DPT

## 2021-08-02 NOTE — PROGRESS NOTES
Day #6 of Zosyn - Renal Dosing Update  Indication:  Sepsis 2/2 unclear etiology - possible intra-abdominal process  Current regimen:  3.375 gm IV Q 8 hr  Abx regimen: Eraxis + Zosyn  Recent Labs     21  0507 21  1632 21  0404 21  0210 21  0209   WBC 20.6*  --  26.9*  --  20.3*   CREA 1.98* 2.19* 2.47*  2.49*   < > 3.01*   BUN 35* 39* 42*  45*   < > 42*    < > = values in this interval not displayed. Est CrCl: Intermittent HD (CVVHD stopped this morning)  Temp (24hrs), Av.3 °F (36.3 °C), Min:96.3 °F (35.7 °C), Max:97.9 °F (36.6 °C)    Cultures:    MRSA screen: (-)   C.diff: (-)   Blood: NGTD    Plan: Now that the patient has transitioned off CVVHD, the dose of Zosyn has been adjusted to 3.375 gm IV Q 12 hr per [facility] P&T Committee Protocol with respect to renal function. Pharmacy will continue to monitor patient daily and will make dosage adjustments based upon changing renal function.

## 2021-08-02 NOTE — PROGRESS NOTES
Comprehensive Nutrition Assessment    Type and Reason for Visit: Reassess, Consult    Nutrition Recommendations/Plan:      Resume 100 mg B1 daily (NGT)    Start enteral nutrition: Advance slowly to goal: Osmolite 1.2 @ 65 ml/hr with 50 ml water flush q 4 hr    Nutrition Assessment:    Pt initially admitted to [facility] for AMS and worsening kidney function. PMHx: DM, HTN, DM 2. Transferred to [facility] for Nephrology consult d/t progression of kidney disease; HD initiated. Permacath placed 7/27. GI consulted 7/27 for liquid diarrhea. CT scan-?megacolon. 7/28 Pt coded-intubated. 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 Extubated 7/30. CVVHD started post-op; discontinued today-HD to resume tomorrow. Okay to start enteral nutrition today per surgery. Pt not ready for PO intake d/t decreased mentation; SLP following. Noted 4 kg weight loss in the past week-suspect d/t volume removal-BMI now 15. Pt is severely malnourished. Lytes WNL today-potassium replaced (low-normal). Thiamine in TPN x 2 days-recommend resuming daily supplementation d/t refeeding risk. Phosphorus WNL today but was BNL yesterday. TPN ordered today-protein reduced to 75 per day since no longer on CRRT. If pt tolerates enteral feeding recommend discontinuing TPN and IV lipid tomorrow. Recommended goal: Osmolite 1.2 @ 65 ml/hr with 50 ml water flush q 4 hr. This will provide 1430 ml, 1715 calories, 79 gm protein and 1475 ml free water (tube feeding/flush) per day to meet estimated needs.     Malnutrition Assessment:  Malnutrition Status:  Severe malnutrition    Context:  Acute illness     Findings of the 6 clinical characteristics of malnutrition:   Energy Intake:  7 - 50% or less of est energy requirements for 5 or more days  Weight Loss:  7.00 - Greater than 7.5% over 3 months     Body Fat Loss:  7 - Moderate body fat loss, Orbital, Buccal region, Triceps   Muscle Mass Loss:  7 - Moderate muscle mass loss, Clavicles (pectoralis & deltoids), Temples (temporalis), Hand (interosseous)  Fluid Accumulation:  No significant fluid accumulation,     Strength:  Not performed     Nutritionally Significant Medications:   Retacrit, Latnus, Humalog, Risaquad, KCL    Estimated Daily Nutrient Needs:  Energy (kcal): 0596-0170 (30-35 kcal/kg); Weight Used for Energy Requirements: Current (50 kg)  Protein (g): 75 (1.5g/kg);  Weight Used for Protein Requirements: Current (50 kg)  Fluid (ml/day):  Method Used for Fluid Requirements: Standard renal    Nutrition Related Findings:       BM: 8/2 via FMS  Edema: None  Wounds:  Moisture associate skin damage       Current Nutrition Therapies:   Diet: NPO  TPN: 5% AA D15 @ 63 ml/hr with 10 units insulin/liter   500 ml 20% lipid 3 x/week (T,T S)  Additional Caloric Sources: None      Anthropometric Measures:  · Height:  6' (182.9 cm)  · Current Body Wt:  50.1 kg (110 lb 7.2 oz)   · Admission Body Wt:  149 lb 14.6 oz    · Ideal Body Wt:  178#:  62.1 %    · BMI Categories:  Underweight (BMI less than 22) age over 72     Wt Readings from Last 10 Encounters:   08/02/21 50.1 kg (110 lb 7.2 oz)   07/20/21 68 kg (150 lb)   04/07/21 74.6 kg (164 lb 6.4 oz)   03/11/21 68.4 kg (150 lb 12.7 oz)   02/23/21 75.8 kg (167 lb)   01/31/21 68.9 kg (151 lb 14.4 oz)       Nutrition Diagnosis:   · Inadequate oral intake related to swallowing difficulty, cognitive or neurological impairment as evidenced by NPO or clear liquid status due to medical condition, nutrition support-parenteral nutrition    · Severe malnutrition related to altered GI function, inadequate protein-energy intake as evidenced by severe loss of subcutaneous fat, severe muscle loss, weight loss      Nutrition Interventions:   Food and/or Nutrient Delivery: Start tube feeding, Continue parenteral nutrition  Nutrition Education and Counseling: No recommendations at this time  Coordination of Nutrition Care: Continue to monitor while inpatient, Interdisciplinary rounds    Goals:  Pt will tolerate transition to full EN support in next 2-3 days. Nutrition Monitoring and Evaluation:   Behavioral-Environmental Outcomes: None identified  Food/Nutrient Intake Outcomes: Enteral nutrition intake/tolerance, Parenteral nutrition intake/tolerance  Physical Signs/Symptoms Outcomes: GI status, Biochemical data, Weight, Fluid status or edema    Discharge Planning:     Too soon to determine     Poonam Kc RD CNSC  Contact: [employer]

## 2021-08-02 NOTE — PROGRESS NOTES
Problem: Dysphagia (Adult)  Goal: *Acute Goals and Plan of Care (Insert Text)  Description: Initiated 7/22/2021  1. Patient will tolerate minced and moist diet, thin liquids free of s/s of aspiration and with functional oral clearance within 7 days. Met 7/23/2021   2. Patient will tolerate solid trials with functional oral clearance within 7 days. Met /advanced to easy to chew 7/23/2021  Discontinue 8/2/2021  3. Added 8/2/2021 Patient will participate in swallow re-evaluation within 7 days. Outcome: Progressing Towards Goal     SPEECH LANGUAGE PATHOLOGY BEDSIDE SWALLOW EVALUATION  Patient: Nolvia Benton (35 y.o. male)  Date: 8/2/2021  Primary Diagnosis: Acute delirium [R41.0]  Procedure(s) (LRB):  LAPAROTOMY EXPLORATORY with decompression of small and large bowel (N/A) 5 Days Post-Op   Precautions:   Fall    ASSESSMENT :  Based on the objective data described below, the patient presents with severe oral dysphagia likely 2/2 cognition with no bolus accepting nor manipulation. No swallow initiation. Recommend NPO with water swabs for comfort. Suspect swallow function to mirror mentation. Patient will benefit from skilled intervention to address the above impairments. Patients rehabilitation potential is considered to be Guarded     PLAN :  Recommendations and Planned Interventions:  --NPO with water swabs  --Alternate means of nutrition/hydration/medication    Frequency/Duration: Patient will be followed by speech-language pathology 2 times a week to address goals. Discharge Recommendations:  To Be Determined     SUBJECTIVE:   Patient non-verbal.    OBJECTIVE:     Past Medical History:   Diagnosis Date    CKD (chronic kidney disease) stage 3, GFR 30-59 ml/min ([address] 2/8/2021    Diabetes ([address]     Essential hypertension 2/8/2021    Hypertension     Insulin-treated type 2 diabetes mellitus ([address] 2/8/2021    Secondary hyperparathyroidism ([address] 2/8/2021    Vitamin D deficiency 2/8/2021     Past Surgical History: Procedure Laterality Date    HX ORTHOPAEDIC      amputation left big toe    IR INSERT NON TUNL CVC OVER 5 YRS  7/21/2021    IR INSERT TUNL CVC W/O PORT OVER 5 YR  7/27/2021     Prior Level of Function/Home Situation:   Home Situation  # Steps to Enter: 8  Rails to Enter: Yes  Hand Rails : Bilateral  One/Two Story Residence: One story  Living Alone: No  Support Systems: Family member(s)  Current DME Used/Available at Home: None  Tub or Shower Type: Tub/Shower combination  Diet prior to admission: regular diet/thin liquids, presumably  Current Diet:  NPO   Cognitive and Communication Status:  Neurologic State: Lethargic  Orientation Level: Unable to verbalize  Cognition: Unable to assess (comment)  Perception: Cues to maintain midline in standing (posterior lean)  Perseveration: No perseveration noted  Safety/Judgement: Decreased awareness of environment, Decreased awareness of need for assistance, Decreased awareness of need for safety, Decreased insight into deficits  Oral Assessment:  Oral Assessment  Labial:  (did not follow commands)  P.O. Trials:  Patient Position: upright in bed  Vocal quality prior to P.O.: Hoarse  Consistency Presented: Ice chips  How Presented: SLP-fed/presented;Spoon     Bolus Acceptance: Impaired  Bolus Formation/Control: Impaired  Type of Impairment: Incomplete  Propulsion: Absent     Initiation of Swallow: Absent                      Oral Phase Severity: Severe  Pharyngeal Phase Severity : No impairment    NOMS:   The NOMS functional outcome measure was used to quantify this patient's level of swallowing impairment. Based on the NOMS, the patient was determined to be at level 1 for swallow function       NOMS Swallowing Levels:  Level 1 (CN): NPO  Level 2 (CM): NPO but takes consistency in therapy  Level 3 (CL): Takes less than 50% of nutrition p.o. and continues with nonoral feedings; and/or safe with mod cues; and/or max diet restriction  Level 4 (CK):  Safe swallow but needs mod cues; and/or mod diet restriction; and/or still requires some nonoral feeding/supplements  Level 5 (CJ): Safe swallow with min diet restriction; and/or needs min cues  Level 6 (CI): Independent with p.o.; rare cues; usually self cues; may need to avoid some foods or needs extra time  Level 7 ([address] [place] [address]): Independent for all p.o.  BRIANA. (2003). National Outcomes Measurement System (NOMS): Adult Speech-Language Pathology User's Guide. Pain:  Pain Scale 1: Numeric (0 - 10)  Pain Intensity 1: 0       After treatment:   Call bell within reach and Nursing notified    COMMUNICATION/EDUCATION:     The patient's plan of care including recommendations, planned interventions, and recommended diet changes were discussed with: Registered nurse. Patient is unable to participate in goal setting and plan of care.     Thank you for this referral.  NICOLAS Harris  Time Calculation: 10 mins

## 2021-08-02 NOTE — PROGRESS NOTES
Bedside and Verbal shift change report given to Ban Brunner RN  (oncoming nurse) by Benita Bhakta RN (offgoing nurse). Report included the following information SBAR, Kardex, Intake/Output, MAR, Recent Results, Cardiac Rhythm SB with PVC's and Alarm Parameters . 1109: CRRT stopped per order from MD. 165 mL of blood returned. 1455: Smita Singh NP about patients MAP in the 50's. Orders received. 1730: Smita Singh NP about MAP still in the 50's. No new orders received. Bedside and Verbal shift change report given to Raoul Velasquez RN (oncoming nurse) by Jerel-Grade-Allee 18 RN (offgoing nurse). Report included the following information SBAR, Kardex, Intake/Output, MAR, Recent Results, Cardiac Rhythm SB with PVC's and Alarm Parameters .

## 2021-08-02 NOTE — DIALYSIS
[address]    Orders   Mode: CVVHD   Factor: 0 ml/hr   Dialysate: 25 ml/kg/hr x 55 kg = 1375, Rounded to 1400 ml/hr   Blood Flow Rate: 180 ml/min     Metrics   BP / HR: 115/39, 54   Blood Flow Rate: 180 ml/min   AP:                         -124   RP: 48   TMP: 41   PD: 78   FP: 157   Dialysate: 1400 ml/hr     Comments / Plan:      [id number] filter running well with no indication for change at this time. Consents, patient, code status, labs and orders verified. RIJ temporary CVC, dressing CDI with bio-patch dated 7/28/21. No signs of redness, drainage, or infection visualized. Lines visible and connections secure with blood warmer to return line at 37*C. Education & pre/post report given to Felix Morales primary RN.

## 2021-08-02 NOTE — WOUND CARE
Wound Care Note:     New consult placed by nurse request for excoriation in sacral/scrotum area    Chart shows:  Admitted for acute delirium   Past Medical History:   Diagnosis Date    CKD (chronic kidney disease) stage 3, GFR 30-59 ml/min ([facility] 2/8/2021    Diabetes ([facility]     Essential hypertension 2/8/2021    Hypertension     Insulin-treated type 2 diabetes mellitus ([facility] 2/8/2021    Secondary hyperparathyroidism ([facility] 2/8/2021    Vitamin D deficiency 2/8/2021     WBC = 20.6 on 8/2/21  Admitted from [facility]    Assessment:   Patient is eyes open, no communication, incontinent with some assistance needed in repositioning. Bed: In Touch [place]  Patient has a Flexi-seal in place. Placed 7/31/21  Diet: Tube feeding  Patient reports did not moan or grimace with repositioining. Bilateral heels, buttocks, and sacral skin intact and without erythema. 1. POA perineum and posterior scrotum with moisture associated skin damage. Z guard paste applied. Flexi-seal leaking with patient laying on side, Flexi-seal with 40 ml of water placed, this was increased to 45 ml. Patient repositioned on right side. Heels offloaded on pillows. Spoke with Chong Wolfe NP; wound care orders obtained. Recommendations:    Salma-anal, perineum and posterior scrotum- Every 12 hours apply Z guard paste (orange tube). Skin Care & Pressure Prevention:  Minimize layers of linen/pads under patient to optimize support surface. Turn/reposition approximately every 2 hours and offload heels.   Manage incontinence / promote continence   Nourishing Skin Cream to dry skin, minimize use of briefs when able    Discussed above plan with patient & Joel RN    Transition of Care: Plan to follow as needed while admitted to hospital.    ZAK Cordova, RN, [facility], [facility].  office [phone]  pager [phone] or call  to page

## 2021-08-02 NOTE — DIALYSIS
CRRT Note [facility]  CRRT discontinued per MD orders. Pt transitioning to HD tomorrow. All possible blood (165 ml) returned by primary RN. Old set discarded in red biohazard bag. Machine externally disinfected per policy & procedure and returned to supply closet. Pre/Post report given to Richi Wayne, primary RN.

## 2021-08-02 NOTE — PROGRESS NOTES
SLP Contact Note    SLP evaluation complete. Pt not appropriate for PO save for water swabs at this time given mentation. Full note to follow.       Thank you,  LEE ANN BonillaEd, [id number] [facility]  Speech-Language Pathologist

## 2021-08-03 NOTE — PROGRESS NOTES
Nephrology Progress Note  Nolvia Certain  Date of Admission : 7/21/2021    CC: Follow up for JEREMIAS on CKD       Assessment and Plan     JEREMIAS on CKD3b : - Dialysis dependent PTA  - pt of Dr Biju Gambino   - was on HD MWF prior to admission --> CRRT post op--> stopped 8/2  - HD TTS starting today   - consider goals of care d/w family   -  labs daily     Lytes :  - TPN adjusted     Anemia of CKD:  - JULISA MWF     Secondary HPT:  - on phoslo    New Burnside's syndrome:  - s/p lap ex and decompression of large and small bowel 7/28  - keep K > 4   - per surgery    Nutrition:  - TPN re-ordered     PNA:  - on rocephin    HTN:  - cont current meds       Interval History:  Seen and examined. Off CRRT, stable, high stool ouput, K low     Current Medications: all current  Medications have been eviewed in EPIC  Review of Systems: Review of systems not obtained due to patient factors. Objective:  Vitals:    Vitals:    08/03/21 0423 08/03/21 0500 08/03/21 0542 08/03/21 0600   BP:  133/69  (!) 110/52   Pulse:  64  (!) 58   Resp:  17  15   Temp:       TempSrc:       SpO2: 100% 96% 99% 97%   Weight:    51.5 kg (113 lb 8.6 oz)   Height:         Intake and Output:  No intake/output data recorded. 08/01 1901 - 08/03 0700  In: 3752.3 [I.V.:3582.3]  Out: 6997 [Drains:1215]    Physical Examination:  General: Cachectic,   Neck:  Lines +  Resp:  Lungs CTA B/L, no wheezing , normal respiratory effort  CV:  RRR,  no murmur or rub, trace LE edema  GI:  Non tender, midline incision w/ staples, hematoma   Neurologic:  Confused, lethargic   Psych:             Unable to assess  Access:           [facility]    [x]    High complexity decision making was performed  []    Patient is at high-risk of decompensation with multiple organ involvement    Lab Data Personally Reviewed: I have reviewed all the pertinent labs, microbiology data and radiology studies during assessment.     Recent Labs     08/03/21  0787 08/03/21  0342 08/02/21  0507 08/01/21  1632 08/01/21  1632 08/01/21  0404 08/01/21  0404    139 139   < > 138   < > 138  136   K 2.8* 2.7* 3.5   < > 2.9*   < > 2.3*  2.3*    110* 108   < > 107   < > 106  105   CO2 19* 19* 23   < > 23   < > 19*  20*   * 101* 158*   < > 202*   < > 384*  382*   BUN 52* 52* 35*   < > 39*   < > 42*  45*   CREA 3.29* 3.21* 1.98*   < > 2.19*   < > 2.47*  2.49*   CA 7.8* 7.8* 8.2*   < > 8.0*   < > 7.8*  7.9*   MG  --  2.6* 2.7*  --  2.5*   < > 2.5*  2.6*   PHOS 4.2  --  2.8  --   --   --  2.1*   ALB 3.0* 3.0* 3.0*  --   --    < > 2.9*   ALT  --  105* 152*  --   --   --   --     < > = values in this interval not displayed. Recent Labs     08/03/21  0342 08/02/21  0507 08/01/21  0404   WBC 14.2* 20.6* 26.9*   HGB 9.5* 9.8* 8.7*   HCT 31.5* 32.1* 27.4*   * 143* 145*     No results found for: [facility]  Lab Results   Component Value Date/Time    Culture result: NO GROWTH 5 DAYS 07/29/2021 01:37 PM    Culture result:  07/21/2021 08:54 AM     MRSA NOT PRESENT. Apparent Staphylococus aureus (not MRSA noted). Culture result:  07/21/2021 08:54 AM     Screening of patient nares for MRSA is for surveillance purposes and, if positive, to facilitate isolation considerations in high risk settings. It is not intended for automatic decolonization interventions per se as regimens are not sufficiently effective to warrant routine use.      Recent Results (from the past 24 hour(s))   GLUCOSE, POC    Collection Time: 08/02/21  8:31 AM   Result Value Ref Range    Glucose (POC) 144 (H) 65 - 117 mg/dL    Performed by [employer], POC    Collection Time: 08/02/21 11:58 AM   Result Value Ref Range    Glucose (POC) 147 (H) 65 - 117 mg/dL    Performed by [employer], POC    Collection Time: 08/02/21  3:58 PM   Result Value Ref Range    Glucose (POC) 116 65 - 117 mg/dL    Performed by [employer], POC    Collection Time: 08/02/21  8:44 PM   Result Value Ref Range    Glucose (POC) 107 65 - 117 mg/dL    Performed by Eino Lower    POC G3 - PUL    Collection Time: 08/02/21 10:34 PM   Result Value Ref Range    FIO2 (POC) 28 %    pH (POC) 7.15 (LL) 7.35 - 7.45      pCO2 (POC) 64.0 (H) 35.0 - 45.0 MMHG    pO2 (POC) 200 (H) 80 - 100 MMHG    HCO3 (POC) 22.1 22 - 26 MMOL/L    sO2 (POC) 99.3 (H) 92 - 97 %    Base deficit (POC) 7.4 mmol/L    Site LEFT RADIAL      Allens test (POC) Positive      Specimen type (POC) ARTERIAL      Critical value read back ALLAN S NP    POC G3 - PUL    Collection Time: 08/03/21 12:09 AM   Result Value Ref Range    FIO2 (POC) 21 %    pH (POC) 7.19 (LL) 7.35 - 7.45      pCO2 (POC) 51.9 (H) 35.0 - 45.0 MMHG    pO2 (POC) 71 (L) 80 - 100 MMHG    HCO3 (POC) 19.9 (L) 22 - 26 MMOL/L    sO2 (POC) 89.5 (L) 92 - 97 %    Base deficit (POC) 8.3 mmol/L    Site LEFT RADIAL      Mode CPAP/PS      PEEP/CPAP (POC) 6 cmH2O    PIP (POC) 14      Pressure support 8 cmH2O    Allens test (POC) Positive      Specimen type (POC) ARTERIAL      Critical value read back ALLAN S NP    GLUCOSE, POC    Collection Time: 08/03/21 12:42 AM   Result Value Ref Range    Glucose (POC) 114 65 - 117 mg/dL    Performed by Eino Lower    MAGNESIUM    Collection Time: 08/03/21  3:42 AM   Result Value Ref Range    Magnesium 2.6 (H) 1.6 - 2.4 mg/dL   METABOLIC PANEL, COMPREHENSIVE    Collection Time: 08/03/21  3:42 AM   Result Value Ref Range    Sodium 139 136 - 145 mmol/L    Potassium 2.7 (LL) 3.5 - 5.1 mmol/L    Chloride 110 (H) 97 - 108 mmol/L    CO2 19 (L) 21 - 32 mmol/L    Anion gap 10 5 - 15 mmol/L    Glucose 101 (H) 65 - 100 mg/dL    BUN 52 (H) 6 - 20 MG/DL    Creatinine 3.21 (H) 0.70 - 1.30 MG/DL    BUN/Creatinine ratio 16 12 - 20      GFR est AA 23 (L) >60 ml/min/1.73m2    GFR est non-AA 19 (L) >60 ml/min/1.73m2    Calcium 7.8 (L) 8.5 - 10.1 MG/DL    Bilirubin, total 0.4 0.2 - 1.0 MG/DL    ALT (SGPT) 105 (H) 12 - 78 U/L    AST (SGOT) 40 (H) 15 - 37 U/L    Alk.  phosphatase 84 45 - 117 U/L    Protein, total 7.1 6.4 - 8.2 g/dL    Albumin 3.0 (L) 3.5 - 5.0 g/dL    Globulin 4.1 (H) 2.0 - 4.0 g/dL    A-G Ratio 0.7 (L) 1.1 - 2.2     CBC W/O DIFF    Collection Time: 08/03/21  3:42 AM   Result Value Ref Range    WBC 14.2 (H) 4.1 - 11.1 K/uL    RBC 3.23 (L) 4.10 - 5.70 M/uL    HGB 9.5 (L) 12.1 - 17.0 g/dL    HCT 31.5 (L) 36.6 - 50.3 %    MCV 97.5 80.0 - 99.0 FL    MCH 29.4 26.0 - 34.0 PG    MCHC 30.2 30.0 - 36.5 g/dL    RDW 21.2 (H) 11.5 - 14.5 %    PLATELET 181 (L) 626 - 400 K/uL    MPV 12.7 8.9 - 12.9 FL    NRBC 6.8 (H) 0  WBC    ABSOLUTE NRBC 0.97 (H) 0.00 - 0.01 K/uL   RENAL FUNCTION PANEL    Collection Time: 08/03/21  3:43 AM   Result Value Ref Range    Sodium 140 136 - 145 mmol/L    Potassium 2.8 (L) 3.5 - 5.1 mmol/L    Chloride 108 97 - 108 mmol/L    CO2 19 (L) 21 - 32 mmol/L    Anion gap 13 5 - 15 mmol/L    Glucose 101 (H) 65 - 100 mg/dL    BUN 52 (H) 6 - 20 MG/DL    Creatinine 3.29 (H) 0.70 - 1.30 MG/DL    BUN/Creatinine ratio 16 12 - 20      GFR est AA 22 (L) >60 ml/min/1.73m2    GFR est non-AA 18 (L) >60 ml/min/1.73m2    Calcium 7.8 (L) 8.5 - 10.1 MG/DL    Phosphorus 4.2 2.6 - 4.7 MG/DL    Albumin 3.0 (L) 3.5 - 5.0 g/dL   GLUCOSE, POC    Collection Time: 08/03/21  3:49 AM   Result Value Ref Range    Glucose (POC) 100 65 - 117 mg/dL    Performed by Ute Manner    POC G3 - PUL    Collection Time: 08/03/21  5:34 AM   Result Value Ref Range    FIO2 (POC) 21 %    pH (POC) 7.19 (LL) 7.35 - 7.45      pCO2 (POC) 47.9 (H) 35.0 - 45.0 MMHG    pO2 (POC) 77 (L) 80 - 100 MMHG    HCO3 (POC) 18.2 (L) 22 - 26 MMOL/L    sO2 (POC) 91.4 (L) 92 - 97 %    Base deficit (POC) 9.8 mmol/L    Site LEFT RADIAL      PEEP/CPAP (POC) 6 cmH2O    PIP (POC) 14      Pressure support 8 cmH2O    Allens test (POC) Positive      Specimen type (POC) ARTERIAL      Critical value read back TIMUR Boyce MD  [address]  Phone - [phone]   Fax - [phone]  [email or web address]

## 2021-08-03 NOTE — PROGRESS NOTES
1930: Bedside shift change report given to Aileen Severs, RN and [place], RN (oncoming nurse) by Evelio Baxter RN and Minnie Enriquez RN (offgoing nurse). Report included the following information SBAR, Kardex, Intake/Output, MAR, Recent Results, Cardiac Rhythm NSR with PVCs and Alarm Parameters . 2000: Upon initial assessment, pt found to be minimally responsive. Eyes open spontaneously but no focusing no tracking, no command following, withdraw to pain only in LUE. Ana Tejada, NP notified. 2100: NP to bedside. Orders received. 2115: RT off the floor. Will complete blood gas upon return. 2200: STAT Head CT completed. 2230: Blood gas results reported to Ana Tejada NP. Orders received to put patient on BIPAP.    0000: During midnight assessment, pt is more alert and can now nod and follow some commands.  RUE; Minimal movement on LUE and BLE.     0525:Critical blood gas and potassium results reported to TAMI Whitehead. Orders received to increase BIPAP settings and replace K+. 0730: Bedside shift change report given to SULEIMAN Maldonado and Minnie Enriquez RN (oncoming nurse) by Aileen Severs, RN and [place], RN (offgoing nurse). Report included the following information SBAR, Kardex, Intake/Output, MAR, Recent Results, Cardiac Rhythm NSR with PVCs and PACs and Alarm Parameters .

## 2021-08-03 NOTE — PROCEDURES
[facility] Dialysis Team [facility]  [phone]    Vitals   Pre   Post   Assessment   Pre   Post     Temp  Temp: 97.9 °F (36.6 °C) (08/03/21 0840)  96.5 LOC  Asleep, arousable Asleep, arousable   HR   Pulse (Heart Rate): 66 (08/03/21 0840) 75 Lungs   diminished  diminished   B/P   BP: 120/62 (08/03/21 0840) 111/60 Cardiac   NSR  NSR   Resp   Resp Rate: 16 (08/03/21 0840) 16 Skin   Warm and dry  warm and dry   Pain level  Pain Intensity 1: 0 (08/03/21 0800) 1:0 Edema    none   none   Orders:    Duration:   Start:    0840 End:    1132 Total:   2 hrs 52 mins   Dialyzer:   Dialyzer/Set Up Inspection: Revaclear (08/03/21 0840)   K Bath:   Dialysate K (mEq/L): 4 (08/03/21 0840)   Ca Bath:   Dialysate CA (mEq/L): 2.5 (08/03/21 0840)   Na/Bicarb:   Dialysate NA (mEq/L): 138 (08/03/21 0840)   Target Fluid Removal:   Goal/Amount of Fluid to Remove (mL): 1000 mL (08/03/21 0840)   [facility]     [facility]. Each catheter limb disinfected per p&p, caps removed, hubs disinfected per p&p. Each dialysis catheter limb disinfected per p&p, blood returned per p&p, each dialysis hub disinfected per p&p, post dialysis catheter dwell instilled per order, and caps applied.    Labs     Obtained/Reviewed   Critical Results Called   Date when labs were drawn-  Hgb-    HGB   Date Value Ref Range Status   08/03/2021 9.5 (L) 12.1 - 17.0 g/dL Final     K-    Potassium   Date Value Ref Range Status   08/03/2021 2.8 (L) 3.5 - 5.1 mmol/L Final     Ca-   Calcium   Date Value Ref Range Status   08/03/2021 7.8 (L) 8.5 - 10.1 MG/DL Final     Bun-   BUN   Date Value Ref Range Status   08/03/2021 52 (H) 6 - 20 MG/DL Final     Creat-   Creatinine   Date Value Ref Range Status   08/03/2021 3.29 (H) 0.70 - 1.30 MG/DL Final        Medications/ Blood Products Given     Name   Dose   Route and Time     Heparin 1000 3800 units  intracath/1122             Blood Volume Processed (BVP):    60.5 liters Net Fluid Removed:  450   Comments Patients BP running low and dropped into 80s once and saline given and ufr decreased. At end  of tx patients systolic BP dropped to 70, so I rinsed him back with 8 mins left on tx. No other problems. Report to Horacio Garza RN. Time Out Done: yes, 0840  Primary Nurse Rpt Pre: Horacio Garza RN  Primary Nurse Rpt Post: Horacio Garza RN  Pt Education: procedural  Care Plan: routine HD  Tx Summary: 2 hrs 52 mins on 4 K 2.5 Ca 138/38 removed 450 ml net. Admiting Diagnosis:  Pt's previous clinic-n/a  Consent signed - Informed Consent Verified: Yes (08/03/21 0840)  Kristi Consent -   Hepatitis Status- antigen negative, immune 0n 7-21-21. In [facility]. Machine #- Machine Number: [id number] (08/03/21 0840)  Telemetry status-on monitor in ICU  Pre-dialysis wt. - Pre-Dialysis Weight: 57.4 kg (126 lb 8.7 oz) (07/26/21 0950)

## 2021-08-03 NOTE — PROGRESS NOTES
Spiritual Care Assessment/Progress Note  [facility]      NAME: Cindy Lucas      MRN: [id number]  AGE: 77 y.o. SEX: male  Zoroastrian Affiliation: No preference   Language: English     8/3/2021     Total Time (in minutes): 15     Spiritual Assessment begun in [address] [facility] through conversation with:         []Patient        [] Family    [] Friend(s)        Reason for Consult: Palliative Care, Initial/Spiritual Assessment     Spiritual beliefs: (Please include comment if needed)     [] Identifies with a aleksey tradition:         [] Supported by a aleksey community:            [] Claims no spiritual orientation:           [] Seeking spiritual identity:                [] Adheres to an individual form of spirituality:           [x] Not able to assess:                           Identified resources for coping:      [] Prayer                               [] Music                  [] Guided Imagery     [] Family/friends                 [] Pet visits     [] Devotional reading                         [x] Unknown     [] Other:                                               Interventions offered during this visit: (See comments for more details)    Patient Interventions: Initial visit           Plan of Care:     [] Support spiritual and/or cultural needs    [] Support AMD and/or advance care planning process      [] Support grieving process   [] Coordinate Rites and/or Rituals    [] Coordination with community clergy   [] No spiritual needs identified at this time   [] Detailed Plan of Care below (See Comments)  [] Make referral to Music Therapy  [] Make referral to Pet Therapy     [] Make referral to Addiction services  [] Make referral to [facility]  [] Make referral to Spiritual Care Partner  [] No future visits requested        [x] Follow up upon further referrals     Attempted visit for palliative initial spiritual assessment. Unable to visit patient at this time. Pt was sleeping and did not awake.  No family present. Pt's chart was consulted.   Chaplain Pace MDiv, MS, [address]  [address] [place] [address] [phone])

## 2021-08-03 NOTE — PROGRESS NOTES
SLP Contact Note    Attempted to see patient for swallow treatment. Pt is on BiPAP and thus is not appropriate. Will hold for now.     Thank you,  LEE ANN SnyderEd, [address] [facility]  Speech-Language Pathologist

## 2021-08-03 NOTE — PROGRESS NOTES
[facility]    ICU TEAM Progress Note    Name: Yuri Herrera   : 1945   MRN: [id number]   Date: 8/3/2021      Assessment/Plan:     1. Septic shock-resolving  a. Likely intra-abdominal origin given bowel dilation and likely bacterial translocation. Patient completed course of abx for pneumonia- does not appear to be contributory currently. b. C diff negative- PO vanc and IV flagyl stopped  c. Discontinue Zosyn and Eraxis  d. Off steroids  2. Colonic pseudo-obstruction  a. Ex-lap done overnight of  revealed dilation of small and large bowel. No perforation, no necrosis. C diff negative. b. General surgery following  c.  patient having bowel movements  3. Respiratory insufficiency requiring intubation  a. Code blue called on  given respiratory status. Likely in the setting of shock and significant abdominal distention. Patient intubated but without loss of pulse. b. Patient passed SBT and was extubated to 3L NC 2021  4. JEREMIAS on CKD  a. Nephrology following  b. Likely progression of underlying CKD. Was on iHD MWF now on CRRT  c. Likely discontniue CRRT today, off pressors, no other indication  d. BID labs  5. Severe malnutrition  a. Per nutrition/dietician patient with clinical characteristics of severe malnutrition  b. Continue TPN today, add insulin, increase potassium  c. Lipids to continue 3x/ week  d. Possible start trickle feeds today, need to avoid refeeding syndrome, will follow closely with dietician  6. Anemia of chronic disease  a.  Daily CBC           F - Feeding:  Yes TPN& tube feeds  A - Analgesia: None  S - Sedation: None  T - DVT Prophylaxis: SCD's or Sequential Compression Device and Heparin   H - Head of Bed: > 30 Degrees  U - Ulcer Prophylaxis: Pepcid (famotidine)   G - Glycemic Control: Insulin  S - Spontaneous Breathing Trial: Yes  B - Bowel Regimen: None needed at this time  I - Indwelling Catheter:   Tubes: None  Lines: Arterial Line and Central Line  Drains: John-Rose Drain (LOBO)  D - De-escalation of Antibiotics: Zosyn    Subjective:   Progress Note: 8/3/2021      Reason for ICU Admission: Septic shock and respiratory failure     HPI:  78-year-old gentleman with multiple medical problem admitted to the hospital as a transfer from outside facility on July 21 for acute on chronic kidney disease requiring hemodialysis for hypoxia and pulmonary edema and possible pneumonia. Patient was dialyzed and his condition improved slightly and decision was made to transfer him to regular hemodialysis 3 times a week and he had a permacath inserted on the 27th. Over the last few days there was progressive distention of his abdomen, C. difficile was suspected and stool sample was sent but no results yet. He was evaluated by CAT scan abdomen followed by surgical and GI evaluation on the 27th but at that time there was no indication for immediate intervention and treatment for C. difficile colitis was started empirically. The morning of 28th he had progressive increase in his abdominal pain and suddenly he started to be in agonal breathing and bradycardia. CODE BLUE was called and resuscitation started, he received 2 amp of epinephrine and 1 amp of bicarb and he was emergently intubated. ROSC was achieved [not clear if he lost pulse completely or it was very weak that was not detected]. Patient obtunded however he moves 4 limbs and was reaching out to the ET tube shortly after resuscitation so he was given fentanyl and Ativan. He is hypotensive and requiring pressors. His abdomen is severely distended and x-ray showed diffuse gas but no clear free air.   Discussed this with the general surgeon as suspicion for bowel perforation is high.        Overnight Events:   8/3/2021  - No acute events overnight    POD:  4 Day Post-Op    S/P:   Procedure(s):  LAPAROTOMY EXPLORATORY with decompression of small and large bowel    Objective:   Vital Signs:  Visit Vitals  BP (!) 113/48 Pulse 64   Temp 97.7 °F (36.5 °C)   Resp 14   Ht 6' (1.829 m)   Wt 51.5 kg (113 lb 8.6 oz)   SpO2 100%   BMI 15.40 kg/m²    O2 Flow Rate (L/min): 2 l/min O2 Device: BIPAP Temp (24hrs), Av.6 °F (36.4 °C), Min:96.5 °F (35.8 °C), Max:98.2 °F (36.8 °C)           Intake/Output:     Intake/Output Summary (Last 24 hours) at 8/3/2021 1457  Last data filed at 8/3/2021 1400  Gross per 24 hour   Intake 2799.25 ml   Output 1240 ml   Net 1559.25 ml       Physical Exam:    General:  Sleepy. Opens eyes to voice. No acute distress. Cachetic. Temporal wasting   Eyes:  Sclera anicteric. Pupils equal, round, reactive to light. Mouth/Throat: Mucous membranes dry. Neck: Supple. Lungs:   Clear to auscultation bilaterally, good effort. Cardiovascular:  Regular rate and rhythm, no murmur, click, rub, or gallop. Abdomen:   Midline incision. Dressing intact with small amount of shadowing. L abd LOBO drain with serosanguinous output. Mildly distended. Extremities: No cyanosis or edema. Skin: No acute rash or lesions. Lymph Nodes: Cervical and supraclavicular normal.   Musculoskeletal:  No swelling or deformity. Lines/Devices:  Intact, no erythema, drainage, or tenderness. Neuro/Psych: Sleepy, opens eyes to voice, oriented x2, follows commands in all extremities. LABS AND  DATA: Personally reviewed  Recent Labs     21  0507   WBC 14.2* 20.6*   HGB 9.5* 9.8*   HCT 31.5* 32.1*   * 143*     Recent Labs     21  0343 21  0342 21  0507 21  0507    139   < > 139   K 2.8* 2.7*   < > 3.5    110*   < > 108   CO2 19* 19*   < > 23   BUN 52* 52*   < > 35*   CREA 3.29* 3.21*   < > 1.98*   * 101*   < > 158*   CA 7.8* 7.8*   < > 8.2*   MG  --  2.6*  --  2.7*   PHOS 4.2  --   --  2.8    < > = values in this interval not displayed.      Recent Labs     21  0343 08/03/21  03421  0507 21  0507   AP  --  84  --  105   TP  --  7.1  --  7.8 ALB 3.0* 3.0*   < > 3.0*   GLOB  --  4.1*  --  4.8*    < > = values in this interval not displayed. No results for input(s): INR, PTP, APTT, INREXT, INREXT in the last 72 hours. Recent Labs     08/03/21  0534 08/03/21  0009   PHI 7.19* 7.19*   PCO2I 47.9* 51.9*   PO2I 77* 71*   FIO2I 21 21     No results for input(s): CPK, CKMB, TROIQ, BNPP in the last 72 hours. Ventilator Settings:  Mode Rate Tidal Volume Pressure FiO2 PEEP   Spontaneous   400 ml  8 cm H2O 70 % 5 cm H20     Peak airway pressure: 20 cm H2O    Minute ventilation: 13.5 l/min        MEDS: Reviewed    Chest X-Ray:  CXR Results  (Last 48 hours)    None          Multidisciplinary Rounds Completed: Yes    ABCDEF Bundle/Checklist Completed:  Yes    SPECIAL EQUIPMENT  CRRT    DISPOSITION  Stay in ICU    CRITICAL CARE CONSULTANT NOTE  I had a face to face encounter with the patient, reviewed and interpreted patient data including clinical events, labs, images, vital signs, I/O's, and examined patient. I have discussed the case and the plan and management of the patient's care with the consulting services, the bedside nurses and the respiratory therapist.      NOTE OF PERSONAL INVOLVEMENT IN CARE   This patient has a high probability of imminent, clinically significant deterioration, which requires the highest level of preparedness to intervene urgently. I participated in the decision-making and personally managed or directed the management of the following life and organ supporting interventions that required my frequent assessment to treat or prevent imminent deterioration. I personally spent 40 minutes of critical care time. This is time spent at this critically ill patient's bedside actively involved in patient care as well as the coordination of care and discussions with the patient's family. This does not include any procedural time which has been billed separately.       Kar Orta [facility]     Critical Care Medicine  [employer] [facility]      8/3/2021

## 2021-08-03 NOTE — PROGRESS NOTES
Progress Note    Patient: Christina Olson MRN: [id number]  SSN: [id number]    YOB: 1945  Age: 77 y.o. Sex: male      Admit Date: 2021    6 Days Post-Op    Procedure:  Procedure(s):  LAPAROTOMY EXPLORATORY with decompression of small and large bowel    Subjective:     Patient currently on BiPAP  Continues to have bowel movements    Objective:     Visit Vitals  BP (!) 102/49 (BP 1 Location: Left lower arm, BP Patient Position: At rest)   Pulse 72   Temp 97.7 °F (36.5 °C)   Resp 18   Ht 6' (1.829 m)   Wt 113 lb 8.6 oz (51.5 kg)   SpO2 100%   BMI 15.40 kg/m²       Temp (24hrs), Av.6 °F (36.4 °C), Min:96.5 °F (35.8 °C), Max:98.2 °F (36.8 °C)      Physical Exam:    General-lethargic  Abdomen soft nontender nondistended incision healing well  LOBO serosanguineous  Rectal tube continues to put out    Data Review: images and reports reviewed    Lab Review: All lab results for the last 24 hours reviewed.   Recent Results (from the past 24 hour(s))   GLUCOSE, POC    Collection Time: 21  3:58 PM   Result Value Ref Range    Glucose (POC) 116 65 - 117 mg/dL    Performed by Clarke Dan, POC    Collection Time: 21  8:44 PM   Result Value Ref Range    Glucose (POC) 107 65 - 117 mg/dL    Performed by Ute Figueroa    POC G3 - PUL    Collection Time: 21 10:34 PM   Result Value Ref Range    FIO2 (POC) 28 %    pH (POC) 7.15 (LL) 7.35 - 7.45      pCO2 (POC) 64.0 (H) 35.0 - 45.0 MMHG    pO2 (POC) 200 (H) 80 - 100 MMHG    HCO3 (POC) 22.1 22 - 26 MMOL/L    sO2 (POC) 99.3 (H) 92 - 97 %    Base deficit (POC) 7.4 mmol/L    Site LEFT RADIAL      Allens test (POC) Positive      Specimen type (POC) ARTERIAL      Critical value read back ALLAN S NP    POC G3 - PUL    Collection Time: 21 12:09 AM   Result Value Ref Range    FIO2 (POC) 21 %    pH (POC) 7.19 (LL) 7.35 - 7.45      pCO2 (POC) 51.9 (H) 35.0 - 45.0 MMHG    pO2 (POC) 71 (L) 80 - 100 MMHG    HCO3 (POC) 19.9 (L) 22 - 26 MMOL/L sO2 (POC) 89.5 (L) 92 - 97 %    Base deficit (POC) 8.3 mmol/L    Site LEFT RADIAL      Mode CPAP/PS      PEEP/CPAP (POC) 6 cmH2O    PIP (POC) 14      Pressure support 8 cmH2O    Allens test (POC) Positive      Specimen type (POC) ARTERIAL      Critical value read back ALLAN S NP    GLUCOSE, POC    Collection Time: 08/03/21 12:42 AM   Result Value Ref Range    Glucose (POC) 114 65 - 117 mg/dL    Performed by Joe Magana    MAGNESIUM    Collection Time: 08/03/21  3:42 AM   Result Value Ref Range    Magnesium 2.6 (H) 1.6 - 2.4 mg/dL   METABOLIC PANEL, COMPREHENSIVE    Collection Time: 08/03/21  3:42 AM   Result Value Ref Range    Sodium 139 136 - 145 mmol/L    Potassium 2.7 (LL) 3.5 - 5.1 mmol/L    Chloride 110 (H) 97 - 108 mmol/L    CO2 19 (L) 21 - 32 mmol/L    Anion gap 10 5 - 15 mmol/L    Glucose 101 (H) 65 - 100 mg/dL    BUN 52 (H) 6 - 20 MG/DL    Creatinine 3.21 (H) 0.70 - 1.30 MG/DL    BUN/Creatinine ratio 16 12 - 20      GFR est AA 23 (L) >60 ml/min/1.73m2    GFR est non-AA 19 (L) >60 ml/min/1.73m2    Calcium 7.8 (L) 8.5 - 10.1 MG/DL    Bilirubin, total 0.4 0.2 - 1.0 MG/DL    ALT (SGPT) 105 (H) 12 - 78 U/L    AST (SGOT) 40 (H) 15 - 37 U/L    Alk.  phosphatase 84 45 - 117 U/L    Protein, total 7.1 6.4 - 8.2 g/dL    Albumin 3.0 (L) 3.5 - 5.0 g/dL    Globulin 4.1 (H) 2.0 - 4.0 g/dL    A-G Ratio 0.7 (L) 1.1 - 2.2     CBC W/O DIFF    Collection Time: 08/03/21  3:42 AM   Result Value Ref Range    WBC 14.2 (H) 4.1 - 11.1 K/uL    RBC 3.23 (L) 4.10 - 5.70 M/uL    HGB 9.5 (L) 12.1 - 17.0 g/dL    HCT 31.5 (L) 36.6 - 50.3 %    MCV 97.5 80.0 - 99.0 FL    MCH 29.4 26.0 - 34.0 PG    MCHC 30.2 30.0 - 36.5 g/dL    RDW 21.2 (H) 11.5 - 14.5 %    PLATELET 147 (L) 928 - 400 K/uL    MPV 12.7 8.9 - 12.9 FL    NRBC 6.8 (H) 0  WBC    ABSOLUTE NRBC 0.97 (H) 0.00 - 0.01 K/uL   RENAL FUNCTION PANEL    Collection Time: 08/03/21  3:43 AM   Result Value Ref Range    Sodium 140 136 - 145 mmol/L    Potassium 2.8 (L) 3.5 - 5.1 mmol/L Chloride 108 97 - 108 mmol/L    CO2 19 (L) 21 - 32 mmol/L    Anion gap 13 5 - 15 mmol/L    Glucose 101 (H) 65 - 100 mg/dL    BUN 52 (H) 6 - 20 MG/DL    Creatinine 3.29 (H) 0.70 - 1.30 MG/DL    BUN/Creatinine ratio 16 12 - 20      GFR est AA 22 (L) >60 ml/min/1.73m2    GFR est non-AA 18 (L) >60 ml/min/1.73m2    Calcium 7.8 (L) 8.5 - 10.1 MG/DL    Phosphorus 4.2 2.6 - 4.7 MG/DL    Albumin 3.0 (L) 3.5 - 5.0 g/dL   GLUCOSE, POC    Collection Time: 08/03/21  3:49 AM   Result Value Ref Range    Glucose (POC) 100 65 - 117 mg/dL    Performed by Beverley Nicholas    POC G3 - PUL    Collection Time: 08/03/21  5:34 AM   Result Value Ref Range    FIO2 (POC) 21 %    pH (POC) 7.19 (LL) 7.35 - 7.45      pCO2 (POC) 47.9 (H) 35.0 - 45.0 MMHG    pO2 (POC) 77 (L) 80 - 100 MMHG    HCO3 (POC) 18.2 (L) 22 - 26 MMOL/L    sO2 (POC) 91.4 (L) 92 - 97 %    Base deficit (POC) 9.8 mmol/L    Site LEFT RADIAL      PEEP/CPAP (POC) 6 cmH2O    PIP (POC) 14      Pressure support 8 cmH2O    Allens test (POC) Positive      Specimen type (POC) ARTERIAL      Critical value read back LACKARD S N.P    GLUCOSE, POC    Collection Time: 08/03/21  8:06 AM   Result Value Ref Range    Glucose (POC) 124 (H) 65 - 117 mg/dL    Performed by Roddie Gottron    BLOOD GAS, ARTERIAL    Collection Time: 08/03/21 10:35 AM   Result Value Ref Range    pH 7.28 (L) 7.35 - 7.45      PCO2 59 (H) 35 - 45 mmHg    PO2 127 (H) 80 - 100 mmHg    O2 SAT 98 (H) 92 - 97 %    BICARBONATE 27 (H) 22 - 26 mmol/L    BASE DEFICIT 0.7 mmol/L    O2 METHOD BIPAP      Sample source ARTERIAL      SITE LEFT RADIAL      PATTI'S TEST YES     GLUCOSE, POC    Collection Time: 08/03/21 12:22 PM   Result Value Ref Range    Glucose (POC) 119 (H) 65 - 117 mg/dL    Performed by Roddie Gottron        Assessment:     Hospital Problems  Date Reviewed: 7/21/2021        Codes Class Noted POA    Acute delirium ICD-10-CM: R41.0  ICD-9-CM: 780.09  7/21/2021 Unknown              Plan/Recommendations/Medical Decision Making:   GI tract appears to be functioning. Continue tube feeds and advance as appropriate.   Continue TPN until tube feeds at goal  Diet by mouth when clinically appropriate  Continue antibiotics-WBC continues to improve

## 2021-08-03 NOTE — PROGRESS NOTES
0730: Bedside and Verbal shift change report given to  Maria Ines Spencer (oncoming nurse) by Lolly Castellanos RN (offgoing nurse). Report included the following information SBAR, Kardex, Intake/Output, MAR, Recent Results, Cardiac Rhythm SB/NSR with PVC's, Alarm Parameters  and Dual Neuro Assessment. 0800:  Cherelle Garcia NP about blood sugars in the low 100's. Orders received to still give 10 Units of Lantus. 1600: Cherelle Garcia NP about potassium result of 2.9. Orders received to replace. 1830: Cherelle Garcia NP of patients lethargic state. Orders received to obtain an ABG. 1930: Bedside and Verbal shift change report given to SULEIMAN Lilly (oncoming nurse) by Lena Devries RN (offgoing nurse). Report included the following information SBAR, Kardex, Intake/Output, MAR, Recent Results, Cardiac Rhythm NSR/SB with PVC's & PAC's and Alarm Parameters .

## 2021-08-04 PROBLEM — R64 CACHECTIC (HCC): Chronic | Status: ACTIVE | Noted: 2021-01-01

## 2021-08-04 PROBLEM — K59.81 OGILVIE'S SYNDROME: Chronic | Status: ACTIVE | Noted: 2021-01-01

## 2021-08-04 NOTE — PROGRESS NOTES
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 Re-intubated overnight. Tolerated HD. DISCUSSED w/ son at bedside     Current Medications: all current  Medications have been eviewed in EPIC  Review of Systems: Review of systems not obtained due to patient factors. Objective:  Vitals:    Vitals:    08/04/21 0730 08/04/21 0800 08/04/21 0900 08/04/21 1000   BP: (!) 84/52 (!) 141/79 101/67 121/61   Pulse: 80 73 67 79   Resp: 25 28 24 20   Temp:  (!) 96.3 °F (35.7 °C)     TempSrc:       SpO2: 99% 99% 100% 100%   Weight:       Height:         Intake and Output:  08/04 0701 - 08/04 1900  In: 291 [I.V.:111]  Out: -   08/02 1901 - 08/04 0700  In: 3880.1 [I.V.:2770.1]  Out: 0357 [Drains:1365]    Physical Examination:  General: On vent   Neck:  Lines +  Resp:  Diminished B/L  CV:  RRR,  no murmur or rub, trace LE edema  GI:  Non tender, midline incision w/ staples, hematoma   Neurologic:  Sedated   Psych:             Unable to assess  Access:           MARLY BONILLA    [x]    High complexity decision making was performed  []    Patient is at high-risk of decompensation with multiple organ involvement    Lab Data Personally Reviewed: I have reviewed all the pertinent labs, microbiology data and radiology studies during assessment.     Recent Labs     08/04/21  9464 08/03/21  1422 08/03/21  0343 08/03/21  0342 08/03/21  0342 08/02/21  0507 08/02/21  0507    138 140   < > 139   < > 139   K 3.6 2.9* 2.8*   < > 2.7*   < > 3.5   * 104 108   < > 110*   < > 108   CO2 24 28 19*   < > 19*   < > 23   * 165* 101*   < > 101*   < > 158*   BUN 47* 28* 52*   < > 52*   < > 35*   CREA 3.14* 2.06* 3.29*   < > 3.21*   < > 1.98*   CA 7.5* 7.6* 7.8*   < > 7.8*   < > 8.2*   MG 2.2 2.2  --   --  2.6*   < > 2.7*   PHOS 4.9*  --  4.2  --   --   --  2.8   ALB 2.8*  --  3.0*  --  3.0*   < > 3.0*   ALT 85*  --   --   --  105*  --  152*    < > = values in this interval not displayed. Recent Labs     08/04/21  0436 08/03/21  0342 08/02/21  0507   WBC 13.5* 14.2* 20.6*   HGB 9.2* 9.5* 9.8*   HCT 30.8* 31.5* 32.1*   PLT 97* 113* 143*     No results found for: [facility]  Lab Results   Component Value Date/Time    Culture result: NO GROWTH 5 DAYS 07/29/2021 01:37 PM    Culture result:  07/21/2021 08:54 AM     MRSA NOT PRESENT. Apparent Staphylococus aureus (not MRSA noted). Culture result:  07/21/2021 08:54 AM     Screening of patient nares for MRSA is for surveillance purposes and, if positive, to facilitate isolation considerations in high risk settings. It is not intended for automatic decolonization interventions per se as regimens are not sufficiently effective to warrant routine use.      Recent Results (from the past 24 hour(s))   BLOOD GAS, ARTERIAL    Collection Time: 08/03/21 10:35 AM   Result Value Ref Range    pH 7.28 (L) 7.35 - 7.45      PCO2 59 (H) 35 - 45 mmHg    PO2 127 (H) 80 - 100 mmHg    O2 SAT 98 (H) 92 - 97 %    BICARBONATE 27 (H) 22 - 26 mmol/L    BASE DEFICIT 0.7 mmol/L    O2 METHOD BIPAP      Sample source ARTERIAL      SITE LEFT RADIAL      PATTI'S TEST YES     GLUCOSE, POC    Collection Time: 08/03/21 12:22 PM   Result Value Ref Range    Glucose (POC) 119 (H) 65 - 117 mg/dL    Performed by [address], BASIC    Collection Time: 08/03/21 2:22 PM   Result Value Ref Range    Sodium 138 136 - 145 mmol/L    Potassium 2.9 (L) 3.5 - 5.1 mmol/L    Chloride 104 97 - 108 mmol/L    CO2 28 21 - 32 mmol/L    Anion gap 6 5 - 15 mmol/L    Glucose 165 (H) 65 - 100 mg/dL    BUN 28 (H) 6 - 20 MG/DL    Creatinine 2.06 (H) 0.70 - 1.30 MG/DL    BUN/Creatinine ratio 14 12 - 20      GFR est AA 38 (L) >60 ml/min/1.73m2    GFR est non-AA 32 (L) >60 ml/min/1.73m2    Calcium 7.6 (L) 8.5 - 10.1 MG/DL   MAGNESIUM    Collection Time: 08/03/21  2:22 PM   Result Value Ref Range    Magnesium 2.2 1.6 - 2.4 mg/dL   GLUCOSE, POC    Collection Time: 08/03/21  3:47 PM   Result Value Ref Range    Glucose (POC) 179 (H) 65 - 117 mg/dL    Performed by Doyle Gresham    POC G3 - PUL    Collection Time: 08/03/21  7:26 PM   Result Value Ref Range    FIO2 (POC) 70 %    pH (POC) 7.18 (LL) 7.35 - 7.45      pCO2 (POC) 71.5 (H) 35.0 - 45.0 MMHG    pO2 (POC) 202 (H) 80 - 100 MMHG    HCO3 (POC) 26.5 (H) 22 - 26 MMOL/L    sO2 (POC) 99.4 (H) 92 - 97 %    Base deficit (POC) 3.2 mmol/L    Site RIGHT RADIAL      Device: BIPAP MASK      Set Rate 4 bpm    PEEP/CPAP (POC) 6 cmH2O    PIP (POC) 16      Allens test (POC) Positive      Specimen type (POC) ARTERIAL      Critical value read back GONZÁLEZ LEMUS NP    GLUCOSE, POC    Collection Time: 08/03/21  8:34 PM   Result Value Ref Range    Glucose (POC) 240 (H) 65 - 117 mg/dL    Performed by [address]    POC G3 - PUL    Collection Time: 08/03/21 11:37 PM   Result Value Ref Range    FIO2 (POC) 70 %    pH (POC) 7.18 (LL) 7.35 - 7.45      pCO2 (POC) 61.6 (H) 35.0 - 45.0 MMHG    pO2 (POC) 132 (H) 80 - 100 MMHG    HCO3 (POC) 23.2 22 - 26 MMOL/L    sO2 (POC) 98.0 (H) 92 - 97 %    Base deficit (POC) 5.7 mmol/L    Site RIGHT RADIAL      Device: BIPAP MASK      Set Rate 4 bpm    PEEP/CPAP (POC) 6 cmH2O    PIP (POC) 20      Allens test (POC) Positive      Specimen type (POC) ARTERIAL      Critical value read back GONZÁLEZ LEMUS NP    GLUCOSE, POC    Collection Time: 08/04/21 12:44 AM   Result Value Ref Range    Glucose (POC) 214 (H) 65 - 117 mg/dL    Performed by ALEX Lau    Collection Time: 08/04/21  4:36 AM   Result Value Ref Range    Sodium 138 136 - 145 mmol/L    Potassium 3.6 3.5 - 5.1 mmol/L    Chloride 109 (H) 97 - 108 mmol/L    CO2 24 21 - 32 mmol/L    Anion gap 5 5 - 15 mmol/L    Glucose 217 (H) 65 - 100 mg/dL    BUN 47 (H) 6 - 20 MG/DL    Creatinine 3.14 (H) 0.70 - 1.30 MG/DL    BUN/Creatinine ratio 15 12 - 20      GFR est AA 24 (L) >60 ml/min/1.73m2    GFR est non-AA 19 (L) >60 ml/min/1.73m2    Calcium 7.5 (L) 8.5 - 10.1 MG/DL    Bilirubin, total 0.4 0.2 - 1.0 MG/DL    ALT (SGPT) 85 (H) 12 - 78 U/L    AST (SGOT) 27 15 - 37 U/L    Alk.  phosphatase 90 45 - 117 U/L    Protein, total 7.0 6.4 - 8.2 g/dL    Albumin 2.8 (L) 3.5 - 5.0 g/dL    Globulin 4.2 (H) 2.0 - 4.0 g/dL    A-G Ratio 0.7 (L) 1.1 - 2.2     CBC W/O DIFF    Collection Time: 08/04/21  4:36 AM   Result Value Ref Range    WBC 13.5 (H) 4.1 - 11.1 K/uL    RBC 3.18 (L) 4.10 - 5.70 M/uL    HGB 9.2 (L) 12.1 - 17.0 g/dL    HCT 30.8 (L) 36.6 - 50.3 %    MCV 96.9 80.0 - 99.0 FL    MCH 28.9 26.0 - 34.0 PG    MCHC 29.9 (L) 30.0 - 36.5 g/dL    RDW 20.7 (H) 11.5 - 14.5 %    PLATELET 97 (L) 811 - 400 K/uL    MPV 12.1 8.9 - 12.9 FL    NRBC 3.3 (H) 0  WBC    ABSOLUTE NRBC 0.45 (H) 0.00 - 0.01 K/uL   MAGNESIUM    Collection Time: 08/04/21  4:36 AM   Result Value Ref Range    Magnesium 2.2 1.6 - 2.4 mg/dL   PHOSPHORUS    Collection Time: 08/04/21  4:36 AM   Result Value Ref Range    Phosphorus 4.9 (H) 2.6 - 4.7 MG/DL   GLUCOSE, POC    Collection Time: 08/04/21  4:40 AM   Result Value Ref Range    Glucose (POC) 213 (H) 65 - 117 mg/dL    Performed by [address]    POC G3 - PUL    Collection Time: 08/04/21  5:43 AM   Result Value Ref Range    FIO2 (POC) 70 %    pH (POC) 7.05 (LL) 7.35 - 7.45      pCO2 (POC) 84.0 (H) 35.0 - 45.0 MMHG    pO2 (POC) 283 (H) 80 - 100 MMHG HCO3 (POC) 23.4 22 - 26 MMOL/L    sO2 (POC) 99.7 (H) 92 - 97 %    Base deficit (POC) 8.3 mmol/L    Site RIGHT RADIAL      Device: BIPAP MASK      Set Rate 20 bpm    PEEP/CPAP (POC) 6 cmH2O    PIP (POC) 20      Allens test (POC) Positive      Specimen type (POC) ARTERIAL      Critical value read back GONZÁLEZ LEMUS NP    BLOOD GAS, ARTERIAL    Collection Time: 08/04/21  7:15 AM   Result Value Ref Range    pH 7.23 (LL) 7.35 - 7.45      PCO2 49 (H) 35 - 45 mmHg    PO2 110 (H) 80 - 100 mmHg    O2 SAT 97 92 - 97 %    BICARBONATE 20 (L) 22 - 26 mmol/L    BASE DEFICIT 7.7 mmol/L    O2 METHOD VENT      Sample source ARTERIAL      SITE RIGHT BRACHIAL      PATTI'S TEST YES      Critical value read back Called to TAMI HERRMANN on 08/04/2021 at 07:15    GLUCOSE, POC    Collection Time: 08/04/21  8:41 AM   Result Value Ref Range    Glucose (POC) 174 (H) 65 - 117 mg/dL    Performed by Evelyn Toure MD  [address]  Phone - [phone]   Fax - [phone]  [email or web address]

## 2021-08-04 NOTE — PROGRESS NOTES
2000. Pt responsive, nodding to questions, following simple commands. Restlessly moving legs, denies discomfort. Remains on bipap 16 / 6 and 70%, pt tachypneic- RR in 30's, with some increased work of breathing observed. 2115. TAMI Chavez and MD [place] at bedside. Bipap adjusted by provider to 20 / 6. ABG's ordered for 2300.

## 2021-08-04 NOTE — PROGRESS NOTES
Progress Note    Patient: Neema Reyes MRN: [id number]  SSN: [id number]    YOB: 1945  Age: 77 y.o. Sex: male      Admit Date: 2021    7 Days Post-Op    Procedure:  Procedure(s):  LAPAROTOMY EXPLORATORY with decompression of small and large bowel    Subjective:     Patient reintubated and sedated. Currently tolerating tube feeds at goal  Continues to have bowel movements  Objective:     Visit Vitals  BP (!) 97/22   Pulse (!) 58   Temp 97.1 °F (36.2 °C)   Resp 25   Ht 6' (1.829 m)   Wt 113 lb 8.6 oz (51.5 kg)   SpO2 100%   BMI 15.40 kg/m²       Temp (24hrs), Av.2 °F (36.2 °C), Min:96.3 °F (35.7 °C), Max:97.9 °F (36.6 °C)      Physical Exam:    General intubated and sedated  Abdomen soft mild tenderness in the midportion of the incision otherwise nondistended and nontender  Incision healing well  LOBO serosanguineous    Data Review: images and reports reviewed    Lab Review: All lab results for the last 24 hours reviewed.   Recent Results (from the past 24 hour(s))   POC G3 - PUL    Collection Time: 21  7:26 PM   Result Value Ref Range    FIO2 (POC) 70 %    pH (POC) 7.18 (LL) 7.35 - 7.45      pCO2 (POC) 71.5 (H) 35.0 - 45.0 MMHG    pO2 (POC) 202 (H) 80 - 100 MMHG    HCO3 (POC) 26.5 (H) 22 - 26 MMOL/L    sO2 (POC) 99.4 (H) 92 - 97 %    Base deficit (POC) 3.2 mmol/L    Site RIGHT RADIAL      Device: BIPAP MASK      Set Rate 4 bpm    PEEP/CPAP (POC) 6 cmH2O    PIP (POC) 16      Allens test (POC) Positive      Specimen type (POC) ARTERIAL      Critical value read back GONZÁLEZ LEMUS NP    GLUCOSE, POC    Collection Time: 21  8:34 PM   Result Value Ref Range    Glucose (POC) 240 (H) 65 - 117 mg/dL    Performed by Marco Simpson    POC G3 - PUL    Collection Time: 21 11:37 PM   Result Value Ref Range    FIO2 (POC) 70 %    pH (POC) 7.18 (LL) 7.35 - 7.45      pCO2 (POC) 61.6 (H) 35.0 - 45.0 MMHG    pO2 (POC) 132 (H) 80 - 100 MMHG    HCO3 (POC) 23.2 22 - 26 MMOL/L    sO2 (POC) 98.0 (H) 92 - 97 %    Base deficit (POC) 5.7 mmol/L    Site RIGHT RADIAL      Device: BIPAP MASK      Set Rate 4 bpm    PEEP/CPAP (POC) 6 cmH2O    PIP (POC) 20      Allens test (POC) Positive      Specimen type (POC) ARTERIAL      Critical value read back GONZÁLEZ LEMUS NP    GLUCOSE, POC    Collection Time: 08/04/21 12:44 AM   Result Value Ref Range    Glucose (POC) 214 (H) 65 - 117 mg/dL    Performed by Paul Beasley, COMPREHENSIVE    Collection Time: 08/04/21  4:36 AM   Result Value Ref Range    Sodium 138 136 - 145 mmol/L    Potassium 3.6 3.5 - 5.1 mmol/L    Chloride 109 (H) 97 - 108 mmol/L    CO2 24 21 - 32 mmol/L    Anion gap 5 5 - 15 mmol/L    Glucose 217 (H) 65 - 100 mg/dL    BUN 47 (H) 6 - 20 MG/DL    Creatinine 3.14 (H) 0.70 - 1.30 MG/DL    BUN/Creatinine ratio 15 12 - 20      GFR est AA 24 (L) >60 ml/min/1.73m2    GFR est non-AA 19 (L) >60 ml/min/1.73m2    Calcium 7.5 (L) 8.5 - 10.1 MG/DL    Bilirubin, total 0.4 0.2 - 1.0 MG/DL    ALT (SGPT) 85 (H) 12 - 78 U/L    AST (SGOT) 27 15 - 37 U/L    Alk.  phosphatase 90 45 - 117 U/L    Protein, total 7.0 6.4 - 8.2 g/dL    Albumin 2.8 (L) 3.5 - 5.0 g/dL    Globulin 4.2 (H) 2.0 - 4.0 g/dL    A-G Ratio 0.7 (L) 1.1 - 2.2     CBC W/O DIFF    Collection Time: 08/04/21  4:36 AM   Result Value Ref Range    WBC 13.5 (H) 4.1 - 11.1 K/uL    RBC 3.18 (L) 4.10 - 5.70 M/uL    HGB 9.2 (L) 12.1 - 17.0 g/dL    HCT 30.8 (L) 36.6 - 50.3 %    MCV 96.9 80.0 - 99.0 FL    MCH 28.9 26.0 - 34.0 PG    MCHC 29.9 (L) 30.0 - 36.5 g/dL    RDW 20.7 (H) 11.5 - 14.5 %    PLATELET 97 (L) 638 - 400 K/uL    MPV 12.1 8.9 - 12.9 FL    NRBC 3.3 (H) 0  WBC    ABSOLUTE NRBC 0.45 (H) 0.00 - 0.01 K/uL   MAGNESIUM    Collection Time: 08/04/21  4:36 AM   Result Value Ref Range    Magnesium 2.2 1.6 - 2.4 mg/dL   PHOSPHORUS    Collection Time: 08/04/21  4:36 AM   Result Value Ref Range    Phosphorus 4.9 (H) 2.6 - 4.7 MG/DL   GLUCOSE, POC    Collection Time: 08/04/21  4:40 AM   Result Value Ref Range    Glucose (POC) 213 (H) 65 - 117 mg/dL    Performed by [address]    POC G3 - PUL    Collection Time: 08/04/21  5:43 AM   Result Value Ref Range    FIO2 (POC) 70 %    pH (POC) 7.05 (LL) 7.35 - 7.45      pCO2 (POC) 84.0 (H) 35.0 - 45.0 MMHG    pO2 (POC) 283 (H) 80 - 100 MMHG    HCO3 (POC) 23.4 22 - 26 MMOL/L    sO2 (POC) 99.7 (H) 92 - 97 %    Base deficit (POC) 8.3 mmol/L    Site RIGHT RADIAL      Device: BIPAP MASK      Set Rate 20 bpm    PEEP/CPAP (POC) 6 cmH2O    PIP (POC) 20      Allens test (POC) Positive      Specimen type (POC) ARTERIAL      Critical value read back GONZÁLEZ LEMUS NP    BLOOD GAS, ARTERIAL    Collection Time: 08/04/21  7:15 AM   Result Value Ref Range    pH 7.23 (LL) 7.35 - 7.45      PCO2 49 (H) 35 - 45 mmHg    PO2 110 (H) 80 - 100 mmHg    O2 SAT 97 92 - 97 %    BICARBONATE 20 (L) 22 - 26 mmol/L    BASE DEFICIT 7.7 mmol/L    O2 METHOD VENT      Sample source ARTERIAL      SITE RIGHT BRACHIAL      PATTI'S TEST YES      Critical value read back Called to TAMI HERRMANN on 08/04/2021 at 07:15    GLUCOSE, POC    Collection Time: 08/04/21  8:41 AM   Result Value Ref Range    Glucose (POC) 174 (H) 65 - 117 mg/dL    Performed by Yarelynelli WAYNE    GLUCOSE, POC    Collection Time: 08/04/21 11:16 AM   Result Value Ref Range    Glucose (POC) 127 (H) 65 - 117 mg/dL    Performed by Mindoro Solgaby TONE    GLUCOSE, POC    Collection Time: 08/04/21  5:01 PM   Result Value Ref Range    Glucose (POC) 210 (H) 65 - 117 mg/dL    Performed by Yarely Solgaby WAYNE        Assessment:     Hospital Problems  Date Reviewed: 7/21/2021        Codes Class Noted POA    Acute delirium ICD-10-CM: R41.0  ICD-9-CM: 780.09  7/21/2021 Unknown              Plan/Recommendations/Medical Decision Making:   GI tract functional  Continue tube feeds at goal  May be able to stop TPN soon

## 2021-08-04 NOTE — PROGRESS NOTES
[facility]    ICU TEAM Progress Note    Name: Taylor Barrera   : 1945   MRN: [id number]   Date: 2021      Assessment/Plan:     1. Septic shock-resolving  a. Likely intra-abdominal origin given bowel dilation and likely bacterial translocation. Patient completed course of abx for pneumonia- does not appear to be contributory currently. b. C diff negative- PO vanc and IV flagyl stopped  c. Discontinue Zosyn and Eraxis  d. Off steroids  2. Colonic pseudo-obstruction  a. Ex-lap done overnight of  revealed dilation of small and large bowel. No perforation, no necrosis. C diff negative. b. General surgery following  c.  patient having bowel movements  3. Respiratory insufficiency requiring intubation  a. Code blue called on  given respiratory status. Likely in the setting of shock and significant abdominal distention. Patient intubated but without loss of pulse. b. Patient passed SBT and was extubated to 3L NC 2021  c. Re-intubated secondary to hypercarbia early AM 2021  4. JEREMIAS on CKD  a. Nephrology following  b. Likely progression of underlying CKD. On iHDTRS  c. BID labs  5. Severe malnutrition  a. Per nutrition/dietician patient with clinical characteristics of severe malnutrition  b. Continue TPN and tube feeds per nephrology and surgery recs  c. Lipids discontinued since he is on tube feeds as well  6. Anemia of chronic disease  a.  Daily CBC           F - Feeding:  Yes TPN& tube feeds  A - Analgesia: None  S - Sedation: None  T - DVT Prophylaxis: SCD's or Sequential Compression Device and Heparin   H - Head of Bed: > 30 Degrees  U - Ulcer Prophylaxis: Pepcid (famotidine)   G - Glycemic Control: Insulin  S - Spontaneous Breathing Trial: Yes  B - Bowel Regimen: None needed at this time  I - Indwelling Catheter:   Tubes: None  Lines: Arterial Line and Central Line  Drains: John-Rose Drain (LOBO)  D - De-escalation of Antibiotics: Zosyn    Subjective:   Progress Note: 2021 Reason for ICU Admission: Septic shock and respiratory failure     HPI:  60-year-old gentleman with multiple medical problem admitted to the hospital as a transfer from outside facility on July 21 for acute on chronic kidney disease requiring hemodialysis for hypoxia and pulmonary edema and possible pneumonia. Patient was dialyzed and his condition improved slightly and decision was made to transfer him to regular hemodialysis 3 times a week and he had a permacath inserted on the 27th. Over the last few days there was progressive distention of his abdomen, C. difficile was suspected and stool sample was sent but no results yet. He was evaluated by CAT scan abdomen followed by surgical and GI evaluation on the 27th but at that time there was no indication for immediate intervention and treatment for C. difficile colitis was started empirically. The morning of 28th he had progressive increase in his abdominal pain and suddenly he started to be in agonal breathing and bradycardia. CODE BLUE was called and resuscitation started, he received 2 amp of epinephrine and 1 amp of bicarb and he was emergently intubated. ROSC was achieved [not clear if he lost pulse completely or it was very weak that was not detected]. Patient obtunded however he moves 4 limbs and was reaching out to the ET tube shortly after resuscitation so he was given fentanyl and Ativan. He is hypotensive and requiring pressors. His abdomen is severely distended and x-ray showed diffuse gas but no clear free air.   Discussed this with the general surgeon as suspicion for bowel perforation is high.        Overnight Events:   8/4/2021  - No acute events overnight    POD:  4 Day Post-Op    S/P:   Procedure(s):  LAPAROTOMY EXPLORATORY with decompression of small and large bowel    Objective:   Vital Signs:  Visit Vitals  /61   Pulse 79   Temp (!) 96.3 °F (35.7 °C)   Resp 20   Ht 6' (1.829 m)   Wt 51.5 kg (113 lb 8.6 oz)   SpO2 100%   BMI 15.40 kg/m²    O2 Flow Rate (L/min): 2 l/min O2 Device: Endotracheal tube, Ventilator Temp (24hrs), Av.2 °F (36.2 °C), Min:96.3 °F (35.7 °C), Max:97.9 °F (36.6 °C)           Intake/Output:     Intake/Output Summary (Last 24 hours) at 2021 1041  Last data filed at 2021 0900  Gross per 24 hour   Intake 2422.8 ml   Output 1485 ml   Net 937.8 ml       Physical Exam:    General:  Sedated on vent. No acute distress. Cachetic. Temporal wasting, sunken orbits   Eyes:  Sclera anicteric. Pupils equal, round, reactive to light. Mouth/Throat: Mucous membranes dry. Neck: Supple. Lungs:   Clear to auscultation bilaterally, poor effort   Cardiovascular:  Regular rate and rhythm, no murmur, click, rub, or gallop. Abdomen:   Midline incision. Dressing intact with small amount of shadowing. L abd LOBO drain with serosanguinous output. Mildly distended. Extremities: No cyanosis or edema. Skin: No acute rash or lesions. Lymph Nodes: Cervical and supraclavicular normal.   Musculoskeletal:  No swelling or deformity. Lines/Devices:  Intact, no erythema, drainage, or tenderness. Neuro/Psych: Sedated         LABS AND  DATA: Personally reviewed  Recent Labs     216 21  0342   WBC 13.5* 14.2*   HGB 9.2* 9.5*   HCT 30.8* 31.5*   PLT 97* 113*     Recent Labs     21  0436 21  1422 21  0343 21  0343    138   < > 140   K 3.6 2.9*   < > 2.8*   * 104   < > 108   CO2 24 28   < > 19*   BUN 47* 28*   < > 52*   CREA 3.14* 2.06*   < > 3.29*   * 165*   < > 101*   CA 7.5* 7.6*   < > 7.8*   MG 2.2 2.2   < >  --    PHOS 4.9*  --   --  4.2    < > = values in this interval not displayed. Recent Labs     21  0436 21  0343 21  0342 21  034   AP 90  --   --  84   TP 7.0  --   --  7.1   ALB 2.8* 3.0*   < > 3.0*   GLOB 4.2*  --   --  4.1*    < > = values in this interval not displayed.      No results for input(s): INR, PTP, APTT, INREXT, INREXT in the last 72 hours. Recent Labs     08/04/21  0543 08/03/21  2337   PHI 7.05* 7.18*   PCO2I 84.0* 61.6*   PO2I 283* 132*   FIO2I 70 70     No results for input(s): CPK, CKMB, TROIQ, BNPP in the last 72 hours. Ventilator Settings:  Mode Rate Tidal Volume Pressure FiO2 PEEP   Assist control, Volume control   440 ml  8 cm H2O 50 % 5 cm H20     Peak airway pressure: 28 cm H2O    Minute ventilation: 12.4 l/min        MEDS: Reviewed    Chest X-Ray:  CXR Results  (Last 48 hours)               08/04/21 0721  XR CHEST PORT Final result    Impression:  Lines and tubes in appropriate position. Chronic left hemidiaphragm elevation. Chronic pancreatitis. Narrative:  PORTABLE CHEST RADIOGRAPH/S: 8/4/2021 7:21 AM       INDICATION: ET tube placement. COMPARISON: 7/28/2021, 7/21/2021, 3/8/2021. TECHNIQUE: Portable frontal semiupright radiograph/s of the chest.       FINDINGS:    An ET tube, NG tube, left IJ central line, and right IJ hemodialysis catheter   are in appropriate position. The left hemidiaphragm is chronically elevated. Passive atelectasis is associated; the lungs are otherwise clear. The central   airways are patent. No pneumothorax and no large pleural effusion. Calcifications in the pancreatic tail are sequela of old pancreatitis. Multidisciplinary Rounds Completed: Yes    ABCDEF Bundle/Checklist Completed:  Yes    SPECIAL EQUIPMENT  IHD    DISPOSITION  Stay in ICU    CRITICAL CARE CONSULTANT NOTE  I had a face to face encounter with the patient, reviewed and interpreted patient data including clinical events, labs, images, vital signs, I/O's, and examined patient.   I have discussed the case and the plan and management of the patient's care with the consulting services, the bedside nurses and the respiratory therapist.      NOTE OF PERSONAL INVOLVEMENT IN CARE   This patient has a high probability of imminent, clinically significant deterioration, which requires the highest level of preparedness to intervene urgently. I participated in the decision-making and personally managed or directed the management of the following life and organ supporting interventions that required my frequent assessment to treat or prevent imminent deterioration. I personally spent 50 minutes of critical care time. This is time spent at this critically ill patient's bedside actively involved in patient care as well as the coordination of care and discussions with the patient's family. This does not include any procedural time which has been billed separately.       Joanne Montague [facility]     Critical Care Medicine  [employer]      8/4/2021

## 2021-08-04 NOTE — CONSULTS
Palliative Medicine Consult  [place]: [phone]    Patient Name: Nolvia Benton  YOB: 1945    Date of Initial Consult: August 4, 2021  Reason for Consult: Care Decisions  Requesting Provider: Joanne Montague NP  Primary Care Physician: Perez Duke MD     SUMMARY:   Nolvia Benton is a 77 y.o. with a past history of type 2 diabetes, chronic diastolic congestive heart failure, dyslipidemia, hypertension, CKD, who was admitted on 7/21/2021 from an outside facility with a diagnosis of septic shock, pneumonia, colonic pseudoobstruction, respiratory failure requiring intubation, CODE BLUE 7/28~ extubated 7/30/21, JEREMIAS on CKD (dialysis), severe malnutrition (TPN),  Anemia of chronic dz. Admission complicated by reintubation secondary to hypercarbia 8/4/2021. Current medical issues leading to Palliative Medicine involvement include: pt acutely ill with high risk of decompensation. PALLIATIVE DIAGNOSES:   1. Shortness of breath  2. Hypoxia  3. Delirium  4. Hypoalbuminemia  5. Feeding difficulties  6. Goals of care       PLAN:   1. Patient seen and family contacted  2. The patient has 5 children without a medical power of   3. Spoke with daughter Phil Toscano today and offered to have a family meeting to address goals of care. 4. We have a meeting scheduled to include the patient's sons tomorrow August 5, 2021 at 10 AM  5. Initial consult note routed to primary continuity provider and/or primary health care team members  6. Communicated plan of care with: Palliative Boy [facility] Team     GOALS OF CARE / TREATMENT PREFERENCES:     GOALS OF CARE:  Patient/Health Care Proxy Stated Goals: Other (comment) (To be determined)    TREATMENT PREFERENCES:   Code Status: Full Code    Advance Care Planning:  [] [facility] Interdisciplinary Team has updated the ACP Navigator with Health Care Decision Maker and Patient Capacity      Primary Decision Maker:  Bharat Funez - Daughter - [phone]  Advance Care Planning 3/5/2021   Confirm Advance Directive None   Patient Would Like to Complete Advance Directive Yes       Medical Interventions: Full interventions     Other Instructions:   Artificially Administered Nutrition: Feeding tube for a defined trial period     Other:    As far as possible, the palliative care team has discussed with patient / health care proxy about goals of care / treatment preferences for patient. HISTORY:     History obtained from: Chart, team    CHIEF COMPLAINT: Patient admitted with aforementioned history and issues    HPI/SUBJECTIVE:    The patient is:   [] Verbal and participatory  [x] Non-participatory due to:   Medical condition    Clinical Pain Assessment (nonverbal scale for severity on nonverbal patients):   Clinical Pain Assessment  Severity: 0     Activity (Movement): Lying quietly, normal position    Duration: for how long has pt been experiencing pain (e.g., 2 days, 1 month, years)  Frequency: how often pain is an issue (e.g., several times per day, once every few days, constant)     FUNCTIONAL ASSESSMENT:     Palliative Performance Scale (PPS):  PPS: 40       PSYCHOSOCIAL/SPIRITUAL SCREENING:     Palliative IDT has assessed this patient for cultural preferences / practices and a referral made as appropriate to needs (Cultural Services, Patient Advocacy, Ethics, etc.)    Any spiritual / Sikh concerns:  [] Yes /  [] No    Caregiver Burnout:  [] Yes /  [] No /  [x] No Caregiver Present      Anticipatory grief assessment:   [x] Normal  / [] Maladaptive       ESAS Anxiety: Anxiety: 0    ESAS Depression:          REVIEW OF SYSTEMS:     Positive and pertinent negative findings in ROS are noted above in HPI. The following systems were [x] reviewed objectively/ [] unable to be reviewed as noted in HPI  Other findings are noted below.   Systems: constitutional, ears/nose/mouth/throat, respiratory, gastrointestinal, genitourinary, musculoskeletal, integumentary, neurologic, psychiatric, endocrine. Positive findings noted below. Modified ESAS Completed by: provider   Fatigue: 10 Drowsiness: 10     Pain: 0   Anxiety: 0     Anorexia: 10 Dyspnea: 0           Stool Occurrence(s): 1        PHYSICAL EXAM:     From RN flowsheet:  Wt Readings from Last 3 Encounters:   08/03/21 113 lb 8.6 oz (51.5 kg)   07/20/21 150 lb (68 kg)   04/07/21 164 lb 6.4 oz (74.6 kg)     Blood pressure (!) 108/53, pulse (!) 58, temperature 97.7 °F (36.5 °C), resp. rate 28, height 6' (1.829 m), weight 113 lb 8.6 oz (51.5 kg), SpO2 100 %.     Pain Scale 1: Adult Nonverbal Pain Scale  Pain Intensity 1: 0     Pain Location 1: Abdomen     Pain Description 1: Constant  Pain Intervention(s) 1: Medication (see MAR) (fentanyl gtt)  Last bowel movement, if known:     Constitutional: Ill-appearing  Eyes:  ENMT: no nasal discharge, dry mucous membranes  Cardiovascular:   Respiratory: breathing not labored on vent, symmetric  Gastrointestinal:   Musculoskeletal: no deformity, no tenderness to palpation  Skin: warm, dry  Neurologic: Sedated  Psychiatric: Unable to assess  Other:       HISTORY:     Active Problems:    Acute delirium (7/21/2021)      Past Medical History:   Diagnosis Date    CKD (chronic kidney disease) stage 3, GFR 30-59 ml/min ([facility]) 2/8/2021    Diabetes ([address]     Essential hypertension 2/8/2021    Hypertension     Insulin-treated type 2 diabetes mellitus ([address] 2/8/2021    Secondary hyperpar[facility] [address] 2/8/2021    Vitamin D deficiency 2/8/2021      Past Surgical History:   Procedure Laterality Date    HX ORTHOPAEDIC      amputation left big toe    IR INSERT NON TUNL CVC OVER 5 YRS  7/21/2021    IR INSERT TUNL CVC W/O PORT OVER 5 YR  7/27/2021      Family History   Problem Relation Age of Onset    Diabetes Mother     Hypertension Mother     Cancer Mother     Diabetes Father     Hypertension Father     Cancer Father       History reviewed, no pertinent family history.   Social History     Tobacco Use    Smoking status: Never Smoker    Smokeless tobacco: Never Used   Substance Use Topics    Alcohol use: Yes     Comment: rarely     No Known Allergies   Current Facility-Administered Medications   Medication Dose Route Frequency    fentaNYL (PF) 1,500 mcg/30 mL (50 mcg/mL) infusion  0-200 mcg/hr IntraVENous TITRATE    dexmedeTOMidine in 0.9 % NaCl (PRECEDEX) 400 mcg/100 mL (4 mcg/mL) infusion soln  0.1-1.5 mcg/kg/hr IntraVENous TITRATE    PHENYLephrine (CASSANDRA-SYNEPHRINE) 30 mg in 0.9% sodium chloride 250 mL infusion   mcg/min IntraVENous TITRATE    TPN ADULT - CENTRAL   IntraVENous CONTINUOUS    insulin lispro (HUMALOG) injection   SubCUTAneous Q6H    insulin glargine (LANTUS) injection 10 Units  10 Units SubCUTAneous DAILY    famotidine (PF) (PEPCID) 20 mg in 0.9% sodium chloride 10 mL injection  20 mg IntraVENous Q24H    sodium bicarbonate (8.4%) injection 50 mEq  50 mEq IntraVENous NOW    TPN ADULT - CENTRAL   IntraVENous CONTINUOUS    thiamine HCL (B-1) tablet 100 mg  100 mg Oral DAILY    heparin (porcine) 1,000 unit/mL injection 1,900 Units  1,900 Units InterCATHeter DIALYSIS PRN    And    heparin (porcine) 1,000 unit/mL injection 1,900 Units  1,900 Units InterCATHeter DIALYSIS PRN    fentaNYL citrate (PF) injection 50 mcg  50 mcg IntraVENous Q1H PRN    LORazepam (ATIVAN) injection 2 mg  2 mg IntraVENous Q2H PRN    heparin (porcine) injection 5,000 Units  5,000 Units SubCUTAneous Q12H    chlorhexidine (PERIDEX) 0.12 % mouthwash 15 mL  15 mL Oral Q12H    epoetin mya-epbx (RETACRIT) injection 10,000 Units  10,000 Units SubCUTAneous DIALYSIS MON, WED & FRI    [Held by provider] DULoxetine (CYMBALTA) capsule 60 mg  60 mg Oral DAILY    [Held by provider] hydrALAZINE (APRESOLINE) tablet 50 mg  50 mg Oral TID    [Held by provider] NIFEdipine ER (PROCARDIA XL) tablet 60 mg  60 mg Oral BID    [Held by provider] pravastatin (PRAVACHOL) tablet 20 mg  20 mg Oral QHS    sodium chloride (NS) flush 5-40 mL  5-40 mL IntraVENous Q8H    sodium chloride (NS) flush 5-40 mL  5-40 mL IntraVENous PRN    acetaminophen (TYLENOL) tablet 650 mg  650 mg Oral Q6H PRN    Or    acetaminophen (TYLENOL) suppository 650 mg  650 mg Rectal Q6H PRN    polyethylene glycol (MIRALAX) packet 17 g  17 g Oral DAILY PRN    ondansetron (ZOFRAN) injection 4 mg  4 mg IntraVENous Q6H PRN    glucose chewable tablet 16 g  4 Tablet Oral PRN    dextrose (D50W) injection syrg 12.5-25 g  12.5-25 g IntraVENous PRN    glucagon (GLUCAGEN) injection 1 mg  1 mg IntraMUSCular PRN    L.acidophilus-paracasei-S.thermophil-bifidobacter (RISAQUAD) 8 billion cell capsule  1 Capsule Oral DAILY    [Held by provider] calcium acetate (phosphate binder) (PHOSLO) tablet 667 mg  1 Tablet Oral TID WITH MEALS          LAB AND IMAGING FINDINGS:     Lab Results   Component Value Date/Time    WBC 13.5 (H) 08/04/2021 04:36 AM    HGB 9.2 (L) 08/04/2021 04:36 AM    PLATELET 97 (L) 46/86/9623 04:36 AM     Lab Results   Component Value Date/Time    Sodium 138 08/04/2021 04:36 AM    Potassium 3.6 08/04/2021 04:36 AM    Chloride 109 (H) 08/04/2021 04:36 AM    CO2 24 08/04/2021 04:36 AM    BUN 47 (H) 08/04/2021 04:36 AM    Creatinine 3.14 (H) 08/04/2021 04:36 AM    Calcium 7.5 (L) 08/04/2021 04:36 AM    Magnesium 2.2 08/04/2021 04:36 AM    Phosphorus 4.9 (H) 08/04/2021 04:36 AM      Lab Results   Component Value Date/Time    Alk.  phosphatase 90 08/04/2021 04:36 AM    Protein, total 7.0 08/04/2021 04:36 AM    Albumin 2.8 (L) 08/04/2021 04:36 AM    Globulin 4.2 (H) 08/04/2021 04:36 AM     Lab Results   Component Value Date/Time    INR 1.5 (H) 07/28/2021 10:34 AM    Prothrombin time 15.2 (H) 07/28/2021 10:34 AM      Lab Results   Component Value Date/Time    Iron 17 (L) 07/21/2021 08:54 AM    TIBC 209 (L) 07/21/2021 06:27 AM    Iron % saturation 10 (L) 07/21/2021 06:27 AM    Ferritin 494 (H) 07/21/2021 06:27 AM      Lab Results Component Value Date/Time    pH 7.23 (LL) 08/04/2021 07:15 AM    PCO2 49 (H) 08/04/2021 07:15 AM    PO2 110 (H) 08/04/2021 07:15 AM     No components found for: [place]   Lab Results   Component Value Date/Time     (H) 07/20/2021 06:40 PM                Total time: 50 minutes  Counseling / coordination time, spent as noted above: 40 minutes  > 50% counseling / coordination?:  Yes    Prolonged service was provided for  []30 min   []75 min in face to face time in the presence of the patient, spent as noted above. Time Start:   Time End:   Note: this can only be billed with 73218 (initial) or 91909 (follow up). If multiple start / stop times, list each separately.

## 2021-08-04 NOTE — PROGRESS NOTES
TRANSITION OF CARE  RUR--42%  Disposition--TBD. Extubated 7/30/21. Re-intubated morning of 8/4/21. Palliative family meeting planned for 8/5/21. Transport--TBD. Note: Previous CM spoke with Kandi Steele with [facility] [phone] to update her on patient's clinical condition. Per Henrique Madden patient has been set with with Dialysis at [facility] in [place] for a Tues-Thurs-Sat schedule with a 11:30 am chair time. Phone # [phone]    CM reviewed case with treatment team during  ICU Interdisciplinary Rounds. Patient was re-intubated this morning. HD has been transitioned from MWF to TTS. Patient continues with TPN, tube feeding. Speech recommends NPO. Wound care is following.

## 2021-08-04 NOTE — PROGRESS NOTES
SLP Contact Note    Pt is once again intubated and sedated. Will sign-off.       Thank you,  LEE ANN ColeEd, [id number] [facility]  Speech-Language Pathologist

## 2021-08-04 NOTE — PROGRESS NOTES
2000. Pt responsive, nodding to questions, following simple commands. Restlessly moving legs, denies discomfort. Remains on bipap 16 / 6 and 70%, pt tachypneic- RR in 30's, with some increased work of breathing observed. 2115. TAMI Chavez and MD [place] at bedside. Bipap adjusted by provider to 20 / 6. ABG's ordered for 2300.    0000. abg's show improvement in CO2, although pH remains unchanged. Rate of 20 added to bipap.    0600. abg's drawn, ph 7.05 / CO2 84. Pt minimally responsive.     0700. Pt intubated by TAMI Chavez and  without difficulty. Steve push given by provider for hypotension during intubation, orders received for steve infusion. Fentanyl started at 50 mcg for patient comfort. Steve infusion currently on standby, precedex ordered, not yet started. 0740. BP 84/52 map 63, steve started.

## 2021-08-04 NOTE — PROGRESS NOTES
TRANSFER - OUT REPORT:    Verbal report given to Carina Olivares RN(name) on Gisela Bird  being transferred to CCU(unit) for routine progression of care       Report consisted of patients Situation, Background, Assessment and   Recommendations(SBAR). Information from the following report(s) SBAR, Kardex, ED Summary, Procedure Summary, Intake/Output, MAR, Recent Results and Cardiac Rhythm SR was reviewed with the receiving nurse. Lines:   Quad Lumen central line 07/28/21 Anterior; Left Neck (Active)   Central Line Being Utilized Yes 08/04/21 1600   Criteria for Appropriate Use Hemodynamically unstable, requiring monitoring lines, vasopressors, or volume resuscitation 08/04/21 1600   Site Assessment Clean, dry, & intact 08/04/21 1600   Infiltration Assessment 0 08/04/21 1600   Affected Extremity/Extremities Color distal to insertion site pink (or appropriate for race); Pulses palpable;Range of motion performed 08/04/21 1600   Date of Last Dressing Change 08/04/21 08/04/21 1734   Dressing Status New 08/04/21 1734   Dressing Type Disk with Chlorhexadine gluconate (CHG); Transparent 08/04/21 1600   Action Taken Open ports on tubing capped 08/03/21 2000   Proximal Hub Color/Line Status White 08/04/21 1600   Positive Blood Return (Medial Site) Yes 08/04/21 1600   Medial 1 Hub Color/Line Status Gray 08/04/21 1600   Positive Blood Return (Lateral Site) Yes 08/04/21 1600   Medial 2 Hub Color/Line Status Blue 08/04/21 1600   Positive Blood Return (Site #3) Yes 08/04/21 1600   Distal Hub Color/Line Status Brown 08/04/21 1600   Positive Blood Return (Site #4) Yes 08/04/21 1600   Alcohol Cap Used Yes 08/04/21 1600       Peripheral IV Left Arm (Active)   Site Assessment Clean, dry, & intact 08/04/21 1600   Phlebitis Assessment 0 08/04/21 1600   Infiltration Assessment 0 08/04/21 1600   Dressing Status Clean, dry, & intact 08/04/21 1600   Dressing Type Tape;Transparent 08/04/21 1600   Hub Color/Line Status Green;Capped 08/04/21 1600 Action Taken Open ports on tubing capped 08/03/21 2000   Alcohol Cap Used Yes 08/04/21 1600        Opportunity for questions and clarification was provided.       Patient transported with:   Monitor  O2 @ 10 liters  Registered Nurse  [facility]

## 2021-08-04 NOTE — PROGRESS NOTES
Had an extensive conversation with family today regarding patients condition. I explined I spoke with Dr. Berto Irizarry, and given worsening respiratory status and need for lifelong dialysis, the patient has an approximate mortality rate of 90%. I spoke with them about trach and peg placement and what that would entail and also spoke with them about comfort measure and quality vs quantity of life. I expressed we will support their decisions and I will consult our palliative care team for further care decisions. The family is appreciative, and will talk about what they think is the best next step in the care of the patient.        Traci Han [facility]-BC     Critical Care Medicine  [employer]

## 2021-08-04 NOTE — PROCEDURES
[facility]      Procedure Note - Intubation:   Performed by Abby Juarez MD .     Procedure status: Emergent    Immediately prior to the procedure, the patient was reevaluated and found suitable for the planned procedure. A time out was called to verify the correct patient, procedure, equipment, staff, and marking as appropriate. Preoxygenation applied via Bipap  Medications given were ketamine and rocuronium (Zemuron). Grade 1 view obtained with CMAC 4 blade  A number 7.5 cuffed ETT was placed to 26 cm at the teeth. Placement was evaluated by noting bilateral, symmetric breath sounds and good end-tidal CO2 detector color change . Attempts required: 2. Complications: torn balloon on first intubation - replaced over bougie with no difficulty. No hypoxia. Some temporary hypotension that responded well to phenylephrine. An endotracheal tube introducer (bougie) was used to assist tube insertion. .  The procedure was tolerated moderately. Procedure performed by RICARDO Rangel. I was present at the bedside throughout the procedure.      Abby Juarez MD  [facility] Critical [facility]  Intensivist/Anesthesiologist  8/4/2021  6:23 AM

## 2021-08-04 NOTE — PROGRESS NOTES
Shift Summary-Patient vented, fentanyl gtt at 50 mcq. Precedex started this morning for vent asynchrony and restlessness. Steve gtt titrated up to 50mcq. Intermittently follows simple commands, R arm weak. Spontaneously moves lower extremities. Diarrhea via flexiseal. Tolerating TF via NGT. 1930-Bedside and Verbal shift change report given to [place] (oncoming nurse) by Cindy Cloud (offgoing nurse). Report included the following information SBAR, Kardex, ED Summary, Procedure Summary, Intake/Output, MAR, Recent Results and Cardiac Rhythm SR/SB.

## 2021-08-04 NOTE — ROUTINE PROCESS
2000. Bedside and Verbal shift change report given to [address] (oncoming nurse) by Efren Matt and Idalia Aranda (offgoing nurse). Report included the following information SBAR, Kardex, Procedure Summary, Intake/Output, MAR, Recent Results, Cardiac Rhythm nsr, Alarm Parameters  and Dual Neuro Assessment. 0800. Bedside and Verbal shift change report given to Jon Skinner (oncoming nurse) by [address] (offgoing nurse). Report included the following information SBAR, Kardex, ED Summary, OR Summary, Procedure Summary, Intake/Output, MAR, Accordion, Recent Results, Cardiac Rhythm nsr, Alarm Parameters  and Dual Neuro Assessment.

## 2021-08-05 NOTE — PROGRESS NOTES
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 Noted plans for family meeting later today     Current Medications: all current  Medications have been eviewed in EPIC  Review of Systems: Review of systems not obtained due to patient factors. Objective:  Vitals:    Vitals:    08/05/21 0545 08/05/21 0600 08/05/21 0615 08/05/21 0630   BP: 102/63 (!) 83/53 108/60 110/65   Pulse: (!) 55 (!) 55 (!) 53 (!) 55   Resp:  22     Temp:       TempSrc:       SpO2:  99%     Weight:       Height:         Intake and Output:  08/04 1901 - 08/05 0700  In: 1670 [I.V.:920]  Out: 1025 [Drains:25]  08/03 0701 - 08/04 1900  In: 4388.2 [I.V.:2438.2]  Out: 6736 [Drains:1370]    Physical Examination:  General: On vent   Neck:  Lines +  Resp:  Diminished B/L  CV:  RRR,  no murmur or rub, trace LE edema  GI:  Non tender, midline incision w/ staples, hematoma   Neurologic:  Sedated   Psych:             Unable to assess  Access:           MARLY BONILLA    [x]    High complexity decision making was performed  []    Patient is at high-risk of decompensation with multiple organ involvement    Lab Data Personally Reviewed: I have reviewed all the pertinent labs, microbiology data and radiology studies during assessment.     Recent Labs     08/04/21  0436 08/03/21  1422 08/03/21  0343 08/03/21  0342 08/03/21  0481  138 140   < > 139   K 3.6 2.9* 2.8*   < > 2.7*   * 104 108   < > 110*   CO2 24 28 19*   < > 19*   * 165* 101*   < > 101*   BUN 47* 28* 52*   < > 52*   CREA 3.14* 2.06* 3.29*   < > 3.21*   CA 7.5* 7.6* 7.8*   < > 7.8*   MG 2.2 2.2  --   --  2.6*   PHOS 4.9*  --  4.2  --   --    ALB 2.8*  --  3.0*  --  3.0*   ALT 85*  --   --   --  105*    < > = values in this interval not displayed. Recent Labs     08/04/21  0436 08/03/21  0342   WBC 13.5* 14.2*   HGB 9.2* 9.5*   HCT 30.8* 31.5*   PLT 97* 113*     No results found for: [facility]  Lab Results   Component Value Date/Time    Culture result: NO GROWTH 5 DAYS 07/29/2021 01:37 PM    Culture result:  07/21/2021 08:54 AM     MRSA NOT PRESENT. Apparent Staphylococus aureus (not MRSA noted). Culture result:  07/21/2021 08:54 AM     Screening of patient nares for MRSA is for surveillance purposes and, if positive, to facilitate isolation considerations in high risk settings. It is not intended for automatic decolonization interventions per se as regimens are not sufficiently effective to warrant routine use.      Recent Results (from the past 24 hour(s))   BLOOD GAS, ARTERIAL    Collection Time: 08/04/21  7:15 AM   Result Value Ref Range    pH 7.23 (LL) 7.35 - 7.45      PCO2 49 (H) 35 - 45 mmHg    PO2 110 (H) 80 - 100 mmHg    O2 SAT 97 92 - 97 %    BICARBONATE 20 (L) 22 - 26 mmol/L    BASE DEFICIT 7.7 mmol/L    O2 METHOD VENT      Sample source ARTERIAL      SITE RIGHT BRACHIAL      PATTI'S TEST YES      Critical value read back Called to TAMI HERRMANN on 08/04/2021 at 07:15    GLUCOSE, POC    Collection Time: 08/04/21  8:41 AM   Result Value Ref Range    Glucose (POC) 174 (H) 65 - 117 mg/dL    Performed by [address], POC    Collection Time: 08/04/21 11:16 AM   Result Value Ref Range    Glucose (POC) 127 (H) 65 - 117 mg/dL    Performed by [address] MUNGUIA, POC    Collection Time: 08/04/21  5:01 PM   Result Value Ref Range    Glucose (POC) 210 (H) 65 - 117 mg/dL    Performed by Yordan Hawley    POC EG7    Collection Time: 08/04/21  8:49 PM   Result Value Ref Range    Calcium, ionized (POC) 1.11 (L) 1.12 - 1.32 mmol/L    FIO2 (POC) 40 %    pH (POC) 7.28 (L) 7.35 - 7.45      pCO2 (POC) 38.3 35.0 - 45.0 MMHG    pO2 (POC) 172 (H) 80 - 100 MMHG    HCO3 (POC) 18.1 (L) 22 - 26 MMOL/L    Base deficit (POC) 7.9 mmol/L    sO2 (POC) 99.4 (H) 92 - 97 %    Site RIGHT RADIAL      Device: ADULT VENT      Mode ASSIST CONTROL      Tidal volume 440 ml    Set Rate 28 bpm    PEEP/CPAP (POC) 5 cmH2O    Mean Airway Pressure 13 cmH2O    Allens test (POC) Positive      Specimen type (POC) ARTERIAL     GLUCOSE, POC    Collection Time: 08/05/21 12:20 AM   Result Value Ref Range    Glucose (POC) 130 (H) 65 - 117 mg/dL    Performed by HERB ZIEGLER    POC G3 - PUL    Collection Time: 08/05/21  5:41 AM   Result Value Ref Range    FIO2 (POC) 21 %    pH (POC) 7.42 7.35 - 7.45      pCO2 (POC) 39.3 35.0 - 45.0 MMHG    pO2 (POC) 80 80 - 100 MMHG    HCO3 (POC) 25.3 22 - 26 MMOL/L    sO2 (POC) 96.0 92 - 97 %    Base excess (POC) 0.7 mmol/L    Site RIGHT BRACHIAL      Mode ASSIST CONTROL      Tidal volume 550 ml    Set Rate 22 bpm    PEEP/CPAP (POC) 5 cmH2O    Allens test (POC) NOT APPLICABLE      Specimen type (POC) ARTERIAL     GLUCOSE, POC    Collection Time: 08/05/21  6:06 AM   Result Value Ref Range    Glucose (POC) 143 (H) 65 - 117 mg/dL    Performed by Aleks Carrera MD  [address]  Phone - [phone]   Fax - [phone]  [email or web address]

## 2021-08-05 NOTE — PROGRESS NOTES
Follow up visit with Mr. Leif Paulson following transition to comfort. Consulted with nursing staff. Pt's family have left the hospital.    Celester Meo can be paged back as needed and at time of pt's death.     [address]en Valentine MDiv, [facility]

## 2021-08-05 NOTE — DIALYSIS
[facility] Dialysis Team [facility]  [phone]    Vitals   Pre   Post   Assessment   Pre   Post     Temp  Temp: 98.1 °F (36.7 °C) (08/05/21 0330) 97.9 LOC  Sedated No change   HR   Pulse (Heart Rate): 61 (08/05/21 0330) 55 Lungs   Clear/diminished. Intubated and mechanically ventilated No change   B/P   BP: 136/65 (08/05/21 0330) 93/81 Cardiac   RRR No change   Resp   Resp Rate: 22 (08/05/21 0330) 22 Skin   Warm/dry/intact No change    Pain level  Pain Intensity 1: 0 (08/05/21 0000) 0 Edema  None     No change   Orders:    Duration:   Start:    0330 End:    0630 Total:   3 hours   Dialyzer:   Dialyzer/Set Up Inspection: Revaclear (08/05/21 0330)   K Bath:   Dialysate K (mEq/L): 4 (08/05/21 0330)   Ca Bath:   Dialysate CA (mEq/L): 2.5 (08/05/21 0330)   Na/Bicarb:   Dialysate NA (mEq/L): 140 (08/05/21 0330)   Target Fluid Removal:   Goal/Amount of Fluid to Remove (mL): 1000 mL (08/05/21 0330)   Access     Type & Location:   RIJ dual lumen CVC. Drsg clean/dry/intact with biopatch in place, dated 8/3/21. Arterial and venous limbs of the CVC prepped per protocol, both aspirate and flush easily.      Labs     Obtained/Reviewed   Critical Results Called   Date when labs were drawn-  Hgb-    HGB   Date Value Ref Range Status   08/04/2021 9.2 (L) 12.1 - 17.0 g/dL Final     K-    Potassium   Date Value Ref Range Status   08/04/2021 3.6 3.5 - 5.1 mmol/L Final     Ca-   Calcium   Date Value Ref Range Status   08/04/2021 7.5 (L) 8.5 - 10.1 MG/DL Final     Bun-   BUN   Date Value Ref Range Status   08/04/2021 47 (H) 6 - 20 MG/DL Final     Comment:     INVESTIGATED PER DELTA CHECK PROTOCOL     Creat-   Creatinine   Date Value Ref Range Status   08/04/2021 3.14 (H) 0.70 - 1.30 MG/DL Final     Comment:     INVESTIGATED PER DELTA CHECK PROTOCOL        Medications/ Blood Products Given     Name   Dose   Route and Time                     Blood Volume Processed (BVP):   62 Net Fluid   Removed:  1000   Comments   Time Out Done: (Time)  Primary Nurse Rpt Pre: Brandy Fang RN  Primary Nurse Rpt Post: Brandy Fang RN  Pt Education: HD procedure  Care Plan: Pt will tolerate HD without adverse effects  Tx Summary: 0330 HD initiated. 0400 Phenylephrine titrated up by primary RN for hypotension. 0415 Phenylephrine titrated up again for hypotension. 3054 Dr Mirtha Wheat rounding. 0630  Treatment concluded. All possible blood rinsed back to the patient. CVC flushed and new Qsytes, caps placed. Admiting Diagnosis: Sepsis, Respiratory Failure, JEREMIAS on CKD  Pt's previous clinic-  Consent signed - Informed Consent Verified: Yes (08/05/21 0330)  [facility] Consent -   Hepatitis Status- Negative/Immune 7/21/21 ([facility])  Machine #- Machine Number: [id number] (08/05/21 0330)  Telemetry status- Bedside/Remote Monitor  Pre-dialysis wt. - Pre-Dialysis Weight: 57.4 kg (126 lb 8.7 oz) (07/26/21 1730)

## 2021-08-05 NOTE — PROGRESS NOTES
Problem: Pressure Injury - Risk of  Goal: *Prevention of pressure injury  Description: Document Newton Scale and appropriate interventions in the flowsheet. Outcome: Progressing Towards Goal  Note: Pressure Injury Interventions:  Sensory Interventions: Assess changes in LOC    Moisture Interventions: Absorbent underpads    Activity Interventions: Pressure redistribution bed/mattress(bed type)    Mobility Interventions: Assess need for specialty bed    Nutrition Interventions: Document food/fluid/supplement intake    Friction and Shear Interventions: Apply protective barrier, creams and emollients                Problem: Patient Education: Go to Patient Education Activity  Goal: Patient/Family Education  Outcome: Progressing Towards Goal     Problem: Pain  Goal: *Control of Pain  Outcome: Progressing Towards Goal  Goal: *PALLIATIVE CARE:  Alleviation of Pain  Outcome: Progressing Towards Goal     Problem: Patient Education: Go to Patient Education Activity  Goal: Patient/Family Education  Outcome: Progressing Towards Goal     Problem: Falls - Risk of  Goal: *Absence of Falls  Description: Document Ryan Fall Risk and appropriate interventions in the flowsheet. Outcome: Progressing Towards Goal  Note: Fall Risk Interventions:  Mobility Interventions: Communicate number of staff needed for ambulation/transfer    Mentation Interventions: Evaluate medications/consider consulting pharmacy    Medication Interventions: Evaluate medications/consider consulting pharmacy    Elimination Interventions:  Toileting schedule/hourly rounds    History of Falls Interventions: Evaluate medications/consider consulting pharmacy         Problem: Patient Education: Go to Patient Education Activity  Goal: Patient/Family Education  Outcome: Progressing Towards Goal     Problem: Patient Education: Go to Patient Education Activity  Goal: Patient/Family Education  Outcome: Progressing Towards Goal     Problem: Patient Education: Go to Patient Education Activity  Goal: Patient/Family Education  Outcome: Progressing Towards Goal     Problem: Risk for Spread of Infection  Goal: Prevent transmission of infectious organism to others  Description: Prevent the transmission of infectious organisms to other patients, staff members, and visitors.   Outcome: Progressing Towards Goal     Problem: Patient Education:  Go to Education Activity  Goal: Patient/Family Education  Outcome: Progressing Towards Goal     Problem: Patient Education: Go to Patient Education Activity  Goal: Patient/Family Education  Outcome: Progressing Towards Goal     Problem: Ventilator Management  Goal: *Adequate oxygenation and ventilation  Outcome: Progressing Towards Goal  Goal: *Patient maintains clear airway/free of aspiration  Outcome: Progressing Towards Goal  Goal: *Absence of infection signs and symptoms  Outcome: Progressing Towards Goal  Goal: *Normal spontaneous ventilation  Outcome: Progressing Towards Goal     Problem: Patient Education: Go to Patient Education Activity  Goal: Patient/Family Education  Outcome: Progressing Towards Goal     Problem: Non-Violent Restraints  Goal: Removal from restraints as soon as assessed to be safe  Outcome: Progressing Towards Goal  Goal: No harm/injury to patient while restraints in use  Outcome: Progressing Towards Goal  Goal: Patient's dignity will be maintained  Outcome: Progressing Towards Goal  Goal: Patient Interventions  Outcome: Progressing Towards Goal     Problem: Nutrition Deficit  Goal: *Optimize nutritional status  Outcome: Progressing Towards Goal     Problem: Nutrition Deficit  Goal: *Optimize nutritional status  Outcome: Progressing Towards Goal     Problem: Airway Clearance - Ineffective  Goal: *Patent airway  Outcome: Progressing Towards Goal  Goal: *Absence of airway secretions  Outcome: Progressing Towards Goal  Goal: *Able to cough effectively  Outcome: Progressing Towards Goal     Problem: Patient Education: Go to Patient Education Activity  Goal: Patient/Family Education  Outcome: Progressing Towards Goal     Problem: Breathing Pattern - Ineffective  Goal: *Absence of hypoxia  Outcome: Progressing Towards Goal  Goal: *Use of effective breathing techniques  Outcome: Progressing Towards Goal     Problem: Patient Education: Go to Patient Education Activity  Goal: Patient/Family Education  Outcome: Progressing Towards Goal     Problem: Infection - Risk of, Central Venous Catheter-Associated Bloodstream Infection  Goal: *Absence of infection signs and symptoms  Outcome: Progressing Towards Goal     Problem: Patient Education: Go to Patient Education Activity  Goal: Patient/Family Education  Outcome: Progressing Towards Goal     Problem: Infection - Risk of, Central Venous Catheter-Associated Bloodstream Infection  Goal: *Absence of infection signs and symptoms  Outcome: Progressing Towards Goal     Problem: Patient Education: Go to Patient Education Activity  Goal: Patient/Family Education  Outcome: Progressing Towards Goal     Problem: Infection - Risk of, Ventilator-Associated Pneumonia  Goal: *Absence of infection signs and symptoms  Outcome: Progressing Towards Goal     Problem: Patient Education: Go to Patient Education Activity  Goal: Patient/Family Education  Outcome: Progressing Towards Goal     Problem: Hypotension  Goal: *Blood pressure within specified parameters  Outcome: Progressing Towards Goal  Goal: *Fluid volume balance  Outcome: Progressing Towards Goal  Goal: *Labs within defined limits  Outcome: Progressing Towards Goal     Problem: Patient Education: Go to Patient Education Activity  Goal: Patient/Family Education  Outcome: Progressing Towards Goal     Problem: Patient Education: Go to Patient Education Activity  Goal: Patient/Family Education  Outcome: Progressing Towards Goal     Problem: Acute Renal Failure: Day 1  Goal: Off Pathway (Use only if patient is Off Pathway)  Outcome: Progressing Towards Goal  Goal: Activity/Safety  Outcome: Progressing Towards Goal  Goal: Consults, if ordered  Outcome: Progressing Towards Goal  Goal: Diagnostic Test/Procedures  Outcome: Progressing Towards Goal  Goal: Nutrition/Diet  Outcome: Progressing Towards Goal  Goal: Discharge Planning  Outcome: Progressing Towards Goal  Goal: Medications  Outcome: Progressing Towards Goal  Goal: Respiratory  Outcome: Progressing Towards Goal  Goal: Treatments/Interventions/Procedures  Outcome: Progressing Towards Goal  Goal: Psychosocial  Outcome: Progressing Towards Goal  Goal: *Optimal pain control at patient's stated goal  Outcome: Progressing Towards Goal  Goal: *Urinary output within identified parameters  Outcome: Progressing Towards Goal  Goal: *Hemodynamically stable  Outcome: Progressing Towards Goal  Goal: *Tolerating diet  Outcome: Progressing Towards Goal     Problem: Acute Renal Failure: Day 2  Goal: Off Pathway (Use only if patient is Off Pathway)  Outcome: Progressing Towards Goal  Goal: Activity/Safety  Outcome: Progressing Towards Goal  Goal: Consults, if ordered  Outcome: Progressing Towards Goal  Goal: Diagnostic Test/Procedures  Outcome: Progressing Towards Goal  Goal: Nutrition/Diet  Outcome: Progressing Towards Goal  Goal: Discharge Planning  Outcome: Progressing Towards Goal  Goal: Medications  Outcome: Progressing Towards Goal  Goal: Respiratory  Outcome: Progressing Towards Goal  Goal: Treatments/Interventions/Procedures  Outcome: Progressing Towards Goal  Goal: Psychosocial  Outcome: Progressing Towards Goal  Goal: *Optimal pain control at patient's stated goal  Outcome: Progressing Towards Goal  Goal: *Urinary output within identified parameters  Outcome: Progressing Towards Goal  Goal: *Hemodynamically stable  Outcome: Progressing Towards Goal  Goal: *Tolerating diet  Outcome: Progressing Towards Goal  Goal: *Lab values improving  Outcome: Progressing Towards Goal     Problem: Acute Renal Failure: Day 3  Goal: Off Pathway (Use only if patient is Off Pathway)  Outcome: Progressing Towards Goal  Goal: Activity/Safety  Outcome: Progressing Towards Goal  Goal: Consults, if ordered  Outcome: Progressing Towards Goal  Goal: Diagnostic Test/Procedures  Outcome: Progressing Towards Goal  Goal: Nutrition/Diet  Outcome: Progressing Towards Goal  Goal: Discharge Planning  Outcome: Progressing Towards Goal  Goal: Medications  Outcome: Progressing Towards Goal  Goal: Respiratory  Outcome: Progressing Towards Goal  Goal: Treatments/Interventions/Procedures  Outcome: Progressing Towards Goal  Goal: Psychosocial  Outcome: Progressing Towards Goal  Goal: *Optimal pain control at patient's stated goal  Outcome: Progressing Towards Goal  Goal: *Urinary output within identified parameters  Outcome: Progressing Towards Goal  Goal: *Hemodynamically stable  Outcome: Progressing Towards Goal  Goal: *Tolerating diet  Outcome: Progressing Towards Goal  Goal: *Lab values improving  Outcome: Progressing Towards Goal     Problem: Acute Renal Failure: Day 4  Goal: Off Pathway (Use only if patient is Off Pathway)  Outcome: Progressing Towards Goal  Goal: Activity/Safety  Outcome: Progressing Towards Goal  Goal: Consults, if ordered  Outcome: Progressing Towards Goal  Goal: Diagnostic Test/Procedures  Outcome: Progressing Towards Goal  Goal: Nutrition/Diet  Outcome: Progressing Towards Goal  Goal: Discharge Planning  Outcome: Progressing Towards Goal  Goal: Medications  Outcome: Progressing Towards Goal  Goal: Respiratory  Outcome: Progressing Towards Goal  Goal: Treatments/Interventions/Procedures  Outcome: Progressing Towards Goal  Goal: Psychosocial  Outcome: Progressing Towards Goal  Goal: *Optimal pain control at patient's stated goal  Outcome: Progressing Towards Goal  Goal: *Urinary output within identified parameters  Outcome: Progressing Towards Goal  Goal: *Hemodynamically stable  Outcome: Progressing Towards Goal  Goal: *Tolerating diet  Outcome: Progressing Towards Goal  Goal: *Lab values improving  Outcome: Progressing Towards Goal     Problem: Acute Renal Failure: Day 5  Goal: Off Pathway (Use only if patient is Off Pathway)  Outcome: Progressing Towards Goal  Goal: Activity/Safety  Outcome: Progressing Towards Goal  Goal: Diagnostic Test/Procedures  Outcome: Progressing Towards Goal  Goal: Nutrition/Diet  Outcome: Progressing Towards Goal  Goal: Discharge Planning  Outcome: Progressing Towards Goal  Goal: Medications  Outcome: Progressing Towards Goal  Goal: Respiratory  Outcome: Progressing Towards Goal  Goal: Treatments/Interventions/Procedures  Outcome: Progressing Towards Goal  Goal: Psychosocial  Outcome: Progressing Towards Goal  Goal: *Optimal pain control at patient's stated goal  Outcome: Progressing Towards Goal  Goal: *Urinary output within identified parameters  Outcome: Progressing Towards Goal  Goal: *Hemodynamically stable  Outcome: Progressing Towards Goal  Goal: *Tolerating diet  Outcome: Progressing Towards Goal  Goal: *Lab values improving  Outcome: Progressing Towards Goal     Problem: Acute Renal Failure: Day 6  Goal: Off Pathway (Use only if patient is Off Pathway)  Outcome: Progressing Towards Goal  Goal: Activity/Safety  Outcome: Progressing Towards Goal  Goal: Diagnostic Test/Procedures  Outcome: Progressing Towards Goal  Goal: Nutrition/Diet  Outcome: Progressing Towards Goal  Goal: Discharge Planning  Outcome: Progressing Towards Goal  Goal: Medications  Outcome: Progressing Towards Goal  Goal: Respiratory  Outcome: Progressing Towards Goal  Goal: Treatments/Interventions/Procedures  Outcome: Progressing Towards Goal  Goal: Psychosocial  Outcome: Progressing Towards Goal     Problem: Acute Renal Failure: Discharge Outcomes  Goal: *Optimal pain control at patient's stated goal  Outcome: Progressing Towards Goal  Goal: *Urinary output within identified parameters  Outcome: Progressing Towards Goal  Goal: *Hemodynamically stable  Outcome: Progressing Towards Goal  Goal: *Tolerating diet  Outcome: Progressing Towards Goal  Goal: *Lab values stabilized  Outcome: Progressing Towards Goal  Goal: *Verbalizes understanding and describes medication purposes and frequencies  Outcome: Progressing Towards Goal  Goal: *Medication reconciliation  Outcome: Progressing Towards Goal

## 2021-08-05 NOTE — PROGRESS NOTES
Spiritual Care Assessment/Progress Note  [facility]      NAME: Selma Hernandez      MRN: [id number]  AGE: 77 y.o. SEX: male  Roman Catholic Affiliation: No preference   Language: English     8/5/2021     Total Time (in minutes): 42     Spiritual Assessment begun in [facility] CORONARY CARE through conversation with:         []Patient        [x] Family    [] Friend(s)        Reason for Consult: End-of-life support     Spiritual beliefs: (Please include comment if needed)     [x] Identifies with a aleksey tradition:         [] Supported by a aleksey community:            [] Claims no spiritual orientation:           [] Seeking spiritual identity:                [] Adheres to an individual form of spirituality:           [] Not able to assess:                           Identified resources for coping:      [] Prayer                               [] Music                  [] Guided Imagery     [x] Family/friends                 [] Pet visits     [] Devotional reading                         [] Unknown     [] Other:                                             Interventions offered during this visit: (See comments for more details)    Patient Interventions: Initial visit     Family/Friend(s):  Affirmation of emotions/emotional suffering, Affirmation of aleksey, Catharsis/review of pertinent events in supportive environment, Coping skills reviewed/reinforced, End of life issues discussed, Iconic (affirming the presence of God/Higher Power), Normalization of emotional/spiritual concerns     Plan of Care:     [] Support spiritual and/or cultural needs    [] Support AMD and/or advance care planning process      [] Support grieving process   [] Coordinate Rites and/or Rituals    [] Coordination with community clergy   [] No spiritual needs identified at this time   [] Detailed Plan of Care below (See Comments)  [] Make referral to Music Therapy  [] Make referral to Pet Therapy     [] Make referral to Addiction services  [] Make referral to [facility]  [] Make referral to Spiritual Care Partner  [] No future visits requested        [x] Follow up visits as needed     Visited with pt's family at bedside. Pt remains on vent support and is shortly to transition to comfort care. One of pt's sons, a D-I-L and a nephew were at bedside grieving over his anticipated death. Offered presence and listened as they spoke of his life and care for others. The nephew regards him as a father, having been practically raised by the pt. Those present were communicating by phone and message to others unable to be present.    Chaplain Yuko, MDiv, MS, [facility]  [phone])

## 2021-08-05 NOTE — PROGRESS NOTES
0745- bedside report and drips verified. Patient intubated and sedated. [address], NP from pallative on unit for updates. She has a meeting with the family this morning over the phone. 1015- Daily rounds completed at bedside. Updated Dr. Patricio Peterson and team on recent changes, including pallative care meeting this morning. [address] [employer], NP called and stated patient is now a DNR and the family wants comfort care. They will be here around 1230 today. [address]- family, Jazmin Grigsby, and daughter-in-law at bedside. Landry Potter NP at bedside as well. 18- family ready to proceed with transition. Son Cullen Reynoso and nephew Efren Quesada asked to be notified of passing. 1346- patient medicated with versed and dilaudid for comfort. 1350- patient suctioned and extubated to 2 L with RT. Mouth care provided and stayed with patient. 1415- called [facility]. [facility], NP called for update. She will place a hospice consult in the morning if needed. 1425- message left on [facility] voicemail with return callback number. 1440- patient agonal breathing, RR 9-11.    1530- Bedside and Verbal shift change report given to [place], RN (oncoming nurse) by Adelina Arroyo RN (offgoing nurse). Report included the following information SBAR, Kardex, Recent Results and Cardiac Rhythm odell (comfort care). [address] Rojas coordinator returned call; basic information given. Will call back after patient passing.

## 2021-08-05 NOTE — PROGRESS NOTES
[facility]    ICU TEAM Progress Note    Name: Gerardo Rivera   : 1945   MRN: [id number]   Date: 2021      Assessment/Plan:     C discussion this AM    1. Septic shock-resolving  a. Likely intra-abdominal origin given bowel dilation and likely bacterial translocation. Patient completed course of abx for pneumonia- does not appear to be contributory currently. b. C diff negative- PO vanc and IV flagyl stopped  c. Discontinue Zosyn and Eraxis  d. Off steroids  2. Colonic pseudo-obstruction  a. Ex-lap done overnight of  revealed dilation of small and large bowel. No perforation, no necrosis. C diff negative. b. General surgery following  c.  patient having bowel movements  3. Respiratory insufficiency requiring intubation  a. Code blue called on  given respiratory status. Likely in the setting of shock and significant abdominal distention. Patient intubated but without loss of pulse. b. Patient passed SBT and was extubated to 3L NC 2021  c. Re-intubated secondary to hypercarbia early AM 2021  4. JEREMIAS on CKD  a. Nephrology following  b. Likely progression of underlying CKD. On iHDTRS  c. BID labs  5. Severe malnutrition  a. Per nutrition/dietician patient with clinical characteristics of severe malnutrition  b. Continue TPN and tube feeds per nephrology and surgery recs  c. Lipids discontinued since he is on tube feeds as well  6. Anemia of chronic disease  a.  Daily CBC           F - Feeding:  Yes TPN& tube feeds  A - Analgesia: None  S - Sedation: None  T - DVT Prophylaxis: SCD's or Sequential Compression Device and Heparin   H - Head of Bed: > 30 Degrees  U - Ulcer Prophylaxis: Pepcid (famotidine)   G - Glycemic Control: Insulin  S - Spontaneous Breathing Trial: Yes  B - Bowel Regimen: None needed at this time  I - Indwelling Catheter:   Tubes: None  Lines: Arterial Line and Central Line  Drains: John-Rose Drain (LOBO)  D - De-escalation of Antibiotics: Zosyn    Subjective: Progress Note: 8/5/2021      Reason for ICU Admission: Septic shock and respiratory failure     HPI:  58-year-old gentleman with multiple medical problem admitted to the hospital as a transfer from outside facility on July 21 for acute on chronic kidney disease requiring hemodialysis for hypoxia and pulmonary edema and possible pneumonia. Patient was dialyzed and his condition improved slightly and decision was made to transfer him to regular hemodialysis 3 times a week and he had a permacath inserted on the 27th. Over the last few days there was progressive distention of his abdomen, C. difficile was suspected and stool sample was sent but no results yet. He was evaluated by CAT scan abdomen followed by surgical and GI evaluation on the 27th but at that time there was no indication for immediate intervention and treatment for C. difficile colitis was started empirically. The morning of 28th he had progressive increase in his abdominal pain and suddenly he started to be in agonal breathing and bradycardia. CODE BLUE was called and resuscitation started, he received 2 amp of epinephrine and 1 amp of bicarb and he was emergently intubated. ROSC was achieved [not clear if he lost pulse completely or it was very weak that was not detected]. Patient obtunded however he moves 4 limbs and was reaching out to the ET tube shortly after resuscitation so he was given fentanyl and Ativan. He is hypotensive and requiring pressors. His abdomen is severely distended and x-ray showed diffuse gas but no clear free air.   Discussed this with the general surgeon as suspicion for bowel perforation is high.        Overnight Events:   8/5/2021  - No acute events overnight    POD:  4 Day Post-Op    S/P:   Procedure(s):  LAPAROTOMY EXPLORATORY with decompression of small and large bowel    Objective:   Vital Signs:  Visit Vitals  /68   Pulse (!) 56   Temp 97.7 °F (36.5 °C)   Resp 22   Ht 6' (1.829 m)   Wt 54.2 kg (119 lb 7.8 oz)   SpO2 97%   BMI 16.21 kg/m²    O2 Flow Rate (L/min): 2 l/min O2 Device: Endotracheal tube, Heated, Ventilator Temp (24hrs), Av.9 °F (36.6 °C), Min:97.1 °F (36.2 °C), Max:98.2 °F (36.8 °C)           Intake/Output:     Intake/Output Summary (Last 24 hours) at 2021 0925  Last data filed at 2021 0800  Gross per 24 hour   Intake 3402.97 ml   Output 1175 ml   Net 2227.97 ml       Physical Exam:    General:  Sedated on vent. No acute distress. Cachetic. Temporal wasting, sunken orbits   Eyes:  Sclera anicteric. Pupils equal, round, reactive to light. Mouth/Throat: Mucous membranes dry. Neck: Supple. Lungs:   Clear to auscultation bilaterally, poor effort   Cardiovascular:  Regular rate and rhythm, no murmur, click, rub, or gallop. Abdomen:   Midline incision. Dressing intact with small amount of shadowing. L abd LOBO drain with serosanguinous output. Mildly distended. Extremities: No cyanosis or edema. Skin: No acute rash or lesions. Lymph Nodes: Cervical and supraclavicular normal.   Musculoskeletal:  No swelling or deformity. Lines/Devices:  Intact, no erythema, drainage, or tenderness. Neuro/Psych: Sedated         LABS AND  DATA: Personally reviewed  Recent Labs     216 21  0342   WBC 13.5* 14.2*   HGB 9.2* 9.5*   HCT 30.8* 31.5*   PLT 97* 113*     Recent Labs     21  0436 21  1422 21  0343 21  0343    138   < > 140   K 3.6 2.9*   < > 2.8*   * 104   < > 108   CO2 24 28   < > 19*   BUN 47* 28*   < > 52*   CREA 3.14* 2.06*   < > 3.29*   * 165*   < > 101*   CA 7.5* 7.6*   < > 7.8*   MG 2.2 2.2   < >  --    PHOS 4.9*  --   --  4.2    < > = values in this interval not displayed. Recent Labs     21  0436 21  0343 21  0342 21  034   AP 90  --   --  84   TP 7.0  --   --  7.1   ALB 2.8* 3.0*   < > 3.0*   GLOB 4.2*  --   --  4.1*    < > = values in this interval not displayed.      No results for input(s): INR, PTP, APTT, INREXT, INREXT in the last 72 hours. Recent Labs     08/05/21  0541 08/04/21 2049   PHI 7.42 7.28*   PCO2I 39.3 38.3   PO2I 80 172*   FIO2I 21 40     No results for input(s): CPK, CKMB, TROIQ, BNPP in the last 72 hours. Ventilator Settings:  Mode Rate Tidal Volume Pressure FiO2 PEEP   Assist control, Volume control   550 ml  8 cm H2O 21 % 5 cm H20     Peak airway pressure: 24 cm H2O    Minute ventilation: 12.6 l/min        MEDS: Reviewed    Chest X-Ray:  CXR Results  (Last 48 hours)               08/04/21 0721  XR CHEST PORT Final result    Impression:  Lines and tubes in appropriate position. Chronic left hemidiaphragm elevation. Chronic pancreatitis. Narrative:  PORTABLE CHEST RADIOGRAPH/S: 8/4/2021 7:21 AM       INDICATION: ET tube placement. COMPARISON: 7/28/2021, 7/21/2021, 3/8/2021. TECHNIQUE: Portable frontal semiupright radiograph/s of the chest.       FINDINGS:    An ET tube, NG tube, left IJ central line, and right IJ hemodialysis catheter   are in appropriate position. The left hemidiaphragm is chronically elevated. Passive atelectasis is associated; the lungs are otherwise clear. The central   airways are patent. No pneumothorax and no large pleural effusion. Calcifications in the pancreatic tail are sequela of old pancreatitis. Multidisciplinary Rounds Completed: Yes    ABCDEF Bundle/Checklist Completed:  Yes    SPECIAL EQUIPMENT  IHD    DISPOSITION  Stay in ICU    CRITICAL CARE CONSULTANT NOTE  I had a face to face encounter with the patient, reviewed and interpreted patient data including clinical events, labs, images, vital signs, I/O's, and examined patient.   I have discussed the case and the plan and management of the patient's care with the consulting services, the bedside nurses and the respiratory therapist.      NOTE OF PERSONAL INVOLVEMENT IN CARE   This patient has a high probability of imminent, clinically significant deterioration, which requires the highest level of preparedness to intervene urgently. I participated in the decision-making and personally managed or directed the management of the following life and organ supporting interventions that required my frequent assessment to treat or prevent imminent deterioration. I personally spent 75 minutes of critical care time. This is time spent at this critically ill patient's bedside actively involved in patient care as well as the coordination of care and discussions with the patient's family. This does not include any procedural time which has been billed separately.       Paul Bonilla DO   Staff Intensivist/Anesthesiologist  Critical Care Medicine

## 2021-08-05 NOTE — PROGRESS NOTES
1530 Report received from [facility] Fentanyl and precedex gtt stopped. Family updated.     1930 Report given to Jw Tran RN

## 2021-08-05 NOTE — PROGRESS NOTES
1930 Received verbal report from [place], [address] [place] [address]. Pt still in ICU at this time. Room being cleaned. [phone] Pt arrived to unit on bed with RN, RT and PCT. Pt bathed and settled. 0730 Bedside and Verbal shift change report given to [address] [place] [address] (oncoming nurse) by RN (offgoing nurse). Report included the following information SBAR, Kardex, Procedure Summary, Intake/Output, MAR, Recent Results and Cardiac Rhythm SB BBB 1 degree AVB.

## 2021-08-05 NOTE — PROGRESS NOTES
PALLIATIVE MEDICINE              Met with family at the bedside prior to transition to comfort measures. Explained the process again and answered questions. Discussed plan with respiratory therapy and primary nurse. Pastoral care contacted. Reviewed comfort care orders again  Hospice consult pending survival  Will follow along          Bharath Moffett.  [address] MSN, FNP-BC, [facility]

## 2021-08-05 NOTE — PROGRESS NOTES
Palliative Medicine Consult  [place]: [phone])    Patient Name: Sarah Aguiar  YOB: 1945    Date of Initial Consult: 2021  Reason for Consult: Care Decisions  Requesting Provider: Opal Delvalle NP  Primary Care Physician: Tamiko Ryan MD     SUMMARY:   Sarah Aguiar is a 77 y.o. with a past history of type 2 diabetes, chronic diastolic congestive heart failure, dyslipidemia, hypertension, CKD, who was admitted on 2021 from an outside facility with a diagnosis of septic shock, pneumonia, colonic pseudoobstruction, respiratory failure requiring intubation, CODE BLUE ~ extubated 21, JEREMIAS on CKD (dialysis), severe malnutrition (TPN),  Anemia of chronic dz. Admission complicated by reintubation secondary to hypercarbia 2021. Current medical issues leading to Palliative Medicine involvement include: pt acutely ill with high risk of decompensation. Social: Patient has been living with his daughter since . He moved in with her after his wife passed in . He worked as a  for over 40 years. He had issues with his diabetes in the last 3 months he has been in and out of the hospital per daughter. PALLIATIVE DIAGNOSES:   1. Shortness of breath  2. Hypoxia  3. Delirium  4. Hypoalbuminemia  5. Feeding difficulties  6. Goals of care  7. End of life     PLAN:   1. Family meeting with the children: Daughter, Daphney Scruggs and sons Truong Godoy, jose, Julienne Pruett; Tommie Avitia  2. Results of meetin. Family able to verbalize a clear understanding of the patient's condition and poor prognosis  2. All of the children agree that the patient would not want to be prolonged in this condition  3. Family agreed to transition to comfort measures  4. Son Truong Godoy and cousin Cosme Burnette will be arriving to the hospital this afternoon at 1230 and we will start the transition at that time     3.  Reviewed the chart in detail and provided to family with a comprehensive update, medical opinion, what survival would like  4. We discussed the patient's wishes  5. Family shared that they experienced similar end-of-life decisions with their mom  6. Many questions and concerns addressed  7. Family agreed to DNR  8. Family understands if the patient survives longer than anticipated then hospice will be involved  9. I will meet with family in person at 200  10. Comfort care orders placed  11. Initial consult note routed to primary continuity provider and/or primary health care team members  12. Communicated plan of care with: Palliative Boy [facility] Team     GOALS OF CARE / TREATMENT PREFERENCES:     GOALS OF CARE:  Patient/Health Care Proxy Stated Goals: Comfort    TREATMENT PREFERENCES:   Code Status: DNR    Advance Care Planning:  [] The [facility] Interdisciplinary Team has updated the ACP Navigator with [address] and Patient Capacity      Primary Decision Maker (Active): Hawa Torres - Daughter - [phone]  Advance Care Planning 3/5/2021   Confirm Advance Directive None   Patient Would Like to Complete Advance Directive Yes       Medical Interventions: Comfort measures     Other Instructions:   Artificially Administered Nutrition: No feeding tube     Other:    As far as possible, the palliative care team has discussed with patient / health care proxy about goals of care / treatment preferences for patient.      HISTORY:     History obtained from: Chart, team    CHIEF COMPLAINT: Patient admitted with aforementioned history and issues    HPI/SUBJECTIVE:    The patient is:   [] Verbal and participatory  [x] Non-participatory due to:   Medical condition    Clinical Pain Assessment (nonverbal scale for severity on nonverbal patients):   Clinical Pain Assessment  Severity: 0     Activity (Movement): Lying quietly, normal position    Duration: for how long has pt been experiencing pain (e.g., 2 days, 1 month, years)  Frequency: how often pain is an issue (e.g., several times per day, once every few days, constant)     FUNCTIONAL ASSESSMENT:     Palliative Performance Scale (PPS):  PPS: 20       PSYCHOSOCIAL/SPIRITUAL SCREENING:     Palliative IDT has assessed this patient for cultural preferences / practices and a referral made as appropriate to needs (Cultural Services, Patient Advocacy, Ethics, etc.)    Any spiritual / Confucianist concerns:  [] Yes /  [x] No    Caregiver Burnout:  [] Yes /  [x] No /  [x] No Caregiver Present      Anticipatory grief assessment:   [x] Normal  / [] Maladaptive       ESAS Anxiety: Anxiety: 0    ESAS Depression:          REVIEW OF SYSTEMS:     Positive and pertinent negative findings in ROS are noted above in HPI. The following systems were [x] reviewed objectively/ [] unable to be reviewed as noted in HPI  Other findings are noted below. Systems: constitutional, ears/nose/mouth/throat, respiratory, gastrointestinal, genitourinary, musculoskeletal, integumentary, neurologic, psychiatric, endocrine. Positive findings noted below. Modified ESAS Completed by: provider   Fatigue: 10 Drowsiness: 10     Pain: 0   Anxiety: 0     Anorexia: 10 Dyspnea: 0           Stool Occurrence(s): 1        PHYSICAL EXAM:     From RN flowsheet:  Wt Readings from Last 3 Encounters:   08/04/21 119 lb 7.8 oz (54.2 kg)   07/20/21 150 lb (68 kg)   04/07/21 164 lb 6.4 oz (74.6 kg)     Blood pressure 109/79, pulse (!) 56, temperature 97.7 °F (36.5 °C), resp. rate 20, height 6' (1.829 m), weight 119 lb 7.8 oz (54.2 kg), SpO2 99 %.     Pain Scale 1: Adult Nonverbal Pain Scale  Pain Intensity 1: 0     Pain Location 1: Abdomen     Pain Description 1: Constant  Pain Intervention(s) 1: Medication (see MAR) (fentanyl gtt)  Last bowel movement, if known:     Constitutional: Ill-appearing  Eyes:  ENMT: no nasal discharge, dry mucous membranes  Cardiovascular:   Respiratory: breathing not labored on vent, symmetric  Gastrointestinal:   Musculoskeletal: no deformity, no tenderness to palpation  Skin: warm, dry  Neurologic: Sedated  Psychiatric: Unable to assess  Other:       HISTORY:     Active Problems:    Cachectic ([address] (7/21/2021)      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)      Acute delirium (7/21/2021)      Past Medical History:   Diagnosis Date    Cachectic ([address] 7/21/2021    CKD (chronic kidney disease) stage 3, GFR 30-59 ml/min ([address] 2/8/2021    Diabetes ([address]     Essential hypertension 2/8/2021    Hypertension     Insulin-treated type 2 diabetes mellitus ([address] 2/8/2021    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   Social History     Tobacco Use    Smoking status: Never Smoker    Smokeless tobacco: Never Used   Substance Use Topics    Alcohol use: Yes     Comment: rarely     No Known Allergies   Current Facility-Administered Medications   Medication Dose Route Frequency    fentaNYL (PF) 1,500 mcg/30 mL (50 mcg/mL) infusion  0-200 mcg/hr IntraVENous TITRATE    dexmedeTOMidine in 0.9 % NaCl (PRECEDEX) 400 mcg/100 mL (4 mcg/mL) infusion soln  0.1-1.5 mcg/kg/hr IntraVENous TITRATE    PHENYLephrine (CASSANDRA-SYNEPHRINE) 30 mg in 0.9% sodium chloride 250 mL infusion   mcg/min IntraVENous TITRATE    TPN ADULT - CENTRAL   IntraVENous CONTINUOUS    insulin lispro (HUMALOG) injection   SubCUTAneous Q6H    insulin glargine (LANTUS) injection 10 Units  10 Units SubCUTAneous DAILY    famotidine (PF) (PEPCID) 20 mg in 0.9% sodium chloride 10 mL injection  20 mg IntraVENous Q24H    [START ON 8/7/2021] epoetin mya-epbx (RETACRIT) injection 10,000 Units  10,000 Units SubCUTAneous DIALYSIS TUE, THU & SAT    thiamine HCL (B-1) tablet 100 mg  100 mg Oral DAILY    heparin (porcine) 1,000 unit/mL injection 1,900 Units  1,900 Units InterCATHeter DIALYSIS PRN    And    heparin (porcine) 1,000 unit/mL injection 1,900 Units  1,900 Units InterCATHeter DIALYSIS PRN    heparin (porcine) injection 5,000 Units  5,000 Units SubCUTAneous Q12H    chlorhexidine (PERIDEX) 0.12 % mouthwash 15 mL  15 mL Oral Q12H    [Held by provider] DULoxetine (CYMBALTA) capsule 60 mg  60 mg Oral DAILY    [Held by provider] hydrALAZINE (APRESOLINE) tablet 50 mg  50 mg Oral TID    [Held by provider] NIFEdipine ER (PROCARDIA XL) tablet 60 mg  60 mg Oral BID    [Held by provider] pravastatin (PRAVACHOL) tablet 20 mg  20 mg Oral QHS    sodium chloride (NS) flush 5-40 mL  5-40 mL IntraVENous Q8H    sodium chloride (NS) flush 5-40 mL  5-40 mL IntraVENous PRN    acetaminophen (TYLENOL) tablet 650 mg  650 mg Oral Q6H PRN    Or    acetaminophen (TYLENOL) suppository 650 mg  650 mg Rectal Q6H PRN    polyethylene glycol (MIRALAX) packet 17 g  17 g Oral DAILY PRN    ondansetron (ZOFRAN) injection 4 mg  4 mg IntraVENous Q6H PRN    glucose chewable tablet 16 g  4 Tablet Oral PRN    dextrose (D50W) injection syrg 12.5-25 g  12.5-25 g IntraVENous PRN    glucagon (GLUCAGEN) injection 1 mg  1 mg IntraMUSCular PRN    L.acidophilus-paracasei-S.thermophil-bifidobacter (RISAQUAD) 8 billion cell capsule  1 Capsule Oral DAILY    [Held by provider] calcium acetate (phosphate binder) (PHOSLO) tablet 667 mg  1 Tablet Oral TID WITH MEALS          LAB AND IMAGING FINDINGS:     Lab Results   Component Value Date/Time    WBC 13.5 (H) 08/04/2021 04:36 AM    HGB 9.2 (L) 08/04/2021 04:36 AM    PLATELET 97 (L) 48/17/2513 04:36 AM     Lab Results   Component Value Date/Time    Sodium 138 08/04/2021 04:36 AM    Potassium 3.6 08/04/2021 04:36 AM Chloride 109 (H) 08/04/2021 04:36 AM    CO2 24 08/04/2021 04:36 AM    BUN 47 (H) 08/04/2021 04:36 AM    Creatinine 3.14 (H) 08/04/2021 04:36 AM    Calcium 7.5 (L) 08/04/2021 04:36 AM    Magnesium 2.2 08/04/2021 04:36 AM    Phosphorus 4.9 (H) 08/04/2021 04:36 AM      Lab Results   Component Value Date/Time    Alk. phosphatase 90 08/04/2021 04:36 AM    Protein, total 7.0 08/04/2021 04:36 AM    Albumin 2.8 (L) 08/04/2021 04:36 AM    Globulin 4.2 (H) 08/04/2021 04:36 AM     Lab Results   Component Value Date/Time    INR 1.5 (H) 07/28/2021 10:34 AM    Prothrombin time 15.2 (H) 07/28/2021 10:34 AM      Lab Results   Component Value Date/Time    Iron 17 (L) 07/21/2021 08:54 AM    TIBC 209 (L) 07/21/2021 06:27 AM    Iron % saturation 10 (L) 07/21/2021 06:27 AM    Ferritin 494 (H) 07/21/2021 06:27 AM      Lab Results   Component Value Date/Time    pH 7.23 (LL) 08/04/2021 07:15 AM    PCO2 49 (H) 08/04/2021 07:15 AM    PO2 110 (H) 08/04/2021 07:15 AM     No components found for: [facility]   Lab Results   Component Value Date/Time     (H) 07/20/2021 06:40 PM                Total time: 90 minutes  Counseling / coordination time, spent as noted above: 80 minutes  > 50% counseling / coordination?:  Yes    Prolonged service was provided for  [x]30 min   []75 min in face to face time in the presence of the patient, spent as noted above. Time Start:  10:00~10:20; 10:45~11:30; 12:30~1330 (see addendum note)  Time End:   Note: this can only be billed with  (initial) or 21 [phone] (follow up). If multiple start / stop times, list each separately.

## 2021-08-05 NOTE — PROGRESS NOTES
2000. Shift handoff received from [facility]. Pt awaiting transfer to CCU.    2100. abg's done by RT.     2200. Pt transported to CCU 19 via bed with RT and nursing staff x3. Update provided to SULEIMAN Morillo. At time of transfer, pt on precedex at 0.8 mcg, fentanyl at 50 mcg, favian at 50 mcg.

## 2021-08-06 NOTE — PROGRESS NOTES
1930 Assumed care of pt from [facility].     2048 Pt HR dropping. RN at bedside. PEA noted on monitor at 0848. Dr. Candi Acosta notified and at bedside to pronounce. [facility], next of kin, notified. [facility] coordinator notified. Pt is not a candidate for tissue/eye donation.

## 2021-08-06 NOTE — PROGRESS NOTES
Paged by nurse and informed of pt's death.  offered silent prayer of commendation at bedside.   Family is not expected to return to the hospital.    [address] Kristen Valentine, Mannie Martinez, Tommy

## 2021-08-06 NOTE — DEATH NOTE
Cardiac Arrest, Pulseless Electrical Activity    Comfort-only measures in place. Do Not Resuscitate status.     Absent apical pulse  Pupils fixed and dilated  No spontaneous respirations    Pronounced dead by Dr. Whitney Carrera @20:48 PM  Family notified via phone.      -Whitney Carrera MD, [address]  Staff [facility] Critical Care  8/5/2021

## 2021-08-06 NOTE — DISCHARGE SUMMARY
Admit date: 7/21/2021   Admitting Provider: Alban Vasques MD    Discharge date: 8/5/2021  Discharging Provider: Tera Brewer MD      * Admission Diagnoses: Acute delirium [R41.0]    * Discharge Diagnoses:  [facility] Problems as of 8/5/2021 Date Reviewed: 7/21/2021        Codes Class Noted - Resolved POA    Cachectic ([address] (Chronic) ICD-10-CM: R64  ICD-9-CM: 799.4 End Stage 7/21/2021 - Present Yes        Katherine's syndrome (Chronic) ICD-10-CM: K59.81  ICD-9-CM: 560.89 End Stage 7/21/2021 - Present Yes        Acute delirium ICD-10-CM: R41.0  ICD-9-CM: 780.09  7/21/2021 - Present Unknown              * Hospital Course:     Cindy Lucas is a 77year-old severely cachectic B/M with type 2 diabetes, chronic diastolic congestive heart failure, dyslipidemia, hypertension, CKD. Transferred from [facility] (7/21) for septic shock due to Clostridia difficile enterocolitis and possible pneumonia, JEREMIAS superimposed upon CKD requiring hemodialysis for hypoxia and pulmonary edema . Patient was dialyzed and his condition improved slightly and decision was made to transfer him to regular hemodialysis 3 times a week and he had a permacath inserted (7/27)    Progressive distention of his abdomen culminated to diffuse abdominal pain, agonal breathing, bradycardia, and  CODE BLUE (7/28). [address] was achieved [not clear if he lost pulse completely or it was very weak that was not detected]. Septic shock requiring pressors and severe distention prompted emergent exploratory laparotomy/bowel compression. Weaned and extubated (7/30). Postoperative course complicated by JEREMIAS superimposed upon CKD (dialysis), severe malnutrition (TPN), and reintubation secondary to hypercarbia (8/4). Family requested comfort-only care, prior to palliative extubation (8/5).      PEA, Cardiac Arrest @ 20:48 PM  Pronounced dead by Dr. Tera Brewer      * Procedures:   Procedure(s):  LAPAROTOMY EXPLORATORY with decompression of small and large bowel  Cardiology and IR Orders (From admission to next 24h)     Start     Ordered    21 0400  IR INSERT TUNL CVC W/O PORT OVER 5 YR  [[id number]]  TOMORROW AM      21 0947    21 0515  IR INSERT NON TUNL CVC OVER 5 YRS  [[id number]]  ONE TIME      21 0502                Consults: Nephrology, Surgery, Critical Care, Palliative Care    Significant Diagnostic Studies: CT (Abdomen/Pelvis)    Discharge Exam:  Severe Cachexia    * Discharge Condition:   * [facility]    Discharge Medications:  Current Discharge Medication List          * Follow-up Care/Patient Instructions:   Activity: N/A  Diet: N/A  Wound Care: N/A    Follow-up Information    None         Signed:  Tony Traore MD  2021  9:08 PM

## 2021-08-07 NOTE — OP NOTES
[address] [facility]  OPERATIVE REPORT    Name:  Sheila Tello  MR#:  [id number]  :  1945  ACCOUNT #:  [de-identified]  DATE OF SERVICE:  2021      PREOPERATIVE DIAGNOSIS:  Colonic ischemia. POSTOPERATIVE DIAGNOSIS:  Katherine's colonic pseudo-obstruction. PROCEDURE PERFORMED:  Exploratory laparotomy with decompression of small and large bowel distention. SURGEON:  Homero Adams MD    ASSISTANT:  Velma Marsh SA    ANESTHESIA:  General endotracheal.    COMPLICATIONS:  None. SPECIMENS REMOVED:  None. IMPLANTS:  None. ESTIMATED BLOOD LOSS:  Minimal.    DRAINS AND TUBES:  A 19-Romanian Felton drain was placed in the pelvis and externalized to an incision in the left lower quadrant. FINDINGS:  The small bowel and large bowel were noted to be severely distended. There was no evidence of small bowel or large bowel ischemia. There was some fecalization of the distal small bowel, 250 mL of enteric content was evacuated from the small and large bowel. INDICATIONS FOR OPERATION:  The patient is a 80-year-old gentleman who reportedly had colonic distention and altered mental status. He has had some diarrhea which was concerning for possible C. Diff. The CT scan and his abdominal x-ray shows severely distended colon. Bladder pressure was checked which showed the bladder pressure to be 27. The patient's blood pressure was labile. Given these findings, the decision was made to take him to operating room for an exploratory laparotomy and decompression of the colon and possible colonic resection. PROCEDURE:  After informed consent was obtained from the patient's family, the patient was taken to the operating room and placed in the supine position on the operating room table. He underwent general anesthesia with endotracheal intubation without any complication. The abdomen and pelvis were then prepped and draped in the usual sterile surgical fashion.   A surgical time-out was then performed. Preoperative antibiotic was administered prior to skin incision. After the time-out was performed, a midline incision was made with a 10-blade scalpel. Dissection was bluntly taken down using 2 S retractors and electrocautery. Once the midline was opened, we then entered the peritoneal cavity using Metzenbaum scissors. Upon entering the Metzenbaum scissors, we noted that the patient has severely distended small bowel and large bowel. There was no evidence of bowel ischemia or perforation. The abdomen was without spillage. Given the severe distention, the decision was made to perform an enterotomy and a colotomy to decompress the bowel, at the mid sigmoid colon, we put a  pursestring suture using 2-0 silk and made a colotomy which was approximately half a centimeter in diameter. We then decompressed the colon which was just mostly air. There was no evidence of ischemia of the colon. There was minimal stool noted in the colon. Once the colon was decompressed, we then oversewed with 3-0 Vicryl in a running fashion and then 3-0 silk in a Lembert fashion. The repair was then tested to make sure there is no leak. After this was completed, we then turned our attention to the small bowel which was distended as well. We made an enterotomy at the mid ileum. We then used a pool sucker to suction approximately 2.5 L of enteric feculent content from the intestine. The proximal small bowel fluid appears to be very thin bilious fluid. In the distal ileum, there was some fecalization of the small bowel. This was suction irrigated out. Some of the firmness in the small bowel was then massaged into the cecum. Once this was completed, we then ran the entire small bowel from the ligament of Treitz to the ileocecal valve. There was no evidence of perforation or ischemia. The colon appears to be normal as well. The small bowel was closed with 3-0 Vicryl in a running fashion.   We then performed a second layer closure using 3-0 silk Lembert sutures. The enterotomy we made was approximately 0.5 cm in diameter. Once this was closed, we then placed a 19-Greenlandic Felton drain in the pelvis and externalized it to the left lower quadrant. The fascia midline was closed with #1 loop PDS in a running fashion. The skin was then closed with the skin staple. The patient tolerated the procedure well. There were no complications associated with the operation. Dr. Franky Monroe, the attending surgeon, was present during the entirety of the case. All lap, needle and instrument count were correct x2. The patient was kept intubated and was then transported back to the ICU in stable condition.         Deena Diop MD      [id number]  D:  08/06/2021 19:09  T:  08/06/2021 23:34  JOB #:  [id number]

## 2021-08-28 ENCOUNTER — TELEPHONE ENCOUNTER (OUTPATIENT)
Dept: URBAN - METROPOLITAN AREA CLINIC 13 | Facility: CLINIC | Age: 76
End: 2021-08-28

## 2021-08-29 ENCOUNTER — TELEPHONE ENCOUNTER (OUTPATIENT)
Dept: URBAN - METROPOLITAN AREA CLINIC 13 | Facility: CLINIC | Age: 76
End: 2021-08-29

## 2022-04-29 NOTE — PROGRESS NOTES
Progress Note    Patient: Gretchen Armstrong MRN: [id number]  SSN: [id number]    YOB: 1945  Age: 77 y.o. Sex: male      Admit Date: 2021    2 Day Post-Op    Procedure:  Procedure(s):  LAPAROTOMY EXPLORATORY with decompression of small and large bowel    Subjective:     No acute surgical issues.   Remains intubated and on CVVHD    Objective:     Visit Vitals  /64   Pulse 67   Temp 98 °F (36.7 °C)   Resp 22   Ht 6' (1.829 m)   Wt 119 lb 11.4 oz (54.3 kg)   SpO2 98%   BMI 16.24 kg/m²       Temp (24hrs), Av.4 °F (36.3 °C), Min:96.1 °F (35.6 °C), Max:98 °F (36.7 °C)      Physical Exam:    Gen:  NAD  Pulm:  Unlabored  Abd:  S/moderately distended/ some bloody drainage at lower portion of wound, drain serosang      Recent Results (from the past 24 hour(s))   METABOLIC PANEL, BASIC    Collection Time: 21 10:15 AM   Result Value Ref Range    Sodium 142 136 - 145 mmol/L    Potassium 2.9 (L) 3.5 - 5.1 mmol/L    Chloride 107 97 - 108 mmol/L    CO2 26 21 - 32 mmol/L    Anion gap 9 5 - 15 mmol/L    Glucose 178 (H) 65 - 100 mg/dL    BUN 42 (H) 6 - 20 MG/DL    Creatinine 4.30 (H) 0.70 - 1.30 MG/DL    BUN/Creatinine ratio 10 (L) 12 - 20      GFR est AA 16 (L) >60 ml/min/1.73m2    GFR est non-AA 14 (L) >60 ml/min/1.73m2    Calcium 7.2 (L) 8.5 - 10.1 MG/DL   MAGNESIUM    Collection Time: 21 10:15 AM   Result Value Ref Range    Magnesium 2.2 1.6 - 2.4 mg/dL   CBC W/O DIFF    Collection Time: 21 10:15 AM   Result Value Ref Range    WBC 18.3 (H) 4.1 - 11.1 K/uL    RBC 2.78 (L) 4.10 - 5.70 M/uL    HGB 7.8 (L) 12.1 - 17.0 g/dL    HCT 24.7 (L) 36.6 - 50.3 %    MCV 88.8 80.0 - 99.0 FL    MCH 28.1 26.0 - 34.0 PG    MCHC 31.6 30.0 - 36.5 g/dL    RDW 18.2 (H) 11.5 - 14.5 %    PLATELET 725 205 - 663 K/uL    MPV 10.6 8.9 - 12.9 FL    NRBC 1.0 (H) 0  WBC    ABSOLUTE NRBC 0.18 (H) 0.00 - 0.01 K/uL   LACTIC ACID    Collection Time: 21 10:15 AM   Result Value Ref Range    Lactic acid 1.5 0.4 - 2. 0 MMOL/L   GLUCOSE, POC    Collection Time: 07/29/21 12:35 PM   Result Value Ref Range    Glucose (POC) 127 (H) 65 - 117 mg/dL    Performed by Briana Jones    CULTURE, BLOOD, PAIRED    Collection Time: 07/29/21  1:37 PM    Specimen: Blood   Result Value Ref Range    Special Requests: NO SPECIAL REQUESTS      Culture result: NO GROWTH AFTER 16 HOURS     LACTIC ACID    Collection Time: 07/29/21  1:37 PM   Result Value Ref Range    Lactic acid 1.5 0.4 - 2.0 MMOL/L   GLUCOSE, POC    Collection Time: 07/29/21  3:47 PM   Result Value Ref Range    Glucose (POC) 121 (H) 65 - 117 mg/dL    Performed by Briana Jones    METABOLIC PANEL, BASIC    Collection Time: 07/29/21  5:09 PM   Result Value Ref Range    Sodium 141 136 - 145 mmol/L    Potassium 3.1 (L) 3.5 - 5.1 mmol/L    Chloride 107 97 - 108 mmol/L    CO2 24 21 - 32 mmol/L    Anion gap 10 5 - 15 mmol/L    Glucose 144 (H) 65 - 100 mg/dL    BUN 39 (H) 6 - 20 MG/DL    Creatinine 3.93 (H) 0.70 - 1.30 MG/DL    BUN/Creatinine ratio 10 (L) 12 - 20      GFR est AA 18 (L) >60 ml/min/1.73m2    GFR est non-AA 15 (L) >60 ml/min/1.73m2    Calcium 7.6 (L) 8.5 - 10.1 MG/DL   MAGNESIUM    Collection Time: 07/29/21  5:09 PM   Result Value Ref Range    Magnesium 2.2 1.6 - 2.4 mg/dL   GLUCOSE, POC    Collection Time: 07/29/21  8:06 PM   Result Value Ref Range    Glucose (POC) 193 (H) 65 - 117 mg/dL    Performed by Mp Barreto    LACTIC ACID    Collection Time: 07/29/21  8:21 PM   Result Value Ref Range    Lactic acid 0.9 0.4 - 2.0 MMOL/L   GLUCOSE, POC    Collection Time: 07/30/21 12:04 AM   Result Value Ref Range    Glucose (POC) 241 (H) 65 - 117 mg/dL    Performed by Mp Barreto    LACTIC ACID    Collection Time: 07/30/21  2:01 AM   Result Value Ref Range    Lactic acid 0.9 0.4 - 2.0 MMOL/L   GLUCOSE, POC    Collection Time: 07/30/21  4:04 AM   Result Value Ref Range    Glucose (POC) 284 (H) 65 - 117 mg/dL    Performed by Mp Barreto    RENAL FUNCTION PANEL    Collection Time: 07/30/21  6:07 AM Result Value Ref Range    Sodium 138 136 - 145 mmol/L    Potassium 2.7 (LL) 3.5 - 5.1 mmol/L    Chloride 106 97 - 108 mmol/L    CO2 24 21 - 32 mmol/L    Anion gap 8 5 - 15 mmol/L    Glucose 265 (H) 65 - 100 mg/dL    BUN 36 (H) 6 - 20 MG/DL    Creatinine 3.23 (H) 0.70 - 1.30 MG/DL    BUN/Creatinine ratio 11 (L) 12 - 20      GFR est AA 23 (L) >60 ml/min/1.73m2    GFR est non-AA 19 (L) >60 ml/min/1.73m2    Calcium 7.8 (L) 8.5 - 10.1 MG/DL    Phosphorus 3.9 2.6 - 4.7 MG/DL    Albumin 3.0 (L) 3.5 - 5.0 g/dL   MAGNESIUM    Collection Time: 07/30/21  6:07 AM   Result Value Ref Range    Magnesium 2.4 1.6 - 2.4 mg/dL   METABOLIC PANEL, BASIC    Collection Time: 07/30/21  6:07 AM   Result Value Ref Range    Sodium 139 136 - 145 mmol/L    Potassium 2.7 (LL) 3.5 - 5.1 mmol/L    Chloride 106 97 - 108 mmol/L    CO2 22 21 - 32 mmol/L    Anion gap 11 5 - 15 mmol/L    Glucose 263 (H) 65 - 100 mg/dL    BUN 36 (H) 6 - 20 MG/DL    Creatinine 3.20 (H) 0.70 - 1.30 MG/DL    BUN/Creatinine ratio 11 (L) 12 - 20      GFR est AA 23 (L) >60 ml/min/1.73m2    GFR est non-AA 19 (L) >60 ml/min/1.73m2    Calcium 8.1 (L) 8.5 - 10.1 MG/DL   MAGNESIUM    Collection Time: 07/30/21  6:07 AM   Result Value Ref Range    Magnesium 2.4 1.6 - 2.4 mg/dL   CBC WITH AUTOMATED DIFF    Collection Time: 07/30/21  6:07 AM   Result Value Ref Range    WBC 18.2 (H) 4.1 - 11.1 K/uL    RBC 2.65 (L) 4.10 - 5.70 M/uL    HGB 7.6 (L) 12.1 - 17.0 g/dL    HCT 23.7 (L) 36.6 - 50.3 %    MCV 89.4 80.0 - 99.0 FL    MCH 28.7 26.0 - 34.0 PG    MCHC 32.1 30.0 - 36.5 g/dL    RDW 18.6 (H) 11.5 - 14.5 %    PLATELET 703 (L) 103 - 400 K/uL    MPV 10.3 8.9 - 12.9 FL    NRBC 0.9 (H) 0  WBC    ABSOLUTE NRBC 0.16 (H) 0.00 - 0.01 K/uL    NEUTROPHILS 92 (H) 32 - 75 %    LYMPHOCYTES 2 (L) 12 - 49 %    MONOCYTES 5 5 - 13 %    EOSINOPHILS 0 0 - 7 %    BASOPHILS 0 0 - 1 %    IMMATURE GRANULOCYTES 1 (H) 0.0 - 0.5 %    ABS. NEUTROPHILS 16.7 (H) 1.8 - 8.0 K/UL    ABS.  LYMPHOCYTES 0.4 (L) 0.8 - 3.5 K/UL    ABS. MONOCYTES 0.9 0.0 - 1.0 K/UL    ABS. EOSINOPHILS 0.0 0.0 - 0.4 K/UL    ABS. BASOPHILS 0.0 0.0 - 0.1 K/UL    ABS. IMM. GRANS. 0.2 (H) 0.00 - 0.04 K/UL    DF SMEAR SCANNED      RBC COMMENTS ANISOCYTOSIS  1+        RBC COMMENTS ADRIANA CELLS  1+             Assessment:     Hospital Problems  Date Reviewed: 7/21/2021        Codes Class Noted POA    Acute delirium ICD-10-CM: R41.0  ICD-9-CM: 780.09  7/21/2021 Unknown              Plan/Recommendations/Medical Decision Making:     - Colonic pseudo-obstruction:  Recommend rectal tube placement if distention persists. No surgical intervention. Bowel is viable without C-diff or toxic megacolon. - Continue antibiotic therapy  - Cont TPN. Hopefully start enteral feeds soon.   - Having BF    Chelo Sands MD  Bariatric and General Surgeon  [facility] Xeljanz Counseling: I discussed with the patient the risks of Xeljanz therapy including increased risk of infection, liver issues, headache, diarrhea, or cold symptoms. Live vaccines should be avoided. They were instructed to call if they have any problems.

## 2022-09-27 NOTE — PROGRESS NOTES
CC:  Ambar Christy is here today for Office Visit and Vertigo     Medications: medications verified, no change  Refills needed today?  NO  denies Latex allergy or sensitivity  Patient would like communication of their results via:  [employer]    Cell Phone:   Telephone Information:   Mobile [phone]     Okay to leave a message containing results? Yes  Patient's current [facility] status: Active.        Health Maintenance Due   Topic Date Due   • Hepatitis B Vaccine (1 of 3 - 3-dose series) Never done   • COVID-19 Vaccine (2 - Booster for Nehemiah series) 07/15/2021       Patient is due for topics as listed above but is not proceeding with any at this time.            Problem: Falls - Risk of  Goal: *Absence of Falls  Description: Document Leafy Ash Fall Risk and appropriate interventions in the flowsheet.   Outcome: Progressing Towards Goal  Note: Fall Risk Interventions:            Medication Interventions: Teach patient to arise slowly, Patient to call before getting OOB

## 2023-01-18 NOTE — PROGRESS NOTES
Problem: Dysphagia (Adult)  Goal: *Acute Goals and Plan of Care (Insert Text)  Description: Initiated 7/22/2021  1. Patient will tolerate minced and moist diet, thin liquids free of s/s of aspiration and with functional oral clearance within 7 days. Met 7/23/2021   2. Patient will tolerate solid trials with functional oral clearance within 7 days. Met /advanced to easy to chew 7/23/2021   Outcome: Progressing Towards Goal   SPEECH LANGUAGE PATHOLOGY DYSPHAGIA TREATMENT  Patient: Cindy Lucas (89 y.o. male)  Date: 7/23/2021  Diagnosis: Acute delirium [R41.0] <principal problem not specified>       Precautions:  Fall    ASSESSMENT:  Patient with improved mentation this date. He continues with mildly prolonged mastication of solids but complete oral clearance with liquid wash. Suspect functional pharyngeal swallow with no overt s/s aspiration with successive straw sips of thin or solid. PLAN:  Recommendations and Planned Interventions:  Easy to chew/ thin liquids. Straws ok  Strict upright positioning with any PO    Patient continues to benefit from skilled intervention to address the above impairments. Continue treatment per established plan of care. Discharge Recommendations: To Be Determined     SUBJECTIVE:   Patient alert, pleasant. OBJECTIVE:   Cognitive and Communication Status:  Neurologic State: Alert, Eyes open spontaneously  Orientation Level: Disoriented to situation, Oriented to person, Oriented to place, Oriented to time  Cognition: Follows commands     Perseveration: No perseveration noted     Dysphagia Treatment:     P.O. Trials:  Patient Position:  (up in bed)  Vocal quality prior to P.O.: No impairment  Consistency Presented: Thin liquid; Solid  How Presented: Successive swallows;Straw;Self-fed/presented     Bolus Acceptance: No impairment  Bolus Formation/Control: Impaired  Type of Impairment: Delayed  Propulsion: No impairment  Oral Residue: None  Initiation of Swallow: No impairment  Laryngeal Elevation: Functional  Aspiration Signs/Symptoms: None  Pharyngeal Phase Characteristics: No impairment, issues, or problems              Oral Phase Severity: Mild  Pharyngeal Phase Severity : No impairment         After treatment:   Patient left in no apparent distress in bed, Call bell within reach, and Nursing notified    COMMUNICATION/EDUCATION:     The patient's plan of care including recommendations, planned interventions, and recommended diet changes were discussed with: Registered nurse.      Abhay Strong SLP  Time Calculation: 16 mins 16:32

## 2024-01-25 NOTE — PROGRESS NOTES
Comprehensive Nutrition Assessment    Type and Reason for Visit: Initial    Nutrition Recommendations/Plan:    1. Consider addition of anti-diarrheal.     2. Pt appears severely malnourished now refusing PO given frequency/amount of diarrhea. Should diarrhea not improve pt should be considered a candidate for alternate means of nutrition support. 3. Continue K+ repletion. Consider addition of IV goody bag given present GI status. Nutrition Assessment:    Past Medical History:   Diagnosis Date    CKD (chronic kidney disease) stage 3, GFR 30-59 ml/min ([facility] [address] 2/8/2021    Diabetes ([facility] [address]     Essential hypertension 2/8/2021    Hypertension     Insulin-treated type 2 diabetes mellitus ([facility] [address] 2/8/2021    Secondary hyperparathyroidism ([facility] [address] 2/8/2021    Vitamin D deficiency 2/8/2021     Pt initially admitted to [facility] for AMS and worsening kidney function. Transferred to [address] [place] [address] for Nephrology consult. Given progression of kidney disease, pt initiated HD and had permacath placed this morning. Nutrition review completed for pt d/t low body wt/BMI. RN reports frequent loose/watery stools, Cdiff sample collected; however, sounds like testing may not be completed given pt does not meet other criteria. Note plans for CT scan of abdomen. Pt also not eating given loose stools. He appears severely malnourished, with fat/muscle wasting as noted below. His UBW ~150#, always has been pretty thin, with relatively limited reserves, muscle/fat loss exacerbated. Suspect also some change in wt reflective of overall fluid status given initiation of HD since admission and now frequent loose stools. K+ persistently low - likely combination of stool loss, poor intake/appetite, and HD.      Malnutrition Assessment:  Malnutrition Status:  Severe malnutrition    Context:  Acute illness     Findings of the 6 clinical characteristics of malnutrition:   Energy Intake:  7 - 50% or less of est energy requirements for 5 or more days  Weight Loss:  7.00 - Greater than 7.5% over 3 months     Body Fat Loss:  7 - Moderate body fat loss, Orbital, Buccal region, Triceps   Muscle Mass Loss:  7 - Moderate muscle mass loss, Clavicles (pectoralis & deltoids), Temples (temporalis), Hand (interosseous)  Fluid Accumulation:  No significant fluid accumulation,     Strength:  Not performed     Nutritionally Significant Medications: Phoslo, Lantus (held), Humalog (held), KCl 10 mEq q hr started, IV iron sucrose d/c'd 7/26, IV abx d/c'd 7/25, Risaquad started 7/21      Estimated Daily Nutrient Needs:  Energy (kcal): 1945 (MSJ x 1.2 x 1.2); Weight Used for Energy Requirements: Current  Protein (g): 72-80 (1.2-1.4 g/kg);  Weight Used for Protein Requirements: Current  Fluid (ml/day): ~1900; Method Used for Fluid Requirements: 1 ml/kcal    Nutrition Related Findings:       BM: +liquid diarrhea  Edema: None noted  Wounds:  Moisture associate skin damage     Recent Labs     07/27/21  1032 07/27/21  0146 07/27/21  0121 07/26/21  1108   GLU 97 77 86 124*   BUN 34* 25* 25* 66*   CREA 4.42* 3.61* 3.61* 6.79*    140 142 136   K 2.4* 2.5* 2.5* 2.1*    102 103 102   CO2 18* 22 24 18*   CA 8.0* 8.2* 8.6 7.1*   PHOS  --  3.7  --   --    MG  --  2.1  --   --      Recent Labs     07/27/21  0146 07/27/21  0121 07/26/21  1108   ALT 26 25 26   * 137* 131*   TBILI 0.5 0.5 0.4   TP 7.7 8.4* 7.2   ALB 3.5 3.5 3.3*   GLOB 4.2* 4.9* 3.9     Recent Labs     07/27/21  1032 07/27/21  0149   WBC 6.6 8.3   HGB 8.7* 9.1*   HCT 27.8* 28.5*    176     Recent Labs     07/27/21  1126 07/26/21  1630 07/26/21  1133 07/26/21  0645 07/25/21  2215 07/25/21  1634 07/25/21  1201 07/25/21  0752 07/24/21  2100 07/24/21  1708   GLUCPOC 110 118* 127* 154* 159* 136* 108 107 142* 166*       Lab Results   Component Value Date/Time    Hemoglobin A1c 6.2 (H) 07/22/2021 04:53 AM    Hemoglobin A1c 6.2 (H) 01/26/2021 05:45 PM       Current Nutrition Therapies:   Diet: NPO, previously easy to chew  Additional Caloric Sources: None    Meal intake: No data found. Supplement Intake: No data found. Anthropometric Measures:  · Height:  6' (182.9 cm)  · Current Body Wt:  57.4 kg (126 lb 8.7 oz)   · Admission Body Wt:  149 lb 14.6 oz    · Usual Body Wt:  150#/68 kg  · Ideal Body Wt:  178:  71.1 %   · BMI Categories:  Underweight (BMI less than 22) age over 72     Wt Readings from Last 10 Encounters:   07/26/21 57.4 kg (126 lb 9.6 oz)   07/20/21 68 kg (150 lb)   04/07/21 74.6 kg (164 lb 6.4 oz)   03/11/21 68.4 kg (150 lb 12.7 oz)   02/23/21 75.8 kg (167 lb)   01/31/21 68.9 kg (151 lb 14.4 oz)       Nutrition Diagnosis:   · Inadequate oral intake related to altered GI function as evidenced by diarrhea    · Severe malnutrition related to altered GI function, inadequate protein-energy intake as evidenced by weight loss, severe loss of subcutaneous fat      Nutrition Interventions:   Food and/or Nutrient Delivery: Continue current diet, Start oral nutrition supplement  Nutrition Education and Counseling: No recommendations at this time  Coordination of Nutrition Care: Continue to monitor while inpatient    Goals:  Improvement in PO intakes with symptomatic improvement of diarrhea over next 2-3 days. Nutrition Monitoring and Evaluation:   Behavioral-Environmental Outcomes: None identified  Food/Nutrient Intake Outcomes: Diet advancement/tolerance, Food and nutrient intake, Supplement intake  Physical Signs/Symptoms Outcomes: Biochemical data, GI status, Diarrhea, Weight    Discharge Planning:     Too soon to determine     Shannan Sullivan RD       Contact via [facility] no

## (undated) DEVICE — CLIP LIG M BLU TI HRT SHP WIRE HORZ 600 PER BX

## (undated) DEVICE — BASIN ST MAJOR-NO CAUTERY: Brand: MEDLINE INDUSTRIES, INC.

## (undated) DEVICE — SUT SLK 3-0 30IN SH BLK --

## (undated) DEVICE — DRAPE,REIN 53X77,STERILE: Brand: MEDLINE

## (undated) DEVICE — YANKAUER,BULB TIP,W/O VENT,RIGID,STERILE: Brand: MEDLINE

## (undated) DEVICE — Device: Brand: JELCO

## (undated) DEVICE — SKIN TEMPERATURE SENSOR: Brand: DEROYAL

## (undated) DEVICE — PENCIL SMK EVAC 10 FT BLADE ELECTRD ROCKER FOR TELSCP

## (undated) DEVICE — BLADE ELECTRODE: Brand: EDGE

## (undated) DEVICE — GAUZE,PACKING STRIP,IODOFORM,1/2"X5YD,ST: Brand: CURAD

## (undated) DEVICE — GLOVE SURG SZ 8 L12IN FNGR THK79MIL GRN LTX FREE

## (undated) DEVICE — SUTURE PDS II SZ 1 L96IN ABSRB VLT TP-1 L65MM 1/2 CIR Z880G

## (undated) DEVICE — HANDLE LT SNAP ON ULT DURABLE LENS FOR TRUMPF ALC DISPOSABLE

## (undated) DEVICE — CURVED, LARGE JAW, OPEN SEALER/DIVIDER NANO-COATED: Brand: LIGASURE IMPACT

## (undated) DEVICE — TOWEL SURG W17XL27IN STD BLU COT NONFENESTRATED PREWASHED

## (undated) DEVICE — SOLUTION IRRIG 1000ML STRL H2O USP PLAS POUR BTL

## (undated) DEVICE — TOTAL TRAY, 16FR 10ML SIL FOLEY, URN: Brand: MEDLINE

## (undated) DEVICE — SHEET, DRAPE, SPLIT, STERILE: Brand: MEDLINE

## (undated) DEVICE — MINOR GENERAL PACK: Brand: MEDLINE INDUSTRIES, INC.

## (undated) DEVICE — INTENDED FOR TISSUE SEPARATION, AND OTHER PROCEDURES THAT REQUIRE A SHARP SURGICAL BLADE TO PUNCTURE OR CUT.: Brand: BARD-PARKER SAFETY BLADES SIZE 15, STERILE

## (undated) DEVICE — TURNOVER KIT OR CUST CMB FLD GEN LF

## (undated) DEVICE — SOL IRR NACL 0.9% 500ML POUR --

## (undated) DEVICE — ULNAR NERVE PROTECTOR FOAM POSITIONER: Brand: CARDINAL HEALTH

## (undated) DEVICE — SUTURE PERMAHAND SZ 2-0 L30IN NONABSORBABLE BLK SILK W/O A305H

## (undated) DEVICE — SYR 20ML LL STRL LF --

## (undated) DEVICE — SUT ETHLN 2-0 18IN FS BLK --

## (undated) DEVICE — LOTION PREP REMV 5OZ IODO CLR TINC OF BENZ DURAPREP

## (undated) DEVICE — SYRINGE IRRIG 60ML SFT PLIABLE BLB EZ TO GRP 1 HND USE W/

## (undated) DEVICE — YANKAUER,POOLE TIP,STERILE,50/CS: Brand: MEDLINE

## (undated) DEVICE — SUTURE PERMAHAND SZ 3-0 L18IN NONABSORBABLE BLK L26MM SH C013D

## (undated) DEVICE — RESERVOIR,SUCTION,100CC,SILICONE: Brand: MEDLINE

## (undated) DEVICE — PACK,CARDIOVASCULAR,DRAPE,GOWN,SEE TEXT: Brand: MEDLINE

## (undated) DEVICE — SOLUTION IRRIG 1000ML 0.9% SOD CHL USP POUR PLAS BTL

## (undated) DEVICE — DERMABOND SKIN ADH 0.7ML -- DERMABOND ADVANCED 12/BX

## (undated) DEVICE — REM POLYHESIVE ADULT PATIENT RETURN ELECTRODE: Brand: VALLEYLAB

## (undated) DEVICE — GLOVE ORANGE PI 7 1/2   MSG9075

## (undated) DEVICE — GARMENT,MEDLINE,DVT,INT,CALF,MED, GEN2: Brand: MEDLINE

## (undated) DEVICE — PAD,PREPPING,CUFFED,24X48,7",NONSTERILE: Brand: MEDLINE

## (undated) DEVICE — PACK,LAPAROTOMY,2 REINFORCED GOWNS: Brand: MEDLINE

## (undated) DEVICE — TOWEL,OR,DSP,ST,BLUE,STD,2/PK,40PK/CS: Brand: MEDLINE

## (undated) DEVICE — BASIC SINGLE BASIN-LF: Brand: MEDLINE INDUSTRIES, INC.

## (undated) DEVICE — STERILE POLYISOPRENE POWDER-FREE SURGICAL GLOVES: Brand: PROTEXIS

## (undated) DEVICE — PREP SKN DURAPREP 26ML APPL --

## (undated) DEVICE — DRAPE FLD WRM W44XL66IN C6L FOR INTRATEMP SYS THERMABASIN

## (undated) DEVICE — Device

## (undated) DEVICE — WRAP SURG W1.31XL1.34M CARD FOR PT 165-172CM THERMOWRP

## (undated) DEVICE — PREP SKN CHLRAPRP APL 26ML STR --